# Patient Record
Sex: FEMALE | Race: WHITE | Employment: OTHER
[De-identification: names, ages, dates, MRNs, and addresses within clinical notes are randomized per-mention and may not be internally consistent; named-entity substitution may affect disease eponyms.]

---

## 2017-01-04 RX ORDER — HYDROCODONE BITARTRATE AND ACETAMINOPHEN 5; 325 MG/1; MG/1
TABLET ORAL
Qty: 120 TABLET | Refills: 0 | Status: SHIPPED | OUTPATIENT
Start: 2017-01-04 | End: 2017-02-07 | Stop reason: SDUPTHER

## 2017-01-20 DIAGNOSIS — E03.8 OTHER SPECIFIED HYPOTHYROIDISM: ICD-10-CM

## 2017-01-20 RX ORDER — LEVOTHYROXINE SODIUM 0.1 MG/1
100 TABLET ORAL DAILY
Qty: 30 TABLET | Refills: 5 | Status: SHIPPED | OUTPATIENT
Start: 2017-01-20 | End: 2017-02-07 | Stop reason: SDUPTHER

## 2017-01-23 RX ORDER — CLONAZEPAM 0.5 MG/1
TABLET ORAL
Qty: 90 TABLET | Refills: 0 | Status: SHIPPED | OUTPATIENT
Start: 2017-01-23 | End: 2017-02-27 | Stop reason: SDUPTHER

## 2017-01-27 ENCOUNTER — TELEPHONE (OUTPATIENT)
Dept: INTERNAL MEDICINE | Facility: CLINIC | Age: 62
End: 2017-01-27

## 2017-01-29 DIAGNOSIS — E11.65 UNCONTROLLED TYPE 2 DIABETES MELLITUS WITH HYPERGLYCEMIA, WITHOUT LONG-TERM CURRENT USE OF INSULIN (HCC): ICD-10-CM

## 2017-01-30 RX ORDER — GLIPIZIDE 10 MG/1
TABLET ORAL
Qty: 120 TABLET | Refills: 3 | Status: SHIPPED | OUTPATIENT
Start: 2017-01-30 | End: 2017-02-07 | Stop reason: SDUPTHER

## 2017-01-30 RX ORDER — METOPROLOL TARTRATE 100 MG/1
TABLET ORAL
Qty: 60 TABLET | Refills: 3 | Status: SHIPPED | OUTPATIENT
Start: 2017-01-30 | End: 2017-05-30 | Stop reason: SDUPTHER

## 2017-02-02 RX ORDER — HYDROCODONE BITARTRATE AND ACETAMINOPHEN 5; 325 MG/1; MG/1
TABLET ORAL
Qty: 120 TABLET | Refills: 0 | Status: SHIPPED | OUTPATIENT
Start: 2017-02-02 | End: 2017-02-07 | Stop reason: SDUPTHER

## 2017-02-02 RX ORDER — GABAPENTIN 800 MG/1
TABLET ORAL
Qty: 120 TABLET | Refills: 3 | Status: SHIPPED | OUTPATIENT
Start: 2017-02-02 | End: 2017-06-06 | Stop reason: SDUPTHER

## 2017-02-07 ENCOUNTER — OFFICE VISIT (OUTPATIENT)
Dept: INTERNAL MEDICINE | Facility: CLINIC | Age: 62
End: 2017-02-07

## 2017-02-07 VITALS
HEIGHT: 62 IN | DIASTOLIC BLOOD PRESSURE: 83 MMHG | WEIGHT: 241.2 LBS | SYSTOLIC BLOOD PRESSURE: 140 MMHG | HEART RATE: 77 BPM | TEMPERATURE: 97.9 F | OXYGEN SATURATION: 96 % | BODY MASS INDEX: 44.39 KG/M2

## 2017-02-07 DIAGNOSIS — K21.9 GASTROESOPHAGEAL REFLUX DISEASE WITHOUT ESOPHAGITIS: ICD-10-CM

## 2017-02-07 DIAGNOSIS — G89.29 CHRONIC PAIN OF BOTH KNEES: ICD-10-CM

## 2017-02-07 DIAGNOSIS — M25.561 CHRONIC PAIN OF BOTH KNEES: ICD-10-CM

## 2017-02-07 DIAGNOSIS — M25.562 CHRONIC PAIN OF BOTH KNEES: ICD-10-CM

## 2017-02-07 DIAGNOSIS — I10 ESSENTIAL HYPERTENSION: ICD-10-CM

## 2017-02-07 DIAGNOSIS — E11.65 UNCONTROLLED TYPE 2 DIABETES MELLITUS WITH HYPERGLYCEMIA, WITHOUT LONG-TERM CURRENT USE OF INSULIN (HCC): ICD-10-CM

## 2017-02-07 DIAGNOSIS — E03.8 OTHER SPECIFIED HYPOTHYROIDISM: ICD-10-CM

## 2017-02-07 DIAGNOSIS — M79.644 THUMB PAIN, RIGHT: ICD-10-CM

## 2017-02-07 DIAGNOSIS — M79.89 LEG SWELLING: ICD-10-CM

## 2017-02-07 DIAGNOSIS — E11.42 DIABETIC POLYNEUROPATHY ASSOCIATED WITH TYPE 2 DIABETES MELLITUS (HCC): ICD-10-CM

## 2017-02-07 PROBLEM — M79.646 THUMB PAIN: Status: ACTIVE | Noted: 2017-02-07

## 2017-02-07 LAB — HBA1C MFR BLD: 11 %

## 2017-02-07 PROCEDURE — 83036 HEMOGLOBIN GLYCOSYLATED A1C: CPT | Performed by: INTERNAL MEDICINE

## 2017-02-07 PROCEDURE — 99214 OFFICE O/P EST MOD 30 MIN: CPT | Performed by: INTERNAL MEDICINE

## 2017-02-07 RX ORDER — DOCUSATE SODIUM 100 MG/1
CAPSULE, LIQUID FILLED ORAL
Qty: 60 CAPSULE | Refills: 3 | Status: SHIPPED | OUTPATIENT
Start: 2017-02-07 | End: 2018-07-07

## 2017-02-07 RX ORDER — DULOXETIN HYDROCHLORIDE 60 MG/1
CAPSULE, DELAYED RELEASE ORAL
Qty: 30 CAPSULE | Refills: 2 | Status: SHIPPED | OUTPATIENT
Start: 2017-02-07 | End: 2017-05-30 | Stop reason: SDUPTHER

## 2017-02-07 RX ORDER — PANTOPRAZOLE SODIUM 40 MG/1
40 TABLET, DELAYED RELEASE ORAL DAILY
Qty: 30 TABLET | Refills: 3 | Status: SHIPPED | OUTPATIENT
Start: 2017-02-07 | End: 2018-07-07

## 2017-02-07 RX ORDER — LISINOPRIL 10 MG/1
TABLET ORAL
Qty: 30 TABLET | Refills: 3 | Status: SHIPPED | OUTPATIENT
Start: 2017-02-07 | End: 2017-08-31 | Stop reason: SDUPTHER

## 2017-02-07 RX ORDER — GLIPIZIDE 10 MG/1
10 TABLET ORAL
Qty: 60 TABLET | Refills: 3 | Status: SHIPPED | OUTPATIENT
Start: 2017-02-07 | End: 2017-07-12 | Stop reason: SDUPTHER

## 2017-02-07 RX ORDER — LEVOTHYROXINE SODIUM 0.1 MG/1
100 TABLET ORAL DAILY
Qty: 30 TABLET | Refills: 5 | Status: ON HOLD | OUTPATIENT
Start: 2017-02-07 | End: 2020-04-21 | Stop reason: HOSPADM

## 2017-02-07 RX ORDER — FUROSEMIDE 20 MG/1
TABLET ORAL
Qty: 90 TABLET | Refills: 3 | Status: SHIPPED | OUTPATIENT
Start: 2017-02-07 | End: 2020-09-25 | Stop reason: SDUPTHER

## 2017-02-07 RX ORDER — HYDROCODONE BITARTRATE AND ACETAMINOPHEN 5; 325 MG/1; MG/1
1 TABLET ORAL EVERY 6 HOURS PRN
Qty: 120 TABLET | Refills: 0 | Status: SHIPPED | OUTPATIENT
Start: 2017-02-07 | End: 2017-04-03 | Stop reason: SDUPTHER

## 2017-02-07 RX ORDER — IBUPROFEN 800 MG/1
800 TABLET ORAL EVERY 8 HOURS PRN
Qty: 90 TABLET | Refills: 3 | Status: SHIPPED | OUTPATIENT
Start: 2017-02-07 | End: 2017-08-03 | Stop reason: SDUPTHER

## 2017-02-07 RX ORDER — ATORVASTATIN CALCIUM 40 MG/1
40 TABLET, FILM COATED ORAL DAILY
Qty: 30 TABLET | Refills: 3 | Status: SHIPPED | OUTPATIENT
Start: 2017-02-07 | End: 2017-06-06 | Stop reason: SDUPTHER

## 2017-02-07 ASSESSMENT — PATIENT HEALTH QUESTIONNAIRE - PHQ9
SUM OF ALL RESPONSES TO PHQ9 QUESTIONS 1 & 2: 2
2. FEELING DOWN, DEPRESSED OR HOPELESS: 1
1. LITTLE INTEREST OR PLEASURE IN DOING THINGS: 1
SUM OF ALL RESPONSES TO PHQ QUESTIONS 1-9: 2

## 2017-02-20 ENCOUNTER — HOSPITAL ENCOUNTER (OUTPATIENT)
Dept: GENERAL RADIOLOGY | Age: 62
Discharge: HOME | End: 2017-02-20
Payer: COMMERCIAL

## 2017-02-20 ENCOUNTER — HOSPITAL ENCOUNTER (OUTPATIENT)
Age: 62
Discharge: HOME | End: 2017-02-20
Payer: COMMERCIAL

## 2017-02-20 DIAGNOSIS — G89.29 CHRONIC PAIN OF BOTH KNEES: ICD-10-CM

## 2017-02-20 DIAGNOSIS — M25.562 CHRONIC PAIN OF BOTH KNEES: ICD-10-CM

## 2017-02-20 DIAGNOSIS — M25.561 CHRONIC PAIN OF BOTH KNEES: ICD-10-CM

## 2017-02-20 DIAGNOSIS — M79.644 THUMB PAIN, RIGHT: ICD-10-CM

## 2017-02-20 PROCEDURE — 73562 X-RAY EXAM OF KNEE 3: CPT

## 2017-02-20 PROCEDURE — 73130 X-RAY EXAM OF HAND: CPT | Performed by: RADIOLOGY

## 2017-02-20 PROCEDURE — 73130 X-RAY EXAM OF HAND: CPT

## 2017-02-27 RX ORDER — CLONAZEPAM 0.5 MG/1
TABLET ORAL
Qty: 90 TABLET | Refills: 0 | Status: SHIPPED | OUTPATIENT
Start: 2017-02-27 | End: 2017-04-07 | Stop reason: SDUPTHER

## 2017-03-17 ENCOUNTER — TELEPHONE (OUTPATIENT)
Dept: INTERNAL MEDICINE | Age: 62
End: 2017-03-17

## 2017-03-22 ENCOUNTER — TELEPHONE (OUTPATIENT)
Dept: INTERNAL MEDICINE | Age: 62
End: 2017-03-22

## 2017-03-22 RX ORDER — ALOGLIPTIN 12.5 MG/1
12.5 TABLET, FILM COATED ORAL DAILY
Qty: 30 TABLET | Refills: 5 | Status: SHIPPED | OUTPATIENT
Start: 2017-03-22 | End: 2018-07-07

## 2017-03-24 ENCOUNTER — TELEPHONE (OUTPATIENT)
Dept: INTERNAL MEDICINE | Age: 62
End: 2017-03-24

## 2017-04-03 RX ORDER — HYDROCODONE BITARTRATE AND ACETAMINOPHEN 5; 325 MG/1; MG/1
1 TABLET ORAL EVERY 6 HOURS PRN
Qty: 120 TABLET | Refills: 0 | Status: SHIPPED | OUTPATIENT
Start: 2017-04-03 | End: 2017-05-04 | Stop reason: SDUPTHER

## 2017-04-10 RX ORDER — CLONAZEPAM 0.5 MG/1
TABLET ORAL
Qty: 90 TABLET | Refills: 0 | Status: SHIPPED | OUTPATIENT
Start: 2017-04-10 | End: 2017-05-22 | Stop reason: SDUPTHER

## 2017-05-05 RX ORDER — HYDROCODONE BITARTRATE AND ACETAMINOPHEN 5; 325 MG/1; MG/1
1 TABLET ORAL EVERY 6 HOURS PRN
Qty: 120 TABLET | Refills: 0 | Status: SHIPPED | OUTPATIENT
Start: 2017-05-05 | End: 2017-06-06 | Stop reason: SDUPTHER

## 2017-05-08 ENCOUNTER — TELEPHONE (OUTPATIENT)
Dept: INTERNAL MEDICINE | Age: 62
End: 2017-05-08

## 2017-05-22 RX ORDER — CLONAZEPAM 0.5 MG/1
TABLET ORAL
Qty: 90 TABLET | Refills: 0 | Status: SHIPPED | OUTPATIENT
Start: 2017-05-22 | End: 2017-06-23 | Stop reason: SDUPTHER

## 2017-05-30 DIAGNOSIS — E11.42 DIABETIC POLYNEUROPATHY ASSOCIATED WITH TYPE 2 DIABETES MELLITUS (HCC): ICD-10-CM

## 2017-05-30 RX ORDER — DULOXETIN HYDROCHLORIDE 60 MG/1
CAPSULE, DELAYED RELEASE ORAL
Qty: 30 CAPSULE | Refills: 2 | Status: SHIPPED | OUTPATIENT
Start: 2017-05-30 | End: 2020-11-16 | Stop reason: SDUPTHER

## 2017-05-30 RX ORDER — METOPROLOL TARTRATE 100 MG/1
TABLET ORAL
Qty: 60 TABLET | Refills: 3 | Status: SHIPPED | OUTPATIENT
Start: 2017-05-30 | End: 2017-06-27 | Stop reason: SDUPTHER

## 2017-06-06 DIAGNOSIS — G89.29 CHRONIC PAIN OF BOTH KNEES: Primary | ICD-10-CM

## 2017-06-06 DIAGNOSIS — M25.561 CHRONIC PAIN OF BOTH KNEES: Primary | ICD-10-CM

## 2017-06-06 DIAGNOSIS — M25.562 CHRONIC PAIN OF BOTH KNEES: Primary | ICD-10-CM

## 2017-06-07 ENCOUNTER — TELEPHONE (OUTPATIENT)
Dept: INTERNAL MEDICINE | Age: 62
End: 2017-06-07

## 2017-06-07 RX ORDER — HYDROCODONE BITARTRATE AND ACETAMINOPHEN 5; 325 MG/1; MG/1
1 TABLET ORAL EVERY 6 HOURS PRN
Qty: 120 TABLET | Refills: 0 | Status: SHIPPED | OUTPATIENT
Start: 2017-06-07 | End: 2017-07-07 | Stop reason: SDUPTHER

## 2017-06-07 RX ORDER — ATORVASTATIN CALCIUM 40 MG/1
40 TABLET, FILM COATED ORAL DAILY
Qty: 30 TABLET | Refills: 3 | Status: ON HOLD
Start: 2017-06-07 | End: 2020-03-30 | Stop reason: HOSPADM

## 2017-06-07 RX ORDER — GABAPENTIN 800 MG/1
TABLET ORAL
Qty: 120 TABLET | Refills: 3 | Status: SHIPPED | OUTPATIENT
Start: 2017-06-07 | End: 2017-07-17 | Stop reason: SDUPTHER

## 2017-06-12 ENCOUNTER — TELEPHONE (OUTPATIENT)
Dept: INTERNAL MEDICINE | Age: 62
End: 2017-06-12

## 2017-06-23 RX ORDER — GABAPENTIN 800 MG/1
TABLET ORAL
Qty: 120 TABLET | Refills: 3 | Status: SHIPPED | OUTPATIENT
Start: 2017-06-23 | End: 2020-07-10 | Stop reason: SDUPTHER

## 2017-06-23 RX ORDER — CLONAZEPAM 0.5 MG/1
TABLET ORAL
Qty: 90 TABLET | Refills: 0 | Status: SHIPPED | OUTPATIENT
Start: 2017-06-23 | End: 2017-07-23 | Stop reason: SDUPTHER

## 2017-06-23 RX ORDER — ATORVASTATIN CALCIUM 40 MG/1
TABLET, FILM COATED ORAL
Qty: 30 TABLET | Refills: 1 | Status: SHIPPED | OUTPATIENT
Start: 2017-06-23 | End: 2017-07-17 | Stop reason: SDUPTHER

## 2017-06-26 ENCOUNTER — TELEPHONE (OUTPATIENT)
Dept: INTERNAL MEDICINE | Age: 62
End: 2017-06-26

## 2017-06-27 RX ORDER — METOPROLOL TARTRATE 100 MG/1
TABLET ORAL
Qty: 60 TABLET | Refills: 3 | Status: ON HOLD | OUTPATIENT
Start: 2017-06-27 | End: 2020-03-30 | Stop reason: HOSPADM

## 2017-07-07 ENCOUNTER — HOSPITAL ENCOUNTER (OUTPATIENT)
Age: 62
Setting detail: SPECIMEN
Discharge: HOME | End: 2017-07-07
Payer: COMMERCIAL

## 2017-07-07 ENCOUNTER — OFFICE VISIT (OUTPATIENT)
Dept: INTERNAL MEDICINE | Age: 62
End: 2017-07-07
Payer: COMMERCIAL

## 2017-07-07 VITALS
HEART RATE: 73 BPM | WEIGHT: 232 LBS | OXYGEN SATURATION: 95 % | SYSTOLIC BLOOD PRESSURE: 124 MMHG | BODY MASS INDEX: 42.43 KG/M2 | DIASTOLIC BLOOD PRESSURE: 91 MMHG

## 2017-07-07 DIAGNOSIS — Z02.83 ENCOUNTER FOR DRUG SCREENING: Primary | ICD-10-CM

## 2017-07-07 DIAGNOSIS — G62.89 OTHER POLYNEUROPATHY: ICD-10-CM

## 2017-07-07 DIAGNOSIS — N39.0 RECURRENT UTI: ICD-10-CM

## 2017-07-07 DIAGNOSIS — Z12.11 COLON CANCER SCREENING: ICD-10-CM

## 2017-07-07 DIAGNOSIS — E03.8 OTHER SPECIFIED HYPOTHYROIDISM: ICD-10-CM

## 2017-07-07 LAB — HBA1C MFR BLD: 13.1 %

## 2017-07-07 PROCEDURE — 99214 OFFICE O/P EST MOD 30 MIN: CPT | Performed by: INTERNAL MEDICINE

## 2017-07-07 PROCEDURE — 83036 HEMOGLOBIN GLYCOSYLATED A1C: CPT | Performed by: INTERNAL MEDICINE

## 2017-07-07 RX ORDER — HYDROCODONE BITARTRATE AND ACETAMINOPHEN 5; 325 MG/1; MG/1
1 TABLET ORAL EVERY 6 HOURS PRN
Qty: 120 TABLET | Refills: 0 | Status: SHIPPED | OUTPATIENT
Start: 2017-07-07 | End: 2017-08-07 | Stop reason: SDUPTHER

## 2017-07-07 RX ORDER — ACETAMINOPHEN, ASPIRIN AND CAFFEINE 250; 250; 65 MG/1; MG/1; MG/1
1 TABLET, FILM COATED ORAL EVERY 6 HOURS PRN
COMMUNITY
End: 2017-07-07 | Stop reason: CLARIF

## 2017-07-07 RX ORDER — NITROFURANTOIN 25; 75 MG/1; MG/1
CAPSULE ORAL
COMMUNITY
Start: 2017-06-25 | End: 2017-07-17 | Stop reason: ALTCHOICE

## 2017-07-07 RX ORDER — OMEPRAZOLE 20 MG/1
20 CAPSULE, DELAYED RELEASE ORAL 2 TIMES DAILY
COMMUNITY
End: 2018-07-07

## 2017-07-08 LAB
BILIRUBIN URINE: NEGATIVE
COLOR: YELLOW
COMMENT UA: ABNORMAL
CREATININE URINE: 67.6 MG/DL (ref 28–217)
CULTURE: NORMAL
CULTURE: NORMAL
GLUCOSE URINE: ABNORMAL
KETONES, URINE: NEGATIVE
LEUKOCYTE ESTERASE, URINE: NEGATIVE
Lab: NORMAL
MICROALBUMIN/CREAT 24H UR: 23 MG/L
MICROALBUMIN/CREAT UR-RTO: 34 MCG/MG CREAT
NITRITE, URINE: NEGATIVE
PH UA: 5 (ref 5–8)
PROTEIN UA: NEGATIVE
SPECIFIC GRAVITY UA: 1.03 (ref 1–1.03)
SPECIMEN DESCRIPTION: NORMAL
STATUS: NORMAL
TURBIDITY: CLEAR
URINE HGB: NEGATIVE
UROBILINOGEN, URINE: NORMAL

## 2017-07-10 LAB
6-ACETYLMORPHINE, UR: NOT DETECTED
7-AMINOCLONAZEPAM, URINE: PRESENT
ALPHA-OH-ALPRAZ, URINE: NOT DETECTED
ALPRAZOLAM, URINE: NOT DETECTED
AMPHETAMINES, URINE: NOT DETECTED
BARBITURATES, URINE: NOT DETECTED
BENZOYLECGONINE, UR: NOT DETECTED
BUPRENORPHINE URINE: NOT DETECTED
CARISOPRODOL, UR: NOT DETECTED
CLONAZEPAM, URINE: NOT DETECTED
CODEINE, URINE: NOT DETECTED
CREATININE URINE: 70.7 MG/DL (ref 20–400)
DIAZEPAM, URINE: NOT DETECTED
DRUGS EXPECTED, UR: NORMAL
EER HI RES INTERP UR: NORMAL
ETHYL GLUCURONIDE UR: NOT DETECTED
FENTANYL URINE: NOT DETECTED
HYDROCODONE, URINE: PRESENT
HYDROMORPHONE, URINE: NOT DETECTED
LORAZEPAM, URINE: NOT DETECTED
MARIJUANA METAB, UR: PRESENT
MDA, UR: NOT DETECTED
MDEA, EVE, UR: NOT DETECTED
MDMA URINE: NOT DETECTED
MEPERIDINE METAB, UR: NOT DETECTED
METHADONE, URINE: NOT DETECTED
METHAMPHETAMINE, URINE: NOT DETECTED
METHYLPHENIDATE: NOT DETECTED
MIDAZOLAM, URINE: NOT DETECTED
MORPHINE URINE: NOT DETECTED
NORBUPRENORPHINE, URINE: NOT DETECTED
NORDIAZEPAM, URINE: NOT DETECTED
NORFENTANYL, URINE: NOT DETECTED
NORHYDROCODONE, URINE: PRESENT
NOROXYCODONE, URINE: NOT DETECTED
NOROXYMORPHONE, URINE: NOT DETECTED
OXAZEPAM, URINE: NOT DETECTED
OXYCODONE URINE: NOT DETECTED
OXYMORPHONE, URINE: NOT DETECTED
PAIN MANAGEMENT DRUG PANEL INTERP, URINE: NORMAL
PAIN MGT DRUG PANEL, HI RES, UR: NORMAL
PCP,URINE: NOT DETECTED
PHENTERMINE, UR: NOT DETECTED
PROPOXYPHENE, URINE: NOT DETECTED
TAPENTADOL, URINE: NOT DETECTED
TAPENTADOL-O-SULFATE, URINE: NOT DETECTED
TEMAZEPAM, URINE: NOT DETECTED
TRAMADOL, URINE: NOT DETECTED
ZOLPIDEM, URINE: NOT DETECTED

## 2017-07-11 ENCOUNTER — TELEPHONE (OUTPATIENT)
Dept: INTERNAL MEDICINE | Age: 62
End: 2017-07-11

## 2017-07-11 DIAGNOSIS — R32 URINARY INCONTINENCE, UNSPECIFIED TYPE: Primary | ICD-10-CM

## 2017-07-12 ENCOUNTER — TELEPHONE (OUTPATIENT)
Dept: INTERNAL MEDICINE | Age: 62
End: 2017-07-12

## 2017-07-12 DIAGNOSIS — E11.65 UNCONTROLLED TYPE 2 DIABETES MELLITUS WITH HYPERGLYCEMIA, WITHOUT LONG-TERM CURRENT USE OF INSULIN (HCC): ICD-10-CM

## 2017-07-12 RX ORDER — GLIPIZIDE 10 MG/1
20 TABLET ORAL
Qty: 120 TABLET | Refills: 3 | Status: SHIPPED
Start: 2017-07-12 | End: 2020-06-16 | Stop reason: ALTCHOICE

## 2017-07-14 ENCOUNTER — HOSPITAL ENCOUNTER (OUTPATIENT)
Age: 62
Discharge: HOME | End: 2017-07-14
Payer: COMMERCIAL

## 2017-07-14 LAB
CHOLESTEROL/HDL RATIO: 5.2
CHOLESTEROL: 183 MG/DL
HDLC SERPL-MCNC: 35 MG/DL
LDL CHOLESTEROL DIRECT: 80 MG/DL
LDL CHOLESTEROL: ABNORMAL MG/DL (ref 0–130)
TRIGL SERPL-MCNC: 557 MG/DL
VLDLC SERPL CALC-MCNC: ABNORMAL MG/DL (ref 1–30)

## 2017-07-14 PROCEDURE — 83721 ASSAY OF BLOOD LIPOPROTEIN: CPT

## 2017-07-14 PROCEDURE — 36415 COLL VENOUS BLD VENIPUNCTURE: CPT

## 2017-07-14 PROCEDURE — 80061 LIPID PANEL: CPT

## 2017-07-17 ENCOUNTER — HOSPITAL ENCOUNTER (OUTPATIENT)
Dept: PAIN MANAGEMENT | Age: 62
Discharge: HOME | End: 2017-07-17
Payer: COMMERCIAL

## 2017-07-17 VITALS
HEIGHT: 62 IN | RESPIRATION RATE: 20 BRPM | TEMPERATURE: 98.3 F | BODY MASS INDEX: 42.69 KG/M2 | SYSTOLIC BLOOD PRESSURE: 156 MMHG | OXYGEN SATURATION: 98 % | HEART RATE: 75 BPM | DIASTOLIC BLOOD PRESSURE: 88 MMHG | WEIGHT: 232 LBS

## 2017-07-17 PROCEDURE — 99203 OFFICE O/P NEW LOW 30 MIN: CPT

## 2017-07-17 ASSESSMENT — PAIN SCALES - GENERAL: PAINLEVEL_OUTOF10: 8

## 2017-07-17 ASSESSMENT — PAIN DESCRIPTION - PROGRESSION: CLINICAL_PROGRESSION: GRADUALLY WORSENING

## 2017-07-17 ASSESSMENT — PAIN DESCRIPTION - FREQUENCY: FREQUENCY: CONTINUOUS

## 2017-07-17 ASSESSMENT — PAIN DESCRIPTION - ORIENTATION: ORIENTATION: RIGHT;LEFT;LOWER;POSTERIOR

## 2017-07-17 ASSESSMENT — PAIN DESCRIPTION - PAIN TYPE: TYPE: CHRONIC PAIN

## 2017-07-17 ASSESSMENT — PAIN DESCRIPTION - ONSET: ONSET: ON-GOING

## 2017-08-04 RX ORDER — IBUPROFEN 800 MG/1
TABLET ORAL
Qty: 90 TABLET | Refills: 3 | Status: SHIPPED | OUTPATIENT
Start: 2017-08-04 | End: 2018-07-30

## 2017-08-07 ENCOUNTER — TELEPHONE (OUTPATIENT)
Dept: UROLOGY | Age: 62
End: 2017-08-07

## 2017-08-08 RX ORDER — HYDROCODONE BITARTRATE AND ACETAMINOPHEN 5; 325 MG/1; MG/1
1 TABLET ORAL EVERY 6 HOURS PRN
Qty: 120 TABLET | Refills: 0 | Status: SHIPPED | OUTPATIENT
Start: 2017-08-08 | End: 2017-09-06 | Stop reason: SDUPTHER

## 2017-08-24 RX ORDER — CLONAZEPAM 0.5 MG/1
0.5 TABLET ORAL 3 TIMES DAILY PRN
Qty: 90 TABLET | Refills: 0 | Status: SHIPPED | OUTPATIENT
Start: 2017-08-24 | End: 2017-09-14 | Stop reason: SDUPTHER

## 2017-08-31 ENCOUNTER — TELEPHONE (OUTPATIENT)
Dept: UROLOGY | Age: 62
End: 2017-08-31

## 2017-08-31 DIAGNOSIS — I10 ESSENTIAL HYPERTENSION: ICD-10-CM

## 2017-09-01 RX ORDER — LISINOPRIL 10 MG/1
TABLET ORAL
Qty: 30 TABLET | Refills: 0 | Status: SHIPPED | OUTPATIENT
Start: 2017-09-01 | End: 2018-07-07

## 2017-09-06 ENCOUNTER — TELEPHONE (OUTPATIENT)
Dept: INTERNAL MEDICINE | Age: 62
End: 2017-09-06

## 2017-09-06 RX ORDER — HYDROCODONE BITARTRATE AND ACETAMINOPHEN 5; 325 MG/1; MG/1
1 TABLET ORAL EVERY 6 HOURS PRN
Qty: 60 TABLET | Refills: 0 | Status: SHIPPED | OUTPATIENT
Start: 2017-09-06 | End: 2018-07-07

## 2017-09-08 ENCOUNTER — TELEPHONE (OUTPATIENT)
Dept: INTERNAL MEDICINE | Age: 62
End: 2017-09-08

## 2017-09-14 ENCOUNTER — TELEPHONE (OUTPATIENT)
Dept: INTERNAL MEDICINE | Age: 62
End: 2017-09-14

## 2017-09-14 RX ORDER — CLONAZEPAM 0.5 MG/1
0.5 TABLET ORAL 3 TIMES DAILY PRN
Qty: 12 TABLET | Refills: 0 | Status: SHIPPED | OUTPATIENT
Start: 2017-09-14 | End: 2018-07-07

## 2017-10-06 RX ORDER — ATORVASTATIN CALCIUM 40 MG/1
TABLET, FILM COATED ORAL
Qty: 30 TABLET | Refills: 3 | Status: SHIPPED | OUTPATIENT
Start: 2017-10-06 | End: 2018-07-07

## 2017-10-06 NOTE — TELEPHONE ENCOUNTER
Health Maintenance   Topic Date Due    Colon Cancer Screen FIT/FOBT  09/29/2012    Diabetic retinal exam  03/07/2017    Zostavax vaccine  03/19/2017    Diabetic hemoglobin A1C test  10/07/2017    Pneumococcal med risk (1 of 1 - PPSV23) 10/07/2017 (Originally 3/19/1976)    Flu vaccine (1) 10/18/2017 (Originally 9/1/2017)    Hepatitis C screen  02/03/2018 (Originally 1955)    DTaP/Tdap/Td vaccine (1 - Tdap) 03/09/2018 (Originally 3/19/1976)    Cervical cancer screen  02/11/2018    Breast cancer screen  06/01/2018    Diabetic foot exam  07/07/2018    Diabetic microalbuminuria test  07/07/2018    Lipid screen  07/14/2018    HIV screen  Addressed       Hemoglobin A1C (%)   Date Value   07/07/2017 13.1   02/07/2017 11.0   05/24/2016 10.3             ( goal A1C is < 7)   Microalb/Crt. Ratio (mcg/mg creat)   Date Value   07/07/2017 34 (H)     LDL Cholesterol (mg/dL)   Date Value   07/14/2017            (goal LDL is <100)   AST (U/L)   Date Value   05/27/2015 24     ALT (U/L)   Date Value   05/27/2015 29     BUN (mg/dL)   Date Value   08/10/2015 13     BP Readings from Last 3 Encounters:   07/17/17 (!) 156/88   07/07/17 (!) 124/91   02/07/17 140/83          (goal 120/80)    All Future Testing planned in [facility]  Lab Frequency Next Occurrence   Microalbumin, Ur Once 10/15/2017   POCT Fecal Immunochemical Test (FIT) Once 11/3/2017       Next Visit Date:  No future appointments.          Patient Active Problem List:     Chronic neck pain     Abnormal mammogram     DM neuropathy takes Percocet     Hypothyroidism     Depression     MVP (mitral valve prolapse)     Constipation     Chronic prescription benzodiazepine use     Chronic use of opiate drugs therapeutic purposes     Morbid obesity with BMI of 45.0-49.9, adult ([facility] [address]     Mediastinal cyst     Uncontrolled type 2 diabetes mellitus with diabetic polyneuropathy, without long-term current use of insulin (HCC)     Essential hypertension     Thumb pain Chronic pain of both knees

## 2017-12-08 ENCOUNTER — HOSPITAL ENCOUNTER (OUTPATIENT)
Age: 62
Setting detail: SPECIMEN
Discharge: HOME | End: 2017-12-08
Payer: COMMERCIAL

## 2017-12-08 LAB
ALBUMIN SERPL-MCNC: 3.5 G/DL (ref 3.5–5.2)
ALBUMIN/GLOBULIN RATIO: 0.9 (ref 1–2.5)
ALP BLD-CCNC: 103 U/L (ref 35–104)
ALT SERPL-CCNC: 21 U/L (ref 5–33)
ANION GAP SERPL CALCULATED.3IONS-SCNC: 23 MMOL/L (ref 9–17)
AST SERPL-CCNC: 15 U/L
BILIRUB SERPL-MCNC: 0.29 MG/DL (ref 0.3–1.2)
BUN BLDV-MCNC: 13 MG/DL (ref 8–23)
BUN/CREAT BLD: ABNORMAL (ref 9–20)
CALCIUM SERPL-MCNC: 9.6 MG/DL (ref 8.6–10.4)
CHLORIDE BLD-SCNC: 96 MMOL/L (ref 98–107)
CHOLESTEROL/HDL RATIO: 9.2
CHOLESTEROL: 366 MG/DL
CO2: 19 MMOL/L (ref 20–31)
CREAT SERPL-MCNC: 0.48 MG/DL (ref 0.5–0.9)
CREATININE URINE: 34 MG/DL (ref 28–217)
ESTIMATED AVERAGE GLUCOSE: 283 MG/DL
GFR AFRICAN AMERICAN: >60 ML/MIN
GFR NON-AFRICAN AMERICAN: >60 ML/MIN
GFR SERPL CREATININE-BSD FRML MDRD: ABNORMAL ML/MIN/{1.73_M2}
GFR SERPL CREATININE-BSD FRML MDRD: ABNORMAL ML/MIN/{1.73_M2}
GLUCOSE BLD-MCNC: 393 MG/DL (ref 70–99)
HBA1C MFR BLD: 11.5 % (ref 4–6)
HDLC SERPL-MCNC: 40 MG/DL
LDL CHOLESTEROL DIRECT: 200 MG/DL
LDL CHOLESTEROL: ABNORMAL MG/DL (ref 0–130)
MICROALBUMIN/CREAT 24H UR: <12 MG/L
MICROALBUMIN/CREAT UR-RTO: NORMAL MCG/MG CREAT
POTASSIUM SERPL-SCNC: 4.7 MMOL/L (ref 3.7–5.3)
SODIUM BLD-SCNC: 138 MMOL/L (ref 135–144)
TOTAL PROTEIN: 7.3 G/DL (ref 6.4–8.3)
TRIGL SERPL-MCNC: 1014 MG/DL
TSH SERPL DL<=0.05 MIU/L-ACNC: 6.23 MIU/L (ref 0.3–5)
VLDLC SERPL CALC-MCNC: ABNORMAL MG/DL (ref 1–30)

## 2018-01-09 ENCOUNTER — HOSPITAL ENCOUNTER (OUTPATIENT)
Age: 63
Setting detail: SPECIMEN
Discharge: HOME | End: 2018-01-09
Payer: COMMERCIAL

## 2018-01-12 LAB
DATE, STOOL #1: NORMAL
DATE, STOOL #2: NORMAL
DATE, STOOL #3: NORMAL
HEMOCCULT SP1 STL QL: NEGATIVE
HEMOCCULT SP2 STL QL: NEGATIVE
HEMOCCULT SP3 STL QL: NEGATIVE
TIME, STOOL #1: NORMAL
TIME, STOOL #2: NORMAL
TIME, STOOL #3: NORMAL

## 2018-02-19 ENCOUNTER — HOSPITAL ENCOUNTER (OUTPATIENT)
Age: 63
Discharge: HOME | End: 2018-02-21
Payer: COMMERCIAL

## 2018-02-19 ENCOUNTER — HOSPITAL ENCOUNTER (OUTPATIENT)
Age: 63
Setting detail: SPECIMEN
Discharge: HOME | End: 2018-02-19
Payer: COMMERCIAL

## 2018-02-19 ENCOUNTER — HOSPITAL ENCOUNTER (OUTPATIENT)
Dept: GENERAL RADIOLOGY | Age: 63
Discharge: HOME | End: 2018-02-21
Payer: COMMERCIAL

## 2018-02-19 DIAGNOSIS — G89.29 CHRONIC LOW BACK PAIN, UNSPECIFIED BACK PAIN LATERALITY, WITH SCIATICA PRESENCE UNSPECIFIED: ICD-10-CM

## 2018-02-19 DIAGNOSIS — M54.5 CHRONIC LOW BACK PAIN, UNSPECIFIED BACK PAIN LATERALITY, WITH SCIATICA PRESENCE UNSPECIFIED: ICD-10-CM

## 2018-02-19 PROCEDURE — 72100 X-RAY EXAM L-S SPINE 2/3 VWS: CPT

## 2018-02-21 LAB
HPV SAMPLE: NORMAL
HPV SOURCE: NORMAL
HPV, GENOTYPE 16: NOT DETECTED
HPV, GENOTYPE 18: NOT DETECTED
HPV, HIGH RISK OTHER: NOT DETECTED
HPV, INTERPRETATION: NORMAL

## 2018-02-25 LAB — CYTOLOGY REPORT: NORMAL

## 2018-03-23 ENCOUNTER — HOSPITAL ENCOUNTER (OUTPATIENT)
Dept: ULTRASOUND IMAGING | Age: 63
Discharge: HOME | End: 2018-03-25
Payer: COMMERCIAL

## 2018-03-23 ENCOUNTER — HOSPITAL ENCOUNTER (OUTPATIENT)
Dept: MAMMOGRAPHY | Age: 63
Discharge: HOME | End: 2018-03-25
Payer: COMMERCIAL

## 2018-03-23 DIAGNOSIS — N63.20 LUMP OF LEFT BREAST: ICD-10-CM

## 2018-03-23 DIAGNOSIS — Z87.898 HISTORY OF LUMP OF RIGHT BREAST: ICD-10-CM

## 2018-03-23 PROCEDURE — 76642 ULTRASOUND BREAST LIMITED: CPT

## 2018-03-23 PROCEDURE — G0279 TOMOSYNTHESIS, MAMMO: HCPCS

## 2018-07-03 ENCOUNTER — HOSPITAL ENCOUNTER (EMERGENCY)
Age: 63
Discharge: HOME | End: 2018-07-03
Attending: EMERGENCY MEDICINE
Payer: COMMERCIAL

## 2018-07-03 ENCOUNTER — APPOINTMENT (OUTPATIENT)
Dept: GENERAL RADIOLOGY | Age: 63
End: 2018-07-03
Payer: COMMERCIAL

## 2018-07-03 VITALS
HEART RATE: 87 BPM | HEIGHT: 62 IN | OXYGEN SATURATION: 96 % | BODY MASS INDEX: 42.33 KG/M2 | TEMPERATURE: 97.7 F | DIASTOLIC BLOOD PRESSURE: 81 MMHG | RESPIRATION RATE: 18 BRPM | SYSTOLIC BLOOD PRESSURE: 132 MMHG | WEIGHT: 230 LBS

## 2018-07-03 DIAGNOSIS — J01.90 ACUTE SINUSITIS, RECURRENCE NOT SPECIFIED, UNSPECIFIED LOCATION: Primary | ICD-10-CM

## 2018-07-03 PROCEDURE — 99283 EMERGENCY DEPT VISIT LOW MDM: CPT

## 2018-07-03 PROCEDURE — 6370000000 HC RX 637 (ALT 250 FOR IP): Performed by: NURSE PRACTITIONER

## 2018-07-03 PROCEDURE — 71046 X-RAY EXAM CHEST 2 VIEWS: CPT

## 2018-07-03 RX ORDER — DOXYCYCLINE 100 MG/1
100 CAPSULE ORAL ONCE
Status: COMPLETED | OUTPATIENT
Start: 2018-07-03 | End: 2018-07-03

## 2018-07-03 RX ORDER — DOXYCYCLINE HYCLATE 100 MG
100 TABLET ORAL 2 TIMES DAILY
Qty: 20 TABLET | Refills: 0 | Status: SHIPPED | OUTPATIENT
Start: 2018-07-03 | End: 2018-07-13

## 2018-07-03 RX ORDER — FLUTICASONE PROPIONATE 50 MCG
1 SPRAY, SUSPENSION (ML) NASAL DAILY
Qty: 1 BOTTLE | Refills: 0 | Status: SHIPPED | OUTPATIENT
Start: 2018-07-03 | End: 2018-07-30

## 2018-07-03 RX ADMIN — DOXYCYCLINE 100 MG: 100 CAPSULE ORAL at 22:38

## 2018-07-03 ASSESSMENT — ENCOUNTER SYMPTOMS
VOMITING: 0
TROUBLE SWALLOWING: 0
NAUSEA: 0
SINUS PRESSURE: 1
COUGH: 1
SINUS PAIN: 1
SHORTNESS OF BREATH: 0

## 2018-07-03 ASSESSMENT — PAIN DESCRIPTION - LOCATION: LOCATION: THROAT;NOSE

## 2018-07-03 ASSESSMENT — PAIN DESCRIPTION - DESCRIPTORS: DESCRIPTORS: BURNING

## 2018-07-03 ASSESSMENT — PAIN SCALES - GENERAL: PAINLEVEL_OUTOF10: 6

## 2018-07-03 ASSESSMENT — PAIN DESCRIPTION - PAIN TYPE: TYPE: ACUTE PAIN

## 2018-07-04 NOTE — ED PROVIDER NOTES
hepatitis A     possible history of hepatitis A in the past    History of renal calculi     Hypertension     Hyperthyroidism     Hypothyroidism     Morbid obesity with BMI of 45.0-49.9, adult ([facility] [address] 3/31/2016    Nephrolithiasis     Neuropathy (HCC)     Type II or unspecified type diabetes mellitus without mention of complication, not stated as uncontrolled     non insulin dependent     UTI (lower urinary tract infection)      Reviewed. SURGICAL HISTORY           Procedure Laterality Date    BREAST BIOPSY  2012    negative    CARDIOVASCULAR STRESS TEST      7/17/15 no results    CHOLECYSTECTOMY      CYST REMOVAL      right hand    CYSTOSCOPY      ENDOMETRIAL BIOPSY  2009    LITHOTRIPSY      TONSILLECTOMY      TUBAL LIGATION       Reviewed. CURRENT MEDICATIONS       Discharge Medication List as of 7/3/2018 10:23 PM      CONTINUE these medications which have NOT CHANGED    Details   !! atorvastatin (LIPITOR) 40 MG tablet take 1 tablet by mouth once daily, Disp-30 tablet, R-3Normal      clonazePAM (KLONOPIN) 0.5 MG tablet Take 1 tablet by mouth 3 times daily as needed for Anxiety, Disp-12 tablet, R-0Normal      HYDROcodone-acetaminophen (NORCO) 5-325 MG per tablet Take 1 tablet by mouth every 6 hours as needed for Pain ., Disp-60 tablet, R-0Must last 30 days. Normal      lisinopril (PRINIVIL;ZESTRIL) 10 MG tablet TAKE ONE-HALF TABLET BY MOUTH DAILY, Disp-30 tablet, R-0Normal      ibuprofen (ADVIL;MOTRIN) 800 MG tablet TAKE ONE TABLET BY MOUTH EVERY 8 HOURS AS NEEDED FOR PAIN, Disp-90 tablet, R-3Normal      !! glipiZIDE (GLUCOTROL) 10 MG tablet take 2 tablets by mouth twice a day before meals, Disp-120 tablet, R-3Normal      !!  DULoxetine (CYMBALTA) 60 MG extended release capsule take 1 capsule by mouth once daily, Disp-30 capsule, R-2Normal      !! glipiZIDE (GLUCOTROL) 10 MG tablet Take 2 tablets by mouth 2 times daily (before meals), Disp-120 tablet, R-3Normal      omeprazole (PRILOSEC) 20 MG delayed HISTORY      reports that she has quit smoking. Her smoking use included Cigarettes. She has never used smokeless tobacco. She reports that she uses drugs, including Marijuana. She reports that she does not drink alcohol. Reviewed. PHYSICAL EXAM    (up to 7 for level 4, 8 or more for level 5)     ED Triage Vitals [07/03/18 2008]   BP Temp Temp Source Pulse Resp SpO2 Height Weight   132/81 97.7 °F (36.5 °C) Temporal 87 18 96 % 5' 2\" (1.575 m) 230 lb (104.3 kg)       Physical Exam   Constitutional: She is oriented to person, place, and time. She appears well-developed and well-nourished. HENT:   Head: Normocephalic and atraumatic. Right Ear: Tympanic membrane, external ear and ear canal normal.   Left Ear: Tympanic membrane, external ear and ear canal normal.   Nose: Rhinorrhea present. Right sinus exhibits maxillary sinus tenderness and frontal sinus tenderness. Left sinus exhibits maxillary sinus tenderness and frontal sinus tenderness. Mouth/Throat: Uvula is midline and mucous membranes are normal. Posterior oropharyngeal erythema present. Congestion. Postnasal drip. Eyes: Right eye exhibits no discharge. Left eye exhibits no discharge. No scleral icterus. Neck: Normal range of motion. Cardiovascular: Normal rate and regular rhythm. Pulmonary/Chest: Effort normal and breath sounds normal. No stridor. No respiratory distress. She has no decreased breath sounds. She has no wheezes. She has no rhonchi. She has no rales. Musculoskeletal: Normal range of motion. She exhibits no edema. Neurological: She is alert and oriented to person, place, and time. Coordination normal.   Skin: Skin is warm and dry. She is not diaphoretic. Psychiatric: She has a normal mood and affect.  Her behavior is normal.       DIAGNOSTIC RESULTS     RADIOLOGY:   Xr Chest Standard (2 Vw)    Result Date: 7/3/2018  EXAMINATION: TWO VIEWS OF THE CHEST 7/3/2018 9:32 pm COMPARISON: March 22, 2013 HISTORY: [address] R-0Print             (Please note that portions of this note were completed with a voice recognition program.  Efforts were made to edit the dictations but occasionally words are mis-transcribed.)    Candido Barron APRN - CNP  07/04/18 0110

## 2018-07-06 NOTE — ED PROVIDER NOTES
[address]  eMERGENCY dEPARTMENT eNCOUnter   Independent Attestation     Pt Name: Radha Villanueva  MRN: [id number]  Katlyngfraquel 1955  Date of evaluation: 7/6/18     Radha Villanueva is a 66 y.o. female with CC: Sinus Problem and Pharyngitis      Based on the medical record the care appears appropriate. I was personally available for consultation in the Emergency Department.     Marc Vang MD  Attending Emergency Physician                    Amy Britton MD  07/06/18 7964

## 2018-07-07 ENCOUNTER — HOSPITAL ENCOUNTER (EMERGENCY)
Age: 63
Discharge: HOME | End: 2018-07-07
Attending: EMERGENCY MEDICINE
Payer: COMMERCIAL

## 2018-07-07 ENCOUNTER — APPOINTMENT (OUTPATIENT)
Dept: GENERAL RADIOLOGY | Age: 63
End: 2018-07-07
Payer: COMMERCIAL

## 2018-07-07 VITALS
HEART RATE: 62 BPM | DIASTOLIC BLOOD PRESSURE: 72 MMHG | HEIGHT: 62 IN | SYSTOLIC BLOOD PRESSURE: 125 MMHG | BODY MASS INDEX: 42.33 KG/M2 | OXYGEN SATURATION: 96 % | RESPIRATION RATE: 16 BRPM | TEMPERATURE: 97.5 F | WEIGHT: 230 LBS

## 2018-07-07 DIAGNOSIS — R73.9 HYPERGLYCEMIA: ICD-10-CM

## 2018-07-07 DIAGNOSIS — J06.9 UPPER RESPIRATORY TRACT INFECTION, UNSPECIFIED TYPE: Primary | ICD-10-CM

## 2018-07-07 DIAGNOSIS — J34.89 SINUS PRESSURE: ICD-10-CM

## 2018-07-07 LAB
ABSOLUTE EOS #: 0.2 K/UL (ref 0–0.4)
ABSOLUTE IMMATURE GRANULOCYTE: NORMAL K/UL (ref 0–0.3)
ABSOLUTE LYMPH #: 2.7 K/UL (ref 1–4.8)
ABSOLUTE MONO #: 0.6 K/UL (ref 0.1–1.3)
ALBUMIN SERPL-MCNC: 3.4 G/DL (ref 3.5–5.2)
ALBUMIN/GLOBULIN RATIO: ABNORMAL (ref 1–2.5)
ALP BLD-CCNC: 109 U/L (ref 35–104)
ALT SERPL-CCNC: 20 U/L (ref 5–33)
ANION GAP SERPL CALCULATED.3IONS-SCNC: 16 MMOL/L (ref 9–17)
AST SERPL-CCNC: 13 U/L
BASOPHILS # BLD: 1 % (ref 0–2)
BASOPHILS ABSOLUTE: 0.1 K/UL (ref 0–0.2)
BILIRUB SERPL-MCNC: 0.38 MG/DL (ref 0.3–1.2)
BUN BLDV-MCNC: 29 MG/DL (ref 8–23)
BUN/CREAT BLD: ABNORMAL (ref 9–20)
CALCIUM SERPL-MCNC: 9.2 MG/DL (ref 8.6–10.4)
CHLORIDE BLD-SCNC: 100 MMOL/L (ref 98–107)
CHP ED QC CHECK: ABNORMAL
CO2: 19 MMOL/L (ref 20–31)
CREAT SERPL-MCNC: 0.58 MG/DL (ref 0.5–0.9)
DIFFERENTIAL TYPE: NORMAL
EKG ATRIAL RATE: 67 BPM
EKG P AXIS: 47 DEGREES
EKG P-R INTERVAL: 158 MS
EKG Q-T INTERVAL: 422 MS
EKG QRS DURATION: 98 MS
EKG QTC CALCULATION (BAZETT): 445 MS
EKG R AXIS: -29 DEGREES
EKG T AXIS: 9 DEGREES
EKG VENTRICULAR RATE: 67 BPM
EOSINOPHILS RELATIVE PERCENT: 2 % (ref 0–4)
GFR AFRICAN AMERICAN: >60 ML/MIN
GFR NON-AFRICAN AMERICAN: >60 ML/MIN
GFR SERPL CREATININE-BSD FRML MDRD: ABNORMAL ML/MIN/{1.73_M2}
GFR SERPL CREATININE-BSD FRML MDRD: ABNORMAL ML/MIN/{1.73_M2}
GLUCOSE BLD-MCNC: 388 MG/DL
GLUCOSE BLD-MCNC: 388 MG/DL (ref 65–105)
GLUCOSE BLD-MCNC: 444 MG/DL (ref 70–99)
HCT VFR BLD CALC: 44.8 % (ref 36–46)
HEMOGLOBIN: 14.9 G/DL (ref 12–16)
IMMATURE GRANULOCYTES: NORMAL %
LYMPHOCYTES # BLD: 28 % (ref 24–44)
MCH RBC QN AUTO: 29.7 PG (ref 26–34)
MCHC RBC AUTO-ENTMCNC: 33.2 G/DL (ref 31–37)
MCV RBC AUTO: 89.6 FL (ref 80–100)
MONOCYTES # BLD: 6 % (ref 1–7)
NRBC AUTOMATED: NORMAL PER 100 WBC
PDW BLD-RTO: 12.7 % (ref 11.5–14.9)
PLATELET # BLD: 252 K/UL (ref 150–450)
PLATELET ESTIMATE: NORMAL
PMV BLD AUTO: 10.4 FL (ref 6–12)
POTASSIUM SERPL-SCNC: 4.8 MMOL/L (ref 3.7–5.3)
RBC # BLD: 5 M/UL (ref 4–5.2)
RBC # BLD: NORMAL 10*6/UL
SEG NEUTROPHILS: 63 % (ref 36–66)
SEGMENTED NEUTROPHILS ABSOLUTE COUNT: 6.3 K/UL (ref 1.3–9.1)
SODIUM BLD-SCNC: 135 MMOL/L (ref 135–144)
TOTAL PROTEIN: 6.9 G/DL (ref 6.4–8.3)
TROPONIN INTERP: NORMAL
TROPONIN T: <0.03 NG/ML
WBC # BLD: 9.9 K/UL (ref 3.5–11)
WBC # BLD: NORMAL 10*3/UL

## 2018-07-07 PROCEDURE — 71046 X-RAY EXAM CHEST 2 VIEWS: CPT

## 2018-07-07 PROCEDURE — 82947 ASSAY GLUCOSE BLOOD QUANT: CPT

## 2018-07-07 PROCEDURE — 36415 COLL VENOUS BLD VENIPUNCTURE: CPT

## 2018-07-07 PROCEDURE — 93005 ELECTROCARDIOGRAM TRACING: CPT

## 2018-07-07 PROCEDURE — 84484 ASSAY OF TROPONIN QUANT: CPT

## 2018-07-07 PROCEDURE — 99284 EMERGENCY DEPT VISIT MOD MDM: CPT

## 2018-07-07 PROCEDURE — 6370000000 HC RX 637 (ALT 250 FOR IP): Performed by: EMERGENCY MEDICINE

## 2018-07-07 PROCEDURE — 85025 COMPLETE CBC W/AUTO DIFF WBC: CPT

## 2018-07-07 PROCEDURE — 96372 THER/PROPH/DIAG INJ SC/IM: CPT

## 2018-07-07 PROCEDURE — 80053 COMPREHEN METABOLIC PANEL: CPT

## 2018-07-07 RX ORDER — CETIRIZINE HYDROCHLORIDE 10 MG/1
10 TABLET ORAL ONCE
Status: COMPLETED | OUTPATIENT
Start: 2018-07-07 | End: 2018-07-07

## 2018-07-07 RX ORDER — DIPHENHYDRAMINE HCL 25 MG
25 CAPSULE ORAL DAILY
COMMUNITY
End: 2018-07-30

## 2018-07-07 RX ORDER — INDOMETHACIN 50 MG/1
50 CAPSULE ORAL
COMMUNITY
End: 2018-07-30

## 2018-07-07 RX ORDER — LISINOPRIL 10 MG/1
10 TABLET ORAL DAILY
Status: ON HOLD | COMMUNITY
End: 2020-03-30 | Stop reason: HOSPADM

## 2018-07-07 RX ORDER — CETIRIZINE HYDROCHLORIDE 10 MG/1
10 TABLET ORAL DAILY
Qty: 15 TABLET | Refills: 0 | Status: SHIPPED | OUTPATIENT
Start: 2018-07-07 | End: 2019-08-19

## 2018-07-07 RX ADMIN — INSULIN LISPRO 6 UNITS: 100 INJECTION, SOLUTION INTRAVENOUS; SUBCUTANEOUS at 12:41

## 2018-07-07 RX ADMIN — CETIRIZINE HYDROCHLORIDE 10 MG: 10 TABLET, FILM COATED ORAL at 13:30

## 2018-07-07 ASSESSMENT — ENCOUNTER SYMPTOMS
CHEST TIGHTNESS: 1
VOMITING: 0
SORE THROAT: 0
DIARRHEA: 0
EYE PAIN: 0
SINUS PRESSURE: 1
SHORTNESS OF BREATH: 0
COUGH: 1
WHEEZING: 1
SINUS PAIN: 1
ABDOMINAL PAIN: 0
NAUSEA: 0
BACK PAIN: 0

## 2018-07-07 ASSESSMENT — PAIN SCALES - GENERAL: PAINLEVEL_OUTOF10: 7

## 2018-07-07 NOTE — ED NOTES
Pt arrives with c/o ear/nose congestion, sore throat, cough, SOB with chest tightness, which started about 3 weeks ago. Pt was seen in our ER 3 days ago and given doxy and flonase. Pt states today she felt the SOB with chest tightness and decided to come in. Pt is resting on cart, eupneic, very talkative, in no distress.       Jud Verduzco RN  07/07/18 4068

## 2018-07-07 NOTE — ED PROVIDER NOTES
see reports below, unless otherwise noted in the medical decision-making or here. XR CHEST STANDARD (2 VW)   Final Result   Unchanged appearance of the chest without acute airspace disease identified. LABS:  Labs Reviewed   COMPREHENSIVE METABOLIC PANEL - Abnormal; Notable for the following:        Result Value    Glucose 444 (*)     BUN 29 (*)     CO2 19 (*)     Alkaline Phosphatase 109 (*)     Alb 3.4 (*)     All other components within normal limits   POCT GLUCOSE - Abnormal; Notable for the following:    POC GLUCOSE FINGERSTICK - Abnormal; Notable for the following:     POC Glucose 388 (*)     All other components within normal limits   CBC WITH AUTO DIFFERENTIAL   TROPONIN         EMERGENCY DEPARTMENT COURSE:   Medical Decision Making:  Cough congestion, likely persistent upper respiratory infection, likely viral patient is protected against this infection with doxycycline. Due to history of chest pain, ongoing with cough the past 3 weeks context diabetes, will get EKG chest x-ray 1 troponin. Troponin is negative, EKGs at baseline, low suspicion for ACS dissection pulmonary embolus. Patient reevaluated and 476 1626, asymptomatic. Patient has hyperglycemia 444, she has follow-up appointment with her endocrinologist since she reports her sugars as being very labile, we'll provide a dose of lispro, lab workup otherwise nondiagnostic. We'll provide decongestant, patient continues to appear well, she is counseled regarding need to follow up and check her sugars daily for record keeping prior to her appointment with her endocrinologist so that they may assist in appropriate Glycemic intervention. Patient instructed to follow-up with PCP or return to the nearest emergency department for uncontrolled fevers, vomiting, shortness of breath, chest pain, or any other concerns. Patient acknowledges plan, voices understanding of it and is in agreement with it.     Pre-hypertension/Hypertension:

## 2018-07-08 ENCOUNTER — HOSPITAL ENCOUNTER (EMERGENCY)
Age: 63
Discharge: HOME | End: 2018-07-08
Attending: EMERGENCY MEDICINE
Payer: COMMERCIAL

## 2018-07-08 VITALS
TEMPERATURE: 98.1 F | RESPIRATION RATE: 17 BRPM | HEART RATE: 76 BPM | WEIGHT: 230 LBS | BODY MASS INDEX: 42.07 KG/M2 | OXYGEN SATURATION: 95 % | DIASTOLIC BLOOD PRESSURE: 90 MMHG | SYSTOLIC BLOOD PRESSURE: 145 MMHG

## 2018-07-08 LAB
ANION GAP SERPL CALCULATED.3IONS-SCNC: 16 MMOL/L (ref 9–17)
BUN BLDV-MCNC: 37 MG/DL (ref 8–23)
BUN/CREAT BLD: ABNORMAL (ref 9–20)
CALCIUM SERPL-MCNC: 9.4 MG/DL (ref 8.6–10.4)
CHLORIDE BLD-SCNC: 95 MMOL/L (ref 98–107)
CHP ED QC CHECK: NORMAL
CO2: 21 MMOL/L (ref 20–31)
CREAT SERPL-MCNC: 0.68 MG/DL (ref 0.5–0.9)
GFR AFRICAN AMERICAN: >60 ML/MIN
GFR NON-AFRICAN AMERICAN: >60 ML/MIN
GFR SERPL CREATININE-BSD FRML MDRD: ABNORMAL ML/MIN/{1.73_M2}
GFR SERPL CREATININE-BSD FRML MDRD: ABNORMAL ML/MIN/{1.73_M2}
GLUCOSE BLD-MCNC: 294 MG/DL (ref 65–105)
GLUCOSE BLD-MCNC: 376 MG/DL (ref 65–105)
GLUCOSE BLD-MCNC: 456 MG/DL (ref 70–99)
GLUCOSE BLD-MCNC: 457 MG/DL (ref 65–105)
GLUCOSE BLD-MCNC: 478 MG/DL
GLUCOSE BLD-MCNC: 478 MG/DL (ref 65–105)
POTASSIUM SERPL-SCNC: 4.3 MMOL/L (ref 3.7–5.3)
SODIUM BLD-SCNC: 132 MMOL/L (ref 135–144)

## 2018-07-08 PROCEDURE — 2580000003 HC RX 258: Performed by: EMERGENCY MEDICINE

## 2018-07-08 PROCEDURE — 99285 EMERGENCY DEPT VISIT HI MDM: CPT

## 2018-07-08 PROCEDURE — 36415 COLL VENOUS BLD VENIPUNCTURE: CPT

## 2018-07-08 PROCEDURE — 80048 BASIC METABOLIC PNL TOTAL CA: CPT

## 2018-07-08 PROCEDURE — 82947 ASSAY GLUCOSE BLOOD QUANT: CPT

## 2018-07-08 PROCEDURE — 96372 THER/PROPH/DIAG INJ SC/IM: CPT

## 2018-07-08 PROCEDURE — 6370000000 HC RX 637 (ALT 250 FOR IP): Performed by: EMERGENCY MEDICINE

## 2018-07-08 RX ORDER — 0.9 % SODIUM CHLORIDE 0.9 %
1000 INTRAVENOUS SOLUTION INTRAVENOUS ONCE
Status: DISCONTINUED | OUTPATIENT
Start: 2018-07-08 | End: 2018-07-08

## 2018-07-08 RX ORDER — 0.9 % SODIUM CHLORIDE 0.9 %
1000 INTRAVENOUS SOLUTION INTRAVENOUS ONCE
Status: COMPLETED | OUTPATIENT
Start: 2018-07-08 | End: 2018-07-08

## 2018-07-08 RX ADMIN — SODIUM CHLORIDE 1000 ML: 9 INJECTION, SOLUTION INTRAVENOUS at 04:56

## 2018-07-08 RX ADMIN — INSULIN LISPRO 12 UNITS: 100 INJECTION, SOLUTION INTRAVENOUS; SUBCUTANEOUS at 03:46

## 2018-07-08 RX ADMIN — INSULIN LISPRO 20 UNITS: 100 INJECTION, SOLUTION INTRAVENOUS; SUBCUTANEOUS at 04:58

## 2018-07-08 ASSESSMENT — ENCOUNTER SYMPTOMS
COUGH: 1
VOMITING: 0
NAUSEA: 0

## 2018-07-08 NOTE — ED PROVIDER NOTES
is . She indicated that her father is . SOCIAL HISTORY      reports that she has quit smoking. Her smoking use included Cigarettes. She has never used smokeless tobacco. She reports that she uses drugs, including Marijuana. She reports that she does not drink alcohol. PHYSICAL EXAM     INITIAL VITALS: BP (!) 145/90   Pulse 76   Temp 98.1 °F (36.7 °C) (Oral)   Resp 17   Wt 230 lb (104.3 kg)   LMP 2011   SpO2 95%   BMI 42.07 kg/m²   Gen.: NAD  Head: Normocephalic, atraumatic  Eye: Pupils equal round reactive to light, no conjunctivitis  Heart: Regular rate and rhythm no murmurs  Lungs: Clear to auscultation bilaterally, no respiratory distress  Chest wall: No crepitus, no tenderness palpation  Abdomen: Soft, nontender, nondistended, with no peritoneal signs  Neurologic: Patient is alert and oriented x3, motor and sensation is intact in all 4 extremities, fluent speech  Extremities: Full range of motion, no cyanosis, no edema, no signs of trauma, no tenderness to palpation    MEDICAL DECISION MAKING:     MDM  66year-old with uncontrolled diabetes. We'll make sure that there is no diabetic ketoacidosis. We will give subcutaneous insulin. And reassess. Hospital course:  Point of care glucose is 457.    6:39 AM  Glucose down to 294 from 478. No sign of DKA. She has close follow-up with endocrinology, next week. The findings of today's investigation and the importance of close follow up with their pcp and warning precautions which should prompt return to the ed were discussed with the patient. The patient and family verbalized understanding with teach back. DIAGNOSTIC RESULTS     LABS: All lab results were reviewed by myself, and all abnormals are listed below.   Labs Reviewed   BASIC METABOLIC PANEL - Abnormal; Notable for the following:        Result Value    Glucose 456 (*)     BUN 37 (*)     Sodium 132 (*)     Chloride 95 (*)     All other components within normal limits   POC GLUCOSE FINGERSTICK - Abnormal; Notable for the following:     POC Glucose 457 (*)     All other components within normal limits   POC GLUCOSE FINGERSTICK - Abnormal; Notable for the following:     POC Glucose 478 (*)     All other components within normal limits   POC GLUCOSE FINGERSTICK - Abnormal; Notable for the following:     POC Glucose 376 (*)     All other components within normal limits   POCT GLUCOSE - Normal       EMERGENCY DEPARTMENT COURSE:   Vitals:    Vitals:    07/08/18 0300 07/08/18 0301 07/08/18 0302   BP:  (!) 145/90    Pulse:  76    Resp:  17    Temp:   98.1 °F (36.7 °C)   TempSrc: Oral Oral Oral   SpO2:  95%    Weight: 230 lb (104.3 kg)         The patient was given the following medications while in the emergency department:  Orders Placed This Encounter   Medications    DISCONTD: 0.9 % sodium chloride bolus    insulin lispro (HUMALOG) injection vial 12 Units    insulin lispro (HUMALOG) injection vial 20 Units    0.9 % sodium chloride bolus     -------------------------  CRITICAL CARE:   CONSULTS: None  PROCEDURES: Procedures     FINAL IMPRESSION      1.  Uncontrolled type 2 diabetes mellitus with complication, with long-term current use of insulin [facility])          DISPOSITION/PLAN   DISPOSITION        PATIENT REFERRED TO:  Jennifer Pulse  [facility] [address] [place] [address]  [phone]    In 2 days      [facility] ED  [facility] [address] [place] [address]  [phone]    If symptoms worsen      DISCHARGE MEDICATIONS:  New Prescriptions    No medications on file         Chapito Concepcion MD  Attending Emergency Physician                      Chapito Concepcion MD  07/08/18 0302

## 2018-07-08 NOTE — ED NOTES
Pt presents in ED c/o hyperglycemia. Pt reported she was seen in ED x1 day ago for the same symptoms. Pt reported ongoing problem with hyperglycemia. Pt reported she is frustrated because she cannot get her blood sugar down. Pt tearful and explaining that she takes all of her medications. Pt reported she sees an endocrinologist on the 16th. Pt reported she has been having an upper respiratory infection for the past few days. Pt denies all pain. Pt is eupneic, A&OX4, and pink, warm, dry. Call light in reach.      Patti Hamilton RN  07/08/18 5517

## 2018-07-30 ENCOUNTER — APPOINTMENT (OUTPATIENT)
Dept: GENERAL RADIOLOGY | Age: 63
End: 2018-07-30
Payer: COMMERCIAL

## 2018-07-30 ENCOUNTER — HOSPITAL ENCOUNTER (OUTPATIENT)
Age: 63
Setting detail: OBSERVATION
Discharge: HOME | End: 2018-08-02
Attending: EMERGENCY MEDICINE | Admitting: INTERNAL MEDICINE
Payer: COMMERCIAL

## 2018-07-30 DIAGNOSIS — R07.9 CHEST PAIN, UNSPECIFIED TYPE: Primary | ICD-10-CM

## 2018-07-30 LAB
ABSOLUTE EOS #: 0.3 K/UL (ref 0–0.4)
ABSOLUTE IMMATURE GRANULOCYTE: ABNORMAL K/UL (ref 0–0.3)
ABSOLUTE LYMPH #: 2.5 K/UL (ref 1–4.8)
ABSOLUTE MONO #: 0.6 K/UL (ref 0.1–1.3)
ALBUMIN SERPL-MCNC: 3.7 G/DL (ref 3.5–5.2)
ALBUMIN/GLOBULIN RATIO: ABNORMAL (ref 1–2.5)
ALP BLD-CCNC: 133 U/L (ref 35–104)
ALT SERPL-CCNC: 23 U/L (ref 5–33)
ANION GAP SERPL CALCULATED.3IONS-SCNC: 15 MMOL/L (ref 9–17)
AST SERPL-CCNC: 14 U/L
BASOPHILS # BLD: 1 % (ref 0–2)
BASOPHILS ABSOLUTE: 0.1 K/UL (ref 0–0.2)
BETA-HYDROXYBUTYRATE: 0.27 MMOL/L (ref 0.02–0.27)
BILIRUB SERPL-MCNC: 0.52 MG/DL (ref 0.3–1.2)
BUN BLDV-MCNC: 23 MG/DL (ref 8–23)
BUN/CREAT BLD: ABNORMAL (ref 9–20)
CALCIUM SERPL-MCNC: 9.3 MG/DL (ref 8.6–10.4)
CHLORIDE BLD-SCNC: 96 MMOL/L (ref 98–107)
CO2: 23 MMOL/L (ref 20–31)
CREAT SERPL-MCNC: 0.55 MG/DL (ref 0.5–0.9)
DIFFERENTIAL TYPE: ABNORMAL
EKG ATRIAL RATE: 70 BPM
EKG P AXIS: 35 DEGREES
EKG P-R INTERVAL: 146 MS
EKG Q-T INTERVAL: 432 MS
EKG QRS DURATION: 96 MS
EKG QTC CALCULATION (BAZETT): 466 MS
EKG R AXIS: -28 DEGREES
EKG T AXIS: 7 DEGREES
EKG VENTRICULAR RATE: 70 BPM
EOSINOPHILS RELATIVE PERCENT: 3 % (ref 0–4)
GFR AFRICAN AMERICAN: >60 ML/MIN
GFR NON-AFRICAN AMERICAN: >60 ML/MIN
GFR SERPL CREATININE-BSD FRML MDRD: ABNORMAL ML/MIN/{1.73_M2}
GFR SERPL CREATININE-BSD FRML MDRD: ABNORMAL ML/MIN/{1.73_M2}
GLUCOSE BLD-MCNC: 299 MG/DL (ref 70–99)
HCT VFR BLD CALC: 44.1 % (ref 36–46)
HEMOGLOBIN: 15 G/DL (ref 12–16)
IMMATURE GRANULOCYTES: ABNORMAL %
INR BLD: 1
LYMPHOCYTES # BLD: 23 % (ref 24–44)
MCH RBC QN AUTO: 29.9 PG (ref 26–34)
MCHC RBC AUTO-ENTMCNC: 33.9 G/DL (ref 31–37)
MCV RBC AUTO: 88.2 FL (ref 80–100)
MONOCYTES # BLD: 5 % (ref 1–7)
NRBC AUTOMATED: ABNORMAL PER 100 WBC
PARTIAL THROMBOPLASTIN TIME: 27.7 SEC (ref 23–31)
PDW BLD-RTO: 12.7 % (ref 11.5–14.9)
PLATELET # BLD: 244 K/UL (ref 150–450)
PLATELET ESTIMATE: ABNORMAL
PMV BLD AUTO: 9.7 FL (ref 6–12)
POTASSIUM SERPL-SCNC: 4.4 MMOL/L (ref 3.7–5.3)
PROTHROMBIN TIME: 10 SEC (ref 9.7–12)
RBC # BLD: 5 M/UL (ref 4–5.2)
RBC # BLD: ABNORMAL 10*6/UL
SEG NEUTROPHILS: 68 % (ref 36–66)
SEGMENTED NEUTROPHILS ABSOLUTE COUNT: 7.6 K/UL (ref 1.3–9.1)
SODIUM BLD-SCNC: 134 MMOL/L (ref 135–144)
TOTAL PROTEIN: 7.2 G/DL (ref 6.4–8.3)
TROPONIN INTERP: NORMAL
TROPONIN INTERP: NORMAL
TROPONIN T: <0.03 NG/ML
TROPONIN T: <0.03 NG/ML
WBC # BLD: 11.1 K/UL (ref 3.5–11)
WBC # BLD: ABNORMAL 10*3/UL

## 2018-07-30 PROCEDURE — 99285 EMERGENCY DEPT VISIT HI MDM: CPT

## 2018-07-30 PROCEDURE — 82010 KETONE BODYS QUAN: CPT

## 2018-07-30 PROCEDURE — 71045 X-RAY EXAM CHEST 1 VIEW: CPT

## 2018-07-30 PROCEDURE — 85025 COMPLETE CBC W/AUTO DIFF WBC: CPT

## 2018-07-30 PROCEDURE — 80053 COMPREHEN METABOLIC PANEL: CPT

## 2018-07-30 PROCEDURE — 2580000003 HC RX 258: Performed by: EMERGENCY MEDICINE

## 2018-07-30 PROCEDURE — 36415 COLL VENOUS BLD VENIPUNCTURE: CPT

## 2018-07-30 PROCEDURE — 6360000002 HC RX W HCPCS: Performed by: EMERGENCY MEDICINE

## 2018-07-30 PROCEDURE — G0378 HOSPITAL OBSERVATION PER HR: HCPCS

## 2018-07-30 PROCEDURE — 85730 THROMBOPLASTIN TIME PARTIAL: CPT

## 2018-07-30 PROCEDURE — 84484 ASSAY OF TROPONIN QUANT: CPT

## 2018-07-30 PROCEDURE — 93005 ELECTROCARDIOGRAM TRACING: CPT

## 2018-07-30 PROCEDURE — 6370000000 HC RX 637 (ALT 250 FOR IP): Performed by: EMERGENCY MEDICINE

## 2018-07-30 PROCEDURE — 96374 THER/PROPH/DIAG INJ IV PUSH: CPT

## 2018-07-30 PROCEDURE — 85610 PROTHROMBIN TIME: CPT

## 2018-07-30 PROCEDURE — 96372 THER/PROPH/DIAG INJ SC/IM: CPT

## 2018-07-30 RX ORDER — GABAPENTIN 400 MG/1
800 CAPSULE ORAL 3 TIMES DAILY
Status: DISCONTINUED | OUTPATIENT
Start: 2018-07-31 | End: 2018-08-02 | Stop reason: HOSPADM

## 2018-07-30 RX ORDER — POTASSIUM CHLORIDE 7.45 MG/ML
10 INJECTION INTRAVENOUS PRN
Status: DISCONTINUED | OUTPATIENT
Start: 2018-07-30 | End: 2018-08-02 | Stop reason: HOSPADM

## 2018-07-30 RX ORDER — ONDANSETRON 2 MG/ML
4 INJECTION INTRAMUSCULAR; INTRAVENOUS EVERY 6 HOURS PRN
Status: DISCONTINUED | OUTPATIENT
Start: 2018-07-30 | End: 2018-08-02 | Stop reason: HOSPADM

## 2018-07-30 RX ORDER — CETIRIZINE HYDROCHLORIDE 10 MG/1
10 TABLET ORAL DAILY
Status: DISCONTINUED | OUTPATIENT
Start: 2018-07-31 | End: 2018-08-02 | Stop reason: HOSPADM

## 2018-07-30 RX ORDER — DULOXETIN HYDROCHLORIDE 60 MG/1
60 CAPSULE, DELAYED RELEASE ORAL DAILY
Status: DISCONTINUED | OUTPATIENT
Start: 2018-07-31 | End: 2018-08-02 | Stop reason: HOSPADM

## 2018-07-30 RX ORDER — ASPIRIN 81 MG/1
324 TABLET, CHEWABLE ORAL ONCE
Status: COMPLETED | OUTPATIENT
Start: 2018-07-30 | End: 2018-07-30

## 2018-07-30 RX ORDER — ONDANSETRON 2 MG/ML
4 INJECTION INTRAMUSCULAR; INTRAVENOUS ONCE
Status: DISCONTINUED | OUTPATIENT
Start: 2018-07-30 | End: 2018-08-02 | Stop reason: HOSPADM

## 2018-07-30 RX ORDER — MAGNESIUM SULFATE 1 G/100ML
1 INJECTION INTRAVENOUS PRN
Status: DISCONTINUED | OUTPATIENT
Start: 2018-07-30 | End: 2018-08-02 | Stop reason: HOSPADM

## 2018-07-30 RX ORDER — SODIUM CHLORIDE 0.9 % (FLUSH) 0.9 %
10 SYRINGE (ML) INJECTION PRN
Status: DISCONTINUED | OUTPATIENT
Start: 2018-07-30 | End: 2018-08-02 | Stop reason: HOSPADM

## 2018-07-30 RX ORDER — LEVOTHYROXINE SODIUM 0.07 MG/1
75 TABLET ORAL DAILY
Status: DISCONTINUED | OUTPATIENT
Start: 2018-07-31 | End: 2018-08-02 | Stop reason: HOSPADM

## 2018-07-30 RX ORDER — NITROGLYCERIN 0.4 MG/1
0.4 TABLET SUBLINGUAL EVERY 5 MIN PRN
Status: DISCONTINUED | OUTPATIENT
Start: 2018-07-30 | End: 2018-08-02 | Stop reason: HOSPADM

## 2018-07-30 RX ORDER — FAMOTIDINE 20 MG/1
20 TABLET, FILM COATED ORAL 2 TIMES DAILY
Status: DISCONTINUED | OUTPATIENT
Start: 2018-07-31 | End: 2018-08-02 | Stop reason: HOSPADM

## 2018-07-30 RX ORDER — 0.9 % SODIUM CHLORIDE 0.9 %
1000 INTRAVENOUS SOLUTION INTRAVENOUS ONCE
Status: COMPLETED | OUTPATIENT
Start: 2018-07-30 | End: 2018-07-30

## 2018-07-30 RX ORDER — POTASSIUM CHLORIDE 20 MEQ/1
40 TABLET, EXTENDED RELEASE ORAL PRN
Status: DISCONTINUED | OUTPATIENT
Start: 2018-07-30 | End: 2018-08-02 | Stop reason: HOSPADM

## 2018-07-30 RX ORDER — FENTANYL CITRATE 50 UG/ML
50 INJECTION, SOLUTION INTRAMUSCULAR; INTRAVENOUS ONCE
Status: COMPLETED | OUTPATIENT
Start: 2018-07-30 | End: 2018-07-30

## 2018-07-30 RX ORDER — POTASSIUM CHLORIDE 20MEQ/15ML
40 LIQUID (ML) ORAL PRN
Status: DISCONTINUED | OUTPATIENT
Start: 2018-07-30 | End: 2018-08-02 | Stop reason: HOSPADM

## 2018-07-30 RX ORDER — FUROSEMIDE 20 MG/1
20 TABLET ORAL DAILY
Status: DISCONTINUED | OUTPATIENT
Start: 2018-07-31 | End: 2018-08-02 | Stop reason: HOSPADM

## 2018-07-30 RX ORDER — ASPIRIN 81 MG/1
81 TABLET, CHEWABLE ORAL DAILY
Status: DISCONTINUED | OUTPATIENT
Start: 2018-07-31 | End: 2018-08-02 | Stop reason: HOSPADM

## 2018-07-30 RX ORDER — PROMETHAZINE HYDROCHLORIDE 25 MG/ML
25 INJECTION, SOLUTION INTRAMUSCULAR; INTRAVENOUS ONCE
Status: COMPLETED | OUTPATIENT
Start: 2018-07-30 | End: 2018-07-30

## 2018-07-30 RX ORDER — METOPROLOL TARTRATE 100 MG/1
100 TABLET ORAL 2 TIMES DAILY
Status: DISCONTINUED | OUTPATIENT
Start: 2018-07-31 | End: 2018-08-02 | Stop reason: HOSPADM

## 2018-07-30 RX ORDER — INSULIN GLARGINE 100 [IU]/ML
28 INJECTION, SOLUTION SUBCUTANEOUS NIGHTLY
Status: DISCONTINUED | OUTPATIENT
Start: 2018-07-31 | End: 2018-08-02 | Stop reason: HOSPADM

## 2018-07-30 RX ORDER — ACETAMINOPHEN 325 MG/1
650 TABLET ORAL EVERY 4 HOURS PRN
Status: DISCONTINUED | OUTPATIENT
Start: 2018-07-30 | End: 2018-08-02 | Stop reason: HOSPADM

## 2018-07-30 RX ORDER — SODIUM CHLORIDE 0.9 % (FLUSH) 0.9 %
10 SYRINGE (ML) INJECTION EVERY 12 HOURS SCHEDULED
Status: DISCONTINUED | OUTPATIENT
Start: 2018-07-31 | End: 2018-08-02 | Stop reason: HOSPADM

## 2018-07-30 RX ORDER — ATORVASTATIN CALCIUM 80 MG/1
80 TABLET, FILM COATED ORAL DAILY
Status: DISCONTINUED | OUTPATIENT
Start: 2018-07-31 | End: 2018-08-02 | Stop reason: HOSPADM

## 2018-07-30 RX ORDER — LISINOPRIL 10 MG/1
10 TABLET ORAL DAILY
Status: DISCONTINUED | OUTPATIENT
Start: 2018-07-31 | End: 2018-08-02 | Stop reason: HOSPADM

## 2018-07-30 RX ADMIN — ASPIRIN 81 MG 324 MG: 81 TABLET ORAL at 20:42

## 2018-07-30 RX ADMIN — PROMETHAZINE HYDROCHLORIDE 25 MG: 25 INJECTION INTRAMUSCULAR; INTRAVENOUS at 20:51

## 2018-07-30 RX ADMIN — SODIUM CHLORIDE 1000 ML: 9 INJECTION, SOLUTION INTRAVENOUS at 20:51

## 2018-07-30 RX ADMIN — FENTANYL CITRATE 50 MCG: 50 INJECTION, SOLUTION INTRAMUSCULAR; INTRAVENOUS at 22:23

## 2018-07-30 ASSESSMENT — PAIN DESCRIPTION - LOCATION
LOCATION: FOOT;HAND
LOCATION: CHEST

## 2018-07-30 ASSESSMENT — PAIN SCALES - GENERAL
PAINLEVEL_OUTOF10: 10
PAINLEVEL_OUTOF10: 8
PAINLEVEL_OUTOF10: 5
PAINLEVEL_OUTOF10: 5

## 2018-07-30 ASSESSMENT — PAIN DESCRIPTION - PAIN TYPE: TYPE: CHRONIC PAIN

## 2018-07-31 ENCOUNTER — APPOINTMENT (OUTPATIENT)
Dept: NUCLEAR MEDICINE | Age: 63
End: 2018-07-31
Payer: COMMERCIAL

## 2018-07-31 LAB
ALBUMIN SERPL-MCNC: 3.1 G/DL (ref 3.5–5.2)
ALBUMIN/GLOBULIN RATIO: ABNORMAL (ref 1–2.5)
ALP BLD-CCNC: 112 U/L (ref 35–104)
ALT SERPL-CCNC: 24 U/L (ref 5–33)
ANION GAP SERPL CALCULATED.3IONS-SCNC: 14 MMOL/L (ref 9–17)
AST SERPL-CCNC: 21 U/L
BILIRUB SERPL-MCNC: 0.33 MG/DL (ref 0.3–1.2)
BNP INTERPRETATION: NORMAL
BUN BLDV-MCNC: 19 MG/DL (ref 8–23)
BUN/CREAT BLD: ABNORMAL (ref 9–20)
CALCIUM SERPL-MCNC: 8.6 MG/DL (ref 8.6–10.4)
CHLORIDE BLD-SCNC: 101 MMOL/L (ref 98–107)
CHOLESTEROL/HDL RATIO: 5.4
CHOLESTEROL: 152 MG/DL
CO2: 23 MMOL/L (ref 20–31)
CREAT SERPL-MCNC: 0.48 MG/DL (ref 0.5–0.9)
GFR AFRICAN AMERICAN: >60 ML/MIN
GFR NON-AFRICAN AMERICAN: >60 ML/MIN
GFR SERPL CREATININE-BSD FRML MDRD: ABNORMAL ML/MIN/{1.73_M2}
GFR SERPL CREATININE-BSD FRML MDRD: ABNORMAL ML/MIN/{1.73_M2}
GLUCOSE BLD-MCNC: 234 MG/DL (ref 65–105)
GLUCOSE BLD-MCNC: 238 MG/DL (ref 65–105)
GLUCOSE BLD-MCNC: 242 MG/DL (ref 65–105)
GLUCOSE BLD-MCNC: 249 MG/DL (ref 65–105)
GLUCOSE BLD-MCNC: 257 MG/DL (ref 65–105)
GLUCOSE BLD-MCNC: 302 MG/DL (ref 70–99)
HCT VFR BLD CALC: 41.3 % (ref 36–46)
HDLC SERPL-MCNC: 28 MG/DL
HEMOGLOBIN: 13.8 G/DL (ref 12–16)
INR BLD: 1
LDL CHOLESTEROL: 48 MG/DL (ref 0–130)
LV EF: 49 %
LV EF: 58 %
LVEF MODALITY: NORMAL
LVEF MODALITY: NORMAL
MAGNESIUM: 1.7 MG/DL (ref 1.6–2.6)
MCH RBC QN AUTO: 29.7 PG (ref 26–34)
MCHC RBC AUTO-ENTMCNC: 33.4 G/DL (ref 31–37)
MCV RBC AUTO: 89 FL (ref 80–100)
NRBC AUTOMATED: ABNORMAL PER 100 WBC
PDW BLD-RTO: 13 % (ref 11.5–14.9)
PLATELET # BLD: 208 K/UL (ref 150–450)
PMV BLD AUTO: 10.3 FL (ref 6–12)
POTASSIUM SERPL-SCNC: 3.9 MMOL/L (ref 3.7–5.3)
PRO-BNP: 164 PG/ML
PROTHROMBIN TIME: 10 SEC (ref 9.7–12)
RBC # BLD: 4.63 M/UL (ref 4–5.2)
SODIUM BLD-SCNC: 138 MMOL/L (ref 135–144)
TOTAL PROTEIN: 6 G/DL (ref 6.4–8.3)
TRIGL SERPL-MCNC: 378 MG/DL
VLDLC SERPL CALC-MCNC: ABNORMAL MG/DL (ref 1–30)
WBC # BLD: 11.9 K/UL (ref 3.5–11)

## 2018-07-31 PROCEDURE — 82947 ASSAY GLUCOSE BLOOD QUANT: CPT

## 2018-07-31 PROCEDURE — 6370000000 HC RX 637 (ALT 250 FOR IP): Performed by: NURSE PRACTITIONER

## 2018-07-31 PROCEDURE — 78452 HT MUSCLE IMAGE SPECT MULT: CPT

## 2018-07-31 PROCEDURE — 85610 PROTHROMBIN TIME: CPT

## 2018-07-31 PROCEDURE — 2580000003 HC RX 258: Performed by: NURSE PRACTITIONER

## 2018-07-31 PROCEDURE — 2500000003 HC RX 250 WO HCPCS: Performed by: NURSE PRACTITIONER

## 2018-07-31 PROCEDURE — 36415 COLL VENOUS BLD VENIPUNCTURE: CPT

## 2018-07-31 PROCEDURE — A9500 TC99M SESTAMIBI: HCPCS | Performed by: INTERNAL MEDICINE

## 2018-07-31 PROCEDURE — A9500 TC99M SESTAMIBI: HCPCS | Performed by: NURSE PRACTITIONER

## 2018-07-31 PROCEDURE — 6360000002 HC RX W HCPCS: Performed by: NURSE PRACTITIONER

## 2018-07-31 PROCEDURE — 85027 COMPLETE CBC AUTOMATED: CPT

## 2018-07-31 PROCEDURE — 93306 TTE W/DOPPLER COMPLETE: CPT

## 2018-07-31 PROCEDURE — 83735 ASSAY OF MAGNESIUM: CPT

## 2018-07-31 PROCEDURE — 3430000000 HC RX DIAGNOSTIC RADIOPHARMACEUTICAL: Performed by: INTERNAL MEDICINE

## 2018-07-31 PROCEDURE — 93017 CV STRESS TEST TRACING ONLY: CPT

## 2018-07-31 PROCEDURE — 99220 PR INITIAL OBSERVATION CARE/DAY 70 MINUTES: CPT | Performed by: INTERNAL MEDICINE

## 2018-07-31 PROCEDURE — 3430000000 HC RX DIAGNOSTIC RADIOPHARMACEUTICAL: Performed by: NURSE PRACTITIONER

## 2018-07-31 PROCEDURE — 6370000000 HC RX 637 (ALT 250 FOR IP): Performed by: INTERNAL MEDICINE

## 2018-07-31 PROCEDURE — G0378 HOSPITAL OBSERVATION PER HR: HCPCS

## 2018-07-31 PROCEDURE — 80053 COMPREHEN METABOLIC PANEL: CPT

## 2018-07-31 PROCEDURE — 83880 ASSAY OF NATRIURETIC PEPTIDE: CPT

## 2018-07-31 PROCEDURE — 80061 LIPID PANEL: CPT

## 2018-07-31 RX ORDER — 0.9 % SODIUM CHLORIDE 0.9 %
250 INTRAVENOUS SOLUTION INTRAVENOUS ONCE
Status: DISCONTINUED | OUTPATIENT
Start: 2018-07-31 | End: 2018-08-02 | Stop reason: HOSPADM

## 2018-07-31 RX ORDER — CYCLOBENZAPRINE HCL 10 MG
10 TABLET ORAL 2 TIMES DAILY PRN
Status: DISCONTINUED | OUTPATIENT
Start: 2018-07-31 | End: 2018-08-02 | Stop reason: HOSPADM

## 2018-07-31 RX ORDER — AMINOPHYLLINE DIHYDRATE 25 MG/ML
100 INJECTION, SOLUTION INTRAVENOUS
Status: ACTIVE | OUTPATIENT
Start: 2018-07-31 | End: 2018-07-31

## 2018-07-31 RX ORDER — SODIUM CHLORIDE 0.9 % (FLUSH) 0.9 %
10 SYRINGE (ML) INJECTION PRN
Status: DISCONTINUED | OUTPATIENT
Start: 2018-07-31 | End: 2018-08-02 | Stop reason: HOSPADM

## 2018-07-31 RX ORDER — NITROGLYCERIN 0.4 MG/1
0.4 TABLET SUBLINGUAL EVERY 5 MIN PRN
Status: ACTIVE | OUTPATIENT
Start: 2018-07-31 | End: 2018-08-01

## 2018-07-31 RX ORDER — METOPROLOL TARTRATE 5 MG/5ML
2.5 INJECTION INTRAVENOUS PRN
Status: ACTIVE | OUTPATIENT
Start: 2018-07-31 | End: 2018-08-01

## 2018-07-31 RX ORDER — DEXTROSE MONOHYDRATE 50 MG/ML
100 INJECTION, SOLUTION INTRAVENOUS PRN
Status: DISCONTINUED | OUTPATIENT
Start: 2018-07-31 | End: 2018-08-02 | Stop reason: HOSPADM

## 2018-07-31 RX ORDER — NICOTINE POLACRILEX 4 MG
15 LOZENGE BUCCAL PRN
Status: DISCONTINUED | OUTPATIENT
Start: 2018-07-31 | End: 2018-08-02 | Stop reason: HOSPADM

## 2018-07-31 RX ORDER — DEXTROSE MONOHYDRATE 25 G/50ML
12.5 INJECTION, SOLUTION INTRAVENOUS PRN
Status: DISCONTINUED | OUTPATIENT
Start: 2018-07-31 | End: 2018-08-02 | Stop reason: HOSPADM

## 2018-07-31 RX ORDER — SODIUM CHLORIDE 0.9 % (FLUSH) 0.9 %
10 SYRINGE (ML) INJECTION PRN
Status: ACTIVE | OUTPATIENT
Start: 2018-07-31 | End: 2018-08-01

## 2018-07-31 RX ADMIN — INSULIN GLARGINE 28 UNITS: 100 INJECTION, SOLUTION SUBCUTANEOUS at 20:33

## 2018-07-31 RX ADMIN — GABAPENTIN 800 MG: 400 CAPSULE ORAL at 12:06

## 2018-07-31 RX ADMIN — Medication 10 ML: at 07:45

## 2018-07-31 RX ADMIN — ATORVASTATIN CALCIUM 80 MG: 80 TABLET, FILM COATED ORAL at 12:08

## 2018-07-31 RX ADMIN — METOPROLOL TARTRATE 100 MG: 100 TABLET ORAL at 00:57

## 2018-07-31 RX ADMIN — MICONAZOLE NITRATE: 20.6 POWDER TOPICAL at 12:06

## 2018-07-31 RX ADMIN — ASPIRIN 81 MG 81 MG: 81 TABLET ORAL at 12:08

## 2018-07-31 RX ADMIN — CETIRIZINE HYDROCHLORIDE 10 MG: 10 TABLET, FILM COATED ORAL at 12:08

## 2018-07-31 RX ADMIN — METOPROLOL TARTRATE 100 MG: 100 TABLET ORAL at 20:21

## 2018-07-31 RX ADMIN — ACETAMINOPHEN 650 MG: 325 TABLET, FILM COATED ORAL at 16:30

## 2018-07-31 RX ADMIN — CYCLOBENZAPRINE HYDROCHLORIDE 10 MG: 10 TABLET, FILM COATED ORAL at 20:23

## 2018-07-31 RX ADMIN — REGADENOSON 0.4 MG: 0.08 INJECTION, SOLUTION INTRAVENOUS at 10:20

## 2018-07-31 RX ADMIN — GABAPENTIN 800 MG: 400 CAPSULE ORAL at 00:57

## 2018-07-31 RX ADMIN — FUROSEMIDE 20 MG: 20 TABLET ORAL at 12:08

## 2018-07-31 RX ADMIN — DULOXETINE HYDROCHLORIDE 60 MG: 60 CAPSULE, DELAYED RELEASE ORAL at 12:07

## 2018-07-31 RX ADMIN — LISINOPRIL 10 MG: 10 TABLET ORAL at 12:07

## 2018-07-31 RX ADMIN — INSULIN LISPRO 4 UNITS: 100 INJECTION, SOLUTION INTRAVENOUS; SUBCUTANEOUS at 12:27

## 2018-07-31 RX ADMIN — TETRAKIS(2-METHOXYISOBUTYLISOCYANIDE)COPPER(I) TETRAFLUOROBORATE 38 MILLICURIE: 1 INJECTION, POWDER, LYOPHILIZED, FOR SOLUTION INTRAVENOUS at 10:27

## 2018-07-31 RX ADMIN — FAMOTIDINE 20 MG: 20 TABLET ORAL at 20:23

## 2018-07-31 RX ADMIN — Medication 10 ML: at 10:20

## 2018-07-31 RX ADMIN — Medication 10 ML: at 12:10

## 2018-07-31 RX ADMIN — MICONAZOLE NITRATE: 20.6 POWDER TOPICAL at 20:24

## 2018-07-31 RX ADMIN — INSULIN LISPRO 4 UNITS: 100 INJECTION, SOLUTION INTRAVENOUS; SUBCUTANEOUS at 18:04

## 2018-07-31 RX ADMIN — Medication 10 ML: at 20:23

## 2018-07-31 RX ADMIN — FAMOTIDINE 20 MG: 20 TABLET ORAL at 12:07

## 2018-07-31 RX ADMIN — INSULIN LISPRO 2 UNITS: 100 INJECTION, SOLUTION INTRAVENOUS; SUBCUTANEOUS at 20:32

## 2018-07-31 RX ADMIN — GABAPENTIN 800 MG: 400 CAPSULE ORAL at 20:22

## 2018-07-31 RX ADMIN — INSULIN LISPRO 6 UNITS: 100 INJECTION, SOLUTION INTRAVENOUS; SUBCUTANEOUS at 08:14

## 2018-07-31 RX ADMIN — TETRAKIS(2-METHOXYISOBUTYLISOCYANIDE)COPPER(I) TETRAFLUOROBORATE 11.3 MILLICURIE: 1 INJECTION, POWDER, LYOPHILIZED, FOR SOLUTION INTRAVENOUS at 07:44

## 2018-07-31 ASSESSMENT — ENCOUNTER SYMPTOMS
BACK PAIN: 0
ABDOMINAL PAIN: 0
COUGH: 0
EYES NEGATIVE: 1
RESPIRATORY NEGATIVE: 1
SHORTNESS OF BREATH: 0
GASTROINTESTINAL NEGATIVE: 1

## 2018-07-31 ASSESSMENT — PAIN SCALES - GENERAL
PAINLEVEL_OUTOF10: 6
PAINLEVEL_OUTOF10: 5

## 2018-07-31 NOTE — ED PROVIDER NOTES
[facility]  eMERGENCY dEPARTMENT eNCOUnter    Pt Name: Denver Schafer  MRN: [id number]  Katlyngfraquel 1955  Date of evaluation: 7/31/18  CHIEF COMPLAINT       Chief Complaint   Patient presents with    Headache    Hyperglycemia    Rash    Chest Pain     HISTORY OF PRESENT ILLNESS   The pt presents for evaluation of chest pain. She states it started today. She was seen by endocrinology and started on new insulin, she thinks that might be what caused it. She does also report a rash to her buttocks and elevated blood sugar, 400's. She states that it has been high since she was diagnosed. She does also have a mild headache, gradual onset, not worst of life. No weakness, numbness, neck pain, neck stiffness or fever. The history is provided by the patient. Chest Pain   Pain location:  Substernal area  Pain quality: pressure    Pain severity:  Moderate  Onset quality:  Gradual  Duration:  1 day  Timing:  Constant  Progression:  Worsening  Chronicity:  New  Relieved by:  Nothing  Worsened by:  Nothing  Associated symptoms: headache    Associated symptoms: no abdominal pain, no back pain, no cough, no fever and no shortness of breath        REVIEW OF SYSTEMS     Review of Systems   Constitutional: Negative. Negative for chills and fever. HENT: Negative. Negative for congestion. Eyes: Negative. Respiratory: Negative. Negative for cough and shortness of breath. Cardiovascular: Positive for chest pain. Gastrointestinal: Negative. Negative for abdominal pain. Musculoskeletal: Negative. Negative for back pain. Skin: Positive for rash. Neurological: Positive for headaches. All other systems reviewed and are negative.     PAST MEDICAL HISTORY     Past Medical History:   Diagnosis Date    Arrhythmia     Breast mass     CAD (coronary artery disease)     with valvular disease    Chronic neck pain     Coccygeal pain 3/7/2016    Constipation     Depression     Diabetic neuropathy ([facility]     History of hepatitis A     possible history of hepatitis A in the past    History of renal calculi     Hypertension     Hyperthyroidism     Hypothyroidism     Morbid obesity with BMI of 45.0-49.9, adult ([facility] 3/31/2016    Nephrolithiasis     Neuropathy (HCC)     Type II or unspecified type diabetes mellitus without mention of complication, not stated as uncontrolled     non insulin dependent     UTI (lower urinary tract infection)      SURGICAL HISTORY       Past Surgical History:   Procedure Laterality Date    BREAST BIOPSY  2012    negative    CARDIOVASCULAR STRESS TEST      7/17/15 no results    CHOLECYSTECTOMY      CYST REMOVAL      right hand    CYSTOSCOPY      ENDOMETRIAL BIOPSY  2009    LITHOTRIPSY      TONSILLECTOMY      TUBAL LIGATION       CURRENT MEDICATIONS       Current Discharge Medication List      CONTINUE these medications which have NOT CHANGED    Details   insulin glargine (BASAGLAR KWIKPEN) 100 UNIT/ML injection pen Inject 28 Units into the skin nightly      aspirin 81 MG tablet Take 81 mg by mouth daily      lisinopril (PRINIVIL;ZESTRIL) 10 MG tablet Take 10 mg by mouth daily      cetirizine (ZYRTEC ALLERGY) 10 MG tablet Take 1 tablet by mouth daily  Qty: 15 tablet, Refills: 0      glipiZIDE (GLUCOTROL) 10 MG tablet Take 2 tablets by mouth 2 times daily (before meals)  Qty: 120 tablet, Refills: 3    Associated Diagnoses: Uncontrolled type 2 diabetes mellitus with hyperglycemia, without long-term current use of insulin (HCC)      metoprolol (LOPRESSOR) 100 MG tablet take 1 tablet by mouth twice a day  Qty: 60 tablet, Refills: 3      gabapentin (NEURONTIN) 800 MG tablet take 1 tablet by mouth four times a day  Qty: 120 tablet, Refills: 3      atorvastatin (LIPITOR) 40 MG tablet Take 1 tablet by mouth daily  Qty: 30 tablet, Refills: 3      DULoxetine (CYMBALTA) 60 MG extended release capsule take 1 capsule by mouth once daily  Qty: 30 capsule, Refills: 2 Nursing note and vitals reviewed. MEDICAL DECISION MAKING:     Chest pain. Reviewed results and imaging. On reeval, exam unchanged. Concern for cardiac etiology. Discussed with medicine, will admit. Regarding rash, clinically consistent with yeast.  Glucose elevated, but no acidosis or dka. HA is minor, I do not suspect emergent neuro, vascular, infectious, or other emergent pathology. Pt is ready for admission at this time. CRITICAL CARE:       PROCEDURES:    Procedures    DIAGNOSTIC RESULTS   EKG: All EKG's are interpreted by the Emergency Department Physician who either signs or Co-signs this chart in the absence of a cardiologist.  Ekg, rate of 70, nsr, no st seg changes, nonspecific st seg changes, abnormal ekg. RADIOLOGY:All plain film, CT, MRI, and formal ultrasound images (except ED bedside ultrasound) are read by the radiologist, see reports below, unless otherwise noted in MDM or here. XR CHEST PORTABLE   Final Result   Stable chest with no acute parenchymal abnormality demonstrated. LABS: All lab results were reviewed by myself, and all abnormals are listed below.   Labs Reviewed   COMPREHENSIVE METABOLIC PANEL - Abnormal; Notable for the following:        Result Value    Glucose 299 (*)     Sodium 134 (*)     Chloride 96 (*)     Alkaline Phosphatase 133 (*)     All other components within normal limits   CBC WITH AUTO DIFFERENTIAL - Abnormal; Notable for the following:     WBC 11.1 (*)     Seg Neutrophils 68 (*)     Lymphocytes 23 (*)     All other components within normal limits   PROTIME-INR   APTT   TROPONIN   TROPONIN   BETA-HYDROXYBUTYRATE   COMPREHENSIVE METABOLIC PANEL W/ REFLEX TO MG FOR LOW K   MAGNESIUM   BRAIN NATRIURETIC PEPTIDE   LIPID PANEL   CBC   PROTIME-INR     EMERGENCY DEPARTMENT COURSE:   Vitals:    Vitals:    07/30/18 2130 07/30/18 2230 07/30/18 2245 07/30/18 2335   BP: (!) 146/89 (!) 146/82 (!) 148/78 (!) 147/85   Pulse: 70  71 66   Resp: 18  16 16

## 2018-07-31 NOTE — CARE COORDINATION
CASE MANAGEMENT NOTE:    Admission Date:  7/30/2018 Thompson Sam is a 66 y.o.  female    Admitted for : Chest pain [R07.9]    Met with:  Patient    PCP:  Dr. Jd Barbosa:  [facility]      Current Residence/ Living Arrangements:  independently at home             Current Services PTA:  No    Is patient agreeable to VNS: No    Freedom of choice provided: Yes         VNS chosen:  No    DME:  none    Home Oxygen: No    Nebulizer: No    Supplier: N/A    Potential Assistance Needed: No    SNF needed: No    Pharmacy:  [facility]       Does Patient want to use MEDS to BEDS? No    Family Members/Caregivers that pt would like involved in their care:    Yes    If yes, list name here:  Sara Davila Provider:  Patient                      Discharge Plan:  7/31: [facility] - Patient is from home with yaniv. She is independent with her care and drives. Denies DME. Denies any discharge needs. Will continue to follow along. Should discharge if stress test is negative.  //RUSS                 Electronically signed by: Modesto Marroquin RN on 7/31/2018 at 4:26 PM

## 2018-07-31 NOTE — H&P
mouth daily   Yes Historical Provider, MD   lisinopril (PRINIVIL;ZESTRIL) 10 MG tablet Take 10 mg by mouth daily   Yes Historical Provider, MD   cetirizine (ZYRTEC ALLERGY) 10 MG tablet Take 1 tablet by mouth daily 7/7/18  Yes London Thurston MD   glipiZIDE (GLUCOTROL) 10 MG tablet Take 2 tablets by mouth 2 times daily (before meals) 7/12/17  Yes Jacobo Patterson MD   metoprolol (LOPRESSOR) 100 MG tablet take 1 tablet by mouth twice a day 6/27/17  Yes Jacobo Patterson MD   gabapentin (NEURONTIN) 800 MG tablet take 1 tablet by mouth four times a day 6/23/17  Yes Jacobo Patterson MD   atorvastatin (LIPITOR) 40 MG tablet Take 1 tablet by mouth daily  Patient taking differently: Take 80 mg by mouth daily  6/7/17  Yes Jacobo Patterson MD   DULoxetine (CYMBALTA) 60 MG extended release capsule take 1 capsule by mouth once daily 5/30/17  Yes Jacobo Patterson MD   levothyroxine (SYNTHROID) 100 MCG tablet Take 1 tablet by mouth daily  Patient taking differently: Take 75 mcg by mouth daily  2/7/17  Yes Jacobo Patterson MD   furosemide (LASIX) 20 MG tablet take 1 tablet by mouth daily 2/7/17  Yes Jacobo Patterson MD        Allergies:     Cefuroxime axetil; Dilaudid [hydromorphone]; Metformin and related; Sulfa antibiotics; Amoxicillin; Antihistamines, loratadine-type; Aspartame and phenylalanine; Eggs or egg-derived products; Lorazepam; Nubain [nalbuphine hcl]; and Vistaril [hydroxyzine hcl]    Social History:     Tobacco:    reports that she has quit smoking. Her smoking use included Cigarettes. She has never used smokeless tobacco.  Alcohol:      reports that she does not drink alcohol. Drug Use:  reports that she does not use drugs.     Family History:     Family History   Problem Relation Age of Onset    Coronary Art Dis Mother     Lung Cancer Mother     Diabetes Mother         mellitus    Coronary Art Dis Father     Diabetes Father         diabetes mellitus       Review of Systems:     Positive and Negative as described in HPI. CONSTITUTIONAL:  negative for fevers, chills, sweats, fatigue, weight loss  HEENT:  negative for vision, hearing changes, runny nose, throat pain  RESPIRATORY:  negative for shortness of breath, cough, congestion, wheezing. CARDIOVASCULAR: positve for cp    GASTROINTESTINAL:  negative for nausea, vomiting, diarrhea, constipation, change in bowel habits, abdominal pain   GENITOURINARY:  negative for difficulty of urination, burning with urination, frequency   INTEGUMENT:  negative for rash, skin lesions, easy bruising   HEMATOLOGIC/LYMPHATIC:  negative for swelling/edema   ALLERGIC/IMMUNOLOGIC:  negative for urticaria , itching  ENDOCRINE:  negative increase in drinking, increase in urination, hot or cold intolerance  MUSCULOSKELETAL:  negative joint pains, muscle aches, swelling of joints  NEUROLOGICAL:  negative for headaches, dizziness, lightheadedness, numbness, pain, tingling extremities  BEHAVIOR/PSYCH:  negative for depression, anxiety    Physical Exam:   /85   Pulse 95   Temp 97.8 °F (36.6 °C) (Oral)   Resp 16   Ht 5' 2\" (1.575 m)   Wt 218 lb 11.1 oz (99.2 kg)   LMP 01/12/2011   SpO2 97%   BMI 40.00 kg/m²   Recent Labs      07/31/18   0051  07/31/18   0818  07/31/18   1205   POCGLU  234*  257*  242*     Morbid obese  General Appearance:  alert, well appearing, and in no acute distress  Mental status: oriented to person, place, and time with normal affect  Head:  normocephalic, atraumatic. Eye: no icterus, redness, pupils equal and reactive, extraocular eye movements intact, conjunctiva clear  Ear: normal external ear, no discharge, hearing intact  Nose:  no drainage noted  Mouth: mucous membranes moist  Neck: supple, no carotid bruits, thyroid not palpable  Lungs: Bilateral equal air entry, clear to ausculation, no wheezing, rales or rhonchi, normal effort  Cardiovascular: normal rate, regular rhythm, no murmur, gallop, rub.   Abdomen: Soft, nontender, nondistended, normal bowel sounds, no hepatomegaly or splenomegaly  Neurologic: There are no new focal motor or sensory deficits, normal muscle tone and bulk, no abnormal sensation, normal speech, cranial nerves II through XII grossly intact  Skin: No gross lesions, rashes, bruising or bleeding on exposed skin area  Extremities:  peripheral pulses palpable, no pedal edema or calf pain with palpation  Psych: normal affect     Investigations:      Laboratory Testing:  Recent Results (from the past 24 hour(s))   EKG 12 Lead    Collection Time: 07/30/18  8:00 PM   Result Value Ref Range    Ventricular Rate 70 BPM    Atrial Rate 70 BPM    P-R Interval 146 ms    QRS Duration 96 ms    Q-T Interval 432 ms    QTc Calculation (Bazett) 466 ms    P Axis 35 degrees    R Axis -28 degrees    T Axis 7 degrees   Comprehensive Metabolic Panel    Collection Time: 07/30/18  8:45 PM   Result Value Ref Range    Glucose 299 (H) 70 - 99 mg/dL    BUN 23 8 - 23 mg/dL    CREATININE 0.55 0.50 - 0.90 mg/dL    Bun/Cre Ratio NOT REPORTED 9 - 20    Calcium 9.3 8.6 - 10.4 mg/dL    Sodium 134 (L) 135 - 144 mmol/L    Potassium 4.4 3.7 - 5.3 mmol/L    Chloride 96 (L) 98 - 107 mmol/L    CO2 23 20 - 31 mmol/L    Anion Gap 15 9 - 17 mmol/L    Alkaline Phosphatase 133 (H) 35 - 104 U/L    ALT 23 5 - 33 U/L    AST 14 <32 U/L    Total Bilirubin 0.52 0.3 - 1.2 mg/dL    Total Protein 7.2 6.4 - 8.3 g/dL    Alb 3.7 3.5 - 5.2 g/dL    Albumin/Globulin Ratio NOT REPORTED 1.0 - 2.5    GFR Non-African American >60 >60 mL/min    GFR African American >60 >60 mL/min    GFR Comment          GFR Staging NOT REPORTED    CBC Auto Differential    Collection Time: 07/30/18  8:45 PM   Result Value Ref Range    WBC 11.1 (H) 3.5 - 11.0 k/uL    RBC 5.00 4.0 - 5.2 m/uL    Hemoglobin 15.0 12.0 - 16.0 g/dL    Hematocrit 44.1 36 - 46 %    MCV 88.2 80 - 100 fL    MCH 29.9 26 - 34 pg    MCHC 33.9 31 - 37 g/dL    RDW 12.7 11.5 - 14.9 %    Platelets 890 159 - 006 k/uL    MPV 9.7 6.0 - 12.0 fL    NRBC (L) 3.5 - 5.2 g/dL    Albumin/Globulin Ratio NOT REPORTED 1.0 - 2.5    GFR Non-African American >60 >60 mL/min    GFR African American >60 >60 mL/min    GFR Comment          GFR Staging NOT REPORTED    Magnesium    Collection Time: 07/31/18  5:23 AM   Result Value Ref Range    Magnesium 1.7 1.6 - 2.6 mg/dL   Brain natriuretic peptide    Collection Time: 07/31/18  5:23 AM   Result Value Ref Range    Pro- <300 pg/mL    BNP Interpretation         Lipid panel - fasting    Collection Time: 07/31/18  5:23 AM   Result Value Ref Range    Cholesterol 152 <200 mg/dL    HDL 28 (L) >40 mg/dL    LDL Cholesterol 48 0 - 130 mg/dL    Chol/HDL Ratio 5.4 (H) <5    Triglycerides 378 (H) <150 mg/dL    VLDL NOT REPORTED 1 - 30 mg/dL   CBC    Collection Time: 07/31/18  5:23 AM   Result Value Ref Range    WBC 11.9 (H) 3.5 - 11.0 k/uL    RBC 4.63 4.0 - 5.2 m/uL    Hemoglobin 13.8 12.0 - 16.0 g/dL    Hematocrit 41.3 36 - 46 %    MCV 89.0 80 - 100 fL    MCH 29.7 26 - 34 pg    MCHC 33.4 31 - 37 g/dL    RDW 13.0 11.5 - 14.9 %    Platelets 931 924 - 256 k/uL    MPV 10.3 6.0 - 12.0 fL    NRBC Automated NOT REPORTED per 100 WBC   Protime-INR    Collection Time: 07/31/18  5:23 AM   Result Value Ref Range    Protime 10.0 9.7 - 12.0 sec    INR 1.0    POC Glucose Fingerstick    Collection Time: 07/31/18  8:18 AM   Result Value Ref Range    POC Glucose 257 (H) 65 - 105 mg/dL   POC Glucose Fingerstick    Collection Time: 07/31/18 12:05 PM   Result Value Ref Range    POC Glucose 242 (H) 65 - 105 mg/dL       Recent Labs      07/31/18 0523 07/30/18 2045   HGB  13.8  15.0   HCT  41.3  44.1   WBC  11.9*  11.1*   MCV  89.0  88.2   NA  138  134*   K  3.9  4.4   CL  101  96*   CO2  23  23   BUN  19  23   CREATININE  0.48*  0.55   GLUCOSE  302*  299*   INR  1.0  1.0   PROTIME  10.0  10.0   APTT   --   27.7   AST  21  14   ALT  24  23   LABALBU  3.1*  3.7       Hematology:  Recent Labs      07/30/18 2045  07/31/18   0523   WBC  11.1*  11.9*   RBC SUSPECT TECHNOLOGIST PROVIDED HISTORY: Procedure Type->Rx Ordering Physician Provided Reason for Exam: chest pain, acute coronary syndrome suspect Acuity: Unknown Type of Exam: Unknown FINDINGS: Images interpreted utilizing [employer] and [employer]. There is normal distribution of radiotracer throughout the myocardium without evidence for a significant reversible or fixed perfusion defect. Function: End diastolic volume:  90VZ Left ventricular ejection fraction:  49% Wall motion abnormalities:  Septal wall hypokinesis/akinesis TID score:  0.79  (Scores greater than 1.39 are considered elevated for Lexiscan stress with Tc99m)     1. No discrete perfusion abnormality to suggest myocardial ischemia/infarction. 2. Septal wall hypokinesis/akinesis. Calculated ejection fraction of 49%. 3. Risk stratification: Intermediate Notes concerning risk stratification: Risk stratification incorporates both clinical history and some testing results. Final risk determination is the responsibility of the ordering provider as other patient information and test results may increase or decrease the risk assessment reported for this examination. Risk stratification criteria are adapted from \"Noninvasive Risk Stratification\" criteria from [employer]. Al, ACC/AATS/AHA/ASE/ASNC/SCAI/SCCT/STS 2017 Appropriate Use Criteria For Coronary Revascularization in Patients With Stable Ischemic Heart Disease [facility] Volume 69, Issue 17, May 2017 High risk (>3% annual death or MI) 1. Severe resting LV dysfunction (LVEF <35%) not readily explained by non coronary causes 2. Resting perfusion abnormalities greater than 10% of the myocardium in patients without prior history or evidence of MI3. Stress-induced perfusion abnormalities encumbering greater than or equal to 10% myocardium or stress segmental scores indicating multiple vascular territories with abnormalities 4.  Stress-induced LV dilatation (TID ratio greater than 1.19 for exercise

## 2018-07-31 NOTE — ED NOTES
Pt presents in ED c/o HA, hyperglycemia, rash, and chest pain. Pt has had these ongoing symptoms for a few weeks without relief. Pt has had trouble with her blood sugar on and off and saw her \"diabetic doctor\" today. Pt very tearful and anxious. Pt reported she cannot get her blood sugar down with her insulin at home. Pt reported ongoing rash on buttocks that she received cream for. Pt reported nausea without relief. Pt is eupneic, A&OX4, and pink, warm, dry. Call light is within pt's reach. Pt instructed on call light use.          Samreen Cortez RN  07/30/18 9564

## 2018-08-01 ENCOUNTER — HOSPITAL ENCOUNTER (OUTPATIENT)
Dept: CARDIAC CATH/INVASIVE PROCEDURES | Age: 63
Discharge: HOME | End: 2018-08-01
Payer: COMMERCIAL

## 2018-08-01 VITALS
SYSTOLIC BLOOD PRESSURE: 140 MMHG | HEART RATE: 68 BPM | RESPIRATION RATE: 18 BRPM | OXYGEN SATURATION: 94 % | DIASTOLIC BLOOD PRESSURE: 80 MMHG

## 2018-08-01 LAB
ALBUMIN SERPL-MCNC: 3.6 G/DL (ref 3.5–5.2)
ALBUMIN/GLOBULIN RATIO: ABNORMAL (ref 1–2.5)
ALP BLD-CCNC: 117 U/L (ref 35–104)
ALT SERPL-CCNC: 25 U/L (ref 5–33)
ANION GAP SERPL CALCULATED.3IONS-SCNC: 14 MMOL/L (ref 9–17)
AST SERPL-CCNC: 20 U/L
BILIRUB SERPL-MCNC: 0.32 MG/DL (ref 0.3–1.2)
BUN BLDV-MCNC: 14 MG/DL (ref 8–23)
BUN/CREAT BLD: ABNORMAL (ref 9–20)
CALCIUM SERPL-MCNC: 9.6 MG/DL (ref 8.6–10.4)
CHLORIDE BLD-SCNC: 105 MMOL/L (ref 98–107)
CO2: 23 MMOL/L (ref 20–31)
CREAT SERPL-MCNC: 0.48 MG/DL (ref 0.5–0.9)
GFR AFRICAN AMERICAN: >60 ML/MIN
GFR NON-AFRICAN AMERICAN: >60 ML/MIN
GFR SERPL CREATININE-BSD FRML MDRD: ABNORMAL ML/MIN/{1.73_M2}
GFR SERPL CREATININE-BSD FRML MDRD: ABNORMAL ML/MIN/{1.73_M2}
GLUCOSE BLD-MCNC: 213 MG/DL (ref 65–105)
GLUCOSE BLD-MCNC: 259 MG/DL (ref 70–99)
GLUCOSE BLD-MCNC: 278 MG/DL (ref 65–105)
GLUCOSE BLD-MCNC: 410 MG/DL (ref 65–105)
HCT VFR BLD CALC: 44.5 % (ref 36–46)
HEMOGLOBIN: 15 G/DL (ref 12–16)
MCH RBC QN AUTO: 30.3 PG (ref 26–34)
MCHC RBC AUTO-ENTMCNC: 33.6 G/DL (ref 31–37)
MCV RBC AUTO: 90.1 FL (ref 80–100)
NRBC AUTOMATED: ABNORMAL PER 100 WBC
PDW BLD-RTO: 13.1 % (ref 11.5–14.9)
PLATELET # BLD: 251 K/UL (ref 150–450)
PMV BLD AUTO: 9.8 FL (ref 6–12)
POTASSIUM SERPL-SCNC: 5.1 MMOL/L (ref 3.7–5.3)
RBC # BLD: 4.94 M/UL (ref 4–5.2)
SODIUM BLD-SCNC: 142 MMOL/L (ref 135–144)
TOTAL PROTEIN: 6.9 G/DL (ref 6.4–8.3)
WBC # BLD: 11.2 K/UL (ref 3.5–11)

## 2018-08-01 PROCEDURE — C1769 GUIDE WIRE: HCPCS

## 2018-08-01 PROCEDURE — 80053 COMPREHEN METABOLIC PANEL: CPT

## 2018-08-01 PROCEDURE — 7100000011 HC PHASE II RECOVERY - ADDTL 15 MIN

## 2018-08-01 PROCEDURE — 93458 L HRT ARTERY/VENTRICLE ANGIO: CPT | Performed by: INTERNAL MEDICINE

## 2018-08-01 PROCEDURE — 85027 COMPLETE CBC AUTOMATED: CPT

## 2018-08-01 PROCEDURE — 82947 ASSAY GLUCOSE BLOOD QUANT: CPT

## 2018-08-01 PROCEDURE — 6360000004 HC RX CONTRAST MEDICATION

## 2018-08-01 PROCEDURE — 6370000000 HC RX 637 (ALT 250 FOR IP): Performed by: INTERNAL MEDICINE

## 2018-08-01 PROCEDURE — C1894 INTRO/SHEATH, NON-LASER: HCPCS

## 2018-08-01 PROCEDURE — 85025 COMPLETE CBC W/AUTO DIFF WBC: CPT

## 2018-08-01 PROCEDURE — 2580000003 HC RX 258: Performed by: NURSE PRACTITIONER

## 2018-08-01 PROCEDURE — 6370000000 HC RX 637 (ALT 250 FOR IP): Performed by: NURSE PRACTITIONER

## 2018-08-01 PROCEDURE — 2500000003 HC RX 250 WO HCPCS

## 2018-08-01 PROCEDURE — 96376 TX/PRO/DX INJ SAME DRUG ADON: CPT

## 2018-08-01 PROCEDURE — G0378 HOSPITAL OBSERVATION PER HR: HCPCS

## 2018-08-01 PROCEDURE — 2709999900 HC NON-CHARGEABLE SUPPLY

## 2018-08-01 PROCEDURE — 7100000010 HC PHASE II RECOVERY - FIRST 15 MIN

## 2018-08-01 PROCEDURE — 36415 COLL VENOUS BLD VENIPUNCTURE: CPT

## 2018-08-01 PROCEDURE — 2500000003 HC RX 250 WO HCPCS: Performed by: NURSE PRACTITIONER

## 2018-08-01 PROCEDURE — 6360000002 HC RX W HCPCS: Performed by: NURSE PRACTITIONER

## 2018-08-01 RX ORDER — SODIUM CHLORIDE 9 MG/ML
INJECTION, SOLUTION INTRAVENOUS CONTINUOUS
Status: DISCONTINUED | OUTPATIENT
Start: 2018-08-01 | End: 2018-08-02 | Stop reason: HOSPADM

## 2018-08-01 RX ORDER — FENTANYL CITRATE 50 UG/ML
50 INJECTION, SOLUTION INTRAMUSCULAR; INTRAVENOUS ONCE
Status: COMPLETED | OUTPATIENT
Start: 2018-08-01 | End: 2018-08-01

## 2018-08-01 RX ORDER — SODIUM CHLORIDE 9 MG/ML
INJECTION, SOLUTION INTRAVENOUS CONTINUOUS
Status: CANCELLED | OUTPATIENT
Start: 2018-08-01 | End: 2018-08-04

## 2018-08-01 RX ORDER — ACETAMINOPHEN 325 MG/1
650 TABLET ORAL EVERY 4 HOURS PRN
Status: CANCELLED | OUTPATIENT
Start: 2018-08-01

## 2018-08-01 RX ORDER — SODIUM CHLORIDE 0.9 % (FLUSH) 0.9 %
10 SYRINGE (ML) INJECTION EVERY 12 HOURS SCHEDULED
Status: CANCELLED | OUTPATIENT
Start: 2018-08-01

## 2018-08-01 RX ORDER — SODIUM CHLORIDE 0.9 % (FLUSH) 0.9 %
10 SYRINGE (ML) INJECTION PRN
Status: DISCONTINUED | OUTPATIENT
Start: 2018-08-01 | End: 2018-08-02 | Stop reason: HOSPADM

## 2018-08-01 RX ORDER — SODIUM CHLORIDE 0.9 % (FLUSH) 0.9 %
10 SYRINGE (ML) INJECTION EVERY 12 HOURS SCHEDULED
Status: DISCONTINUED | OUTPATIENT
Start: 2018-08-01 | End: 2018-08-02 | Stop reason: HOSPADM

## 2018-08-01 RX ORDER — ONDANSETRON HYDROCHLORIDE 4 MG/5ML
SOLUTION ORAL ONCE
COMMUNITY
End: 2019-08-19

## 2018-08-01 RX ORDER — ONDANSETRON 2 MG/ML
4 INJECTION INTRAMUSCULAR; INTRAVENOUS EVERY 6 HOURS PRN
Status: CANCELLED | OUTPATIENT
Start: 2018-08-01

## 2018-08-01 RX ORDER — FENTANYL 50 UG/H
1 PATCH TRANSDERMAL
COMMUNITY
End: 2019-08-19

## 2018-08-01 RX ORDER — SODIUM CHLORIDE 0.9 % (FLUSH) 0.9 %
10 SYRINGE (ML) INJECTION PRN
Status: CANCELLED | OUTPATIENT
Start: 2018-08-01

## 2018-08-01 RX ORDER — FAMOTIDINE 20 MG/1
20 TABLET, FILM COATED ORAL 2 TIMES DAILY
COMMUNITY
End: 2019-08-19

## 2018-08-01 RX ADMIN — Medication 10 ML: at 22:22

## 2018-08-01 RX ADMIN — METOPROLOL TARTRATE 100 MG: 100 TABLET ORAL at 08:42

## 2018-08-01 RX ADMIN — SODIUM CHLORIDE: 9 INJECTION, SOLUTION INTRAVENOUS at 12:27

## 2018-08-01 RX ADMIN — INSULIN LISPRO 6 UNITS: 100 INJECTION, SOLUTION INTRAVENOUS; SUBCUTANEOUS at 08:40

## 2018-08-01 RX ADMIN — Medication 10 ML: at 08:44

## 2018-08-01 RX ADMIN — INSULIN LISPRO 4 UNITS: 100 INJECTION, SOLUTION INTRAVENOUS; SUBCUTANEOUS at 18:35

## 2018-08-01 RX ADMIN — INSULIN LISPRO 6 UNITS: 100 INJECTION, SOLUTION INTRAVENOUS; SUBCUTANEOUS at 22:27

## 2018-08-01 RX ADMIN — CYCLOBENZAPRINE HYDROCHLORIDE 10 MG: 10 TABLET, FILM COATED ORAL at 22:22

## 2018-08-01 RX ADMIN — INSULIN LISPRO 6 UNITS: 100 INJECTION, SOLUTION INTRAVENOUS; SUBCUTANEOUS at 22:25

## 2018-08-01 RX ADMIN — MICONAZOLE NITRATE: 20.6 POWDER TOPICAL at 22:24

## 2018-08-01 RX ADMIN — LISINOPRIL 10 MG: 10 TABLET ORAL at 08:43

## 2018-08-01 RX ADMIN — GABAPENTIN 800 MG: 400 CAPSULE ORAL at 22:22

## 2018-08-01 RX ADMIN — FAMOTIDINE 20 MG: 20 TABLET ORAL at 22:22

## 2018-08-01 RX ADMIN — METOPROLOL TARTRATE 100 MG: 100 TABLET ORAL at 22:22

## 2018-08-01 RX ADMIN — FENTANYL CITRATE 50 MCG: 50 INJECTION, SOLUTION INTRAMUSCULAR; INTRAVENOUS at 05:23

## 2018-08-01 RX ADMIN — ASPIRIN 81 MG 81 MG: 81 TABLET ORAL at 08:42

## 2018-08-01 RX ADMIN — INSULIN GLARGINE 28 UNITS: 100 INJECTION, SOLUTION SUBCUTANEOUS at 22:25

## 2018-08-01 RX ADMIN — MICONAZOLE NITRATE: 20.6 POWDER TOPICAL at 08:44

## 2018-08-01 ASSESSMENT — PAIN SCALES - GENERAL: PAINLEVEL_OUTOF10: 8

## 2018-08-01 NOTE — OP NOTE
[facility]    CARDIAC CATHETERIZATION    Date:   8/1/2018  Patient name: Andrew Ceballos  Date of admission:  8/1/2018 12:04 PM  MRN:   [id number]  YOB: 1955    Operators:  Primary: Karina Bazzi MD    CV Fellow:  Alicia Allen MD    Pre Procedure Conscious Sedation Data:    ASA Class:    [] I [x] II [] III [] IV    Mallampati Class:  [] I [x] II [] III [] IV     Indication:  [] STEMI      [] + Stress test  [] ACS      [] + EKG Changes  [] Non Q MI       [] Significant Risk Factors  [] Recurrent Angina             [] Diabetes Mellitus    [] New LBBB      [] Uncontrolled HTN. [] CHF / Low EF changes     [] Abnormal CTA / Ca Score    Procedure:  Access:  [x] Femoral  [] Radial  artery       [x] Right  [] Left    Procedure: After informed consent was obtained with explanation of the risks and benefits, the patient was brought to the cath lab. The access area was prepped and draped in sterile fashion. 1% lidocaine was used for local block. The artery was cannulated with a 6  Fr sheath with brisk arterial blood return. The side port was frequently flushed and aspirated with normal saline. Findings:  Left main: Mild disease  LAD: Mild disease  LCX: Mild disease  RCA: Mild disease  The LV gram was not performed. Conclusions:  1. Normal / Non obstructive CAD    Recommendation:  1. Medical treatments  2. Risk factor modification        History and Risk Factors    [x] Hypertension     [] Family history of CAD  [x] Hyperlipidemia     [] Cerebrovascular Disease   [] Prior MI       [] Peripheral Vascular disease   [] Prior PCI              [x] Diabetes Mellitus    [] Left Main PCI. [] Currently on Dialysis. [] Prior CABG. [] Currently smoker. [] Cardiac Arrest outside of healthcare facility. Witnessed     [] Yes   [] No     Arrest after arrival of EMS  [] Yes   [] No   [] Cardiac Arrest at other Facility. [] Yes   [] No    Pre-Procedure Information.   Heart Failure       [] Yes    [x] No    Class  [] I      [] II  [] III    [] IV. New Diagnosis    [] Yes  [] No    HF Type      [] Systolic   [] Diastolic          [] Unknown. Diagnostic Test:   EKG       [] Normal   [] Abnormal    New antiarrhythmia medications:    [] Yes   [] No   New onset atrial fibrillation / Flutter     [] Yes   [] No   ECG Abnormalities:      [] V. Fib   [] Johanna V. Tach           [] NS V. T   [] New LBBB           [] T. Inv  []  ST dev > 0.5 mm         [] PVC's freq  [] PVC's infrequent  Stress Test:   Type:    [] Stress Echo   [] Exercise Stress Test (no imaging)      [] Stress Nuclear  [] Stress Imaging   Results  [] Negative   [] Positive        [] Indeterminate  [] Unavailable     If Positive/ Risk / Extent of Ischemia:       [] Low  [] Intermediate         [] High  [] Unavailable      Cardiac CTA Performed:   Results   [] CAD   [] Non obstructive CAD      [] No CAD   [] Uncertain      [] Unknown   [] Structural Disease. Pre Procedure Medications:      [] ASA [] Beta Blockers      [] Nitrate [] Ca Channel Blockers      [] Ranolazine [] Statin       [] Plavix/Others antiplatelets        Electronically signed by Dariel Johnson MD on 8/1/2018 at 2:56 PM  Cardiology Fellow  Earlene Hampton MD. Attending physician  [facility]

## 2018-08-01 NOTE — PLAN OF CARE
Problem: Pain:  Goal: Pain level will decrease  Pain level will decrease   Outcome: Ongoing    Goal: Control of acute pain  Control of acute pain   Outcome: Ongoing    Goal: Control of chronic pain  Control of chronic pain   Outcome: Ongoing      Problem: Falls - Risk of:  Goal: Will remain free from falls  Will remain free from falls   Outcome: Met This Shift  Pt is free form falls this shift   Goal: Absence of physical injury  Absence of physical injury   Outcome: Met This Shift  Pt free from injury     Problem: Risk for Impaired Skin Integrity  Goal: Tissue integrity - skin and mucous membranes  Structural intactness and normal physiological function of skin and  mucous membranes.    Outcome: Met This Shift  Pt skin integrity is intact

## 2018-08-01 NOTE — CONSULTS
100 MG tablet take 1 tablet by mouth twice a day 6/27/17  Yes Giselle Monaco MD   gabapentin (NEURONTIN) 800 MG tablet take 1 tablet by mouth four times a day 6/23/17  Yes Giselle Monaco MD   atorvastatin (LIPITOR) 40 MG tablet Take 1 tablet by mouth daily  Patient taking differently: Take 80 mg by mouth daily  6/7/17  Yes Giselle Monaco MD   DULoxetine (CYMBALTA) 60 MG extended release capsule take 1 capsule by mouth once daily 5/30/17  Yes Giselle Monaco MD   levothyroxine (SYNTHROID) 100 MCG tablet Take 1 tablet by mouth daily  Patient taking differently: Take 75 mcg by mouth daily  2/7/17  Yes Giselle Monaco MD   furosemide (LASIX) 20 MG tablet take 1 tablet by mouth daily 2/7/17  Yes Giselle Monaco MD       Current Medications: Scheduled Meds:   miconazole   Topical BID    insulin lispro  0-12 Units Subcutaneous TID WC    insulin lispro  0-6 Units Subcutaneous Nightly    0.9 % sodium chloride  250 mL Intravenous Once    ondansetron  4 mg Intravenous Once    aspirin  81 mg Oral Daily    atorvastatin  80 mg Oral Daily    cetirizine  10 mg Oral Daily    DULoxetine  60 mg Oral Daily    furosemide  20 mg Oral Daily    gabapentin  800 mg Oral TID    insulin glargine  28 Units Subcutaneous Nightly    levothyroxine  75 mcg Oral Daily    lisinopril  10 mg Oral Daily    metoprolol  100 mg Oral BID    sodium chloride flush  10 mL Intravenous 2 times per day    famotidine  20 mg Oral BID    enoxaparin  40 mg Subcutaneous Daily     Continuous Infusions:   dextrose       PRN Meds:.glucose, dextrose, glucagon (rDNA), dextrose, sodium chloride flush, perflutren lipid microspheres, cyclobenzaprine, sodium chloride flush, potassium chloride **OR** potassium chloride **OR** potassium chloride, magnesium sulfate, acetaminophen, magnesium hydroxide, ondansetron, nitroGLYCERIN     Allergies:  Cefuroxime axetil; Dilaudid [hydromorphone]; Metformin and related; Sulfa antibiotics;  Amoxicillin; left ventricular hypertrophy. Grade I (mild) left ventricular diastolic dysfunction. Thickened mitral valve leaflets. [address] mitral regurgitation. Trivial tricuspid regurgitation. Normal right ventricular systolic pressure. Stress:7/31/18     Impression   1. No discrete perfusion abnormality to suggest myocardial   ischemia/infarction. 2. Septal wall hypokinesis/akinesis.  Calculated ejection fraction of 49%. 3. Risk stratification: Intermediate       Cath:    Labs:     CBC:   Recent Labs      07/31/18 0523 08/01/18 0518   WBC  11.9*  11.2*   HGB  13.8  15.0   HCT  41.3  44.5   PLT  208  251     BMP: Recent Labs      07/31/18 0523 08/01/18 0518   NA  138  142   K  3.9  5.1   CO2  23  23   BUN  19  14   CREATININE  0.48*  0.48*   LABGLOM  >60  >60   GLUCOSE  302*  259*     BNP: No results for input(s): BNP in the last 72 hours. PT/INR:   Recent Labs      07/30/18 2045 07/31/18 0523   PROTIME  10.0  10.0   INR  1.0  1.0     APTT:  Recent Labs      07/30/18 2045   APTT  27.7     CARDIAC ENZYMES:No results for input(s): CKTOTAL, CKMB, CKMBINDEX, TROPONINI in the last 72 hours.   FASTING LIPID PANEL:  Lab Results   Component Value Date    HDL 28 07/31/2018    LDLDIRECT 200 12/08/2017    TRIG 378 07/31/2018     LIVER PROFILE:  Recent Labs      07/31/18 0523 08/01/18 0518   AST  21  20   ALT  24  25   LABALBU  3.1*  3.6       Intake/Output Summary (Last 24 hours) at 08/01/18 1112  Last data filed at 08/01/18 6300   Gross per 24 hour   Intake              720 ml   Output             1900 ml   Net            -1180 ml       IMPRESSION:    Patient Active Problem List   Diagnosis    Chronic neck pain    Abnormal mammogram    DM neuropathy takes Percocet    Hypothyroidism    Depression    MVP (mitral valve prolapse)    Constipation    Chronic prescription benzodiazepine use    Chronic use of opiate drugs therapeutic purposes    Morbid obesity with BMI of 45.0-49.9, adult ([facility] [address]    Mediastinal cyst    Uncontrolled type 2 diabetes mellitus with diabetic polyneuropathy, without long-term current use of insulin (HCC)    Essential hypertension    Thumb pain    Chronic pain of both knees    Chest pain           RECOMMENDATIONS:  GIVEN THE RISK FACTORS , BEING DIABETIC , STRONG FAMILY HISTORY   PT IS GIVEN THE OPTION AND ALTERNATIVE   PT OPTED TO GO FAR CATH   RISK OF BLEEDING  REQUIRING TRANSFUSION , ARTERY RUPTURE REQUIRING SURGERY , MINOR MAJOR MI DEATH         Discussed with patient and spouse and nursing.     Rula Renner [facility]        [phone]

## 2018-08-01 NOTE — CARE COORDINATION
DISCHARGE PLANNING NOTE:    Patient is at ST for heart cath. Will follow along for any discharge needs.      Electronically signed by Hansel Rubio RN on 8/1/2018 at 2:44 PM

## 2018-08-02 VITALS
BODY MASS INDEX: 39.6 KG/M2 | HEIGHT: 62 IN | RESPIRATION RATE: 18 BRPM | DIASTOLIC BLOOD PRESSURE: 50 MMHG | SYSTOLIC BLOOD PRESSURE: 106 MMHG | WEIGHT: 215.17 LBS | HEART RATE: 69 BPM | OXYGEN SATURATION: 95 % | TEMPERATURE: 98 F

## 2018-08-02 LAB
ALBUMIN SERPL-MCNC: 3.4 G/DL (ref 3.5–5.2)
ALBUMIN/GLOBULIN RATIO: ABNORMAL (ref 1–2.5)
ALP BLD-CCNC: 109 U/L (ref 35–104)
ALT SERPL-CCNC: 30 U/L (ref 5–33)
ANION GAP SERPL CALCULATED.3IONS-SCNC: 12 MMOL/L (ref 9–17)
AST SERPL-CCNC: 19 U/L
BILIRUB SERPL-MCNC: 0.37 MG/DL (ref 0.3–1.2)
BUN BLDV-MCNC: 10 MG/DL (ref 8–23)
BUN/CREAT BLD: ABNORMAL (ref 9–20)
CALCIUM SERPL-MCNC: 9.1 MG/DL (ref 8.6–10.4)
CHLORIDE BLD-SCNC: 106 MMOL/L (ref 98–107)
CO2: 23 MMOL/L (ref 20–31)
CREAT SERPL-MCNC: 0.42 MG/DL (ref 0.5–0.9)
GFR AFRICAN AMERICAN: >60 ML/MIN
GFR NON-AFRICAN AMERICAN: >60 ML/MIN
GFR SERPL CREATININE-BSD FRML MDRD: ABNORMAL ML/MIN/{1.73_M2}
GFR SERPL CREATININE-BSD FRML MDRD: ABNORMAL ML/MIN/{1.73_M2}
GLUCOSE BLD-MCNC: 231 MG/DL (ref 65–105)
GLUCOSE BLD-MCNC: 233 MG/DL (ref 70–99)
GLUCOSE BLD-MCNC: 291 MG/DL (ref 65–105)
HCT VFR BLD CALC: 42 % (ref 36–46)
HEMOGLOBIN: 14.4 G/DL (ref 12–16)
MCH RBC QN AUTO: 30.9 PG (ref 26–34)
MCHC RBC AUTO-ENTMCNC: 34.3 G/DL (ref 31–37)
MCV RBC AUTO: 90.1 FL (ref 80–100)
NRBC AUTOMATED: NORMAL PER 100 WBC
PDW BLD-RTO: 13.2 % (ref 11.5–14.9)
PLATELET # BLD: 249 K/UL (ref 150–450)
PMV BLD AUTO: 9.7 FL (ref 6–12)
POTASSIUM SERPL-SCNC: 3.6 MMOL/L (ref 3.7–5.3)
RBC # BLD: 4.67 M/UL (ref 4–5.2)
SODIUM BLD-SCNC: 141 MMOL/L (ref 135–144)
TOTAL PROTEIN: 6.3 G/DL (ref 6.4–8.3)
WBC # BLD: 10.7 K/UL (ref 3.5–11)

## 2018-08-02 PROCEDURE — 80053 COMPREHEN METABOLIC PANEL: CPT

## 2018-08-02 PROCEDURE — 6370000000 HC RX 637 (ALT 250 FOR IP): Performed by: NURSE PRACTITIONER

## 2018-08-02 PROCEDURE — 99217 PR OBSERVATION CARE DISCHARGE MANAGEMENT: CPT | Performed by: INTERNAL MEDICINE

## 2018-08-02 PROCEDURE — 85027 COMPLETE CBC AUTOMATED: CPT

## 2018-08-02 PROCEDURE — 36415 COLL VENOUS BLD VENIPUNCTURE: CPT

## 2018-08-02 PROCEDURE — G0378 HOSPITAL OBSERVATION PER HR: HCPCS

## 2018-08-02 PROCEDURE — 85025 COMPLETE CBC W/AUTO DIFF WBC: CPT

## 2018-08-02 PROCEDURE — 82947 ASSAY GLUCOSE BLOOD QUANT: CPT

## 2018-08-02 RX ADMIN — LEVOTHYROXINE SODIUM 75 MCG: 75 TABLET ORAL at 05:41

## 2018-08-02 RX ADMIN — INSULIN LISPRO 6 UNITS: 100 INJECTION, SOLUTION INTRAVENOUS; SUBCUTANEOUS at 10:07

## 2018-08-02 NOTE — PLAN OF CARE
Problem: Falls - Risk of:  Goal: Will remain free from falls  Will remain free from falls   Outcome: Met This Shift  The patient remained free from falls this shift, call light within reach, bed in locked and lowest position. Side rails up x2. Continue to monitor closely. Problem: Risk for Impaired Skin Integrity  Goal: Tissue integrity - skin and mucous membranes  Structural intactness and normal physiological function of skin and  mucous membranes. Outcome: Met This Shift  Pt has cath.  Dry site in femoral

## 2018-08-02 NOTE — DISCHARGE SUMMARY
tablet  Take 20 mg by mouth 2 times daily             fentaNYL (DURAGESIC) 50 MCG/HR  Place 1 patch onto the skin every 72 hours. .             furosemide (LASIX) 20 MG tablet  take 1 tablet by mouth daily             gabapentin (NEURONTIN) 800 MG tablet  take 1 tablet by mouth four times a day             glipiZIDE (GLUCOTROL) 10 MG tablet  Take 2 tablets by mouth 2 times daily (before meals)             insulin glargine (BASAGLAR KWIKPEN) 100 UNIT/ML injection pen  Inject 28 Units into the skin nightly             levothyroxine (SYNTHROID) 100 MCG tablet  Take 1 tablet by mouth daily             lisinopril (PRINIVIL;ZESTRIL) 10 MG tablet  Take 10 mg by mouth daily             metoprolol (LOPRESSOR) 100 MG tablet  take 1 tablet by mouth twice a day             miconazole (MICOTIN) 2 % powder  Apply topically 2 times daily Apply topically 2 times daily. ondansetron (ZOFRAN) 4 MG/5ML solution  Take by mouth once                 Discharge Instructions: Follow up with Teresa  in 2 weeks.       Hospital acquired Infections: None    Time spent for dc more than 30 min    Maxwell RAMACHANDRAN attending

## 2018-08-03 ENCOUNTER — CARE COORDINATION (OUTPATIENT)
Dept: CASE MANAGEMENT | Age: 63
End: 2018-08-03

## 2018-08-03 DIAGNOSIS — R07.9 CHEST PAIN, UNSPECIFIED TYPE: Primary | ICD-10-CM

## 2018-08-03 PROCEDURE — 1111F DSCHRG MED/CURRENT MED MERGE: CPT

## 2018-08-03 NOTE — CARE COORDINATION
[facility] Transitions Initial Follow Up Call    Call within 2 business days of discharge: Yes    Patient: Hever Theodore Patient : 1955   MRN: [id number]  Reason for Admission: There are no discharge diagnoses documented for the most recent discharge. Discharge Date: 18 RARS: Readmission Risk Score: 7     Spoke with: [address]: [address] [place] [address]    Non-face-to-face services provided:  Obtained and reviewed discharge summary and/or continuity of care documents  Assessment and support for treatment adherence and medication management-reviewed discharge instructions and medications 1111f order   Writer spoke with patient, she is doing fine, has TCM appointment with her PCP today at 10:40, reviewed discharge medications, 1111F order completed, no vns, no needs or concerns, care transitions completed//JU    Care Transitions 24 Hour Call    Schedule Follow Up Appointment with PCP:  Completed  Do you have a copy of your discharge instructions?:  Yes  Do you have all of your prescriptions and are they filled?:  Yes  Have you been contacted by a [id number] [address] Pharmacist?:  No  Have you scheduled your follow up appointment?:  Yes  How are you going to get to your appointment?:  Car - family or friend to transport  Were you discharged with any Home Care or Post Acute Services:  No  Do you feel like you have everything you need to keep you well at home?:  Yes  Care Transitions Interventions         Follow Up  No future appointments.     Rema Padilla RN

## 2018-08-21 LAB — GLUCOSE BLD-MCNC: 426 MG/DL (ref 65–105)

## 2018-08-22 ENCOUNTER — TELEPHONE (OUTPATIENT)
Dept: INTERNAL MEDICINE CLINIC | Age: 63
End: 2018-08-22

## 2018-10-30 ENCOUNTER — HOSPITAL ENCOUNTER (OUTPATIENT)
Age: 63
Discharge: HOME | End: 2018-10-30
Payer: COMMERCIAL

## 2018-10-30 LAB
ESTIMATED AVERAGE GLUCOSE: 289 MG/DL
HBA1C MFR BLD: 11.7 % (ref 4–6)

## 2018-10-30 PROCEDURE — 83036 HEMOGLOBIN GLYCOSYLATED A1C: CPT

## 2018-10-30 PROCEDURE — 36415 COLL VENOUS BLD VENIPUNCTURE: CPT

## 2018-12-12 ENCOUNTER — HOSPITAL ENCOUNTER (OUTPATIENT)
Age: 63
Discharge: HOME | End: 2018-12-12
Payer: COMMERCIAL

## 2018-12-12 LAB
ALT SERPL-CCNC: 45 U/L (ref 5–33)
CHOLESTEROL, FASTING: 140 MG/DL
CHOLESTEROL/HDL RATIO: 3.7
CREATININE URINE: 67.4 MG/DL (ref 28–217)
HDLC SERPL-MCNC: 38 MG/DL
LDL CHOLESTEROL: 75 MG/DL (ref 0–130)
MICROALBUMIN/CREAT 24H UR: <12 MG/L
MICROALBUMIN/CREAT UR-RTO: NORMAL MCG/MG CREAT
POTASSIUM SERPL-SCNC: 4.5 MMOL/L (ref 3.7–5.3)
TRIGLYCERIDE, FASTING: 135 MG/DL
VLDLC SERPL CALC-MCNC: ABNORMAL MG/DL (ref 1–30)

## 2018-12-12 PROCEDURE — 82043 UR ALBUMIN QUANTITATIVE: CPT

## 2018-12-12 PROCEDURE — 84460 ALANINE AMINO (ALT) (SGPT): CPT

## 2018-12-12 PROCEDURE — 84132 ASSAY OF SERUM POTASSIUM: CPT

## 2018-12-12 PROCEDURE — 82570 ASSAY OF URINE CREATININE: CPT

## 2018-12-12 PROCEDURE — 80061 LIPID PANEL: CPT

## 2018-12-12 PROCEDURE — 36415 COLL VENOUS BLD VENIPUNCTURE: CPT

## 2019-01-23 ENCOUNTER — HOSPITAL ENCOUNTER (OUTPATIENT)
Age: 64
Setting detail: SPECIMEN
Discharge: HOME | End: 2019-01-23
Payer: COMMERCIAL

## 2019-01-24 LAB
ESTIMATED AVERAGE GLUCOSE: 240 MG/DL
HBA1C MFR BLD: 10 % (ref 4–6)

## 2019-01-29 ENCOUNTER — TELEPHONE (OUTPATIENT)
Dept: DERMATOLOGY | Age: 64
End: 2019-01-29

## 2019-02-26 ENCOUNTER — HOSPITAL ENCOUNTER (EMERGENCY)
Age: 64
Discharge: HOME | End: 2019-02-26
Attending: EMERGENCY MEDICINE
Payer: COMMERCIAL

## 2019-02-26 ENCOUNTER — APPOINTMENT (OUTPATIENT)
Dept: GENERAL RADIOLOGY | Age: 64
End: 2019-02-26
Payer: COMMERCIAL

## 2019-02-26 VITALS
WEIGHT: 230 LBS | BODY MASS INDEX: 42.33 KG/M2 | RESPIRATION RATE: 18 BRPM | TEMPERATURE: 97.9 F | HEART RATE: 74 BPM | HEIGHT: 62 IN | SYSTOLIC BLOOD PRESSURE: 119 MMHG | DIASTOLIC BLOOD PRESSURE: 89 MMHG | OXYGEN SATURATION: 98 %

## 2019-02-26 DIAGNOSIS — S69.91XA INJURY OF RIGHT WRIST, INITIAL ENCOUNTER: Primary | ICD-10-CM

## 2019-02-26 PROCEDURE — 73110 X-RAY EXAM OF WRIST: CPT

## 2019-02-26 PROCEDURE — 29125 APPL SHORT ARM SPLINT STATIC: CPT

## 2019-02-26 PROCEDURE — 6370000000 HC RX 637 (ALT 250 FOR IP): Performed by: NURSE PRACTITIONER

## 2019-02-26 PROCEDURE — 99283 EMERGENCY DEPT VISIT LOW MDM: CPT

## 2019-02-26 RX ORDER — IBUPROFEN 800 MG/1
800 TABLET ORAL EVERY 8 HOURS PRN
Qty: 20 TABLET | Refills: 0 | Status: SHIPPED | OUTPATIENT
Start: 2019-02-26 | End: 2019-08-19

## 2019-02-26 RX ORDER — HYDROCODONE BITARTRATE AND ACETAMINOPHEN 5; 325 MG/1; MG/1
1 TABLET ORAL EVERY 8 HOURS PRN
Qty: 9 TABLET | Refills: 0 | Status: SHIPPED | OUTPATIENT
Start: 2019-02-26 | End: 2019-03-01

## 2019-02-26 RX ORDER — HYDROCODONE BITARTRATE AND ACETAMINOPHEN 5; 325 MG/1; MG/1
1 TABLET ORAL ONCE
Status: COMPLETED | OUTPATIENT
Start: 2019-02-26 | End: 2019-02-26

## 2019-02-26 RX ADMIN — HYDROCODONE BITARTRATE AND ACETAMINOPHEN 1 TABLET: 5; 325 TABLET ORAL at 18:12

## 2019-02-26 ASSESSMENT — PAIN DESCRIPTION - PAIN TYPE: TYPE: ACUTE PAIN

## 2019-02-26 ASSESSMENT — ENCOUNTER SYMPTOMS
COUGH: 0
TROUBLE SWALLOWING: 0
NAUSEA: 0
VOMITING: 0
SHORTNESS OF BREATH: 0

## 2019-02-26 ASSESSMENT — PAIN SCALES - GENERAL
PAINLEVEL_OUTOF10: 10
PAINLEVEL_OUTOF10: 10

## 2019-02-26 ASSESSMENT — PAIN DESCRIPTION - DESCRIPTORS: DESCRIPTORS: ACHING;DISCOMFORT

## 2019-02-26 ASSESSMENT — PAIN DESCRIPTION - LOCATION: LOCATION: WRIST

## 2019-02-26 ASSESSMENT — PAIN DESCRIPTION - FREQUENCY: FREQUENCY: CONTINUOUS

## 2019-02-26 ASSESSMENT — PAIN DESCRIPTION - ORIENTATION: ORIENTATION: RIGHT

## 2019-02-27 ENCOUNTER — TELEPHONE (OUTPATIENT)
Dept: ORTHOPEDIC SURGERY | Age: 64
End: 2019-02-27

## 2019-03-07 ENCOUNTER — OFFICE VISIT (OUTPATIENT)
Dept: ORTHOPEDIC SURGERY | Age: 64
End: 2019-03-07
Payer: COMMERCIAL

## 2019-03-07 DIAGNOSIS — M25.531 RIGHT WRIST PAIN: Primary | ICD-10-CM

## 2019-03-07 PROCEDURE — 1036F TOBACCO NON-USER: CPT | Performed by: ORTHOPAEDIC SURGERY

## 2019-03-07 PROCEDURE — G8419 CALC BMI OUT NRM PARAM NOF/U: HCPCS | Performed by: ORTHOPAEDIC SURGERY

## 2019-03-07 PROCEDURE — G8427 DOCREV CUR MEDS BY ELIG CLIN: HCPCS | Performed by: ORTHOPAEDIC SURGERY

## 2019-03-07 PROCEDURE — G8484 FLU IMMUNIZE NO ADMIN: HCPCS | Performed by: ORTHOPAEDIC SURGERY

## 2019-03-07 PROCEDURE — 3017F COLORECTAL CA SCREEN DOC REV: CPT | Performed by: ORTHOPAEDIC SURGERY

## 2019-03-07 PROCEDURE — 99203 OFFICE O/P NEW LOW 30 MIN: CPT | Performed by: ORTHOPAEDIC SURGERY

## 2019-03-07 RX ORDER — DICLOFENAC SODIUM 75 MG/1
75 TABLET, DELAYED RELEASE ORAL 2 TIMES DAILY WITH MEALS
Qty: 28 TABLET | Refills: 0 | Status: SHIPPED | OUTPATIENT
Start: 2019-03-07 | End: 2019-08-19

## 2019-03-19 DIAGNOSIS — M25.531 RIGHT WRIST PAIN: Primary | ICD-10-CM

## 2019-05-15 ENCOUNTER — HOSPITAL ENCOUNTER (OUTPATIENT)
Age: 64
Setting detail: SPECIMEN
Discharge: HOME | End: 2019-05-15
Payer: COMMERCIAL

## 2019-05-15 LAB — TSH SERPL DL<=0.05 MIU/L-ACNC: 5.3 MIU/L (ref 0.3–5)

## 2019-07-25 ENCOUNTER — HOSPITAL ENCOUNTER (OUTPATIENT)
Age: 64
Setting detail: SPECIMEN
Discharge: HOME | End: 2019-07-25
Payer: COMMERCIAL

## 2019-07-26 LAB
CULTURE: NORMAL
DIRECT EXAM: ABNORMAL
Lab: ABNORMAL
Lab: NORMAL
SPECIMEN DESCRIPTION: ABNORMAL
SPECIMEN DESCRIPTION: NORMAL

## 2019-07-30 ENCOUNTER — APPOINTMENT (OUTPATIENT)
Dept: CT IMAGING | Age: 64
End: 2019-07-30
Payer: COMMERCIAL

## 2019-07-30 ENCOUNTER — APPOINTMENT (OUTPATIENT)
Dept: GENERAL RADIOLOGY | Age: 64
End: 2019-07-30
Payer: COMMERCIAL

## 2019-07-30 ENCOUNTER — HOSPITAL ENCOUNTER (EMERGENCY)
Age: 64
Discharge: HOME | End: 2019-07-30
Attending: EMERGENCY MEDICINE
Payer: COMMERCIAL

## 2019-07-30 VITALS
TEMPERATURE: 97.7 F | RESPIRATION RATE: 16 BRPM | WEIGHT: 240 LBS | HEIGHT: 62 IN | OXYGEN SATURATION: 96 % | HEART RATE: 80 BPM | BODY MASS INDEX: 44.16 KG/M2 | DIASTOLIC BLOOD PRESSURE: 97 MMHG | SYSTOLIC BLOOD PRESSURE: 162 MMHG

## 2019-07-30 DIAGNOSIS — S82.141A CLOSED FRACTURE OF RIGHT TIBIAL PLATEAU, INITIAL ENCOUNTER: ICD-10-CM
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
## 2019-07-30 PROCEDURE — 73700 CT LOWER EXTREMITY W/O DYE: CPT

## 2019-07-30 PROCEDURE — 72040 X-RAY EXAM NECK SPINE 2-3 VW: CPT

## 2019-07-30 PROCEDURE — 73610 X-RAY EXAM OF ANKLE: CPT

## 2019-07-30 PROCEDURE — 99284 EMERGENCY DEPT VISIT MOD MDM: CPT

## 2019-07-30 PROCEDURE — 73110 X-RAY EXAM OF WRIST: CPT

## 2019-07-30 PROCEDURE — 73630 X-RAY EXAM OF FOOT: CPT

## 2019-07-30 PROCEDURE — 73502 X-RAY EXAM HIP UNI 2-3 VIEWS: CPT

## 2019-07-30 PROCEDURE — 73562 X-RAY EXAM OF KNEE 3: CPT

## 2019-07-30 RX ORDER — TRAMADOL HYDROCHLORIDE 50 MG/1
50 TABLET ORAL EVERY 6 HOURS PRN
Qty: 10 TABLET | Refills: 0 | Status: SHIPPED | OUTPATIENT
Start: 2019-07-30 | End: 2019-07-30

## 2019-07-30 RX ORDER — HYDROCODONE BITARTRATE AND ACETAMINOPHEN 5; 325 MG/1; MG/1
1 TABLET ORAL EVERY 6 HOURS PRN
Qty: 8 TABLET | Refills: 0 | Status: SHIPPED | OUTPATIENT
Start: 2019-07-30 | End: 2019-08-01

## 2019-07-30 ASSESSMENT — PAIN SCALES - GENERAL: PAINLEVEL_OUTOF10: 5

## 2019-07-30 ASSESSMENT — PAIN DESCRIPTION - PAIN TYPE: TYPE: ACUTE PAIN

## 2019-07-30 ASSESSMENT — PAIN DESCRIPTION - DESCRIPTORS: DESCRIPTORS: ACHING;SORE

## 2019-07-30 ASSESSMENT — PAIN DESCRIPTION - ORIENTATION: ORIENTATION: RIGHT

## 2019-07-31 NOTE — ED PROVIDER NOTES
stated in nurses notes    SURGICAL HISTORY       Past Surgical History:   Procedure Laterality Date    BREAST BIOPSY  2012    negative    CARDIOVASCULAR STRESS TEST      7/17/15 no results    CHOLECYSTECTOMY      CYST REMOVAL      right hand    CYSTOSCOPY      ENDOMETRIAL BIOPSY  2009    LITHOTRIPSY      TONSILLECTOMY      TUBAL LIGATION       None otherwise stated in nurses notes    CURRENT MEDICATIONS       Discharge Medication List as of 7/30/2019  8:43 PM      CONTINUE these medications which have NOT CHANGED    Details   diclofenac (VOLTAREN) 75 MG EC tablet Take 1 tablet by mouth 2 times daily (with meals) for 14 days, Disp-28 tablet, R-0Normal      ibuprofen (ADVIL;MOTRIN) 800 MG tablet Take 1 tablet by mouth every 8 hours as needed for Pain, Disp-20 tablet, R-0Print      ondansetron (ZOFRAN) 4 MG/5ML solution Take by mouth onceHistorical Med      miconazole (MICOTIN) 2 % powder Apply topically 2 times daily Apply topically 2 times daily. , Topical, 2 TIMES DAILY, Historical Med      famotidine (PEPCID) 20 MG tablet Take 20 mg by mouth 2 times dailyHistorical Med      fentaNYL (DURAGESIC) 50 MCG/HR Place 1 patch onto the skin every 72 hours. .Historical Med      insulin glargine (BASAGLAR KWIKPEN) 100 UNIT/ML injection pen Inject 28 Units into the skin nightlyHistorical Med      aspirin 81 MG tablet Take 81 mg by mouth dailyHistorical Med      lisinopril (PRINIVIL;ZESTRIL) 10 MG tablet Take 10 mg by mouth dailyHistorical Med      cetirizine (ZYRTEC ALLERGY) 10 MG tablet Take 1 tablet by mouth daily, Disp-15 tablet, R-0Print      glipiZIDE (GLUCOTROL) 10 MG tablet Take 2 tablets by mouth 2 times daily (before meals), Disp-120 tablet, R-3Normal      metoprolol (LOPRESSOR) 100 MG tablet take 1 tablet by mouth twice a day, Disp-60 tablet, R-3Normal      gabapentin (NEURONTIN) 800 MG tablet take 1 tablet by mouth four times a day, Disp-120 tablet, R-3Normal      atorvastatin (LIPITOR) 40 MG tablet Take 1 right knee. Confirmed with CT scan. All other imaging is unremarkable. Will dc home with knee immobilizer and crutches. Pt is to be non-weight bearing. Will refer to orthopedics. Pain medication, RICE. Discussed results and plan with the pt. They expressed appropriate understanding. Pt given close follow up, supportive care instructions and strict return instructions at the bedside. ED Medications administered this visit:  Medications - No data to display    New Prescriptions from this visit:    Discharge Medication List as of 7/30/2019  8:43 PM          Follow-up:  Slo Oliver  [address] [facility] [address] [facility]  [phone]    Call       [facility] ED  [address]  [address] [address]  [phone]    If symptoms worsen    Jay Guzman MD  [address] [place] [address], [address] [place] [id number] [phone]    Call       Jay Guzman MD  [address] [place] [address], [address] [place] [address] [address] [facility] [id number] [phone]              Final Impression:   1. Fall, initial encounter    2.  Closed fracture of right tibial plateau, initial encounter               (Please note that portions of this note were completed with a voice recognition program.  Efforts were made to edit the dictations but occasionally words are mis-transcribed.)      (Please note that portions of this note were completed with a voice recognition program.  Efforts were made to edit the dictations but occasionally words are mis-transcribed.)    Danay Machuca, [address] [facility]  07/30/19 4021

## 2019-08-15 ENCOUNTER — HOSPITAL ENCOUNTER (OUTPATIENT)
Age: 64
Setting detail: SPECIMEN
Discharge: HOME | End: 2019-08-15
Payer: COMMERCIAL

## 2019-08-17 LAB
CULTURE: ABNORMAL
Lab: ABNORMAL
SPECIMEN DESCRIPTION: ABNORMAL

## 2019-08-19 ENCOUNTER — HOSPITAL ENCOUNTER (INPATIENT)
Age: 64
LOS: 1 days | Discharge: HOME | DRG: 463 | End: 2019-08-21
Attending: EMERGENCY MEDICINE | Admitting: INTERNAL MEDICINE
Payer: COMMERCIAL

## 2019-08-19 ENCOUNTER — APPOINTMENT (OUTPATIENT)
Dept: CT IMAGING | Age: 64
DRG: 463 | End: 2019-08-19
Payer: COMMERCIAL

## 2019-08-19 DIAGNOSIS — N30.00 ACUTE CYSTITIS WITHOUT HEMATURIA: Primary | ICD-10-CM

## 2019-08-19 DIAGNOSIS — N20.0 NEPHROLITHIASIS: ICD-10-CM

## 2019-08-19 DIAGNOSIS — N20.1 URETEROLITHIASIS: ICD-10-CM

## 2019-08-19 LAB
-: ABNORMAL
ABSOLUTE EOS #: 0.1 K/UL (ref 0–0.4)
ABSOLUTE IMMATURE GRANULOCYTE: ABNORMAL K/UL (ref 0–0.3)
ABSOLUTE LYMPH #: 2.4 K/UL (ref 1–4.8)
ABSOLUTE MONO #: 0.6 K/UL (ref 0.1–1.3)
ALBUMIN SERPL-MCNC: 3.6 G/DL (ref 3.5–5.2)
ALBUMIN/GLOBULIN RATIO: ABNORMAL (ref 1–2.5)
ALP BLD-CCNC: 116 U/L (ref 35–104)
ALT SERPL-CCNC: 28 U/L (ref 5–33)
AMORPHOUS: ABNORMAL
ANION GAP SERPL CALCULATED.3IONS-SCNC: 13 MMOL/L (ref 9–17)
AST SERPL-CCNC: 25 U/L
BACTERIA: ABNORMAL
BASOPHILS # BLD: 1 % (ref 0–2)
BASOPHILS ABSOLUTE: 0.1 K/UL (ref 0–0.2)
BILIRUB SERPL-MCNC: 0.52 MG/DL (ref 0.3–1.2)
BILIRUBIN URINE: ABNORMAL
BUN BLDV-MCNC: 7 MG/DL (ref 8–23)
BUN/CREAT BLD: ABNORMAL (ref 9–20)
CALCIUM SERPL-MCNC: 9.1 MG/DL (ref 8.6–10.4)
CASTS UA: ABNORMAL /LPF
CHLORIDE BLD-SCNC: 103 MMOL/L (ref 98–107)
CO2: 25 MMOL/L (ref 20–31)
COLOR: ABNORMAL
COMMENT UA: ABNORMAL
CREAT SERPL-MCNC: 0.42 MG/DL (ref 0.5–0.9)
CRYSTALS, UA: ABNORMAL /HPF
DIFFERENTIAL TYPE: ABNORMAL
EOSINOPHILS RELATIVE PERCENT: 1 % (ref 0–4)
EPITHELIAL CELLS UA: ABNORMAL /HPF
GFR AFRICAN AMERICAN: >60 ML/MIN
GFR NON-AFRICAN AMERICAN: >60 ML/MIN
GFR SERPL CREATININE-BSD FRML MDRD: ABNORMAL ML/MIN/{1.73_M2}
GFR SERPL CREATININE-BSD FRML MDRD: ABNORMAL ML/MIN/{1.73_M2}
GLUCOSE BLD-MCNC: 179 MG/DL (ref 70–99)
GLUCOSE URINE: NEGATIVE
HCT VFR BLD CALC: 45.8 % (ref 36–46)
HEMOGLOBIN: 15.4 G/DL (ref 12–16)
IMMATURE GRANULOCYTES: ABNORMAL %
KETONES, URINE: ABNORMAL
LEUKOCYTE ESTERASE, URINE: ABNORMAL
LIPASE: 13 U/L (ref 13–60)
LYMPHOCYTES # BLD: 24 % (ref 24–44)
MCH RBC QN AUTO: 29.7 PG (ref 26–34)
MCHC RBC AUTO-ENTMCNC: 33.5 G/DL (ref 31–37)
MCV RBC AUTO: 88.6 FL (ref 80–100)
MONOCYTES # BLD: 7 % (ref 1–7)
MUCUS: ABNORMAL
NITRITE, URINE: POSITIVE
NRBC AUTOMATED: ABNORMAL PER 100 WBC
OTHER OBSERVATIONS UA: ABNORMAL
PDW BLD-RTO: 13.1 % (ref 11.5–14.9)
PH UA: 5.5 (ref 5–8)
PLATELET # BLD: 247 K/UL (ref 150–450)
PLATELET ESTIMATE: ABNORMAL
PMV BLD AUTO: 9.9 FL (ref 6–12)
POTASSIUM SERPL-SCNC: 3.5 MMOL/L (ref 3.7–5.3)
PROTEIN UA: ABNORMAL
RBC # BLD: 5.17 M/UL (ref 4–5.2)
RBC # BLD: ABNORMAL 10*6/UL
RBC UA: ABNORMAL /HPF
RENAL EPITHELIAL, UA: ABNORMAL /HPF
SEG NEUTROPHILS: 67 % (ref 36–66)
SEGMENTED NEUTROPHILS ABSOLUTE COUNT: 6.8 K/UL (ref 1.3–9.1)
SODIUM BLD-SCNC: 141 MMOL/L (ref 135–144)
SPECIFIC GRAVITY UA: 1.01 (ref 1–1.03)
TOTAL PROTEIN: 7.1 G/DL (ref 6.4–8.3)
TRICHOMONAS: ABNORMAL
TURBIDITY: ABNORMAL
URINE HGB: NEGATIVE
UROBILINOGEN, URINE: ABNORMAL
WBC # BLD: 10 K/UL (ref 3.5–11)
WBC # BLD: ABNORMAL 10*3/UL
WBC UA: ABNORMAL /HPF
YEAST: ABNORMAL

## 2019-08-19 PROCEDURE — 81001 URINALYSIS AUTO W/SCOPE: CPT

## 2019-08-19 PROCEDURE — 74176 CT ABD & PELVIS W/O CONTRAST: CPT

## 2019-08-19 PROCEDURE — 83690 ASSAY OF LIPASE: CPT

## 2019-08-19 PROCEDURE — 0T768DZ DILATION OF RIGHT URETER WITH INTRALUMINAL DEVICE, VIA NATURAL OR ARTIFICIAL OPENING ENDOSCOPIC: ICD-10-PCS | Performed by: UROLOGY

## 2019-08-19 PROCEDURE — 87086 URINE CULTURE/COLONY COUNT: CPT

## 2019-08-19 PROCEDURE — 96375 TX/PRO/DX INJ NEW DRUG ADDON: CPT

## 2019-08-19 PROCEDURE — 96365 THER/PROPH/DIAG IV INF INIT: CPT

## 2019-08-19 PROCEDURE — 99220 PR INITIAL OBSERVATION CARE/DAY 70 MINUTES: CPT | Performed by: INTERNAL MEDICINE

## 2019-08-19 PROCEDURE — 85025 COMPLETE CBC W/AUTO DIFF WBC: CPT

## 2019-08-19 PROCEDURE — G0378 HOSPITAL OBSERVATION PER HR: HCPCS

## 2019-08-19 PROCEDURE — 6360000002 HC RX W HCPCS: Performed by: STUDENT IN AN ORGANIZED HEALTH CARE EDUCATION/TRAINING PROGRAM

## 2019-08-19 PROCEDURE — 99285 EMERGENCY DEPT VISIT HI MDM: CPT

## 2019-08-19 PROCEDURE — 80053 COMPREHEN METABOLIC PANEL: CPT

## 2019-08-19 PROCEDURE — 36415 COLL VENOUS BLD VENIPUNCTURE: CPT

## 2019-08-19 RX ORDER — DEXTROSE MONOHYDRATE 25 G/50ML
12.5 INJECTION, SOLUTION INTRAVENOUS PRN
Status: DISCONTINUED | OUTPATIENT
Start: 2019-08-19 | End: 2019-08-21 | Stop reason: HOSPADM

## 2019-08-19 RX ORDER — SODIUM CHLORIDE 9 MG/ML
INJECTION, SOLUTION INTRAVENOUS CONTINUOUS
Status: DISCONTINUED | OUTPATIENT
Start: 2019-08-19 | End: 2019-08-21 | Stop reason: HOSPADM

## 2019-08-19 RX ORDER — GABAPENTIN 400 MG/1
800 CAPSULE ORAL 3 TIMES DAILY
Status: DISCONTINUED | OUTPATIENT
Start: 2019-08-19 | End: 2019-08-21 | Stop reason: HOSPADM

## 2019-08-19 RX ORDER — NICOTINE POLACRILEX 4 MG
15 LOZENGE BUCCAL PRN
Status: DISCONTINUED | OUTPATIENT
Start: 2019-08-19 | End: 2019-08-21 | Stop reason: HOSPADM

## 2019-08-19 RX ORDER — DEXTROSE MONOHYDRATE 50 MG/ML
100 INJECTION, SOLUTION INTRAVENOUS PRN
Status: DISCONTINUED | OUTPATIENT
Start: 2019-08-19 | End: 2019-08-21 | Stop reason: HOSPADM

## 2019-08-19 RX ORDER — DIPHENHYDRAMINE HYDROCHLORIDE 50 MG/ML
50 INJECTION INTRAMUSCULAR; INTRAVENOUS EVERY 6 HOURS PRN
Status: DISCONTINUED | OUTPATIENT
Start: 2019-08-19 | End: 2019-08-21 | Stop reason: HOSPADM

## 2019-08-19 RX ORDER — FENTANYL CITRATE 50 UG/ML
50 INJECTION, SOLUTION INTRAMUSCULAR; INTRAVENOUS
Status: DISCONTINUED | OUTPATIENT
Start: 2019-08-19 | End: 2019-08-20

## 2019-08-19 RX ORDER — CIPROFLOXACIN 2 MG/ML
400 INJECTION, SOLUTION INTRAVENOUS ONCE
Status: COMPLETED | OUTPATIENT
Start: 2019-08-19 | End: 2019-08-19

## 2019-08-19 RX ORDER — METOPROLOL TARTRATE 50 MG/1
100 TABLET, FILM COATED ORAL 2 TIMES DAILY
Status: DISCONTINUED | OUTPATIENT
Start: 2019-08-19 | End: 2019-08-21 | Stop reason: HOSPADM

## 2019-08-19 RX ORDER — KETOROLAC TROMETHAMINE 30 MG/ML
15 INJECTION, SOLUTION INTRAMUSCULAR; INTRAVENOUS ONCE
Status: COMPLETED | OUTPATIENT
Start: 2019-08-19 | End: 2019-08-19

## 2019-08-19 RX ORDER — INSULIN GLARGINE 100 [IU]/ML
60 INJECTION, SOLUTION SUBCUTANEOUS NIGHTLY
Status: DISCONTINUED | OUTPATIENT
Start: 2019-08-20 | End: 2019-08-21 | Stop reason: HOSPADM

## 2019-08-19 RX ADMIN — KETOROLAC TROMETHAMINE 15 MG: 30 INJECTION, SOLUTION INTRAMUSCULAR at 19:38

## 2019-08-19 RX ADMIN — CIPROFLOXACIN 400 MG: 2 INJECTION, SOLUTION INTRAVENOUS at 21:33

## 2019-08-19 ASSESSMENT — ENCOUNTER SYMPTOMS
NAUSEA: 1
VOMITING: 0
ABDOMINAL PAIN: 1
CHEST TIGHTNESS: 0
SHORTNESS OF BREATH: 0

## 2019-08-19 ASSESSMENT — PAIN SCALES - GENERAL
PAINLEVEL_OUTOF10: 7
PAINLEVEL_OUTOF10: 7

## 2019-08-19 NOTE — ED PROVIDER NOTES
Encounter   Medications    ketorolac (TORADOL) injection 15 mg    ciprofloxacin (CIPRO) IVPB 400 mg    gabapentin (NEURONTIN) capsule 800 mg    insulin glargine (LANTUS) injection vial 60 Units    metoprolol tartrate (LOPRESSOR) tablet 100 mg    glucose (GLUTOSE) 40 % oral gel 15 g    dextrose 50 % IV solution    glucagon (rDNA) injection 1 mg    dextrose 5 % solution    insulin lispro (HUMALOG) injection vial 0-6 Units    diphenhydrAMINE (BENADRYL) injection 50 mg    0.9 % sodium chloride infusion    fentaNYL (SUBLIMAZE) injection 50 mcg     DIAGNOSTIC RESULTS / EMERGENCY DEPARTMENT COURSE / MDM     LABS:  Results for orders placed or performed during the hospital encounter of 08/19/19   Urinalysis with Microscopic   Result Value Ref Range    Color, UA ORANGE (A) YELLOW    Turbidity UA CLOUDY (A) CLEAR    Glucose, Ur NEGATIVE NEGATIVE    Bilirubin Urine SMALL (A) NEGATIVE    Ketones, Urine TRACE (A) NEGATIVE    Specific [place], UA 1.015 1.000 - 1.030    Urine Hgb NEGATIVE NEGATIVE    pH, UA 5.5 5.0 - 8.0    Protein, UA 1+ (A) NEGATIVE    Urobilinogen, Urine ELEVATED (A) Normal    Nitrite, Urine POSITIVE (A) NEGATIVE    Leukocyte Esterase, Urine SMALL (A) NEGATIVE    Urinalysis Comments NOT REPORTED     -          WBC, UA 2 TO 5 /HPF    RBC, UA 0 TO 2 /HPF    Casts UA NOT REPORTED /LPF    Crystals UA NOT REPORTED None /HPF    Epithelial Cells UA 5 TO 10 /HPF    Renal Epithelial, Urine NOT REPORTED 0 /HPF    Bacteria, UA FEW (A) None    Mucus, UA NOT REPORTED None    Trichomonas, UA NOT REPORTED None    Amorphous, UA 1+ (A) None    Other Observations UA NOT REPORTED NOT REQ.     Yeast, UA NOT REPORTED None   CBC Auto Differential   Result Value Ref Range    WBC 10.0 3.5 - 11.0 k/uL    RBC 5.17 4.0 - 5.2 m/uL    Hemoglobin 15.4 12.0 - 16.0 g/dL    Hematocrit 45.8 36 - 46 %    MCV 88.6 80 - 100 fL    MCH 29.7 26 - 34 pg    MCHC 33.5 31 - 37 g/dL    RDW 13.1 11.5 - 14.9 %    Platelets 998 239 - 982 k/uL

## 2019-08-19 NOTE — ED NOTES
Pt ambulates to bathroom with a steady gait. UA collected and sent to lab.       Mildred Ren RN  08/19/19 [id number]

## 2019-08-20 ENCOUNTER — ANESTHESIA (OUTPATIENT)
Dept: OPERATING ROOM | Age: 64
DRG: 463 | End: 2019-08-20
Payer: COMMERCIAL

## 2019-08-20 ENCOUNTER — TELEPHONE (OUTPATIENT)
Dept: UROLOGY | Age: 64
End: 2019-08-20

## 2019-08-20 ENCOUNTER — APPOINTMENT (OUTPATIENT)
Dept: GENERAL RADIOLOGY | Age: 64
DRG: 463 | End: 2019-08-20
Payer: COMMERCIAL

## 2019-08-20 ENCOUNTER — ANESTHESIA EVENT (OUTPATIENT)
Dept: OPERATING ROOM | Age: 64
DRG: 463 | End: 2019-08-20
Payer: COMMERCIAL

## 2019-08-20 VITALS — SYSTOLIC BLOOD PRESSURE: 116 MMHG | TEMPERATURE: 97.7 F | OXYGEN SATURATION: 96 % | DIASTOLIC BLOOD PRESSURE: 64 MMHG

## 2019-08-20 PROBLEM — N13.30 HYDRONEPHROSIS: Status: ACTIVE | Noted: 2019-08-20

## 2019-08-20 PROBLEM — N39.0 URINARY TRACT INFECTION WITHOUT HEMATURIA: Status: ACTIVE | Noted: 2019-08-20

## 2019-08-20 PROBLEM — N20.1 URETEROLITHIASIS: Status: ACTIVE | Noted: 2019-08-20

## 2019-08-20 LAB
-: NORMAL
ANION GAP SERPL CALCULATED.3IONS-SCNC: 12 MMOL/L (ref 9–17)
BUN BLDV-MCNC: 8 MG/DL (ref 8–23)
BUN/CREAT BLD: ABNORMAL (ref 9–20)
CALCIUM SERPL-MCNC: 8.3 MG/DL (ref 8.6–10.4)
CHLORIDE BLD-SCNC: 104 MMOL/L (ref 98–107)
CO2: 24 MMOL/L (ref 20–31)
CREAT SERPL-MCNC: <0.4 MG/DL (ref 0.5–0.9)
GFR AFRICAN AMERICAN: ABNORMAL ML/MIN
GFR NON-AFRICAN AMERICAN: ABNORMAL ML/MIN
GFR SERPL CREATININE-BSD FRML MDRD: ABNORMAL ML/MIN/{1.73_M2}
GFR SERPL CREATININE-BSD FRML MDRD: ABNORMAL ML/MIN/{1.73_M2}
GLUCOSE BLD-MCNC: 128 MG/DL (ref 65–105)
GLUCOSE BLD-MCNC: 145 MG/DL (ref 70–99)
GLUCOSE BLD-MCNC: 161 MG/DL (ref 65–105)
GLUCOSE BLD-MCNC: 178 MG/DL (ref 65–105)
HCT VFR BLD CALC: 43.1 % (ref 36–46)
HEMOGLOBIN: 14.3 G/DL (ref 12–16)
MAGNESIUM: 1.5 MG/DL (ref 1.6–2.6)
MCH RBC QN AUTO: 29.7 PG (ref 26–34)
MCHC RBC AUTO-ENTMCNC: 33.1 G/DL (ref 31–37)
MCV RBC AUTO: 89.7 FL (ref 80–100)
NRBC AUTOMATED: NORMAL PER 100 WBC
PDW BLD-RTO: 13.1 % (ref 11.5–14.9)
PLATELET # BLD: 218 K/UL (ref 150–450)
PMV BLD AUTO: 10.1 FL (ref 6–12)
POTASSIUM SERPL-SCNC: 3.3 MMOL/L (ref 3.7–5.3)
RBC # BLD: 4.8 M/UL (ref 4–5.2)
REASON FOR REJECTION: NORMAL
SODIUM BLD-SCNC: 140 MMOL/L (ref 135–144)
WBC # BLD: 8.8 K/UL (ref 3.5–11)
ZZ NTE CLEAN UP: ORDERED TEST: NORMAL
ZZ NTE WITH NAME CLEAN UP: SPECIMEN SOURCE: NORMAL

## 2019-08-20 PROCEDURE — 6370000000 HC RX 637 (ALT 250 FOR IP): Performed by: ANESTHESIOLOGY

## 2019-08-20 PROCEDURE — 2580000003 HC RX 258: Performed by: NURSE PRACTITIONER

## 2019-08-20 PROCEDURE — 7100000000 HC PACU RECOVERY - FIRST 15 MIN: Performed by: UROLOGY

## 2019-08-20 PROCEDURE — C2617 STENT, NON-COR, TEM W/O DEL: HCPCS | Performed by: UROLOGY

## 2019-08-20 PROCEDURE — 6360000002 HC RX W HCPCS: Performed by: UROLOGY

## 2019-08-20 PROCEDURE — 3600000002 HC SURGERY LEVEL 2 BASE: Performed by: UROLOGY

## 2019-08-20 PROCEDURE — 6370000000 HC RX 637 (ALT 250 FOR IP): Performed by: UROLOGY

## 2019-08-20 PROCEDURE — 6370000000 HC RX 637 (ALT 250 FOR IP): Performed by: NURSE PRACTITIONER

## 2019-08-20 PROCEDURE — 74018 RADEX ABDOMEN 1 VIEW: CPT

## 2019-08-20 PROCEDURE — 3700000000 HC ANESTHESIA ATTENDED CARE: Performed by: UROLOGY

## 2019-08-20 PROCEDURE — 83735 ASSAY OF MAGNESIUM: CPT

## 2019-08-20 PROCEDURE — 80048 BASIC METABOLIC PNL TOTAL CA: CPT

## 2019-08-20 PROCEDURE — 2580000003 HC RX 258: Performed by: UROLOGY

## 2019-08-20 PROCEDURE — 2500000003 HC RX 250 WO HCPCS: Performed by: ANESTHESIOLOGY

## 2019-08-20 PROCEDURE — C1769 GUIDE WIRE: HCPCS | Performed by: UROLOGY

## 2019-08-20 PROCEDURE — 1200000000 HC SEMI PRIVATE

## 2019-08-20 PROCEDURE — 85027 COMPLETE CBC AUTOMATED: CPT

## 2019-08-20 PROCEDURE — 7100000001 HC PACU RECOVERY - ADDTL 15 MIN: Performed by: UROLOGY

## 2019-08-20 PROCEDURE — 82947 ASSAY GLUCOSE BLOOD QUANT: CPT

## 2019-08-20 PROCEDURE — 3600000012 HC SURGERY LEVEL 2 ADDTL 15MIN: Performed by: UROLOGY

## 2019-08-20 PROCEDURE — C1758 CATHETER, URETERAL: HCPCS | Performed by: UROLOGY

## 2019-08-20 PROCEDURE — 2709999900 HC NON-CHARGEABLE SUPPLY: Performed by: UROLOGY

## 2019-08-20 PROCEDURE — 6360000002 HC RX W HCPCS: Performed by: ANESTHESIOLOGY

## 2019-08-20 PROCEDURE — 3700000001 HC ADD 15 MINUTES (ANESTHESIA): Performed by: UROLOGY

## 2019-08-20 PROCEDURE — 6360000002 HC RX W HCPCS: Performed by: NURSE PRACTITIONER

## 2019-08-20 PROCEDURE — 3209999900 FLUORO FOR SURGICAL PROCEDURES

## 2019-08-20 PROCEDURE — 2580000003 HC RX 258: Performed by: ANESTHESIOLOGY

## 2019-08-20 PROCEDURE — 36415 COLL VENOUS BLD VENIPUNCTURE: CPT

## 2019-08-20 DEVICE — STENT URET 6FR L24CM PERCFLX HYDR+ DBL PGTL THRD 2: Type: IMPLANTABLE DEVICE | Site: URETER | Status: FUNCTIONAL

## 2019-08-20 RX ORDER — LIDOCAINE HYDROCHLORIDE 10 MG/ML
INJECTION, SOLUTION EPIDURAL; INFILTRATION; INTRACAUDAL; PERINEURAL PRN
Status: DISCONTINUED | OUTPATIENT
Start: 2019-08-20 | End: 2019-08-20 | Stop reason: SDUPTHER

## 2019-08-20 RX ORDER — DULOXETIN HYDROCHLORIDE 60 MG/1
60 CAPSULE, DELAYED RELEASE ORAL DAILY
Status: DISCONTINUED | OUTPATIENT
Start: 2019-08-20 | End: 2019-08-21 | Stop reason: HOSPADM

## 2019-08-20 RX ORDER — INSULIN GLARGINE 100 [IU]/ML
15 INJECTION, SOLUTION SUBCUTANEOUS
Status: DISCONTINUED | OUTPATIENT
Start: 2019-08-20 | End: 2019-08-21 | Stop reason: HOSPADM

## 2019-08-20 RX ORDER — POTASSIUM CHLORIDE 7.45 MG/ML
10 INJECTION INTRAVENOUS PRN
Status: DISCONTINUED | OUTPATIENT
Start: 2019-08-20 | End: 2019-08-21 | Stop reason: HOSPADM

## 2019-08-20 RX ORDER — LISINOPRIL 10 MG/1
10 TABLET ORAL DAILY
Status: DISCONTINUED | OUTPATIENT
Start: 2019-08-20 | End: 2019-08-21 | Stop reason: HOSPADM

## 2019-08-20 RX ORDER — DEXAMETHASONE SODIUM PHOSPHATE 4 MG/ML
INJECTION, SOLUTION INTRA-ARTICULAR; INTRALESIONAL; INTRAMUSCULAR; INTRAVENOUS; SOFT TISSUE PRN
Status: DISCONTINUED | OUTPATIENT
Start: 2019-08-20 | End: 2019-08-20 | Stop reason: SDUPTHER

## 2019-08-20 RX ORDER — MORPHINE SULFATE 2 MG/ML
2 INJECTION, SOLUTION INTRAMUSCULAR; INTRAVENOUS EVERY 5 MIN PRN
Status: DISCONTINUED | OUTPATIENT
Start: 2019-08-20 | End: 2019-08-20 | Stop reason: HOSPADM

## 2019-08-20 RX ORDER — ONDANSETRON 2 MG/ML
INJECTION INTRAMUSCULAR; INTRAVENOUS PRN
Status: DISCONTINUED | OUTPATIENT
Start: 2019-08-20 | End: 2019-08-20 | Stop reason: SDUPTHER

## 2019-08-20 RX ORDER — FENTANYL CITRATE 50 UG/ML
75 INJECTION, SOLUTION INTRAMUSCULAR; INTRAVENOUS
Status: DISCONTINUED | OUTPATIENT
Start: 2019-08-20 | End: 2019-08-21 | Stop reason: HOSPADM

## 2019-08-20 RX ORDER — KETOROLAC TROMETHAMINE 30 MG/ML
15 INJECTION, SOLUTION INTRAMUSCULAR; INTRAVENOUS EVERY 6 HOURS PRN
Status: DISCONTINUED | OUTPATIENT
Start: 2019-08-20 | End: 2019-08-21 | Stop reason: HOSPADM

## 2019-08-20 RX ORDER — FENTANYL CITRATE 50 UG/ML
25 INJECTION, SOLUTION INTRAMUSCULAR; INTRAVENOUS EVERY 5 MIN PRN
Status: DISCONTINUED | OUTPATIENT
Start: 2019-08-20 | End: 2019-08-20 | Stop reason: HOSPADM

## 2019-08-20 RX ORDER — SODIUM CHLORIDE 0.9 % (FLUSH) 0.9 %
10 SYRINGE (ML) INJECTION PRN
Status: DISCONTINUED | OUTPATIENT
Start: 2019-08-20 | End: 2019-08-21 | Stop reason: HOSPADM

## 2019-08-20 RX ORDER — TAMSULOSIN HYDROCHLORIDE 0.4 MG/1
0.4 CAPSULE ORAL DAILY
Status: DISCONTINUED | OUTPATIENT
Start: 2019-08-20 | End: 2019-08-21 | Stop reason: HOSPADM

## 2019-08-20 RX ORDER — CIPROFLOXACIN 2 MG/ML
400 INJECTION, SOLUTION INTRAVENOUS EVERY 12 HOURS
Status: DISCONTINUED | OUTPATIENT
Start: 2019-08-20 | End: 2019-08-21 | Stop reason: HOSPADM

## 2019-08-20 RX ORDER — FUROSEMIDE 20 MG/1
20 TABLET ORAL DAILY
Status: DISCONTINUED | OUTPATIENT
Start: 2019-08-20 | End: 2019-08-21 | Stop reason: HOSPADM

## 2019-08-20 RX ORDER — SODIUM CHLORIDE 9 MG/ML
INJECTION, SOLUTION INTRAVENOUS CONTINUOUS PRN
Status: DISCONTINUED | OUTPATIENT
Start: 2019-08-20 | End: 2019-08-20 | Stop reason: SDUPTHER

## 2019-08-20 RX ORDER — ATORVASTATIN CALCIUM 80 MG/1
80 TABLET, FILM COATED ORAL DAILY
Status: DISCONTINUED | OUTPATIENT
Start: 2019-08-20 | End: 2019-08-21 | Stop reason: HOSPADM

## 2019-08-20 RX ORDER — SCOLOPAMINE TRANSDERMAL SYSTEM 1 MG/1
1 PATCH, EXTENDED RELEASE TRANSDERMAL ONCE
Status: DISCONTINUED | OUTPATIENT
Start: 2019-08-20 | End: 2019-08-21 | Stop reason: HOSPADM

## 2019-08-20 RX ORDER — DIPHENHYDRAMINE HYDROCHLORIDE 50 MG/ML
12.5 INJECTION INTRAMUSCULAR; INTRAVENOUS
Status: DISCONTINUED | OUTPATIENT
Start: 2019-08-20 | End: 2019-08-20 | Stop reason: HOSPADM

## 2019-08-20 RX ORDER — PROMETHAZINE HYDROCHLORIDE 25 MG/ML
6.25 INJECTION, SOLUTION INTRAMUSCULAR; INTRAVENOUS
Status: DISCONTINUED | OUTPATIENT
Start: 2019-08-20 | End: 2019-08-20 | Stop reason: HOSPADM

## 2019-08-20 RX ORDER — PROPOFOL 10 MG/ML
INJECTION, EMULSION INTRAVENOUS PRN
Status: DISCONTINUED | OUTPATIENT
Start: 2019-08-20 | End: 2019-08-20 | Stop reason: SDUPTHER

## 2019-08-20 RX ORDER — POTASSIUM CHLORIDE 20 MEQ/1
40 TABLET, EXTENDED RELEASE ORAL PRN
Status: DISCONTINUED | OUTPATIENT
Start: 2019-08-20 | End: 2019-08-21 | Stop reason: HOSPADM

## 2019-08-20 RX ORDER — FAMOTIDINE 20 MG/1
20 TABLET, FILM COATED ORAL 2 TIMES DAILY
Status: DISCONTINUED | OUTPATIENT
Start: 2019-08-20 | End: 2019-08-21 | Stop reason: HOSPADM

## 2019-08-20 RX ORDER — HYDROCODONE BITARTRATE AND ACETAMINOPHEN 5; 325 MG/1; MG/1
1 TABLET ORAL PRN
Status: DISCONTINUED | OUTPATIENT
Start: 2019-08-20 | End: 2019-08-20 | Stop reason: HOSPADM

## 2019-08-20 RX ORDER — HYDRALAZINE HYDROCHLORIDE 20 MG/ML
5 INJECTION INTRAMUSCULAR; INTRAVENOUS EVERY 10 MIN PRN
Status: DISCONTINUED | OUTPATIENT
Start: 2019-08-20 | End: 2019-08-20 | Stop reason: HOSPADM

## 2019-08-20 RX ORDER — ONDANSETRON 2 MG/ML
4 INJECTION INTRAMUSCULAR; INTRAVENOUS EVERY 6 HOURS PRN
Status: DISCONTINUED | OUTPATIENT
Start: 2019-08-20 | End: 2019-08-21 | Stop reason: HOSPADM

## 2019-08-20 RX ORDER — 0.9 % SODIUM CHLORIDE 0.9 %
500 INTRAVENOUS SOLUTION INTRAVENOUS
Status: DISCONTINUED | OUTPATIENT
Start: 2019-08-20 | End: 2019-08-20 | Stop reason: HOSPADM

## 2019-08-20 RX ORDER — LEVOTHYROXINE SODIUM 0.07 MG/1
75 TABLET ORAL DAILY
Status: DISCONTINUED | OUTPATIENT
Start: 2019-08-20 | End: 2019-08-21 | Stop reason: HOSPADM

## 2019-08-20 RX ORDER — ONDANSETRON 2 MG/ML
4 INJECTION INTRAMUSCULAR; INTRAVENOUS
Status: DISCONTINUED | OUTPATIENT
Start: 2019-08-20 | End: 2019-08-20 | Stop reason: HOSPADM

## 2019-08-20 RX ORDER — HYDROCODONE BITARTRATE AND ACETAMINOPHEN 5; 325 MG/1; MG/1
2 TABLET ORAL PRN
Status: DISCONTINUED | OUTPATIENT
Start: 2019-08-20 | End: 2019-08-20 | Stop reason: HOSPADM

## 2019-08-20 RX ORDER — ACETAMINOPHEN 325 MG/1
650 TABLET ORAL EVERY 4 HOURS PRN
Status: DISCONTINUED | OUTPATIENT
Start: 2019-08-20 | End: 2019-08-21 | Stop reason: HOSPADM

## 2019-08-20 RX ORDER — FENTANYL CITRATE 50 UG/ML
INJECTION, SOLUTION INTRAMUSCULAR; INTRAVENOUS PRN
Status: DISCONTINUED | OUTPATIENT
Start: 2019-08-20 | End: 2019-08-20 | Stop reason: SDUPTHER

## 2019-08-20 RX ORDER — MIDAZOLAM HYDROCHLORIDE 1 MG/ML
INJECTION INTRAMUSCULAR; INTRAVENOUS PRN
Status: DISCONTINUED | OUTPATIENT
Start: 2019-08-20 | End: 2019-08-20 | Stop reason: SDUPTHER

## 2019-08-20 RX ORDER — SODIUM CHLORIDE 0.9 % (FLUSH) 0.9 %
10 SYRINGE (ML) INJECTION EVERY 12 HOURS SCHEDULED
Status: DISCONTINUED | OUTPATIENT
Start: 2019-08-20 | End: 2019-08-21 | Stop reason: HOSPADM

## 2019-08-20 RX ADMIN — MIDAZOLAM 1 MG: 1 INJECTION INTRAMUSCULAR; INTRAVENOUS at 18:43

## 2019-08-20 RX ADMIN — ONDANSETRON 4 MG: 2 INJECTION INTRAMUSCULAR; INTRAVENOUS at 18:49

## 2019-08-20 RX ADMIN — METOPROLOL TARTRATE 100 MG: 50 TABLET ORAL at 20:20

## 2019-08-20 RX ADMIN — MIDAZOLAM 1 MG: 1 INJECTION INTRAMUSCULAR; INTRAVENOUS at 18:24

## 2019-08-20 RX ADMIN — FENTANYL CITRATE 25 MCG: 50 INJECTION, SOLUTION INTRAMUSCULAR; INTRAVENOUS at 18:33

## 2019-08-20 RX ADMIN — DEXAMETHASONE SODIUM PHOSPHATE 4 MG: 4 INJECTION, SOLUTION INTRA-ARTICULAR; INTRALESIONAL; INTRAMUSCULAR; INTRAVENOUS; SOFT TISSUE at 18:50

## 2019-08-20 RX ADMIN — METOPROLOL TARTRATE 100 MG: 50 TABLET ORAL at 00:29

## 2019-08-20 RX ADMIN — PROPOFOL 20 MG: 10 INJECTION, EMULSION INTRAVENOUS at 18:42

## 2019-08-20 RX ADMIN — PROPOFOL 30 MG: 10 INJECTION, EMULSION INTRAVENOUS at 18:37

## 2019-08-20 RX ADMIN — LIDOCAINE HYDROCHLORIDE 50 MG: 10 INJECTION, SOLUTION EPIDURAL; INFILTRATION; INTRACAUDAL; PERINEURAL at 18:33

## 2019-08-20 RX ADMIN — GABAPENTIN 800 MG: 400 CAPSULE ORAL at 20:06

## 2019-08-20 RX ADMIN — FAMOTIDINE 20 MG: 20 TABLET ORAL at 20:06

## 2019-08-20 RX ADMIN — FENTANYL CITRATE 50 MCG: 50 INJECTION INTRAMUSCULAR; INTRAVENOUS at 20:09

## 2019-08-20 RX ADMIN — SODIUM CHLORIDE: 9 INJECTION, SOLUTION INTRAVENOUS at 20:15

## 2019-08-20 RX ADMIN — CIPROFLOXACIN 400 MG: 2 INJECTION, SOLUTION INTRAVENOUS at 20:16

## 2019-08-20 RX ADMIN — MIDAZOLAM 1 MG: 1 INJECTION INTRAMUSCULAR; INTRAVENOUS at 19:06

## 2019-08-20 RX ADMIN — GABAPENTIN 800 MG: 400 CAPSULE ORAL at 03:06

## 2019-08-20 RX ADMIN — PROPOFOL 30 MG: 10 INJECTION, EMULSION INTRAVENOUS at 18:43

## 2019-08-20 RX ADMIN — CIPROFLOXACIN 400 MG: 2 INJECTION, SOLUTION INTRAVENOUS at 08:36

## 2019-08-20 RX ADMIN — FENTANYL CITRATE 25 MCG: 50 INJECTION, SOLUTION INTRAMUSCULAR; INTRAVENOUS at 19:05

## 2019-08-20 RX ADMIN — PROPOFOL 30 MG: 10 INJECTION, EMULSION INTRAVENOUS at 18:35

## 2019-08-20 RX ADMIN — FENTANYL CITRATE 50 MCG: 50 INJECTION INTRAMUSCULAR; INTRAVENOUS at 00:21

## 2019-08-20 RX ADMIN — SODIUM CHLORIDE 150 ML/HR: 9 INJECTION, SOLUTION INTRAVENOUS at 00:15

## 2019-08-20 RX ADMIN — SODIUM CHLORIDE: 9 INJECTION, SOLUTION INTRAVENOUS at 08:17

## 2019-08-20 RX ADMIN — FENTANYL CITRATE 25 MCG: 50 INJECTION, SOLUTION INTRAMUSCULAR; INTRAVENOUS at 18:35

## 2019-08-20 RX ADMIN — PROPOFOL 20 MG: 10 INJECTION, EMULSION INTRAVENOUS at 18:40

## 2019-08-20 RX ADMIN — FENTANYL CITRATE 50 MCG: 50 INJECTION INTRAMUSCULAR; INTRAVENOUS at 13:55

## 2019-08-20 RX ADMIN — SODIUM CHLORIDE: 9 INJECTION, SOLUTION INTRAVENOUS at 18:26

## 2019-08-20 RX ADMIN — PROPOFOL 50 MG: 10 INJECTION, EMULSION INTRAVENOUS at 18:33

## 2019-08-20 RX ADMIN — FENTANYL CITRATE 75 MCG: 50 INJECTION INTRAMUSCULAR; INTRAVENOUS at 22:21

## 2019-08-20 RX ADMIN — FAMOTIDINE 20 MG: 10 INJECTION, SOLUTION INTRAVENOUS at 16:37

## 2019-08-20 RX ADMIN — FENTANYL CITRATE 25 MCG: 50 INJECTION, SOLUTION INTRAMUSCULAR; INTRAVENOUS at 18:41

## 2019-08-20 RX ADMIN — PROPOFOL 20 MG: 10 INJECTION, EMULSION INTRAVENOUS at 18:38

## 2019-08-20 RX ADMIN — SODIUM CHLORIDE: 9 INJECTION, SOLUTION INTRAVENOUS at 18:52

## 2019-08-20 RX ADMIN — MIDAZOLAM 1 MG: 1 INJECTION INTRAMUSCULAR; INTRAVENOUS at 18:26

## 2019-08-20 ASSESSMENT — PULMONARY FUNCTION TESTS
PIF_VALUE: 1
PIF_VALUE: 0
PIF_VALUE: 1
PIF_VALUE: 2
PIF_VALUE: 1
PIF_VALUE: 0
PIF_VALUE: 0
PIF_VALUE: 1
PIF_VALUE: 0
PIF_VALUE: 1
PIF_VALUE: 0
PIF_VALUE: 0
PIF_VALUE: 1
PIF_VALUE: 1
PIF_VALUE: 0
PIF_VALUE: 1

## 2019-08-20 ASSESSMENT — PAIN SCALES - GENERAL
PAINLEVEL_OUTOF10: 9
PAINLEVEL_OUTOF10: 10
PAINLEVEL_OUTOF10: 10
PAINLEVEL_OUTOF10: 9
PAINLEVEL_OUTOF10: 7
PAINLEVEL_OUTOF10: 6
PAINLEVEL_OUTOF10: 3
PAINLEVEL_OUTOF10: 9
PAINLEVEL_OUTOF10: 4
PAINLEVEL_OUTOF10: 7

## 2019-08-20 ASSESSMENT — PAIN DESCRIPTION - ORIENTATION
ORIENTATION: RIGHT
ORIENTATION: RIGHT

## 2019-08-20 ASSESSMENT — ENCOUNTER SYMPTOMS
WHEEZING: 0
ABDOMINAL PAIN: 0
VOMITING: 0
NAUSEA: 1
SHORTNESS OF BREATH: 0
COUGH: 0
SORE THROAT: 0
DIARRHEA: 1
CONSTIPATION: 0
BACK PAIN: 0

## 2019-08-20 ASSESSMENT — PAIN DESCRIPTION - FREQUENCY
FREQUENCY: CONTINUOUS
FREQUENCY: INTERMITTENT

## 2019-08-20 ASSESSMENT — PAIN DESCRIPTION - PAIN TYPE
TYPE: ACUTE PAIN
TYPE: ACUTE PAIN;SURGICAL PAIN

## 2019-08-20 ASSESSMENT — PAIN DESCRIPTION - LOCATION
LOCATION: FLANK
LOCATION: PERINEUM
LOCATION: FLANK

## 2019-08-20 ASSESSMENT — PAIN DESCRIPTION - DESCRIPTORS
DESCRIPTORS: ACHING
DESCRIPTORS: ACHING;SHARP;SHOOTING;BURNING

## 2019-08-20 NOTE — H&P (VIEW-ONLY)
loratadine-type; Aspartame and phenylalanine; Eggs or egg-derived products; Lorazepam; Nubain [nalbuphine hcl]; and Vistaril [hydroxyzine hcl]    REVIEW OF SYSTEMS     Review of Systems   Constitutional: Positive for fatigue. Negative for chills, diaphoresis and fever. HENT: Negative for congestion and sore throat. Respiratory: Negative for cough, shortness of breath and wheezing. Cardiovascular: Negative for chest pain, palpitations and leg swelling. Gastrointestinal: Positive for diarrhea and nausea. Negative for abdominal pain, constipation and vomiting. Genitourinary: Positive for dysuria, flank pain, frequency and urgency. Musculoskeletal: Negative for back pain and myalgias. Skin: Negative for rash. Neurological: Negative for dizziness, weakness and headaches. Psychiatric/Behavioral: The patient is not nervous/anxious. PHYSICAL EXAM      BP (!) 132/59   Pulse 60   Temp 97.9 °F (36.6 °C) (Oral)   Resp 16   Ht 5' 2\" (1.575 m)   Wt 235 lb (106.6 kg)   LMP 01/12/2011   SpO2 98%   BMI 42.98 kg/m²  Body mass index is 42.98 kg/m². Physical Exam   Constitutional: She is oriented to person, place, and time. She appears well-developed and well-nourished. No distress. obesity   HENT:   Head: Normocephalic and atraumatic. Mouth/Throat: Oropharynx is clear and moist.   Eyes: Pupils are equal, round, and reactive to light. Conjunctivae and EOM are normal.   Neck: Normal range of motion. Neck supple. No tracheal deviation present. Cardiovascular: Normal rate, regular rhythm, normal heart sounds and intact distal pulses. Exam reveals no gallop and no friction rub. No murmur heard. Pulmonary/Chest: Effort normal and breath sounds normal. No respiratory distress. She has no wheezes. She has no rales. She exhibits no tenderness. Abdominal: Soft. Bowel sounds are normal. She exhibits no distension. There is no tenderness. There is no guarding.    Musculoskeletal: Normal range of SPECGRAV 1.015   LEUKOCYTESUR SMALL*   GLUCOSEU NEGATIVE   AMORPHOUS 1+*       Imaging/Diagonstics:     Ct Abdomen Pelvis Wo Contrast    Result Date: 8/19/2019  EXAMINATION: CT OF THE ABDOMEN AND PELVIS WITHOUT CONTRAST 8/19/2019 7:54 pm TECHNIQUE: CT of the abdomen and pelvis was performed without the administration of intravenous contrast. Multiplanar reformatted images are provided for review. Dose modulation, iterative reconstruction, and/or weight based adjustment of the mA/kV was utilized to reduce the radiation dose to as low as reasonably achievable. COMPARISON: CT abdomen and pelvis performed 04/24/2015. HISTORY: ORDERING SYSTEM PROVIDED HISTORY: rule out kidney stones TECHNOLOGIST PROVIDED HISTORY: Reason for Exam: rule out kidney stones Additional signs and symptoms: back pain worse on right side, possible kidney stones FINDINGS: Lower Chest: The lung bases are without consolidation or effusion. The visualized cardiac structures are unremarkable. Organs: The liver and spleen are normal size and overall attenuation. The gallbladder has been removed. The pancreas and adrenal glands are unremarkable. The left kidney is without obstructive uropathy. There is mild right-sided hydronephrosis with a 4 mm stone at the right ureterovesicular junction. The urinary bladder is contracted. GI/Bowel: The stomach is unremarkable. Loops of small bowel are normal in caliber without evidence for obstruction. The colon contains air and fecal residue. There are a few scattered uncomplicated diverticula. The appendix is normal.  There is no intraperitoneal free air or free fluid. Pelvis: The uterus and adnexal structures are age-appropriate. Peritoneum/Retroperitoneum: The psoas muscles are symmetric. The abdominal aorta is normal in caliber. The inferior vena cava is unremarkable. There is no retroperitoneal or mesenteric adenopathy. Bones/Soft Tissues: The extra-abdominal soft tissues are unremarkable.   There adjacent to the lunate, likely ossicles. No dislocation. Right knee: Three views were obtained of the right knee. Medial compartment joint space loss. Rounded ossific density is seen medial to the medial femoral condyle without clear donor site, likely degenerative or developmental.  Tiny ossific density is also seen along the lateral margin of the tibial plateau. Right foot: Three views were obtained of the right foot. Small calcific densities are seen medial to the distal aspect of the 1st metatarsal.  No clear donor site. No fracture or dislocation. Small plantar calcaneal spur. Ossific density along the lateral aspect of the tibial plateau, for which Segond fracture could be considered in the appropriate clinical setting. Correlate with point tenderness. Particulate hyperdensities within the soft tissues medial to the right 1st metatarsal, which can reflect calcification or foreign body/debris. Correlate with physical exam. No evidence of right hip fracture. Degenerative change of the right wrist and accessory ossicles, without convincing evidence of fracture. If patient has anatomic snuff box tenderness or persistent symptoms, follow-up radiographic series in 7-10 days would be recommended. Xr Hip Right (2-3 Views)    Result Date: 7/30/2019  EXAMINATION: TWO XRAY VIEWS OF THE RIGHT HIP; 3 XRAY VIEWS OF THE RIGHT WRIST; THREE XRAY VIEWS OF THE RIGHT KNEE; THREE XRAY VIEWS OF THE RIGHT FOOT 7/30/2019 7:26 pm COMPARISON: None. HISTORY: ORDERING SYSTEM PROVIDED HISTORY: fall TECHNOLOGIST PROVIDED HISTORY: fall Reason for Exam: Pain Acuity: Acute Type of Exam: Initial Mechanism of Injury: Fall x 1 day. slipped on kitchen floor; ORDERING SYSTEM PROVIDED HISTORY: pain over ulnar side. fall TECHNOLOGIST PROVIDED HISTORY: pain over ulnar side. fall Reason for Exam: Pain Acuity: Acute Type of Exam: Initial Mechanism of Injury: Fall x 1 day.  Slipped on kitchen floor; ORDERING SYSTEM PROVIDED HISTORY: fall TECHNOLOGIST PROVIDED HISTORY: fall Reason for Exam: Pain Acuity: Acute Mechanism of Injury: Fall x 1 day. slipped on kitchen floor FINDINGS: Right hip: Two views were obtained of the right hip. No gross fracture. No dislocation. Spurring of the greater trochanter. Right wrist: Three views were obtained of the right wrist.  Cystic change is demonstrated at the scaphoid. Rounded ossific densities are seen adjacent to the trapezium and adjacent to the lunate, likely ossicles. No dislocation. Right knee: Three views were obtained of the right knee. Medial compartment joint space loss. Rounded ossific density is seen medial to the medial femoral condyle without clear donor site, likely degenerative or developmental.  Tiny ossific density is also seen along the lateral margin of the tibial plateau. Right foot: Three views were obtained of the right foot. Small calcific densities are seen medial to the distal aspect of the 1st metatarsal.  No clear donor site. No fracture or dislocation. Small plantar calcaneal spur. Ossific density along the lateral aspect of the tibial plateau, for which Segond fracture could be considered in the appropriate clinical setting. Correlate with point tenderness. Particulate hyperdensities within the soft tissues medial to the right 1st metatarsal, which can reflect calcification or foreign body/debris. Correlate with physical exam. No evidence of right hip fracture. Degenerative change of the right wrist and accessory ossicles, without convincing evidence of fracture. If patient has anatomic snuff box tenderness or persistent symptoms, follow-up radiographic series in 7-10 days would be recommended. Xr Knee Right (3 Views)    Result Date: 7/30/2019  EXAMINATION: TWO XRAY VIEWS OF THE RIGHT HIP; 3 XRAY VIEWS OF THE RIGHT WRIST; THREE XRAY VIEWS OF THE RIGHT KNEE; THREE XRAY VIEWS OF THE RIGHT FOOT 7/30/2019 7:26 pm COMPARISON: None.  HISTORY: ORDERING SYSTEM PROVIDED

## 2019-08-20 NOTE — CONSULTS
Urology Consultation    Patient:  Radha Villalobos  MRN: [id number]  YOB: 1955    CHIEF COMPLAINT:  Right flank pain. HISTORY OF PRESENT ILLNESS:   The patient is a 60 y.o. female who presents with right flank pain. She has been having problems for the last 3 weeks. She developed severe right flank pain last night, 8/10. She had a CT, which showed a 4mm right UVJ stone. She is feeling better after having IV pain meds. She reports having a recent UTI, but has been on antibiotics for the last week or so. She has also noticed urgency and frequency as well. She has a h/o stones, but states that she can usually pass them. Patient's old records, notes and chart reviewed and summarized above.     Past Medical History:    Past Medical History:   Diagnosis Date    Arrhythmia     Breast mass     CAD (coronary artery disease)     with valvular disease    Chronic neck pain     Coccygeal pain 3/7/2016    Constipation     Depression     Diabetic neuropathy (HCC)     History of hepatitis A     possible history of hepatitis A in the past    History of renal calculi     Hypertension     Hyperthyroidism     Hypothyroidism     Morbid obesity with BMI of 45.0-49.9, adult ([facility] [address] 3/31/2016    Nephrolithiasis     Neuropathy     Type II or unspecified type diabetes mellitus without mention of complication, not stated as uncontrolled     non insulin dependent     UTI (lower urinary tract infection)        Past Surgical History:    Past Surgical History:   Procedure Laterality Date    BREAST BIOPSY  2012    negative    CARDIOVASCULAR STRESS TEST      7/17/15 no results    CHOLECYSTECTOMY      CYST REMOVAL      right hand    CYSTOSCOPY      ENDOMETRIAL BIOPSY  2009    LITHOTRIPSY      TONSILLECTOMY      TUBAL LIGATION       Previous  surgery: ureteroscopy   Medications:    Scheduled Meds:   ciprofloxacin  400 mg Intravenous Q12H    atorvastatin  80 mg Oral Daily    DULoxetine  60 mg Days per week: Not on file     Minutes per session: Not on file    Stress: Not on file   Relationships    Social connections:     Talks on phone: Not on file     Gets together: Not on file     Attends Voodoo service: Not on file     Active member of club or organization: Not on file     Attends meetings of clubs or organizations: Not on file     Relationship status: Not on file    Intimate partner violence:     Fear of current or ex partner: Not on file     Emotionally abused: Not on file     Physically abused: Not on file     Forced sexual activity: Not on file   Other Topics Concern    Not on file   Social History Narrative    Not on file       Family History:    Family History   Problem Relation Age of Onset    Coronary Art Dis Mother     Lung Cancer Mother     Diabetes Mother         mellitus    Coronary Art Dis Father     Diabetes Father         diabetes mellitus     Previous Urologic Family history: none  REVIEW OF SYSTEMS:  Constitutional: negative  Eyes: negative  Respiratory: negative  Cardiovascular: negative  Gastrointestinal: negative  Genitourinary: see HPI  Musculoskeletal: negative  Skin: negative   Neurological: negative  Hematological/Lymphatic: negative  Psychological: negative    Physical Exam:      This a 60 y.o. female   Patient Vitals for the past 24 hrs:   BP Temp Temp src Pulse Resp SpO2 Height Weight   08/20/19 0752 105/66 98.4 °F (36.9 °C) Oral 67 16 96 % -- --   08/20/19 0325 (!) 132/59 97.9 °F (36.6 °C) Oral 60 16 98 % -- --   08/20/19 0029 131/74 -- -- 70 -- -- -- --   08/20/19 0022 131/74 -- -- 69 16 96 % -- --   08/19/19 2350 (!) 147/94 98.1 °F (36.7 °C) Oral 70 18 95 % -- --   08/19/19 1844 (!) 142/97 98 °F (36.7 °C) Oral 69 16 97 % 5' 2\" (1.575 m) 235 lb (106.6 kg)     Constitutional: Patient in no acute distress. Neuro: Alert and oriented to person, place and time.   Psych: mood and affect normal  HEENT negative  Lungs: Respiratory effort is normal  Cardiovascular:

## 2019-08-20 NOTE — H&P
motion. She exhibits tenderness (Rt CVA tenderness). She exhibits no edema. Lymphadenopathy:     She has no cervical adenopathy. Neurological: She is alert and oriented to person, place, and time. She displays no seizure activity. Coordination normal.   Skin: Skin is warm and dry. No rash noted. She is not diaphoretic. No erythema. No pallor. Psychiatric: She has a normal mood and affect. Her behavior is normal. Thought content normal.     DIAGNOSTICS      EKG: none    Labs:  CBC:   Recent Labs     08/19/19 1938   WBC 10.0   HGB 15.4        BMP:    Recent Labs     08/19/19 1938      K 3.5*      CO2 25   BUN 7*   CREATININE 0.42*   GLUCOSE 179*     S. Calcium:  Recent Labs     08/19/19 1938   CALCIUM 9.1     S. Ionized Calcium:No results for input(s): IONCA in the last 72 hours. S. Magnesium:No results for input(s): MG in the last 72 hours. S. Phosphorus:No results for input(s): PHOS in the last 72 hours. S. Glucose:No results for input(s): POCGLU in the last 72 hours. Glycosylated hemoglobin A1C:   Lab Results   Component Value Date    LABA1C 10.0 01/23/2019     Hepatic:   Recent Labs     08/19/19 1938   AST 25   ALT 28   ALKPHOS 116*     CARDIAC ENZY: No results for input(s): CKTOTAL, CKMB, CKMBINDEX, TROPHS, MYOGLOBIN in the last 72 hours. INR: No results for input(s): INR in the last 72 hours. BNP: No results for input(s): PROBNP in the last 72 hours. ABGs: No results for input(s): PH, PCO2, PO2, HCO3, O2SAT in the last 72 hours. Lipids: No results for input(s): CHOL, TRIG, HDL, LDLCALC in the last 72 hours. Invalid input(s): LDL  Pancreatic functions:  Recent Labs     08/19/19 1938   LIPASE 13     S. LacticAcid: No results for input(s): LACTA in the last 72 hours.   Thyroid functions:   Lab Results   Component Value Date    TSH 5.30 05/15/2019      U/A:  Recent Labs     08/19/19  [address] 2 TO 5   RBCUA 0 TO 2   MUCUS NOT REPORTED   BACTERIA FEW* TECHNOLOGIST PROVIDED HISTORY: fall Reason for Exam: Pain Acuity: Acute Mechanism of Injury: Fall x 1 day. slipped on kitchen floor FINDINGS: Right hip: Two views were obtained of the right hip. No gross fracture. No dislocation. Spurring of the greater trochanter. Right wrist: Three views were obtained of the right wrist.  Cystic change is demonstrated at the scaphoid. Rounded ossific densities are seen adjacent to the trapezium and adjacent to the lunate, likely ossicles. No dislocation. Right knee: Three views were obtained of the right knee. Medial compartment joint space loss. Rounded ossific density is seen medial to the medial femoral condyle without clear donor site, likely degenerative or developmental.  Tiny ossific density is also seen along the lateral margin of the tibial plateau. Right foot: Three views were obtained of the right foot. Small calcific densities are seen medial to the distal aspect of the 1st metatarsal.  No clear donor site. No fracture or dislocation. Small plantar calcaneal spur. Ossific density along the lateral aspect of the tibial plateau, for which Segond fracture could be considered in the appropriate clinical setting. Correlate with point tenderness. Particulate hyperdensities within the soft tissues medial to the right 1st metatarsal, which can reflect calcification or foreign body/debris. Correlate with physical exam. No evidence of right hip fracture. Degenerative change of the right wrist and accessory ossicles, without convincing evidence of fracture. If patient has anatomic snuff box tenderness or persistent symptoms, follow-up radiographic series in 7-10 days would be recommended. Xr Knee Right (3 Views)    Result Date: 7/30/2019  EXAMINATION: TWO XRAY VIEWS OF THE RIGHT HIP; 3 XRAY VIEWS OF THE RIGHT WRIST; THREE XRAY VIEWS OF THE RIGHT KNEE; THREE XRAY VIEWS OF THE RIGHT FOOT 7/30/2019 7:26 pm COMPARISON: None.  HISTORY: ORDERING SYSTEM PROVIDED COMPARISON: Right knee radiographs 07/30/2019 and 02/20/2017. HISTORY ORDERING SYSTEM PROVIDED HISTORY: possible segond fracture on xray TECHNOLOGIST PROVIDED HISTORY: Reason for Exam: patient states she fell a couple days ago and c/o right knee pain Initial encounter FINDINGS: Bones: A small age-indeterminate osseous fragment is seen adjacent to the peripheral rim of the lateral tibial plateau. This is new from 2017 and may represent a small Segond fracture. No displaced fracture or dislocation. Soft Tissue:  Chronic calcification in the proximal superficial medial collateral ligament compatible with a remote sprain. The extensor mechanism is intact. No abnormal soft tissue mass or fluid collection. Joint:  No joint effusion or popliteal cyst.  No significant degenerative changes. No joint body. 1. Small age-indeterminate osseous fragment new from 2017 adjacent to the lateral tibial plateau possibly representing a Segond fracture. These have a high correlation with ACL tears and if clinically indicated further evaluation could be obtained with MRI. 2. No joint effusion or significant degenerative changes. ASSESSMENT  and  PLAN     Principal Problem:    Ureterolithiasis  Active Problems:    Nephrolithiasis    Urinary tract infection without hematuria    Hydronephrosis  Resolved Problems:    Diabetes type 2, uncontrolled ([address]    Plan:  Ureterolithiasis with mild hydronephrosis  -4 mm stone noted in Rt UVJ  -IV fluids   -Urology Consult  -Pain and nausea control  -Flomax daily  -Strain all urine    Urinary tract infection  -Cipro IV BID  -Urine culture pending    Diabetes Mellitus  -Continue oral hypoglycemics for now  -POCT ac and hs with sliding scale coverage    Consultations:     [facility] CONSULT TO RASHMI Mcdowell CNP   8/20/2019  5:38 AM    [address]  [address]    Phone [phone]     IM

## 2019-08-21 VITALS
HEIGHT: 62 IN | HEART RATE: 77 BPM | SYSTOLIC BLOOD PRESSURE: 141 MMHG | RESPIRATION RATE: 17 BRPM | TEMPERATURE: 98 F | OXYGEN SATURATION: 96 % | DIASTOLIC BLOOD PRESSURE: 69 MMHG | BODY MASS INDEX: 44.59 KG/M2 | WEIGHT: 242.29 LBS

## 2019-08-21 LAB
ANION GAP SERPL CALCULATED.3IONS-SCNC: 13 MMOL/L (ref 9–17)
BUN BLDV-MCNC: 11 MG/DL (ref 8–23)
BUN/CREAT BLD: ABNORMAL (ref 9–20)
CALCIUM SERPL-MCNC: 8.6 MG/DL (ref 8.6–10.4)
CHLORIDE BLD-SCNC: 105 MMOL/L (ref 98–107)
CO2: 20 MMOL/L (ref 20–31)
CREAT SERPL-MCNC: 0.53 MG/DL (ref 0.5–0.9)
CULTURE: NORMAL
GFR AFRICAN AMERICAN: >60 ML/MIN
GFR NON-AFRICAN AMERICAN: >60 ML/MIN
GFR SERPL CREATININE-BSD FRML MDRD: ABNORMAL ML/MIN/{1.73_M2}
GFR SERPL CREATININE-BSD FRML MDRD: ABNORMAL ML/MIN/{1.73_M2}
GLUCOSE BLD-MCNC: 270 MG/DL (ref 65–105)
GLUCOSE BLD-MCNC: 311 MG/DL (ref 70–99)
HCT VFR BLD CALC: 43.8 % (ref 36–46)
HEMOGLOBIN: 14.6 G/DL (ref 12–16)
Lab: NORMAL
MAGNESIUM: 1.5 MG/DL (ref 1.6–2.6)
MCH RBC QN AUTO: 29.8 PG (ref 26–34)
MCHC RBC AUTO-ENTMCNC: 33.4 G/DL (ref 31–37)
MCV RBC AUTO: 89.4 FL (ref 80–100)
NRBC AUTOMATED: NORMAL PER 100 WBC
PDW BLD-RTO: 13.1 % (ref 11.5–14.9)
PLATELET # BLD: 221 K/UL (ref 150–450)
PMV BLD AUTO: 10.5 FL (ref 6–12)
POTASSIUM SERPL-SCNC: 3.5 MMOL/L (ref 3.7–5.3)
RBC # BLD: 4.9 M/UL (ref 4–5.2)
SODIUM BLD-SCNC: 138 MMOL/L (ref 135–144)
SPECIMEN DESCRIPTION: NORMAL
WBC # BLD: 9.2 K/UL (ref 3.5–11)

## 2019-08-21 PROCEDURE — 6370000000 HC RX 637 (ALT 250 FOR IP): Performed by: INTERNAL MEDICINE

## 2019-08-21 PROCEDURE — 99238 HOSP IP/OBS DSCHRG MGMT 30/<: CPT | Performed by: INTERNAL MEDICINE

## 2019-08-21 PROCEDURE — 2580000003 HC RX 258: Performed by: UROLOGY

## 2019-08-21 PROCEDURE — 6360000002 HC RX W HCPCS: Performed by: UROLOGY

## 2019-08-21 PROCEDURE — 82947 ASSAY GLUCOSE BLOOD QUANT: CPT

## 2019-08-21 PROCEDURE — 85027 COMPLETE CBC AUTOMATED: CPT

## 2019-08-21 PROCEDURE — 80048 BASIC METABOLIC PNL TOTAL CA: CPT

## 2019-08-21 PROCEDURE — 36415 COLL VENOUS BLD VENIPUNCTURE: CPT

## 2019-08-21 PROCEDURE — 6370000000 HC RX 637 (ALT 250 FOR IP): Performed by: UROLOGY

## 2019-08-21 PROCEDURE — 6360000002 HC RX W HCPCS: Performed by: NURSE PRACTITIONER

## 2019-08-21 PROCEDURE — 83735 ASSAY OF MAGNESIUM: CPT

## 2019-08-21 RX ORDER — HYDROCODONE BITARTRATE AND ACETAMINOPHEN 5; 325 MG/1; MG/1
1 TABLET ORAL EVERY 6 HOURS PRN
Qty: 12 TABLET | Refills: 0 | Status: SHIPPED | OUTPATIENT
Start: 2019-08-21 | End: 2019-08-24

## 2019-08-21 RX ORDER — CIPROFLOXACIN 500 MG/1
500 TABLET, FILM COATED ORAL 2 TIMES DAILY
Qty: 14 TABLET | Refills: 0 | Status: SHIPPED | OUTPATIENT
Start: 2019-08-21 | End: 2019-08-28

## 2019-08-21 RX ORDER — LANOLIN ALCOHOL/MO/W.PET/CERES
800 CREAM (GRAM) TOPICAL ONCE
Status: COMPLETED | OUTPATIENT
Start: 2019-08-21 | End: 2019-08-21

## 2019-08-21 RX ORDER — TAMSULOSIN HYDROCHLORIDE 0.4 MG/1
0.4 CAPSULE ORAL DAILY
Qty: 30 CAPSULE | Refills: 0 | Status: SHIPPED | OUTPATIENT
Start: 2019-08-22 | End: 2019-09-10 | Stop reason: ALTCHOICE

## 2019-08-21 RX ORDER — OXYCODONE HYDROCHLORIDE AND ACETAMINOPHEN 5; 325 MG/1; MG/1
1 TABLET ORAL EVERY 6 HOURS PRN
Status: DISCONTINUED | OUTPATIENT
Start: 2019-08-21 | End: 2019-08-21 | Stop reason: HOSPADM

## 2019-08-21 RX ORDER — OXYCODONE HYDROCHLORIDE AND ACETAMINOPHEN 5; 325 MG/1; MG/1
2 TABLET ORAL EVERY 6 HOURS PRN
Status: DISCONTINUED | OUTPATIENT
Start: 2019-08-21 | End: 2019-08-21 | Stop reason: HOSPADM

## 2019-08-21 RX ADMIN — INSULIN LISPRO 6 UNITS: 100 INJECTION, SOLUTION INTRAVENOUS; SUBCUTANEOUS at 12:30

## 2019-08-21 RX ADMIN — Medication 800 MG: at 13:37

## 2019-08-21 RX ADMIN — KETOROLAC TROMETHAMINE 15 MG: 30 INJECTION, SOLUTION INTRAMUSCULAR at 00:02

## 2019-08-21 RX ADMIN — CIPROFLOXACIN 400 MG: 2 INJECTION, SOLUTION INTRAVENOUS at 09:03

## 2019-08-21 RX ADMIN — INSULIN GLARGINE 15 UNITS: 100 INJECTION, SOLUTION SUBCUTANEOUS at 09:03

## 2019-08-21 RX ADMIN — TAMSULOSIN HYDROCHLORIDE 0.4 MG: 0.4 CAPSULE ORAL at 09:03

## 2019-08-21 RX ADMIN — INSULIN LISPRO 6 UNITS: 100 INJECTION, SOLUTION INTRAVENOUS; SUBCUTANEOUS at 09:03

## 2019-08-21 RX ADMIN — POTASSIUM CHLORIDE 40 MEQ: 1500 TABLET, EXTENDED RELEASE ORAL at 12:30

## 2019-08-21 RX ADMIN — FUROSEMIDE 20 MG: 20 TABLET ORAL at 09:04

## 2019-08-21 RX ADMIN — LEVOTHYROXINE SODIUM 75 MCG: 75 TABLET ORAL at 06:20

## 2019-08-21 RX ADMIN — LISINOPRIL 10 MG: 10 TABLET ORAL at 09:04

## 2019-08-21 RX ADMIN — ATORVASTATIN CALCIUM 80 MG: 80 TABLET, FILM COATED ORAL at 09:04

## 2019-08-21 RX ADMIN — FAMOTIDINE 20 MG: 20 TABLET ORAL at 09:04

## 2019-08-21 RX ADMIN — DULOXETINE 60 MG: 60 CAPSULE, DELAYED RELEASE ORAL at 09:10

## 2019-08-21 RX ADMIN — GABAPENTIN 800 MG: 400 CAPSULE ORAL at 09:04

## 2019-08-21 RX ADMIN — OXYCODONE HYDROCHLORIDE AND ACETAMINOPHEN 1 TABLET: 5; 325 TABLET ORAL at 09:47

## 2019-08-21 RX ADMIN — FENTANYL CITRATE 75 MCG: 50 INJECTION INTRAMUSCULAR; INTRAVENOUS at 03:49

## 2019-08-21 RX ADMIN — ENOXAPARIN SODIUM 30 MG: 30 INJECTION SUBCUTANEOUS at 09:03

## 2019-08-21 RX ADMIN — GABAPENTIN 800 MG: 400 CAPSULE ORAL at 13:39

## 2019-08-21 RX ADMIN — SODIUM CHLORIDE: 9 INJECTION, SOLUTION INTRAVENOUS at 03:58

## 2019-08-21 RX ADMIN — METOPROLOL TARTRATE 100 MG: 50 TABLET ORAL at 09:03

## 2019-08-21 ASSESSMENT — PAIN DESCRIPTION - ORIENTATION: ORIENTATION: LOWER;RIGHT

## 2019-08-21 ASSESSMENT — PAIN DESCRIPTION - FREQUENCY: FREQUENCY: CONTINUOUS

## 2019-08-21 ASSESSMENT — PAIN SCALES - GENERAL
PAINLEVEL_OUTOF10: 6
PAINLEVEL_OUTOF10: 8
PAINLEVEL_OUTOF10: 10
PAINLEVEL_OUTOF10: 10
PAINLEVEL_OUTOF10: 6

## 2019-08-21 ASSESSMENT — PAIN DESCRIPTION - DESCRIPTORS: DESCRIPTORS: SHARP

## 2019-08-21 ASSESSMENT — PAIN DESCRIPTION - LOCATION: LOCATION: ABDOMEN;FLANK

## 2019-08-21 ASSESSMENT — PAIN DESCRIPTION - PAIN TYPE: TYPE: ACUTE PAIN

## 2019-08-21 NOTE — ANESTHESIA POSTPROCEDURE EVALUATION
Department of Anesthesiology  Postprocedure Note    Patient: Pixie Landau  MRN: [id number]  YOB: 1955  Date of evaluation: 8/21/2019  Time:  1:25 PM     Procedure Summary     Date:  08/20/19 Room / Location:  [address] [place] [address] 01 / 250 [facility] OR    Anesthesia Start:  [address] Anesthesia Stop:  1900    Procedure:  CYSTOSCOPY URETERAL STENT INSERTION (Right Ureter) Diagnosis:  (NEPHROLITHIASIS)    Surgeon:  Nereida Rojo MD Responsible Provider:  Marj Mark MD    Anesthesia Type:  MAC, general ASA Status:  3          Anesthesia Type: MAC, general    Gogo Phase I: Gogo Score: 10    Gogo Phase II:      Last vitals: Reviewed and per EMR flowsheets. Anesthesia Post Evaluation    Comments: Patient wants to go home. No anesthetic related issues.

## 2019-08-21 NOTE — PLAN OF CARE
Problem: Falls - Risk of:  Goal: Will remain free from falls  Description  Will remain free from falls  Outcome: Ongoing  Note:   Pt. Free of falls and injuries this shift. Problem: Pain:  Goal: Pain level will decrease  Description  Pain level will decrease  Outcome: Ongoing  Note:   Adequate pain control achieved this shift. See MAR.

## 2019-08-22 ENCOUNTER — TELEPHONE (OUTPATIENT)
Dept: UROLOGY | Age: 64
End: 2019-08-22

## 2019-08-24 LAB — GLUCOSE BLD-MCNC: 291 MG/DL (ref 65–105)

## 2019-08-26 ENCOUNTER — APPOINTMENT (OUTPATIENT)
Dept: GENERAL RADIOLOGY | Age: 64
End: 2019-08-26
Attending: UROLOGY
Payer: COMMERCIAL

## 2019-08-26 ENCOUNTER — ANESTHESIA (OUTPATIENT)
Dept: OPERATING ROOM | Age: 64
End: 2019-08-26
Payer: COMMERCIAL

## 2019-08-26 ENCOUNTER — HOSPITAL ENCOUNTER (OUTPATIENT)
Age: 64
Setting detail: OUTPATIENT SURGERY
Discharge: HOME | End: 2019-08-26
Attending: UROLOGY | Admitting: UROLOGY
Payer: COMMERCIAL

## 2019-08-26 ENCOUNTER — ANESTHESIA EVENT (OUTPATIENT)
Dept: OPERATING ROOM | Age: 64
End: 2019-08-26
Payer: COMMERCIAL

## 2019-08-26 VITALS
HEART RATE: 78 BPM | SYSTOLIC BLOOD PRESSURE: 103 MMHG | OXYGEN SATURATION: 95 % | RESPIRATION RATE: 16 BRPM | HEIGHT: 62 IN | TEMPERATURE: 97.2 F | DIASTOLIC BLOOD PRESSURE: 64 MMHG | BODY MASS INDEX: 44.16 KG/M2 | WEIGHT: 240 LBS

## 2019-08-26 VITALS
OXYGEN SATURATION: 95 % | RESPIRATION RATE: 10 BRPM | SYSTOLIC BLOOD PRESSURE: 96 MMHG | DIASTOLIC BLOOD PRESSURE: 56 MMHG | TEMPERATURE: 97.1 F

## 2019-08-26 LAB
GLUCOSE BLD-MCNC: 257 MG/DL (ref 65–105)
GLUCOSE BLD-MCNC: 276 MG/DL (ref 65–105)

## 2019-08-26 PROCEDURE — 3209999900 FLUORO FOR SURGICAL PROCEDURES

## 2019-08-26 PROCEDURE — 2580000003 HC RX 258: Performed by: ANESTHESIOLOGY

## 2019-08-26 PROCEDURE — 2720000010 HC SURG SUPPLY STERILE: Performed by: UROLOGY

## 2019-08-26 PROCEDURE — C2617 STENT, NON-COR, TEM W/O DEL: HCPCS | Performed by: UROLOGY

## 2019-08-26 PROCEDURE — 6360000002 HC RX W HCPCS: Performed by: NURSE ANESTHETIST, CERTIFIED REGISTERED

## 2019-08-26 PROCEDURE — 7100000001 HC PACU RECOVERY - ADDTL 15 MIN: Performed by: UROLOGY

## 2019-08-26 PROCEDURE — 2709999900 HC NON-CHARGEABLE SUPPLY: Performed by: UROLOGY

## 2019-08-26 PROCEDURE — 6360000002 HC RX W HCPCS: Performed by: ANESTHESIOLOGY

## 2019-08-26 PROCEDURE — 3600000012 HC SURGERY LEVEL 2 ADDTL 15MIN: Performed by: UROLOGY

## 2019-08-26 PROCEDURE — 7100000031 HC ASPR PHASE II RECOVERY - ADDTL 15 MIN: Performed by: UROLOGY

## 2019-08-26 PROCEDURE — 6370000000 HC RX 637 (ALT 250 FOR IP): Performed by: ANESTHESIOLOGY

## 2019-08-26 PROCEDURE — 2500000003 HC RX 250 WO HCPCS: Performed by: NURSE ANESTHETIST, CERTIFIED REGISTERED

## 2019-08-26 PROCEDURE — 3600000002 HC SURGERY LEVEL 2 BASE: Performed by: UROLOGY

## 2019-08-26 PROCEDURE — 3700000001 HC ADD 15 MINUTES (ANESTHESIA): Performed by: UROLOGY

## 2019-08-26 PROCEDURE — 82360 CALCULUS ASSAY QUANT: CPT

## 2019-08-26 PROCEDURE — 7100000030 HC ASPR PHASE II RECOVERY - FIRST 15 MIN: Performed by: UROLOGY

## 2019-08-26 PROCEDURE — C1758 CATHETER, URETERAL: HCPCS | Performed by: UROLOGY

## 2019-08-26 PROCEDURE — 88300 SURGICAL PATH GROSS: CPT

## 2019-08-26 PROCEDURE — 7100000000 HC PACU RECOVERY - FIRST 15 MIN: Performed by: UROLOGY

## 2019-08-26 PROCEDURE — 7100000011 HC PHASE II RECOVERY - ADDTL 15 MIN: Performed by: UROLOGY

## 2019-08-26 PROCEDURE — 6360000002 HC RX W HCPCS: Performed by: UROLOGY

## 2019-08-26 PROCEDURE — 3700000000 HC ANESTHESIA ATTENDED CARE: Performed by: UROLOGY

## 2019-08-26 PROCEDURE — 82947 ASSAY GLUCOSE BLOOD QUANT: CPT

## 2019-08-26 PROCEDURE — 74018 RADEX ABDOMEN 1 VIEW: CPT

## 2019-08-26 PROCEDURE — 7100000010 HC PHASE II RECOVERY - FIRST 15 MIN: Performed by: UROLOGY

## 2019-08-26 PROCEDURE — C1769 GUIDE WIRE: HCPCS

## 2019-08-26 DEVICE — URETERAL STENT
Type: IMPLANTABLE DEVICE | Site: URETER | Status: FUNCTIONAL
Brand: POLARIS™ ULTRA

## 2019-08-26 RX ORDER — FENTANYL CITRATE 50 UG/ML
INJECTION, SOLUTION INTRAMUSCULAR; INTRAVENOUS PRN
Status: DISCONTINUED | OUTPATIENT
Start: 2019-08-26 | End: 2019-08-26 | Stop reason: SDUPTHER

## 2019-08-26 RX ORDER — LABETALOL 20 MG/4 ML (5 MG/ML) INTRAVENOUS SYRINGE
5 EVERY 10 MIN PRN
Status: DISCONTINUED | OUTPATIENT
Start: 2019-08-26 | End: 2019-08-26 | Stop reason: HOSPADM

## 2019-08-26 RX ORDER — PROPOFOL 10 MG/ML
INJECTION, EMULSION INTRAVENOUS PRN
Status: DISCONTINUED | OUTPATIENT
Start: 2019-08-26 | End: 2019-08-26 | Stop reason: SDUPTHER

## 2019-08-26 RX ORDER — SUCCINYLCHOLINE/SOD CL,ISO/PF 200MG/10ML
SYRINGE (ML) INTRAVENOUS PRN
Status: DISCONTINUED | OUTPATIENT
Start: 2019-08-26 | End: 2019-08-26 | Stop reason: SDUPTHER

## 2019-08-26 RX ORDER — ONDANSETRON 2 MG/ML
INJECTION INTRAMUSCULAR; INTRAVENOUS PRN
Status: DISCONTINUED | OUTPATIENT
Start: 2019-08-26 | End: 2019-08-26 | Stop reason: SDUPTHER

## 2019-08-26 RX ORDER — SODIUM CHLORIDE 0.9 % (FLUSH) 0.9 %
10 SYRINGE (ML) INJECTION PRN
Status: DISCONTINUED | OUTPATIENT
Start: 2019-08-26 | End: 2019-08-26 | Stop reason: HOSPADM

## 2019-08-26 RX ORDER — FENTANYL CITRATE 50 UG/ML
25 INJECTION, SOLUTION INTRAMUSCULAR; INTRAVENOUS EVERY 5 MIN PRN
Status: DISCONTINUED | OUTPATIENT
Start: 2019-08-26 | End: 2019-08-26 | Stop reason: HOSPADM

## 2019-08-26 RX ORDER — SODIUM CHLORIDE, SODIUM LACTATE, POTASSIUM CHLORIDE, CALCIUM CHLORIDE 600; 310; 30; 20 MG/100ML; MG/100ML; MG/100ML; MG/100ML
INJECTION, SOLUTION INTRAVENOUS CONTINUOUS
Status: DISCONTINUED | OUTPATIENT
Start: 2019-08-26 | End: 2019-08-26 | Stop reason: CLARIF

## 2019-08-26 RX ORDER — METOCLOPRAMIDE HYDROCHLORIDE 5 MG/ML
10 INJECTION INTRAMUSCULAR; INTRAVENOUS
Status: COMPLETED | OUTPATIENT
Start: 2019-08-26 | End: 2019-08-26

## 2019-08-26 RX ORDER — 0.9 % SODIUM CHLORIDE 0.9 %
500 INTRAVENOUS SOLUTION INTRAVENOUS
Status: DISCONTINUED | OUTPATIENT
Start: 2019-08-26 | End: 2019-08-26 | Stop reason: HOSPADM

## 2019-08-26 RX ORDER — HYDROCODONE BITARTRATE AND ACETAMINOPHEN 5; 325 MG/1; MG/1
1 TABLET ORAL EVERY 6 HOURS PRN
COMMUNITY
End: 2019-12-23

## 2019-08-26 RX ORDER — ROCURONIUM BROMIDE 10 MG/ML
INJECTION, SOLUTION INTRAVENOUS PRN
Status: DISCONTINUED | OUTPATIENT
Start: 2019-08-26 | End: 2019-08-26 | Stop reason: SDUPTHER

## 2019-08-26 RX ORDER — SCOLOPAMINE TRANSDERMAL SYSTEM 1 MG/1
1 PATCH, EXTENDED RELEASE TRANSDERMAL
Status: DISCONTINUED | OUTPATIENT
Start: 2019-08-26 | End: 2019-08-26 | Stop reason: HOSPADM

## 2019-08-26 RX ORDER — HYDROCODONE BITARTRATE AND ACETAMINOPHEN 5; 325 MG/1; MG/1
1 TABLET ORAL
Status: DISCONTINUED | OUTPATIENT
Start: 2019-08-26 | End: 2019-08-26 | Stop reason: HOSPADM

## 2019-08-26 RX ORDER — HYDRALAZINE HYDROCHLORIDE 20 MG/ML
5 INJECTION INTRAMUSCULAR; INTRAVENOUS EVERY 10 MIN PRN
Status: DISCONTINUED | OUTPATIENT
Start: 2019-08-26 | End: 2019-08-26 | Stop reason: HOSPADM

## 2019-08-26 RX ORDER — SODIUM CHLORIDE 9 MG/ML
INJECTION, SOLUTION INTRAVENOUS CONTINUOUS
Status: DISCONTINUED | OUTPATIENT
Start: 2019-08-26 | End: 2019-08-26 | Stop reason: HOSPADM

## 2019-08-26 RX ORDER — CIPROFLOXACIN 2 MG/ML
400 INJECTION, SOLUTION INTRAVENOUS ONCE
Status: COMPLETED | OUTPATIENT
Start: 2019-08-26 | End: 2019-08-26

## 2019-08-26 RX ORDER — DIPHENHYDRAMINE HYDROCHLORIDE 50 MG/ML
12.5 INJECTION INTRAMUSCULAR; INTRAVENOUS
Status: DISCONTINUED | OUTPATIENT
Start: 2019-08-26 | End: 2019-08-26 | Stop reason: HOSPADM

## 2019-08-26 RX ORDER — PROMETHAZINE HYDROCHLORIDE 25 MG/ML
6.25 INJECTION, SOLUTION INTRAMUSCULAR; INTRAVENOUS
Status: DISCONTINUED | OUTPATIENT
Start: 2019-08-26 | End: 2019-08-26

## 2019-08-26 RX ORDER — SODIUM CHLORIDE 0.9 % (FLUSH) 0.9 %
10 SYRINGE (ML) INJECTION EVERY 12 HOURS SCHEDULED
Status: DISCONTINUED | OUTPATIENT
Start: 2019-08-26 | End: 2019-08-26 | Stop reason: HOSPADM

## 2019-08-26 RX ORDER — LIDOCAINE HYDROCHLORIDE 20 MG/ML
INJECTION, SOLUTION EPIDURAL; INFILTRATION; INTRACAUDAL; PERINEURAL PRN
Status: DISCONTINUED | OUTPATIENT
Start: 2019-08-26 | End: 2019-08-26 | Stop reason: SDUPTHER

## 2019-08-26 RX ADMIN — FENTANYL CITRATE 50 MCG: 50 INJECTION, SOLUTION INTRAMUSCULAR; INTRAVENOUS at 11:25

## 2019-08-26 RX ADMIN — Medication 120 MG: at 11:25

## 2019-08-26 RX ADMIN — CIPROFLOXACIN 400 MG: 2 INJECTION, SOLUTION INTRAVENOUS at 11:34

## 2019-08-26 RX ADMIN — LIDOCAINE HYDROCHLORIDE 100 MG: 20 INJECTION, SOLUTION EPIDURAL; INFILTRATION; INTRACAUDAL at 11:25

## 2019-08-26 RX ADMIN — METOCLOPRAMIDE 10 MG: 5 INJECTION, SOLUTION INTRAMUSCULAR; INTRAVENOUS at 12:20

## 2019-08-26 RX ADMIN — PROPOFOL 180 MG: 10 INJECTION, EMULSION INTRAVENOUS at 11:25

## 2019-08-26 RX ADMIN — ROCURONIUM BROMIDE 10 MG: 10 INJECTION, SOLUTION INTRAVENOUS at 11:25

## 2019-08-26 RX ADMIN — PHENYLEPHRINE HYDROCHLORIDE 200 MCG: 10 INJECTION INTRAVENOUS at 11:43

## 2019-08-26 RX ADMIN — ONDANSETRON 4 MG: 2 INJECTION INTRAMUSCULAR; INTRAVENOUS at 11:51

## 2019-08-26 RX ADMIN — SODIUM CHLORIDE: 9 INJECTION, SOLUTION INTRAVENOUS at 10:18

## 2019-08-26 RX ADMIN — SODIUM CHLORIDE: 9 INJECTION, SOLUTION INTRAVENOUS at 11:23

## 2019-08-26 RX ADMIN — PHENYLEPHRINE HYDROCHLORIDE 100 MCG: 10 INJECTION INTRAVENOUS at 11:40

## 2019-08-26 ASSESSMENT — PULMONARY FUNCTION TESTS
PIF_VALUE: 28
PIF_VALUE: 4
PIF_VALUE: 32
PIF_VALUE: 27
PIF_VALUE: 30
PIF_VALUE: 30
PIF_VALUE: 31
PIF_VALUE: 4
PIF_VALUE: 16
PIF_VALUE: 3
PIF_VALUE: 32
PIF_VALUE: 0
PIF_VALUE: 28
PIF_VALUE: 4
PIF_VALUE: 1
PIF_VALUE: 1
PIF_VALUE: 32
PIF_VALUE: 32
PIF_VALUE: 16
PIF_VALUE: 30
PIF_VALUE: 33
PIF_VALUE: 30
PIF_VALUE: 28
PIF_VALUE: 13
PIF_VALUE: 1
PIF_VALUE: 16
PIF_VALUE: 15
PIF_VALUE: 31
PIF_VALUE: 1
PIF_VALUE: 1
PIF_VALUE: 4
PIF_VALUE: 32
PIF_VALUE: 28
PIF_VALUE: 21
PIF_VALUE: 0

## 2019-08-26 ASSESSMENT — PAIN DESCRIPTION - DESCRIPTORS: DESCRIPTORS: SHARP

## 2019-08-26 ASSESSMENT — PAIN SCALES - GENERAL
PAINLEVEL_OUTOF10: 0
PAINLEVEL_OUTOF10: 0

## 2019-08-26 ASSESSMENT — ENCOUNTER SYMPTOMS
BACK PAIN: 0
WHEEZING: 0
SORE THROAT: 0
DIARRHEA: 1
COUGH: 0
SHORTNESS OF BREATH: 0
NAUSEA: 1
CONSTIPATION: 0
VOMITING: 0
ABDOMINAL PAIN: 0

## 2019-08-26 ASSESSMENT — PAIN - FUNCTIONAL ASSESSMENT: PAIN_FUNCTIONAL_ASSESSMENT: 0-10

## 2019-08-26 NOTE — BRIEF OP NOTE
Brief Postoperative Note  ______________________________________________________________    Patient: Dorene Urrutia  YOB: 1955  MRN: [id number]  Date of Procedure: 8/26/2019    Pre-Op Diagnosis: RIGHT KIDNEY STONE  (PAT PER IN EPIC ON ADMIT)    Post-Op Diagnosis: Same       Procedure(s):  CYSTOSCOPY URETEROSCOPY  W/HOLMIUM LASER & STENT EXCHANGE    Anesthesia: General    Surgeon(s):  Vicky Dias MD    Assistant:     Estimated Blood Loss (mL): less than 50     Complications: None    Specimens:   ID Type Source Tests Collected by Time Destination   1 :  Stone (Calculus) Ureter STONE ANALYSIS Vicky Dias MD 8/26/2019 1151        Implants:  * No implants in log *      Drains: 4.7x24 stent placed    Findings: stone removed, stent exchanged    Vicky Dias MD  Date: 8/26/2019  Time: 11:53 AM

## 2019-08-26 NOTE — H&P
(Oral)   Resp 18   Ht 5' 2\" (1.575 m)   Wt 240 lb (108.9 kg)   LMP 01/12/2011   SpO2 96%   BMI 43.90 kg/m²  Body mass index is 43.9 kg/m². Physical Exam   Constitutional: She is oriented to person, place, and time. She appears well-developed and well-nourished. No distress. obesity   HENT:   Head: Normocephalic and atraumatic. Mouth/Throat: Oropharynx is clear and moist.   Eyes: Pupils are equal, round, and reactive to light. Conjunctivae and EOM are normal.   Neck: Normal range of motion. Neck supple. No tracheal deviation present. Cardiovascular: Normal rate, regular rhythm, normal heart sounds and intact distal pulses. Exam reveals no gallop and no friction rub. No murmur heard. Pulmonary/Chest: Effort normal and breath sounds normal. No respiratory distress. She has no wheezes. She has no rales. She exhibits no tenderness. Abdominal: Soft. Bowel sounds are normal. She exhibits no distension. There is no tenderness. There is no guarding. Musculoskeletal: Normal range of motion. She exhibits tenderness (Rt CVA tenderness). She exhibits no edema. Lymphadenopathy:     She has no cervical adenopathy. Neurological: She is alert and oriented to person, place, and time. She displays no seizure activity. Coordination normal.   Skin: Skin is warm and dry. No rash noted. She is not diaphoretic. No erythema. No pallor. Psychiatric: She has a normal mood and affect. Her behavior is normal. Thought content normal.     DIAGNOSTICS      EKG: none    Labs:  CBC:   No results for input(s): WBC, HGB, PLT in the last 72 hours. BMP:    No results for input(s): NA, K, CL, CO2, BUN, CREATININE, GLUCOSE in the last 72 hours. S. Calcium:  No results for input(s): CALCIUM in the last 72 hours. S. Ionized Calcium:No results for input(s): IONCA in the last 72 hours. S. Magnesium:No results for input(s): MG in the last 72 hours. S. Phosphorus:No results for input(s): PHOS in the last 72 hours.   S. Glucose:  Recent Labs     08/26/19  1017   POCGLU 276*     Glycosylated hemoglobin A1C:   Lab Results   Component Value Date    LABA1C 10.0 01/23/2019     Hepatic:   No results for input(s): AST, ALT, ALB, ALKPHOS in the last 72 hours. Invalid input(s):  PROT,  BILITOT,  BILIDIR  CARDIAC ENZY: No results for input(s): CKTOTAL, CKMB, CKMBINDEX, TROPHS, MYOGLOBIN in the last 72 hours. INR: No results for input(s): INR in the last 72 hours. BNP: No results for input(s): PROBNP in the last 72 hours. ABGs: No results for input(s): PH, PCO2, PO2, HCO3, O2SAT in the last 72 hours. Lipids: No results for input(s): CHOL, TRIG, HDL, LDLCALC in the last 72 hours. Invalid input(s): LDL  Pancreatic functions:  No results for input(s): LIPASE, AMYLASE in the last 72 hours. Marichuy Chi: No results for input(s): LACTA in the last 72 hours. Thyroid functions:   Lab Results   Component Value Date    TSH 5.30 05/15/2019      U/A:  No results for input(s): NITRITE, COLORU, WBCUA, RBCUA, MUCUS, BACTERIA, CLARITYU, SPECGRAV, LEUKOCYTESUR, BLOODU, GLUCOSEU, AMORPHOUS in the last 72 hours. Invalid input(s): Amee Metz    Imaging/Diagonstics:     Ct Abdomen Pelvis Wo Contrast    Result Date: 8/19/2019  EXAMINATION: CT OF THE ABDOMEN AND PELVIS WITHOUT CONTRAST 8/19/2019 7:54 pm TECHNIQUE: CT of the abdomen and pelvis was performed without the administration of intravenous contrast. Multiplanar reformatted images are provided for review. Dose modulation, iterative reconstruction, and/or weight based adjustment of the mA/kV was utilized to reduce the radiation dose to as low as reasonably achievable. COMPARISON: CT abdomen and pelvis performed 04/24/2015.  HISTORY: ORDERING SYSTEM PROVIDED HISTORY: rule out kidney stones TECHNOLOGIST PROVIDED HISTORY: Reason for Exam: rule out kidney stones Additional signs and symptoms: back pain worse on right side, possible kidney stones FINDINGS: Lower Chest: The lung bases are without consolidation or effusion. The visualized cardiac structures are unremarkable. Organs: The liver and spleen are normal size and overall attenuation. The gallbladder has been removed. The pancreas and adrenal glands are unremarkable. The left kidney is without obstructive uropathy. There is mild right-sided hydronephrosis with a 4 mm stone at the right ureterovesicular junction. The urinary bladder is contracted. GI/Bowel: The stomach is unremarkable. Loops of small bowel are normal in caliber without evidence for obstruction. The colon contains air and fecal residue. There are a few scattered uncomplicated diverticula. The appendix is normal.  There is no intraperitoneal free air or free fluid. Pelvis: The uterus and adnexal structures are age-appropriate. Peritoneum/Retroperitoneum: The psoas muscles are symmetric. The abdominal aorta is normal in caliber. The inferior vena cava is unremarkable. There is no retroperitoneal or mesenteric adenopathy. Bones/Soft Tissues: The extra-abdominal soft tissues are unremarkable. There is no acute osseous abnormality. Mild right-sided hydronephrosis with a 4 mm stone at the right ureterovesicular junction. Xr Cervical Spine (2-3 Views)    Result Date: 7/30/2019  EXAMINATION: 4 XRAY VIEWS OF THE CERVICAL SPINE 7/30/2019 7:26 pm COMPARISON: None. HISTORY: ORDERING SYSTEM PROVIDED HISTORY: fall TECHNOLOGIST PROVIDED HISTORY: fall Reason for Exam: Pain Acuity: Acute Type of Exam: Initial Mechanism of Injury: Fall x 1 day. Slipped on kitchen floor FINDINGS: There is loss of the normal lordotic curvature. AP alignment is normal through the upper thoracic spine. Detail below the upper C7 level is limited on the lateral.  There is no acute displaced fracture. Prevertebral soft tissues are normal in thickness.      No acute osseous abnormality     Xr Wrist Right (min 3 Views)    Result Date: 7/30/2019  EXAMINATION: TWO XRAY VIEWS OF THE RIGHT HIP; 3 XRAY VIEWS or foreign body/debris. Correlate with physical exam. No evidence of right hip fracture. Degenerative change of the right wrist and accessory ossicles, without convincing evidence of fracture. If patient has anatomic snuff box tenderness or persistent symptoms, follow-up radiographic series in 7-10 days would be recommended. Xr Hip Right (2-3 Views)    Result Date: 7/30/2019  EXAMINATION: TWO XRAY VIEWS OF THE RIGHT HIP; 3 XRAY VIEWS OF THE RIGHT WRIST; THREE XRAY VIEWS OF THE RIGHT KNEE; THREE XRAY VIEWS OF THE RIGHT FOOT 7/30/2019 7:26 pm COMPARISON: None. HISTORY: ORDERING SYSTEM PROVIDED HISTORY: fall TECHNOLOGIST PROVIDED HISTORY: fall Reason for Exam: Pain Acuity: Acute Type of Exam: Initial Mechanism of Injury: Fall x 1 day. slipped on kitchen floor; ORDERING SYSTEM PROVIDED HISTORY: pain over ulnar side. fall TECHNOLOGIST PROVIDED HISTORY: pain over ulnar side. fall Reason for Exam: Pain Acuity: Acute Type of Exam: Initial Mechanism of Injury: Fall x 1 day. Slipped on kitchen floor; ORDERING SYSTEM PROVIDED HISTORY: fall TECHNOLOGIST PROVIDED HISTORY: fall Reason for Exam: Pain Acuity: Acute Mechanism of Injury: Fall x 1 day. slipped on kitchen floor FINDINGS: Right hip: Two views were obtained of the right hip. No gross fracture. No dislocation. Spurring of the greater trochanter. Right wrist: Three views were obtained of the right wrist.  Cystic change is demonstrated at the scaphoid. Rounded ossific densities are seen adjacent to the trapezium and adjacent to the lunate, likely ossicles. No dislocation. Right knee: Three views were obtained of the right knee. Medial compartment joint space loss. Rounded ossific density is seen medial to the medial femoral condyle without clear donor site, likely degenerative or developmental.  Tiny ossific density is also seen along the lateral margin of the tibial plateau. Right foot: Three views were obtained of the right foot.   Small calcific densities the lunate, likely ossicles. No dislocation. Right knee: Three views were obtained of the right knee. Medial compartment joint space loss. Rounded ossific density is seen medial to the medial femoral condyle without clear donor site, likely degenerative or developmental.  Tiny ossific density is also seen along the lateral margin of the tibial plateau. Right foot: Three views were obtained of the right foot. Small calcific densities are seen medial to the distal aspect of the 1st metatarsal.  No clear donor site. No fracture or dislocation. Small plantar calcaneal spur. Ossific density along the lateral aspect of the tibial plateau, for which Segond fracture could be considered in the appropriate clinical setting. Correlate with point tenderness. Particulate hyperdensities within the soft tissues medial to the right 1st metatarsal, which can reflect calcification or foreign body/debris. Correlate with physical exam. No evidence of right hip fracture. Degenerative change of the right wrist and accessory ossicles, without convincing evidence of fracture. If patient has anatomic snuff box tenderness or persistent symptoms, follow-up radiographic series in 7-10 days would be recommended. Xr Ankle Right (min 3 Views)    Result Date: 7/30/2019  EXAMINATION: THREE XRAY VIEWS OF THE RIGHT ANKLE 7/30/2019 7:26 pm COMPARISON: None. HISTORY: ORDERING SYSTEM PROVIDED HISTORY: fall TECHNOLOGIST PROVIDED HISTORY: fall Reason for Exam: Pain Acuity: Acute Type of Exam: Initial Mechanism of Injury: Fall x 1 day. Slipped on kitchen floor FINDINGS: Three views were obtained of the right ankle. The lateral view is slightly oblique. The ankle mortise is maintained. Partially corticated ossific density is seen just lateral to the talus, inferior to the lateral malleolus. Soft tissue swelling is demonstrated. Phleboliths within the soft tissues of the lower leg.      Ossific density along the lateral margin of the talus

## 2019-08-27 LAB — SURGICAL PATHOLOGY REPORT: NORMAL

## 2019-08-30 ENCOUNTER — TELEPHONE (OUTPATIENT)
Dept: UROLOGY | Age: 64
End: 2019-08-30

## 2019-09-04 ENCOUNTER — HOSPITAL ENCOUNTER (OUTPATIENT)
Age: 64
Setting detail: SPECIMEN
Discharge: HOME | End: 2019-09-04
Payer: COMMERCIAL

## 2019-09-04 LAB — TSH SERPL DL<=0.05 MIU/L-ACNC: 4.3 MIU/L (ref 0.3–5)

## 2019-09-06 ENCOUNTER — APPOINTMENT (OUTPATIENT)
Dept: CT IMAGING | Age: 64
End: 2019-09-06
Payer: COMMERCIAL

## 2019-09-06 ENCOUNTER — HOSPITAL ENCOUNTER (EMERGENCY)
Age: 64
Discharge: HOME | End: 2019-09-06
Attending: EMERGENCY MEDICINE
Payer: COMMERCIAL

## 2019-09-06 VITALS
DIASTOLIC BLOOD PRESSURE: 85 MMHG | HEIGHT: 62 IN | BODY MASS INDEX: 43.24 KG/M2 | SYSTOLIC BLOOD PRESSURE: 129 MMHG | OXYGEN SATURATION: 96 % | WEIGHT: 235 LBS | HEART RATE: 69 BPM | TEMPERATURE: 98.2 F | RESPIRATION RATE: 19 BRPM

## 2019-09-06 DIAGNOSIS — N39.0 COMPLICATED UTI (URINARY TRACT INFECTION): Primary | ICD-10-CM

## 2019-09-06 LAB
-: ABNORMAL
ABSOLUTE EOS #: 0.1 K/UL (ref 0–0.4)
ABSOLUTE IMMATURE GRANULOCYTE: ABNORMAL K/UL (ref 0–0.3)
ABSOLUTE LYMPH #: 3 K/UL (ref 1–4.8)
ABSOLUTE MONO #: 0.6 K/UL (ref 0.1–1.3)
AMORPHOUS: ABNORMAL
ANION GAP SERPL CALCULATED.3IONS-SCNC: 12 MMOL/L (ref 9–17)
BACTERIA: ABNORMAL
BASOPHILS # BLD: 1 % (ref 0–2)
BASOPHILS ABSOLUTE: 0.1 K/UL (ref 0–0.2)
BILIRUBIN URINE: NEGATIVE
BUN BLDV-MCNC: 9 MG/DL (ref 8–23)
BUN/CREAT BLD: ABNORMAL (ref 9–20)
CALCIUM SERPL-MCNC: 9.4 MG/DL (ref 8.6–10.4)
CASTS UA: ABNORMAL /LPF
CHLORIDE BLD-SCNC: 102 MMOL/L (ref 98–107)
CO2: 25 MMOL/L (ref 20–31)
COLOR: YELLOW
COMMENT UA: ABNORMAL
CREAT SERPL-MCNC: 0.41 MG/DL (ref 0.5–0.9)
CRYSTALS, UA: ABNORMAL /HPF
DIFFERENTIAL TYPE: ABNORMAL
EOSINOPHILS RELATIVE PERCENT: 1 % (ref 0–4)
EPITHELIAL CELLS UA: ABNORMAL /HPF
GFR AFRICAN AMERICAN: >60 ML/MIN
GFR NON-AFRICAN AMERICAN: >60 ML/MIN
GFR SERPL CREATININE-BSD FRML MDRD: ABNORMAL ML/MIN/{1.73_M2}
GFR SERPL CREATININE-BSD FRML MDRD: ABNORMAL ML/MIN/{1.73_M2}
GLUCOSE BLD-MCNC: 185 MG/DL (ref 70–99)
GLUCOSE URINE: NEGATIVE
HCT VFR BLD CALC: 47.4 % (ref 36–46)
HEMOGLOBIN: 15.9 G/DL (ref 12–16)
IMMATURE GRANULOCYTES: ABNORMAL %
KETONES, URINE: NEGATIVE
LEUKOCYTE ESTERASE, URINE: ABNORMAL
LYMPHOCYTES # BLD: 30 % (ref 24–44)
MCH RBC QN AUTO: 29.9 PG (ref 26–34)
MCHC RBC AUTO-ENTMCNC: 33.6 G/DL (ref 31–37)
MCV RBC AUTO: 89 FL (ref 80–100)
MONOCYTES # BLD: 6 % (ref 1–7)
MUCUS: ABNORMAL
NITRITE, URINE: NEGATIVE
NRBC AUTOMATED: ABNORMAL PER 100 WBC
OTHER OBSERVATIONS UA: ABNORMAL
PDW BLD-RTO: 13.2 % (ref 11.5–14.9)
PH UA: 5.5 (ref 5–8)
PLATELET # BLD: 287 K/UL (ref 150–450)
PLATELET ESTIMATE: ABNORMAL
PMV BLD AUTO: 10 FL (ref 6–12)
POTASSIUM SERPL-SCNC: 4.2 MMOL/L (ref 3.7–5.3)
PROTEIN UA: NEGATIVE
RBC # BLD: 5.33 M/UL (ref 4–5.2)
RBC # BLD: ABNORMAL 10*6/UL
RBC UA: ABNORMAL /HPF
RENAL EPITHELIAL, UA: ABNORMAL /HPF
SEG NEUTROPHILS: 62 % (ref 36–66)
SEGMENTED NEUTROPHILS ABSOLUTE COUNT: 6.3 K/UL (ref 1.3–9.1)
SODIUM BLD-SCNC: 139 MMOL/L (ref 135–144)
SPECIFIC GRAVITY UA: 1.01 (ref 1–1.03)
TRICHOMONAS: ABNORMAL
TURBIDITY: ABNORMAL
URINE HGB: NEGATIVE
UROBILINOGEN, URINE: NORMAL
WBC # BLD: 10.1 K/UL (ref 3.5–11)
WBC # BLD: ABNORMAL 10*3/UL
WBC UA: ABNORMAL /HPF
YEAST: ABNORMAL

## 2019-09-06 PROCEDURE — 81001 URINALYSIS AUTO W/SCOPE: CPT

## 2019-09-06 PROCEDURE — 99284 EMERGENCY DEPT VISIT MOD MDM: CPT

## 2019-09-06 PROCEDURE — 6370000000 HC RX 637 (ALT 250 FOR IP): Performed by: STUDENT IN AN ORGANIZED HEALTH CARE EDUCATION/TRAINING PROGRAM

## 2019-09-06 PROCEDURE — 85025 COMPLETE CBC W/AUTO DIFF WBC: CPT

## 2019-09-06 PROCEDURE — 74176 CT ABD & PELVIS W/O CONTRAST: CPT

## 2019-09-06 PROCEDURE — 36415 COLL VENOUS BLD VENIPUNCTURE: CPT

## 2019-09-06 PROCEDURE — 6360000002 HC RX W HCPCS: Performed by: STUDENT IN AN ORGANIZED HEALTH CARE EDUCATION/TRAINING PROGRAM

## 2019-09-06 PROCEDURE — 96372 THER/PROPH/DIAG INJ SC/IM: CPT

## 2019-09-06 PROCEDURE — 80048 BASIC METABOLIC PNL TOTAL CA: CPT

## 2019-09-06 RX ORDER — SULFAMETHOXAZOLE AND TRIMETHOPRIM 800; 160 MG/1; MG/1
1 TABLET ORAL 2 TIMES DAILY
Qty: 20 TABLET | Refills: 0 | Status: SHIPPED | OUTPATIENT
Start: 2019-09-06 | End: 2019-09-16

## 2019-09-06 RX ORDER — ORPHENADRINE CITRATE 30 MG/ML
60 INJECTION INTRAMUSCULAR; INTRAVENOUS ONCE
Status: COMPLETED | OUTPATIENT
Start: 2019-09-06 | End: 2019-09-06

## 2019-09-06 RX ORDER — SULFAMETHOXAZOLE AND TRIMETHOPRIM 800; 160 MG/1; MG/1
1 TABLET ORAL ONCE
Status: COMPLETED | OUTPATIENT
Start: 2019-09-06 | End: 2019-09-06

## 2019-09-06 RX ORDER — ACETAMINOPHEN 500 MG
1000 TABLET ORAL ONCE
Status: COMPLETED | OUTPATIENT
Start: 2019-09-06 | End: 2019-09-06

## 2019-09-06 RX ADMIN — SULFAMETHOXAZOLE AND TRIMETHOPRIM 1 TABLET: 800; 160 TABLET ORAL at 20:28

## 2019-09-06 RX ADMIN — ACETAMINOPHEN 1000 MG: 500 TABLET, FILM COATED ORAL at 19:06

## 2019-09-06 RX ADMIN — ORPHENADRINE CITRATE 60 MG: 30 INJECTION INTRAMUSCULAR; INTRAVENOUS at 19:41

## 2019-09-06 ASSESSMENT — PAIN SCALES - GENERAL
PAINLEVEL_OUTOF10: 8
PAINLEVEL_OUTOF10: 9

## 2019-09-07 NOTE — ED PROVIDER NOTES
focal deficits    Impression: muscle spasm vs UTI vs stone     Plan: symptomatic treatment, UA, CBC, BMP, and bedside US. UA + for UTI. Will get CT to ensure no stone. If negative plan for d/c with antibiotics.     CRITICAL CARE: None    Lisset Villarreal MD  Attending Emergency Physician         Lisset Villarreal MD  09/06/19 1371
tablet         DDX:   Stent Obstruction, MSK strain/sprain, UTI Pyelo    MDM:    Shanell Irvin is a 60 y.o. female who presents with right back and right flank pain. Recently had a stent placed for nephrolithiasis. Stent is still in place. Vital signs notable for hypertension otherwise stable. Afebrile. No complaints of urinary symptoms. Will obtain UA with micro CBC and Bmp to assess for UTI as well as metabolic derangements as pain is right over stent site. Patient pulled stent out already. Tylenol & norflex  for pain control, hypertonic mm over R paraspinal.  She is frequently seen in the emergency department has a narcotic score of 323. Bedside point of care ultrasound performed did not demonstrate any hydronephrosis. Will treat as complicated UTi due to recent instrumentation. Will give first dose of bactrim here and script for 10days. DIAGNOSTIC RESULTS / EMERGENCYDEPARTMENT COURSE   LABS:  Labs Reviewed   URINALYSIS WITH MICROSCOPIC - Abnormal; Notable for the following components:       Result Value    Turbidity UA CLOUDY (*)     Leukocyte Esterase, Urine MOD (*)     Bacteria, UA MODERATE (*)     All other components within normal limits   CBC WITH AUTO DIFFERENTIAL - Abnormal; Notable for the following components:    RBC 5.33 (*)     Hematocrit 47.4 (*)     All other components within normal limits   BASIC METABOLIC PANEL - Abnormal; Notable for the following components:    Glucose 185 (*)     CREATININE 0.41 (*)     All other components within normal limits       RADIOLOGY:  No results found. EMERGENCY DEPARTMENT COURSE:  ED Course as of Sep 06 2026   Fri Sep 06, 2019   2000 Leukocyte Esterase, Urine(!): MOD [RB]   2000 UTI per UA however will treat as complicated UTI due to recent procedure    [RB]      ED Course User Index  [RB] Perfecto Villeda DO       CONSULTS:  None    CRITICAL CARE:  Please see attending note    FINAL IMPRESSION     1.  Complicated UTI (urinary tract infection)

## 2019-09-10 ENCOUNTER — OFFICE VISIT (OUTPATIENT)
Dept: UROLOGY | Age: 64
End: 2019-09-10
Payer: COMMERCIAL

## 2019-09-10 ENCOUNTER — HOSPITAL ENCOUNTER (OUTPATIENT)
Age: 64
Setting detail: SPECIMEN
Discharge: HOME | End: 2019-09-10
Payer: COMMERCIAL

## 2019-09-10 VITALS
BODY MASS INDEX: 43.25 KG/M2 | SYSTOLIC BLOOD PRESSURE: 121 MMHG | HEIGHT: 62 IN | WEIGHT: 235.01 LBS | HEART RATE: 69 BPM | DIASTOLIC BLOOD PRESSURE: 65 MMHG | TEMPERATURE: 98.6 F

## 2019-09-10 DIAGNOSIS — N20.0 KIDNEY STONE: Primary | ICD-10-CM

## 2019-09-10 DIAGNOSIS — N20.0 KIDNEY STONE: ICD-10-CM

## 2019-09-10 DIAGNOSIS — N30.00 ACUTE CYSTITIS WITHOUT HEMATURIA: ICD-10-CM

## 2019-09-10 DIAGNOSIS — R10.9 FLANK PAIN: ICD-10-CM

## 2019-09-10 LAB
-: ABNORMAL
AMORPHOUS: ABNORMAL
BACTERIA: ABNORMAL
BILIRUBIN URINE: NEGATIVE
CASTS UA: ABNORMAL /LPF (ref 0–8)
COLOR: YELLOW
COMMENT UA: ABNORMAL
CRYSTALS, UA: ABNORMAL /HPF
EPITHELIAL CELLS UA: ABNORMAL /HPF (ref 0–5)
GLUCOSE URINE: ABNORMAL
KETONES, URINE: NEGATIVE
LEUKOCYTE ESTERASE, URINE: ABNORMAL
MUCUS: ABNORMAL
NITRITE, URINE: NEGATIVE
OTHER OBSERVATIONS UA: ABNORMAL
PH UA: 5 (ref 5–8)
PROTEIN UA: NEGATIVE
RBC UA: ABNORMAL /HPF (ref 0–4)
RENAL EPITHELIAL, UA: ABNORMAL /HPF
SPECIFIC GRAVITY UA: 1.01 (ref 1–1.03)
TRICHOMONAS: ABNORMAL
TURBIDITY: CLEAR
URINE HGB: NEGATIVE
UROBILINOGEN, URINE: NORMAL
WBC UA: ABNORMAL /HPF (ref 0–5)
YEAST: ABNORMAL

## 2019-09-10 PROCEDURE — G8428 CUR MEDS NOT DOCUMENT: HCPCS | Performed by: UROLOGY

## 2019-09-10 PROCEDURE — 1111F DSCHRG MED/CURRENT MED MERGE: CPT | Performed by: UROLOGY

## 2019-09-10 PROCEDURE — 99214 OFFICE O/P EST MOD 30 MIN: CPT | Performed by: UROLOGY

## 2019-09-10 PROCEDURE — 3017F COLORECTAL CA SCREEN DOC REV: CPT | Performed by: UROLOGY

## 2019-09-10 PROCEDURE — 1036F TOBACCO NON-USER: CPT | Performed by: UROLOGY

## 2019-09-10 PROCEDURE — G8417 CALC BMI ABV UP PARAM F/U: HCPCS | Performed by: UROLOGY

## 2019-09-10 ASSESSMENT — ENCOUNTER SYMPTOMS
EYE PAIN: 0
COLOR CHANGE: 0
COUGH: 0
BACK PAIN: 0
WHEEZING: 0
VOMITING: 0
SHORTNESS OF BREATH: 0
ABDOMINAL PAIN: 0
NAUSEA: 0
EYE REDNESS: 0

## 2019-09-10 NOTE — PROGRESS NOTES
Review of Systems   Constitutional: Negative for activity change, chills and fever. Eyes: Negative for pain, redness and visual disturbance. Respiratory: Negative for cough, shortness of breath and wheezing. Cardiovascular: Negative for chest pain and leg swelling. Gastrointestinal: Negative for abdominal pain, nausea and vomiting. Genitourinary: Positive for flank pain, frequency and urgency. Negative for difficulty urinating, dysuria, hematuria, pelvic pain, vaginal bleeding and vaginal discharge. Musculoskeletal: Negative for back pain, joint swelling and myalgias. Skin: Negative for color change and rash. Neurological: Negative for dizziness, tremors and numbness. Hematological: Negative for adenopathy. Does not bruise/bleed easily.

## 2019-09-11 LAB
CULTURE: NORMAL
Lab: NORMAL
SPECIMEN DESCRIPTION: NORMAL

## 2019-10-17 ENCOUNTER — OFFICE VISIT (OUTPATIENT)
Dept: DERMATOLOGY | Age: 64
End: 2019-10-17

## 2019-10-17 VITALS
SYSTOLIC BLOOD PRESSURE: 141 MMHG | HEART RATE: 76 BPM | HEIGHT: 62 IN | WEIGHT: 240.6 LBS | DIASTOLIC BLOOD PRESSURE: 96 MMHG | BODY MASS INDEX: 44.27 KG/M2 | OXYGEN SATURATION: 93 %

## 2019-10-17 DIAGNOSIS — L82.1 SEBORRHEIC KERATOSIS: Primary | ICD-10-CM

## 2019-10-17 PROCEDURE — 1131000 COSMETIC-SHAVE SKIN LESION 0.5 CM/<: Performed by: DERMATOLOGY

## 2019-10-17 RX ORDER — LIDOCAINE HYDROCHLORIDE AND EPINEPHRINE 10; 10 MG/ML; UG/ML
0.5 INJECTION, SOLUTION INFILTRATION; PERINEURAL ONCE
Status: COMPLETED | OUTPATIENT
Start: 2019-10-17 | End: 2019-10-17

## 2019-10-17 RX ORDER — LIDOCAINE HYDROCHLORIDE AND EPINEPHRINE 10; 10 MG/ML; UG/ML
0.5 INJECTION, SOLUTION INFILTRATION; PERINEURAL ONCE
Status: CANCELLED | OUTPATIENT
Start: 2019-10-17 | End: 2019-10-17

## 2019-10-17 RX ADMIN — LIDOCAINE HYDROCHLORIDE AND EPINEPHRINE 0.5 ML: 10; 10 INJECTION, SOLUTION INFILTRATION; PERINEURAL at 16:42

## 2019-10-22 ENCOUNTER — TELEPHONE (OUTPATIENT)
Dept: UROLOGY | Age: 64
End: 2019-10-22

## 2019-10-25 DIAGNOSIS — L82.1 SEBORRHEIC KERATOSIS: ICD-10-CM

## 2019-12-23 ENCOUNTER — HOSPITAL ENCOUNTER (EMERGENCY)
Age: 64
Discharge: HOME | End: 2019-12-23
Attending: EMERGENCY MEDICINE
Payer: COMMERCIAL

## 2019-12-23 VITALS
HEIGHT: 62 IN | DIASTOLIC BLOOD PRESSURE: 94 MMHG | HEART RATE: 69 BPM | SYSTOLIC BLOOD PRESSURE: 128 MMHG | WEIGHT: 240 LBS | OXYGEN SATURATION: 97 % | TEMPERATURE: 99.2 F | BODY MASS INDEX: 44.16 KG/M2 | RESPIRATION RATE: 18 BRPM

## 2019-12-23 DIAGNOSIS — N30.01 ACUTE CYSTITIS WITH HEMATURIA: Primary | ICD-10-CM

## 2019-12-23 LAB
-: ABNORMAL
AMORPHOUS: ABNORMAL
BACTERIA: ABNORMAL
BILIRUBIN URINE: NEGATIVE
CASTS UA: ABNORMAL /LPF
COLOR: YELLOW
COMMENT UA: ABNORMAL
CRYSTALS, UA: ABNORMAL /HPF
EPITHELIAL CELLS UA: ABNORMAL /HPF
GLUCOSE URINE: ABNORMAL
KETONES, URINE: NEGATIVE
LEUKOCYTE ESTERASE, URINE: ABNORMAL
MUCUS: ABNORMAL
NITRITE, URINE: POSITIVE
OTHER OBSERVATIONS UA: ABNORMAL
PH UA: 5.5 (ref 5–8)
PROTEIN UA: ABNORMAL
RBC UA: ABNORMAL /HPF
RENAL EPITHELIAL, UA: ABNORMAL /HPF
SPECIFIC GRAVITY UA: 1.02 (ref 1–1.03)
TRICHOMONAS: ABNORMAL
TURBIDITY: ABNORMAL
URINE HGB: ABNORMAL
UROBILINOGEN, URINE: NORMAL
WBC UA: ABNORMAL /HPF
YEAST: ABNORMAL

## 2019-12-23 PROCEDURE — 87086 URINE CULTURE/COLONY COUNT: CPT

## 2019-12-23 PROCEDURE — 87077 CULTURE AEROBIC IDENTIFY: CPT

## 2019-12-23 PROCEDURE — 81001 URINALYSIS AUTO W/SCOPE: CPT

## 2019-12-23 PROCEDURE — 87186 SC STD MICRODIL/AGAR DIL: CPT

## 2019-12-23 PROCEDURE — 99283 EMERGENCY DEPT VISIT LOW MDM: CPT

## 2019-12-23 RX ORDER — SULFAMETHOXAZOLE AND TRIMETHOPRIM 800; 160 MG/1; MG/1
1 TABLET ORAL 2 TIMES DAILY
Qty: 20 TABLET | Refills: 0 | Status: SHIPPED | OUTPATIENT
Start: 2019-12-23 | End: 2020-01-02

## 2019-12-23 ASSESSMENT — PAIN SCALES - GENERAL: PAINLEVEL_OUTOF10: 7

## 2019-12-25 LAB
CULTURE: ABNORMAL
Lab: ABNORMAL
SPECIMEN DESCRIPTION: ABNORMAL

## 2020-02-04 ENCOUNTER — HOSPITAL ENCOUNTER (OUTPATIENT)
Age: 65
Discharge: HOME | End: 2020-02-04
Payer: COMMERCIAL

## 2020-02-04 LAB
BILIRUBIN URINE: NEGATIVE
COLOR: YELLOW
COMMENT UA: ABNORMAL
GLUCOSE URINE: ABNORMAL
KETONES, URINE: NEGATIVE
LEUKOCYTE ESTERASE, URINE: NEGATIVE
NITRITE, URINE: NEGATIVE
PH UA: 5 (ref 5–8)
PROTEIN UA: NEGATIVE
SPECIFIC GRAVITY UA: 1.01 (ref 1–1.03)
TURBIDITY: CLEAR
URINE HGB: NEGATIVE
UROBILINOGEN, URINE: NORMAL

## 2020-02-04 PROCEDURE — 81003 URINALYSIS AUTO W/O SCOPE: CPT

## 2020-02-04 NOTE — PROGRESS NOTES
Patient called into office stating that her PCP is treating her for a UTI but the Macrobid that she was put on is not working. Adv patient to contact PCP since they are the ones treating her UTI. Patient states that she was informed to call our office. Adv patient to give urine sample at the lab so it can be tested.  Patient verbalized understanding

## 2020-02-05 ENCOUNTER — TELEPHONE (OUTPATIENT)
Dept: UROLOGY | Age: 65
End: 2020-02-05

## 2020-02-05 NOTE — TELEPHONE ENCOUNTER
Pt called for results of urine test ordered and collected yesterday. Pt was informed results were negative for indications of UTI and did not meet criteria for culture to be performed. Pt was advised to call for an office visit if symptoms persist. She stated that dysuria is intermittent and will call to schedule office visit if dysuria continues.

## 2020-03-28 ENCOUNTER — APPOINTMENT (OUTPATIENT)
Dept: GENERAL RADIOLOGY | Age: 65
DRG: 139 | End: 2020-03-28
Payer: COMMERCIAL

## 2020-03-28 ENCOUNTER — APPOINTMENT (OUTPATIENT)
Dept: NUCLEAR MEDICINE | Age: 65
DRG: 139 | End: 2020-03-28
Payer: COMMERCIAL

## 2020-03-28 ENCOUNTER — HOSPITAL ENCOUNTER (INPATIENT)
Age: 65
LOS: 1 days | Discharge: HOME | DRG: 139 | End: 2020-03-30
Attending: EMERGENCY MEDICINE | Admitting: INTERNAL MEDICINE
Payer: COMMERCIAL

## 2020-03-28 LAB
ABSOLUTE BANDS #: 1.77 K/UL (ref 0–1)
ABSOLUTE EOS #: 0.18 K/UL (ref 0–0.4)
ABSOLUTE IMMATURE GRANULOCYTE: ABNORMAL K/UL (ref 0–0.3)
ABSOLUTE LYMPH #: 4.96 K/UL (ref 1–4.8)
ABSOLUTE MONO #: 0.53 K/UL (ref 0.1–1.3)
ALBUMIN SERPL-MCNC: 3.5 G/DL (ref 3.5–5.2)
ALBUMIN/GLOBULIN RATIO: ABNORMAL (ref 1–2.5)
ALP BLD-CCNC: 139 U/L (ref 35–104)
ALT SERPL-CCNC: 23 U/L (ref 5–33)
ANION GAP SERPL CALCULATED.3IONS-SCNC: 13 MMOL/L (ref 9–17)
AST SERPL-CCNC: 15 U/L
ATYPICAL LYMPHOCYTE ABSOLUTE COUNT: 0.35 K/UL
ATYPICAL LYMPHOCYTES: 2 %
BANDS: 10 % (ref 0–10)
BASOPHILS # BLD: 0 % (ref 0–2)
BASOPHILS ABSOLUTE: 0 K/UL (ref 0–0.2)
BILIRUB SERPL-MCNC: 0.63 MG/DL (ref 0.3–1.2)
BNP INTERPRETATION: NORMAL
BUN BLDV-MCNC: 13 MG/DL (ref 8–23)
BUN/CREAT BLD: ABNORMAL (ref 9–20)
CALCIUM SERPL-MCNC: 10.4 MG/DL (ref 8.6–10.4)
CHLORIDE BLD-SCNC: 98 MMOL/L (ref 98–107)
CO2: 25 MMOL/L (ref 20–31)
CREAT SERPL-MCNC: 0.43 MG/DL (ref 0.5–0.9)
DIFFERENTIAL TYPE: ABNORMAL
EKG ATRIAL RATE: 78 BPM
EKG ATRIAL RATE: 81 BPM
EKG P AXIS: 45 DEGREES
EKG P AXIS: 58 DEGREES
EKG P-R INTERVAL: 150 MS
EKG P-R INTERVAL: 152 MS
EKG Q-T INTERVAL: 392 MS
EKG Q-T INTERVAL: 402 MS
EKG QRS DURATION: 90 MS
EKG QRS DURATION: 94 MS
EKG QTC CALCULATION (BAZETT): 446 MS
EKG QTC CALCULATION (BAZETT): 466 MS
EKG R AXIS: -26 DEGREES
EKG R AXIS: -35 DEGREES
EKG T AXIS: 25 DEGREES
EKG T AXIS: 5 DEGREES
EKG VENTRICULAR RATE: 78 BPM
EKG VENTRICULAR RATE: 81 BPM
EOSINOPHILS RELATIVE PERCENT: 1 % (ref 0–4)
GFR AFRICAN AMERICAN: >60 ML/MIN
GFR NON-AFRICAN AMERICAN: >60 ML/MIN
GFR SERPL CREATININE-BSD FRML MDRD: ABNORMAL ML/MIN/{1.73_M2}
GFR SERPL CREATININE-BSD FRML MDRD: ABNORMAL ML/MIN/{1.73_M2}
GLUCOSE BLD-MCNC: 184 MG/DL (ref 65–105)
GLUCOSE BLD-MCNC: 214 MG/DL (ref 65–105)
GLUCOSE BLD-MCNC: 215 MG/DL (ref 70–99)
GLUCOSE BLD-MCNC: 238 MG/DL (ref 65–105)
GLUCOSE BLD-MCNC: 277 MG/DL (ref 65–105)
GLUCOSE BLD-MCNC: 286 MG/DL (ref 65–105)
HCT VFR BLD CALC: 48.6 % (ref 36–46)
HEMOGLOBIN: 16.3 G/DL (ref 12–16)
IMMATURE GRANULOCYTES: ABNORMAL %
LV EF: 20 %
LVEF MODALITY: NORMAL
LYMPHOCYTES # BLD: 28 % (ref 24–44)
MCH RBC QN AUTO: 29.7 PG (ref 26–34)
MCHC RBC AUTO-ENTMCNC: 33.5 G/DL (ref 31–37)
MCV RBC AUTO: 88.6 FL (ref 80–100)
MONOCYTES # BLD: 3 % (ref 1–7)
MORPHOLOGY: NORMAL
NRBC AUTOMATED: ABNORMAL PER 100 WBC
PDW BLD-RTO: 13.2 % (ref 11.5–14.9)
PLATELET # BLD: 254 K/UL (ref 150–450)
PLATELET ESTIMATE: ABNORMAL
PMV BLD AUTO: 10.7 FL (ref 6–12)
POTASSIUM SERPL-SCNC: 4.5 MMOL/L (ref 3.7–5.3)
PRO-BNP: 248 PG/ML
RBC # BLD: 5.49 M/UL (ref 4–5.2)
RBC # BLD: ABNORMAL 10*6/UL
SEG NEUTROPHILS: 56 % (ref 36–66)
SEGMENTED NEUTROPHILS ABSOLUTE COUNT: 9.91 K/UL (ref 1.3–9.1)
SODIUM BLD-SCNC: 136 MMOL/L (ref 135–144)
TOTAL PROTEIN: 7.4 G/DL (ref 6.4–8.3)
TROPONIN INTERP: NORMAL
TROPONIN INTERP: NORMAL
TROPONIN T: NORMAL NG/ML
TROPONIN T: NORMAL NG/ML
TROPONIN, HIGH SENSITIVITY: 12 NG/L (ref 0–14)
TROPONIN, HIGH SENSITIVITY: 13 NG/L (ref 0–14)
WBC # BLD: 17.7 K/UL (ref 3.5–11)
WBC # BLD: ABNORMAL 10*3/UL

## 2020-03-28 PROCEDURE — 6360000002 HC RX W HCPCS: Performed by: INTERNAL MEDICINE

## 2020-03-28 PROCEDURE — 96365 THER/PROPH/DIAG IV INF INIT: CPT

## 2020-03-28 PROCEDURE — 36415 COLL VENOUS BLD VENIPUNCTURE: CPT

## 2020-03-28 PROCEDURE — 6370000000 HC RX 637 (ALT 250 FOR IP): Performed by: INTERNAL MEDICINE

## 2020-03-28 PROCEDURE — 99223 1ST HOSP IP/OBS HIGH 75: CPT | Performed by: INTERNAL MEDICINE

## 2020-03-28 PROCEDURE — 93005 ELECTROCARDIOGRAM TRACING: CPT | Performed by: EMERGENCY MEDICINE

## 2020-03-28 PROCEDURE — 93017 CV STRESS TEST TRACING ONLY: CPT

## 2020-03-28 PROCEDURE — G0378 HOSPITAL OBSERVATION PER HR: HCPCS

## 2020-03-28 PROCEDURE — 80053 COMPREHEN METABOLIC PANEL: CPT

## 2020-03-28 PROCEDURE — A9500 TC99M SESTAMIBI: HCPCS | Performed by: INTERNAL MEDICINE

## 2020-03-28 PROCEDURE — 78452 HT MUSCLE IMAGE SPECT MULT: CPT

## 2020-03-28 PROCEDURE — 6360000002 HC RX W HCPCS: Performed by: EMERGENCY MEDICINE

## 2020-03-28 PROCEDURE — 83880 ASSAY OF NATRIURETIC PEPTIDE: CPT

## 2020-03-28 PROCEDURE — 84484 ASSAY OF TROPONIN QUANT: CPT

## 2020-03-28 PROCEDURE — 93005 ELECTROCARDIOGRAM TRACING: CPT | Performed by: INTERNAL MEDICINE

## 2020-03-28 PROCEDURE — 93010 ELECTROCARDIOGRAM REPORT: CPT | Performed by: INTERNAL MEDICINE

## 2020-03-28 PROCEDURE — 2580000003 HC RX 258: Performed by: INTERNAL MEDICINE

## 2020-03-28 PROCEDURE — 71045 X-RAY EXAM CHEST 1 VIEW: CPT

## 2020-03-28 PROCEDURE — 82947 ASSAY GLUCOSE BLOOD QUANT: CPT

## 2020-03-28 PROCEDURE — 99285 EMERGENCY DEPT VISIT HI MDM: CPT

## 2020-03-28 PROCEDURE — 96372 THER/PROPH/DIAG INJ SC/IM: CPT

## 2020-03-28 PROCEDURE — 3430000000 HC RX DIAGNOSTIC RADIOPHARMACEUTICAL: Performed by: INTERNAL MEDICINE

## 2020-03-28 PROCEDURE — 85025 COMPLETE CBC W/AUTO DIFF WBC: CPT

## 2020-03-28 RX ORDER — AMINOPHYLLINE DIHYDRATE 25 MG/ML
50 INJECTION, SOLUTION INTRAVENOUS PRN
Status: ACTIVE | OUTPATIENT
Start: 2020-03-28 | End: 2020-03-28

## 2020-03-28 RX ORDER — DEXTROSE MONOHYDRATE 50 MG/ML
100 INJECTION, SOLUTION INTRAVENOUS PRN
Status: DISCONTINUED | OUTPATIENT
Start: 2020-03-28 | End: 2020-03-30 | Stop reason: HOSPADM

## 2020-03-28 RX ORDER — NITROGLYCERIN 0.4 MG/1
0.4 TABLET SUBLINGUAL EVERY 5 MIN PRN
Status: ACTIVE | OUTPATIENT
Start: 2020-03-28 | End: 2020-03-28

## 2020-03-28 RX ORDER — METOPROLOL TARTRATE 5 MG/5ML
5 INJECTION INTRAVENOUS EVERY 5 MIN PRN
Status: ACTIVE | OUTPATIENT
Start: 2020-03-28 | End: 2020-03-28

## 2020-03-28 RX ORDER — LEVOTHYROXINE SODIUM 0.07 MG/1
75 TABLET ORAL DAILY
Status: DISCONTINUED | OUTPATIENT
Start: 2020-03-28 | End: 2020-03-30 | Stop reason: HOSPADM

## 2020-03-28 RX ORDER — ACETAMINOPHEN 325 MG/1
650 TABLET ORAL EVERY 4 HOURS PRN
Status: DISCONTINUED | OUTPATIENT
Start: 2020-03-28 | End: 2020-03-30 | Stop reason: HOSPADM

## 2020-03-28 RX ORDER — SODIUM CHLORIDE 0.9 % (FLUSH) 0.9 %
10 SYRINGE (ML) INJECTION PRN
Status: DISCONTINUED | OUTPATIENT
Start: 2020-03-28 | End: 2020-03-30 | Stop reason: HOSPADM

## 2020-03-28 RX ORDER — ATROPINE SULFATE 0.1 MG/ML
0.5 INJECTION INTRAVENOUS EVERY 5 MIN PRN
Status: ACTIVE | OUTPATIENT
Start: 2020-03-28 | End: 2020-03-28

## 2020-03-28 RX ORDER — LEVOFLOXACIN 5 MG/ML
500 INJECTION, SOLUTION INTRAVENOUS EVERY 24 HOURS
Status: DISCONTINUED | OUTPATIENT
Start: 2020-03-28 | End: 2020-03-28

## 2020-03-28 RX ORDER — DEXTROSE MONOHYDRATE 25 G/50ML
12.5 INJECTION, SOLUTION INTRAVENOUS PRN
Status: DISCONTINUED | OUTPATIENT
Start: 2020-03-28 | End: 2020-03-30 | Stop reason: HOSPADM

## 2020-03-28 RX ORDER — SODIUM CHLORIDE 0.9 % (FLUSH) 0.9 %
10 SYRINGE (ML) INJECTION PRN
Status: ACTIVE | OUTPATIENT
Start: 2020-03-28 | End: 2020-03-28

## 2020-03-28 RX ORDER — LISINOPRIL 10 MG/1
10 TABLET ORAL DAILY
Status: DISCONTINUED | OUTPATIENT
Start: 2020-03-28 | End: 2020-03-30

## 2020-03-28 RX ORDER — GABAPENTIN 400 MG/1
800 CAPSULE ORAL 2 TIMES DAILY
Status: DISCONTINUED | OUTPATIENT
Start: 2020-03-28 | End: 2020-03-30 | Stop reason: HOSPADM

## 2020-03-28 RX ORDER — DULOXETIN HYDROCHLORIDE 60 MG/1
60 CAPSULE, DELAYED RELEASE ORAL DAILY
Status: DISCONTINUED | OUTPATIENT
Start: 2020-03-28 | End: 2020-03-30 | Stop reason: HOSPADM

## 2020-03-28 RX ORDER — SODIUM CHLORIDE 0.9 % (FLUSH) 0.9 %
10 SYRINGE (ML) INJECTION EVERY 12 HOURS SCHEDULED
Status: DISCONTINUED | OUTPATIENT
Start: 2020-03-28 | End: 2020-03-30 | Stop reason: HOSPADM

## 2020-03-28 RX ORDER — SODIUM CHLORIDE 9 MG/ML
500 INJECTION, SOLUTION INTRAVENOUS CONTINUOUS PRN
Status: ACTIVE | OUTPATIENT
Start: 2020-03-28 | End: 2020-03-28

## 2020-03-28 RX ORDER — INSULIN GLARGINE 100 [IU]/ML
60 INJECTION, SOLUTION SUBCUTANEOUS NIGHTLY
Status: DISCONTINUED | OUTPATIENT
Start: 2020-03-28 | End: 2020-03-28

## 2020-03-28 RX ORDER — NICOTINE POLACRILEX 4 MG
15 LOZENGE BUCCAL PRN
Status: DISCONTINUED | OUTPATIENT
Start: 2020-03-28 | End: 2020-03-30 | Stop reason: HOSPADM

## 2020-03-28 RX ORDER — LEVOFLOXACIN 5 MG/ML
750 INJECTION, SOLUTION INTRAVENOUS EVERY 24 HOURS
Status: DISCONTINUED | OUTPATIENT
Start: 2020-03-29 | End: 2020-03-30 | Stop reason: HOSPADM

## 2020-03-28 RX ORDER — METOPROLOL TARTRATE 100 MG/1
100 TABLET ORAL 2 TIMES DAILY
Status: DISCONTINUED | OUTPATIENT
Start: 2020-03-28 | End: 2020-03-30

## 2020-03-28 RX ORDER — ATORVASTATIN CALCIUM 80 MG/1
80 TABLET, FILM COATED ORAL DAILY
Status: DISCONTINUED | OUTPATIENT
Start: 2020-03-28 | End: 2020-03-30 | Stop reason: HOSPADM

## 2020-03-28 RX ADMIN — INSULIN LISPRO 2 UNITS: 100 INJECTION, SOLUTION INTRAVENOUS; SUBCUTANEOUS at 07:53

## 2020-03-28 RX ADMIN — INSULIN LISPRO 4 UNITS: 100 INJECTION, SOLUTION INTRAVENOUS; SUBCUTANEOUS at 12:39

## 2020-03-28 RX ADMIN — TETRAKIS(2-METHOXYISOBUTYLISOCYANIDE)COPPER(I) TETRAFLUOROBORATE 36.7 MILLICURIE: 1 INJECTION, POWDER, LYOPHILIZED, FOR SOLUTION INTRAVENOUS at 11:16

## 2020-03-28 RX ADMIN — TETRAKIS(2-METHOXYISOBUTYLISOCYANIDE)COPPER(I) TETRAFLUOROBORATE 9.8 MILLICURIE: 1 INJECTION, POWDER, LYOPHILIZED, FOR SOLUTION INTRAVENOUS at 07:13

## 2020-03-28 RX ADMIN — Medication 10 ML: at 07:54

## 2020-03-28 RX ADMIN — ACETAMINOPHEN 650 MG: 325 TABLET, FILM COATED ORAL at 15:14

## 2020-03-28 RX ADMIN — ACETAMINOPHEN 650 MG: 325 TABLET, FILM COATED ORAL at 03:46

## 2020-03-28 RX ADMIN — GABAPENTIN 800 MG: 400 CAPSULE ORAL at 20:53

## 2020-03-28 RX ADMIN — LISINOPRIL 10 MG: 10 TABLET ORAL at 15:36

## 2020-03-28 RX ADMIN — DULOXETINE HYDROCHLORIDE 60 MG: 60 CAPSULE, DELAYED RELEASE ORAL at 17:13

## 2020-03-28 RX ADMIN — Medication 10 ML: at 20:53

## 2020-03-28 RX ADMIN — Medication 5 ML: at 11:14

## 2020-03-28 RX ADMIN — LEVOFLOXACIN 500 MG: 5 INJECTION, SOLUTION INTRAVENOUS at 01:39

## 2020-03-28 RX ADMIN — ENOXAPARIN SODIUM 30 MG: 30 INJECTION SUBCUTANEOUS at 07:53

## 2020-03-28 RX ADMIN — ATORVASTATIN CALCIUM 80 MG: 80 TABLET, FILM COATED ORAL at 20:53

## 2020-03-28 RX ADMIN — INSULIN LISPRO 6 UNITS: 100 INJECTION, SOLUTION INTRAVENOUS; SUBCUTANEOUS at 17:13

## 2020-03-28 RX ADMIN — METOPROLOL TARTRATE 100 MG: 100 TABLET ORAL at 20:54

## 2020-03-28 RX ADMIN — REGADENOSON 0.4 MG: 0.08 INJECTION, SOLUTION INTRAVENOUS at 11:13

## 2020-03-28 RX ADMIN — Medication 10 ML: at 07:13

## 2020-03-28 RX ADMIN — GABAPENTIN 800 MG: 400 CAPSULE ORAL at 15:36

## 2020-03-28 RX ADMIN — ENOXAPARIN SODIUM 30 MG: 30 INJECTION SUBCUTANEOUS at 20:53

## 2020-03-28 ASSESSMENT — PAIN DESCRIPTION - PAIN TYPE
TYPE: ACUTE PAIN

## 2020-03-28 ASSESSMENT — PAIN SCALES - GENERAL
PAINLEVEL_OUTOF10: 5
PAINLEVEL_OUTOF10: 0
PAINLEVEL_OUTOF10: 3
PAINLEVEL_OUTOF10: 9
PAINLEVEL_OUTOF10: 9
PAINLEVEL_OUTOF10: 3
PAINLEVEL_OUTOF10: 3

## 2020-03-28 ASSESSMENT — PAIN DESCRIPTION - ORIENTATION: ORIENTATION: MID;OTHER (COMMENT)

## 2020-03-28 ASSESSMENT — PAIN DESCRIPTION - LOCATION
LOCATION: CHEST
LOCATION: HEAD
LOCATION: CHEST
LOCATION: CHEST

## 2020-03-28 ASSESSMENT — PAIN DESCRIPTION - DESCRIPTORS: DESCRIPTORS: SHARP

## 2020-03-28 ASSESSMENT — PAIN DESCRIPTION - FREQUENCY: FREQUENCY: INTERMITTENT

## 2020-03-28 ASSESSMENT — PAIN DESCRIPTION - ONSET: ONSET: SUDDEN

## 2020-03-28 ASSESSMENT — PAIN DESCRIPTION - PROGRESSION: CLINICAL_PROGRESSION: GRADUALLY WORSENING

## 2020-03-28 NOTE — PROGRESS NOTES
Pt transported to stress lab via wheelchair at this time. IV flushed and intact, no distress noted at this time. Will continue to monitor.

## 2020-03-28 NOTE — H&P
History and Physical Service  [facility]    HISTORY AND PHYSICAL EXAMINATION            Date:   3/28/2020  Patient name:  Yashira Lamb  MRN:   [id number]  Account:  [de-identified]  YOB: 1955  PCP:    Shelton Calderón  Code Status:    Full Code    Chief Complaint:     Chief Complaint   Patient presents with    Chest Pain         History Obtained From:     patient    History of Present Illness: The patient is a 63 y.o. Non-/non  female who presents with exertional chest pain      Started 2 days ago. Initially episodic, more in the evenings. Yesterday progressively got worse. Worsening with exertion. Associated with some difficulty breathing, palpitations. Denies any nausea vomiting. Denies any fevers chills. Trop X2 neg. EKG- nil acute      Chest x-ray showed left basilar airspace disease. Started on Levaquin. Reports feeling better since. Known to have mitral valve prolapse. Also type 2 diabetes, hypertension, hyperlipidemia.     Past Medical History:     Past Medical History:   Diagnosis Date    Arrhythmia     Breast mass     CAD (coronary artery disease)     with valvular disease    Chronic neck pain     Coccygeal pain 3/7/2016    Constipation     Depression     Diabetic neuropathy (HCC)     History of hepatitis A     possible history of hepatitis A in the past    History of renal calculi     Hypertension     Hyperthyroidism     Hypothyroidism     Morbid obesity with BMI of 45.0-49.9, adult ([facility] [address] 3/31/2016    Nephrolithiasis     Neuropathy     Type II or unspecified type diabetes mellitus without mention of complication, not stated as uncontrolled     non insulin dependent     UTI (lower urinary tract infection)         Past Surgical History:     Past Surgical History:   Procedure Laterality Date    BREAST BIOPSY  2012    negative    CARDIOVASCULAR STRESS TEST      7/17/15 no results    CHOLECYSTECTOMY      CYST REPORTED     Immature Granulocytes NOT REPORTED 0 %    Absolute Immature Granulocyte NOT REPORTED 0.00 - 0.30 k/uL    WBC Morphology NOT REPORTED     RBC Morphology NOT REPORTED     Platelet Estimate NOT REPORTED     Seg Neutrophils 56 36 - 66 %    Lymphocytes 28 24 - 44 %    Atypical Lymphocytes 2 %    Monocytes 3 1 - 7 %    Eosinophils % 1 0 - 4 %    Basophils 0 0 - 2 %    Bands 10 0 - 10 %    Segs Absolute 9.91 (H) 1.3 - 9.1 k/uL    Absolute Lymph # 4.96 (H) 1.0 - 4.8 k/uL    Atypical Lymphocytes Absolute 0.35 k/uL    Absolute Mono # 0.53 0.1 - 1.3 k/uL    Absolute Eos # 0.18 0.0 - 0.4 k/uL    Basophils Absolute 0.00 0.0 - 0.2 k/uL    Absolute Bands # 1.77 (H) 0.0 - 1.0 k/uL    Morphology Normal    Comprehensive Metabolic Panel w/ Reflex to MG    Collection Time: 03/28/20 12:40 AM   Result Value Ref Range    Glucose 215 (H) 70 - 99 mg/dL    BUN 13 8 - 23 mg/dL    CREATININE 0.43 (L) 0.50 - 0.90 mg/dL    Bun/Cre Ratio NOT REPORTED 9 - 20    Calcium 10.4 8.6 - 10.4 mg/dL    Sodium 136 135 - 144 mmol/L    Potassium 4.5 3.7 - 5.3 mmol/L    Chloride 98 98 - 107 mmol/L    CO2 25 20 - 31 mmol/L    Anion Gap 13 9 - 17 mmol/L    Alkaline Phosphatase 139 (H) 35 - 104 U/L    ALT 23 5 - 33 U/L    AST 15 <32 U/L    Total Bilirubin 0.63 0.3 - 1.2 mg/dL    Total Protein 7.4 6.4 - 8.3 g/dL    Alb 3.5 3.5 - 5.2 g/dL    Albumin/Globulin Ratio NOT REPORTED 1.0 - 2.5    GFR Non-African American >60 >60 mL/min    GFR African American >60 >60 mL/min    GFR Comment          GFR Staging NOT REPORTED    Brain Natriuretic Peptide    Collection Time: 03/28/20 12:40 AM   Result Value Ref Range    Pro- <300 pg/mL    BNP Interpretation Pro-BNP Reference Range:    Troponin    Collection Time: 03/28/20 12:40 AM   Result Value Ref Range    Troponin, High Sensitivity 12 0 - 14 ng/L    Troponin T NOT REPORTED <0.03 ng/mL    Troponin Interp NOT REPORTED    POC Glucose Fingerstick    Collection Time: 03/28/20  2:36 AM   Result Value Ref Range PROVIDED HISTORY: cp Reason for Exam: cp Acuity: Acute Type of Exam: Initial Additional signs and symptoms: Chest pain, palpitations, lung pain/rib pain with deep breaths Relevant Medical/Surgical History: Chest pain, palpitations, lung pain/rib pain with deep breaths FINDINGS: The heart is normal in size and configuration. The mediastinal contours are within normal limits. Left basilar subsegmental atelectasis versus airspace disease is present. Lungs are otherwise well aerated. The pleural surfaces are normal and no evidence of a pleural effusion is seen. Bones and soft tissues are unremarkable. Left basilar subsegmental atelectasis versus airspace disease.  Otherwise, unremarkable single AP upright view of the chest.        Current Facility-Administered Medications   Medication Dose Route Frequency Provider Last Rate Last Dose    levofloxacin (LEVAQUIN) 500 MG/100ML infusion 500 mg  500 mg Intravenous Q24H Maria Esther Bess MD   Stopped at 03/28/20 0239    sodium chloride flush 0.9 % injection 10 mL  10 mL Intravenous 2 times per day Ivette Sue MD   10 mL at 03/28/20 0754    sodium chloride flush 0.9 % injection 10 mL  10 mL Intravenous PRN Ivette Sue MD        acetaminophen (TYLENOL) tablet 650 mg  650 mg Oral Q4H PRN Ivette Sue MD   650 mg at 03/28/20 0346    enoxaparin (LOVENOX) injection 30 mg  30 mg Subcutaneous BID Ivette Sue MD   30 mg at 03/28/20 0753    insulin lispro (HUMALOG) injection vial 0-12 Units  0-12 Units Subcutaneous TID WC Ivette Sue MD   4 Units at 03/28/20 1239    insulin lispro (HUMALOG) injection vial 0-6 Units  0-6 Units Subcutaneous Nightly Feng Chloe Magallanes MD        glucose (GLUTOSE) 40 % oral gel 15 g  15 g Oral PRN Feng Chloe Magallanes MD        dextrose 50 % IV solution  12.5 g Intravenous PRN Ivette Sue MD        glucagon (rDNA) injection 1 mg  1 mg Intramuscular PRN Ivette Sue MD        dextrose 5 % solution  100 mL/hr Intravenous PRN Ivette Sue MD        sodium chloride flush 0.9 % injection 10 mL  10 mL Intravenous PRN Raymond Praker MD   5 mL at 03/28/20 1114    0.9 % sodium chloride infusion  500 mL Intravenous Continuous PRN Raymond Parker MD        atropine injection 0.5 mg  0.5 mg Intravenous Q5 Min PRN Raymond Parker MD        nitroGLYCERIN (NITROSTAT) SL tablet 0.4 mg  0.4 mg Sublingual Q5 Min PRN Raymond Parker MD        metoprolol (LOPRESSOR) injection 5 mg  5 mg Intravenous Q5 Min PRN Raymond Parker MD        aminophylline injection 50 mg  50 mg Intravenous PRN Raymond Parker MD        sodium chloride flush 0.9 % injection 10 mL  10 mL Intravenous PRN Raymond Parker MD   10 mL at 03/28/20 0713    atorvastatin (LIPITOR) tablet 80 mg  80 mg Oral Daily Jaclyn Euceda MD        DULoxetine (CYMBALTA) extended release capsule 60 mg  60 mg Oral Daily Jaclyn Euceda MD        gabapentin (NEURONTIN) capsule 800 mg  800 mg Oral BID Jaclyn Euceda MD        insulin glargine (LANTUS;BASAGLAR) injection pen 60 Units  60 Units Subcutaneous Nightly Jaclyn Euceda MD        levothyroxine (SYNTHROID) tablet 75 mcg  75 mcg Oral Daily Jaclyn Euceda MD        lisinopril (PRINIVIL;ZESTRIL) tablet 10 mg  10 mg Oral Daily Jaclyn Euceda MD        metoprolol (LOPRESSOR) tablet 100 mg  100 mg Oral BID Jaclyn Euceda MD           Impressions :     1. Active Problems:    Hypothyroidism    MVP (mitral valve prolapse)    Morbid obesity with BMI of 45.0-49.9, adult ([facility] [address]    Uncontrolled type 2 diabetes mellitus with diabetic polyneuropathy, without long-term current use of insulin (HCC)    Essential hypertension    Chest pain  Resolved Problems:    * No resolved hospital problems.  *        2.  has a past medical history of Arrhythmia, Breast mass, CAD (coronary artery disease), Chronic neck pain, Coccygeal pain (3/7/2016), Constipation, Depression, Diabetic neuropathy ([facility] [address] History of hepatitis A, History of renal calculi,

## 2020-03-28 NOTE — FLOWSHEET NOTE
Called and notified Dr Kate Brock that patient has arrived to unit. RN informed of admitting diagnosis as well as levaquin that was ordered in the ER. Dr Kate Brock ordered regular diet, continue antibiotics, ACHS blood sugar checks and medium dose insulin sliding scale. He will evaluate the patient in the morning.      Electronically signed by Albino Berman RN on 3/28/2020 at 3:16 AM

## 2020-03-28 NOTE — ED PROVIDER NOTES
EMERGENCY DEPARTMENT ENCOUNTER    Pt Name: Elvira Rios  MRN: [id number]  Katlyngfraquel 1955  Date of evaluation: 3/28/20  CHIEF COMPLAINT       Chief Complaint   Patient presents with    Chest Pain     HISTORY OF PRESENT ILLNESS   HPI    HISTORY OF PRESENT ILLNESS:  Past medical history of CAD presents for chief complaint of chest pain. Patient has sharp pressure-like chest pain that radiates to her neck. Is been intermittent for the past day. Worse on exertion. Improved on rest.  No lightheadedness or dizziness. No hemoptysis. No fevers or chills. Severity is moderate. Timing is 1 day. Course is intermittent.   Context is CAD.  -----------------------  -----------------------  REVIEW OF SYSTEMS  ED Caveat: [none]  Gen:  No fever, no chills  CV: +CP, no palpitations  Resp: +SOB, no respiratory distress  GI: No V/D, no abd pain  : No dysuria, no increased frequency  Skin: No rash, no purulent lesions  Eyes: No blurry vision, No double vision  MSK: No back pain, no joint pain  Neuro: No HA, no sensation changes  Psych: No SI/HI  -----------------------  -----------------------  ALLERGIES  -per nursing records, reviewed    PAST MEDICAL HISTORY  -See HPI    SOCIAL HISTORY  -No daily drinking, no IV drugs  -----------------------  -----------------------  PHYSICAL EXAM  Gen: Alert, no acute distress  Skin: Warm, no rashes  Head: Normocephalic, atraumatic  Neck: No midline tenderness, no nuchal rigidity  Eye: EOMI, PERRLA, normal conjunctiva  ENT: Mucous membranes moist, no pharyngeal erythema  CV: Normal rate, no rubs  Resp: Respirations unlabored, lungs clear to auscultation  GI: Soft, non distended, no large abdominal masses, non tender  MSK: No midline back pain, no large joint effusions  Neuro: Alert and oriented, no focal neurological deficits observed  Psych: Cooperative, appropriate mood and affect  -----------------------  -----------------------  MEDICAL DECISION MAKING  Differential Diagnosis:  - Consideration is given for ACS, Dissection, Pneumothorax, Pulmonary Embolism,  -  #Impression/Plan:  - Clinically patient's presentation is most consistent with concern for ACS. Given patient's presentation will get laboratory testing imaging. Given her heart score of greater than 3 will get admitted for further evaluation. Clinically she is well-appearing at this time in no acute distress and chest pain-free.  -  ##Reevaluation/Conversations on care:  -   -   -----------------------  -----------------------  Major Christianson MD, BENEDICTO  Emergency Medicine Attending  Questions? Please contact my cell phone anytime.   [phone]  *This charting supersedes any ED resident or staff charting and was written using speech recognition software      PASTMEDICAL HISTORY     Past Medical History:   Diagnosis Date    Arrhythmia     Breast mass     CAD (coronary artery disease)     with valvular disease    Chronic neck pain     Coccygeal pain 3/7/2016    Constipation     Depression     Diabetic neuropathy (HCC)     History of hepatitis A     possible history of hepatitis A in the past    History of renal calculi     Hypertension     Hyperthyroidism     Hypothyroidism     Morbid obesity with BMI of 45.0-49.9, adult ([facility] [address] 3/31/2016    Nephrolithiasis     Neuropathy     Type II or unspecified type diabetes mellitus without mention of complication, not stated as uncontrolled     non insulin dependent     UTI (lower urinary tract infection)      SURGICAL HISTORY       Past Surgical History:   Procedure Laterality Date    BREAST BIOPSY  2012    negative    CARDIOVASCULAR STRESS TEST      7/17/15 no results    CHOLECYSTECTOMY      CYST REMOVAL      right hand    CYSTO/URETERO/PYELOSCOPY, CALCULUS TX Right 8/26/2019    CYSTOSCOPY URETEROSCOPY  STONE BASKET RETRIEVAL RIGHT STENT EXCHANGE performed by Bren Florian MD at [facility] Right 8/20/2019    [facility]

## 2020-03-29 PROBLEM — R07.9 CHEST PAIN WITH HIGH RISK FOR CARDIAC ETIOLOGY: Status: ACTIVE | Noted: 2020-03-29

## 2020-03-29 LAB
ABSOLUTE EOS #: 0.1 K/UL (ref 0–0.4)
ABSOLUTE IMMATURE GRANULOCYTE: ABNORMAL K/UL (ref 0–0.3)
ABSOLUTE LYMPH #: 2.8 K/UL (ref 1–4.8)
ABSOLUTE MONO #: 0.8 K/UL (ref 0.1–1.3)
ANION GAP SERPL CALCULATED.3IONS-SCNC: 12 MMOL/L (ref 9–17)
BASOPHILS # BLD: 1 % (ref 0–2)
BASOPHILS ABSOLUTE: 0.1 K/UL (ref 0–0.2)
BUN BLDV-MCNC: 10 MG/DL (ref 8–23)
BUN/CREAT BLD: ABNORMAL (ref 9–20)
CALCIUM SERPL-MCNC: 9.2 MG/DL (ref 8.6–10.4)
CHLORIDE BLD-SCNC: 101 MMOL/L (ref 98–107)
CO2: 22 MMOL/L (ref 20–31)
CREAT SERPL-MCNC: 0.48 MG/DL (ref 0.5–0.9)
DIFFERENTIAL TYPE: ABNORMAL
EOSINOPHILS RELATIVE PERCENT: 1 % (ref 0–4)
GFR AFRICAN AMERICAN: >60 ML/MIN
GFR NON-AFRICAN AMERICAN: >60 ML/MIN
GFR SERPL CREATININE-BSD FRML MDRD: ABNORMAL ML/MIN/{1.73_M2}
GFR SERPL CREATININE-BSD FRML MDRD: ABNORMAL ML/MIN/{1.73_M2}
GLUCOSE BLD-MCNC: 174 MG/DL (ref 65–105)
GLUCOSE BLD-MCNC: 206 MG/DL (ref 65–105)
GLUCOSE BLD-MCNC: 270 MG/DL (ref 70–99)
GLUCOSE BLD-MCNC: 282 MG/DL (ref 65–105)
GLUCOSE BLD-MCNC: 307 MG/DL (ref 65–105)
HCT VFR BLD CALC: 45.4 % (ref 36–46)
HEMOGLOBIN: 15.2 G/DL (ref 12–16)
IMMATURE GRANULOCYTES: ABNORMAL %
LYMPHOCYTES # BLD: 32 % (ref 24–44)
MCH RBC QN AUTO: 29.9 PG (ref 26–34)
MCHC RBC AUTO-ENTMCNC: 33.5 G/DL (ref 31–37)
MCV RBC AUTO: 89.4 FL (ref 80–100)
MONOCYTES # BLD: 9 % (ref 1–7)
NRBC AUTOMATED: ABNORMAL PER 100 WBC
PDW BLD-RTO: 13.3 % (ref 11.5–14.9)
PLATELET # BLD: 225 K/UL (ref 150–450)
PLATELET ESTIMATE: ABNORMAL
PMV BLD AUTO: 10.2 FL (ref 6–12)
POTASSIUM SERPL-SCNC: 4.1 MMOL/L (ref 3.7–5.3)
RBC # BLD: 5.08 M/UL (ref 4–5.2)
RBC # BLD: ABNORMAL 10*6/UL
SEG NEUTROPHILS: 57 % (ref 36–66)
SEGMENTED NEUTROPHILS ABSOLUTE COUNT: 5.1 K/UL (ref 1.3–9.1)
SODIUM BLD-SCNC: 135 MMOL/L (ref 135–144)
WBC # BLD: 8.9 K/UL (ref 3.5–11)
WBC # BLD: ABNORMAL 10*3/UL

## 2020-03-29 PROCEDURE — 99232 SBSQ HOSP IP/OBS MODERATE 35: CPT | Performed by: INTERNAL MEDICINE

## 2020-03-29 PROCEDURE — 96366 THER/PROPH/DIAG IV INF ADDON: CPT

## 2020-03-29 PROCEDURE — 6370000000 HC RX 637 (ALT 250 FOR IP): Performed by: INTERNAL MEDICINE

## 2020-03-29 PROCEDURE — 6360000002 HC RX W HCPCS: Performed by: INTERNAL MEDICINE

## 2020-03-29 PROCEDURE — G0378 HOSPITAL OBSERVATION PER HR: HCPCS

## 2020-03-29 PROCEDURE — 85025 COMPLETE CBC W/AUTO DIFF WBC: CPT

## 2020-03-29 PROCEDURE — 2580000003 HC RX 258: Performed by: INTERNAL MEDICINE

## 2020-03-29 PROCEDURE — 82947 ASSAY GLUCOSE BLOOD QUANT: CPT

## 2020-03-29 PROCEDURE — 80048 BASIC METABOLIC PNL TOTAL CA: CPT

## 2020-03-29 PROCEDURE — 36415 COLL VENOUS BLD VENIPUNCTURE: CPT

## 2020-03-29 PROCEDURE — 2060000000 HC ICU INTERMEDIATE R&B

## 2020-03-29 PROCEDURE — 96372 THER/PROPH/DIAG INJ SC/IM: CPT

## 2020-03-29 RX ADMIN — INSULIN LISPRO 8 UNITS: 100 INJECTION, SOLUTION INTRAVENOUS; SUBCUTANEOUS at 17:37

## 2020-03-29 RX ADMIN — Medication 10 ML: at 07:41

## 2020-03-29 RX ADMIN — INSULIN LISPRO 4 UNITS: 100 INJECTION, SOLUTION INTRAVENOUS; SUBCUTANEOUS at 07:37

## 2020-03-29 RX ADMIN — GABAPENTIN 800 MG: 400 CAPSULE ORAL at 20:37

## 2020-03-29 RX ADMIN — DULOXETINE HYDROCHLORIDE 60 MG: 60 CAPSULE, DELAYED RELEASE ORAL at 07:37

## 2020-03-29 RX ADMIN — INSULIN LISPRO 6 UNITS: 100 INJECTION, SOLUTION INTRAVENOUS; SUBCUTANEOUS at 11:42

## 2020-03-29 RX ADMIN — ENOXAPARIN SODIUM 30 MG: 30 INJECTION SUBCUTANEOUS at 07:37

## 2020-03-29 RX ADMIN — LEVOFLOXACIN 750 MG: 5 INJECTION, SOLUTION INTRAVENOUS at 00:10

## 2020-03-29 RX ADMIN — METOPROLOL TARTRATE 100 MG: 100 TABLET ORAL at 20:37

## 2020-03-29 RX ADMIN — ATORVASTATIN CALCIUM 80 MG: 80 TABLET, FILM COATED ORAL at 20:40

## 2020-03-29 RX ADMIN — LISINOPRIL 10 MG: 10 TABLET ORAL at 07:37

## 2020-03-29 RX ADMIN — ENOXAPARIN SODIUM 30 MG: 30 INJECTION SUBCUTANEOUS at 20:38

## 2020-03-29 RX ADMIN — LEVOTHYROXINE SODIUM 75 MCG: 75 TABLET ORAL at 07:37

## 2020-03-29 RX ADMIN — ACETAMINOPHEN 650 MG: 325 TABLET, FILM COATED ORAL at 03:40

## 2020-03-29 RX ADMIN — Medication 10 ML: at 20:38

## 2020-03-29 RX ADMIN — GABAPENTIN 800 MG: 400 CAPSULE ORAL at 07:37

## 2020-03-29 RX ADMIN — METOPROLOL TARTRATE 100 MG: 100 TABLET ORAL at 07:37

## 2020-03-29 ASSESSMENT — PAIN SCALES - GENERAL
PAINLEVEL_OUTOF10: 0
PAINLEVEL_OUTOF10: 8

## 2020-03-29 NOTE — PROGRESS NOTES
Patient's stress test results are back. Dr. Heath Gibson paged with results. Clinical Lead, Kellie talked to Dr. Heath Gibson, he would like an ECHO to confirm the EF.

## 2020-03-29 NOTE — PROGRESS NOTES
Current Meds:   Scheduled Meds:    sodium chloride flush  10 mL Intravenous 2 times per day    enoxaparin  30 mg Subcutaneous BID    insulin lispro  0-12 Units Subcutaneous TID WC    insulin lispro  0-6 Units Subcutaneous Nightly    atorvastatin  80 mg Oral Daily    DULoxetine  60 mg Oral Daily    gabapentin  800 mg Oral BID    levothyroxine  75 mcg Oral Daily    lisinopril  10 mg Oral Daily    metoprolol  100 mg Oral BID    levofloxacin  750 mg Intravenous Q24H    Insulin Degludec  75 Units Subcutaneous Nightly     Continuous Infusions:    dextrose       PRN Meds: melatonin ER, sodium chloride flush, acetaminophen, glucose, dextrose, glucagon (rDNA), dextrose, sodium chloride flush    Data:     Past Medical History:   has a past medical history of Arrhythmia, Breast mass, CAD (coronary artery disease), Chronic neck pain, Coccygeal pain, Constipation, Depression, Diabetic neuropathy ([facility] [address] History of hepatitis A, History of renal calculi, Hypertension, Hyperthyroidism, Hypothyroidism, Morbid obesity with BMI of 45.0-49.9, adult ([facility] [address] Nephrolithiasis, Neuropathy, Type II or unspecified type diabetes mellitus without mention of complication, not stated as uncontrolled, and UTI (lower urinary tract infection). Social History:   reports that she has quit smoking. Her smoking use included cigarettes. She has never used smokeless tobacco. She reports that she does not drink alcohol or use drugs.      Family History:   Family History   Problem Relation Age of Onset    Coronary Art Dis Mother     Lung Cancer Mother     Diabetes Mother         mellitus    Coronary Art Dis Father     Diabetes Father         diabetes mellitus       Vitals:  /74   Pulse 77   Temp 96.8 °F (36 °C) (Oral)   Resp 16   Ht 5' 2\" (1.575 m)   Wt 235 lb 2 oz (106.7 kg)   LMP 2011   SpO2 95%   BMI 43.00 kg/m²   Temp (24hrs), Av.3 °F (36.8 °C), Min:96.8 °F (36 °C), Max:98.6 °F (37 °C)    Recent Labs adult ([facility] [E66.01, Z68.42] 03/31/2016    Hypothyroidism [E03.9] 01/14/2012    MVP (mitral valve prolapse) [I34.1] 01/14/2012           DVT prophylaxis: Lovenox    Day number for antibiotics: Levaquin day 2    Plan:          56-year-old female admitted for chest pain rule out ACS. High risk due to poorly controlled diabetes, morbid obesity, also hypertension.     1. Chest pain-rule out ACS. Stress test performed-  2. Left lower lobe pneumonia- community-acquired. Clinically unable to determine organism. Started on Levaquin, improving. 2.  Type 2 diabetes-poorly controlled. Lantus and sliding scale. 3.  Hypertension-continue home medications. 4.  Hypothyroid- resume home medication    3/29-still showed significantly decreased EF from 45 to 29%. Cardiologist recommends echo to confirm. Ordered. Continues on antibiotic for left lower lobe pneumonia.         Gissel Marie MD  3/29/2020  3:44 PM

## 2020-03-29 NOTE — PLAN OF CARE
Problem: Airway Clearance - Ineffective:  Goal: Clear lung sounds  Description: Clear lung sounds  3/29/2020 0902 by Chito Mahajan RN  Outcome: Ongoing  3/28/2020 2130 by Xavi Fenton RN  Outcome: Met This Shift  3/28/2020 1925 by Chito Mahajan RN  Outcome: Ongoing  Goal: Ability to maintain a clear airway will improve  Description: Ability to maintain a clear airway will improve  3/29/2020 0902 by Chito Mahajan RN  Outcome: Ongoing  3/28/2020 2130 by Xavi Fenton RN  Outcome: Met This Shift  3/28/2020 1925 by Chito Mahajan RN  Outcome: Ongoing     Problem: Fluid Volume - Deficit:  Goal: Achieves intake and output within specified parameters  Description: Achieves intake and output within specified parameters  3/29/2020 0902 by Chito Mahajan RN  Outcome: Ongoing  3/28/2020 2130 by Xavi Fenton RN  Outcome: Met This Shift  3/28/2020 1925 by Chito Mahajan RN  Outcome: Ongoing     Problem: Gas Exchange - Impaired:  Goal: Levels of oxygenation will improve  Description: Levels of oxygenation will improve  3/29/2020 0902 by Chito Mahajan RN  Outcome: Ongoing  3/28/2020 2130 by Xavi Fenton RN  Outcome: Met This Shift  3/28/2020 1925 by Chito Mahajan RN  Outcome: Ongoing     Problem:  Activity:  Goal: Risk for activity intolerance will decrease  Description: Risk for activity intolerance will decrease  3/29/2020 0902 by Chito Mahajan RN  Outcome: Ongoing  3/28/2020 2130 by Xavi Fenton RN  Outcome: Met This Shift  3/28/2020 1925 by Chito Mahajan RN  Outcome: Ongoing     Problem: Metabolic:  Goal: Ability to maintain appropriate glucose levels will improve  Description: Ability to maintain appropriate glucose levels will improve  3/29/2020 0902 by Chito Mahajan RN  Outcome: Ongoing  3/28/2020 2130 by Xavi Fenton RN  Outcome: Met This Shift  3/28/2020 1925 by Chito Mahajan RN  Outcome: Ongoing     Problem: Nutritional:  Goal: Maintenance

## 2020-03-29 NOTE — PLAN OF CARE
Problem: Airway Clearance - Ineffective:  Goal: Clear lung sounds  Description: Clear lung sounds  3/28/2020 2130 by Aure Kuhn RN  Outcome: Met This Shift  3/28/2020 1925 by Tamela Carrel, RN  Outcome: Ongoing  Goal: Ability to maintain a clear airway will improve  Description: Ability to maintain a clear airway will improve  3/28/2020 2130 by Aure Kuhn RN  Outcome: Met This Shift  3/28/2020 1925 by Tamela Carrel, RN  Outcome: Ongoing     Problem: Fluid Volume - Deficit:  Goal: Achieves intake and output within specified parameters  Description: Achieves intake and output within specified parameters  3/28/2020 2130 by Aure Kuhn RN  Outcome: Met This Shift  3/28/2020 1925 by Tamela Carrel, RN  Outcome: Ongoing     Problem: Gas Exchange - Impaired:  Goal: Levels of oxygenation will improve  Description: Levels of oxygenation will improve  3/28/2020 2130 by Aure Kuhn RN  Outcome: Met This Shift  3/28/2020 1925 by Tamela Carrel, RN  Outcome: Ongoing     Problem:  Activity:  Goal: Risk for activity intolerance will decrease  Description: Risk for activity intolerance will decrease  3/28/2020 2130 by Aure Kuhn RN  Outcome: Met This Shift  3/28/2020 1925 by Tamela Carrel, RN  Outcome: Ongoing     Problem: Metabolic:  Goal: Ability to maintain appropriate glucose levels will improve  Description: Ability to maintain appropriate glucose levels will improve  3/28/2020 2130 by Aure Kuhn RN  Outcome: Met This Shift  3/28/2020 1925 by Tamela Carrel, RN  Outcome: Ongoing     Problem: Nutritional:  Goal: Maintenance of adequate nutrition will improve  Description: Maintenance of adequate nutrition will improve  3/28/2020 2130 by Aure Kuhn RN  Outcome: Met This Shift  3/28/2020 1925 by Tamela Carrel, RN  Outcome: Ongoing  Goal: Progress toward achieving an optimal weight will improve  Description: Progress toward achieving an optimal weight will

## 2020-03-30 VITALS
HEIGHT: 62 IN | OXYGEN SATURATION: 94 % | WEIGHT: 235.13 LBS | RESPIRATION RATE: 20 BRPM | DIASTOLIC BLOOD PRESSURE: 60 MMHG | SYSTOLIC BLOOD PRESSURE: 97 MMHG | HEART RATE: 68 BPM | BODY MASS INDEX: 43.27 KG/M2 | TEMPERATURE: 97.9 F

## 2020-03-30 LAB
GLUCOSE BLD-MCNC: 174 MG/DL (ref 65–105)
GLUCOSE BLD-MCNC: 198 MG/DL (ref 65–105)
GLUCOSE BLD-MCNC: 212 MG/DL (ref 65–105)
GLUCOSE BLD-MCNC: 222 MG/DL (ref 65–105)
GLUCOSE BLD-MCNC: 268 MG/DL (ref 65–105)
LV EF: 55 %
LVEF MODALITY: NORMAL

## 2020-03-30 PROCEDURE — C8929 TTE W OR WO FOL WCON,DOPPLER: HCPCS

## 2020-03-30 PROCEDURE — 6370000000 HC RX 637 (ALT 250 FOR IP): Performed by: INTERNAL MEDICINE

## 2020-03-30 PROCEDURE — 99239 HOSP IP/OBS DSCHRG MGMT >30: CPT | Performed by: INTERNAL MEDICINE

## 2020-03-30 PROCEDURE — 6360000002 HC RX W HCPCS: Performed by: INTERNAL MEDICINE

## 2020-03-30 PROCEDURE — 82947 ASSAY GLUCOSE BLOOD QUANT: CPT

## 2020-03-30 PROCEDURE — 6360000004 HC RX CONTRAST MEDICATION: Performed by: INTERNAL MEDICINE

## 2020-03-30 PROCEDURE — 2580000003 HC RX 258: Performed by: INTERNAL MEDICINE

## 2020-03-30 RX ORDER — METOPROLOL TARTRATE 50 MG/1
50 TABLET, FILM COATED ORAL 2 TIMES DAILY
Status: DISCONTINUED | OUTPATIENT
Start: 2020-03-30 | End: 2020-03-30 | Stop reason: HOSPADM

## 2020-03-30 RX ORDER — LEVOFLOXACIN 750 MG/1
750 TABLET ORAL DAILY
Qty: 3 TABLET | Refills: 0 | Status: SHIPPED | OUTPATIENT
Start: 2020-03-30 | End: 2020-04-02

## 2020-03-30 RX ORDER — LISINOPRIL 5 MG/1
5 TABLET ORAL DAILY
Status: DISCONTINUED | OUTPATIENT
Start: 2020-03-31 | End: 2020-03-30 | Stop reason: HOSPADM

## 2020-03-30 RX ORDER — ATORVASTATIN CALCIUM 80 MG/1
80 TABLET, FILM COATED ORAL DAILY
Qty: 30 TABLET | Refills: 3 | Status: SHIPPED | OUTPATIENT
Start: 2020-03-31 | End: 2021-01-21 | Stop reason: SDUPTHER

## 2020-03-30 RX ORDER — METOPROLOL TARTRATE 50 MG/1
50 TABLET, FILM COATED ORAL 2 TIMES DAILY
Qty: 60 TABLET | Refills: 3 | Status: SHIPPED | OUTPATIENT
Start: 2020-03-30 | End: 2020-08-17 | Stop reason: SDUPTHER

## 2020-03-30 RX ORDER — LISINOPRIL 5 MG/1
5 TABLET ORAL DAILY
Qty: 30 TABLET | Refills: 3 | Status: SHIPPED | OUTPATIENT
Start: 2020-03-31 | End: 2020-08-17

## 2020-03-30 RX ADMIN — GABAPENTIN 800 MG: 400 CAPSULE ORAL at 08:20

## 2020-03-30 RX ADMIN — DULOXETINE HYDROCHLORIDE 60 MG: 60 CAPSULE, DELAYED RELEASE ORAL at 08:20

## 2020-03-30 RX ADMIN — ACETAMINOPHEN 650 MG: 325 TABLET, FILM COATED ORAL at 05:43

## 2020-03-30 RX ADMIN — LEVOFLOXACIN 750 MG: 5 INJECTION, SOLUTION INTRAVENOUS at 03:56

## 2020-03-30 RX ADMIN — METOPROLOL TARTRATE 100 MG: 100 TABLET ORAL at 08:20

## 2020-03-30 RX ADMIN — LEVOTHYROXINE SODIUM 75 MCG: 75 TABLET ORAL at 08:24

## 2020-03-30 RX ADMIN — INSULIN LISPRO 4 UNITS: 100 INJECTION, SOLUTION INTRAVENOUS; SUBCUTANEOUS at 08:21

## 2020-03-30 RX ADMIN — PERFLUTREN 2.2 MG: 6.52 INJECTION, SUSPENSION INTRAVENOUS at 09:29

## 2020-03-30 RX ADMIN — INSULIN LISPRO 6 UNITS: 100 INJECTION, SOLUTION INTRAVENOUS; SUBCUTANEOUS at 12:37

## 2020-03-30 RX ADMIN — ATORVASTATIN CALCIUM 80 MG: 80 TABLET, FILM COATED ORAL at 08:20

## 2020-03-30 RX ADMIN — ENOXAPARIN SODIUM 30 MG: 30 INJECTION SUBCUTANEOUS at 08:20

## 2020-03-30 RX ADMIN — LISINOPRIL 10 MG: 10 TABLET ORAL at 08:20

## 2020-03-30 RX ADMIN — ACETAMINOPHEN 650 MG: 325 TABLET, FILM COATED ORAL at 10:50

## 2020-03-30 RX ADMIN — Medication 10 ML: at 08:21

## 2020-03-30 RX ADMIN — INSULIN LISPRO 4 UNITS: 100 INJECTION, SOLUTION INTRAVENOUS; SUBCUTANEOUS at 17:40

## 2020-03-30 ASSESSMENT — PAIN SCALES - GENERAL
PAINLEVEL_OUTOF10: 3
PAINLEVEL_OUTOF10: 8
PAINLEVEL_OUTOF10: 5
PAINLEVEL_OUTOF10: 3

## 2020-03-30 NOTE — CARE COORDINATION
ONGOING DISCHARGE PLAN:    Spoke with patient regarding discharge plan and patient confirms that plan is still to go home with no needs. Patient had 2 Decho today. Per Cardio Notes - Depending on echo results, may need a Cardiac Cath    Remains on IV Levaquin,     Will continue to follow for additional discharge needs.     Electronically signed by Garth Romero RN on 3/30/2020 at 11:07 AM

## 2020-03-30 NOTE — PROGRESS NOTES
RN spoke with Dr. Griselda Ca. OK for discharge. Discussed hypotension of 94/62 but pt is asymptomatic. Decreased metoprolol from 100mg BID to 50mg BID and decreased lisinopril from 10mg daily to 5mg daily. OK to discharge, have pt monitor her BP. F/U in 2-3 weeks outpatient. Electronically signed by Neil Norman RN on 3/30/2020 at 5:18 PM      RN discussed this change by Dr. Griselda Ca with Dr. Alber Willingham. OK to change the discharge med rec to reflect the above changes in cardiac meds.  Electronically signed by Neil Norman RN on 3/30/2020 at 5:21 PM

## 2020-03-30 NOTE — PROGRESS NOTES
[facility] [address]    Progress Note    3/30/2020    11:37 AM    Name:   Selvin Montez  MRN:     [id number]     Acct:      [de-identified]   Room:   211/[facility] Day:  1  Admit Date:  3/28/2020 12:30 AM    PCP:   Ruben Francisco  Code Status:  Full Code    Subjective:     C/C:   Chief Complaint   Patient presents with    Chest Pain     Interval History Status: improved. Brief History:   Patient has morbid obesity, diabetes, hypertension, mitral valve prolapse. She presented with complaining of chest pain  Chest pain is mainly located on left side, center of the chest.  Chest pain has completely improved  She had stress test, cardiology following    Review of Systems:     Constitutional:  negative for chills, fevers, sweats  Respiratory:  negative for cough, dyspnea on exertion, shortness of breath, wheezing  Cardiovascular: Chest pain, improved  Gastrointestinal:  negative for abdominal pain, constipation, diarrhea, nausea, vomiting  Neurological:  negative for dizziness, headache    Medications: Allergies: Allergies   Allergen Reactions    Cefuroxime Axetil Anaphylaxis    Dilaudid [Hydromorphone] Anaphylaxis    Metformin And Related Diarrhea, Itching and Nausea And Vomiting    Sulfa Antibiotics Itching    Amoxicillin     Antihistamines, Loratadine-Type     Aspartame And Phenylalanine     Eggs Or Egg-Derived Products     Lorazepam     Nubain [Nalbuphine Hcl] Other (See Comments)     Hallucinations; \"I got stoned. \"    Vistaril [Hydroxyzine Hcl]        Current Meds:   Scheduled Meds:    sodium chloride flush  10 mL Intravenous 2 times per day    enoxaparin  30 mg Subcutaneous BID    insulin lispro  0-12 Units Subcutaneous TID WC    insulin lispro  0-6 Units Subcutaneous Nightly    atorvastatin  80 mg Oral Daily    DULoxetine  60 mg Oral Daily    gabapentin  800 mg Oral BID    levothyroxine  75 mcg Oral Daily    lisinopril  10 mg Oral Daily

## 2020-03-30 NOTE — CONSULTS
SOPN 75 Units, 75 Units, Subcutaneous, Nightly    Allergies:  Cefuroxime axetil; Dilaudid [hydromorphone]; Metformin and related; Sulfa antibiotics; Amoxicillin; Antihistamines, loratadine-type; Aspartame and phenylalanine; Eggs or egg-derived products; Lorazepam; Nubain [nalbuphine hcl]; and Vistaril [hydroxyzine hcl]    Social History:   reports that she has quit smoking. Her smoking use included cigarettes. She has never used smokeless tobacco. She reports that she does not drink alcohol or use drugs. Family History: family history includes Coronary Art Dis in her father and mother; Diabetes in her father and mother; Nazanin Tamir in her mother. No h/o sudden cardiac death. No for premature CAD    REVIEW OF SYSTEMS:    · Constitutional: there has been no unanticipated weight loss. There's been No change in energy level, No change in activity level. · Eyes: No visual changes or diplopia. No scleral icterus. · ENT: No Headaches  · Cardiovascular: No chest pain at this time. · Respiratory: No previous pulmonary problems, No cough  · Gastrointestinal: No abdominal pain. No change in bowel or bladder habits. · Genitourinary: No dysuria, trouble voiding, or hematuria. · Musculoskeletal:  No gait disturbance, No weakness or joint complaints. · Integumentary: No rash or pruritis. · Neurological: No headache, diplopia, change in muscle strength, numbness or tingling. No change in gait, balance, coordination, mood, affect, memory, mentation, behavior. · Psychiatric: No anxiety, or depression. · Endocrine: No temperature intolerance. No excessive thirst, fluid intake, or urination. No tremor. · Hematologic/Lymphatic: No abnormal bruising or bleeding, blood clots or swollen lymph nodes. · Allergic/Immunologic: No nasal congestion or hives.       PHYSICAL EXAM:      /84   Pulse 71   Temp 97.1 °F (36.2 °C) (Oral)   Resp 20   Ht 5' 2\" (1.575 m)   Wt 235 lb 2 oz (106.7 kg)   LMP 01/12/2011   SpO2

## 2020-03-30 NOTE — DISCHARGE SUMMARY
[place] [address] Internal Medicine    Discharge Summary     Patient ID: Eddie Metz  :  1955   MRN: [id number]     ACCOUNT:  [de-identified]   Patient's PCP: Eleni Bravo  Admit Date: 3/28/2020   Discharge Date: 2020    Length of Stay: 1  Code Status:  Prior  Admitting Physician: Jimmy Rivera MD  Discharge Physician: Pradeep Watts MD     Active Discharge Diagnoses:     Primary Problem  <principal problem not specified>      [place] Problems    Diagnosis Date Noted    Chest pain with high risk for cardiac etiology [R07.9] 2020    Chest pain [R07.9] 2018    Essential hypertension [I10] 2017    Uncontrolled type 2 diabetes mellitus with diabetic polyneuropathy, without long-term current use of insulin ([facility] [address] [E11.42, E11.65] 2016    Morbid obesity with BMI of 45.0-49.9, adult ([facility] [address] [E66.01, Z68.42] 2016    Hypothyroidism [E03.9] 2012    MVP (mitral valve prolapse) [I34.1] 2012       Admission Condition:  fair     Discharged Condition: fair    Hospital Stay:     Hospital Course:  Eddie Metz is a 63 y.o. female who was admitted for the management of <principal problem not specified> , presented to ER with Chest Pain  Patient has multiple medical problem which include morbid obesity, diabetes, hypertension, Mitral Valve prolapse. She presented with chest pain, she had stress test was negative. Evaluated by cardiologist.  She will follow with cardiologist as outpatient. Her symptom has improved completely.         Significant therapeutic interventions:     Significant Diagnostic Studies:   Labs / Micro:        ,     Radiology:    Xr Chest Portable    Result Date: 3/28/2020  EXAMINATION: ONE XRAY VIEW OF THE CHEST 3/28/2020 1:09 am COMPARISON: Chest portable 2018 HISTORY: ORDERING SYSTEM PROVIDED HISTORY: cp TECHNOLOGIST PROVIDED HISTORY: cp Reason for Exam: cp Acuity: Acute Type of Exam: Initial Additional signs and symptoms: Chest pain, palpitations, lung pain/rib pain with deep breaths Relevant Medical/Surgical History: Chest pain, palpitations, lung pain/rib pain with deep breaths FINDINGS: The heart is normal in size and configuration. The mediastinal contours are within normal limits. Left basilar subsegmental atelectasis versus airspace disease is present. Lungs are otherwise well aerated. The pleural surfaces are normal and no evidence of a pleural effusion is seen. Bones and soft tissues are unremarkable. Left basilar subsegmental atelectasis versus airspace disease. Otherwise, unremarkable single AP upright view of the chest.     Nm Cardiac Stress Test Nuclear Imaging    Result Date: 3/29/2020  EXAMINATION: MYOCARDIAL PERFUSION IMAGING 3/28/2020 7:09 am TECHNIQUE: For the rest study, 9.8 mCi of Tc 99 labeled sestamibi were injected. SPECT images were acquired. Under cardiology supervision, 0.4mg Lalitha Asnya was infused. After pharmacologic stress, 36.7 mCi of Tc 99 labeled sestamibi were injected. SPECT images with ECG gating were acquired. COMPARISON: July 31, 2018 HISTORY: ORDERING SYSTEM PROVIDED HISTORY: Chest pain TECHNOLOGIST PROVIDED HISTORY: Reason for Exam: Chest pain Procedure Type->Rx Chest Pain Reason for Exam: Chest pain Acuity: Unknown Type of Exam: Unknown FINDINGS: There is normal accumulation of tracer throughout the myocardium on rest images and stress images. There are no fixed or reversible defects. End diastolic volume is 83 ml. The ejection fraction equals 20%. Hypokinesis of the septal wall and apex. Dyskinesis of the lateral wall. The left ventricle is dilated. TID of 1.05.     1. No pharmacologically induced reversible perfusion defects to suggest ischemia. 2. Ejection fraction of 20% (previously 49%). 3. Hypokinesis of the septal wall and apex. Dyskinesis of the lateral wall.          Consultations:    Consults:     Final Specialist Recommendations/Findings:   IP CONSULT TO CARDIOLOGY      The patient was seen and examined on day of discharge and this discharge summary is in conjunction with any daily progress note from day of discharge. Discharge plan:     Disposition: Home    Physician Follow Up:     Veronica Paz  [address] [facility] [address] [facility]  [phone]    Schedule an appointment as soon as possible for a visit in 1 week  post hospital follow up appointment    [facility] [address] [address] [phone]    Schedule an appointment as soon as possible for a visit in 2 weeks         Requiring Further Evaluation/Follow Up POST HOSPITALIZATION/Incidental Findings:    Diet: cardiac diet    Activity: As tolerated    Instructions to Patient:     Discharge Medications:      Medication List      START taking these medications    levoFLOXacin 750 MG tablet  Commonly known as:  LEVAQUIN  Take 1 tablet by mouth daily for 3 days        CHANGE how you take these medications    atorvastatin 80 MG tablet  Commonly known as:  LIPITOR  Take 1 tablet by mouth daily  What changed:    · medication strength  · how much to take     gabapentin 800 MG tablet  Commonly known as:  NEURONTIN  take 1 tablet by mouth four times a day  What changed:  See the new instructions. levothyroxine 100 MCG tablet  Commonly known as:  SYNTHROID  Take 1 tablet by mouth daily  What changed:  how much to take     lisinopril 5 MG tablet  Commonly known as:  PRINIVIL;ZESTRIL  Take 1 tablet by mouth daily  What changed:    · medication strength  · how much to take     metoprolol tartrate 50 MG tablet  Commonly known as:  LOPRESSOR  Take 1 tablet by mouth 2 times daily  What changed:    · medication strength  · See the new instructions. * Insulin Degludec 200 UNIT/ML Sopn  Inject 75 Units into the skin nightly  What changed: You were already taking a medication with the same name, and this prescription was added.  Make sure you understand how

## 2020-03-31 ENCOUNTER — TELEPHONE (OUTPATIENT)
Dept: INTERNAL MEDICINE CLINIC | Age: 65
End: 2020-03-31

## 2020-04-13 ENCOUNTER — HOSPITAL ENCOUNTER (INPATIENT)
Age: 65
LOS: 4 days | Discharge: HOME | DRG: 241 | End: 2020-04-21
Attending: EMERGENCY MEDICINE | Admitting: INTERNAL MEDICINE
Payer: COMMERCIAL

## 2020-04-13 ENCOUNTER — APPOINTMENT (OUTPATIENT)
Dept: GENERAL RADIOLOGY | Age: 65
DRG: 241 | End: 2020-04-13
Payer: COMMERCIAL

## 2020-04-13 LAB
ABSOLUTE EOS #: 0 K/UL (ref 0–0.4)
ABSOLUTE IMMATURE GRANULOCYTE: ABNORMAL K/UL (ref 0–0.3)
ABSOLUTE LYMPH #: 0.92 K/UL (ref 1–4.8)
ABSOLUTE MONO #: 0.48 K/UL (ref 0.1–1.3)
ANION GAP SERPL CALCULATED.3IONS-SCNC: 14 MMOL/L (ref 9–17)
BASOPHILS # BLD: 1 % (ref 0–2)
BASOPHILS ABSOLUTE: 0.04 K/UL (ref 0–0.2)
BUN BLDV-MCNC: 7 MG/DL (ref 8–23)
BUN/CREAT BLD: ABNORMAL (ref 9–20)
CALCIUM SERPL-MCNC: 8.6 MG/DL (ref 8.6–10.4)
CHLORIDE BLD-SCNC: 101 MMOL/L (ref 98–107)
CO2: 21 MMOL/L (ref 20–31)
CREAT SERPL-MCNC: 0.44 MG/DL (ref 0.5–0.9)
DIFFERENTIAL TYPE: ABNORMAL
EOSINOPHILS RELATIVE PERCENT: 0 % (ref 0–4)
GFR AFRICAN AMERICAN: >60 ML/MIN
GFR NON-AFRICAN AMERICAN: >60 ML/MIN
GFR SERPL CREATININE-BSD FRML MDRD: ABNORMAL ML/MIN/{1.73_M2}
GFR SERPL CREATININE-BSD FRML MDRD: ABNORMAL ML/MIN/{1.73_M2}
GLUCOSE BLD-MCNC: 305 MG/DL (ref 70–99)
HCT VFR BLD CALC: 48.7 % (ref 36–46)
HEMOGLOBIN: 16.5 G/DL (ref 12–16)
IMMATURE GRANULOCYTES: ABNORMAL %
LYMPHOCYTES # BLD: 21 % (ref 24–44)
MCH RBC QN AUTO: 29.7 PG (ref 26–34)
MCHC RBC AUTO-ENTMCNC: 33.9 G/DL (ref 31–37)
MCV RBC AUTO: 87.8 FL (ref 80–100)
MONOCYTES # BLD: 11 % (ref 1–7)
MORPHOLOGY: ABNORMAL
NRBC AUTOMATED: ABNORMAL PER 100 WBC
PDW BLD-RTO: 13.4 % (ref 11.5–14.9)
PLATELET # BLD: 132 K/UL (ref 150–450)
PLATELET ESTIMATE: ABNORMAL
PMV BLD AUTO: 10.5 FL (ref 6–12)
POTASSIUM SERPL-SCNC: 3.7 MMOL/L (ref 3.7–5.3)
RBC # BLD: 5.55 M/UL (ref 4–5.2)
RBC # BLD: ABNORMAL 10*6/UL
SEG NEUTROPHILS: 67 % (ref 36–66)
SEGMENTED NEUTROPHILS ABSOLUTE COUNT: 2.96 K/UL (ref 1.3–9.1)
SODIUM BLD-SCNC: 136 MMOL/L (ref 135–144)
TROPONIN INTERP: ABNORMAL
TROPONIN T: ABNORMAL NG/ML
TROPONIN, HIGH SENSITIVITY: 17 NG/L (ref 0–14)
WBC # BLD: 4.4 K/UL (ref 3.5–11)
WBC # BLD: ABNORMAL 10*3/UL

## 2020-04-13 PROCEDURE — 93005 ELECTROCARDIOGRAM TRACING: CPT | Performed by: EMERGENCY MEDICINE

## 2020-04-13 PROCEDURE — G0378 HOSPITAL OBSERVATION PER HR: HCPCS

## 2020-04-13 PROCEDURE — 80048 BASIC METABOLIC PNL TOTAL CA: CPT

## 2020-04-13 PROCEDURE — 71045 X-RAY EXAM CHEST 1 VIEW: CPT

## 2020-04-13 PROCEDURE — 99285 EMERGENCY DEPT VISIT HI MDM: CPT

## 2020-04-13 PROCEDURE — 36415 COLL VENOUS BLD VENIPUNCTURE: CPT

## 2020-04-13 PROCEDURE — 85025 COMPLETE CBC W/AUTO DIFF WBC: CPT

## 2020-04-13 PROCEDURE — 84484 ASSAY OF TROPONIN QUANT: CPT

## 2020-04-13 ASSESSMENT — ENCOUNTER SYMPTOMS
ABDOMINAL PAIN: 0
BACK PAIN: 0
SHORTNESS OF BREATH: 1
NAUSEA: 1
VOMITING: 0
EYES NEGATIVE: 1
DIARRHEA: 0

## 2020-04-13 ASSESSMENT — PAIN SCALES - GENERAL: PAINLEVEL_OUTOF10: 8

## 2020-04-13 NOTE — LETTER
[address] [facility] [address]  Phone: [phone]             April 21, 2020    Patient: Deborah Regalado   YOB: 1955   Date of Visit: 4/13/2020       To Whom It May Concern:    Keyla Mann was seen and treated in our facility  beginning 4/13/2020 until 04/21/2020. She is instructed to continue to self isolate for 10 additional days.       Sincerely,       Nia Real RN         Signature:__________________________________

## 2020-04-14 ENCOUNTER — APPOINTMENT (OUTPATIENT)
Dept: NUCLEAR MEDICINE | Age: 65
DRG: 241 | End: 2020-04-14
Payer: COMMERCIAL

## 2020-04-14 PROBLEM — R06.00 DYSPNEA: Status: ACTIVE | Noted: 2020-04-14

## 2020-04-14 PROBLEM — R06.02 SHORTNESS OF BREATH: Status: ACTIVE | Noted: 2020-04-14

## 2020-04-14 LAB
ALBUMIN SERPL-MCNC: 2.7 G/DL (ref 3.5–5.2)
ALBUMIN/GLOBULIN RATIO: ABNORMAL (ref 1–2.5)
ALP BLD-CCNC: 107 U/L (ref 35–104)
ALT SERPL-CCNC: 29 U/L (ref 5–33)
AMYLASE: 198 U/L (ref 28–100)
ANION GAP SERPL CALCULATED.3IONS-SCNC: 15 MMOL/L (ref 9–17)
AST SERPL-CCNC: 27 U/L
BILIRUB SERPL-MCNC: 0.32 MG/DL (ref 0.3–1.2)
BNP INTERPRETATION: NORMAL
BUN BLDV-MCNC: 6 MG/DL (ref 8–23)
BUN/CREAT BLD: ABNORMAL (ref 9–20)
CALCIUM SERPL-MCNC: 8.6 MG/DL (ref 8.6–10.4)
CHLORIDE BLD-SCNC: 101 MMOL/L (ref 98–107)
CHOLESTEROL/HDL RATIO: 2.6
CHOLESTEROL: 68 MG/DL
CO2: 19 MMOL/L (ref 20–31)
CREAT SERPL-MCNC: <0.4 MG/DL (ref 0.5–0.9)
EKG ATRIAL RATE: 96 BPM
EKG P AXIS: 10 DEGREES
EKG P-R INTERVAL: 132 MS
EKG Q-T INTERVAL: 368 MS
EKG QRS DURATION: 92 MS
EKG QTC CALCULATION (BAZETT): 464 MS
EKG R AXIS: -39 DEGREES
EKG T AXIS: 10 DEGREES
EKG VENTRICULAR RATE: 96 BPM
GFR AFRICAN AMERICAN: ABNORMAL ML/MIN
GFR NON-AFRICAN AMERICAN: ABNORMAL ML/MIN
GFR SERPL CREATININE-BSD FRML MDRD: ABNORMAL ML/MIN/{1.73_M2}
GFR SERPL CREATININE-BSD FRML MDRD: ABNORMAL ML/MIN/{1.73_M2}
GLUCOSE BLD-MCNC: 254 MG/DL (ref 65–105)
GLUCOSE BLD-MCNC: 273 MG/DL (ref 65–105)
GLUCOSE BLD-MCNC: 280 MG/DL (ref 65–105)
GLUCOSE BLD-MCNC: 297 MG/DL (ref 70–99)
GLUCOSE BLD-MCNC: 305 MG/DL (ref 65–105)
GLUCOSE BLD-MCNC: 339 MG/DL (ref 65–105)
HDLC SERPL-MCNC: 26 MG/DL
LDL CHOLESTEROL: 16 MG/DL (ref 0–130)
LIPASE: 12 U/L (ref 13–60)
MAGNESIUM: 1.4 MG/DL (ref 1.6–2.6)
POTASSIUM SERPL-SCNC: 3.5 MMOL/L (ref 3.7–5.3)
PRO-BNP: 118 PG/ML
SODIUM BLD-SCNC: 135 MMOL/L (ref 135–144)
TOTAL PROTEIN: 6.5 G/DL (ref 6.4–8.3)
TRIGL SERPL-MCNC: 132 MG/DL
TROPONIN INTERP: ABNORMAL
TROPONIN T: ABNORMAL NG/ML
TROPONIN, HIGH SENSITIVITY: 15 NG/L (ref 0–14)
TROPONIN, HIGH SENSITIVITY: 16 NG/L (ref 0–14)
TROPONIN, HIGH SENSITIVITY: 21 NG/L (ref 0–14)
VLDLC SERPL CALC-MCNC: ABNORMAL MG/DL (ref 1–30)

## 2020-04-14 PROCEDURE — 6360000002 HC RX W HCPCS: Performed by: NURSE PRACTITIONER

## 2020-04-14 PROCEDURE — 6370000000 HC RX 637 (ALT 250 FOR IP): Performed by: NURSE PRACTITIONER

## 2020-04-14 PROCEDURE — 3430000000 HC RX DIAGNOSTIC RADIOPHARMACEUTICAL: Performed by: INTERNAL MEDICINE

## 2020-04-14 PROCEDURE — 99244 OFF/OP CNSLTJ NEW/EST MOD 40: CPT | Performed by: INTERNAL MEDICINE

## 2020-04-14 PROCEDURE — 83880 ASSAY OF NATRIURETIC PEPTIDE: CPT

## 2020-04-14 PROCEDURE — 83690 ASSAY OF LIPASE: CPT

## 2020-04-14 PROCEDURE — 99223 1ST HOSP IP/OBS HIGH 75: CPT | Performed by: INTERNAL MEDICINE

## 2020-04-14 PROCEDURE — 83735 ASSAY OF MAGNESIUM: CPT

## 2020-04-14 PROCEDURE — 96372 THER/PROPH/DIAG INJ SC/IM: CPT

## 2020-04-14 PROCEDURE — A9560 TC99M LABELED RBC: HCPCS | Performed by: INTERNAL MEDICINE

## 2020-04-14 PROCEDURE — 96365 THER/PROPH/DIAG IV INF INIT: CPT

## 2020-04-14 PROCEDURE — 80053 COMPREHEN METABOLIC PANEL: CPT

## 2020-04-14 PROCEDURE — 82947 ASSAY GLUCOSE BLOOD QUANT: CPT

## 2020-04-14 PROCEDURE — 2580000003 HC RX 258: Performed by: NURSE PRACTITIONER

## 2020-04-14 PROCEDURE — G0378 HOSPITAL OBSERVATION PER HR: HCPCS

## 2020-04-14 PROCEDURE — 84484 ASSAY OF TROPONIN QUANT: CPT

## 2020-04-14 PROCEDURE — 96366 THER/PROPH/DIAG IV INF ADDON: CPT

## 2020-04-14 PROCEDURE — 36415 COLL VENOUS BLD VENIPUNCTURE: CPT

## 2020-04-14 PROCEDURE — 6370000000 HC RX 637 (ALT 250 FOR IP): Performed by: INTERNAL MEDICINE

## 2020-04-14 PROCEDURE — 82150 ASSAY OF AMYLASE: CPT

## 2020-04-14 PROCEDURE — 93010 ELECTROCARDIOGRAM REPORT: CPT | Performed by: INTERNAL MEDICINE

## 2020-04-14 PROCEDURE — 80061 LIPID PANEL: CPT

## 2020-04-14 RX ORDER — DULOXETIN HYDROCHLORIDE 60 MG/1
60 CAPSULE, DELAYED RELEASE ORAL DAILY
Status: DISCONTINUED | OUTPATIENT
Start: 2020-04-14 | End: 2020-04-21 | Stop reason: HOSPADM

## 2020-04-14 RX ORDER — FAMOTIDINE 20 MG/1
20 TABLET, FILM COATED ORAL 2 TIMES DAILY
Status: DISCONTINUED | OUTPATIENT
Start: 2020-04-14 | End: 2020-04-14

## 2020-04-14 RX ORDER — INSULIN GLARGINE 100 [IU]/ML
75 INJECTION, SOLUTION SUBCUTANEOUS NIGHTLY
Status: DISCONTINUED | OUTPATIENT
Start: 2020-04-14 | End: 2020-04-21 | Stop reason: HOSPADM

## 2020-04-14 RX ORDER — LISINOPRIL 5 MG/1
5 TABLET ORAL DAILY
Status: DISCONTINUED | OUTPATIENT
Start: 2020-04-14 | End: 2020-04-21 | Stop reason: HOSPADM

## 2020-04-14 RX ORDER — PANTOPRAZOLE SODIUM 40 MG/1
40 TABLET, DELAYED RELEASE ORAL
Status: DISCONTINUED | OUTPATIENT
Start: 2020-04-15 | End: 2020-04-21 | Stop reason: HOSPADM

## 2020-04-14 RX ORDER — ACETAMINOPHEN 650 MG/1
650 SUPPOSITORY RECTAL EVERY 6 HOURS PRN
Status: DISCONTINUED | OUTPATIENT
Start: 2020-04-14 | End: 2020-04-18 | Stop reason: SDUPTHER

## 2020-04-14 RX ORDER — MAGNESIUM SULFATE 1 G/100ML
1 INJECTION INTRAVENOUS PRN
Status: DISCONTINUED | OUTPATIENT
Start: 2020-04-14 | End: 2020-04-21 | Stop reason: HOSPADM

## 2020-04-14 RX ORDER — GABAPENTIN 400 MG/1
800 CAPSULE ORAL 2 TIMES DAILY
Status: DISCONTINUED | OUTPATIENT
Start: 2020-04-14 | End: 2020-04-21 | Stop reason: HOSPADM

## 2020-04-14 RX ORDER — DIPHENHYDRAMINE HCL 25 MG
50 TABLET ORAL EVERY 6 HOURS PRN
Status: DISCONTINUED | OUTPATIENT
Start: 2020-04-14 | End: 2020-04-21 | Stop reason: HOSPADM

## 2020-04-14 RX ORDER — SODIUM CHLORIDE 0.9 % (FLUSH) 0.9 %
10 SYRINGE (ML) INJECTION PRN
Status: DISCONTINUED | OUTPATIENT
Start: 2020-04-14 | End: 2020-04-21 | Stop reason: HOSPADM

## 2020-04-14 RX ORDER — METOPROLOL TARTRATE 50 MG/1
50 TABLET, FILM COATED ORAL 2 TIMES DAILY
Status: DISCONTINUED | OUTPATIENT
Start: 2020-04-14 | End: 2020-04-21 | Stop reason: HOSPADM

## 2020-04-14 RX ORDER — LEVOTHYROXINE SODIUM 0.07 MG/1
75 TABLET ORAL DAILY
Status: DISCONTINUED | OUTPATIENT
Start: 2020-04-14 | End: 2020-04-21 | Stop reason: HOSPADM

## 2020-04-14 RX ORDER — POTASSIUM CHLORIDE 20 MEQ/1
40 TABLET, EXTENDED RELEASE ORAL PRN
Status: DISCONTINUED | OUTPATIENT
Start: 2020-04-14 | End: 2020-04-21 | Stop reason: HOSPADM

## 2020-04-14 RX ORDER — ATORVASTATIN CALCIUM 80 MG/1
80 TABLET, FILM COATED ORAL DAILY
Status: DISCONTINUED | OUTPATIENT
Start: 2020-04-14 | End: 2020-04-21 | Stop reason: HOSPADM

## 2020-04-14 RX ORDER — DEXTROSE MONOHYDRATE 25 G/50ML
12.5 INJECTION, SOLUTION INTRAVENOUS PRN
Status: DISCONTINUED | OUTPATIENT
Start: 2020-04-14 | End: 2020-04-21 | Stop reason: HOSPADM

## 2020-04-14 RX ORDER — SODIUM CHLORIDE 0.9 % (FLUSH) 0.9 %
10 SYRINGE (ML) INJECTION EVERY 12 HOURS SCHEDULED
Status: DISCONTINUED | OUTPATIENT
Start: 2020-04-14 | End: 2020-04-21 | Stop reason: HOSPADM

## 2020-04-14 RX ORDER — FUROSEMIDE 20 MG/1
20 TABLET ORAL DAILY
Status: DISCONTINUED | OUTPATIENT
Start: 2020-04-14 | End: 2020-04-21 | Stop reason: HOSPADM

## 2020-04-14 RX ORDER — POTASSIUM CHLORIDE 7.45 MG/ML
10 INJECTION INTRAVENOUS PRN
Status: DISCONTINUED | OUTPATIENT
Start: 2020-04-14 | End: 2020-04-21 | Stop reason: HOSPADM

## 2020-04-14 RX ORDER — ACETAMINOPHEN 325 MG/1
650 TABLET ORAL EVERY 6 HOURS PRN
Status: DISCONTINUED | OUTPATIENT
Start: 2020-04-14 | End: 2020-04-17 | Stop reason: SDUPTHER

## 2020-04-14 RX ORDER — NICOTINE POLACRILEX 4 MG
15 LOZENGE BUCCAL PRN
Status: DISCONTINUED | OUTPATIENT
Start: 2020-04-14 | End: 2020-04-21 | Stop reason: HOSPADM

## 2020-04-14 RX ORDER — DEXTROSE MONOHYDRATE 50 MG/ML
100 INJECTION, SOLUTION INTRAVENOUS PRN
Status: DISCONTINUED | OUTPATIENT
Start: 2020-04-14 | End: 2020-04-21 | Stop reason: HOSPADM

## 2020-04-14 RX ORDER — ONDANSETRON 2 MG/ML
4 INJECTION INTRAMUSCULAR; INTRAVENOUS EVERY 6 HOURS PRN
Status: DISCONTINUED | OUTPATIENT
Start: 2020-04-14 | End: 2020-04-21 | Stop reason: HOSPADM

## 2020-04-14 RX ORDER — NITROGLYCERIN 0.4 MG/1
0.4 TABLET SUBLINGUAL EVERY 5 MIN PRN
Status: DISCONTINUED | OUTPATIENT
Start: 2020-04-14 | End: 2020-04-21 | Stop reason: HOSPADM

## 2020-04-14 RX ORDER — PROMETHAZINE HYDROCHLORIDE 25 MG/1
12.5 TABLET ORAL EVERY 6 HOURS PRN
Status: DISCONTINUED | OUTPATIENT
Start: 2020-04-14 | End: 2020-04-21 | Stop reason: HOSPADM

## 2020-04-14 RX ADMIN — ENOXAPARIN SODIUM 30 MG: 30 INJECTION SUBCUTANEOUS at 20:07

## 2020-04-14 RX ADMIN — MAGNESIUM SULFATE HEPTAHYDRATE 1 G: 1 INJECTION, SOLUTION INTRAVENOUS at 09:27

## 2020-04-14 RX ADMIN — Medication 10 ML: at 09:26

## 2020-04-14 RX ADMIN — INSULIN LISPRO 3 UNITS: 100 INJECTION, SOLUTION INTRAVENOUS; SUBCUTANEOUS at 01:04

## 2020-04-14 RX ADMIN — MAGNESIUM SULFATE HEPTAHYDRATE 1 G: 1 INJECTION, SOLUTION INTRAVENOUS at 10:38

## 2020-04-14 RX ADMIN — Medication 25 MILLICURIE: at 13:10

## 2020-04-14 RX ADMIN — POTASSIUM CHLORIDE 40 MEQ: 1500 TABLET, EXTENDED RELEASE ORAL at 09:26

## 2020-04-14 RX ADMIN — DULOXETINE HYDROCHLORIDE 60 MG: 60 CAPSULE, DELAYED RELEASE PELLETS ORAL at 09:15

## 2020-04-14 RX ADMIN — METOPROLOL TARTRATE 50 MG: 50 TABLET, FILM COATED ORAL at 09:15

## 2020-04-14 RX ADMIN — ATORVASTATIN CALCIUM 80 MG: 80 TABLET, FILM COATED ORAL at 09:15

## 2020-04-14 RX ADMIN — INSULIN GLARGINE 75 UNITS: 100 INJECTION, SOLUTION SUBCUTANEOUS at 21:59

## 2020-04-14 RX ADMIN — INSULIN LISPRO 6 UNITS: 100 INJECTION, SOLUTION INTRAVENOUS; SUBCUTANEOUS at 09:16

## 2020-04-14 RX ADMIN — INSULIN LISPRO 5 UNITS: 100 INJECTION, SOLUTION INTRAVENOUS; SUBCUTANEOUS at 21:59

## 2020-04-14 RX ADMIN — ACETAMINOPHEN 650 MG: 325 TABLET, FILM COATED ORAL at 13:58

## 2020-04-14 RX ADMIN — LEVOTHYROXINE SODIUM 75 MCG: 75 TABLET ORAL at 09:15

## 2020-04-14 RX ADMIN — ENOXAPARIN SODIUM 30 MG: 30 INJECTION SUBCUTANEOUS at 09:31

## 2020-04-14 RX ADMIN — GABAPENTIN 800 MG: 400 CAPSULE ORAL at 20:07

## 2020-04-14 RX ADMIN — INSULIN LISPRO 12 UNITS: 100 INJECTION, SOLUTION INTRAVENOUS; SUBCUTANEOUS at 13:16

## 2020-04-14 RX ADMIN — LISINOPRIL 5 MG: 5 TABLET ORAL at 09:15

## 2020-04-14 RX ADMIN — FUROSEMIDE 20 MG: 20 TABLET ORAL at 09:15

## 2020-04-14 RX ADMIN — INSULIN LISPRO 12 UNITS: 100 INJECTION, SOLUTION INTRAVENOUS; SUBCUTANEOUS at 18:37

## 2020-04-14 RX ADMIN — GABAPENTIN 800 MG: 400 CAPSULE ORAL at 09:15

## 2020-04-14 RX ADMIN — INSULIN GLARGINE 75 UNITS: 100 INJECTION, SOLUTION SUBCUTANEOUS at 01:04

## 2020-04-14 RX ADMIN — FAMOTIDINE 20 MG: 20 TABLET, FILM COATED ORAL at 09:15

## 2020-04-14 ASSESSMENT — ENCOUNTER SYMPTOMS
WHEEZING: 0
VOMITING: 0
ABDOMINAL PAIN: 0
NAUSEA: 1
SHORTNESS OF BREATH: 1
CONSTIPATION: 0
SORE THROAT: 0
DIARRHEA: 0
BACK PAIN: 0
COUGH: 0

## 2020-04-14 ASSESSMENT — PAIN SCALES - GENERAL
PAINLEVEL_OUTOF10: 0
PAINLEVEL_OUTOF10: 0
PAINLEVEL_OUTOF10: 2
PAINLEVEL_OUTOF10: 3
PAINLEVEL_OUTOF10: 4

## 2020-04-14 ASSESSMENT — PAIN DESCRIPTION - ORIENTATION: ORIENTATION: RIGHT

## 2020-04-14 ASSESSMENT — PAIN DESCRIPTION - FREQUENCY: FREQUENCY: INTERMITTENT

## 2020-04-14 ASSESSMENT — PAIN DESCRIPTION - LOCATION
LOCATION: HEAD
LOCATION: ABDOMEN

## 2020-04-14 ASSESSMENT — PAIN DESCRIPTION - PAIN TYPE
TYPE: ACUTE PAIN
TYPE: ACUTE PAIN

## 2020-04-14 ASSESSMENT — PAIN DESCRIPTION - DESCRIPTORS
DESCRIPTORS: ACHING
DESCRIPTORS: HEAVINESS

## 2020-04-14 NOTE — ED PROVIDER NOTES
EMERGENCY DEPARTMENT ENCOUNTER    Pt Name: Elvira Rios  MRN: [id number]  Katlyngfraquel 1955  Date of evaluation: 4/13/20  CHIEF COMPLAINT       Chief Complaint   Patient presents with    Nausea    Shortness of Breath     HISTORY OF PRESENT ILLNESS   The pt presents for evaluation of shortness of breath and nausea. Ongoing for a few days. Pt was recently admitted for pneumonia. Pt denies any pain. No fever recently. Pt denies any exposure to covid. The history is provided by the patient. Shortness of Breath   Severity:  Moderate  Onset quality:  Gradual  Timing:  Constant  Progression:  Worsening  Chronicity:  Recurrent  Relieved by:  Nothing  Worsened by:  Nothing  Ineffective treatments:  None tried  Associated symptoms: no abdominal pain, no chest pain, no fever, no headaches, no rash and no vomiting        REVIEW OF SYSTEMS     Review of Systems   Constitutional: Negative. Negative for chills and fever. HENT: Negative. Negative for congestion. Eyes: Negative. Respiratory: Positive for shortness of breath. Cardiovascular: Negative. Negative for chest pain. Gastrointestinal: Positive for nausea. Negative for abdominal pain, diarrhea and vomiting. Genitourinary: Negative. Musculoskeletal: Negative. Negative for back pain. Skin: Negative. Negative for rash. Neurological: Negative. Negative for headaches. All other systems reviewed and are negative.     PASTMEDICAL HISTORY     Past Medical History:   Diagnosis Date    Arrhythmia     Breast mass     CAD (coronary artery disease)     with valvular disease    Chronic neck pain     Coccygeal pain 3/7/2016    Constipation     Depression     Diabetic neuropathy (HCC)     History of hepatitis A     possible history of hepatitis A in the past    History of renal calculi     Hypertension     Hyperthyroidism     Hypothyroidism     Morbid obesity with BMI of 45.0-49.9, adult ([facility] [address] 3/31/2016    Nephrolithiasis     Neuropathy     Type II or unspecified type diabetes mellitus without mention of complication, not stated as uncontrolled     non insulin dependent     UTI (lower urinary tract infection)      SURGICAL HISTORY       Past Surgical History:   Procedure Laterality Date    BREAST BIOPSY  2012    negative    CARDIOVASCULAR STRESS TEST      7/17/15 no results    CHOLECYSTECTOMY      CYST REMOVAL      right hand    CYSTO/URETERO/PYELOSCOPY, CALCULUS TX Right 8/26/2019    CYSTOSCOPY URETEROSCOPY  STONE BASKET RETRIEVAL RIGHT STENT EXCHANGE performed by Heather Mcmillan MD at [facility] Right 8/20/2019    CYSTOSCOPY URETERAL STENT INSERTION performed by Heather Mcmillan MD at [facility]  2009    LITHOTRIPSY     [facility]       Current Discharge Medication List      CONTINUE these medications which have NOT CHANGED    Details   Insulin Degludec 200 UNIT/ML SOPN Inject 75 Units into the skin nightly  Qty: 5 pen, Refills: 2      lisinopril (PRINIVIL;ZESTRIL) 5 MG tablet Take 1 tablet by mouth daily  Qty: 30 tablet, Refills: 3      metoprolol tartrate (LOPRESSOR) 50 MG tablet Take 1 tablet by mouth 2 times daily  Qty: 60 tablet, Refills: 3      atorvastatin (LIPITOR) 80 MG tablet Take 1 tablet by mouth daily  Qty: 30 tablet, Refills: 3      Insulin Degludec (TRESIBA) 100 UNIT/ML SOLN Inject into the skin      glipiZIDE (GLUCOTROL) 10 MG tablet Take 2 tablets by mouth 2 times daily (before meals)  Qty: 120 tablet, Refills: 3    Associated Diagnoses: Uncontrolled type 2 diabetes mellitus with hyperglycemia, without long-term current use of insulin ([facility])      gabapentin (NEURONTIN) 800 MG tablet take 1 tablet by mouth four times a day  Qty: 120 tablet, Refills: 3      DULoxetine (CYMBALTA) 60 MG extended release capsule take 1 capsule by mouth once daily  Qty: 30 capsule, Refills: 2    Associated Diagnoses: Diabetic polyneuropathy associated with type 2 diabetes mellitus (HCC)      levothyroxine (SYNTHROID) 100 MCG tablet Take 1 tablet by mouth daily  Qty: 30 tablet, Refills: 5    Associated Diagnoses: Other specified hypothyroidism      furosemide (LASIX) 20 MG tablet take 1 tablet by mouth daily  Qty: 90 tablet, Refills: 3    Associated Diagnoses: Leg swelling           ALLERGIES     is allergic to cefuroxime axetil; dilaudid [hydromorphone]; metformin and related; sulfa antibiotics; amoxicillin; antihistamines, loratadine-type; aspartame and phenylalanine; eggs or egg-derived products; lorazepam; nubain [nalbuphine hcl]; and vistaril [hydroxyzine hcl]. FAMILY HISTORY     She indicated that her mother is . She indicated that her father is . SOCIAL HISTORY       Social History     Tobacco Use    Smoking status: Former Smoker     Types: Cigarettes    Smokeless tobacco: Never Used    Tobacco comment: pt states she quit smoking cigarettes at the age of 14   Substance Use Topics    Alcohol use: No     Alcohol/week: 0.0 standard drinks    Drug use: No     Comment: once in a while     PHYSICAL EXAM     INITIAL VITALS: /64   Pulse 104   Temp 97.9 °F (36.6 °C) (Oral)   Resp 18   Ht 5' 2\" (1.575 m)   Wt 231 lb 0.7 oz (104.8 kg)   LMP 2015 (Approximate)   SpO2 92%   BMI 42.26 kg/m²    Physical Exam  Vitals signs and nursing note reviewed. Constitutional:       Appearance: Normal appearance. HENT:      Head: Normocephalic and atraumatic. Nose: No congestion. Mouth/Throat:      Mouth: Mucous membranes are moist.   Eyes:      Conjunctiva/sclera: Conjunctivae normal.   Neck:      Musculoskeletal: Normal range of motion and neck supple. Cardiovascular:      Rate and Rhythm: Normal rate and regular rhythm. Heart sounds: No murmur. Pulmonary:      Effort: Pulmonary effort is normal.      Breath sounds: Normal breath sounds. Abdominal:      Palpations: Abdomen is soft. Tenderness: There is no abdominal tenderness. Hernia: No hernia is present. Musculoskeletal:      Right lower leg: No edema. Left lower leg: No edema. Comments: No calf tenderness. Skin:     General: Skin is warm and dry. Capillary Refill: Capillary refill takes less than 2 seconds. Findings: No rash. Neurological:      General: No focal deficit present. Mental Status: She is alert. MEDICAL DECISION MAKING:     Shortness of breath. Reviewed ekg, labs, and imaging. On reeval, exam unchanged. Trop mildly elevated, increased since last visit. Otherwise, workup nonacute. Discussed with medicine, will admit for further therapy and evaluation. Pt is ready for admission at this time. CRITICAL CARE:       PROCEDURES:    Procedures    DIAGNOSTIC RESULTS   EKG:All EKG's are interpreted by the Emergency Department Physician who either signs or Co-signs this chart in the absence of a cardiologist.        RADIOLOGY:All plain film, CT, MRI, and formal ultrasound images (except ED bedside ultrasound) are read by the radiologist, see reports below, unless otherwisenoted in MDM or here. XR CHEST PORTABLE   Final Result   No acute process. NM CARDIAC MUGA SCAN    (Results Pending)   US GALLBLADDER RUQ    (Results Pending)     LABS: All lab results were reviewed by myself, and all abnormals are listed below.   Labs Reviewed   CBC WITH AUTO DIFFERENTIAL - Abnormal; Notable for the following components:       Result Value    RBC 5.55 (*)     Hemoglobin 16.5 (*)     Hematocrit 48.7 (*)     Platelets 847 (*)     Seg Neutrophils 67 (*)     Lymphocytes 21 (*)     Monocytes 11 (*)     Absolute Lymph # 0.92 (*)     All other components within normal limits   BASIC METABOLIC PANEL - Abnormal; Notable for the following components:    Glucose 305 (*)     BUN 7 (*)     CREATININE 0.44 (*)     All other components within normal limits   TROPONIN - Abnormal; Notable for the following TempSrc: Axillary  Oral    SpO2: 93%  92%    Weight:  229 lb 4.5 oz (104 kg)  231 lb 0.7 oz (104.8 kg)   Height:           The patient was given the following medications while in the emergency department:  Orders Placed This Encounter   Medications    atorvastatin (LIPITOR) tablet 80 mg    DULoxetine (CYMBALTA) extended release capsule 60 mg    furosemide (LASIX) tablet 20 mg    gabapentin (NEURONTIN) capsule 800 mg    DISCONTD: Insulin Degludec SOPN 75 Units    levothyroxine (SYNTHROID) tablet 75 mcg    lisinopril (PRINIVIL;ZESTRIL) tablet 5 mg    metoprolol tartrate (LOPRESSOR) tablet 50 mg    glucose (GLUTOSE) 40 % oral gel 15 g    dextrose 50 % IV solution    glucagon (rDNA) injection 1 mg    dextrose 5 % solution    DISCONTD: insulin lispro (HUMALOG) injection vial 0-12 Units    DISCONTD: insulin lispro (HUMALOG) injection vial 0-6 Units    sodium chloride flush 0.9 % injection 10 mL    sodium chloride flush 0.9 % injection 10 mL    OR Linked Order Group     potassium chloride (KLOR-CON M) extended release tablet 40 mEq     potassium bicarb-citric acid (EFFER-K) effervescent tablet 40 mEq     potassium chloride 10 mEq/100 mL IVPB (Peripheral Line)    magnesium sulfate 1 g in dextrose 5% 100 mL IVPB    OR Linked Order Group     acetaminophen (TYLENOL) tablet 650 mg     acetaminophen (TYLENOL) suppository 650 mg    magnesium hydroxide (MILK OF MAGNESIA) 400 MG/5ML suspension 30 mL    OR Linked Order Group     promethazine (PHENERGAN) tablet 12.5 mg     ondansetron (ZOFRAN) injection 4 mg    DISCONTD: famotidine (PEPCID) tablet 20 mg     May cancel order if already on H2 blocker/PPI alternative as a home medication to avoid duplication of therapy.     aspirin EC tablet 325 mg    enoxaparin (LOVENOX) injection 30 mg    nitroGLYCERIN (NITROSTAT) SL tablet 0.4 mg    insulin glargine (LANTUS) injection vial 75 Units    insulin lispro (HUMALOG) injection vial 0-18 Units    insulin

## 2020-04-14 NOTE — PROGRESS NOTES
Spoke to RN regarding MUGA scan. We attempted testing today but were unable to complete due to dose infiltrating. A new IV needs to be started (20 gauge or larger) with good patency, and we will try again in the late morning tomorrow, 4/15/20. Dr. Jeovanny Espinoza, Radiologist, advised to reattempt tomorrow.  With any questions, please call [phone] or contact radiology thank you Electronically signed by Angeline Wharton on 4/14/2020 at 1:40 PM

## 2020-04-14 NOTE — ED TRIAGE NOTES
Mode of arrival (squad #, walk in, police, etc) : squad        Chief complaint(s): nausea, shortness of breath        Arrival Note (brief scenario, treatment PTA, etc). : Pt arrives via EMS for complaints of SOB and nausea. Pt mistakenly states she was discharged from here 4 days ago, but was actually discharged on 4/1/2020 from here after a bout of pneumonia. Pt denies cough, she reports one episode of vomiting. Pt also states she gets kidney stones frequently. Pt denies chest pain. Pt is A&Ox4, in no acute distress, appears nauseous. Respirations are even and unlabored. C= \"Have you ever felt that you should Cut down on your drinking? \"  No  A= \"Have people Annoyed you by criticizing your drinking? \"  No  G= \"Have you ever felt bad or Guilty about your drinking? \"  No  E= \"Have you ever had a drink as an Eye-opener first thing in the morning to steady your nerves or to help a hangover? \"  No      Deferred []      Reason for deferring: N/A    *If yes to two or more: probable alcohol abuse. *

## 2020-04-14 NOTE — H&P
Vincent Reed MD       ALLERGIES      Cefuroxime axetil; Dilaudid [hydromorphone]; Metformin and related; Sulfa antibiotics; Amoxicillin; Antihistamines, loratadine-type; Aspartame and phenylalanine; Eggs or egg-derived products; Lorazepam; Nubain [nalbuphine hcl]; and Vistaril [hydroxyzine hcl]    REVIEW OF SYSTEMS     Review of Systems   Constitutional: Negative for chills, diaphoresis and fever. HENT: Negative for congestion and sore throat. Respiratory: Positive for shortness of breath. Negative for cough and wheezing. Cardiovascular: Negative for chest pain, palpitations and leg swelling. Gastrointestinal: Positive for nausea. Negative for abdominal pain, constipation, diarrhea and vomiting. Genitourinary: Negative for dysuria, frequency and urgency. Musculoskeletal: Negative for back pain and myalgias. Skin: Negative for rash. Neurological: Negative for dizziness, weakness and headaches. Psychiatric/Behavioral: The patient is not nervous/anxious. PHYSICAL EXAM      /78   Pulse 105   Temp 98.6 °F (37 °C) (Axillary)   Resp 18   Ht 5' 2\" (1.575 m)   Wt 229 lb 4.5 oz (104 kg)   LMP 01/12/2015 (Approximate)   SpO2 93%   BMI 41.94 kg/m²  Body mass index is 41.94 kg/m². Physical Exam  Constitutional:       General: She is not in acute distress. Appearance: She is well-developed. She is obese. She is not diaphoretic. HENT:      Head: Normocephalic and atraumatic. Eyes:      Conjunctiva/sclera: Conjunctivae normal.      Pupils: Pupils are equal, round, and reactive to light. Neck:      Musculoskeletal: Normal range of motion and neck supple. Trachea: No tracheal deviation. Cardiovascular:      Rate and Rhythm: Normal rate and regular rhythm. Heart sounds: Normal heart sounds. No murmur. No friction rub. No gallop. Pulmonary:      Effort: Pulmonary effort is normal. No respiratory distress. Breath sounds: Normal breath sounds. No wheezing or rales. Chest:      Chest wall: No tenderness. Abdominal:      General: Bowel sounds are normal. There is no distension. Palpations: Abdomen is soft. Tenderness: There is no abdominal tenderness. There is no guarding. Musculoskeletal: Normal range of motion. General: No tenderness. Lymphadenopathy:      Cervical: No cervical adenopathy. Skin:     General: Skin is warm and dry. Coloration: Skin is not pale. Findings: No erythema or rash. Neurological:      Mental Status: She is alert and oriented to person, place, and time. Motor: No seizure activity. Coordination: Coordination normal.   Psychiatric:         Behavior: Behavior normal.         Thought Content: Thought content normal.       DIAGNOSTICS      EKG:     EKG 12 Lead [[id number]]    Collected: 04/13/20 2204    Updated: 04/13/20 2207     Ventricular Rate 96 BPM    Atrial Rate 96 BPM    P-R Interval 132 ms    QRS Duration 92 ms    Q-T Interval 368 ms    QTc Calculation (Bazett) 464 ms    P Axis 10 degrees    R Axis -39 degrees    T Axis 10 degrees   Narrative:     *Poor data quality, interpretation may be adversely affected  Sinus rhythm with frequent Premature ventricular complexes  Left axis deviation  Minimal voltage criteria for LVH, may be normal variant  Inferior infarct , age undetermined  Anterolateral infarct (cited on or before 26-AUG-2005)  Abnormal ECG  When compared with ECG of 28-MAR-2020 05:16,  ST now depressed in Inferior leads       Labs:  CBC:   Recent Labs     04/13/20 2200   WBC 4.4   HGB 16.5*   *     BMP:    Recent Labs     04/13/20 2200      K 3.7      CO2 21   BUN 7*   CREATININE 0.44*   GLUCOSE 305*     S. Calcium:  Recent Labs     04/13/20 2200   CALCIUM 8.6     S. Ionized Calcium:No results for input(s): IONCA in the last 72 hours. S. Magnesium:No results for input(s): MG in the last 72 hours. S. Phosphorus:No results for input(s): PHOS in the last 72 hours.   S. am COMPARISON: Chest portable July 30, 2018 HISTORY: ORDERING SYSTEM PROVIDED HISTORY: cp TECHNOLOGIST PROVIDED HISTORY: cp Reason for Exam: cp Acuity: Acute Type of Exam: Initial Additional signs and symptoms: Chest pain, palpitations, lung pain/rib pain with deep breaths Relevant Medical/Surgical History: Chest pain, palpitations, lung pain/rib pain with deep breaths FINDINGS: The heart is normal in size and configuration. The mediastinal contours are within normal limits. Left basilar subsegmental atelectasis versus airspace disease is present. Lungs are otherwise well aerated. The pleural surfaces are normal and no evidence of a pleural effusion is seen. Bones and soft tissues are unremarkable. Left basilar subsegmental atelectasis versus airspace disease. Otherwise, unremarkable single AP upright view of the chest.     Nm Cardiac Stress Test Nuclear Imaging    Result Date: 3/28/2020  EXAMINATION: MYOCARDIAL PERFUSION IMAGING 3/28/2020 7:09 am TECHNIQUE: For the rest study, 9.8 mCi of Tc 99 labeled sestamibi were injected. SPECT images were acquired. Under cardiology supervision, 0.4mg Serina Rom was infused. After pharmacologic stress, 36.7 mCi of Tc 99 labeled sestamibi were injected. SPECT images with ECG gating were acquired. COMPARISON: July 31, 2018 HISTORY: ORDERING SYSTEM PROVIDED HISTORY: Chest pain TECHNOLOGIST PROVIDED HISTORY: Reason for Exam: Chest pain Procedure Type->Rx Chest Pain Reason for Exam: Chest pain Acuity: Unknown Type of Exam: Unknown FINDINGS: There is normal accumulation of tracer throughout the myocardium on rest images and stress images. There are no fixed or reversible defects. End diastolic volume is 83 ml. The ejection fraction equals 20%. Hypokinesis of the septal wall and apex. Dyskinesis of the lateral wall. The left ventricle is dilated. TID of 1.05.     1. No pharmacologically induced reversible perfusion defects to suggest ischemia.  2. Ejection fraction of 20%

## 2020-04-14 NOTE — PLAN OF CARE
Problem: Pain:  Goal: Pain level will decrease  Description: Pain level will decrease  Outcome: Ongoing   Pt has had no complaints of chest pain since being admitted

## 2020-04-14 NOTE — CONSULTS
nightly 3/30/20  Yes Patricia Hansen MD   lisinopril (PRINIVIL;ZESTRIL) 5 MG tablet Take 1 tablet by mouth daily 3/31/20  Yes Patricia Hansen MD   metoprolol tartrate (LOPRESSOR) 50 MG tablet Take 1 tablet by mouth 2 times daily 3/30/20  Yes Patricia Hansen MD   atorvastatin (LIPITOR) 80 MG tablet Take 1 tablet by mouth daily 3/31/20  Yes Patricia Hansen MD   Insulin Degludec (TRESIBA) 100 UNIT/ML SOLN Inject into the skin   Yes Negrita Frank MD   glipiZIDE (GLUCOTROL) 10 MG tablet Take 2 tablets by mouth 2 times daily (before meals) 7/12/17  Yes Terry Gifford MD   gabapentin (NEURONTIN) 800 MG tablet take 1 tablet by mouth four times a day  Patient taking differently: 800 mg 2 times daily. 6/23/17  Yes Terry Gifford MD   DULoxetine (CYMBALTA) 60 MG extended release capsule take 1 capsule by mouth once daily 5/30/17  Yes Terry Gifford MD   levothyroxine (SYNTHROID) 100 MCG tablet Take 1 tablet by mouth daily  Patient taking differently: Take 75 mcg by mouth daily  2/7/17  Yes Terry Gifford MD   furosemide (LASIX) 20 MG tablet take 1 tablet by mouth daily 2/7/17  Yes Terry Gifford MD       Allergies:  Cefuroxime axetil; Dilaudid [hydromorphone]; Metformin and related; Sulfa antibiotics; Amoxicillin; Antihistamines, loratadine-type; Aspartame and phenylalanine; Eggs or egg-derived products; Lorazepam; Nubain [nalbuphine hcl]; and Vistaril [hydroxyzine hcl]    Social History:   reports that she has quit smoking. Her smoking use included cigarettes. She has never used smokeless tobacco. She reports that she does not drink alcohol or use drugs. Family History:   neg for early CAD    REVIEW OF SYSTEMS:    · Constitutional: there has been no unanticipated weight loss. There's been No change in energy level, No change in activity level. · Eyes: No visual changes or diplopia. No scleral icterus. · ENT: No Headaches, hearing loss or vertigo. No mouth sores or sore throat. · Cardiovascular:  As HPI  · Respiratory: As HPI  · Gastrointestinal: No abdominal pain, appetite loss, blood in stools. No change in bowel or bladder habits. · Genitourinary: No dysuria, trouble voiding, or hematuria. · Musculoskeletal:  No gait disturbance, No weakness or joint complaints. · Integumentary: No rash or pruritis. · Neurological: No headache, diplopia, change in muscle strength, numbness or tingling. No change in gait, balance, coordination, mood, affect, memory, mentation, behavior. · Psychiatric: No anxiety, or depression. · Endocrine: No temperature intolerance. No excessive thirst, fluid intake, or urination. No tremor. · Hematologic/Lymphatic: No abnormal bruising or bleeding, blood clots or swollen lymph nodes. · Allergic/Immunologic: No nasal congestion or hives. PHYSICAL EXAM:    Physical Examination:    /64   Pulse 104   Temp 97.9 °F (36.6 °C) (Oral)   Resp 18   Ht 5' 2\" (1.575 m)   Wt 229 lb 4.5 oz (104 kg)   LMP 01/12/2015 (Approximate)   SpO2 92%   BMI 41.94 kg/m²    Constitutional and General Appearance: alert, cooperative, no distress and appears stated age  [de-identified]: PERRL, no cervical lymphadenopathy. No masses palpable. Normal oral mucosa  Respiratory:  · Normal excursion and expansion without use of accessory muscles  · Resp Auscultation: Good respiratory effort. No for increased work of breathing. On auscultation: Clear  Cardiovascular:  · The apical impulse is not displaced  · Heart tones are crisp and normal. regular S1 and S2. Murmurs: none  · Jugular venous pulsation Normal  · The carotid upstroke is normal in amplitude and contour without delay or bruit  · Peripheral pulses are symmetrical and full   Abdomen:  · No masses or tenderness  · Bowel sounds present  Extremities:  ·  No Cyanosis or Clubbing  ·  Lower extremity edema: none  ·  Skin: Warm and dry  Neurological:  · Alert and oriented.   · Moves all extremities well  · No abnormalities of mood, affect, memory, mentation, or behavior are noted    DATA:    Diagnostics:      EKG:   Results for orders placed or performed during the hospital encounter of 04/13/20   EKG 12 Lead   Result Value Ref Range    Ventricular Rate 96 BPM    Atrial Rate 96 BPM    P-R Interval 132 ms    QRS Duration 92 ms    Q-T Interval 368 ms    QTc Calculation (Bazett) 464 ms    P Axis 10 degrees    R Axis -39 degrees    T Axis 10 degrees    Narrative    Sinus rhythm with frequent Premature ventricular complexes  Left axis deviation  Minimal voltage criteria for LVH, may be normal variant  Inferior infarct , age undetermined  Poor R wave progression  Abnormal ECG  When compared with ECG of 28-MAR-2020 05:16,  ST now depressed in Inferior leads       Echo:  Results for orders placed or performed during the hospital encounter of 03/28/20   ECHO Complete 2D W Doppler W Color  Summary  Contrast was utilized on this technically difficult study. Small LV cavity. Severe left ventricular hypertrophy. Global left ventricular systolic function is normal. Estimated LV EF >55 %. No obvious wall motion abnormality seen. Evidence of mild (grade I)  diastolic dysfunction. Both atria are normal in size. Normal right ventricular size and function. No significant valvular regurgitation or stenosis seen. No obvious thrombus, vegetation or shunt seen on present study. No significant pericardial effusion seen. Stress Test 3/28/29:  1. No pharmacologically induced reversible perfusion defects to suggest   ischemia. 2. Ejection fraction of 20% (previously 49%). 3. Hypokinesis of the septal wall and apex.  Dyskinesis of the lateral wall. LAST CATH:   Results for orders placed or performed during the hospital encounter of 08/01/18   Diagnostic Cardiac Cath Lab Procedure        Procedure Summary      Mild CAD.       Recommendations      Medical therapy and risk factor modifications             Labs:     CBC:   Recent Labs     04/13/20  2200   WBC 4.4   HGB 16.5*   HCT

## 2020-04-14 NOTE — PROGRESS NOTES
pts , Ozzie Dunne, called for another update. Questions answered. States he will call back again later for another update.

## 2020-04-15 ENCOUNTER — APPOINTMENT (OUTPATIENT)
Dept: NUCLEAR MEDICINE | Age: 65
DRG: 241 | End: 2020-04-15
Payer: COMMERCIAL

## 2020-04-15 ENCOUNTER — APPOINTMENT (OUTPATIENT)
Dept: ULTRASOUND IMAGING | Age: 65
DRG: 241 | End: 2020-04-15
Payer: COMMERCIAL

## 2020-04-15 LAB
ALBUMIN SERPL-MCNC: 2.9 G/DL (ref 3.5–5.2)
ALBUMIN/GLOBULIN RATIO: ABNORMAL (ref 1–2.5)
ALP BLD-CCNC: 112 U/L (ref 35–104)
ALT SERPL-CCNC: 25 U/L (ref 5–33)
ANION GAP SERPL CALCULATED.3IONS-SCNC: 17 MMOL/L (ref 9–17)
AST SERPL-CCNC: 22 U/L
BILIRUB SERPL-MCNC: 0.57 MG/DL (ref 0.3–1.2)
BUN BLDV-MCNC: 6 MG/DL (ref 8–23)
BUN/CREAT BLD: ABNORMAL (ref 9–20)
CALCIUM SERPL-MCNC: 8.7 MG/DL (ref 8.6–10.4)
CHLORIDE BLD-SCNC: 98 MMOL/L (ref 98–107)
CO2: 20 MMOL/L (ref 20–31)
CREAT SERPL-MCNC: 0.42 MG/DL (ref 0.5–0.9)
ESTIMATED AVERAGE GLUCOSE: 243 MG/DL
GFR AFRICAN AMERICAN: >60 ML/MIN
GFR NON-AFRICAN AMERICAN: >60 ML/MIN
GFR SERPL CREATININE-BSD FRML MDRD: ABNORMAL ML/MIN/{1.73_M2}
GFR SERPL CREATININE-BSD FRML MDRD: ABNORMAL ML/MIN/{1.73_M2}
GLUCOSE BLD-MCNC: 214 MG/DL (ref 70–99)
GLUCOSE BLD-MCNC: 246 MG/DL (ref 65–105)
GLUCOSE BLD-MCNC: 247 MG/DL (ref 65–105)
GLUCOSE BLD-MCNC: 262 MG/DL (ref 65–105)
GLUCOSE BLD-MCNC: 319 MG/DL (ref 65–105)
HBA1C MFR BLD: 10.1 % (ref 4–6)
HCT VFR BLD CALC: 47.4 % (ref 36–46)
HEMOGLOBIN: 16 G/DL (ref 12–16)
LIPASE: 9 U/L (ref 13–60)
LV EF: 54 %
LVEF MODALITY: NORMAL
MCH RBC QN AUTO: 29.4 PG (ref 26–34)
MCHC RBC AUTO-ENTMCNC: 33.8 G/DL (ref 31–37)
MCV RBC AUTO: 87.2 FL (ref 80–100)
NRBC AUTOMATED: ABNORMAL PER 100 WBC
PDW BLD-RTO: 13.4 % (ref 11.5–14.9)
PLATELET # BLD: 148 K/UL (ref 150–450)
PMV BLD AUTO: 10.7 FL (ref 6–12)
POTASSIUM SERPL-SCNC: 3.9 MMOL/L (ref 3.7–5.3)
RBC # BLD: 5.43 M/UL (ref 4–5.2)
SODIUM BLD-SCNC: 135 MMOL/L (ref 135–144)
TOTAL PROTEIN: 6.8 G/DL (ref 6.4–8.3)
TROPONIN INTERP: ABNORMAL
TROPONIN INTERP: ABNORMAL
TROPONIN T: ABNORMAL NG/ML
TROPONIN T: ABNORMAL NG/ML
TROPONIN, HIGH SENSITIVITY: 17 NG/L (ref 0–14)
TROPONIN, HIGH SENSITIVITY: 18 NG/L (ref 0–14)
WBC # BLD: 5.8 K/UL (ref 3.5–11)

## 2020-04-15 PROCEDURE — 96375 TX/PRO/DX INJ NEW DRUG ADDON: CPT

## 2020-04-15 PROCEDURE — 80053 COMPREHEN METABOLIC PANEL: CPT

## 2020-04-15 PROCEDURE — 6360000002 HC RX W HCPCS: Performed by: NURSE PRACTITIONER

## 2020-04-15 PROCEDURE — G0378 HOSPITAL OBSERVATION PER HR: HCPCS | Performed by: INTERNAL MEDICINE

## 2020-04-15 PROCEDURE — G0378 HOSPITAL OBSERVATION PER HR: HCPCS

## 2020-04-15 PROCEDURE — 6370000000 HC RX 637 (ALT 250 FOR IP): Performed by: INTERNAL MEDICINE

## 2020-04-15 PROCEDURE — 6370000000 HC RX 637 (ALT 250 FOR IP): Performed by: NURSE PRACTITIONER

## 2020-04-15 PROCEDURE — 76705 ECHO EXAM OF ABDOMEN: CPT

## 2020-04-15 PROCEDURE — 3430000000 HC RX DIAGNOSTIC RADIOPHARMACEUTICAL: Performed by: INTERNAL MEDICINE

## 2020-04-15 PROCEDURE — 85027 COMPLETE CBC AUTOMATED: CPT

## 2020-04-15 PROCEDURE — 36415 COLL VENOUS BLD VENIPUNCTURE: CPT

## 2020-04-15 PROCEDURE — 96372 THER/PROPH/DIAG INJ SC/IM: CPT

## 2020-04-15 PROCEDURE — 99225 PR SBSQ OBSERVATION CARE/DAY 25 MINUTES: CPT | Performed by: INTERNAL MEDICINE

## 2020-04-15 PROCEDURE — 82947 ASSAY GLUCOSE BLOOD QUANT: CPT

## 2020-04-15 PROCEDURE — 83690 ASSAY OF LIPASE: CPT

## 2020-04-15 PROCEDURE — 2580000003 HC RX 258: Performed by: NURSE PRACTITIONER

## 2020-04-15 PROCEDURE — 96376 TX/PRO/DX INJ SAME DRUG ADON: CPT

## 2020-04-15 PROCEDURE — 78472 GATED HEART PLANAR SINGLE: CPT

## 2020-04-15 PROCEDURE — A9560 TC99M LABELED RBC: HCPCS | Performed by: INTERNAL MEDICINE

## 2020-04-15 PROCEDURE — 84484 ASSAY OF TROPONIN QUANT: CPT

## 2020-04-15 PROCEDURE — 99233 SBSQ HOSP IP/OBS HIGH 50: CPT | Performed by: INTERNAL MEDICINE

## 2020-04-15 PROCEDURE — 83036 HEMOGLOBIN GLYCOSYLATED A1C: CPT

## 2020-04-15 RX ORDER — GLIPIZIDE 5 MG/1
20 TABLET ORAL
Status: DISCONTINUED | OUTPATIENT
Start: 2020-04-15 | End: 2020-04-21 | Stop reason: HOSPADM

## 2020-04-15 RX ADMIN — LEVOTHYROXINE SODIUM 75 MCG: 75 TABLET ORAL at 09:08

## 2020-04-15 RX ADMIN — Medication 22 MILLICURIE: at 12:45

## 2020-04-15 RX ADMIN — GLIPIZIDE 20 MG: 5 TABLET ORAL at 18:31

## 2020-04-15 RX ADMIN — ENOXAPARIN SODIUM 30 MG: 30 INJECTION SUBCUTANEOUS at 20:25

## 2020-04-15 RX ADMIN — ATORVASTATIN CALCIUM 80 MG: 80 TABLET, FILM COATED ORAL at 09:08

## 2020-04-15 RX ADMIN — ONDANSETRON 4 MG: 2 INJECTION INTRAMUSCULAR; INTRAVENOUS at 10:46

## 2020-04-15 RX ADMIN — ACETAMINOPHEN 650 MG: 325 TABLET, FILM COATED ORAL at 23:46

## 2020-04-15 RX ADMIN — ASPIRIN 325 MG: 325 TABLET, COATED ORAL at 09:07

## 2020-04-15 RX ADMIN — ONDANSETRON 4 MG: 2 INJECTION INTRAMUSCULAR; INTRAVENOUS at 05:37

## 2020-04-15 RX ADMIN — INSULIN LISPRO 6 UNITS: 100 INJECTION, SOLUTION INTRAVENOUS; SUBCUTANEOUS at 09:08

## 2020-04-15 RX ADMIN — METOPROLOL TARTRATE 50 MG: 50 TABLET, FILM COATED ORAL at 20:25

## 2020-04-15 RX ADMIN — DULOXETINE HYDROCHLORIDE 60 MG: 60 CAPSULE, DELAYED RELEASE PELLETS ORAL at 09:08

## 2020-04-15 RX ADMIN — FUROSEMIDE 20 MG: 20 TABLET ORAL at 09:08

## 2020-04-15 RX ADMIN — ACETAMINOPHEN 650 MG: 325 TABLET, FILM COATED ORAL at 17:46

## 2020-04-15 RX ADMIN — GABAPENTIN 800 MG: 400 CAPSULE ORAL at 20:25

## 2020-04-15 RX ADMIN — Medication 10 ML: at 09:11

## 2020-04-15 RX ADMIN — INSULIN LISPRO 6 UNITS: 100 INJECTION, SOLUTION INTRAVENOUS; SUBCUTANEOUS at 13:47

## 2020-04-15 RX ADMIN — INSULIN GLARGINE 75 UNITS: 100 INJECTION, SOLUTION SUBCUTANEOUS at 20:23

## 2020-04-15 RX ADMIN — LISINOPRIL 5 MG: 5 TABLET ORAL at 09:08

## 2020-04-15 RX ADMIN — Medication 10 ML: at 20:28

## 2020-04-15 RX ADMIN — INSULIN LISPRO 5 UNITS: 100 INJECTION, SOLUTION INTRAVENOUS; SUBCUTANEOUS at 20:24

## 2020-04-15 RX ADMIN — METOPROLOL TARTRATE 50 MG: 50 TABLET, FILM COATED ORAL at 09:08

## 2020-04-15 RX ADMIN — GABAPENTIN 800 MG: 400 CAPSULE ORAL at 09:08

## 2020-04-15 RX ADMIN — INSULIN LISPRO 12 UNITS: 100 INJECTION, SOLUTION INTRAVENOUS; SUBCUTANEOUS at 17:47

## 2020-04-15 ASSESSMENT — ENCOUNTER SYMPTOMS
VOMITING: 0
DIARRHEA: 1
ALLERGIC/IMMUNOLOGIC NEGATIVE: 1
BACK PAIN: 1
SHORTNESS OF BREATH: 1
VOMITING: 1
SORE THROAT: 0
DIARRHEA: 0
CONSTIPATION: 0
COLOR CHANGE: 0
BLOOD IN STOOL: 0
SHORTNESS OF BREATH: 0
BACK PAIN: 0
COUGH: 0
WHEEZING: 0
NAUSEA: 1
ABDOMINAL PAIN: 1
RECTAL PAIN: 0
TROUBLE SWALLOWING: 0
ANAL BLEEDING: 0
EYES NEGATIVE: 1
VOICE CHANGE: 0
CHOKING: 0

## 2020-04-15 ASSESSMENT — PAIN SCALES - GENERAL
PAINLEVEL_OUTOF10: 0
PAINLEVEL_OUTOF10: 0
PAINLEVEL_OUTOF10: 2
PAINLEVEL_OUTOF10: 4

## 2020-04-15 NOTE — PLAN OF CARE
Problem: Pain:  Goal: Control of acute pain  Description: Control of acute pain  Note: Pt pain is controlled with prn tylenol. Will continue to monitor. Problem: Activity:  Goal: Ability to tolerate increased activity will improve  Description: Ability to tolerate increased activity will improve  Note: Pt able to express needs. Tolerates ambulation well with some discomfort upon exertion. Will continue to monitor.

## 2020-04-15 NOTE — CONSULTS
mildly elevated. However lipase levels within normal limits and her liver tests within normal limits as well. Other history by the APRN as follows. This history also reviewed and discussed with the patient. 80-year-old female with a history of hypertension, hypothyroidism, morbid obesity, poorly controlled DM II presented to the ER with shortness of breath and nausea. Patient reports recent history of pneumonia. Patient states that she has been having dizziness, weakness, shortness of breath for the last few months. Patient is concerned that she has something severely wrong with her heart. Patient also reports nausea over the last 2 weeks with an isolated episode of vomiting. She denies any fevers, chills, chest pain. She does have some mild right upper quadrant pain that comes and goes. This pain started a few months ago. Pain is described as sharp and lasts over a period  of 30 minutes to 2 hours. She also reports diarrhea intermittently over the last few weeks. Denies any sick contacts. No aggravating or alleviating factors.    She is going down for MUGA scan today.           Past Medical History:     Past Medical History:   Diagnosis Date    Arrhythmia     Breast mass     CAD (coronary artery disease)     with valvular disease    Chronic neck pain     Coccygeal pain 3/7/2016    Constipation     Depression     Diabetic neuropathy (HCC)     History of hepatitis A     possible history of hepatitis A in the past    History of renal calculi     Hypertension     Hyperthyroidism     Hypothyroidism     Morbid obesity with BMI of 45.0-49.9, adult ([facility] [address] 3/31/2016    Nephrolithiasis     Neuropathy     Type II or unspecified type diabetes mellitus without mention of complication, not stated as uncontrolled     non insulin dependent     UTI (lower urinary tract infection)         Past Surgical History:     Past Surgical History:   Procedure Laterality Date    BREAST BIOPSY  2012 /71   Pulse 80   Temp 99.8 °F (37.7 °C) (Oral)   Resp 16   Ht 5' 2\" (1.575 m)   Wt 231 lb 7.7 oz (105 kg)   LMP 2015 (Approximate)   SpO2 94%   BMI 42.34 kg/m²   Temp (24hrs), Av.7 °F (37.1 °C), Min:98 °F (36.7 °C), Max:99.8 °F (37.7 °C)      Physical Exam  Vitals signs and nursing note reviewed. Constitutional:       Appearance: She is well-developed. HENT:      Head: Normocephalic and atraumatic. Eyes:      General: No scleral icterus. Conjunctiva/sclera: Conjunctivae normal.      Pupils: Pupils are equal, round, and reactive to light. Neck:      Musculoskeletal: Normal range of motion and neck supple. Thyroid: No thyromegaly. Vascular: No hepatojugular reflux or JVD. Trachea: No tracheal deviation. Cardiovascular:      Rate and Rhythm: Normal rate and regular rhythm. Heart sounds: Normal heart sounds. Pulmonary:      Effort: Pulmonary effort is normal. No respiratory distress. Breath sounds: Normal breath sounds. No wheezing or rales. Abdominal:      General: Bowel sounds are normal. There is no distension. Palpations: Abdomen is soft. There is no hepatomegaly or mass. Tenderness: There is no abdominal tenderness. There is no rebound. Hernia: No hernia is present. Musculoskeletal:         General: No tenderness. Comments: No joint swelling   Lymphadenopathy:      Cervical: No cervical adenopathy. Skin:     General: Skin is warm. Findings: No bruising, ecchymosis, erythema or rash. Neurological:      Mental Status: She is alert and oriented to person, place, and time. Cranial Nerves: No cranial nerve deficit. Psychiatric:         Thought Content:  Thought content normal.       Data:   CBC:   Lab Results   Component Value Date    WBC 5.8 04/15/2020    RBC 5.43 04/15/2020    RBC 4.79 2012    HGB 16.0 04/15/2020    HCT 47.4 04/15/2020    MCV 87.2 04/15/2020    MCH 29.4 04/15/2020    MCHC 33.8 04/15/2020 RDW 13.4 04/15/2020     04/15/2020     01/23/2012    MPV 10.7 04/15/2020     CBC with Differential:  Lab Results   Component Value Date    WBC 5.8 04/15/2020    RBC 5.43 04/15/2020    RBC 4.79 01/23/2012    HGB 16.0 04/15/2020    HCT 47.4 04/15/2020     04/15/2020     01/23/2012    MCV 87.2 04/15/2020    MCH 29.4 04/15/2020    MCHC 33.8 04/15/2020    RDW 13.4 04/15/2020    LYMPHOPCT 21 04/13/2020    MONOPCT 11 04/13/2020    BASOPCT 1 04/13/2020    MONOSABS 0.48 04/13/2020    LYMPHSABS 0.92 04/13/2020    EOSABS 0.00 04/13/2020    BASOSABS 0.04 04/13/2020    DIFFTYPE NOT REPORTED 04/13/2020     Hemoglobin/Hematocrit:  Lab Results   Component Value Date    HGB 16.0 04/15/2020    HCT 47.4 04/15/2020     CMP:    Lab Results   Component Value Date     04/15/2020    K 3.9 04/15/2020    CL 98 04/15/2020    CO2 20 04/15/2020    BUN 6 04/15/2020    CREATININE 0.42 04/15/2020    GFRAA >60 04/15/2020    LABGLOM >60 04/15/2020    GLUCOSE 214 04/15/2020    GLUCOSE 94 01/23/2012    PROT 6.8 04/15/2020    LABALBU 2.9 04/15/2020    LABALBU 3.2 10/17/2011    CALCIUM 8.7 04/15/2020    BILITOT 0.57 04/15/2020    ALKPHOS 112 04/15/2020    AST 22 04/15/2020    ALT 25 04/15/2020     BMP:  Lab Results   Component Value Date     04/15/2020    K 3.9 04/15/2020    CL 98 04/15/2020    CO2 20 04/15/2020    BUN 6 04/15/2020    LABALBU 2.9 04/15/2020    LABALBU 3.2 10/17/2011    CREATININE 0.42 04/15/2020    CALCIUM 8.7 04/15/2020    GFRAA >60 04/15/2020    LABGLOM >60 04/15/2020    GLUCOSE 214 04/15/2020    GLUCOSE 94 01/23/2012     PT/INR:    Lab Results   Component Value Date    PROTIME 10.0 07/31/2018    INR 1.0 07/31/2018     PTT:    Lab Results   Component Value Date    APTT 27.7 07/30/2018   [APTT}    Assesment:     Primary Problem  Dyspnea    Active Hospital Problems    Diagnosis Date Noted    Dyspnea [R06.00] 04/14/2020    Diabetes type 2, uncontrolled ([facility] [address] [E11.65] 01/14/2012     This patient has mild right flank right upper quadrant discomfort. I suspect that she may have fatty liver. Need to evaluate possibility of diabetic gastroparesis. Plan:     1. Ultrasound of the liver. 2. Stool for occult blood. 3. When patient status stable may need work-up regarding possibility of gastroparesis. 4. Patient is getting work-up regarding loss of balance, gait issues by the PCP. 5. Consider peripheral neuropathy. 6. Advised hemoglobin A1c as well. Thank you for allowing me to participate in the care of your patient. Please feel free to contact me with anyquestions or concerns. Electronically signed by Hayden Moraes MD on 4/15/2020 at 9:55 AM     Copy sent to Dr. Marleen Bill    Note is dictated utilizing voice recognition software. Unfortunately this leads to occasional typographical errors. Please contact our office if you have any questions.

## 2020-04-15 NOTE — PROGRESS NOTES
Oral Daily    lisinopril  5 mg Oral Daily    metoprolol tartrate  50 mg Oral BID    sodium chloride flush  10 mL Intravenous 2 times per day    aspirin  325 mg Oral Daily    enoxaparin  30 mg Subcutaneous BID    insulin glargine  75 Units Subcutaneous Nightly    insulin lispro  0-18 Units Subcutaneous TID WC    insulin lispro  0-9 Units Subcutaneous Nightly    pantoprazole  40 mg Oral QAM AC     Continuous Infusions:    dextrose       PRN Meds: glucose, dextrose, glucagon (rDNA), dextrose, sodium chloride flush, potassium chloride **OR** potassium alternative oral replacement **OR** potassium chloride, magnesium sulfate, acetaminophen **OR** acetaminophen, magnesium hydroxide, promethazine **OR** ondansetron, nitroGLYCERIN, technetium labeled red blood cells, sodium chloride flush, sodium chloride flush, diphenhydrAMINE    Data:     Past Medical History:   has a past medical history of Arrhythmia, Breast mass, CAD (coronary artery disease), Chronic neck pain, Coccygeal pain, Constipation, Depression, Diabetic neuropathy ([facility] [address] History of hepatitis A, History of renal calculi, Hypertension, Hyperthyroidism, Hypothyroidism, Morbid obesity with BMI of 45.0-49.9, adult ([facility] [address] Nephrolithiasis, Neuropathy, Type II or unspecified type diabetes mellitus without mention of complication, not stated as uncontrolled, and UTI (lower urinary tract infection). Social History:   reports that she has quit smoking. Her smoking use included cigarettes. She has never used smokeless tobacco. She reports that she does not drink alcohol or use drugs.      Family History:   Family History   Problem Relation Age of Onset    Coronary Art Dis Mother     Lung Cancer Mother     Diabetes Mother         mellitus    Coronary Art Dis Father     Diabetes Father         diabetes mellitus       Vitals:  /71   Pulse 80   Temp 99.8 °F (37.7 °C) (Oral)   Resp 16   Ht 5' 2\" (1.575 m)   Wt 231 lb 7.7 oz (105 kg)   LMP 2015 (Approximate)   SpO2 94%   BMI 42.34 kg/m²   Temp (24hrs), Av.7 °F (37.1 °C), Min:98 °F (36.7 °C), Max:99.8 °F (37.7 °C)    Recent Labs     20  1139 20  1731 207 04/15/20  0746   POCGLU 305* 339* 280* 246*       I/O (24Hr): Intake/Output Summary (Last 24 hours) at 4/15/2020 1040  Last data filed at 4/15/2020 0542  Gross per 24 hour   Intake 1100 ml   Output 100 ml   Net 1000 ml       Labs:    [unfilled]    Lab Results   Component Value Date/Time    SPECIAL NOT REPORTED 2019 04:10 PM     Lab Results   Component Value Date/Time    CULTURE KLEBSIELLA PNEUMONIAE >188441 CFU/ML (A) 2019 04:10 PM       [unfilled]    Radiology:    Physical Examination:        General appearance:  alert, cooperative and no distress, central obesity present  Mental Status:  oriented to person, place and time and normal affect  Lungs:  clear to auscultation bilaterally, normal effort  Heart:  regular rate and rhythm, no murmur  Abdomen:  soft, nontender, nondistended, normal bowel sounds, no masses, hepatomegaly, splenomegaly  Extremities:  no edema, redness, tenderness in the calves  Skin:  no gross lesions, rashes, induration    Assessment:        Primary Problem  Dyspnea    Active Hospital Problems    Diagnosis Date Noted    Dyspnea [R06.00] 2020    Diabetes [facility], [facility] [E11.65] 2012   Principal Problem:    Dyspnea  Active Problems:    Chronic neck pain    Diabetes type 2, uncontrolled (HCC)    Hypothyroidism    MVP (mitral valve prolapse)    Chronic prescription benzodiazepine use    Morbid obesity with BMI of 45.0-49.9, adult ([facility] [address]    Uncontrolled type 2 diabetes mellitus with diabetic neuropathy, with long-term current use of insulin (HCC)    Chest pain  Resolved Problems:    * No resolved hospital problems. *        Plan:        1. Admitted with nausea, vomiting, dizziness.   Symptoms are going on for months, GI consulted, plan for ultrasound liver,

## 2020-04-16 ENCOUNTER — ANESTHESIA (OUTPATIENT)
Dept: ENDOSCOPY | Age: 65
DRG: 241 | End: 2020-04-16
Payer: COMMERCIAL

## 2020-04-16 ENCOUNTER — ANESTHESIA EVENT (OUTPATIENT)
Dept: ENDOSCOPY | Age: 65
DRG: 241 | End: 2020-04-16
Payer: COMMERCIAL

## 2020-04-16 ENCOUNTER — APPOINTMENT (OUTPATIENT)
Dept: GENERAL RADIOLOGY | Age: 65
DRG: 241 | End: 2020-04-16
Payer: COMMERCIAL

## 2020-04-16 VITALS
DIASTOLIC BLOOD PRESSURE: 35 MMHG | OXYGEN SATURATION: 98 % | RESPIRATION RATE: 12 BRPM | SYSTOLIC BLOOD PRESSURE: 91 MMHG

## 2020-04-16 LAB
-: ABNORMAL
ALBUMIN SERPL-MCNC: 2.6 G/DL (ref 3.5–5.2)
ALBUMIN/GLOBULIN RATIO: ABNORMAL (ref 1–2.5)
ALP BLD-CCNC: 99 U/L (ref 35–104)
ALT SERPL-CCNC: 23 U/L (ref 5–33)
AMORPHOUS: ABNORMAL
ANION GAP SERPL CALCULATED.3IONS-SCNC: 12 MMOL/L (ref 9–17)
AST SERPL-CCNC: 20 U/L
BACTERIA: ABNORMAL
BILIRUB SERPL-MCNC: 0.52 MG/DL (ref 0.3–1.2)
BILIRUBIN URINE: NEGATIVE
BUN BLDV-MCNC: 9 MG/DL (ref 8–23)
BUN/CREAT BLD: ABNORMAL (ref 9–20)
CALCIUM SERPL-MCNC: 8.8 MG/DL (ref 8.6–10.4)
CASTS UA: ABNORMAL /LPF
CHLORIDE BLD-SCNC: 103 MMOL/L (ref 98–107)
CO2: 20 MMOL/L (ref 20–31)
COLOR: YELLOW
COMMENT UA: ABNORMAL
CREAT SERPL-MCNC: 0.42 MG/DL (ref 0.5–0.9)
CRYSTALS, UA: ABNORMAL /HPF
EPITHELIAL CELLS UA: ABNORMAL /HPF
GFR AFRICAN AMERICAN: >60 ML/MIN
GFR NON-AFRICAN AMERICAN: >60 ML/MIN
GFR SERPL CREATININE-BSD FRML MDRD: ABNORMAL ML/MIN/{1.73_M2}
GFR SERPL CREATININE-BSD FRML MDRD: ABNORMAL ML/MIN/{1.73_M2}
GLUCOSE BLD-MCNC: 142 MG/DL (ref 65–105)
GLUCOSE BLD-MCNC: 142 MG/DL (ref 65–105)
GLUCOSE BLD-MCNC: 149 MG/DL (ref 70–99)
GLUCOSE BLD-MCNC: 193 MG/DL (ref 65–105)
GLUCOSE URINE: NEGATIVE
HCT VFR BLD CALC: 45.2 % (ref 36–46)
HEMOGLOBIN: 15.6 G/DL (ref 12–16)
KETONES, URINE: NEGATIVE
LEUKOCYTE ESTERASE, URINE: ABNORMAL
MAGNESIUM: 1.7 MG/DL (ref 1.6–2.6)
MCH RBC QN AUTO: 29.7 PG (ref 26–34)
MCHC RBC AUTO-ENTMCNC: 34.4 G/DL (ref 31–37)
MCV RBC AUTO: 86.3 FL (ref 80–100)
MUCUS: ABNORMAL
NITRITE, URINE: POSITIVE
NRBC AUTOMATED: ABNORMAL PER 100 WBC
OTHER OBSERVATIONS UA: ABNORMAL
PDW BLD-RTO: 13.1 % (ref 11.5–14.9)
PH UA: 6 (ref 5–8)
PLATELET # BLD: 148 K/UL (ref 150–450)
PMV BLD AUTO: 11.1 FL (ref 6–12)
POTASSIUM SERPL-SCNC: 3.5 MMOL/L (ref 3.7–5.3)
PROTEIN UA: ABNORMAL
RBC # BLD: 5.24 M/UL (ref 4–5.2)
RBC UA: ABNORMAL /HPF
RENAL EPITHELIAL, UA: ABNORMAL /HPF
SODIUM BLD-SCNC: 135 MMOL/L (ref 135–144)
SPECIFIC GRAVITY UA: 1.02 (ref 1–1.03)
TOTAL PROTEIN: 6.5 G/DL (ref 6.4–8.3)
TRICHOMONAS: ABNORMAL
TURBIDITY: ABNORMAL
URINE HGB: NEGATIVE
UROBILINOGEN, URINE: NORMAL
WBC # BLD: 6.5 K/UL (ref 3.5–11)
WBC UA: ABNORMAL /HPF
YEAST: ABNORMAL

## 2020-04-16 PROCEDURE — 2709999900 HC NON-CHARGEABLE SUPPLY: Performed by: INTERNAL MEDICINE

## 2020-04-16 PROCEDURE — 99233 SBSQ HOSP IP/OBS HIGH 50: CPT | Performed by: INTERNAL MEDICINE

## 2020-04-16 PROCEDURE — 2500000003 HC RX 250 WO HCPCS: Performed by: ANESTHESIOLOGY

## 2020-04-16 PROCEDURE — 6360000002 HC RX W HCPCS: Performed by: INTERNAL MEDICINE

## 2020-04-16 PROCEDURE — 6370000000 HC RX 637 (ALT 250 FOR IP): Performed by: INTERNAL MEDICINE

## 2020-04-16 PROCEDURE — 3700000001 HC ADD 15 MINUTES (ANESTHESIA): Performed by: INTERNAL MEDICINE

## 2020-04-16 PROCEDURE — 82947 ASSAY GLUCOSE BLOOD QUANT: CPT

## 2020-04-16 PROCEDURE — 43239 EGD BIOPSY SINGLE/MULTIPLE: CPT | Performed by: INTERNAL MEDICINE

## 2020-04-16 PROCEDURE — 81001 URINALYSIS AUTO W/SCOPE: CPT

## 2020-04-16 PROCEDURE — 2580000003 HC RX 258: Performed by: INTERNAL MEDICINE

## 2020-04-16 PROCEDURE — 2580000003 HC RX 258: Performed by: ANESTHESIOLOGY

## 2020-04-16 PROCEDURE — 7100000001 HC PACU RECOVERY - ADDTL 15 MIN: Performed by: INTERNAL MEDICINE

## 2020-04-16 PROCEDURE — 6360000002 HC RX W HCPCS

## 2020-04-16 PROCEDURE — 0DB78ZX EXCISION OF STOMACH, PYLORUS, VIA NATURAL OR ARTIFICIAL OPENING ENDOSCOPIC, DIAGNOSTIC: ICD-10-PCS | Performed by: INTERNAL MEDICINE

## 2020-04-16 PROCEDURE — 2500000003 HC RX 250 WO HCPCS

## 2020-04-16 PROCEDURE — 96372 THER/PROPH/DIAG INJ SC/IM: CPT

## 2020-04-16 PROCEDURE — 88342 IMHCHEM/IMCYTCHM 1ST ANTB: CPT

## 2020-04-16 PROCEDURE — 6370000000 HC RX 637 (ALT 250 FOR IP): Performed by: NURSE PRACTITIONER

## 2020-04-16 PROCEDURE — 3700000000 HC ANESTHESIA ATTENDED CARE: Performed by: INTERNAL MEDICINE

## 2020-04-16 PROCEDURE — G0378 HOSPITAL OBSERVATION PER HR: HCPCS

## 2020-04-16 PROCEDURE — 83735 ASSAY OF MAGNESIUM: CPT

## 2020-04-16 PROCEDURE — 87086 URINE CULTURE/COLONY COUNT: CPT

## 2020-04-16 PROCEDURE — 88305 TISSUE EXAM BY PATHOLOGIST: CPT

## 2020-04-16 PROCEDURE — 85027 COMPLETE CBC AUTOMATED: CPT

## 2020-04-16 PROCEDURE — 96375 TX/PRO/DX INJ NEW DRUG ADDON: CPT

## 2020-04-16 PROCEDURE — 80053 COMPREHEN METABOLIC PANEL: CPT

## 2020-04-16 PROCEDURE — 3609012400 HC EGD TRANSORAL BIOPSY SINGLE/MULTIPLE: Performed by: INTERNAL MEDICINE

## 2020-04-16 PROCEDURE — 7100000000 HC PACU RECOVERY - FIRST 15 MIN: Performed by: INTERNAL MEDICINE

## 2020-04-16 PROCEDURE — 36415 COLL VENOUS BLD VENIPUNCTURE: CPT

## 2020-04-16 PROCEDURE — 96376 TX/PRO/DX INJ SAME DRUG ADON: CPT

## 2020-04-16 PROCEDURE — 87040 BLOOD CULTURE FOR BACTERIA: CPT

## 2020-04-16 PROCEDURE — 96367 TX/PROPH/DG ADDL SEQ IV INF: CPT

## 2020-04-16 PROCEDURE — 6360000002 HC RX W HCPCS: Performed by: ANESTHESIOLOGY

## 2020-04-16 PROCEDURE — 71045 X-RAY EXAM CHEST 1 VIEW: CPT

## 2020-04-16 RX ORDER — DIPHENHYDRAMINE HYDROCHLORIDE 50 MG/ML
12.5 INJECTION INTRAMUSCULAR; INTRAVENOUS
Status: DISCONTINUED | OUTPATIENT
Start: 2020-04-16 | End: 2020-04-16 | Stop reason: HOSPADM

## 2020-04-16 RX ORDER — FENTANYL CITRATE 50 UG/ML
25 INJECTION, SOLUTION INTRAMUSCULAR; INTRAVENOUS EVERY 5 MIN PRN
Status: DISCONTINUED | OUTPATIENT
Start: 2020-04-16 | End: 2020-04-16 | Stop reason: HOSPADM

## 2020-04-16 RX ORDER — HYDRALAZINE HYDROCHLORIDE 20 MG/ML
5 INJECTION INTRAMUSCULAR; INTRAVENOUS EVERY 10 MIN PRN
Status: DISCONTINUED | OUTPATIENT
Start: 2020-04-16 | End: 2020-04-16 | Stop reason: HOSPADM

## 2020-04-16 RX ORDER — HYDROCODONE BITARTRATE AND ACETAMINOPHEN 5; 325 MG/1; MG/1
2 TABLET ORAL PRN
Status: DISCONTINUED | OUTPATIENT
Start: 2020-04-16 | End: 2020-04-16 | Stop reason: HOSPADM

## 2020-04-16 RX ORDER — ONDANSETRON 2 MG/ML
4 INJECTION INTRAMUSCULAR; INTRAVENOUS
Status: DISCONTINUED | OUTPATIENT
Start: 2020-04-16 | End: 2020-04-16 | Stop reason: HOSPADM

## 2020-04-16 RX ORDER — MORPHINE SULFATE 2 MG/ML
2 INJECTION, SOLUTION INTRAMUSCULAR; INTRAVENOUS EVERY 5 MIN PRN
Status: DISCONTINUED | OUTPATIENT
Start: 2020-04-16 | End: 2020-04-16 | Stop reason: HOSPADM

## 2020-04-16 RX ORDER — HYDROCODONE BITARTRATE AND ACETAMINOPHEN 5; 325 MG/1; MG/1
1 TABLET ORAL PRN
Status: DISCONTINUED | OUTPATIENT
Start: 2020-04-16 | End: 2020-04-16 | Stop reason: HOSPADM

## 2020-04-16 RX ORDER — LIDOCAINE HYDROCHLORIDE 20 MG/ML
INJECTION, SOLUTION EPIDURAL; INFILTRATION; INTRACAUDAL; PERINEURAL PRN
Status: DISCONTINUED | OUTPATIENT
Start: 2020-04-16 | End: 2020-04-16 | Stop reason: SDUPTHER

## 2020-04-16 RX ORDER — ONDANSETRON 2 MG/ML
4 INJECTION INTRAMUSCULAR; INTRAVENOUS ONCE
Status: COMPLETED | OUTPATIENT
Start: 2020-04-16 | End: 2020-04-16

## 2020-04-16 RX ORDER — PROPOFOL 10 MG/ML
INJECTION, EMULSION INTRAVENOUS PRN
Status: DISCONTINUED | OUTPATIENT
Start: 2020-04-16 | End: 2020-04-16 | Stop reason: SDUPTHER

## 2020-04-16 RX ORDER — 0.9 % SODIUM CHLORIDE 0.9 %
500 INTRAVENOUS SOLUTION INTRAVENOUS
Status: DISCONTINUED | OUTPATIENT
Start: 2020-04-16 | End: 2020-04-16 | Stop reason: HOSPADM

## 2020-04-16 RX ORDER — SODIUM CHLORIDE 9 MG/ML
INJECTION, SOLUTION INTRAVENOUS CONTINUOUS
Status: DISCONTINUED | OUTPATIENT
Start: 2020-04-16 | End: 2020-04-16

## 2020-04-16 RX ORDER — LEVOFLOXACIN 5 MG/ML
500 INJECTION, SOLUTION INTRAVENOUS EVERY 24 HOURS
Status: DISCONTINUED | OUTPATIENT
Start: 2020-04-16 | End: 2020-04-17

## 2020-04-16 RX ORDER — MEPERIDINE HYDROCHLORIDE 25 MG/ML
12.5 INJECTION INTRAMUSCULAR; INTRAVENOUS; SUBCUTANEOUS EVERY 5 MIN PRN
Status: DISCONTINUED | OUTPATIENT
Start: 2020-04-16 | End: 2020-04-16 | Stop reason: HOSPADM

## 2020-04-16 RX ADMIN — LIDOCAINE HYDROCHLORIDE 60 MG: 20 INJECTION, SOLUTION EPIDURAL; INFILTRATION; INTRACAUDAL; PERINEURAL at 15:18

## 2020-04-16 RX ADMIN — PROPOFOL 20 MG: 10 INJECTION, EMULSION INTRAVENOUS at 15:20

## 2020-04-16 RX ADMIN — INSULIN GLARGINE 75 UNITS: 100 INJECTION, SOLUTION SUBCUTANEOUS at 20:54

## 2020-04-16 RX ADMIN — PANTOPRAZOLE SODIUM 40 MG: 40 TABLET, DELAYED RELEASE ORAL at 17:53

## 2020-04-16 RX ADMIN — Medication 10 ML: at 21:00

## 2020-04-16 RX ADMIN — PROPOFOL 20 MG: 10 INJECTION, EMULSION INTRAVENOUS at 15:22

## 2020-04-16 RX ADMIN — PROPOFOL 20 MG: 10 INJECTION, EMULSION INTRAVENOUS at 15:24

## 2020-04-16 RX ADMIN — ACETAMINOPHEN 650 MG: 325 TABLET, FILM COATED ORAL at 11:20

## 2020-04-16 RX ADMIN — INSULIN LISPRO 3 UNITS: 100 INJECTION, SOLUTION INTRAVENOUS; SUBCUTANEOUS at 20:55

## 2020-04-16 RX ADMIN — METOPROLOL TARTRATE 50 MG: 50 TABLET, FILM COATED ORAL at 20:56

## 2020-04-16 RX ADMIN — POTASSIUM CHLORIDE 40 MEQ: 1500 TABLET, EXTENDED RELEASE ORAL at 17:53

## 2020-04-16 RX ADMIN — PROPOFOL 20 MG: 10 INJECTION, EMULSION INTRAVENOUS at 15:21

## 2020-04-16 RX ADMIN — GLIPIZIDE 20 MG: 5 TABLET ORAL at 17:53

## 2020-04-16 RX ADMIN — LISINOPRIL 5 MG: 5 TABLET ORAL at 17:53

## 2020-04-16 RX ADMIN — PROPOFOL 20 MG: 10 INJECTION, EMULSION INTRAVENOUS at 15:23

## 2020-04-16 RX ADMIN — SODIUM CHLORIDE: 9 INJECTION, SOLUTION INTRAVENOUS at 14:09

## 2020-04-16 RX ADMIN — LEVOFLOXACIN 500 MG: 5 INJECTION, SOLUTION INTRAVENOUS at 11:20

## 2020-04-16 RX ADMIN — DULOXETINE HYDROCHLORIDE 60 MG: 60 CAPSULE, DELAYED RELEASE PELLETS ORAL at 17:53

## 2020-04-16 RX ADMIN — PROPOFOL 20 MG: 10 INJECTION, EMULSION INTRAVENOUS at 15:19

## 2020-04-16 RX ADMIN — GABAPENTIN 800 MG: 400 CAPSULE ORAL at 11:20

## 2020-04-16 RX ADMIN — ONDANSETRON 4 MG: 2 INJECTION INTRAMUSCULAR; INTRAVENOUS at 14:52

## 2020-04-16 RX ADMIN — ATORVASTATIN CALCIUM 80 MG: 80 TABLET, FILM COATED ORAL at 17:53

## 2020-04-16 RX ADMIN — ENOXAPARIN SODIUM 30 MG: 30 INJECTION SUBCUTANEOUS at 20:56

## 2020-04-16 RX ADMIN — FUROSEMIDE 20 MG: 20 TABLET ORAL at 17:53

## 2020-04-16 RX ADMIN — GABAPENTIN 800 MG: 400 CAPSULE ORAL at 20:56

## 2020-04-16 RX ADMIN — FAMOTIDINE 20 MG: 10 INJECTION, SOLUTION INTRAVENOUS at 14:52

## 2020-04-16 RX ADMIN — PROPOFOL 50 MG: 10 INJECTION, EMULSION INTRAVENOUS at 15:18

## 2020-04-16 RX ADMIN — ACETAMINOPHEN 650 MG: 325 TABLET, FILM COATED ORAL at 17:59

## 2020-04-16 ASSESSMENT — ENCOUNTER SYMPTOMS: SHORTNESS OF BREATH: 1

## 2020-04-16 ASSESSMENT — PULMONARY FUNCTION TESTS
PIF_VALUE: 2
PIF_VALUE: 1
PIF_VALUE: 2
PIF_VALUE: 1
PIF_VALUE: 2
PIF_VALUE: 1
PIF_VALUE: 1
PIF_VALUE: 2
PIF_VALUE: 1

## 2020-04-16 ASSESSMENT — PAIN DESCRIPTION - DESCRIPTORS: DESCRIPTORS: ACHING

## 2020-04-16 ASSESSMENT — PAIN SCALES - GENERAL
PAINLEVEL_OUTOF10: 10
PAINLEVEL_OUTOF10: 3
PAINLEVEL_OUTOF10: 0
PAINLEVEL_OUTOF10: 8
PAINLEVEL_OUTOF10: 0

## 2020-04-16 ASSESSMENT — PAIN DESCRIPTION - PAIN TYPE: TYPE: ACUTE PAIN

## 2020-04-16 ASSESSMENT — PAIN - FUNCTIONAL ASSESSMENT: PAIN_FUNCTIONAL_ASSESSMENT: 0-10

## 2020-04-16 ASSESSMENT — PAIN DESCRIPTION - FREQUENCY: FREQUENCY: INTERMITTENT

## 2020-04-16 ASSESSMENT — PAIN DESCRIPTION - LOCATION: LOCATION: HEAD

## 2020-04-16 NOTE — PROGRESS NOTES
glipiZIDE  20 mg Oral BID AC    atorvastatin  80 mg Oral Daily    DULoxetine  60 mg Oral Daily    furosemide  20 mg Oral Daily    gabapentin  800 mg Oral BID    levothyroxine  75 mcg Oral Daily    lisinopril  5 mg Oral Daily    metoprolol tartrate  50 mg Oral BID    sodium chloride flush  10 mL Intravenous 2 times per day    aspirin  325 mg Oral Daily    enoxaparin  30 mg Subcutaneous BID    insulin glargine  75 Units Subcutaneous Nightly    insulin lispro  0-18 Units Subcutaneous TID WC    insulin lispro  0-9 Units Subcutaneous Nightly    pantoprazole  40 mg Oral QAM AC     Continuous Infusions:    dextrose       PRN Meds: glucose, dextrose, glucagon (rDNA), dextrose, sodium chloride flush, potassium chloride **OR** potassium alternative oral replacement **OR** potassium chloride, magnesium sulfate, acetaminophen **OR** acetaminophen, magnesium hydroxide, promethazine **OR** ondansetron, nitroGLYCERIN, sodium chloride flush, sodium chloride flush, diphenhydrAMINE    Data:     Past Medical History:   has a past medical history of Arrhythmia, Breast mass, CAD (coronary artery disease), Chronic neck pain, Coccygeal pain, Constipation, Depression, Diabetic neuropathy ([facility] [address] History of hepatitis A, History of renal calculi, Hypertension, Hyperthyroidism, Hypothyroidism, Morbid obesity with BMI of 45.0-49.9, adult ([facility] [address] Nephrolithiasis, Neuropathy, Type II or unspecified type diabetes mellitus without mention of complication, not stated as uncontrolled, and UTI (lower urinary tract infection). Social History:   reports that she has quit smoking. Her smoking use included cigarettes. She has never used smokeless tobacco. She reports that she does not drink alcohol or use drugs.      Family History:   Family History   Problem Relation Age of Onset    Coronary Art Dis Mother     Lung Cancer Mother     Diabetes Mother         mellitus    Coronary Art Dis Father     Diabetes Father         diabetes

## 2020-04-16 NOTE — CARE COORDINATION
SPOKE WITH DR. Arvin Stubbs. PT. WITH LOW GRADE TEMP T/O TODAY, REVIEWED CXR PT. STARTED ON LEVAQUIN TODAY. BC, UA AND IS.  WILL HOLD DC FOR TODAY WILL SEE IN AM. Electronically signed by Emely Dunne RN on 4/16/2020 at 6:13 PM

## 2020-04-17 LAB
ALBUMIN SERPL-MCNC: 2.5 G/DL (ref 3.5–5.2)
ALBUMIN/GLOBULIN RATIO: ABNORMAL (ref 1–2.5)
ALP BLD-CCNC: 88 U/L (ref 35–104)
ALT SERPL-CCNC: 19 U/L (ref 5–33)
ANION GAP SERPL CALCULATED.3IONS-SCNC: 13 MMOL/L (ref 9–17)
AST SERPL-CCNC: 23 U/L
BILIRUB SERPL-MCNC: 0.53 MG/DL (ref 0.3–1.2)
BUN BLDV-MCNC: 6 MG/DL (ref 8–23)
BUN/CREAT BLD: ABNORMAL (ref 9–20)
C-REACTIVE PROTEIN: 61.5 MG/L (ref 0–5)
CALCIUM SERPL-MCNC: 8.3 MG/DL (ref 8.6–10.4)
CHLORIDE BLD-SCNC: 101 MMOL/L (ref 98–107)
CO2: 22 MMOL/L (ref 20–31)
CREAT SERPL-MCNC: 0.47 MG/DL (ref 0.5–0.9)
CULTURE: NORMAL
D-DIMER QUANTITATIVE: 0.98 MG/L FEU (ref 0–0.59)
FERRITIN: 699 UG/L (ref 13–150)
GFR AFRICAN AMERICAN: >60 ML/MIN
GFR NON-AFRICAN AMERICAN: >60 ML/MIN
GFR SERPL CREATININE-BSD FRML MDRD: ABNORMAL ML/MIN/{1.73_M2}
GFR SERPL CREATININE-BSD FRML MDRD: ABNORMAL ML/MIN/{1.73_M2}
GLUCOSE BLD-MCNC: 142 MG/DL (ref 65–105)
GLUCOSE BLD-MCNC: 142 MG/DL (ref 70–99)
GLUCOSE BLD-MCNC: 154 MG/DL (ref 65–105)
GLUCOSE BLD-MCNC: 205 MG/DL (ref 65–105)
GLUCOSE BLD-MCNC: 218 MG/DL (ref 65–105)
GLUCOSE BLD-MCNC: 234 MG/DL (ref 65–105)
HCT VFR BLD CALC: 42.5 % (ref 36–46)
HEMOGLOBIN: 14.3 G/DL (ref 12–16)
LACTATE DEHYDROGENASE: 348 U/L (ref 135–214)
LACTIC ACID, WHOLE BLOOD: ABNORMAL MMOL/L (ref 0.7–2.1)
LACTIC ACID: 2.5 MMOL/L (ref 0.5–2.2)
LEGIONELLA PNEUMOPHILIA AG, URINE: NEGATIVE
Lab: NORMAL
MCH RBC QN AUTO: 29.6 PG (ref 26–34)
MCHC RBC AUTO-ENTMCNC: 33.6 G/DL (ref 31–37)
MCV RBC AUTO: 88.3 FL (ref 80–100)
NRBC AUTOMATED: NORMAL PER 100 WBC
PDW BLD-RTO: 13.3 % (ref 11.5–14.9)
PLATELET # BLD: 163 K/UL (ref 150–450)
PMV BLD AUTO: 10.5 FL (ref 6–12)
POTASSIUM SERPL-SCNC: 4.4 MMOL/L (ref 3.7–5.3)
PROCALCITONIN: 0.1 NG/ML
RBC # BLD: 4.81 M/UL (ref 4–5.2)
SODIUM BLD-SCNC: 136 MMOL/L (ref 135–144)
SOURCE: NORMAL
SPECIMEN DESCRIPTION: NORMAL
STREP PNEUMONIAE ANTIGEN: NEGATIVE
TOTAL PROTEIN: 6.5 G/DL (ref 6.4–8.3)
TROPONIN INTERP: NORMAL
TROPONIN T: NORMAL NG/ML
TROPONIN, HIGH SENSITIVITY: 14 NG/L (ref 0–14)
WBC # BLD: 7.3 K/UL (ref 3.5–11)

## 2020-04-17 PROCEDURE — 6370000000 HC RX 637 (ALT 250 FOR IP): Performed by: INTERNAL MEDICINE

## 2020-04-17 PROCEDURE — 85379 FIBRIN DEGRADATION QUANT: CPT

## 2020-04-17 PROCEDURE — 82947 ASSAY GLUCOSE BLOOD QUANT: CPT

## 2020-04-17 PROCEDURE — 6360000002 HC RX W HCPCS: Performed by: INTERNAL MEDICINE

## 2020-04-17 PROCEDURE — 82728 ASSAY OF FERRITIN: CPT

## 2020-04-17 PROCEDURE — 99253 IP/OBS CNSLTJ NEW/EST LOW 45: CPT | Performed by: INTERNAL MEDICINE

## 2020-04-17 PROCEDURE — 87899 AGENT NOS ASSAY W/OPTIC: CPT

## 2020-04-17 PROCEDURE — 2580000003 HC RX 258: Performed by: INTERNAL MEDICINE

## 2020-04-17 PROCEDURE — 85027 COMPLETE CBC AUTOMATED: CPT

## 2020-04-17 PROCEDURE — 83605 ASSAY OF LACTIC ACID: CPT

## 2020-04-17 PROCEDURE — 2060000000 HC ICU INTERMEDIATE R&B

## 2020-04-17 PROCEDURE — 99232 SBSQ HOSP IP/OBS MODERATE 35: CPT | Performed by: INTERNAL MEDICINE

## 2020-04-17 PROCEDURE — 84145 PROCALCITONIN (PCT): CPT

## 2020-04-17 PROCEDURE — 84484 ASSAY OF TROPONIN QUANT: CPT

## 2020-04-17 PROCEDURE — 87449 NOS EACH ORGANISM AG IA: CPT

## 2020-04-17 PROCEDURE — 83615 LACTATE (LD) (LDH) ENZYME: CPT

## 2020-04-17 PROCEDURE — 86140 C-REACTIVE PROTEIN: CPT

## 2020-04-17 PROCEDURE — U0002 COVID-19 LAB TEST NON-CDC: HCPCS

## 2020-04-17 PROCEDURE — 96372 THER/PROPH/DIAG INJ SC/IM: CPT

## 2020-04-17 PROCEDURE — 36415 COLL VENOUS BLD VENIPUNCTURE: CPT

## 2020-04-17 PROCEDURE — 80053 COMPREHEN METABOLIC PANEL: CPT

## 2020-04-17 RX ORDER — ACETAMINOPHEN 650 MG/1
650 SUPPOSITORY RECTAL EVERY 6 HOURS PRN
Status: DISCONTINUED | OUTPATIENT
Start: 2020-04-17 | End: 2020-04-21 | Stop reason: HOSPADM

## 2020-04-17 RX ORDER — DEXTROMETHORPHAN POLISTIREX 30 MG/5ML
60 SUSPENSION ORAL 2 TIMES DAILY
Status: DISCONTINUED | OUTPATIENT
Start: 2020-04-17 | End: 2020-04-21 | Stop reason: HOSPADM

## 2020-04-17 RX ORDER — ACETAMINOPHEN 325 MG/1
650 TABLET ORAL EVERY 6 HOURS PRN
Status: DISCONTINUED | OUTPATIENT
Start: 2020-04-17 | End: 2020-04-21 | Stop reason: HOSPADM

## 2020-04-17 RX ORDER — BENZONATATE 100 MG/1
200 CAPSULE ORAL 3 TIMES DAILY
Status: DISCONTINUED | OUTPATIENT
Start: 2020-04-17 | End: 2020-04-21 | Stop reason: HOSPADM

## 2020-04-17 RX ORDER — FUROSEMIDE 10 MG/ML
20 INJECTION INTRAMUSCULAR; INTRAVENOUS ONCE
Status: COMPLETED | OUTPATIENT
Start: 2020-04-17 | End: 2020-04-17

## 2020-04-17 RX ORDER — LEVOFLOXACIN 500 MG/1
500 TABLET, FILM COATED ORAL DAILY
Status: DISCONTINUED | OUTPATIENT
Start: 2020-04-17 | End: 2020-04-20

## 2020-04-17 RX ADMIN — GLIPIZIDE 20 MG: 5 TABLET ORAL at 06:09

## 2020-04-17 RX ADMIN — LEVOFLOXACIN 500 MG: 500 TABLET, FILM COATED ORAL at 16:39

## 2020-04-17 RX ADMIN — DULOXETINE HYDROCHLORIDE 60 MG: 60 CAPSULE, DELAYED RELEASE PELLETS ORAL at 08:50

## 2020-04-17 RX ADMIN — GLIPIZIDE 20 MG: 5 TABLET ORAL at 16:39

## 2020-04-17 RX ADMIN — INSULIN LISPRO 6 UNITS: 100 INJECTION, SOLUTION INTRAVENOUS; SUBCUTANEOUS at 18:00

## 2020-04-17 RX ADMIN — INSULIN LISPRO 6 UNITS: 100 INJECTION, SOLUTION INTRAVENOUS; SUBCUTANEOUS at 13:09

## 2020-04-17 RX ADMIN — ENOXAPARIN SODIUM 30 MG: 30 INJECTION SUBCUTANEOUS at 08:50

## 2020-04-17 RX ADMIN — PANTOPRAZOLE SODIUM 40 MG: 40 TABLET, DELAYED RELEASE ORAL at 06:09

## 2020-04-17 RX ADMIN — FUROSEMIDE 20 MG: 10 INJECTION, SOLUTION INTRAMUSCULAR; INTRAVENOUS at 16:41

## 2020-04-17 RX ADMIN — Medication 10 ML: at 09:52

## 2020-04-17 RX ADMIN — METOPROLOL TARTRATE 50 MG: 50 TABLET, FILM COATED ORAL at 08:49

## 2020-04-17 RX ADMIN — Medication 60 MG: at 20:38

## 2020-04-17 RX ADMIN — Medication 10 ML: at 20:38

## 2020-04-17 RX ADMIN — LISINOPRIL 5 MG: 5 TABLET ORAL at 08:50

## 2020-04-17 RX ADMIN — GABAPENTIN 800 MG: 400 CAPSULE ORAL at 08:50

## 2020-04-17 RX ADMIN — ASPIRIN 325 MG: 325 TABLET, COATED ORAL at 08:50

## 2020-04-17 RX ADMIN — FUROSEMIDE 20 MG: 20 TABLET ORAL at 08:50

## 2020-04-17 RX ADMIN — ENOXAPARIN SODIUM 30 MG: 30 INJECTION SUBCUTANEOUS at 20:38

## 2020-04-17 RX ADMIN — BENZONATATE 200 MG: 100 CAPSULE ORAL at 20:38

## 2020-04-17 RX ADMIN — INSULIN LISPRO 3 UNITS: 100 INJECTION, SOLUTION INTRAVENOUS; SUBCUTANEOUS at 08:30

## 2020-04-17 RX ADMIN — ATORVASTATIN CALCIUM 80 MG: 80 TABLET, FILM COATED ORAL at 08:50

## 2020-04-17 RX ADMIN — INSULIN GLARGINE 75 UNITS: 100 INJECTION, SOLUTION SUBCUTANEOUS at 21:44

## 2020-04-17 RX ADMIN — INSULIN LISPRO 3 UNITS: 100 INJECTION, SOLUTION INTRAVENOUS; SUBCUTANEOUS at 21:44

## 2020-04-17 RX ADMIN — GABAPENTIN 800 MG: 400 CAPSULE ORAL at 20:38

## 2020-04-17 RX ADMIN — ACETAMINOPHEN 650 MG: 325 TABLET, FILM COATED ORAL at 14:19

## 2020-04-17 RX ADMIN — LEVOTHYROXINE SODIUM 75 MCG: 75 TABLET ORAL at 08:50

## 2020-04-17 ASSESSMENT — PAIN SCALES - GENERAL
PAINLEVEL_OUTOF10: 0
PAINLEVEL_OUTOF10: 0

## 2020-04-17 NOTE — ANESTHESIA POSTPROCEDURE EVALUATION
POST- ANESTHESIA EVALUATION       Pt Name: Selvin Montez  MRN: [id number]  YOB: 1955  Date of evaluation: 4/16/2020  Time:  8:44 PM      BP (!) 102/58   Pulse 109   Temp 99.6 °F (37.6 °C) (Oral)   Resp 24   Ht 5' 2\" (1.575 m)   Wt 233 lb 0.4 oz (105.7 kg)   LMP 01/12/2015 (Approximate)   SpO2 92%   BMI 42.62 kg/m²      Consciousness Level  Awake  Cardiopulmonary Status  Stable  Pain Adequately Treated YES  Nausea / Vomiting  NO  Adequate Hydration  YES  Anesthesia Related Complications NONE      Electronically signed by Kilo Lee MD on 4/16/2020 at 8:44 PM       Department of Anesthesiology  Postprocedure Note    Patient: Selvin Montez  MRN: [id number]  YOB: 1955  Date of evaluation: 4/16/2020  Time:  8:44 PM     Procedure Summary     Date:  04/16/20 Room / Location:  [facility] 03 / [facility]    Anesthesia Start:  8737 Anesthesia Stop:  3248    Procedure:  EGD BIOPSY (N/A Esophagus) Diagnosis:  (NAUSEA GASTROPARESIS)    Surgeon:  Francesca Cuello MD Responsible Provider:  Chika Adame MD    Anesthesia Type:  MAC ASA Status:  3          Anesthesia Type: MAC    Gogo Phase I: Gogo Score: 10    Gogo Phase II:      Last vitals: Reviewed and per EMR flowsheets.        Anesthesia Post Evaluation

## 2020-04-17 NOTE — PROGRESS NOTES
GI Progress notes    4/17/2020   10:38 AM    Name:  Jody Reno  MRN:    [id number]     Acct:     [de-identified]   Room:  2046/2046-01   Day: 0     Admit Date: 4/13/2020  9:42 PM  PCP: Joaquina Renee    Subjective:     C/C:   Chief Complaint   Patient presents with    Nausea    Shortness of Breath       Interval History: Status: not changed. Patient seen and examined. No acute events overnight. Patient s/p EGD revealing mild gastritis and few pinpoint hemorrhages. Still reports mild nausea, abdominal pains. Patient reports headache overnight. Recently started on Levaquin for UTI. Patient reports SOB. Denies chest pain. She had low-grade fever yesterday. Fever noted again this morning. Transferred to [facility] until ruled out. US liver: The hepatic parenchyma is somewhat heterogeneous.  This could reflect hepatic   steatosis and/or hepatocellular disease.  This finding should be correlated   clinically. Labs and progress notes reviewed. ROS:  Constitutional: + fever, chills  Gastrointestinal: negative for  constipation, diarrhea, and vomiting. +abdominal pain, nausea      Medications: Allergies: Allergies   Allergen Reactions    Cefuroxime Axetil Anaphylaxis    Dilaudid [Hydromorphone] Anaphylaxis    Metformin And Related Diarrhea, Itching and Nausea And Vomiting    Sulfa Antibiotics Itching    Amoxicillin     Antihistamines, Loratadine-Type     Aspartame And Phenylalanine     Eggs Or Egg-Derived Products     Lorazepam     Nubain [Nalbuphine Hcl] Other (See Comments)     Hallucinations; \"I got stoned. \"    Vistaril [Hydroxyzine Hcl]        Current Meds: levofloxacin (LEVAQUIN) 500 MG/100ML infusion 500 mg, Q24H  glipiZIDE (GLUCOTROL) tablet 20 mg, BID AC  atorvastatin (LIPITOR) tablet 80 mg, Daily  DULoxetine (CYMBALTA) extended release capsule 60 mg, Daily  furosemide (LASIX) tablet 20 mg, Daily  gabapentin (NEURONTIN) capsule 800 mg, BID  levothyroxine (SYNTHROID) 4.79 01/23/2012    HGB 14.3 04/17/2020    HCT 42.5 04/17/2020    MCV 88.3 04/17/2020    MCH 29.6 04/17/2020    MCHC 33.6 04/17/2020    RDW 13.3 04/17/2020     04/17/2020     01/23/2012    MPV 10.5 04/17/2020     CBC with Differential:    Lab Results   Component Value Date    WBC 7.3 04/17/2020    RBC 4.81 04/17/2020    RBC 4.79 01/23/2012    HGB 14.3 04/17/2020    HCT 42.5 04/17/2020     04/17/2020     01/23/2012    MCV 88.3 04/17/2020    MCH 29.6 04/17/2020    MCHC 33.6 04/17/2020    RDW 13.3 04/17/2020    LYMPHOPCT 21 04/13/2020    MONOPCT 11 04/13/2020    BASOPCT 1 04/13/2020    MONOSABS 0.48 04/13/2020    LYMPHSABS 0.92 04/13/2020    EOSABS 0.00 04/13/2020    BASOSABS 0.04 04/13/2020    DIFFTYPE NOT REPORTED 04/13/2020     Hemoglobin/Hematocrit:    Lab Results   Component Value Date    HGB 14.3 04/17/2020    HCT 42.5 04/17/2020     CMP:    Lab Results   Component Value Date     04/17/2020    K 4.4 04/17/2020     04/17/2020    CO2 22 04/17/2020    BUN 6 04/17/2020    CREATININE 0.47 04/17/2020    GFRAA >60 04/17/2020    LABGLOM >60 04/17/2020    GLUCOSE 142 04/17/2020    GLUCOSE 94 01/23/2012    PROT 6.5 04/17/2020    LABALBU 2.5 04/17/2020    LABALBU 3.2 10/17/2011    CALCIUM 8.3 04/17/2020    BILITOT 0.53 04/17/2020    ALKPHOS 88 04/17/2020    AST 23 04/17/2020    ALT 19 04/17/2020     BMP:    Lab Results   Component Value Date     04/17/2020    K 4.4 04/17/2020     04/17/2020    CO2 22 04/17/2020    BUN 6 04/17/2020    LABALBU 2.5 04/17/2020    LABALBU 3.2 10/17/2011    CREATININE 0.47 04/17/2020    CALCIUM 8.3 04/17/2020    GFRAA >60 04/17/2020    LABGLOM >60 04/17/2020    GLUCOSE 142 04/17/2020    GLUCOSE 94 01/23/2012     PT/INR:    Lab Results   Component Value Date    PROTIME 10.0 07/31/2018    INR 1.0 07/31/2018     PTT:    Lab Results   Component Value Date    APTT 27.7 07/30/2018   [APTT}    Physical Examination:        General appearance: alert, patient and d/W Nursing Staff  Will F/U with you  Please call or Page for any issues or change in status  Thanks    Electronically signed by RASHMI Yeager NP on 4/17/2020 at 10:38 AM     Discussed with RASHMI around 11:30 AM regarding this patient. I agree with her assessment and management plan. Advised to contact me if patient has further issues. At present ultrasound of the liver revealed fatty liver. EGD was nonspecific and did not identify any lesions that can account for her symptoms.

## 2020-04-17 NOTE — CARE COORDINATION
DISCHARGE PLANNING NOTE:    COVID PENDING    Patient transferred to [address] [place] [address] Unit from [facility] today. Had EGD yesterday     On PO levaquin. Will continue to follow along.      Electronically signed by Raiza Gray RN on 4/17/2020 at 3:33 PM

## 2020-04-17 NOTE — CONSULTS
[facility] -   Infectious diseases evaluation  admission date 4/13/2020        Impression :   Current:  · Fever, possible UTI  · Concern about COVID-19  Infection  · Coronary artery disease  · Multiple antibiotics allergy include cephalosporins, sulfa, amoxicillin. · Diabetes mellitus  · Coronary artery disease  · Hypertension  · Obesity  · Depression and anxiety  · Asthma        Recommendations  Levaquin  Follow blood culture and adjust antibiotics as needed  Blood cultures from 4/16/2020 pending  Urine for Legionella and Streptococcus antigen pending  COVID-19 PCR pending    History of Present Illness:   Initial history:  Dre Gracia is a 63y.o.-year-old female presented to the hospital on 4/13/2020 with gradual moderate shortness of breath associated with nausea, no fever. 4/13/2020 WBC was 4.4 with 21% lymphocytes. 4/13 chest x-ray showed no acute infiltrate  MUGA scan showed of EF of 54%  EGD was done  On 4/16 she had low-grade fever with a temperature up to 100.8 , with reported sinus drainage was started on Levaquin  Urinalysis 4/16/2020 showed 5-10 WBC, small leukocyte esterase, positive nitrate, pending urine culture  4/16/2020 chest x-ray no acute infiltrate  Interval changes  4/17/2020   Lactic acid today 2.5, d-dimer 0.98, ferritin 699, , C-reactive protein 61 procalcitonin level 0.10  . I have personally reviewed the past medical history, past surgical history, medications, social history, and family history, and I haveupdated the database accordingly.   Past Medical History:     Past Medical History:   Diagnosis Date    Arrhythmia     Breast mass     CAD (coronary artery disease)     with valvular disease    Chronic neck pain     Coccygeal pain 3/7/2016    Constipation     Depression     Diabetic neuropathy (HCC)     History of hepatitis A     possible history of hepatitis A in the past    History of renal calculi    

## 2020-04-17 NOTE — CONSULTS
Dictated      Patient with new onset fever yesterday after EGD, has lymphopenia transferred to rule out COVID    Patient states that she has some mild dyspnea with the cough which has been nonproductive for the last 4 to 5 days  Also has evidence of TU, says that she has asthma but cannot remember any inhalers she is on. She is a non-smoker      Coronavirus swab already sent.   Will check LDH, ferritin, procalcitonin, d-dimer, CRP, and also to rule out bacterial pneumonia

## 2020-04-17 NOTE — CONSULTS
and echo. Echo showed an EF  of greater than 35%, but the stress test showed her EF was 20%. PAST SURGICAL HISTORY:  She has had a cholecystectomy, tonsillectomy,  cystoscopy, breast biopsy and endometrial biopsy. PHYSICAL EXAMINATION:  GENERAL APPEARANCE:  Morbidly obese female in no acute respiratory  distress. VITAL SIGNS:  Currently, temperature 98.1, respiratory rate 20, pulse is  91, blood pressure 106/58, oxygen saturation at rest is _____. Her  T-max here was 100.8. HEENT:  She is micrognathic, macroglossia, low overhanging soft palate. LUNGS:  Lungs are notable for being diminished with some minimal basilar  crackles. No wheezes, no rhonchi. CARDIAC SYSTEM:  S1, S2.  ABDOMEN:  Soft, but obese. EXTREMITIES:  Show no edema. NEUROLOGIC:  There are no focal deficits. LABORATORY DATA:  Electrolytes are all within normal limits. BUN 6,  creatinine 0.47, glucose have been in the 200 range. Liver function  tests are normal.  White count is 7.3 with an H and H of 14.3 and 42.5  and a platelet count of 479. DIAGNOSTIC DATA:  Chest x-ray shows increased interstitial markings  bilaterally particularly at the bases with some small pleural effusions. IMPRESSION:  My impression is that the patient has some symptoms  consistent with coronavirus infection. However, we need to get more  information clearly if the swab has been sent. I would also like to get  a C-reactive protein, D-dimer, ferritin, LDH, procalcitonin level. We  will also get a strep pneumoniae antigen, urine for Legionella antigen,  lactic acid. We will get a BNP in the a.m. tomorrow. I will however  give her Lasix today. Further recommendations to follow. We will keep  her in droplet plus isolation for now. She will need outpatient PFTs and also sleep study.         Angeline Shearer    D: 04/17/2020 16:41:31       T: 04/17/2020 16:51:46     [email or web address]  Job#: [id number]     Doc#: [id number]    CC:

## 2020-04-18 LAB
ABSOLUTE BANDS #: 0.05 K/UL (ref 0–1)
ABSOLUTE EOS #: 0 K/UL (ref 0–0.4)
ABSOLUTE IMMATURE GRANULOCYTE: ABNORMAL K/UL (ref 0–0.3)
ABSOLUTE LYMPH #: 1.49 K/UL (ref 1–4.8)
ABSOLUTE MONO #: 0.43 K/UL (ref 0.1–1.3)
ALBUMIN SERPL-MCNC: 2.2 G/DL (ref 3.5–5.2)
ALBUMIN/GLOBULIN RATIO: ABNORMAL (ref 1–2.5)
ALP BLD-CCNC: 85 U/L (ref 35–104)
ALT SERPL-CCNC: 19 U/L (ref 5–33)
ANION GAP SERPL CALCULATED.3IONS-SCNC: 13 MMOL/L (ref 9–17)
AST SERPL-CCNC: 20 U/L
BANDS: 1 % (ref 0–10)
BASOPHILS # BLD: 0 % (ref 0–2)
BASOPHILS ABSOLUTE: 0 K/UL (ref 0–0.2)
BILIRUB SERPL-MCNC: 0.39 MG/DL (ref 0.3–1.2)
BNP INTERPRETATION: NORMAL
BUN BLDV-MCNC: 7 MG/DL (ref 8–23)
BUN/CREAT BLD: ABNORMAL (ref 9–20)
CALCIUM SERPL-MCNC: 8.5 MG/DL (ref 8.6–10.4)
CHLORIDE BLD-SCNC: 102 MMOL/L (ref 98–107)
CO2: 20 MMOL/L (ref 20–31)
CREAT SERPL-MCNC: <0.4 MG/DL (ref 0.5–0.9)
DIFFERENTIAL TYPE: ABNORMAL
EOSINOPHILS RELATIVE PERCENT: 0 % (ref 0–4)
GFR AFRICAN AMERICAN: ABNORMAL ML/MIN
GFR NON-AFRICAN AMERICAN: ABNORMAL ML/MIN
GFR SERPL CREATININE-BSD FRML MDRD: ABNORMAL ML/MIN/{1.73_M2}
GFR SERPL CREATININE-BSD FRML MDRD: ABNORMAL ML/MIN/{1.73_M2}
GLUCOSE BLD-MCNC: 148 MG/DL (ref 70–99)
GLUCOSE BLD-MCNC: 156 MG/DL (ref 65–105)
GLUCOSE BLD-MCNC: 163 MG/DL (ref 65–105)
HCT VFR BLD CALC: 43.5 % (ref 36–46)
HEMOGLOBIN: 14.2 G/DL (ref 12–16)
IMMATURE GRANULOCYTES: ABNORMAL %
LYMPHOCYTES # BLD: 31 % (ref 24–44)
MCH RBC QN AUTO: 29.4 PG (ref 26–34)
MCHC RBC AUTO-ENTMCNC: 32.5 G/DL (ref 31–37)
MCV RBC AUTO: 90.4 FL (ref 80–100)
MONOCYTES # BLD: 9 % (ref 1–7)
MORPHOLOGY: NORMAL
NRBC AUTOMATED: ABNORMAL PER 100 WBC
PDW BLD-RTO: 13.5 % (ref 11.5–14.9)
PLATELET # BLD: 168 K/UL (ref 150–450)
PLATELET ESTIMATE: ABNORMAL
PMV BLD AUTO: 9.9 FL (ref 6–12)
POTASSIUM SERPL-SCNC: 4 MMOL/L (ref 3.7–5.3)
PRO-BNP: 106 PG/ML
RBC # BLD: 4.81 M/UL (ref 4–5.2)
RBC # BLD: ABNORMAL 10*6/UL
SARS-COV-2, PCR: ABNORMAL
SARS-COV-2: DETECTED
SEG NEUTROPHILS: 59 % (ref 36–66)
SEGMENTED NEUTROPHILS ABSOLUTE COUNT: 2.83 K/UL (ref 1.3–9.1)
SODIUM BLD-SCNC: 135 MMOL/L (ref 135–144)
SOURCE: ABNORMAL
TOTAL PROTEIN: 6.2 G/DL (ref 6.4–8.3)
WBC # BLD: 4.8 K/UL (ref 3.5–11)
WBC # BLD: ABNORMAL 10*3/UL

## 2020-04-18 PROCEDURE — 6370000000 HC RX 637 (ALT 250 FOR IP): Performed by: INTERNAL MEDICINE

## 2020-04-18 PROCEDURE — 82947 ASSAY GLUCOSE BLOOD QUANT: CPT

## 2020-04-18 PROCEDURE — 36415 COLL VENOUS BLD VENIPUNCTURE: CPT

## 2020-04-18 PROCEDURE — 85025 COMPLETE CBC W/AUTO DIFF WBC: CPT

## 2020-04-18 PROCEDURE — 83880 ASSAY OF NATRIURETIC PEPTIDE: CPT

## 2020-04-18 PROCEDURE — 80053 COMPREHEN METABOLIC PANEL: CPT

## 2020-04-18 PROCEDURE — 2060000000 HC ICU INTERMEDIATE R&B

## 2020-04-18 PROCEDURE — 6360000002 HC RX W HCPCS: Performed by: INTERNAL MEDICINE

## 2020-04-18 PROCEDURE — 99232 SBSQ HOSP IP/OBS MODERATE 35: CPT | Performed by: INTERNAL MEDICINE

## 2020-04-18 PROCEDURE — 2580000003 HC RX 258: Performed by: INTERNAL MEDICINE

## 2020-04-18 PROCEDURE — 99231 SBSQ HOSP IP/OBS SF/LOW 25: CPT | Performed by: INTERNAL MEDICINE

## 2020-04-18 RX ADMIN — ONDANSETRON 4 MG: 2 INJECTION INTRAMUSCULAR; INTRAVENOUS at 17:23

## 2020-04-18 RX ADMIN — GLIPIZIDE 20 MG: 5 TABLET ORAL at 16:02

## 2020-04-18 RX ADMIN — ASPIRIN 325 MG: 325 TABLET, COATED ORAL at 07:33

## 2020-04-18 RX ADMIN — ENOXAPARIN SODIUM 30 MG: 30 INJECTION SUBCUTANEOUS at 07:33

## 2020-04-18 RX ADMIN — Medication 10 ML: at 07:36

## 2020-04-18 RX ADMIN — Medication 60 MG: at 21:10

## 2020-04-18 RX ADMIN — LEVOTHYROXINE SODIUM 75 MCG: 75 TABLET ORAL at 07:33

## 2020-04-18 RX ADMIN — Medication 10 ML: at 21:18

## 2020-04-18 RX ADMIN — ATORVASTATIN CALCIUM 80 MG: 80 TABLET, FILM COATED ORAL at 07:32

## 2020-04-18 RX ADMIN — PANTOPRAZOLE SODIUM 40 MG: 40 TABLET, DELAYED RELEASE ORAL at 05:33

## 2020-04-18 RX ADMIN — INSULIN GLARGINE 75 UNITS: 100 INJECTION, SOLUTION SUBCUTANEOUS at 22:03

## 2020-04-18 RX ADMIN — ENOXAPARIN SODIUM 30 MG: 30 INJECTION SUBCUTANEOUS at 21:09

## 2020-04-18 RX ADMIN — Medication 60 MG: at 07:33

## 2020-04-18 RX ADMIN — GABAPENTIN 800 MG: 400 CAPSULE ORAL at 07:32

## 2020-04-18 RX ADMIN — GLIPIZIDE 20 MG: 5 TABLET ORAL at 05:32

## 2020-04-18 RX ADMIN — BENZONATATE 200 MG: 100 CAPSULE ORAL at 07:32

## 2020-04-18 RX ADMIN — BENZONATATE 200 MG: 100 CAPSULE ORAL at 21:09

## 2020-04-18 RX ADMIN — INSULIN LISPRO 6 UNITS: 100 INJECTION, SOLUTION INTRAVENOUS; SUBCUTANEOUS at 17:15

## 2020-04-18 RX ADMIN — FUROSEMIDE 20 MG: 20 TABLET ORAL at 07:32

## 2020-04-18 RX ADMIN — BENZONATATE 200 MG: 100 CAPSULE ORAL at 16:02

## 2020-04-18 RX ADMIN — INSULIN LISPRO 3 UNITS: 100 INJECTION, SOLUTION INTRAVENOUS; SUBCUTANEOUS at 12:00

## 2020-04-18 RX ADMIN — LEVOFLOXACIN 500 MG: 500 TABLET, FILM COATED ORAL at 07:33

## 2020-04-18 RX ADMIN — METOPROLOL TARTRATE 50 MG: 50 TABLET, FILM COATED ORAL at 07:33

## 2020-04-18 RX ADMIN — GABAPENTIN 800 MG: 400 CAPSULE ORAL at 21:09

## 2020-04-18 RX ADMIN — DULOXETINE HYDROCHLORIDE 60 MG: 60 CAPSULE, DELAYED RELEASE PELLETS ORAL at 07:32

## 2020-04-18 RX ADMIN — LISINOPRIL 5 MG: 5 TABLET ORAL at 07:32

## 2020-04-18 ASSESSMENT — PAIN SCALES - GENERAL: PAINLEVEL_OUTOF10: 0

## 2020-04-18 NOTE — PROGRESS NOTES
Marital status: Single     Spouse name: Not on file    Number of children: Not on file    Years of education: Not on file    Highest education level: Not on file   Occupational History    Not on file   Social Needs    Financial resource strain: Not on file    Food insecurity     Worry: Not on file     Inability: Not on file    Transportation needs     Medical: Not on file     Non-medical: Not on file   Tobacco Use    Smoking status: Former Smoker     Types: Cigarettes    Smokeless tobacco: Never Used    Tobacco comment: pt states she quit smoking cigarettes at the age of 14   Substance and Sexual Activity    Alcohol use: No     Alcohol/week: 0.0 standard drinks    Drug use: No     Comment: once in a while    Sexual activity: Yes     Partners: Male   Lifestyle    Physical activity     Days per week: Not on file     Minutes per session: Not on file    Stress: Not on file   Relationships    Social connections     Talks on phone: Not on file     Gets together: Not on file     Attends Yarsanism service: Not on file     Active member of club or organization: Not on file     Attends meetings of clubs or organizations: Not on file     Relationship status: Not on file    Intimate partner violence     Fear of current or ex partner: Not on file     Emotionally abused: Not on file     Physically abused: Not on file     Forced sexual activity: Not on file   Other Topics Concern    Not on file   Social History Narrative    Not on file       Family History:     Family History   Problem Relation Age of Onset    Coronary Art Dis Mother     Lung Cancer Mother     Diabetes Mother         mellitus    Coronary Art Dis Father     Diabetes Father         diabetes mellitus        Allergies:   Cefuroxime axetil; Dilaudid [hydromorphone]; Metformin and related; Sulfa antibiotics; Amoxicillin;  Antihistamines, loratadine-type; Aspartame and phenylalanine; Eggs or egg-derived products; Lorazepam; Nubain [nalbuphine hcl]; and Vistaril [hydroxyzine hcl]     Review of Systems:     Review of Systems  As documented in the chart all reviewed  Physical Examination :     Patient Vitals for the past 8 hrs:   BP Temp Temp src Pulse Resp SpO2   04/18/20 1202 100/67 98.3 °F (36.8 °C) Oral 70 18 94 %   04/18/20 1023 -- -- -- -- -- 95 %   04/18/20 0730 112/70 98 °F (36.7 °C) Oral 84 18 95 %       Physical Exam  Due to limited PPE during COVID-19 pandemic my physical exam was  deferred    Medical Decision Making:   I have independently reviewed/ordered the following labs:    CBC with Differential:   Recent Labs     04/17/20  0500 04/18/20  0621   WBC 7.3 4.8   HGB 14.3 14.2   HCT 42.5 43.5    168   LYMPHOPCT  --  31   MONOPCT  --  9*     BMP:  Recent Labs     04/16/20  0523 04/17/20  0500 04/18/20  0621    136 135   K 3.5* 4.4 4.0    101 102   CO2 20 22 20   BUN 9 6* 7*   CREATININE 0.42* 0.47* <0.40*   MG 1.7  --   --      Hepatic Function Panel:   Recent Labs     04/17/20  0500 04/18/20  0621   PROT 6.5 6.2*   LABALBU 2.5* 2.2*   BILITOT 0.53 0.39   ALKPHOS 88 85   ALT 19 19   AST 23 20       Lab Results   Component Value Date    CREATININE <0.40 04/18/2020    GLUCOSE 148 04/18/2020    GLUCOSE 94 01/23/2012       Detailed results: Thank you for allowing us to participate in the care of this patient. Please call with questions. This note is created with the assistance of a speech recognition program.  While intending to generate adocument that actually reflects the content of the visit, the document can still have some errors including those of syntax and sound a like substitutions which may escape proof reading. It such instances, actual meaningcan be extrapolated by contextual diversion.     Wilfredo Ba MD  Office: [phone]  Perfect serve / office [phone]

## 2020-04-18 NOTE — PROGRESS NOTES
Patient weaned to 1L NC. O2 sat 95% on the 1L. Will continue to try to wean back to room air. Will continue to monitor.

## 2020-04-18 NOTE — PROGRESS NOTES
Her smoking use included cigarettes. She has never used smokeless tobacco. She reports that she does not drink alcohol or use drugs. Family History:   Family History   Problem Relation Age of Onset    Coronary Art Dis Mother     Lung Cancer Mother     Diabetes Mother         mellitus    Coronary Art Dis Father     Diabetes Father         diabetes mellitus       Vitals:  /71   Pulse 72   Temp 98.1 °F (36.7 °C) (Oral)   Resp 18   Ht 5' 2\" (1.575 m)   Wt 233 lb 0.4 oz (105.7 kg)   LMP 2015 (Approximate)   SpO2 93%   BMI 42.62 kg/m²   Temp (24hrs), Av.2 °F (36.8 °C), Min:98 °F (36.7 °C), Max:98.5 °F (36.9 °C)    Recent Labs     20  0716 20  1154 20  1637 20  1200   POCGLU 142* 218* 205* 163*       I/O (24Hr):     Intake/Output Summary (Last 24 hours) at 2020 1908  Last data filed at 2020 1048  Gross per 24 hour   Intake --   Output 700 ml   Net -700 ml       Labs:    [unfilled]    Lab Results   Component Value Date/Time    SPECIAL NOT REPORTED 2020 08:23 PM     Lab Results   Component Value Date/Time    CULTURE NO SIGNIFICANT GROWTH 2020 08:23 PM       [unfilled]    Radiology:    Physical Examination:        General appearance:  alert, cooperative and no distress, central obesity present  Mental Status:  oriented to person, place and time and normal affect  Lungs:  clear to auscultation bilaterally, normal effort  Heart:  regular rate and rhythm, no murmur  Abdomen:  soft, nontender, nondistended, normal bowel sounds, no masses, hepatomegaly, splenomegaly  Extremities:  no edema, redness, tenderness in the calves  Skin:  no gross lesions, rashes, induration    Assessment:        Primary Problem  Dyspnea    Active Hospital Problems    Diagnosis Date Noted    Abdominal pain, epigastric [R10.13]     Dyspnea [R06.00] 2020    Chest pain [R07.9] 2018    Uncontrolled type 2 diabetes mellitus with diabetic neuropathy, with long-term

## 2020-04-18 NOTE — PROGRESS NOTES
List   Diagnosis    Chronic neck pain    Abnormal mammogram    Diabetes type 2, uncontrolled ([address]    DM neuropathy takes Percocet    Hypothyroidism    Depression    MVP (mitral valve prolapse)    Constipation    Chronic prescription benzodiazepine use    Chronic use of opiate drugs therapeutic purposes    Morbid obesity with BMI of 45.0-49.9, adult (HCC)    Mediastinal cyst    Uncontrolled type 2 diabetes mellitus with diabetic neuropathy, with long-term current use of insulin (HCC)    Essential hypertension    Thumb pain    Chronic pain of both knees    Chest pain    Nephrolithiasis    Urinary tract infection without hematuria    Ureterolithiasis    Hydronephrosis    Chest pain with high risk for cardiac etiology    Dyspnea    Abdominal pain, epigastric     #1. Patient with viral pneumonia secondary toSARS COVID 2/ COVID 19  Particular acute respiratory failure with hypoxemia secondary to #1    QTC is 464. Perhaps too long for the use of Plaquenil  On Lovenox for DVT prophylaxis  Continue with supportive care.     Patient voiced understanding with plan      Electronically signed by Elmon Goodpasture, MD on 4/18/2020 at 12:04 PM

## 2020-04-19 LAB
ALBUMIN SERPL-MCNC: 2.4 G/DL (ref 3.5–5.2)
ALBUMIN/GLOBULIN RATIO: ABNORMAL (ref 1–2.5)
ALP BLD-CCNC: 97 U/L (ref 35–104)
ALT SERPL-CCNC: 19 U/L (ref 5–33)
ANION GAP SERPL CALCULATED.3IONS-SCNC: 13 MMOL/L (ref 9–17)
AST SERPL-CCNC: 17 U/L
BILIRUB SERPL-MCNC: 0.37 MG/DL (ref 0.3–1.2)
BUN BLDV-MCNC: 7 MG/DL (ref 8–23)
BUN/CREAT BLD: ABNORMAL (ref 9–20)
CALCIUM SERPL-MCNC: 9 MG/DL (ref 8.6–10.4)
CHLORIDE BLD-SCNC: 103 MMOL/L (ref 98–107)
CO2: 19 MMOL/L (ref 20–31)
CREAT SERPL-MCNC: <0.4 MG/DL (ref 0.5–0.9)
GFR AFRICAN AMERICAN: ABNORMAL ML/MIN
GFR NON-AFRICAN AMERICAN: ABNORMAL ML/MIN
GFR SERPL CREATININE-BSD FRML MDRD: ABNORMAL ML/MIN/{1.73_M2}
GFR SERPL CREATININE-BSD FRML MDRD: ABNORMAL ML/MIN/{1.73_M2}
GLUCOSE BLD-MCNC: 202 MG/DL (ref 70–99)
GLUCOSE BLD-MCNC: 237 MG/DL (ref 65–105)
POTASSIUM SERPL-SCNC: 4 MMOL/L (ref 3.7–5.3)
SODIUM BLD-SCNC: 135 MMOL/L (ref 135–144)
TOTAL PROTEIN: 6.4 G/DL (ref 6.4–8.3)

## 2020-04-19 PROCEDURE — 6370000000 HC RX 637 (ALT 250 FOR IP): Performed by: INTERNAL MEDICINE

## 2020-04-19 PROCEDURE — 99231 SBSQ HOSP IP/OBS SF/LOW 25: CPT | Performed by: INTERNAL MEDICINE

## 2020-04-19 PROCEDURE — 99232 SBSQ HOSP IP/OBS MODERATE 35: CPT | Performed by: INTERNAL MEDICINE

## 2020-04-19 PROCEDURE — 82947 ASSAY GLUCOSE BLOOD QUANT: CPT

## 2020-04-19 PROCEDURE — 2060000000 HC ICU INTERMEDIATE R&B

## 2020-04-19 PROCEDURE — 6360000002 HC RX W HCPCS: Performed by: INTERNAL MEDICINE

## 2020-04-19 PROCEDURE — 2580000003 HC RX 258: Performed by: INTERNAL MEDICINE

## 2020-04-19 PROCEDURE — 36415 COLL VENOUS BLD VENIPUNCTURE: CPT

## 2020-04-19 PROCEDURE — 80053 COMPREHEN METABOLIC PANEL: CPT

## 2020-04-19 RX ADMIN — Medication 60 MG: at 08:04

## 2020-04-19 RX ADMIN — INSULIN LISPRO 9 UNITS: 100 INJECTION, SOLUTION INTRAVENOUS; SUBCUTANEOUS at 18:00

## 2020-04-19 RX ADMIN — FUROSEMIDE 20 MG: 20 TABLET ORAL at 08:02

## 2020-04-19 RX ADMIN — Medication 10 ML: at 08:08

## 2020-04-19 RX ADMIN — METOPROLOL TARTRATE 50 MG: 50 TABLET, FILM COATED ORAL at 20:30

## 2020-04-19 RX ADMIN — ENOXAPARIN SODIUM 30 MG: 30 INJECTION SUBCUTANEOUS at 08:04

## 2020-04-19 RX ADMIN — GABAPENTIN 800 MG: 400 CAPSULE ORAL at 20:30

## 2020-04-19 RX ADMIN — Medication 10 ML: at 20:31

## 2020-04-19 RX ADMIN — INSULIN LISPRO 3 UNITS: 100 INJECTION, SOLUTION INTRAVENOUS; SUBCUTANEOUS at 22:08

## 2020-04-19 RX ADMIN — ENOXAPARIN SODIUM 30 MG: 30 INJECTION SUBCUTANEOUS at 20:31

## 2020-04-19 RX ADMIN — INSULIN LISPRO 9 UNITS: 100 INJECTION, SOLUTION INTRAVENOUS; SUBCUTANEOUS at 12:00

## 2020-04-19 RX ADMIN — GLIPIZIDE 20 MG: 5 TABLET ORAL at 08:02

## 2020-04-19 RX ADMIN — Medication 60 MG: at 20:30

## 2020-04-19 RX ADMIN — LISINOPRIL 5 MG: 5 TABLET ORAL at 08:02

## 2020-04-19 RX ADMIN — METOPROLOL TARTRATE 50 MG: 50 TABLET, FILM COATED ORAL at 08:04

## 2020-04-19 RX ADMIN — DULOXETINE HYDROCHLORIDE 60 MG: 60 CAPSULE, DELAYED RELEASE PELLETS ORAL at 08:04

## 2020-04-19 RX ADMIN — INSULIN LISPRO 3 UNITS: 100 INJECTION, SOLUTION INTRAVENOUS; SUBCUTANEOUS at 08:03

## 2020-04-19 RX ADMIN — ATORVASTATIN CALCIUM 80 MG: 80 TABLET, FILM COATED ORAL at 08:04

## 2020-04-19 RX ADMIN — INSULIN GLARGINE 75 UNITS: 100 INJECTION, SOLUTION SUBCUTANEOUS at 22:07

## 2020-04-19 RX ADMIN — BENZONATATE 200 MG: 100 CAPSULE ORAL at 20:30

## 2020-04-19 RX ADMIN — ASPIRIN 325 MG: 325 TABLET, COATED ORAL at 08:03

## 2020-04-19 RX ADMIN — BENZONATATE 200 MG: 100 CAPSULE ORAL at 13:57

## 2020-04-19 RX ADMIN — GLIPIZIDE 20 MG: 5 TABLET ORAL at 17:10

## 2020-04-19 RX ADMIN — BENZONATATE 200 MG: 100 CAPSULE ORAL at 08:04

## 2020-04-19 RX ADMIN — LEVOTHYROXINE SODIUM 75 MCG: 75 TABLET ORAL at 08:02

## 2020-04-19 RX ADMIN — PANTOPRAZOLE SODIUM 40 MG: 40 TABLET, DELAYED RELEASE ORAL at 06:49

## 2020-04-19 RX ADMIN — GABAPENTIN 800 MG: 400 CAPSULE ORAL at 08:02

## 2020-04-19 RX ADMIN — LEVOFLOXACIN 500 MG: 500 TABLET, FILM COATED ORAL at 08:02

## 2020-04-19 ASSESSMENT — PAIN SCALES - GENERAL: PAINLEVEL_OUTOF10: 0

## 2020-04-19 NOTE — PROGRESS NOTES
Pulmonary Progress Note  [facility]      Patient - Belen Harding,  Age - 63 y.o.    - 1955      Room Number - 7976/7569-63   N -  [id number]   [facility]  Date of Admission -  2020  9:42 PM        Consulting Paulina Galicia MD  Primary Care Physician - [address]   Patient appears to be feeling better. She still feels weak but better. Her nausea is improved. Her breathing is fair as long as she lays still. She understands that she is not on oxygen and she is happy with that. OBJECTIVE   VITALS    height is 5' 2\" (1.575 m) and weight is 233 lb 0.4 oz (105.7 kg). Her oral temperature is 97.8 °F (36.6 °C). Her blood pressure is 136/77 and her pulse is 82. Her respiration is 18 and oxygen saturation is 92%. Body mass index is 42.62 kg/m². Temperature Range: Temp: 97.8 °F (36.6 °C) Temp  Av.2 °F (36.8 °C)  Min: 97.8 °F (36.6 °C)  Max: 98.9 °F (37.2 °C)  BP Range:  Systolic (20VMT), ICN:162 , Min:102 , TQY:556     Diastolic (22STB), OMK:52, Min:54, Max:93    Pulse Range: Pulse  Av  Min: 72  Max: 95  Respiration Range: Resp  Av  Min: 18  Max: 18  Current Pulse Ox[de-identified]  SpO2: 92 %  24HR Pulse Ox Range:  SpO2  Av.8 %  Min: 92 %  Max: 94 %  Oxygen Amount and Delivery: O2 Flow Rate (L/min): 1 L/min    Wt Readings from Last 3 Encounters:   20 233 lb 0.4 oz (105.7 kg)   20 235 lb 2 oz (106.7 kg)   19 240 lb (108.9 kg)       I/O (24 Hours)    Intake/Output Summary (Last 24 hours) at 2020 1304  Last data filed at 2020 0045  Gross per 24 hour   Intake --   Output 800 ml   Net -800 ml       EXAM     General Appearance  Awake, alert, oriented, in no acute distress  HEENT - normocephalic, atraumatic. Neck - Supple,  trachea midline   Lungs -coarse breath sounds no crackles rales or wheezes  Heart Exam:PMI normal. No lifts, heaves, or thrills. RRR.  No murmurs, clicks, gallops,

## 2020-04-19 NOTE — PROGRESS NOTES
[facility] -   Infectious diseases evaluation  admission date 4/13/2020        Impression :   Current:  ·  COVID-19  Infection  · Possible UTI  · Coronary artery disease  · Multiple antibiotics allergy include cephalosporins, sulfa, amoxicillin. · Diabetes mellitus  · Coronary artery disease  · Hypertension  · Obesity  · Depression and anxiety  · Asthma        Recommendations  Levaquin  Urine for Legionella and Streptococcus antigen negative  No growth on urine orblood cultures to date  QTC is 464, avoid Plaquenil and Zithromax due to cardiac risk. History of Present Illness:   Initial history:  Freda Bello is a 63y.o.-year-old female presented to the hospital on 4/13/2020 with gradual moderate shortness of breath associated with nausea, no fever. 4/13/2020 WBC was 4.4 with 21% lymphocytes. 4/13 chest x-ray showed no acute infiltrate  MUGA scan showed of EF of 54%  EGD was done  On 4/16 she had low-grade fever with a temperature up to 100.8 , with reported sinus drainage was started on Levaquin  Urinalysis 4/16/2020 showed 5-10 WBC, small leukocyte esterase, positive nitrate, pending urine culture  4/16/2020 chest x-ray no acute infiltrate  4/17 Lactic acid  2.5, d-dimer 0.98, ferritin 699, , C-reactive protein 61 procalcitonin level 0.10  Interval changes  4/19/2020   Improving, no hypoxia on room air, no reported fever in the last 24 hours. QTC is 464 on 4/13  . I have personally reviewed the past medical history, past surgical history, medications, social history, and family history, and I haveupdated the database accordingly.   Past Medical History:     Past Medical History:   Diagnosis Date    Arrhythmia     Breast mass     CAD (coronary artery disease)     with valvular disease    Chronic neck pain     Coccygeal pain 3/7/2016    Constipation     Depression     Diabetic neuropathy (HCC)     History of hepatitis A Socioeconomic History    Marital status: Single     Spouse name: Not on file    Number of children: Not on file    Years of education: Not on file    Highest education level: Not on file   Occupational History    Not on file   Social Needs    Financial resource strain: Not on file    Food insecurity     Worry: Not on file     Inability: Not on file    Transportation needs     Medical: Not on file     Non-medical: Not on file   Tobacco Use    Smoking status: Former Smoker     Types: Cigarettes    Smokeless tobacco: Never Used    Tobacco comment: pt states she quit smoking cigarettes at the age of 14   Substance and Sexual Activity    Alcohol use: No     Alcohol/week: 0.0 standard drinks    Drug use: No     Comment: once in a while    Sexual activity: Yes     Partners: Male   Lifestyle    Physical activity     Days per week: Not on file     Minutes per session: Not on file    Stress: Not on file   Relationships    Social connections     Talks on phone: Not on file     Gets together: Not on file     Attends Sabianism service: Not on file     Active member of club or organization: Not on file     Attends meetings of clubs or organizations: Not on file     Relationship status: Not on file    Intimate partner violence     Fear of current or ex partner: Not on file     Emotionally abused: Not on file     Physically abused: Not on file     Forced sexual activity: Not on file   Other Topics Concern    Not on file   Social History Narrative    Not on file       Family History:     Family History   Problem Relation Age of Onset    Coronary Art Dis Mother     Lung Cancer Mother     Diabetes Mother         mellitus    Coronary Art Dis Father     Diabetes Father         diabetes mellitus        Allergies:   Cefuroxime axetil; Dilaudid [hydromorphone]; Metformin and related; Sulfa antibiotics; Amoxicillin;  Antihistamines, loratadine-type; Aspartame and phenylalanine; Eggs or egg-derived products;

## 2020-04-19 NOTE — PROGRESS NOTES
[facility]    Progress Note    4/19/2020    1:23 PM    Name:   Ranulfo Pena  MRN:     [id number]     Acct:      [de-identified]   Room:   2046/2046-01  IP Day:  2  Admit Date:  4/13/2020  9:42 PM    PCP:   Aruna Art  Code Status:  Full Code    Subjective:     C/C:   Chief Complaint   Patient presents with    Nausea    Shortness of Breath     Interval History Status: improved. Brief History:   Patient admitted with vague complaints, which include nausea, vomiting, dizziness, extreme shakiness of the body symptoms are going on for last few weeks. She was recently admitted at [facility] with pneumonia, she also had chest pain, stress test was done which was concerning for low ejection fraction  Subsequent echocardiogram, patient had normal ejection fraction  Plan for MUGA scan  She feels that her nausea is slightly better  4/16   Patient has low-grade fever up to 100  Patient complaining of sinus drainage, symptoms are going on for last few days  No cough, shortness of breath  4/17   Patient feeling much better   GI Symptoms are better after EGD   Has temperature overnight   Was shifted in isolation for coronavirus   Discussed with Pulmonologist   4/18   Patient positive for coronavirus   Afebrile   Requiring Oxygen 1 liters     4/19   Patient is doing much better today  On room air  Afebrile  Eating food  Review of Systems:     Constitutional:  negative for chills, fevers, sweats  Respiratory:  negative for cough, dyspnea on exertion, shortness of breath, wheezing  Cardiovascular:  negative for chest pain, chest pressure/discomfort, lower extremity edema, palpitations  Gastrointestinal: Positive for nausea,   neurological: Positive for dizziness    Medications: Allergies:     Allergies   Allergen Reactions    Cefuroxime Axetil Anaphylaxis    Dilaudid [Hydromorphone] Anaphylaxis    Metformin And Related Diarrhea, Itching and Nausea And of Oxygen   On levofloxacin    4/19  Patient is  Doing much better   DC planning , once ok with Pulm   Discussed with Dr Gabriella Camara   Patient has nausea on presentation, confirmed to have gastritis on EGD.   Lori Corley MD  4/19/2020  1:22 PM

## 2020-04-19 NOTE — PLAN OF CARE
Problem: Pain:  Goal: Pain level will decrease  Description: Pain level will decrease  4/19/2020 0429 by Jonda Carrel, RN  Outcome: Met This Shift  Note: Adequate pain control achieved this shift. See MAR. Problem: Breathing Pattern - Ineffective:  Goal: Ability to achieve and maintain a regular respiratory rate will improve  Description: Ability to achieve and maintain a regular respiratory rate will improve  Outcome: Met This Shift  Note: Maintained adequate oxygen saturations. Encouraged C and DB and incentive spirometry. Oxygen therapy as needed. See head to toe assessment.

## 2020-04-20 ENCOUNTER — APPOINTMENT (OUTPATIENT)
Dept: GENERAL RADIOLOGY | Age: 65
DRG: 241 | End: 2020-04-20
Payer: COMMERCIAL

## 2020-04-20 PROBLEM — U07.1 PNEUMONIA DUE TO COVID-19 VIRUS: Status: ACTIVE | Noted: 2020-04-20

## 2020-04-20 PROBLEM — J12.82 PNEUMONIA DUE TO COVID-19 VIRUS: Status: ACTIVE | Noted: 2020-04-20

## 2020-04-20 LAB
ALBUMIN SERPL-MCNC: 2.5 G/DL (ref 3.5–5.2)
ALBUMIN/GLOBULIN RATIO: ABNORMAL (ref 1–2.5)
ALP BLD-CCNC: 97 U/L (ref 35–104)
ALT SERPL-CCNC: 17 U/L (ref 5–33)
ANION GAP SERPL CALCULATED.3IONS-SCNC: 14 MMOL/L (ref 9–17)
AST SERPL-CCNC: 15 U/L
BILIRUB SERPL-MCNC: 0.3 MG/DL (ref 0.3–1.2)
BUN BLDV-MCNC: 6 MG/DL (ref 8–23)
BUN/CREAT BLD: ABNORMAL (ref 9–20)
CALCIUM SERPL-MCNC: 8.8 MG/DL (ref 8.6–10.4)
CHLORIDE BLD-SCNC: 102 MMOL/L (ref 98–107)
CO2: 23 MMOL/L (ref 20–31)
CREAT SERPL-MCNC: <0.4 MG/DL (ref 0.5–0.9)
GFR AFRICAN AMERICAN: ABNORMAL ML/MIN
GFR NON-AFRICAN AMERICAN: ABNORMAL ML/MIN
GFR SERPL CREATININE-BSD FRML MDRD: ABNORMAL ML/MIN/{1.73_M2}
GFR SERPL CREATININE-BSD FRML MDRD: ABNORMAL ML/MIN/{1.73_M2}
GLUCOSE BLD-MCNC: 215 MG/DL (ref 70–99)
GLUCOSE BLD-MCNC: 267 MG/DL (ref 65–105)
POTASSIUM SERPL-SCNC: 4 MMOL/L (ref 3.7–5.3)
SODIUM BLD-SCNC: 139 MMOL/L (ref 135–144)
SURGICAL PATHOLOGY REPORT: NORMAL
TOTAL PROTEIN: 6.1 G/DL (ref 6.4–8.3)

## 2020-04-20 PROCEDURE — 71045 X-RAY EXAM CHEST 1 VIEW: CPT

## 2020-04-20 PROCEDURE — 99231 SBSQ HOSP IP/OBS SF/LOW 25: CPT | Performed by: INTERNAL MEDICINE

## 2020-04-20 PROCEDURE — 2060000000 HC ICU INTERMEDIATE R&B

## 2020-04-20 PROCEDURE — 6370000000 HC RX 637 (ALT 250 FOR IP): Performed by: INTERNAL MEDICINE

## 2020-04-20 PROCEDURE — 6360000002 HC RX W HCPCS: Performed by: INTERNAL MEDICINE

## 2020-04-20 PROCEDURE — 2580000003 HC RX 258: Performed by: INTERNAL MEDICINE

## 2020-04-20 PROCEDURE — 80053 COMPREHEN METABOLIC PANEL: CPT

## 2020-04-20 PROCEDURE — 82947 ASSAY GLUCOSE BLOOD QUANT: CPT

## 2020-04-20 PROCEDURE — 99232 SBSQ HOSP IP/OBS MODERATE 35: CPT | Performed by: INTERNAL MEDICINE

## 2020-04-20 PROCEDURE — 36415 COLL VENOUS BLD VENIPUNCTURE: CPT

## 2020-04-20 RX ADMIN — BENZONATATE 200 MG: 100 CAPSULE ORAL at 20:59

## 2020-04-20 RX ADMIN — ACETAMINOPHEN 650 MG: 325 TABLET, FILM COATED ORAL at 03:44

## 2020-04-20 RX ADMIN — ACETAMINOPHEN 650 MG: 325 TABLET, FILM COATED ORAL at 22:23

## 2020-04-20 RX ADMIN — METOPROLOL TARTRATE 50 MG: 50 TABLET, FILM COATED ORAL at 20:59

## 2020-04-20 RX ADMIN — GLIPIZIDE 20 MG: 5 TABLET ORAL at 17:13

## 2020-04-20 RX ADMIN — ENOXAPARIN SODIUM 30 MG: 30 INJECTION SUBCUTANEOUS at 20:59

## 2020-04-20 RX ADMIN — Medication 10 ML: at 21:14

## 2020-04-20 RX ADMIN — METOPROLOL TARTRATE 50 MG: 50 TABLET, FILM COATED ORAL at 09:01

## 2020-04-20 RX ADMIN — BENZONATATE 200 MG: 100 CAPSULE ORAL at 14:04

## 2020-04-20 RX ADMIN — ASPIRIN 325 MG: 325 TABLET, COATED ORAL at 09:00

## 2020-04-20 RX ADMIN — ENOXAPARIN SODIUM 30 MG: 30 INJECTION SUBCUTANEOUS at 08:59

## 2020-04-20 RX ADMIN — INSULIN LISPRO 6 UNITS: 100 INJECTION, SOLUTION INTRAVENOUS; SUBCUTANEOUS at 08:45

## 2020-04-20 RX ADMIN — DULOXETINE HYDROCHLORIDE 60 MG: 60 CAPSULE, DELAYED RELEASE PELLETS ORAL at 09:00

## 2020-04-20 RX ADMIN — INSULIN LISPRO 5 UNITS: 100 INJECTION, SOLUTION INTRAVENOUS; SUBCUTANEOUS at 21:13

## 2020-04-20 RX ADMIN — GABAPENTIN 800 MG: 400 CAPSULE ORAL at 09:01

## 2020-04-20 RX ADMIN — INSULIN LISPRO 3 UNITS: 100 INJECTION, SOLUTION INTRAVENOUS; SUBCUTANEOUS at 11:58

## 2020-04-20 RX ADMIN — PANTOPRAZOLE SODIUM 40 MG: 40 TABLET, DELAYED RELEASE ORAL at 05:59

## 2020-04-20 RX ADMIN — GABAPENTIN 800 MG: 400 CAPSULE ORAL at 20:59

## 2020-04-20 RX ADMIN — GLIPIZIDE 20 MG: 5 TABLET ORAL at 09:01

## 2020-04-20 RX ADMIN — BENZONATATE 200 MG: 100 CAPSULE ORAL at 09:01

## 2020-04-20 RX ADMIN — INSULIN LISPRO 9 UNITS: 100 INJECTION, SOLUTION INTRAVENOUS; SUBCUTANEOUS at 17:19

## 2020-04-20 RX ADMIN — LEVOFLOXACIN 500 MG: 500 TABLET, FILM COATED ORAL at 09:00

## 2020-04-20 RX ADMIN — LISINOPRIL 5 MG: 5 TABLET ORAL at 09:02

## 2020-04-20 RX ADMIN — FUROSEMIDE 20 MG: 20 TABLET ORAL at 09:00

## 2020-04-20 RX ADMIN — LEVOTHYROXINE SODIUM 75 MCG: 75 TABLET ORAL at 09:01

## 2020-04-20 RX ADMIN — ATORVASTATIN CALCIUM 80 MG: 80 TABLET, FILM COATED ORAL at 09:01

## 2020-04-20 RX ADMIN — Medication 60 MG: at 09:00

## 2020-04-20 RX ADMIN — Medication 10 ML: at 12:01

## 2020-04-20 RX ADMIN — ACETAMINOPHEN 650 MG: 325 TABLET, FILM COATED ORAL at 09:00

## 2020-04-20 RX ADMIN — INSULIN GLARGINE 75 UNITS: 100 INJECTION, SOLUTION SUBCUTANEOUS at 21:13

## 2020-04-20 RX ADMIN — Medication 60 MG: at 20:59

## 2020-04-20 ASSESSMENT — PAIN SCALES - GENERAL
PAINLEVEL_OUTOF10: 0
PAINLEVEL_OUTOF10: 9
PAINLEVEL_OUTOF10: 9
PAINLEVEL_OUTOF10: 5
PAINLEVEL_OUTOF10: 6
PAINLEVEL_OUTOF10: 0
PAINLEVEL_OUTOF10: 5

## 2020-04-20 ASSESSMENT — PAIN DESCRIPTION - DESCRIPTORS
DESCRIPTORS: ACHING
DESCRIPTORS: ACHING

## 2020-04-20 ASSESSMENT — PAIN DESCRIPTION - LOCATION
LOCATION: NECK
LOCATION: HEAD
LOCATION: NECK
LOCATION: HEAD

## 2020-04-20 ASSESSMENT — PAIN DESCRIPTION - ORIENTATION
ORIENTATION: MID
ORIENTATION: MID

## 2020-04-20 ASSESSMENT — PAIN DESCRIPTION - PAIN TYPE
TYPE: ACUTE PAIN

## 2020-04-20 ASSESSMENT — PAIN DESCRIPTION - FREQUENCY: FREQUENCY: INTERMITTENT

## 2020-04-20 ASSESSMENT — PAIN DESCRIPTION - ONSET: ONSET: GRADUAL

## 2020-04-20 NOTE — PROGRESS NOTES
Pulmonary Progress Note  [facility]      Patient - Dre Gracia,  Age - 63 y.o.    - 1955      Room Number - 4908/6370-74   MRN -  [id number]   Acct # - [de-identified]  Date of Admission -  2020  9:42 PM        Consulting El Salmeron MD  Primary Care Physician - Nitish Sung she is feeling better    OBJECTIVE   VITALS    height is 5' 2\" (1.575 m) and weight is 233 lb 0.4 oz (105.7 kg). Her oral temperature is 97 °F (36.1 °C). Her blood pressure is 126/76 and her pulse is 80. Her respiration is 22 and oxygen saturation is 90%. Body mass index is 42.62 kg/m². Temperature Range: Temp: 97 °F (36.1 °C) Temp  Av.3 °F (36.3 °C)  Min: 96.8 °F (36 °C)  Max: 97.8 °F (36.6 °C)  BP Range:  Systolic (85FQN), ECW:522 , Min:108 , JBI:796     Diastolic (82GWG), PWH:38, Min:54, Max:86    Pulse Range: Pulse  Av.2  Min: 72  Max: 80  Respiration Range: Resp  Av.4  Min: 16  Max: 22  Current Pulse Ox[de-identified]  SpO2: 90 %  24HR Pulse Ox Range:  SpO2  Av.4 %  Min: 90 %  Max: 92 %  Oxygen Amount and Delivery: O2 Flow Rate (L/min): 1 L/min    Wt Readings from Last 3 Encounters:   20 233 lb 0.4 oz (105.7 kg)   20 235 lb 2 oz (106.7 kg)   19 240 lb (108.9 kg)       I/O (24 Hours)    Intake/Output Summary (Last 24 hours) at 2020 1611  Last data filed at 2020 1403  Gross per 24 hour   Intake 850 ml   Output 2900 ml   Net -2050 ml       EXAM       Remainder of examination deferred due to excessive risk conferred by physical examination during a global pandemic due to coronavirus 19.   In a setting where local and national supplies of personal protective equipment are depleted    MEDS      benzonatate  200 mg Oral TID    dextromethorphan  60 mg Oral BID    glipiZIDE  20 mg Oral BID AC    atorvastatin  80 mg Oral Daily    DULoxetine  60 mg Oral Daily    furosemide  20 mg

## 2020-04-20 NOTE — PROGRESS NOTES
obesity with BMI of 45.0-49.9, adult ([facility] [address] 3/31/2016    Nephrolithiasis     Neuropathy     Type II or unspecified type diabetes mellitus without mention of complication, not stated as uncontrolled     non insulin dependent     UTI (lower urinary tract infection)        Past Surgical  History:     Past Surgical History:   Procedure Laterality Date    BREAST BIOPSY  2012    negative    CARDIOVASCULAR STRESS TEST      7/17/15 no results    CHOLECYSTECTOMY      CYST REMOVAL      right hand    CYSTO/URETERO/PYELOSCOPY, CALCULUS TX Right 8/26/2019    CYSTOSCOPY URETEROSCOPY  STONE BASKET RETRIEVAL RIGHT STENT EXCHANGE performed by Hailee Solares MD at [facility] Right 8/20/2019    CYSTOSCOPY URETERAL STENT INSERTION performed by Hailee Solares MD at [facility]  2009    ENDOSCOPY, COLON, DIAGNOSTIC      LITHOTRIPSY      TONSILLECTOMY      TUBAL LIGATION      UPPER GASTROINTESTINAL ENDOSCOPY N/A 4/16/2020    EGD BIOPSY performed by Serafin Rubio MD at [facility]       Medications:      levoFLOXacin  500 mg Oral Daily    benzonatate  200 mg Oral TID    dextromethorphan  60 mg Oral BID    glipiZIDE  20 mg Oral BID AC    atorvastatin  80 mg Oral Daily    DULoxetine  60 mg Oral Daily    furosemide  20 mg Oral Daily    gabapentin  800 mg Oral BID    levothyroxine  75 mcg Oral Daily    lisinopril  5 mg Oral Daily    metoprolol tartrate  50 mg Oral BID    sodium chloride flush  10 mL Intravenous 2 times per day    aspirin  325 mg Oral Daily    enoxaparin  30 mg Subcutaneous BID    insulin glargine  75 Units Subcutaneous Nightly    insulin lispro  0-18 Units Subcutaneous TID WC    insulin lispro  0-9 Units Subcutaneous Nightly    pantoprazole  40 mg Oral QAM AC       Social History:     Social History     Socioeconomic History    Marital status: Single     Spouse name: Not on file    Number of children: Not on file    Years of education:

## 2020-04-21 VITALS
WEIGHT: 233.03 LBS | HEART RATE: 77 BPM | OXYGEN SATURATION: 92 % | HEIGHT: 62 IN | TEMPERATURE: 98.3 F | RESPIRATION RATE: 18 BRPM | DIASTOLIC BLOOD PRESSURE: 72 MMHG | SYSTOLIC BLOOD PRESSURE: 118 MMHG | BODY MASS INDEX: 42.88 KG/M2

## 2020-04-21 LAB
ALBUMIN SERPL-MCNC: 2.6 G/DL (ref 3.5–5.2)
ALBUMIN/GLOBULIN RATIO: ABNORMAL (ref 1–2.5)
ALP BLD-CCNC: 107 U/L (ref 35–104)
ALT SERPL-CCNC: 20 U/L (ref 5–33)
ANION GAP SERPL CALCULATED.3IONS-SCNC: 10 MMOL/L (ref 9–17)
AST SERPL-CCNC: 18 U/L
BILIRUB SERPL-MCNC: 0.33 MG/DL (ref 0.3–1.2)
BUN BLDV-MCNC: 5 MG/DL (ref 8–23)
BUN/CREAT BLD: ABNORMAL (ref 9–20)
CALCIUM SERPL-MCNC: 8.8 MG/DL (ref 8.6–10.4)
CHLORIDE BLD-SCNC: 104 MMOL/L (ref 98–107)
CO2: 26 MMOL/L (ref 20–31)
CREAT SERPL-MCNC: <0.4 MG/DL (ref 0.5–0.9)
GFR AFRICAN AMERICAN: ABNORMAL ML/MIN
GFR NON-AFRICAN AMERICAN: ABNORMAL ML/MIN
GFR SERPL CREATININE-BSD FRML MDRD: ABNORMAL ML/MIN/{1.73_M2}
GFR SERPL CREATININE-BSD FRML MDRD: ABNORMAL ML/MIN/{1.73_M2}
GLUCOSE BLD-MCNC: 146 MG/DL (ref 70–99)
GLUCOSE BLD-MCNC: 165 MG/DL (ref 65–105)
POTASSIUM SERPL-SCNC: 4 MMOL/L (ref 3.7–5.3)
SODIUM BLD-SCNC: 140 MMOL/L (ref 135–144)
TOTAL PROTEIN: 6.1 G/DL (ref 6.4–8.3)

## 2020-04-21 PROCEDURE — 6360000002 HC RX W HCPCS: Performed by: INTERNAL MEDICINE

## 2020-04-21 PROCEDURE — 36415 COLL VENOUS BLD VENIPUNCTURE: CPT

## 2020-04-21 PROCEDURE — 80053 COMPREHEN METABOLIC PANEL: CPT

## 2020-04-21 PROCEDURE — 2580000003 HC RX 258: Performed by: INTERNAL MEDICINE

## 2020-04-21 PROCEDURE — 99239 HOSP IP/OBS DSCHRG MGMT >30: CPT | Performed by: INTERNAL MEDICINE

## 2020-04-21 PROCEDURE — 6370000000 HC RX 637 (ALT 250 FOR IP): Performed by: INTERNAL MEDICINE

## 2020-04-21 PROCEDURE — 82947 ASSAY GLUCOSE BLOOD QUANT: CPT

## 2020-04-21 RX ORDER — DEXTROMETHORPHAN POLISTIREX 30 MG/5ML
60 SUSPENSION ORAL 2 TIMES DAILY
Qty: 200 ML | Refills: 0 | Status: SHIPPED | OUTPATIENT
Start: 2020-04-21 | End: 2020-05-01

## 2020-04-21 RX ORDER — BENZONATATE 200 MG/1
200 CAPSULE ORAL 3 TIMES DAILY
Qty: 21 CAPSULE | Refills: 0 | Status: SHIPPED | OUTPATIENT
Start: 2020-04-21 | End: 2020-04-28

## 2020-04-21 RX ORDER — LEVOTHYROXINE SODIUM 0.07 MG/1
75 TABLET ORAL DAILY
Qty: 30 TABLET | Refills: 3 | Status: SHIPPED | OUTPATIENT
Start: 2020-04-22 | End: 2021-07-20 | Stop reason: SDUPTHER

## 2020-04-21 RX ADMIN — METOPROLOL TARTRATE 50 MG: 50 TABLET, FILM COATED ORAL at 08:24

## 2020-04-21 RX ADMIN — ENOXAPARIN SODIUM 30 MG: 30 INJECTION SUBCUTANEOUS at 08:25

## 2020-04-21 RX ADMIN — INSULIN LISPRO 3 UNITS: 100 INJECTION, SOLUTION INTRAVENOUS; SUBCUTANEOUS at 11:36

## 2020-04-21 RX ADMIN — LEVOTHYROXINE SODIUM 75 MCG: 75 TABLET ORAL at 08:24

## 2020-04-21 RX ADMIN — Medication 60 MG: at 08:24

## 2020-04-21 RX ADMIN — PANTOPRAZOLE SODIUM 40 MG: 40 TABLET, DELAYED RELEASE ORAL at 05:19

## 2020-04-21 RX ADMIN — ASPIRIN 325 MG: 325 TABLET, COATED ORAL at 08:24

## 2020-04-21 RX ADMIN — GLIPIZIDE 20 MG: 5 TABLET ORAL at 05:18

## 2020-04-21 RX ADMIN — ATORVASTATIN CALCIUM 80 MG: 80 TABLET, FILM COATED ORAL at 08:24

## 2020-04-21 RX ADMIN — Medication 10 ML: at 08:30

## 2020-04-21 RX ADMIN — BENZONATATE 200 MG: 100 CAPSULE ORAL at 08:25

## 2020-04-21 RX ADMIN — FUROSEMIDE 20 MG: 20 TABLET ORAL at 08:24

## 2020-04-21 RX ADMIN — DULOXETINE HYDROCHLORIDE 60 MG: 60 CAPSULE, DELAYED RELEASE PELLETS ORAL at 08:24

## 2020-04-21 ASSESSMENT — PAIN SCALES - GENERAL: PAINLEVEL_OUTOF10: 0

## 2020-04-21 NOTE — DISCHARGE SUMMARY
deep breaths FINDINGS: The heart is normal in size and configuration. The mediastinal contours are within normal limits. Left basilar subsegmental atelectasis versus airspace disease is present. Lungs are otherwise well aerated. The pleural surfaces are normal and no evidence of a pleural effusion is seen. Bones and soft tissues are unremarkable. Left basilar subsegmental atelectasis versus airspace disease. Otherwise, unremarkable single AP upright view of the chest.     Nm Cardiac Stress Test Nuclear Imaging    Result Date: 3/28/2020  EXAMINATION: MYOCARDIAL PERFUSION IMAGING 3/28/2020 7:09 am TECHNIQUE: For the rest study, 9.8 mCi of Tc 99 labeled sestamibi were injected. SPECT images were acquired. Under cardiology supervision, 0.4mg Serina Rom was infused. After pharmacologic stress, 36.7 mCi of Tc 99 labeled sestamibi were injected. SPECT images with ECG gating were acquired. COMPARISON: July 31, 2018 HISTORY: ORDERING SYSTEM PROVIDED HISTORY: Chest pain TECHNOLOGIST PROVIDED HISTORY: Reason for Exam: Chest pain Procedure Type->Rx Chest Pain Reason for Exam: Chest pain Acuity: Unknown Type of Exam: Unknown FINDINGS: There is normal accumulation of tracer throughout the myocardium on rest images and stress images. There are no fixed or reversible defects. End diastolic volume is 83 ml. The ejection fraction equals 20%. Hypokinesis of the septal wall and apex. Dyskinesis of the lateral wall. The left ventricle is dilated. TID of 1.05.     1. No pharmacologically induced reversible perfusion defects to suggest ischemia. 2. Ejection fraction of 20% (previously 49%). 3. Hypokinesis of the septal wall and apex. Dyskinesis of the lateral wall.          Consultations:    Consults:     Final Specialist Recommendations/Findings:   IP CONSULT TO INTERNAL MEDICINE  IP CONSULT TO CARDIOLOGY  IP CONSULT TO GI  IP CONSULT TO PULMONOLOGY  IP CONSULT TO INFECTIOUS DISEASES      The patient was seen and examined on healthcare provider and tell them that you have, or are being evaluated for, COVID-19. Put on a facemask before you enter the facility. These steps will help the healthcare providers office to keep other people in the office or waiting room from getting infected or exposed. Ask your healthcare provider to call the local or state health department. Persons who are placed under active monitoring or facilitated self-monitoring should follow instructions provided by their local health department or occupational health professionals, as appropriate. When working with your local health department check their available hours. If you have a medical emergency and need to call 911, notify the dispatch personnel that you have, or are being evaluated for COVID-19. If possible, put on a facemask before emergency medical services arrive. Discontinuing home isolation  Patients with confirmed COVID-19 should remain under home isolation precautions until the risk of secondary transmission to others is thought to be low. The decision to discontinue home isolation precautions should be made on a case-by-case basis, in consultation with healthcare providers and [facility] health departments. Diet: regular    Activity: As tolerated    Discharge Medications:      Medication List      START taking these medications    benzonatate 200 MG capsule  Commonly known as:  TESSALON  Take 1 capsule by mouth three times daily for 7 days     dextromethorphan 30 MG/5ML extended release liquid  Commonly known as:  DELSYM  Take 10 mLs by mouth 2 times daily for 10 days        CHANGE how you take these medications    gabapentin 800 MG tablet  Commonly known as:  NEURONTIN  take 1 tablet by mouth four times a day  What changed:  See the new instructions. Insulin Degludec 200 UNIT/ML Sopn  Inject 75 Units into the skin nightly  What changed:  Another medication with the same name was removed.  Continue taking this medication, and follow the directions you see here. levothyroxine 75 MCG tablet  Commonly known as:  SYNTHROID  Take 1 tablet by mouth daily  Start taking on:  April 22, 2020  What changed:    · medication strength  · how much to take        CONTINUE taking these medications    atorvastatin 80 MG tablet  Commonly known as:  LIPITOR  Take 1 tablet by mouth daily     DULoxetine 60 MG extended release capsule  Commonly known as:  CYMBALTA  take 1 capsule by mouth once daily     furosemide 20 MG tablet  Commonly known as:  LASIX  take 1 tablet by mouth daily     glipiZIDE 10 MG tablet  Commonly known as:  GLUCOTROL  Take 2 tablets by mouth 2 times daily (before meals)     lisinopril 5 MG tablet  Commonly known as:  PRINIVIL;ZESTRIL  Take 1 tablet by mouth daily     metoprolol tartrate 50 MG tablet  Commonly known as:  LOPRESSOR  Take 1 tablet by mouth 2 times daily           Where to Get Your Medications      These medications were sent to [place] [address]. Abigail Lovell [phone] Toribio Gaviria [phone]  [address] [place] [id number]    Phone:  [phone]   · benzonatate 200 MG capsule  · dextromethorphan 30 MG/5ML extended release liquid  · levothyroxine 75 MCG tablet         Time Spent on discharge is  35 mins in patient examination, evaluation, counseling as well as medication reconciliation, prescriptions for required medications, discharge plan and follow up. Electronically signed by   Rebeca Benavides MD  4/21/2020  2:05 PM      Thank you Dr. Daylin Delgado for the opportunity to be involved in this patient's care.

## 2020-04-21 NOTE — PLAN OF CARE
Problem: Pain:  Goal: Pain level will decrease  Description: Pain level will decrease  4/21/2020 0216 by Jorge Vazquez RN  Outcome: Ongoing  4/20/2020 1714 by Alfonzo Maher RN  Outcome: Met This Shift  Goal: Control of acute pain  Description: Control of acute pain  4/21/2020 0216 by Jorge Vazquez RN  Outcome: Ongoing  Note: Patient having some neck discomfort. Discomfort relieved with PRN Tylenol. See MAR. Will continue to monitor. 4/20/2020 1714 by Alfonzo Maher RN  Outcome: Met This Shift  Goal: Control of chronic pain  Description: Control of chronic pain  4/21/2020 0216 by Jorge Vazquez RN  Outcome: Ongoing  4/20/2020 1714 by Alfonzo Maher RN  Outcome: Met This Shift  Note: Tylenol relieved headache. Problem: Activity:  Goal: Ability to tolerate increased activity will improve  Description: Ability to tolerate increased activity will improve  4/21/2020 0216 by Jorge Vazquez RN  Outcome: Ongoing  Note: Patient has been up to bathroom by herself. No complaints of SOB. O2 sats 90% on RA. Will continue to monitor. 4/20/2020 1714 by Alfonzo Maher RN  Outcome: Met This Shift  Note: Up to bathroom by herself. States slight SOB only. O2 sats RA 90-91%     Problem: Breathing Pattern - Ineffective:  Goal: Ability to achieve and maintain a regular respiratory rate will improve  Description: Ability to achieve and maintain a regular respiratory rate will improve  4/21/2020 0216 by Jorge Vazquez RN  Outcome: Ongoing  Note: Regular breathing pattern noted this shift. See respiratory assessment. Will continue to monitor.    4/20/2020 1714 by Alfonzo Maher RN  Outcome: Met This Shift

## 2020-04-22 ENCOUNTER — CARE COORDINATION (OUTPATIENT)
Dept: CASE MANAGEMENT | Age: 65
End: 2020-04-22

## 2020-04-22 NOTE — CARE COORDINATION
[facility] Transitions Initial Follow Up Call    Call within 2 business days of discharge: Yes    Patient: Delbert Ramirez Patient : 1955   MRN: [id number]  Reason for Admission: sob  Discharge Date: 20 RARS: Readmission Risk Score: 17      Last Discharge [facility]       Complaint Diagnosis Description Type Department Provider    20 Nausea; Shortness of Breath Dyspnea, unspecified type ED to Hosp-Admission (Discharged) (ADMITTED) [facility] Terese Goetz MD; Jimmy Pruitt. .. Spoke with: [facility]: [address]    Non-face-to-face services provided:  Scheduled appointment with PCP-sent request to [facility] for follow up appointment  covid-19 screening  Patient contacted regarding [id number] diagnosis\". Care Transition Nurse/ Ambulatory Care Manager contacted the caregiver by telephone to perform post discharge assessment. Verified name and  with caregiver as identifiers. Provided introduction to self, and explanation of the CTN/ACM role, and reason for call due to risk factors for infection and/or exposure to COVID-19. Symptoms reviewed with caregiver who verbalized the following symptoms: fatigue, pain or aching joints and headache. Due to patient's cough and sob is better but still has headache, SO giving her OTC tylenol extra strength encounter set f/u call for 1-2 days from CTN from Loop routed to provider for escalation. Patient has following risk factors of: diabetes. CTN/ACM reviewed discharge instructions, medical action plan and red flags such as increased shortness of breath, increasing fever and signs of decompensation with caregiver who verbalized understanding. Discussed exposure protocols and quarantine with CDC Guidelines What to do if you are sick with coronavirus disease .  Caregiver was given an opportunity for questions and concerns.  The caregiver agrees to contact the [facility] exposure line [phone], local

## 2020-04-23 LAB
CULTURE: NORMAL
CULTURE: NORMAL
Lab: NORMAL
Lab: NORMAL
SPECIMEN DESCRIPTION: NORMAL
SPECIMEN DESCRIPTION: NORMAL

## 2020-06-16 ENCOUNTER — VIRTUAL VISIT (OUTPATIENT)
Dept: FAMILY MEDICINE CLINIC | Age: 65
End: 2020-06-16
Payer: COMMERCIAL

## 2020-06-16 PROBLEM — N39.0 URINARY TRACT INFECTION WITHOUT HEMATURIA: Status: RESOLVED | Noted: 2019-08-20 | Resolved: 2020-06-16

## 2020-06-16 PROBLEM — R06.00 DYSPNEA: Status: RESOLVED | Noted: 2020-04-14 | Resolved: 2020-06-16

## 2020-06-16 PROBLEM — N13.30 HYDRONEPHROSIS: Status: RESOLVED | Noted: 2019-08-20 | Resolved: 2020-06-16

## 2020-06-16 PROBLEM — M79.646 THUMB PAIN: Status: RESOLVED | Noted: 2017-02-07 | Resolved: 2020-06-16

## 2020-06-16 PROBLEM — E78.2 MIXED HYPERLIPIDEMIA: Status: ACTIVE | Noted: 2020-06-16

## 2020-06-16 PROBLEM — R07.9 CHEST PAIN: Status: RESOLVED | Noted: 2018-07-30 | Resolved: 2020-06-16

## 2020-06-16 PROCEDURE — 99204 OFFICE O/P NEW MOD 45 MIN: CPT | Performed by: FAMILY MEDICINE

## 2020-06-16 PROCEDURE — 3046F HEMOGLOBIN A1C LEVEL >9.0%: CPT | Performed by: FAMILY MEDICINE

## 2020-06-16 PROCEDURE — G8427 DOCREV CUR MEDS BY ELIG CLIN: HCPCS | Performed by: FAMILY MEDICINE

## 2020-06-16 PROCEDURE — 3017F COLORECTAL CA SCREEN DOC REV: CPT | Performed by: FAMILY MEDICINE

## 2020-06-16 PROCEDURE — 2022F DILAT RTA XM EVC RTNOPTHY: CPT | Performed by: FAMILY MEDICINE

## 2020-06-16 RX ORDER — INSULIN LISPRO 100 [IU]/ML
10 INJECTION, SOLUTION INTRAVENOUS; SUBCUTANEOUS 3 TIMES DAILY PRN
COMMUNITY
Start: 2020-05-18 | End: 2021-11-12 | Stop reason: SDUPTHER

## 2020-06-16 RX ORDER — GABAPENTIN 600 MG/1
600 TABLET ORAL 2 TIMES DAILY
COMMUNITY
Start: 2020-05-29 | End: 2020-09-08

## 2020-06-16 ASSESSMENT — ENCOUNTER SYMPTOMS
VOMITING: 0
DIARRHEA: 0
RHINORRHEA: 0
VOICE CHANGE: 0
WHEEZING: 0
SHORTNESS OF BREATH: 0
SINUS PRESSURE: 0
COUGH: 0
ABDOMINAL DISTENTION: 0
ABDOMINAL PAIN: 0
PHOTOPHOBIA: 0
BACK PAIN: 0
NAUSEA: 0
CONSTIPATION: 0
CHEST TIGHTNESS: 0

## 2020-06-16 ASSESSMENT — PATIENT HEALTH QUESTIONNAIRE - PHQ9
SUM OF ALL RESPONSES TO PHQ QUESTIONS 1-9: 1
2. FEELING DOWN, DEPRESSED OR HOPELESS: 1
SUM OF ALL RESPONSES TO PHQ QUESTIONS 1-9: 1
1. LITTLE INTEREST OR PLEASURE IN DOING THINGS: 0
SUM OF ALL RESPONSES TO PHQ9 QUESTIONS 1 & 2: 1

## 2020-06-16 NOTE — PROGRESS NOTES
LIGATION      UPPER GASTROINTESTINAL ENDOSCOPY N/A 4/16/2020    EGD BIOPSY performed by Peter Mcpherson MD at [address] [place] [address] ENDO   ,   Social History     Tobacco Use    Smoking status: Former Smoker     Types: Cigarettes    Smokeless tobacco: Never Used    Tobacco comment: pt states she quit smoking cigarettes at the age of 14   Substance Use Topics    Alcohol use: No     Alcohol/week: 0.0 standard drinks    Drug use: No     Comment: once in a while   ,   Family History   Problem Relation Age of Onset    Coronary Art Dis Mother     Lung Cancer Mother     Diabetes Mother         mellitus    Coronary Art Dis Father     Diabetes Father         diabetes mellitus   ,   Immunization History   Administered Date(s) Administered    Pneumococcal Polysaccharide ([id number]) 09/18/2012    Tdap (Boostrix, Adacel) 09/18/2009   ,   Health Maintenance   Topic Date Due    Hepatitis C screen  1955    Shingles Vaccine (1 of 2) 03/19/2007    Diabetic retinal exam  03/07/2017    Diabetic foot exam  07/07/2018    Colon Cancer Screen FIT/FOBT  01/09/2019    DTaP/Tdap/Td vaccine (2 - Td) 09/18/2019    Diabetic microalbuminuria test  12/12/2019    Breast cancer screen  03/23/2020    A1C test (Diabetic or Prediabetic)  07/15/2020    Flu vaccine (Season Ended) 09/01/2020    TSH testing  09/04/2020    Lipid screen  04/14/2021    Potassium monitoring  04/21/2021    Creatinine monitoring  04/21/2021    Cervical cancer screen  02/19/2023    Pneumococcal 0-64 years Vaccine  Completed    HIV screen  Addressed    Hepatitis A vaccine  Aged Out    Hib vaccine  Aged Out    Meningococcal (ACWY) vaccine  Aged Out       PHYSICAL EXAMINATION:  [ INSTRUCTIONS:  \"[x]\" Indicates a positive item  \"[]\" Indicates a negative item  -- DELETE ALL ITEMS NOT EXAMINED]  Vital Signs: (As obtained by patient/caregiver or practitioner observation)    Blood pressure-  Heart rate-    Respiratory rate-    Temperature-  Pulse oximetry- Constitutional: [x] Appears well-developed and well-nourished [x] No apparent distress      [] Abnormal-   Mental status  [x] Alert and awake  [x] Oriented to person/place/time [x]Able to follow commands      Eyes:  EOM    [x]  Normal  [] Abnormal-  Sclera  [x]  Normal  [] Abnormal -         Discharge []  None visible  [] Abnormal -    HENT:   [x] Normocephalic, atraumatic. [x] Abnormal   [x] Mouth/Throat: Mucous membranes are moist.     External Ears [x] Normal  [] Abnormal-     Neck: [x] No visualized mass     Pulmonary/Chest: [x] Respiratory effort normal.  [x] No visualized signs of difficulty breathing or respiratory distress        [] Abnormal-      Musculoskeletal:   [x] Normal gait with no signs of ataxia         [x] Normal range of motion of neck        [] Abnormal-       Neurological:        [x] No Facial Asymmetry (Cranial nerve 7 motor function) (limited exam to video visit)          [x] No gaze palsy        [] Abnormal-         Skin:        [x] No significant exanthematous lesions or discoloration noted on facial skin         [] Abnormal-            Psychiatric:       [x] Normal Affect [] No Hallucinations        [] Abnormal-     Other pertinent observable physical exam findings-     ASSESSMENT/PLAN:  1. Uncontrolled type 2 diabetes mellitus with diabetic neuropathy, with long-term current use of insulin (HCC)  Uncontrolled A1c has mildly improved, continue insulin continue sliding scale, start on Januvia discontinue glipizide. Discussed with patient to keep fasting blood sugars under 150.  - SITagliptin (JANUVIA) 50 MG tablet; Take 1 tablet by mouth daily  Dispense: 90 tablet; Refill: 1    2. Pneumonia due to COVID-19 virus  Repeat chest x-ray for resolution of pneumonia. - XR CHEST STANDARD (2 VW); Future    3. Acquired hypothyroidism  Stable on recent blood work    4. Essential hypertension  Blood pressure is running low, discussed to hold of lisinopril and monitor your blood pressure    5. Mixed hyperlipidemia  Stable continue same medications    6. Preventative health care    - Hepatitis C Antibody; Future      Return in about 4 weeks (around 7/14/2020) for dm ,htn, hld,in office. Laith aNir is a 63 y.o. female being evaluated by a Virtual Visit (video visit) encounter to address concerns as mentioned above. A caregiver was present when appropriate. Due to this being a TeleHealth encounter (During JNJEX-87 public health emergency), evaluation of the following organ systems was limited: Vitals/Constitutional/EENT/Resp/CV/GI//MS/Neuro/Skin/Heme-Lymph-Imm. Pursuant to the emergency declaration under the [address] [place] [address] authority and the [employer] and [employer] Act, this Virtual Visit was conducted with patient's (and/or legal guardian's) consent, to reduce the patient's risk of exposure to COVID-19 and provide necessary medical care. The patient (and/or legal guardian) has also been advised to contact this office for worsening conditions or problems, and seek emergency medical treatment and/or call 911 if deemed necessary. Patient identification was verified at the start of the visit: Yes    Total time spent on this encounter: 22    Services were provided through a video synchronous discussion virtually to substitute for in-person clinic visit. Patient and provider were located at their individual homes. --Olga Moses MD on 6/16/2020 at 11:15 PM    An electronic signature was used to authenticate this note.

## 2020-06-23 ENCOUNTER — TELEPHONE (OUTPATIENT)
Dept: FAMILY MEDICINE CLINIC | Age: 65
End: 2020-06-23

## 2020-06-26 ENCOUNTER — HOSPITAL ENCOUNTER (EMERGENCY)
Age: 65
Discharge: HOME | End: 2020-06-26
Attending: EMERGENCY MEDICINE
Payer: COMMERCIAL

## 2020-06-26 VITALS
HEART RATE: 85 BPM | OXYGEN SATURATION: 97 % | TEMPERATURE: 97.8 F | WEIGHT: 235 LBS | RESPIRATION RATE: 14 BRPM | BODY MASS INDEX: 43.24 KG/M2 | SYSTOLIC BLOOD PRESSURE: 122 MMHG | DIASTOLIC BLOOD PRESSURE: 89 MMHG | HEIGHT: 62 IN

## 2020-06-26 LAB
-: ABNORMAL
AMORPHOUS: ABNORMAL
BACTERIA: ABNORMAL
BILIRUBIN URINE: NEGATIVE
CASTS UA: ABNORMAL /LPF
COLOR: ABNORMAL
COMMENT UA: ABNORMAL
CRYSTALS, UA: ABNORMAL /HPF
EPITHELIAL CELLS UA: ABNORMAL /HPF
GLUCOSE URINE: ABNORMAL
KETONES, URINE: NEGATIVE
LEUKOCYTE ESTERASE, URINE: ABNORMAL
MUCUS: ABNORMAL
NITRITE, URINE: NEGATIVE
OTHER OBSERVATIONS UA: ABNORMAL
PH UA: 5.5 (ref 5–8)
PROTEIN UA: ABNORMAL
RBC UA: ABNORMAL /HPF
RENAL EPITHELIAL, UA: ABNORMAL /HPF
SPECIFIC GRAVITY UA: 1.03 (ref 1–1.03)
TRICHOMONAS: ABNORMAL
TURBIDITY: ABNORMAL
URINE HGB: ABNORMAL
UROBILINOGEN, URINE: NORMAL
WBC UA: ABNORMAL /HPF
YEAST: ABNORMAL

## 2020-06-26 PROCEDURE — 87086 URINE CULTURE/COLONY COUNT: CPT

## 2020-06-26 PROCEDURE — 87186 SC STD MICRODIL/AGAR DIL: CPT

## 2020-06-26 PROCEDURE — 81001 URINALYSIS AUTO W/SCOPE: CPT

## 2020-06-26 PROCEDURE — 99283 EMERGENCY DEPT VISIT LOW MDM: CPT

## 2020-06-26 PROCEDURE — 6370000000 HC RX 637 (ALT 250 FOR IP): Performed by: EMERGENCY MEDICINE

## 2020-06-26 PROCEDURE — 87077 CULTURE AEROBIC IDENTIFY: CPT

## 2020-06-26 RX ORDER — CIPROFLOXACIN 500 MG/1
500 TABLET, FILM COATED ORAL ONCE
Status: DISCONTINUED | OUTPATIENT
Start: 2020-06-26 | End: 2020-06-26

## 2020-06-26 RX ORDER — SULFAMETHOXAZOLE AND TRIMETHOPRIM 800; 160 MG/1; MG/1
1 TABLET ORAL 2 TIMES DAILY
Qty: 20 TABLET | Refills: 0 | Status: SHIPPED | OUTPATIENT
Start: 2020-06-26 | End: 2020-07-06

## 2020-06-26 RX ORDER — SULFAMETHOXAZOLE AND TRIMETHOPRIM 800; 160 MG/1; MG/1
1 TABLET ORAL ONCE
Status: COMPLETED | OUTPATIENT
Start: 2020-06-26 | End: 2020-06-26

## 2020-06-26 RX ORDER — CIPROFLOXACIN 500 MG/1
500 TABLET, FILM COATED ORAL 2 TIMES DAILY
Qty: 14 TABLET | Refills: 0 | Status: SHIPPED | OUTPATIENT
Start: 2020-06-26 | End: 2020-07-03

## 2020-06-26 RX ADMIN — SULFAMETHOXAZOLE AND TRIMETHOPRIM 1 TABLET: 800; 160 TABLET ORAL at 19:07

## 2020-06-26 ASSESSMENT — ENCOUNTER SYMPTOMS
BACK PAIN: 0
GASTROINTESTINAL NEGATIVE: 1
RESPIRATORY NEGATIVE: 1
ABDOMINAL PAIN: 0
SHORTNESS OF BREATH: 0
EYES NEGATIVE: 1
COUGH: 0

## 2020-06-26 NOTE — ED PROVIDER NOTES
1+ (*)     Urine Hgb LARGE (*)     Protein, UA 2+ (*)     Leukocyte Esterase, Urine LARGE (*)     All other components within normal limits   MICROSCOPIC URINALYSIS - Abnormal; Notable for the following components:    Bacteria, UA MANY (*)     Mucus, UA 2+ (*)     Amorphous, UA 2+ (*)     All other components within normal limits   CULTURE, URINE       EMERGENCY DEPARTMENTCOURSE:         Vitals:    Vitals:    06/26/20 1840   BP: 122/89   Pulse: 85   Resp: 14   Temp: 97.8 °F (36.6 °C)   SpO2: 97%   Weight: 235 lb (106.6 kg)   Height: 5' 2\" (1.575 m)       The patient was given the following medications while in the emergency department:  Orders Placed This Encounter   Medications    DISCONTD: ciprofloxacin (CIPRO) tablet 500 mg    ciprofloxacin (CIPRO) 500 MG tablet     Sig: Take 1 tablet by mouth 2 times daily for 7 days     Dispense:  14 tablet     Refill:  0    sulfamethoxazole-trimethoprim (BACTRIM DS;SEPTRA DS) 800-160 MG per tablet 1 tablet    sulfamethoxazole-trimethoprim (BACTRIM DS) 800-160 MG per tablet     Sig: Take 1 tablet by mouth 2 times daily for 10 days     Dispense:  20 tablet     Refill:  0     CONSULTS:  None    FINAL IMPRESSION      1.  Urinary tract infection with hematuria, site unspecified          DISPOSITION/PLAN   DISPOSITION Decision To Discharge 06/26/2020 06:59:25 PM      PATIENT REFERRED TO:  Alean Crigler, MD  [address] [facility]  [address] [address]    Call in 1 day      DISCHARGE MEDICATIONS:  Discharge Medication List as of 6/26/2020  6:59 PM      START taking these medications    Details   ciprofloxacin (CIPRO) 500 MG tablet Take 1 tablet by mouth 2 times daily for 7 days, Disp-14 tablet, R-0Print           Korey Shrestha MD  Attending Emergency Physician                    Ina Valera MD  06/26/20 0950

## 2020-06-28 ENCOUNTER — CARE COORDINATION (OUTPATIENT)
Dept: CARE COORDINATION | Age: 65
End: 2020-06-28

## 2020-06-28 LAB
CULTURE: ABNORMAL
Lab: ABNORMAL
SPECIMEN DESCRIPTION: ABNORMAL

## 2020-06-29 ENCOUNTER — TELEPHONE (OUTPATIENT)
Dept: FAMILY MEDICINE CLINIC | Age: 65
End: 2020-06-29

## 2020-06-29 NOTE — TELEPHONE ENCOUNTER
[facility]) ED Follow up Call    Reason for ED visit:           Joaquina Knight , this is Barbara Barros from Dr. Alanis Luu office, just calling to see how you are doing after your recent ED visit. Did you receive discharge instructions? Yes  Do you understand the discharge instructions? Yes  Did the ED give you any new prescriptions? Yes  Were you able to fill your prescriptions? Yes      Do you have one of our red, yellow and green  Zone sheets that help you to determine when you should go to the ED? Yes      Do you need or want to make a follow up appt with your PCP? No    Do you have any further needs in the home i.e. Equipment?   No        FU appts/Provider:    Future Appointments   Date Time Provider Aleksandra Wisdom   7/16/2020 11:00 AM MD josy Brewer [facility]

## 2020-06-30 NOTE — CARE COORDINATION
Patient contacted regarding [id number] diagnosis\". Discussed COVID-19 related testing which was available at this time. Test results were positive. Patient informed of results, if available? Yes    Care Transition Nurse/ Ambulatory Care Manager contacted the patient by telephone to perform post discharge assessment. Verified name and  with patient as identifiers. Provided introduction to self, and explanation of the CTN/ACM role, and reason for call due to risk factors for infection and/or exposure to COVID-19. Symptoms reviewed with patient who verbalized the following symptoms: nausea, fatigue, sweating. Due to no new or worsening symptoms encounter was not routed to provider for escalation. Discussed follow-up appointments. If no appointment was previously scheduled, appointment scheduling offered: [facility] follow up appointment(s):   Future Appointments   Date Time Provider Aleksandra Wisdom   2020 11:00 AM Austin Sepulveda MD [facility]     [facility] follow up appointment(s):      Patient has following risk factors of: diabetes and HTN, obesity. CTN/ACM reviewed discharge instructions, medical action plan and red flags such as increased shortness of breath, increasing fever and signs of decompensation with patient who verbalized understanding. Discussed exposure protocols and quarantine with CDC Guidelines What to do if you are sick with coronavirus disease .  Patient was given an opportunity for questions and concerns. The patient agrees to contact the [facility] exposure line [phone], local [facility] department [place] Department [facility]: ([phone]) and PCP office for questions related to their healthcare. CTN/ACM provided contact information for future needs.     Reviewed and educated patient on any new and changed medications related to discharge diagnosis     Patient/family/caregiver given information for Damaso Greenberg and agrees to enroll yes  Patient's preferred e-mail:    Patient's

## 2020-07-03 ENCOUNTER — HOSPITAL ENCOUNTER (OUTPATIENT)
Age: 65
Discharge: HOME | End: 2020-07-03
Payer: COMMERCIAL

## 2020-07-03 ENCOUNTER — HOSPITAL ENCOUNTER (OUTPATIENT)
Age: 65
Discharge: HOME | End: 2020-07-05
Payer: COMMERCIAL

## 2020-07-03 ENCOUNTER — HOSPITAL ENCOUNTER (OUTPATIENT)
Dept: GENERAL RADIOLOGY | Age: 65
Discharge: HOME | End: 2020-07-05
Payer: COMMERCIAL

## 2020-07-03 LAB — HEPATITIS C ANTIBODY: NONREACTIVE

## 2020-07-03 PROCEDURE — 71046 X-RAY EXAM CHEST 2 VIEWS: CPT

## 2020-07-03 PROCEDURE — 86803 HEPATITIS C AB TEST: CPT

## 2020-07-03 PROCEDURE — 36415 COLL VENOUS BLD VENIPUNCTURE: CPT

## 2020-07-10 NOTE — TELEPHONE ENCOUNTER
Please Approve or Refuse.   Send to Pharmacy per Pt's Request:      Next Visit Date:  7/16/2020   Last Visit Date: 06/16/2020    Hemoglobin A1C (%)   Date Value   04/15/2020 10.1 (H)   01/23/2019 10.0 (H)   10/30/2018 11.7 (H)             ( goal A1C is < 7)   BP Readings from Last 3 Encounters:   06/26/20 122/89   04/21/20 118/72   04/16/20 (!) 91/35          (goal 120/80)  BUN   Date Value Ref Range Status   04/21/2020 5 (L) 8 - 23 mg/dL Final     CREATININE   Date Value Ref Range Status   04/21/2020 <0.40 (L) 0.50 - 0.90 mg/dL Final     Potassium   Date Value Ref Range Status   04/21/2020 4.0 3.7 - 5.3 mmol/L Final

## 2020-07-11 RX ORDER — GABAPENTIN 600 MG/1
600 TABLET ORAL 2 TIMES DAILY
Qty: 60 TABLET | Refills: 0 | Status: SHIPPED | OUTPATIENT
Start: 2020-07-11 | End: 2020-08-10

## 2020-08-10 RX ORDER — GABAPENTIN 600 MG/1
TABLET ORAL
Qty: 60 TABLET | Refills: 0 | Status: SHIPPED | OUTPATIENT
Start: 2020-08-10 | End: 2020-08-17

## 2020-08-10 NOTE — TELEPHONE ENCOUNTER
Last seen 6/16/20    Next Visit Date:  No future appointments. Health Maintenance   Topic Date Due    Shingles Vaccine (1 of 2) 03/19/2007    Diabetic retinal exam  03/07/2017    Diabetic foot exam  07/07/2018    Colon Cancer Screen FIT/FOBT  01/09/2019    DTaP/Tdap/Td vaccine (2 - Td) 09/18/2019    Diabetic microalbuminuria test  12/12/2019    Breast cancer screen  03/23/2020    A1C test (Diabetic or Prediabetic)  07/15/2020    Flu vaccine (1) 09/01/2020    TSH testing  09/04/2020    Lipid screen  04/14/2021    Potassium monitoring  04/21/2021    Creatinine monitoring  04/21/2021    Cervical cancer screen  02/19/2023    Pneumococcal 0-64 years Vaccine  Completed    Hepatitis C screen  Completed    HIV screen  Addressed    Hepatitis A vaccine  Aged Out    Hib vaccine  Aged Out    Meningococcal (ACWY) vaccine  Aged Out       Hemoglobin A1C (%)   Date Value   04/15/2020 10.1 (H)   01/23/2019 10.0 (H)   10/30/2018 11.7 (H)             ( goal A1C is < 7)   Microalb/Crt.  Ratio (mcg/mg creat)   Date Value   12/12/2018 CANNOT BE CALCULATED     LDL Cholesterol (mg/dL)   Date Value   04/14/2020 16       (goal LDL is <100)   AST (U/L)   Date Value   04/21/2020 18     ALT (U/L)   Date Value   04/21/2020 20     BUN (mg/dL)   Date Value   04/21/2020 5 (L)     BP Readings from Last 3 Encounters:   06/26/20 122/89   04/21/20 118/72   04/16/20 (!) 91/35          (goal 120/80)    All Future Testing planned in [facility]  Lab Frequency Next Occurrence   Supersaturation Profile, Urine Once 05/26/2020   Nasal cannula oxygen PRN                Patient Active Problem List:     Chronic neck pain     Abnormal mammogram     Diabetes type 2, uncontrolled (HCC)     DM neuropathy takes Percocet     Hypothyroidism     Depression     MVP (mitral valve prolapse)     Constipation     Chronic prescription benzodiazepine use     Chronic use of opiate drugs therapeutic purposes     Morbid obesity with BMI of 45.0-49.9, adult ([address]     Mediastinal cyst     Uncontrolled type 2 diabetes mellitus with diabetic neuropathy, with long-term current use of insulin (HCC)     Essential hypertension     Chronic pain of both knees     Nephrolithiasis     Ureterolithiasis     Chest pain with high risk for cardiac etiology     Pneumonia due to COVID-19 virus     Mixed hyperlipidemia

## 2020-08-17 ENCOUNTER — VIRTUAL VISIT (OUTPATIENT)
Dept: FAMILY MEDICINE CLINIC | Age: 65
End: 2020-08-17
Payer: COMMERCIAL

## 2020-08-17 VITALS — SYSTOLIC BLOOD PRESSURE: 97 MMHG | DIASTOLIC BLOOD PRESSURE: 78 MMHG

## 2020-08-17 PROBLEM — E78.2 MIXED DYSLIPIDEMIA: Status: ACTIVE | Noted: 2017-09-18

## 2020-08-17 PROBLEM — M19.042 ARTHRITIS OF LEFT HAND: Status: ACTIVE | Noted: 2020-08-17

## 2020-08-17 PROBLEM — G40.909 SEIZURE DISORDER (HCC): Status: ACTIVE | Noted: 2017-09-21

## 2020-08-17 PROBLEM — I25.10 CORONARY ARTERY DISEASE INVOLVING NATIVE CORONARY ARTERY OF NATIVE HEART WITHOUT ANGINA PECTORIS: Status: ACTIVE | Noted: 2017-09-18

## 2020-08-17 PROBLEM — M19.041 ARTHRITIS OF RIGHT HAND: Status: ACTIVE | Noted: 2020-08-17

## 2020-08-17 PROBLEM — M12.9 ARTHRITIS INVOLVING MULTIPLE SITES: Status: ACTIVE | Noted: 2020-08-17

## 2020-08-17 PROBLEM — K21.9 GASTROESOPHAGEAL REFLUX DISEASE WITHOUT ESOPHAGITIS: Status: ACTIVE | Noted: 2020-08-17

## 2020-08-17 PROBLEM — G43.009 MIGRAINE WITHOUT AURA, NOT INTRACTABLE: Status: ACTIVE | Noted: 2017-09-18

## 2020-08-17 PROBLEM — F41.1 GENERALIZED ANXIETY DISORDER: Status: ACTIVE | Noted: 2017-09-18

## 2020-08-17 PROCEDURE — 99443 PR PHYS/QHP TELEPHONE EVALUATION 21-30 MIN: CPT | Performed by: FAMILY MEDICINE

## 2020-08-17 RX ORDER — NAPROXEN 250 MG/1
500 TABLET ORAL 2 TIMES DAILY WITH MEALS
Qty: 180 TABLET | Refills: 0 | Status: SHIPPED | OUTPATIENT
Start: 2020-08-17 | End: 2020-12-02 | Stop reason: ALTCHOICE

## 2020-08-17 RX ORDER — FAMOTIDINE 20 MG/1
20 TABLET, FILM COATED ORAL 2 TIMES DAILY
Qty: 60 TABLET | Refills: 3 | Status: SHIPPED | OUTPATIENT
Start: 2020-08-17 | End: 2020-12-11

## 2020-08-17 RX ORDER — PANTOPRAZOLE SODIUM 40 MG/1
40 TABLET, DELAYED RELEASE ORAL DAILY
COMMUNITY
Start: 2020-04-15 | End: 2021-10-06

## 2020-08-17 RX ORDER — GLIPIZIDE 5 MG/1
1 TABLET ORAL NIGHTLY
COMMUNITY
Start: 2017-07-12 | End: 2020-11-16 | Stop reason: SDUPTHER

## 2020-08-17 RX ORDER — MAGNESIUM SULFATE 1 G/100ML
1 INJECTION INTRAVENOUS DAILY
COMMUNITY
Start: 2020-04-14 | End: 2020-12-02 | Stop reason: ALTCHOICE

## 2020-08-17 RX ORDER — NITROGLYCERIN 0.4 MG/1
0.4 TABLET SUBLINGUAL EVERY 6 HOURS PRN
COMMUNITY
Start: 2020-04-14 | End: 2021-06-26

## 2020-08-17 RX ORDER — OXYBUTYNIN CHLORIDE 5 MG/1
5 TABLET ORAL DAILY
COMMUNITY
Start: 2020-06-30 | End: 2022-03-11 | Stop reason: DRUGHIGH

## 2020-08-17 ASSESSMENT — ENCOUNTER SYMPTOMS
BACK PAIN: 0
PHOTOPHOBIA: 0
COUGH: 0
VOMITING: 0
ABDOMINAL PAIN: 0
CHEST TIGHTNESS: 0
VOICE CHANGE: 0
RHINORRHEA: 0
DIARRHEA: 0
SINUS PRESSURE: 0
NAUSEA: 0
ABDOMINAL DISTENTION: 0
SHORTNESS OF BREATH: 0
CONSTIPATION: 0
WHEEZING: 0

## 2020-08-17 NOTE — PATIENT INSTRUCTIONS
Patient Education        Arthritis: Care Instructions  Overview  Arthritis, also called osteoarthritis, is a breakdown of the cartilage that cushions your joints. When the cartilage wears down, your bones rub against each other. This causes pain and stiffness. Many people have some arthritis as they age. Arthritis most often affects the joints of the spine, hands, hips, knees, or feet. Arthritis never goes away completely. But medicine and home treatment can help reduce pain and help you stay active. Follow-up care is a key part of your treatment and safety. Be sure to make and go to all appointments, and call your doctor if you are having problems. It's also a good idea to know your test results and keep a list of the medicines you take. How can you care for yourself at home? · Stay at a healthy weight. Being overweight puts extra strain on your joints. · Talk to your doctor or physical therapist about exercises that will help ease joint pain. ? Stretch. You may enjoy gentle forms of yoga to help keep your joints and muscles flexible. ? Walk instead of jog. Other types of exercise that are less stressful on the joints include riding a bike, swimming, chidi chi, or water exercise. ? Lift weights. Strong muscles help reduce stress on your joints. Stronger thigh muscles, for example, take some of the stress off of the knees and hips. Learn the right way to lift weights so you don't make joint pain worse. · Take your medicines exactly as prescribed. Call your doctor if you think you are having a problem with your medicine. · Take pain medicines exactly as directed. ? If the doctor gave you a prescription medicine for pain, take it as prescribed. ? If you are not taking a prescription pain medicine, ask your doctor if you can take an over-the-counter medicine. · Use a cane, crutch, walker, or another device if you need help to get around. These can help rest your joints.  You also can use other things to make life easier, such as a higher toilet seat and padded handles on kitchen utensils. · Do not sit in low chairs. They can make it hard to get up. · Put heat or cold on your sore joints as needed. Use whichever helps you most. You can also switch between hot and cold packs. ? Apply heat 2 or 3 times a day for 20 to 30 minutes--using a heating pad, hot shower, or hot pack--to relieve pain and stiffness. But don't use heat on a swollen joint. ? Put ice or a cold pack on your sore joint for 10 to 20 minutes at a time. Put a thin cloth between the ice and your skin. When should you call for help? Call your doctor now or seek immediate medical care if:  · You have sudden swelling, warmth, or pain in any joint. · You have joint pain and a fever or rash. · You have such bad pain that you cannot use a joint. Watch closely for changes in your health, and be sure to contact your doctor if:  · You have mild joint symptoms that continue even with more than 6 weeks of care at home. · You have stomach pain or other problems with your medicine. Where can you learn more? Go to https://[email or web address] org and sign in to your [employer] account. Enter N978 in the [facility] box to learn more about \"Arthritis: Care Instructions. \"     If you do not have an account, please click on the \"Sign Up Now\" link. Current as of: December 9, 2019               Content Version: 12.5  © 7386-9735 Healthwise, Incorporated. Care instructions adapted under license by [facility] ([facility]). If you have questions about a medical condition or this instruction, always ask your healthcare professional. [place] [address] any warranty or liability for your use of this information. Patient Education        Nonsteroidal Anti-Inflammatory Drugs (NSAIDs): Care Instructions  Your Care Instructions     Nonsteroidal anti-inflammatory drugs (NSAIDs) relieve pain and fever.  You also can use them to reduce swelling and inflammation. Over-the-counter NSAIDs include:  · Ibuprofen (Advil, Motrin). · Naproxen (Aleve). Aspirin (Enzo, Bufferin) is also an NSAID. But it doesn't work the same way as these other NSAIDs. Prescription NSAIDs include:  · Diclofenac (Voltaren). · Indomethacin (Indocin). · Piroxicam (Feldene). Take NSAIDS exactly as prescribed. Call your doctor if you think you are having a problem with your medicine. If you are taking over-the-counter medicine, read and follow all instructions on the label. Follow-up care is a key part of your treatment and safety. Be sure to make and go to all appointments, and call your doctor if you are having problems. It's also a good idea to know your test results and keep a list of the medicines you take. What should you know about NSAIDs? · Do not use an over-the-counter NSAID for longer than 10 days. Talk to your doctor first.  · The most common side effects from NSAIDs are stomachaches, heartburn, and nausea. NSAIDs may irritate the stomach lining. If the medicine upsets your stomach, you can try taking it with food. But if that doesn't help, talk with your doctor to make sure it's not a more serious problem, such as a stomach ulcer or bleeding in the stomach or intestines. · Using NSAIDs may:  ? Lead to high blood pressure. ? Make symptoms of heart failure worse. ? Raise the risk of heart attack, stroke, kidney damage, and skin reactions. · Your risks are greater if you take NSAIDs at higher doses or for longer than the label says. People who are older than 72 or who have heart, stomach, or intestinal disease have a higher risk for problems. Aspirin  Aspirin is not like other NSAIDs. It can help people who are at high risk for heart attack or stroke. But taking aspirin isn't right for everyone, because it can cause serious bleeding. Talk to your doctor before you start taking aspirin every day.  You and your doctor can decide if aspirin is a good choice for you based on your risk of a heart attack or stroke and your risk of serious bleeding. Unless you have a high risk of a heart attack or stroke, the benefits of aspirin probably won't outweigh the risk of bleeding. · If you use other NSAIDs a lot, aspirin may not work as well to prevent heart attack and stroke. · If you take aspirin every day for your heart, talk with your doctor before you take other NSAIDs. · Do not give aspirin to anyone younger than 20. It has been linked to Reye syndrome, a serious illness. When should you call for help? [id number] anytime you think you may need emergency care. For example, call if:  · You passed out (lost consciousness). · You vomit blood or what looks like coffee grounds. · You pass maroon or very bloody stools. Call your doctor now or seek immediate medical care if:  · Your stools are black and tarlike or have streaks of blood. Watch closely for changes in your health, and be sure to contact your doctor if you have any problems. Where can you learn more? Go to https://[employer]. org and sign in to your [employer] account. Enter A328 in the [employer] box to learn more about \"Nonsteroidal Anti-Inflammatory Drugs (NSAIDs): Care Instructions. \"     If you do not have an account, please click on the \"Sign Up Now\" link. Current as of: November 20, 2019               Content Version: 12.5  © 7524-1819 Healthwise, Incorporated. Care instructions adapted under license by [facility] ([facility]). If you have questions about a medical condition or this instruction, always ask your healthcare professional. [place] [address] any warranty or liability for your use of this information. Thank you for coming to see me today. Listed below are the things we discussed today.     Number to call to schedule Diagnostic testing if you need to schedule a test:  [phone]   Orders Placed This Encounter   Medications    naproxen (NAPROSYN) 250 MG tablet

## 2020-08-17 NOTE — PROGRESS NOTES
[address] [facility] [address]  [address]  Phone: [phone], Fax: [phone]    TELEHEALTH EVALUATION -- Audio/Phone (During RSACN-79 public health emergency)    Mitesh Morse (:  1955) has requested an audio/video evaluation for the following concern(s):  Chief Complaint   Patient presents with    Arthritis    Other    Pain    Peripheral Neuropathy      HPI:  Mitesh Morse is an established patient of Dr. Zahira Abbott. Patient scheduled this appointment for hand pain, and general joint pains. Patient has a known history of diabetes, hypertension, MVP, and GERD. Patient reports stable BP at home. She was on lisinopril and metoprolol. BP was very low. Lisinopril was stopped and metoprolol dose was decreased from 50 mg BID to 25 mg bid. Patient denies palpitations, CP, SOB. She was established with a cardiologist but does not want to go back to his office. We will send her to Dr. Joel Liu for further management. Patient with a known DM. SHe is established with Dr. Virgen Sin office. She states that her glucose readings have been fluctuating. Last A1c was 11%. She is currently on lantus, lispro, and glipizide. Dr. Zahira Abbott started her on Januvia but endocrinologist stopped it. She has another ff up appt in the next few weeks. Patient with a known osteoarthritis on both hips, both shoulders, both knees and both hands/fingers. SHe's had this pain for a long time. She reports worsening pain on MIP's with decreasing ROM. Patient is also being seen for neuropathy. SHe is currently on gabapentin and cymbalta. SHe was on ibuprofen but stopped taken them due to covid 19 news about it. SHe has some increasing pain since she stopped. She was established with an orthopedic specialist at [facility] but also stopped. She had a few steroid injections and PT without success. She also reports some occasional indigestion and was diagnosed with GERD during a recent hospitalization. She is not on any medication. No dark stools/bleeding.     Patient Active Problem List    Diagnosis Date Noted    Arthritis involving multiple sites 08/17/2020    Arthritis of right hand 08/17/2020    Gastroesophageal reflux disease without esophagitis 08/17/2020    Mixed hyperlipidemia 06/16/2020    Pneumonia due to COVID-19 virus 04/20/2020    Chest pain with high risk for cardiac etiology 03/29/2020    Ureterolithiasis 08/20/2019    Nephrolithiasis 08/19/2019    Seizure disorder ([facility] [address] 09/21/2017    Coronary artery disease involving native coronary artery of native heart without angina pectoris 09/18/2017    Generalized anxiety disorder 09/18/2017    Migraine without aura, not intractable 09/18/2017    Mixed dyslipidemia 09/18/2017    Essential hypertension 02/07/2017    Chronic pain of both knees 02/07/2017    Uncontrolled type 2 diabetes mellitus with diabetic neuropathy, with long-term current use of insulin ([facility] [address] 06/08/2016    Mediastinal cyst 05/24/2016    Chronic prescription benzodiazepine use 03/31/2016    Chronic use of opiate drugs therapeutic purposes 03/31/2016    Morbid obesity with BMI of 45.0-49.9, adult ([address] 03/31/2016    Constipation 04/08/2015    Diabetes type 2, uncontrolled ([address] 01/14/2012    DM neuropathy takes Percocet 01/14/2012    Hypothyroidism 01/14/2012    Depression 01/14/2012    MVP (mitral valve prolapse) 01/14/2012    Abnormal mammogram 01/10/2012    Chronic neck pain        Past Medical History:   Diagnosis Date    Arrhythmia     Breast mass     CAD (coronary artery disease)     with valvular disease    Chronic neck pain     Coccygeal pain 3/7/2016    Constipation     Depression     Diabetic neuropathy (HCC)     History of hepatitis A     possible history of hepatitis A in the past    History of renal calculi     Hypertension     Hyperthyroidism     Hypothyroidism     Morbid obesity with BMI of 45.0-49.9, adult ([address] 3/31/2016    Nephrolithiasis     Neuropathy     Type II or unspecified type diabetes mellitus without mention of complication, not stated as uncontrolled     non insulin dependent     UTI (lower urinary tract infection)      Past Surgical History:   Procedure Laterality Date    BREAST BIOPSY  2012    negative    CARDIOVASCULAR STRESS TEST      7/17/15 no results    CHOLECYSTECTOMY      CYST REMOVAL      right hand    CYSTO/URETERO/PYELOSCOPY, CALCULUS TX Right 8/26/2019    CYSTOSCOPY URETEROSCOPY  STONE BASKET RETRIEVAL RIGHT STENT EXCHANGE performed by Adolph Allen MD at [facility] Right 8/20/2019    CYSTOSCOPY URETERAL STENT INSERTION performed by Adolph Allen MD at [facility]  2009    ENDOSCOPY, COLON, DIAGNOSTIC      LITHOTRIPSY      TONSILLECTOMY      TUBAL LIGATION      UPPER GASTROINTESTINAL ENDOSCOPY N/A 4/16/2020    EGD BIOPSY performed by Tawny Dancer, MD at [facility]     Family History   Problem Relation Age of Onset    Coronary Art Dis Mother     Lung Cancer Mother     Diabetes Mother         mellitus    Coronary Art Dis Father     Diabetes Father         diabetes mellitus     Current Outpatient Medications   Medication Sig Dispense Refill    pantoprazole (PROTONIX) 40 MG tablet Take 40 mg by mouth daily      magnesium sulfate 1-5 GM/100ML-% SOLN Infuse 1 g intravenously daily      melatonin ER 1 MG TBCR tablet Take 1 mg by mouth nightly      nitroGLYCERIN (NITROSTAT) 0.4 MG SL tablet Place 0.4 mg under the tongue every 6 hours as needed      glipiZIDE (GLUCOTROL) 5 MG tablet Take 1 tablet by mouth nightly      naproxen (NAPROSYN) 250 MG tablet Take 2 tablets by mouth 2 times daily (with meals) 180 tablet 0    diclofenac sodium (VOLTAREN) 1 % GEL Apply 2 g topically 4 times daily 2 Tube 1    famotidine (PEPCID) 20 MG tablet Take 1 tablet by mouth 2 times daily 60 tablet 3    metoprolol tartrate (LOPRESSOR) 25 MG tablet Take 1 tablet by mouth 2 times daily 180 tablet 0    oxybutynin (DITROPAN) 5 MG tablet Take 1 tablet by mouth daily      insulin lispro, 1 Unit Dial, 100 UNIT/ML SOPN Inject 10 Units into the skin 3 times daily as needed      gabapentin (NEURONTIN) 600 MG tablet Take 600 mg by mouth 2 times daily.  levothyroxine (SYNTHROID) 75 MCG tablet Take 1 tablet by mouth daily 30 tablet 3    Insulin Degludec 200 UNIT/ML SOPN Inject 75 Units into the skin nightly 5 pen 2    atorvastatin (LIPITOR) 80 MG tablet Take 1 tablet by mouth daily 30 tablet 3    DULoxetine (CYMBALTA) 60 MG extended release capsule take 1 capsule by mouth once daily 30 capsule 2    furosemide (LASIX) 20 MG tablet take 1 tablet by mouth daily 90 tablet 3     No current facility-administered medications for this visit. Review of Systems   Constitutional: Positive for activity change and fatigue. Negative for chills and unexpected weight change. HENT: Negative for congestion, ear discharge, ear pain, postnasal drip, rhinorrhea, sinus pressure, sneezing and voice change. Eyes: Negative for photophobia and visual disturbance. Respiratory: Negative for cough, chest tightness, shortness of breath and wheezing. Cardiovascular: Negative for chest pain, palpitations and leg swelling. Gastrointestinal: Negative for abdominal distention, abdominal pain, constipation, diarrhea, nausea and vomiting. Occasional indigestion   Endocrine: Negative for polyphagia and polyuria. Genitourinary: Negative for difficulty urinating, dysuria, flank pain, frequency, menstrual problem, urgency and vaginal pain. Musculoskeletal: Positive for arthralgias and joint swelling. Negative for back pain, neck pain and neck stiffness. Neurological: Positive for numbness. Negative for facial asymmetry, speech difficulty, weakness and light-headedness.    Psychiatric/Behavioral: Negative for agitation, behavioral problems, decreased concentration, dysphoric mood, sleep disturbance and suicidal ideas. The patient is nervous/anxious. Allergies   Allergen Reactions    Cefuroxime Axetil Anaphylaxis    Dilaudid [Hydromorphone] Anaphylaxis    Metformin And Related Diarrhea, Itching and Nausea And Vomiting    Sulfa Antibiotics Itching    Amoxicillin     Antihistamines, Loratadine-Type     Eggs Or Egg-Derived Products     Lorazepam     Nubain [Nalbuphine Hcl] Other (See Comments)     Hallucinations; \"I got stoned. \"    Vistaril [Hydroxyzine Hcl]         Prior to Visit Medications    Medication Sig Taking? Authorizing Provider   pantoprazole (PROTONIX) 40 MG tablet Take 40 mg by mouth daily Yes Historical Provider, MD   magnesium sulfate 1-5 GM/100ML-% SOLN Infuse 1 g intravenously daily Yes Historical Provider, MD   melatonin ER 1 MG TBCR tablet Take 1 mg by mouth nightly Yes Historical Provider, MD   nitroGLYCERIN (NITROSTAT) 0.4 MG SL tablet Place 0.4 mg under the tongue every 6 hours as needed Yes Historical Provider, MD   glipiZIDE (GLUCOTROL) 5 MG tablet Take 1 tablet by mouth nightly Yes Historical Provider, MD   naproxen (NAPROSYN) 250 MG tablet Take 2 tablets by mouth 2 times daily (with meals) Yes RASHMI Sutherland CNP   diclofenac sodium (VOLTAREN) 1 % GEL Apply 2 g topically 4 times daily Yes RASHMI Sutherland CNP   famotidine (PEPCID) 20 MG tablet Take 1 tablet by mouth 2 times daily Yes RASHMI Sutherland CNP   metoprolol tartrate (LOPRESSOR) 25 MG tablet Take 1 tablet by mouth 2 times daily Yes RASHMI Sutherland CNP   oxybutynin (DITROPAN) 5 MG tablet Take 1 tablet by mouth daily  Historical Provider, MD   insulin lispro, 1 Unit Dial, 100 UNIT/ML SOPN Inject 10 Units into the skin 3 times daily as needed  Historical Provider, MD   gabapentin (NEURONTIN) 600 MG tablet Take 600 mg by mouth 2 times daily.   Historical Provider, MD   levothyroxine (SYNTHROID) 75 MCG tablet Take 1 tablet by mouth daily  Gloria Murphy MD   Insulin Degludec 200 UNIT/ML SOPN Inject 75 Units into the skin nightly  Jesusita Corona MD   atorvastatin (LIPITOR) 80 MG tablet Take 1 tablet by mouth daily  Jesusita Corona MD   DULoxetine (CYMBALTA) 60 MG extended release capsule take 1 capsule by mouth once daily  Dewayne Crowley MD   furosemide (LASIX) 20 MG tablet take 1 tablet by mouth daily  Dewayne Crowley MD       Social History     Tobacco Use    Smoking status: Former Smoker     Types: Cigarettes    Smokeless tobacco: Never Used    Tobacco comment: pt states she quit smoking cigarettes at the age of 14   Substance Use Topics    Alcohol use: No     Alcohol/week: 0.0 standard drinks    Drug use: No     Comment: once in a while        PHYSICAL EXAMINATION:  Vital Signs: (As obtained by patient/caregiver or practitioner observation)    Constitutional: [] Appears well-developed and well-nourished [x] No apparent distress      [] Abnormal-   Mental status  [x] Alert and awake  [x] Oriented to person/place/time [x]Able to follow commands      Psychiatric:       [x] Normal Affect [x] No Hallucinations        [x] Abnormal- Anxious    Other pertinent observable physical exam findings- with pressured speech, multiple complaints,     Due to this being a TeleHealth encounter, evaluation of the following organ systems is limited: Vitals/Constitutional/EENT/Resp/CV/GI//MS/Neuro/Skin/Heme-Lymph-Imm. ASSESSMENT/PLAN:  1. Arthritis involving multiple sites  Worsening  Continue current therapy. Start naproxen and pepcid  See ortho specialist.  DISCUSSED and ADVISED TO:  Stay at a healthy weight. Continue exercises/PT  Stretch to help prevent stiffness and to prevent injury before exercise. Gentle forms of yoga help keep joints and muscles flexible. Walk instead of jog, ride a bike, swim, and water exercise. Lift weights as tolerated. strong muscles help reduce stress on joints. Take pain medicines exactly as directed and only as needed. - naproxen (NAPROSYN) 250 MG tablet;  Take 2 tablets by mouth 2 times daily (with meals)  Dispense: 180 tablet; Refill: 0  - diclofenac sodium (VOLTAREN) 1 % GEL; Apply 2 g topically 4 times daily  Dispense: 2 Tube; Refill: 1  - [address] Lyly Ruiz MD, [place], [place]    2. Arthritis of left hand  Worsening  Continue current therapy. Start naproxen and pepcid  See ortho specialist.  - naproxen (NAPROSYN) 250 MG tablet; Take 2 tablets by mouth 2 times daily (with meals)  Dispense: 180 tablet; Refill: 0  - diclofenac sodium (VOLTAREN) 1 % GEL; Apply 2 g topically 4 times daily  Dispense: 2 Tube; Refill: 1  - 300 Lyly Ruiz MD, [place], [place]    3. Arthritis of right hand  Worsening  Continue current therapy. Start naproxen and pepcid  See ortho specialist.  - naproxen (NAPROSYN) 250 MG tablet; Take 2 tablets by mouth 2 times daily (with meals)  Dispense: 180 tablet; Refill: 0  - diclofenac sodium (VOLTAREN) 1 % GEL; Apply 2 g topically 4 times daily  Dispense: 2 Tube; Refill: 1  - 300 Lyly Ruiz MD, [place], [place]    4. Essential hypertension  Stable  Continue current therapy. Metoprolol  Lisinopril stopped  DISCUSSED AND ADVISED TO:  Cut down on your salt intake. Cut down on caffeinated drinks, sports drinks. Instructed to check BP at home regularly. Report for any chest pains, shortness of breath, headaches, and lightheadedness. Call the office if your blood pressure continue to be higher than 140/90 or 90/50.    - metoprolol tartrate (LOPRESSOR) 25 MG tablet; Take 1 tablet by mouth 2 times daily  Dispense: 180 tablet; Refill: 0    5. Uncontrolled type 2 diabetes mellitus with diabetic neuropathy, with long-term current use of insulin (HCC)  Failure to Improve  Continue therapy. We will recheck Hemoglobin A1c with endocrinology  DISCUSSED and ADVISED TO:  Continue to check Glucose levels at home. Report increased and low levels as discussed.   Decrease carbohydrates, sugary drinks, desserts in your diet.  Exercise regularly, as tolerated. Try to lose weight. 6. MVP (mitral valve prolapse)  Stable  COntinue therapy  DISCUSSED and ADVISED TO:  Discussed serious causes of CP. Advised to go to the ER for worsening CP/SOB  - metoprolol tartrate (LOPRESSOR) 25 MG tablet; Take 1 tablet by mouth 2 times daily  Dispense: 180 tablet; Refill: 0  - AFL - Noe Phipps MD, Cardiology, [facility]    7. Gastroesophageal reflux disease without esophagitis  Failure to Improve  Start Pepcid  DISCUSSED AND ADVISED TO:  Avoid food triggers. Stop eating large meals close to bedtime  Don't eat meals too close to bedtime  Avoid ASA, NSAID's, caffeine, peppermints, alcohol and tobacco.  Report for worsening symptoms. - famotidine (PEPCID) 20 MG tablet; Take 1 tablet by mouth 2 times daily  Dispense: 60 tablet;  Refill: 3    Controlled Substance Monitoring:  Acute and Chronic Pain Monitoring:   RX Monitoring 7/7/2017   Attestation -   Periodic Controlled Substance Monitoring Medication contract signed today     Orders Placed This Encounter   Procedures   Eb Joshua MD, Orthopedic Surgery, [facility]     Referral Priority:   Routine     Referral Type:   Eval and Treat     Referral Reason:   Specialty Services Required     Referred to Provider:   Harrison Grayson MD     Requested Specialty:   Orthopedic Surgery     Number of Visits Requested:   Conley Nissen, MD, Cardiology, [facility]     Referral Priority:   Routine     Referral Type:   Eval and Treat     Referral Reason:   Specialty Services Required     Referred to Provider:   Shelia Cervantes MD     Requested Specialty:   Cardiology     Number of Visits Requested:   1     Orders Placed This Encounter   Medications    naproxen (NAPROSYN) 250 MG tablet     Sig: Take 2 tablets by mouth 2 times daily (with meals)     Dispense:  180 tablet     Refill:  0    diclofenac sodium (VOLTAREN) 1 % GEL     Sig: Apply 2 g topically 4 times daily     Dispense:  2 Tube consent to participate in phone visit was obtained. I discussed with the patient the nature of our telehealth visits via interactive/real-time audio that:  - I would evaluate the patient and recommend diagnostics and treatments based on my assessment  - Our sessions are not being recorded and that personal health information is protected  - Our team would provide follow up care in person if/when the patient needs it. Pursuant to the emergency declaration under the [address] [place] [address], [address] [place] [address] and the [employer] and [employer] Act, this Virtual Visit was conducted with patient's (and/or legal guardian's) consent, to reduce the patient's risk of exposure to COVID-19 and provide necessary medical care. The patient (and/or legal guardian) has also been advised to contact this office for worsening conditions or problems, and seek emergency medical treatment and/or call 911 if deemed necessary. This note was completed by using the assistance of a speech-recognition program. However, inadvertent computerized transcription errors may be present. Although every effort was made to ensure accuracy, no guarantees can be provided that every mistake has been identified and corrected by editing.   Electronically signed by RASHMI Castro CNP on 8/17/20 at 3:48 PM EDT

## 2020-08-18 ENCOUNTER — HOSPITAL ENCOUNTER (OUTPATIENT)
Age: 65
Discharge: HOME | End: 2020-08-18
Payer: COMMERCIAL

## 2020-08-18 LAB
ALBUMIN SERPL-MCNC: 3.9 G/DL (ref 3.5–5.2)
ALBUMIN/GLOBULIN RATIO: ABNORMAL (ref 1–2.5)
ALP BLD-CCNC: 129 U/L (ref 35–104)
ALT SERPL-CCNC: 24 U/L (ref 5–33)
ANION GAP SERPL CALCULATED.3IONS-SCNC: 12 MMOL/L (ref 9–17)
AST SERPL-CCNC: 21 U/L
BILIRUB SERPL-MCNC: 0.61 MG/DL (ref 0.3–1.2)
BUN BLDV-MCNC: 11 MG/DL (ref 8–23)
BUN/CREAT BLD: ABNORMAL (ref 9–20)
CALCIUM SERPL-MCNC: 9.3 MG/DL (ref 8.6–10.4)
CHLORIDE BLD-SCNC: 101 MMOL/L (ref 98–107)
CHOLESTEROL/HDL RATIO: 3.8
CHOLESTEROL: 156 MG/DL
CO2: 25 MMOL/L (ref 20–31)
CREAT SERPL-MCNC: 0.48 MG/DL (ref 0.5–0.9)
CREATININE URINE: 72 MG/DL (ref 28–217)
GFR AFRICAN AMERICAN: >60 ML/MIN
GFR NON-AFRICAN AMERICAN: >60 ML/MIN
GFR SERPL CREATININE-BSD FRML MDRD: ABNORMAL ML/MIN/{1.73_M2}
GFR SERPL CREATININE-BSD FRML MDRD: ABNORMAL ML/MIN/{1.73_M2}
GLUCOSE BLD-MCNC: 237 MG/DL (ref 70–99)
HDLC SERPL-MCNC: 41 MG/DL
LDL CHOLESTEROL: 72 MG/DL (ref 0–130)
MICROALBUMIN/CREAT 24H UR: 37 MG/L
MICROALBUMIN/CREAT UR-RTO: 51 MCG/MG CREAT
POTASSIUM SERPL-SCNC: 4.6 MMOL/L (ref 3.7–5.3)
SODIUM BLD-SCNC: 138 MMOL/L (ref 135–144)
THYROXINE, FREE: 1.05 NG/DL (ref 0.93–1.7)
TOTAL PROTEIN: 7.5 G/DL (ref 6.4–8.3)
TRIGL SERPL-MCNC: 214 MG/DL
TSH SERPL DL<=0.05 MIU/L-ACNC: 3.94 MIU/L (ref 0.3–5)
VLDLC SERPL CALC-MCNC: ABNORMAL MG/DL (ref 1–30)

## 2020-08-18 PROCEDURE — 80053 COMPREHEN METABOLIC PANEL: CPT

## 2020-08-18 PROCEDURE — 82570 ASSAY OF URINE CREATININE: CPT

## 2020-08-18 PROCEDURE — 80061 LIPID PANEL: CPT

## 2020-08-18 PROCEDURE — 36415 COLL VENOUS BLD VENIPUNCTURE: CPT

## 2020-08-18 PROCEDURE — 84439 ASSAY OF FREE THYROXINE: CPT

## 2020-08-18 PROCEDURE — 82043 UR ALBUMIN QUANTITATIVE: CPT

## 2020-08-18 PROCEDURE — 84443 ASSAY THYROID STIM HORMONE: CPT

## 2020-09-08 ENCOUNTER — TELEPHONE (OUTPATIENT)
Dept: FAMILY MEDICINE CLINIC | Age: 65
End: 2020-09-08

## 2020-09-08 RX ORDER — GABAPENTIN 600 MG/1
TABLET ORAL
Qty: 60 TABLET | Refills: 0 | Status: SHIPPED | OUTPATIENT
Start: 2020-09-08 | End: 2020-10-05

## 2020-09-08 NOTE — TELEPHONE ENCOUNTER
Last seen 8/17/20    Next Visit Date:  Future Appointments   Date Time Provider Aleksandra Wisdom   9/9/2020  2:00 PM BIBER, [place] [facility]   9/16/2020  4:00 PM Sudhakar Ba, APRN - [id number] Maintenance   Topic Date Due    Shingles Vaccine (1 of 2) 03/19/2007    Diabetic retinal exam  03/07/2017    Diabetic foot exam  07/07/2018    Colon Cancer Screen FIT/FOBT  01/09/2019    DTaP/Tdap/Td vaccine (2 - Td) 09/18/2019    Breast cancer screen  03/23/2020    A1C test (Diabetic or Prediabetic)  07/15/2020    Flu vaccine (1) 09/01/2020    Diabetic microalbuminuria test  08/18/2021    Lipid screen  08/18/2021    TSH testing  08/18/2021    Potassium monitoring  08/18/2021    Creatinine monitoring  08/18/2021    Cervical cancer screen  02/19/2023    Pneumococcal 0-64 years Vaccine  Completed    Hepatitis C screen  Completed    HIV screen  Addressed    Hepatitis A vaccine  Aged Out    Hib vaccine  Aged Out    Meningococcal (ACWY) vaccine  Aged Out       Hemoglobin A1C (%)   Date Value   04/15/2020 10.1 (H)   01/23/2019 10.0 (H)   10/30/2018 11.7 (H)             ( goal A1C is < 7)   Microalb/Crt.  Ratio (mcg/mg creat)   Date Value   08/18/2020 51 (H)     LDL Cholesterol (mg/dL)   Date Value   08/18/2020 72       (goal LDL is <100)   AST (U/L)   Date Value   08/18/2020 21     ALT (U/L)   Date Value   08/18/2020 24     BUN (mg/dL)   Date Value   08/18/2020 11     BP Readings from Last 3 Encounters:   08/17/20 97/78   06/26/20 122/89   04/21/20 118/72          (goal 120/80)    All Future Testing planned in [facility]  Lab Frequency Next Occurrence   Supersaturation Profile, Urine Once 05/26/2020   Nasal cannula oxygen PRN                Patient Active Problem List:     Chronic neck pain     Abnormal mammogram     Diabetes type 2, uncontrolled (HCC)     DM neuropathy takes Percocet     Hypothyroidism     Depression     MVP (mitral valve prolapse)     Constipation

## 2020-09-08 NOTE — TELEPHONE ENCOUNTER
diclofenac sodium (VOLTAREN) 1 % GEL doesn't help  for hand pain , it feels like her hands are on fire, even after gel is washed off it would still burn but no pain relief . Patient called to let you know .

## 2020-09-16 ENCOUNTER — OFFICE VISIT (OUTPATIENT)
Dept: OBGYN CLINIC | Age: 65
End: 2020-09-16
Payer: COMMERCIAL

## 2020-09-16 ENCOUNTER — HOSPITAL ENCOUNTER (OUTPATIENT)
Age: 65
Setting detail: SPECIMEN
Discharge: HOME | End: 2020-09-16
Payer: COMMERCIAL

## 2020-09-16 VITALS
HEIGHT: 62 IN | TEMPERATURE: 97.2 F | WEIGHT: 245 LBS | DIASTOLIC BLOOD PRESSURE: 82 MMHG | SYSTOLIC BLOOD PRESSURE: 124 MMHG | BODY MASS INDEX: 45.08 KG/M2

## 2020-09-16 PROCEDURE — 87624 HPV HI-RISK TYP POOLED RSLT: CPT

## 2020-09-16 PROCEDURE — 99386 PREV VISIT NEW AGE 40-64: CPT | Performed by: NURSE PRACTITIONER

## 2020-09-16 PROCEDURE — G0145 SCR C/V CYTO,THINLAYER,RESCR: HCPCS

## 2020-09-16 ASSESSMENT — PATIENT HEALTH QUESTIONNAIRE - PHQ9
1. LITTLE INTEREST OR PLEASURE IN DOING THINGS: 0
SUM OF ALL RESPONSES TO PHQ9 QUESTIONS 1 & 2: 0
SUM OF ALL RESPONSES TO PHQ QUESTIONS 1-9: 0
SUM OF ALL RESPONSES TO PHQ QUESTIONS 1-9: 0
2. FEELING DOWN, DEPRESSED OR HOPELESS: 0

## 2020-09-16 NOTE — PROGRESS NOTES
History and Physical  [address], [address] [facility] [address], [address] [facility] [phone]   Fax [phone]  Mi Akbar  9/16/2020              63 y.o. Chief Complaint   Patient presents with    Annual Exam       Patient's last menstrual period was 01/12/2015 (approximate). Primary Care Physician: Tre Devries MD    The patient was seen and examined. She has no chief complaint today and is here for her annual exam.  Her bowels are regular. There are no voiding complaints. She denies any bloating. She denies vaginal discharge and was counseled on STD's and the need for barrier contraception. HPI : Mi Akbar is a 63 y.o. female No obstetric history on file. Annual exam  Patient is here to establish care. Hx of left breast biopsies with benign histology, clip placement. Complaining of bilateral breast pain      ________________________________________________________________________  OB History   No obstetric history on file.      Past Medical History:   Diagnosis Date    Arrhythmia     Breast mass     CAD (coronary artery disease)     with valvular disease    Chronic neck pain     Coccygeal pain 3/7/2016    Constipation     COVID-19 04/2020    Depression     Diabetic neuropathy (HCC)     History of hepatitis A     possible history of hepatitis A in the past    History of renal calculi     Hypertension     Hyperthyroidism     Hypothyroidism     Morbid obesity with BMI of 45.0-49.9, adult ([facility] [address] 3/31/2016    Nephrolithiasis     Neuropathy     Type II or unspecified type diabetes mellitus without mention of complication, not stated as uncontrolled     non insulin dependent     UTI (lower urinary tract infection)                                                                    Past Surgical History:   Procedure Laterality Date    BREAST BIOPSY  2012    negative    CARDIOVASCULAR STRESS TEST      7/17/15 no results    CHOLECYSTECTOMY      CYST REMOVAL      right hand    CYSTO/URETERO/PYELOSCOPY, CALCULUS TX Right 8/26/2019    CYSTOSCOPY URETEROSCOPY  STONE BASKET RETRIEVAL RIGHT STENT EXCHANGE performed by Brigitte Villanueva MD at [facility] Right 8/20/2019    CYSTOSCOPY URETERAL STENT INSERTION performed by Brigitte Villanueva MD at [facility]  2009    ENDOSCOPY, COLON, DIAGNOSTIC      LITHOTRIPSY      TONSILLECTOMY      TUBAL LIGATION      UPPER GASTROINTESTINAL ENDOSCOPY N/A 4/16/2020    EGD BIOPSY performed by Arias Villanueva MD at [facility]     Family History   Problem Relation Age of Onset    Coronary Art Dis Mother     Lung Cancer Mother     Diabetes Mother         mellitus    Coronary Art Dis Father     Diabetes Father         diabetes mellitus     Social History     Socioeconomic History    Marital status: Single     Spouse name: Not on file    Number of children: Not on file    Years of education: Not on file    Highest education level: Not on file   Occupational History    Not on file   Social Needs    Financial resource strain: Not on file    Food insecurity     Worry: Not on file     Inability: Not on file    Transportation needs     Medical: Not on file     Non-medical: Not on file   Tobacco Use    Smoking status: Former Smoker     Types: Cigarettes    Smokeless tobacco: Never Used    Tobacco comment: pt states she quit smoking cigarettes at the age of 14   Substance and Sexual Activity    Alcohol use: No     Alcohol/week: 0.0 standard drinks    Drug use: No     Comment: once in a while    Sexual activity: Yes     Partners: Male   Lifestyle    Physical activity     Days per week: Not on file     Minutes per session: Not on file    Stress: Not on file   Relationships    Social connections     Talks on phone: Not on file     Gets together: Not on file     Attends Druze service: Not on file     Active member of club or organization: Not on file     Attends meetings of clubs or organizations: Not on file     Relationship status: Not on file    Intimate partner violence     Fear of current or ex partner: Not on file     Emotionally abused: Not on file     Physically abused: Not on file     Forced sexual activity: Not on file   Other Topics Concern    Not on file   Social History Narrative    Not on file       MEDICATIONS:  Current Outpatient Medications   Medication Sig Dispense Refill    gabapentin (NEURONTIN) 600 MG tablet take 1 tablet by mouth twice a day 60 tablet 0    pantoprazole (PROTONIX) 40 MG tablet Take 40 mg by mouth daily      oxybutynin (DITROPAN) 5 MG tablet Take 1 tablet by mouth daily      magnesium sulfate 1-5 GM/100ML-% SOLN Infuse 1 g intravenously daily      melatonin ER 1 MG TBCR tablet Take 1 mg by mouth nightly      nitroGLYCERIN (NITROSTAT) 0.4 MG SL tablet Place 0.4 mg under the tongue every 6 hours as needed      glipiZIDE (GLUCOTROL) 5 MG tablet Take 1 tablet by mouth nightly      naproxen (NAPROSYN) 250 MG tablet Take 2 tablets by mouth 2 times daily (with meals) 180 tablet 0    diclofenac sodium (VOLTAREN) 1 % GEL Apply 2 g topically 4 times daily 2 Tube 1    famotidine (PEPCID) 20 MG tablet Take 1 tablet by mouth 2 times daily 60 tablet 3    metoprolol tartrate (LOPRESSOR) 25 MG tablet Take 1 tablet by mouth 2 times daily 180 tablet 0    insulin lispro, 1 Unit Dial, 100 UNIT/ML SOPN Inject 10 Units into the skin 3 times daily as needed      levothyroxine (SYNTHROID) 75 MCG tablet Take 1 tablet by mouth daily 30 tablet 3    Insulin Degludec 200 UNIT/ML SOPN Inject 75 Units into the skin nightly 5 pen 2    atorvastatin (LIPITOR) 80 MG tablet Take 1 tablet by mouth daily 30 tablet 3    DULoxetine (CYMBALTA) 60 MG extended release capsule take 1 capsule by mouth once daily 30 capsule 2    furosemide (LASIX) 20 MG tablet take 1 tablet by mouth daily 90 tablet 3     No current facility-administered medications for this visit. ALLERGIES:  Allergies as of 09/16/2020 - Review Complete 09/16/2020   Allergen Reaction Noted    Cefuroxime axetil Anaphylaxis 12/06/2011    Dilaudid [hydromorphone] Anaphylaxis 09/16/2013    Metformin and related Diarrhea, Itching, and Nausea And Vomiting 06/08/2016    Sulfa antibiotics Itching 12/06/2011    Amoxicillin  07/17/2015    Antihistamines, loratadine-type  07/17/2015    Eggs or egg-derived products  07/30/2018    Lorazepam  01/14/2012    Nubain [nalbuphine hcl] Other (See Comments) 07/07/2018    Vistaril [hydroxyzine hcl]  01/10/2012       Symptoms of decreased mood absent  Symptoms of anhedonia absent    **If either question is answered in a  positive fashion then complete the PHQ9 Scoring Evaluation and make the appropriate referral**      Immunization status: stated as current, but no records available. Gynecologic History:  Menarche: 17 yo  Menopause at 49 yo     Patient's last menstrual period was 01/12/2015 (approximate). Sexually Active: Yes    STD History: Yes hx of herpes     Permanent Sterilization: Yes TL   Reversible Birth Control: No        Hormone Replacement Exposure: No      Genetic Qualified Family History of Breast, Ovarian , Colon or Uterine Cancer: no     If YES see scanned worksheet.     Preventative Health Testing:    Health Maintenance:  Health Maintenance Due   Topic Date Due    Shingles Vaccine (1 of 2) 03/19/2007    Diabetic retinal exam  03/07/2017    Diabetic foot exam  07/07/2018    Colon Cancer Screen FIT/FOBT  01/09/2019    DTaP/Tdap/Td vaccine (2 - Td) 09/18/2019    Breast cancer screen  03/23/2020    A1C test (Diabetic or Prediabetic)  07/15/2020    Flu vaccine (1) 09/01/2020       Date of Last Pap Smear: 2/19/2018 neg/neg  Abnormal Pap Smear History: yes- unsure - poor historian  Colposcopy History:   Date of Last Mammogram: 3/23/2018 normal  Date of Last Colonoscopy:   Date of Last Bone Density:      ________________________________________________________________________        REVIEW OF SYSTEMS:    yes   A minimum of an eleven point review of systems was completed. Review Of Systems (11 point):  Constitutional: No fever, chills or malaise; No weight change or fatigue  Head and Eyes: No vision, Headache, Dizziness or trauma in last 12 months  ENT ROS: No hearing, Tinnitis, sinus or taste problems  Hematological and Lymphatic ROS:No Lymphoma, Von Willebrand's, Hemophillia or Bleeding History  Psych ROS: No Depression, Homicidal thoughts,suicidal thoughts, or anxiety  Breast ROS: No prior breast abnormalities or lumps. + hx left breast biopsies. Bilateral breast pain  Respiratory ROS: No SOB, Pneumoniae,Cough, or Pulmonary Embolism History  Cardiovascular ROS: No Chest Pain with Exertion, Palpitations, Syncope, Edema, Arrhythmia. Hypertension, hyperlipidemia  Gastrointestinal ROS: No Indigestion, Heartburn, Nausea, vomiting, Diarrhea, Constipation,or Bowel Changes; No Bloody Stools or melena  Genito-Urinary ROS: No Dysuria, Hematuria or Nocturia. No Urinary Incontinence or Vaginal Discharge. Musculoskeletal ROS: No Arthralgia, Arthritis,Gout,Osteoporosis or Rheumatism  Neurological ROS: No CVA, Migraines, Epilepsy, Seizure Hx, or Limb Weakness+ type II diabetes, hypothyroidism  Dermatological ROS: No Rash, Itching, Hives, Mole Changes or Cancer                                                                                                                                                                                                                                  PHYSICAL Exam:     Constitutional:  Vitals:    09/16/20 1628   BP: 124/82   Site: Right Upper Arm   Position: Sitting   Cuff Size: Large Adult   Temp: 97.2 °F (36.2 °C)   Weight: 245 lb (111.1 kg)   Height: 5' 2\" (1.575 m)         General Appearance: This  is a well Developed, well Nourished, well groomed female.       Her BMI was reviewed. Nutritional decision making was discussed. Skin:  There was a Normal Inspection of the skin without rashes or lesions. There were no rashes. (Papular, Maculopapular, Hives, Pustular, Macular)     There were no lesions (Ulcers, Erythema, Abn. Appearing Nevi)            Lymphatic:  No Lymph Nodes were Palpable in the neck , axilla or groin.  0 # Of Lymph Nodes; Location ; Character [Normal]  [Shotty] [Tender] [Enlarged]     Neck and EENT:  The neck was supple. There were no masses   The thyroid was not enlarged and had no masses. Perrla, EOMI B/L, TMI B/L No Abnormalities. Throat inspected-No exudates or Masses, Nares Patent No Masses        Respiratory: The lungs were auscultated and found to be clear. There were no rales, rhonchi or wheezes. There was a good respiratory effort. Cardiovascular: The heart was in a regular rate and rhythm. . No S3 or S4. There was no murmur appreciated. Location, grade, and radiation are not applicable. Extremities: The patients extremities were without calf tenderness, edema, or varicosities. There was full range of motion in all four extremities. Pulses in all four extremities were appreciated and are 2/4. Abdomen: The abdomen was soft and non-tender. There were good bowel sounds in all quadrants and there was no guarding, rebound or rigidity. On evaluation there was no evidence of hepatosplenomegaly and there was no costal vertebral timmy tenderness bilaterally. No hernias were appreciated. Abdominal Scars: C/W previous surgeries    Psych:   The patient had a normal Orientation to: Time, Place, Person, and Situation  There is no Mood / Affect changes    Breast:  (Chest)  normal appearance, no masses or tenderness, No nipple retraction or dimpling, No nipple discharge or bleeding, No axillary or supraclavicular adenopathy, scar noted to left breast from previous breast biopsy on left outer aspect of breast.  Self breast exams were reviewed in detail. Literature was given. Pelvic Exam:  Vulva and vagina appear normal. Bimanual exam reveals normal uterus and adnexa. Rectal Exam:  exam declined by patient          Musculosk:  Normal Gait and station was noted. Digits were evaluated without abnormal findings. Range of motion, stability and strength were evaluated and found to be appropriate for the patients age. ASSESSMENT:      63 y.o. Annual   Diagnosis Orders   1. Well female exam with routine gynecological exam  DEXA VERTEBRAL FRACTURE ASSESSMENT    PAP SMEAR   2. Encounter for screening mammogram for malignant neoplasm of breast  VIDHYA DIGITAL DIAGNOSTIC W OR WO CAD BILATERAL   3. Post-menopausal  DEXA VERTEBRAL FRACTURE ASSESSMENT   4. Special screening examination for human papillomavirus (HPV)  PAP SMEAR   5.  Breast pain  VIDHYA DIGITAL DIAGNOSTIC W OR WO CAD BILATERAL          Chief Complaint   Patient presents with    Annual Exam          Past Medical History:   Diagnosis Date    Arrhythmia     Breast mass     CAD (coronary artery disease)     with valvular disease    Chronic neck pain     Coccygeal pain 3/7/2016    Constipation     COVID-19 04/2020    Depression     Diabetic neuropathy (HCC)     History of hepatitis A     possible history of hepatitis A in the past    History of renal calculi     Hypertension     Hyperthyroidism     Hypothyroidism     Morbid obesity with BMI of 45.0-49.9, adult ([facility] [address] 3/31/2016    Nephrolithiasis     Neuropathy     Type II or unspecified type diabetes mellitus without mention of complication, not stated as uncontrolled     non insulin dependent     UTI (lower urinary tract infection)          Patient Active Problem List    Diagnosis Date Noted    Arthritis involving multiple sites 08/17/2020    Arthritis of right hand 08/17/2020    Gastroesophageal reflux disease without esophagitis 08/17/2020    Mixed hyperlipidemia 06/16/2020    Pneumonia due to COVID-19 virus 04/20/2020  Chest pain with high risk for cardiac etiology 03/29/2020    Ureterolithiasis 08/20/2019    Nephrolithiasis 08/19/2019    [facility] 09/21/2017    Coronary artery disease involving native coronary artery of native heart without angina pectoris 09/18/2017    Generalized anxiety disorder 09/18/2017    Migraine without aura, not intractable 09/18/2017    Mixed dyslipidemia 09/18/2017    Essential hypertension 02/07/2017    Chronic pain of both knees 02/07/2017    Uncontrolled type 2 diabetes mellitus with diabetic neuropathy, with long-term current use of insulin ([facility] 06/08/2016    Mediastinal cyst 05/24/2016    Chronic prescription benzodiazepine use 03/31/2016    Chronic use of opiate drugs therapeutic purposes 03/31/2016     Replacing deprecated diagnoses      Morbid obesity with BMI of 45.0-49.9, adult ([facility] [address] 03/31/2016    Constipation 04/08/2015    Diabetes type 2, uncontrolled ([facility] 01/14/2012    DM neuropathy takes Percocet 01/14/2012    Hypothyroidism 01/14/2012    Depression 01/14/2012    MVP (mitral valve prolapse) 01/14/2012    Abnormal mammogram 01/10/2012    Chronic neck pain           Hereditary Breast, Ovarian, Colon and Uterine Cancer screening Done. Tobacco & Secondary smoke risks reviewed; instructed on cessation and avoidance      Counseling Completed:  Preventative Health Recommendations and Follow up. The patient was informed of the recommended preventative health recommendations. 1. Annuals every year; Cytology collections per prevailing guidelines. 2. Mammograms begin every year at 37 yo if no abnormalities are found and no family history. 3. Bone density studies every 2-3 years. Begin at 73 yo. If no fracture history or osteoporosis family history. (significant). 4. Colonoscopy begin at 40 yo. Repeat every ten years if negative and no family history. 5. Calcium of 4859-1599 mg/day in split dosing  6. Vitamin D 400-800 IU/day  7.  All other preventative health recommendations will be managed by the patients Primary care physician. PLAN:  Return in about 1 year (around 9/16/2021) for annual.   Mammogram and dexascan ordered. Pap smear obtained. FIT test ordered. Repeat Annual every 1 year  Cervical Cytology Evaluation begins at 24years old. If Negative Cytology, Follow-up screening per current guidelines. Mammograms every 1 year. If 37 yo and last mammogram was negative. Calcium and Vitamin D dosing reviewed. Colonoscopy screening reviewed as well as onset for bone density testing. Birth control and barrier recommendations discussed. STD counseling and prevention reviewed. Gardisil counseling completed for all patients 10-37 yo. Routine health maintenance per patients PCP. Orders Placed This Encounter   Procedures    DEXA VERTEBRAL FRACTURE ASSESSMENT     Standing Status:   Future     Standing Expiration Date:   9/16/2021     Order Specific Question:   Reason for exam:     Answer:   post menopausal    VIDHYA DIGITAL DIAGNOSTIC W OR WO CAD BILATERAL     Standing Status:   Future     Standing Expiration Date:   11/16/2021     Order Specific Question:   Reason for exam:     Answer:   bilateral breast pain    PAP SMEAR     Patient History:    Patient's last menstrual period was 01/12/2015 (approximate). OBGYN Status: Postmenopausal  Past Surgical History:  2012: BREAST BIOPSY      Comment:  negative  No date: CARDIOVASCULAR STRESS TEST      Comment:  7/17/15 no results  No date: CHOLECYSTECTOMY  No date: CYST REMOVAL      Comment:  right hand  8/26/2019: CYSTO/URETERO/PYELOSCOPY, CALCULUS TX; Right      Comment:  CYSTOSCOPY URETEROSCOPY  STONE BASKET RETRIEVAL RIGHT                STENT EXCHANGE performed by Patricia Mulligan MD at [address] [facility]  No date: CYSTOSCOPY  8/20/2019: CYSTOSCOPY;  Right      Comment:  CYSTOSCOPY URETERAL STENT INSERTION performed by Shamir Barry MD at [address]  2009: ENDOMETRIAL BIOPSY  No date: ENDOSCOPY, COLON, DIAGNOSTIC  No date: LITHOTRIPSY  No date: TONSILLECTOMY  No date: TUBAL LIGATION  4/16/2020: UPPER GASTROINTESTINAL ENDOSCOPY; N/A      Comment:  EGD BIOPSY performed by Chanda Chi MD at [address] [place] [address]      Social History    Tobacco Use      Smoking status: Former Smoker        Types: Cigarettes      Smokeless tobacco: Never Used      Tobacco comment: pt states she quit smoking cigarettes at the age of 14       Standing Status:   Future     Standing Expiration Date:   9/16/2021     Order Specific Question:   Collection Type     Answer: Thin Prep     Order Specific Question:   Prior Abnormal Pap Test     Answer:   No     Order Specific Question:   Screening or Diagnostic     Answer:   Screening     Order Specific Question:   HPV Requested?      Answer:   Yes     Order Specific Question:   High Risk Patient     Answer:   N/A

## 2020-09-21 RX ORDER — CYCLOBENZAPRINE HCL 10 MG
10 TABLET ORAL 2 TIMES DAILY PRN
Qty: 60 TABLET | Refills: 0 | Status: SHIPPED | OUTPATIENT
Start: 2020-09-21 | End: 2020-11-02 | Stop reason: SDUPTHER

## 2020-09-25 LAB
HPV SOURCE: NORMAL
HPV, GENOTYPE 16: NOT DETECTED
HPV, GENOTYPE 18: NOT DETECTED
HPV, HIGH RISK OTHER: NOT DETECTED

## 2020-09-25 RX ORDER — FUROSEMIDE 20 MG/1
TABLET ORAL
Qty: 90 TABLET | Refills: 3 | Status: SHIPPED | OUTPATIENT
Start: 2020-09-25 | End: 2021-03-23 | Stop reason: SDUPTHER

## 2020-09-28 LAB — CYTOLOGY REPORT: NORMAL

## 2020-09-29 ENCOUNTER — TELEPHONE (OUTPATIENT)
Dept: OBGYN CLINIC | Age: 65
End: 2020-09-29

## 2020-09-29 NOTE — TELEPHONE ENCOUNTER
----- Message from RASHMI Santos CNP sent at 9/28/2020 12:53 PM EDT -----  Pap smear ASCUS  Neg HPV   aGE 63  Repeat pap smear in 1 year. Please let patient know. +Candida  Diflucan 150mg PO X 1 now and repeat in 7 days.

## 2020-10-01 ENCOUNTER — TELEPHONE (OUTPATIENT)
Dept: OBGYN CLINIC | Age: 65
End: 2020-10-01

## 2020-10-01 RX ORDER — FLUCONAZOLE 150 MG/1
150 TABLET ORAL ONCE
Qty: 2 TABLET | Refills: 0 | Status: SHIPPED | OUTPATIENT
Start: 2020-10-01 | End: 2020-10-01

## 2020-10-01 NOTE — TELEPHONE ENCOUNTER
Per Fernando Perez CNP, patient advised of +candida with order for diflucan. Patient also made aware of negative hpv with ascus results. Patient agreeable to repeat pap in 1 year.

## 2020-10-01 NOTE — TELEPHONE ENCOUNTER
----- Message from RASHMI Maza CNP sent at 9/28/2020 12:53 PM EDT -----  Pap smear ASCUS  Neg HPV   aGE 63  Repeat pap smear in 1 year. Please let patient know. +Candida  Diflucan 150mg PO X 1 now and repeat in 7 days.

## 2020-10-05 RX ORDER — GABAPENTIN 600 MG/1
TABLET ORAL
Qty: 60 TABLET | Refills: 0 | Status: SHIPPED | OUTPATIENT
Start: 2020-10-05 | End: 2020-11-03

## 2020-10-05 NOTE — TELEPHONE ENCOUNTER
Last visit: 8/17/2020  Last Med refill: 9/8/2020  Does patient have enough medication for 72 hours: Yes    Next Visit Date:  Future Appointments   Date Time Provider Aleksandra Wisdom   10/7/2020  4:30 PM [id number]   10/13/2020  3:00  [facility] M[facility]MO [facility]   10/13/2020  3:30  [facility] RM [facility] MAMMO [facility]   9/20/2021  4:00 PM Alfredo Dobbs, APRN - [address] Maintenance   Topic Date Due    Shingles Vaccine (1 of 2) 03/19/2007    Diabetic retinal exam  03/07/2017    Diabetic foot exam  07/07/2018    Colon Cancer Screen FIT/FOBT  01/09/2019    DTaP/Tdap/Td vaccine (2 - Td) 09/18/2019    Breast cancer screen  03/23/2020    A1C test (Diabetic or Prediabetic)  07/15/2020    Flu vaccine (1) 09/01/2020    Statin Therapy  03/30/2021    Diabetic microalbuminuria test  08/18/2021    Lipid screen  08/18/2021    TSH testing  08/18/2021    Potassium monitoring  08/18/2021    Creatinine monitoring  08/18/2021    Cervical cancer screen  09/16/2025    Pneumococcal 0-64 years Vaccine  Completed    Hepatitis C screen  Completed    HIV screen  Addressed    Hepatitis A vaccine  Aged Out    Hib vaccine  Aged Out    Meningococcal (ACWY) vaccine  Aged Out       Hemoglobin A1C (%)   Date Value   04/15/2020 10.1 (H)   01/23/2019 10.0 (H)   10/30/2018 11.7 (H)             ( goal A1C is < 7)   Microalb/Crt.  Ratio (mcg/mg creat)   Date Value   08/18/2020 51 (H)     LDL Cholesterol (mg/dL)   Date Value   08/18/2020 72   04/14/2020 16       (goal LDL is <100)   AST (U/L)   Date Value   08/18/2020 21     ALT (U/L)   Date Value   08/18/2020 24     BUN (mg/dL)   Date Value   08/18/2020 11     BP Readings from Last 3 Encounters:   09/16/20 124/82   08/17/20 97/78   06/26/20 122/89          (goal 120/80)    All Future Testing planned in [facility]  Lab Frequency Next Occurrence   DEXA VERTEBRAL FRACTURE ASSESSMENT Once 10/16/2020   [facility] DIGITAL DIAGNOSTIC W OR WO CAD BILATERAL Once 11/26/2020   Cologuard Once 10/25/2020   Nasal cannula oxygen PRN                Patient Active Problem List:     Chronic neck pain     Abnormal mammogram     Diabetes type 2, uncontrolled ([facility])     DM neuropathy takes Percocet     Hypothyroidism     Depression     MVP (mitral valve prolapse)     Constipation     Chronic prescription benzodiazepine use     Chronic use of opiate drugs therapeutic purposes     Morbid obesity with BMI of 45.0-49.9, adult ([facility] [address]     Mediastinal cyst     Uncontrolled type 2 diabetes mellitus with diabetic neuropathy, with long-term current use of insulin ([facility])     Essential hypertension     Chronic pain of both knees     Nephrolithiasis     Ureterolithiasis     Chest pain with high risk for cardiac etiology     Pneumonia due to COVID-19 virus     Mixed hyperlipidemia     Coronary artery disease involving native coronary artery of native heart without angina pectoris     Generalized anxiety disorder     Migraine without aura, not intractable     Mixed dyslipidemia     Seizure disorder ([facility])     Arthritis involving multiple sites     Arthritis of right hand     Gastroesophageal reflux disease without esophagitis

## 2020-10-12 NOTE — TELEPHONE ENCOUNTER
Please Approve or Refuse.   Send to Pharmacy per Pt's Request:    Next Visit Date:  Visit date not found   Last Visit Date: 8/17/2020    Hemoglobin A1C (%)   Date Value   04/15/2020 10.1 (H)   01/23/2019 10.0 (H)   10/30/2018 11.7 (H)             ( goal A1C is < 7)   BP Readings from Last 3 Encounters:   09/16/20 124/82   08/17/20 97/78   06/26/20 122/89          (goal 120/80)  BUN   Date Value Ref Range Status   08/18/2020 11 8 - 23 mg/dL Final     CREATININE   Date Value Ref Range Status   08/18/2020 0.48 (L) 0.50 - 0.90 mg/dL Final     Potassium   Date Value Ref Range Status   08/18/2020 4.6 3.7 - 5.3 mmol/L Final

## 2020-10-20 ENCOUNTER — E-VISIT (OUTPATIENT)
Dept: FAMILY MEDICINE CLINIC | Age: 65
End: 2020-10-20
Payer: COMMERCIAL

## 2020-10-20 PROCEDURE — 99422 OL DIG E/M SVC 11-20 MIN: CPT | Performed by: FAMILY MEDICINE

## 2020-10-20 RX ORDER — AZITHROMYCIN 250 MG/1
TABLET, FILM COATED ORAL
Qty: 6 TABLET | Refills: 0 | Status: SHIPPED | OUTPATIENT
Start: 2020-10-20 | End: 2020-10-25

## 2020-10-20 RX ORDER — GUAIFENESIN 600 MG/1
600 TABLET, EXTENDED RELEASE ORAL 2 TIMES DAILY
Qty: 30 TABLET | Refills: 0 | Status: SHIPPED | OUTPATIENT
Start: 2020-10-20 | End: 2020-12-02 | Stop reason: ALTCHOICE

## 2020-10-20 RX ORDER — FLUTICASONE PROPIONATE 50 MCG
2 SPRAY, SUSPENSION (ML) NASAL DAILY
Qty: 1 BOTTLE | Refills: 0 | Status: SHIPPED | OUTPATIENT
Start: 2020-10-20 | End: 2021-10-06

## 2020-10-20 ASSESSMENT — LIFESTYLE VARIABLES
PACKS_PER_DAY: .5
SMOKING_STATUS: NO, I'M A FORMER SMOKER
SMOKING_YEARS: 1

## 2020-10-20 NOTE — PROGRESS NOTES
HPI: per patient's questionnaire    EXAM: not applicable    Diagnoses and all orders for this visit:    1. Acute bacterial sinusitis    - azithromycin (ZITHROMAX) 250 MG tablet; 500 mg orally on day one followed by 250 mg daily on days two through five  Dispense: 6 tablet; Refill: 0  - fluticasone (FLONASE) 50 MCG/ACT nasal spray; 2 sprays by Nasal route daily  Dispense: 1 Bottle; Refill: 0  - guaiFENesin (MUCINEX) 600 MG extended release tablet; Take 1 tablet by mouth 2 times daily  Dispense: 30 tablet; Refill: 0      Patient was advised to contact PCP if symptoms worsen or failing to change as expected    11-20 minutes were spent on the digital evaluation and management of this patient.

## 2020-11-02 ENCOUNTER — TELEPHONE (OUTPATIENT)
Dept: FAMILY MEDICINE CLINIC | Age: 65
End: 2020-11-02

## 2020-11-02 RX ORDER — CYCLOBENZAPRINE HCL 10 MG
10 TABLET ORAL 2 TIMES DAILY PRN
Qty: 60 TABLET | Refills: 0 | Status: SHIPPED | OUTPATIENT
Start: 2020-11-02 | End: 2020-11-12

## 2020-11-03 RX ORDER — GABAPENTIN 600 MG/1
TABLET ORAL
Qty: 60 TABLET | Refills: 0 | Status: SHIPPED | OUTPATIENT
Start: 2020-11-03 | End: 2020-12-02

## 2020-11-03 NOTE — TELEPHONE ENCOUNTER
Please Approve or Refuse.   Send to Pharmacy per Pt's Request:      Next Visit Date:  Visit date not found   Last Visit Date: 10/20/2020    Hemoglobin A1C (%)   Date Value   04/15/2020 10.1 (H)   01/23/2019 10.0 (H)   10/30/2018 11.7 (H)             ( goal A1C is < 7)   BP Readings from Last 3 Encounters:   09/16/20 124/82   08/17/20 97/78   06/26/20 122/89          (goal 120/80)  BUN   Date Value Ref Range Status   08/18/2020 11 8 - 23 mg/dL Final     CREATININE   Date Value Ref Range Status   08/18/2020 0.48 (L) 0.50 - 0.90 mg/dL Final     Potassium   Date Value Ref Range Status   08/18/2020 4.6 3.7 - 5.3 mmol/L Final

## 2020-11-16 RX ORDER — DULOXETIN HYDROCHLORIDE 60 MG/1
CAPSULE, DELAYED RELEASE ORAL
Qty: 30 CAPSULE | Refills: 2 | Status: SHIPPED | OUTPATIENT
Start: 2020-11-16 | End: 2021-01-28

## 2020-11-16 RX ORDER — GLIPIZIDE 5 MG/1
5 TABLET ORAL NIGHTLY
Qty: 60 TABLET | Refills: 1 | Status: SHIPPED | OUTPATIENT
Start: 2020-11-16 | End: 2020-12-02 | Stop reason: ALTCHOICE

## 2020-11-16 NOTE — TELEPHONE ENCOUNTER
PATIENT ALSO STATES B/L FEET HAVE BEEN VERY DRY ESPECIALLY IN BETWEEN TOES REQUESTING REFERRAL FOR PODIATRIST ALSO CONCERNED THE PAST FEW MONTHS WITH EDEMA IN BOTH LEGS.  SHE DID STATE SHE HAS BEEN MOVING AND ON HER FEET A LOT SO SHE SHE KNOWS THAT ISN'T HELPING PLEASE ADVISE

## 2020-11-30 ENCOUNTER — NURSE TRIAGE (OUTPATIENT)
Dept: OTHER | Facility: CLINIC | Age: 65
End: 2020-11-30

## 2020-11-30 ENCOUNTER — TELEPHONE (OUTPATIENT)
Dept: FAMILY MEDICINE CLINIC | Age: 65
End: 2020-11-30

## 2020-11-30 NOTE — TELEPHONE ENCOUNTER
Patient was transferred to scheduling by nurse triage for a possible UTI. Experiencing frequency, burning x2 days. She states the medication that was called in for her for her last UTI cleared it up right away. Please contact patient at earliest convenience to advise. Thank you.

## 2020-12-02 ENCOUNTER — VIRTUAL VISIT (OUTPATIENT)
Dept: FAMILY MEDICINE CLINIC | Age: 65
End: 2020-12-02
Payer: COMMERCIAL

## 2020-12-02 ENCOUNTER — TELEPHONE (OUTPATIENT)
Dept: FAMILY MEDICINE CLINIC | Age: 65
End: 2020-12-02

## 2020-12-02 PROCEDURE — 3046F HEMOGLOBIN A1C LEVEL >9.0%: CPT | Performed by: FAMILY MEDICINE

## 2020-12-02 PROCEDURE — 99214 OFFICE O/P EST MOD 30 MIN: CPT | Performed by: FAMILY MEDICINE

## 2020-12-02 PROCEDURE — G8427 DOCREV CUR MEDS BY ELIG CLIN: HCPCS | Performed by: FAMILY MEDICINE

## 2020-12-02 PROCEDURE — 2022F DILAT RTA XM EVC RTNOPTHY: CPT | Performed by: FAMILY MEDICINE

## 2020-12-02 PROCEDURE — 3017F COLORECTAL CA SCREEN DOC REV: CPT | Performed by: FAMILY MEDICINE

## 2020-12-02 RX ORDER — CIPROFLOXACIN 500 MG/1
500 TABLET, FILM COATED ORAL 2 TIMES DAILY
Qty: 14 TABLET | Refills: 0 | Status: SHIPPED | OUTPATIENT
Start: 2020-12-02 | End: 2020-12-09

## 2020-12-02 RX ORDER — SYRINGE-NEEDLE,INSULIN,0.5 ML 30 GX5/16"
SYRINGE, EMPTY DISPOSABLE MISCELLANEOUS
COMMUNITY
Start: 2020-10-28 | End: 2022-06-24 | Stop reason: SDUPTHER

## 2020-12-02 RX ORDER — ASPIRIN 81 MG/1
81 TABLET ORAL DAILY
Qty: 90 TABLET | Refills: 1 | Status: SHIPPED | OUTPATIENT
Start: 2020-12-02 | End: 2021-05-19

## 2020-12-02 RX ORDER — GLIPIZIDE 10 MG/1
TABLET ORAL
COMMUNITY
Start: 2020-11-12 | End: 2020-12-21 | Stop reason: SDUPTHER

## 2020-12-02 RX ORDER — GABAPENTIN 600 MG/1
TABLET ORAL
Qty: 60 TABLET | Refills: 0 | Status: SHIPPED | OUTPATIENT
Start: 2020-12-02 | End: 2021-01-04

## 2020-12-02 ASSESSMENT — ENCOUNTER SYMPTOMS
BLOOD IN STOOL: 0
SHORTNESS OF BREATH: 0
DIARRHEA: 0
WHEEZING: 0
ABDOMINAL PAIN: 1
BACK PAIN: 1
PHOTOPHOBIA: 0
COUGH: 0
ABDOMINAL DISTENTION: 0
CHEST TIGHTNESS: 0

## 2020-12-02 NOTE — TELEPHONE ENCOUNTER
Patient tried doing EVisit but it gets half way through and kicks her out several different times with ERRORS. She would like to see if something can be called in. Can we order a urine lab for her to do?     URINARY PROBLEMS ADULT EVISIT EXCLUSION CRITERIA:  -feels pressure when emptying bladder  - Large amount of cloudy urine  - Duration of symptoms longer than 4 days   -belly pain that is getting worse with time  - History of kidney stones  - History of surgery on the kidneys stones removed back in February/March 2020    RX: [facility]

## 2020-12-02 NOTE — PROGRESS NOTES
Visit Information    Have you changed or started any medications since your last visit including any over-the-counter medicines, vitamins, or herbal medicines? no   Have you stopped taking any of your medications? Is so, why? -  no  Are you having any side effects from any of your medications? - no    Have you seen any other physician or provider since your last visit?  no   Have you had any other diagnostic tests since your last visit?  no   Have you been seen in the emergency room and/or had an admission in a hospital since we last saw you?  no   Have you had your routine dental cleaning in the past 6 months?  no     Do you have an active MyChart account? If no, what is the barrier?   Yes    Patient Care Team:  Corinne Snyder MD as PCP - General (Family Medicine)  Corinne Snyder MD as PCP - [address] [place] [address] Provider  Maria Esther Cleary DO as Consulting Physician (Obstetrics & Gynecology)  Charity Galdamez MD as Consulting Physician (Endocrinology)    Medical History Review  Past Medical, Family, and Social History reviewed and does contribute to the patient presenting condition    Health Maintenance   Topic Date Due    Shingles Vaccine (1 of 2) 03/19/2007    Diabetic retinal exam  03/07/2017    Diabetic foot exam  07/07/2018    Colon Cancer Screen FIT/FOBT  01/09/2019    DTaP/Tdap/Td vaccine (2 - Td) 09/18/2019    Breast cancer screen  03/23/2020    A1C test (Diabetic or Prediabetic)  07/15/2020    Flu vaccine (1) 09/01/2020    Diabetic microalbuminuria test  08/18/2021    Lipid screen  08/18/2021    TSH testing  08/18/2021    Potassium monitoring  08/18/2021    Creatinine monitoring  08/18/2021    Cervical cancer screen  09/16/2025    Hepatitis C screen  Completed    HIV screen  Addressed    Hepatitis A vaccine  Aged Out    Hib vaccine  Aged Out    Meningococcal (ACWY) vaccine  Aged Out    Pneumococcal 0-64 years Vaccine  Aged Out

## 2020-12-02 NOTE — PROGRESS NOTES
[address] [address]  Phone: [phone], Fax: [phone]    TELEHEALTH EVALUATION -- Audio/Visual (During UTG- public health emergency)    Patient ID verified by me prior to start of this visit    Jay Britt (:  1955) has requested an audio/video evaluation for the following concern(s):  Chief Complaint   Patient presents with    Urinary Tract Infection     NOTICED SYMPTOMS STARTING OVER THE WEEKEND      HPI:  Jay Britt is an established patient of Shelva Cabot, MD  . Patient has a history of UTI and uncontrolled diabetes. Patient called for sick call complaining of burning increased frequency suprapubic pain started yesterday. Patient has history of recurrent UTIs was treated in  and urine culture grew Citrobacter. Patient reports she gets recurrent infections, she also has seen urologist and has lithotripsy done. Patient denies any fever chills. Reports she drinks plenty of water. Diabetes uncontrolled follows with endocrinologist no recent A1c, she was started on Januvia in  reports endocrinologist recommended not to take that and she is only on [place] and short acting insulin. Patient reports evening sugars are running more than 300 and she is not able to get under control. Morning sugars are more than 150. She is on high-dose of Tresiba 18 units and also takes 10 units premeal insulin. She is also on glipizide. Hypertension usually controlled denies any chest pain shortness of breath. Patient has mammogram ordered reports she missed her appointment. Hyperlipidemia on aspirin and statins. Review of Systems   Constitutional: Positive for activity change. Negative for appetite change, diaphoresis, fever and unexpected weight change. HENT: Negative for congestion, ear pain, mouth sores and postnasal drip. Eyes: Negative for photophobia and visual disturbance.    Respiratory: Negative for cough, chest tightness, shortness of breath and wheezing. Cardiovascular: Negative for chest pain, palpitations and leg swelling. Gastrointestinal: Positive for abdominal pain. Negative for abdominal distention, blood in stool and diarrhea. Genitourinary: Positive for dysuria, flank pain, frequency and urgency. Negative for difficulty urinating, dyspareunia, genital sores, hematuria and vaginal pain. Musculoskeletal: Positive for arthralgias and back pain. Negative for joint swelling and myalgias. Neurological: Negative for dizziness, weakness, numbness and headaches. Psychiatric/Behavioral: Negative for agitation, decreased concentration and sleep disturbance. The patient is not nervous/anxious.         Patient Active Problem List    Diagnosis Date Noted    Arthritis involving multiple sites 08/17/2020    Arthritis of right hand 08/17/2020    Gastroesophageal reflux disease without esophagitis 08/17/2020    Mixed hyperlipidemia 06/16/2020    Pneumonia due to COVID-19 virus 04/20/2020    Chest pain with high risk for cardiac etiology 03/29/2020    Ureterolithiasis 08/20/2019    Nephrolithiasis 08/19/2019    Seizure disorder ([facility] [address] 09/21/2017    Coronary artery disease involving native coronary artery of native heart without angina pectoris 09/18/2017    Generalized anxiety disorder 09/18/2017    Migraine without aura, not intractable 09/18/2017    Mixed dyslipidemia 09/18/2017    Essential hypertension 02/07/2017    Chronic pain of both knees 02/07/2017    Uncontrolled type 2 diabetes mellitus with diabetic neuropathy, with long-term current use of insulin ([address] 06/08/2016    Mediastinal cyst 05/24/2016    Chronic prescription benzodiazepine use 03/31/2016    Chronic use of opiate drugs therapeutic purposes 03/31/2016    Morbid obesity with BMI of 45.0-49.9, adult ([address] 03/31/2016    Constipation 04/08/2015    Diabetes type 2, uncontrolled ([address] 01/14/2012    DM neuropathy takes Percocet 01/14/2012    Hypothyroidism 01/14/2012    Depression 01/14/2012    MVP (mitral valve prolapse) 01/14/2012    Abnormal mammogram 01/10/2012    Chronic neck pain         Past Surgical History:   Procedure Laterality Date    BREAST BIOPSY  2012    negative    CARDIOVASCULAR STRESS TEST      7/17/15 no results    CHOLECYSTECTOMY      CYST REMOVAL      right hand    CYSTO/URETERO/PYELOSCOPY, CALCULUS TX Right 8/26/2019    CYSTOSCOPY URETEROSCOPY  STONE BASKET RETRIEVAL RIGHT STENT EXCHANGE performed by Beatriz Posadas MD at [facility] Right 8/20/2019    CYSTOSCOPY URETERAL STENT INSERTION performed by Beatriz Posadas MD at [facility]  2009    ENDOSCOPY, COLON, DIAGNOSTIC      LITHOTRIPSY      TONSILLECTOMY      TUBAL LIGATION      UPPER GASTROINTESTINAL ENDOSCOPY N/A 4/16/2020    EGD BIOPSY performed by Levon Olivera MD at [address] [place] [address]     Family History   Problem Relation Age of Onset    Coronary Art Dis Mother     Lung Cancer Mother     Diabetes Mother         mellitus    Coronary Art Dis Father     Diabetes Father         diabetes mellitus     Current Outpatient Medications   Medication Sig Dispense Refill    RA PEN NEEDLES 31G X 8 MM MISC INJECTING TWICE DAILY DX: E11.65      glipiZIDE (GLUCOTROL) 10 MG tablet take 2 tablets by mouth twice a day      ciprofloxacin (CIPRO) 500 MG tablet Take 1 tablet by mouth 2 times daily for 7 days 14 tablet 0    SITagliptin (JANUVIA) 100 MG tablet Take 1 tablet by mouth daily 90 tablet 1    aspirin EC 81 MG EC tablet Take 1 tablet by mouth daily 90 tablet 1    DULoxetine (CYMBALTA) 60 MG extended release capsule take 1 capsule by mouth once daily 30 capsule 2    gabapentin (NEURONTIN) 600 MG tablet take 1 tablet by mouth twice a day 60 tablet 0    fluticasone (FLONASE) 50 MCG/ACT nasal spray 2 sprays by Nasal route daily 1 Bottle 0    furosemide (LASIX) 20 MG tablet take 1 tablet by mouth daily 90 tablet 3    pantoprazole (PROTONIX) 40 MG tablet Take 40 mg by mouth daily      oxybutynin (DITROPAN) 5 MG tablet Take 1 tablet by mouth daily      melatonin ER 1 MG TBCR tablet Take 1 mg by mouth nightly      nitroGLYCERIN (NITROSTAT) 0.4 MG SL tablet Place 0.4 mg under the tongue every 6 hours as needed      famotidine (PEPCID) 20 MG tablet Take 1 tablet by mouth 2 times daily 60 tablet 3    metoprolol tartrate (LOPRESSOR) 25 MG tablet Take 1 tablet by mouth 2 times daily 180 tablet 0    insulin lispro, 1 Unit Dial, 100 UNIT/ML SOPN Inject 10 Units into the skin 3 times daily as needed      levothyroxine (SYNTHROID) 75 MCG tablet Take 1 tablet by mouth daily 30 tablet 3    Insulin Degludec 200 UNIT/ML SOPN Inject 75 Units into the skin nightly 5 pen 2    atorvastatin (LIPITOR) 80 MG tablet Take 1 tablet by mouth daily 30 tablet 3     No current facility-administered medications for this visit. Allergies   Allergen Reactions    Cefuroxime Axetil Anaphylaxis    Dilaudid [Hydromorphone] Anaphylaxis    Metformin And Related Diarrhea, Itching and Nausea And Vomiting    Sulfa Antibiotics Itching    Amoxicillin     Antihistamines, Loratadine-Type     Eggs Or Egg-Derived Products     Lorazepam     Nubain [Nalbuphine Hcl] Other (See Comments)     Hallucinations; \"I got stoned. \"    Vistaril [Hydroxyzine Hcl]         Social History     Tobacco Use    Smoking status: Former Smoker     Types: Cigarettes    Smokeless tobacco: Never Used    Tobacco comment: pt states she quit smoking cigarettes at the age of 14   Substance Use Topics    Alcohol use: No     Alcohol/week: 0.0 standard drinks    Drug use: No     Comment: once in a while        PHYSICAL EXAMINATION:  Vital Signs: (As obtained by patient/caregiver or practitioner observation)  Patient-Reported Vitals 12/2/2020   Patient-Reported Weight 240 LB   Patient-Reported Height 5 2   Patient-Reported Systolic -   Patient-Reported Diastolic -        Constitutional: [x] Appears well-developed and well-nourished [x] No apparent distress      [] Abnormal-   Mental status  [x] Alert and awake  [x] Oriented to person/place/time [x]Able to follow commands      Eyes:  EOM    [x]  Normal  [] Abnormal-  Sclera  [x]  Normal  [] Abnormal -         Discharge [x]  None visible  [] Abnormal -    HENT:   [x] Normocephalic, atraumatic. [] Abnormal   [x] Mouth/Throat: Mucous membranes are moist.     External Ears [x] Normal  [] Abnormal-     Neck: [x] No visualized mass     Pulmonary/Chest: [x] Respiratory effort normal.  [x] No visualized signs of difficulty breathing or respiratory distress        [] Abnormal     Musculoskeletal:   [x] Normal gait with no signs of ataxia         [x] Normal range of motion of neck        [] Abnormal-     Neurological:        [x] No Facial Asymmetry (Cranial nerve 7 motor function) (limited exam to video visit)          [x] No gaze palsy        [] Abnormal-     Skin:        [x] No significant exanthematous lesions or discoloration noted on facial skin         [] Abnormal-     Psychiatric:       [x] Normal Affect [x] No Hallucinations        [x] Abnormal- Anxious, with pressured speech    Other pertinent observable physical exam findings-   Lab Results   Component Value Date    WBC 4.8 04/18/2020    HGB 14.2 04/18/2020    HCT 43.5 04/18/2020    MCV 90.4 04/18/2020     04/18/2020     Lab Results   Component Value Date     08/18/2020    K 4.6 08/18/2020     08/18/2020    CO2 25 08/18/2020    BUN 11 08/18/2020    CREATININE 0.48 08/18/2020    GLUCOSE 237 08/18/2020    GLUCOSE 94 01/23/2012    CALCIUM 9.3 08/18/2020        Due to this being a TeleHealth encounter, evaluation of the following organ systems is limited: Vitals/Constitutional/EENT/Resp/CV/GI//MS/Neuro/Skin/Heme-Lymph-Imm. ASSESSMENT/PLAN:  1.  Uncontrolled type 2 diabetes mellitus with diabetic neuropathy, with long-term current use of insulin ([facility]  Uncontrolled repeat A1c, start on Januvia continue insulin encouraged patient to follow-up monthly in the office for blood sugars. - Hemoglobin A1C; Future  - SITagliptin (JANUVIA) 100 MG tablet; Take 1 tablet by mouth daily  Dispense: 90 tablet; Refill: 1  - aspirin EC 81 MG EC tablet; Take 1 tablet by mouth daily  Dispense: 90 tablet; Refill: 1    2. Acute cystitis with hematuria  Antibiotic, recheck urine, likely due to uncontrolled diabetes. - Urinalysis Reflex to Culture; Future  - ciprofloxacin (CIPRO) 500 MG tablet; Take 1 tablet by mouth 2 times daily for 7 days  Dispense: 14 tablet; Refill: 0    3. Essential hypertension  Controlled continue same medications    4. Mixed hyperlipidemia  Stable continue statins and aspirin    5. Encounter for screening mammogram for breast cancer  Encouraged to schedule mammogram      Controlled Substance Monitoring:  Acute and Chronic Pain Monitoring:   RX Monitoring 9/21/2020   Attestation -   Periodic Controlled Substance Monitoring No signs of potential drug abuse or diversion identified. Orders Placed This Encounter   Procedures    Hemoglobin A1C     Standing Status:   Future     Standing Expiration Date:   12/2/2021    Urinalysis Reflex to Culture     Standing Status:   Future     Standing Expiration Date:   12/2/2021     Order Specific Question:   SPECIFY(EX-CATH,MIDSTREAM,CYSTO,ETC)?      Answer:   midstream      Orders Placed This Encounter   Medications    ciprofloxacin (CIPRO) 500 MG tablet     Sig: Take 1 tablet by mouth 2 times daily for 7 days     Dispense:  14 tablet     Refill:  0    SITagliptin (JANUVIA) 100 MG tablet     Sig: Take 1 tablet by mouth daily     Dispense:  90 tablet     Refill:  1    aspirin EC 81 MG EC tablet     Sig: Take 1 tablet by mouth daily     Dispense:  90 tablet     Refill:  1      Medications Discontinued During This Encounter   Medication Reason    glipiZIDE (GLUCOTROL) 5 MG tablet Therapy completed    diclofenac sodium (VOLTAREN) 1 % GEL Therapy completed    guaiFENesin (MUCINEX) 600 MG extended release tablet Therapy completed    magnesium sulfate 1-5 GM/100ML-% SOLN Therapy completed    naproxen (NAPROSYN) 250 MG tablet Therapy completed      Altonkwame Mohamud received counseling on the following healthy behaviors: nutrition, exercise and medication adherence  Reviewed prior labs and health maintenance. Continue current medications, diet and exercise. Discussed use, benefit, and side effects of prescribed medications. Barriers to medication compliance addressed. Patient given educational materials - see patient instructions. All patient questions answered. Patient voiced understanding. Return in about 4 weeks (around 12/30/2020) for dm ,htn, hld. Hernan Do is a 63 y.o. female patient  being evaluated by a Virtual Visit (video visit) encounter to address concerns as mentioned above. 25A caregiver was present when appropriate. Due to this being a TeleHealth encounter (During ABPXP-97 public health emergency), evaluation of the following organ systems was limited:Vitals/Constitutional/EENT/Resp/CV/GI//MS/Neuro/Skin/Heme-Lymph-Imm. Services were provided through a video synchronous discussion virtually to substitute for in-person clinic visit. This is a telehealth visit that was performed with the originating site at Patient Location: home and provider Location of [place], [place]. Verbal consent to participate in video visit was obtained. Patient ID verified by me prior to start of this visit  I discussed with the patient the nature of our telehealth visits via interactive/real-time audio/video that:  - I would evaluate the patient and recommend diagnostics and treatments based on my assessment  - Our sessions are not being recorded and that personal health information is protected  - Our team would provide follow up care in person if/when the patient needs it.      Pursuant to the emergency declaration under the [address] [place] [address] authority and the [employer] Act, this Virtual Visit was conducted with patient's (and/or legal guardian's) consent, to reduce the patient's risk of exposure to COVID-19 and provide necessary medical care. The patient (and/or legal guardian) has also been advised to contact this office for worsening conditions or problems, and seek emergency medical treatment and/or call 911 if deemed necessary. This note was completed by using the assistance of a speech-recognition program. However, inadvertent computerized transcription errors may be present. Although every effort was made to ensure accuracy, no guarantees can be provided that every mistake has been identified and corrected by editing.   Electronically signed by Norene Schirmer, MD on 12/2/20 at 11:22 AM EST

## 2020-12-03 ENCOUNTER — HOSPITAL ENCOUNTER (OUTPATIENT)
Age: 65
Discharge: HOME | End: 2020-12-03
Payer: COMMERCIAL

## 2020-12-03 LAB
-: ABNORMAL
AMORPHOUS: ABNORMAL
BACTERIA: ABNORMAL
BILIRUBIN URINE: NEGATIVE
CASTS UA: ABNORMAL /LPF
COLOR: YELLOW
COMMENT UA: ABNORMAL
CRYSTALS, UA: ABNORMAL /HPF
EPITHELIAL CELLS UA: ABNORMAL /HPF
ESTIMATED AVERAGE GLUCOSE: 266 MG/DL
GLUCOSE URINE: ABNORMAL
HBA1C MFR BLD: 10.9 % (ref 4–6)
KETONES, URINE: NEGATIVE
LEUKOCYTE ESTERASE, URINE: ABNORMAL
MUCUS: ABNORMAL
NITRITE, URINE: NEGATIVE
OTHER OBSERVATIONS UA: ABNORMAL
PH UA: 5.5 (ref 5–8)
PROTEIN UA: NEGATIVE
RBC UA: ABNORMAL /HPF
RENAL EPITHELIAL, UA: ABNORMAL /HPF
SPECIFIC GRAVITY UA: 1.01 (ref 1–1.03)
TRICHOMONAS: ABNORMAL
TURBIDITY: ABNORMAL
URINE HGB: NEGATIVE
UROBILINOGEN, URINE: NORMAL
WBC UA: ABNORMAL /HPF
YEAST: ABNORMAL

## 2020-12-03 PROCEDURE — 36415 COLL VENOUS BLD VENIPUNCTURE: CPT

## 2020-12-03 PROCEDURE — 83036 HEMOGLOBIN GLYCOSYLATED A1C: CPT

## 2020-12-03 PROCEDURE — 81001 URINALYSIS AUTO W/SCOPE: CPT

## 2020-12-11 RX ORDER — FAMOTIDINE 20 MG/1
TABLET, FILM COATED ORAL
Qty: 60 TABLET | Refills: 3 | Status: SHIPPED | OUTPATIENT
Start: 2020-12-11 | End: 2021-03-30

## 2020-12-11 NOTE — TELEPHONE ENCOUNTER
Last visit: 12/02/2020  Last Med refill: 11/16/2020  Does patient have enough medication for 72 hours: Yes    Next Visit Date:  Future Appointments   Date Time Provider Aleksandra Wisdom   1/11/2021  2:45 PM Lacy Ron MD [facility]   9/20/2021  4:00 PM Eros Cheney, APRN - [id number] Maintenance   Topic Date Due    Shingles Vaccine (1 of 2) 03/19/2007    Diabetic retinal exam  03/07/2017    Diabetic foot exam  07/07/2018    Colon Cancer Screen FIT/FOBT  01/09/2019    DTaP/Tdap/Td vaccine (2 - Td) 09/18/2019    Breast cancer screen  03/23/2020    Flu vaccine (1) 09/01/2020    A1C test (Diabetic or Prediabetic)  03/03/2021    Diabetic microalbuminuria test  08/18/2021    Lipid screen  08/18/2021    TSH testing  08/18/2021    Potassium monitoring  08/18/2021    Creatinine monitoring  08/18/2021    Cervical cancer screen  09/16/2025    Hepatitis C screen  Completed    HIV screen  Addressed    Hepatitis A vaccine  Aged Out    Hib vaccine  Aged Out    Meningococcal (ACWY) vaccine  Aged Out    Pneumococcal 0-64 years Vaccine  Aged Out       Hemoglobin A1C (%)   Date Value   12/03/2020 10.9 (H)   04/15/2020 10.1 (H)   01/23/2019 10.0 (H)             ( goal A1C is < 7)   Microalb/Crt.  Ratio (mcg/mg creat)   Date Value   08/18/2020 51 (H)     LDL Cholesterol (mg/dL)   Date Value   08/18/2020 72   04/14/2020 16       (goal LDL is <100)   AST (U/L)   Date Value   08/18/2020 21     ALT (U/L)   Date Value   08/18/2020 24     BUN (mg/dL)   Date Value   08/18/2020 11     BP Readings from Last 3 Encounters:   09/16/20 124/82   08/17/20 97/78   06/26/20 122/89          (goal 120/80)    All Future Testing planned in [facility]  Lab Frequency Next Occurrence   DEXA VERTEBRAL FRACTURE ASSESSMENT Once 11/02/2021   VIDHYA DIGITAL DIAGNOSTIC W OR WO CAD BILATERAL Once 02/05/2021   Cologuard Once 02/04/2021   Nasal cannula oxygen PRN                Patient Active Problem List:     Chronic neck pain     Abnormal mammogram     Diabetes type 2, uncontrolled (HCC)     DM neuropathy takes Percocet     Hypothyroidism     Depression     MVP (mitral valve prolapse)     Constipation     Chronic prescription benzodiazepine use     Chronic use of opiate drugs therapeutic purposes     Morbid obesity with BMI of 45.0-49.9, adult ([facility]     Mediastinal cyst     Uncontrolled type 2 diabetes mellitus with diabetic neuropathy, with long-term current use of insulin (HCC)     Essential hypertension     Chronic pain of both knees     Nephrolithiasis     Ureterolithiasis     Chest pain with high risk for cardiac etiology     Pneumonia due to COVID-19 virus     Mixed hyperlipidemia     Coronary artery disease involving native coronary artery of native heart without angina pectoris     Generalized anxiety disorder     Migraine without aura, not intractable     Mixed dyslipidemia     Seizure disorder (HCC)     Arthritis involving multiple sites     Arthritis of right hand     Gastroesophageal reflux disease without esophagitis

## 2020-12-21 ENCOUNTER — TELEPHONE (OUTPATIENT)
Dept: FAMILY MEDICINE CLINIC | Age: 65
End: 2020-12-21

## 2020-12-21 NOTE — TELEPHONE ENCOUNTER
Pt is calling and wants to know if she needs to take glipizide and [facility] [place] [place] together.

## 2020-12-22 RX ORDER — GLIPIZIDE 10 MG/1
TABLET ORAL
Qty: 180 TABLET | Refills: 2 | Status: SHIPPED | OUTPATIENT
Start: 2020-12-22 | End: 2022-08-22 | Stop reason: SDUPTHER

## 2021-01-04 RX ORDER — GABAPENTIN 600 MG/1
TABLET ORAL
Qty: 60 TABLET | Refills: 0 | Status: SHIPPED | OUTPATIENT
Start: 2021-01-04 | End: 2021-02-03

## 2021-01-04 NOTE — TELEPHONE ENCOUNTER
Please Approve or Refuse.   Send to Pharmacy per Pt's Request:    Next Visit Date:  1/11/2021   Last Visit Date: 12/2/2020    Hemoglobin A1C (%)   Date Value   12/03/2020 10.9 (H)   04/15/2020 10.1 (H)   01/23/2019 10.0 (H)             ( goal A1C is < 7)   BP Readings from Last 3 Encounters:   09/16/20 124/82   08/17/20 97/78   06/26/20 122/89          (goal 120/80)  BUN   Date Value Ref Range Status   08/18/2020 11 8 - 23 mg/dL Final     CREATININE   Date Value Ref Range Status   08/18/2020 0.48 (L) 0.50 - 0.90 mg/dL Final     Potassium   Date Value Ref Range Status   08/18/2020 4.6 3.7 - 5.3 mmol/L Final

## 2021-01-11 ENCOUNTER — VIRTUAL VISIT (OUTPATIENT)
Dept: FAMILY MEDICINE CLINIC | Age: 66
End: 2021-01-11
Payer: COMMERCIAL

## 2021-01-11 DIAGNOSIS — E78.2 MIXED HYPERLIPIDEMIA: ICD-10-CM

## 2021-01-11 DIAGNOSIS — E03.9 ACQUIRED HYPOTHYROIDISM: ICD-10-CM

## 2021-01-11 DIAGNOSIS — I10 ESSENTIAL HYPERTENSION: ICD-10-CM

## 2021-01-11 DIAGNOSIS — G47.00 INSOMNIA, UNSPECIFIED TYPE: ICD-10-CM

## 2021-01-11 DIAGNOSIS — Z12.11 COLON CANCER SCREENING: ICD-10-CM

## 2021-01-11 PROBLEM — J12.82 PNEUMONIA DUE TO COVID-19 VIRUS: Status: RESOLVED | Noted: 2020-04-20 | Resolved: 2021-01-11

## 2021-01-11 PROBLEM — U07.1 PNEUMONIA DUE TO COVID-19 VIRUS: Status: RESOLVED | Noted: 2020-04-20 | Resolved: 2021-01-11

## 2021-01-11 PROCEDURE — 99214 OFFICE O/P EST MOD 30 MIN: CPT | Performed by: FAMILY MEDICINE

## 2021-01-11 RX ORDER — CHOLECALCIFEROL (VITAMIN D3) 25 MCG
3 TABLET ORAL NIGHTLY
Qty: 30 TABLET | Refills: 1 | Status: SHIPPED | OUTPATIENT
Start: 2021-01-11 | End: 2021-10-06 | Stop reason: DRUGHIGH

## 2021-01-11 NOTE — PROGRESS NOTES
Anita Solano is a 63 y.o. female evaluated via telephone on 1/11/2021. Consent:  She and/or health care decision maker is aware that that she may receive a bill for this telephone service, depending on her insurance coverage, and has provided verbal consent to proceed: Yes      Documentation:  I communicated with the patient and/or health care decision maker about . Patient is here for follow-up on diabetes which is uncontrolled and to discuss blood work. Her A1c has increased to 10.7 from 10.1, she is on long-acting insulin and short-acting and was also started on Januvia. Patient follows with endocrinologist and she also takes glipizide. Patient reports she received Dexcom and her sugars have improved. The lowest sugars are 150 and the highest on our more than 200. She is not able to tolerate sugars below 150. Patient is currently taking 80 units of long-acting insulin and takes glipizide on and off. Patient reports she covers her sugars with short-acting insulin. Patient also started Atkins diet and is trying to watch her diet cut down on carbs. Cholesterol is normal.  Blood pressure is controlled. All lab work discussed with patient. She is complaining of insomnia is taking melatonin 1 mg reports that started helping and wants to increase. Details of this discussion including any medical advice provided:     1. Uncontrolled type 2 diabetes mellitus with diabetic neuropathy, with long-term current use of insulin (HCC)  Blood sugars have improved, discussed start glipizide twice daily agent continue Januvia and insulin. We will see her back in 2 months. 2. Mixed hyperlipidemia  Stable on current treatment    3. Essential hypertension  Controlled continue same medications    4. Acquired hypothyroidism  Stable continue Synthroid    5. Insomnia, unspecified type  Increase melatonin to 3 mg  - melatonin ER 3 MG TBCR; Take 3 mg by mouth nightly  Dispense: 30 tablet;  Refill: 1

## 2021-01-18 ENCOUNTER — VIRTUAL VISIT (OUTPATIENT)
Dept: FAMILY MEDICINE CLINIC | Age: 66
End: 2021-01-18
Payer: COMMERCIAL

## 2021-01-18 ENCOUNTER — TELEPHONE (OUTPATIENT)
Dept: FAMILY MEDICINE CLINIC | Age: 66
End: 2021-01-18

## 2021-01-18 DIAGNOSIS — N20.0 NEPHROLITHIASIS: ICD-10-CM

## 2021-01-18 DIAGNOSIS — N39.0 RECURRENT UTI: Primary | ICD-10-CM

## 2021-01-18 PROCEDURE — 99214 OFFICE O/P EST MOD 30 MIN: CPT | Performed by: FAMILY MEDICINE

## 2021-01-18 RX ORDER — CEPHALEXIN 500 MG/1
500 CAPSULE ORAL 4 TIMES DAILY
Qty: 40 CAPSULE | Refills: 0 | Status: SHIPPED | OUTPATIENT
Start: 2021-01-18 | End: 2021-02-24 | Stop reason: ALTCHOICE

## 2021-01-18 RX ORDER — LANOLIN ALCOHOL/MO/W.PET/CERES
CREAM (GRAM) TOPICAL
Qty: 30 TABLET | Refills: 1 | Status: SHIPPED | OUTPATIENT
Start: 2021-01-18 | End: 2022-05-17

## 2021-01-18 NOTE — TELEPHONE ENCOUNTER
Please Approve or Refuse.   Send to Pharmacy per Pt's Request:  Next Visit Date:  Visit date not found   Last Visit Date: 1/11/2021    Hemoglobin A1C (%)   Date Value   12/03/2020 10.9 (H)   04/15/2020 10.1 (H)   01/23/2019 10.0 (H)             ( goal A1C is < 7)   BP Readings from Last 3 Encounters:   09/16/20 124/82   08/17/20 97/78   06/26/20 122/89          (goal 120/80)  BUN   Date Value Ref Range Status   08/18/2020 11 8 - 23 mg/dL Final     CREATININE   Date Value Ref Range Status   08/18/2020 0.48 (L) 0.50 - 0.90 mg/dL Final     Potassium   Date Value Ref Range Status   08/18/2020 4.6 3.7 - 5.3 mmol/L Final

## 2021-01-18 NOTE — TELEPHONE ENCOUNTER
Patient has been trying to do an E-visit all day today but not able to do as the system is down. She is having urinary symptoms.      Symptoms:   Urine is cloudy  When she is done urinating she has a lot of discomfort/cramping  Urine bubbles towards the end  No discharge  No odor  This has been ongoing for the past 3 days

## 2021-01-18 NOTE — PROGRESS NOTES
Lauren Rae is a 63 y.o. female evaluated via telephone on 1/18/2021. Consent:  She and/or health care decision maker is aware that that she may receive a bill for this telephone service, depending on her insurance coverage, and has provided verbal consent to proceed: Yes      Documentation:  I communicated with the patient and/or health care decision maker about. Patient is scheduled for telephone visit for recurrent UTIs was recently treated in December with ciprofloxacin. Patient has recurrent history has uncontrolled diabetes. Patient was referred to endocrinologist reports sugars improved. She is complaining of suprapubic pain, bubbly sensation, increased frequency denies any fever chills flank pain, denies any blood with urine. Patient has history of nephrolithiasis in the past.    Details of this discussion including any medical advice provided:     1. Recurrent UTI  Advised to take Benadryl if he notes any side effects from Keflex. - Jose Moody MD, Urology, [place]  - Urinalysis Reflex to Culture; Future  - cephALEXin (KEFLEX) 500 MG capsule; Take 1 capsule by mouth 4 times daily  Dispense: 40 capsule; Refill: 0    2. Nephrolithiasis    - Jose Moody MD, Urology, [place]    I affirm this is a Patient Initiated Episode with a Patient who has not had a related appointment within my department in the past 7 days or scheduled within the next 24 hours.     Patient identification was verified at the start of the visit: Yes    Total Time: minutes: 21-30 minutes    Note: not billable if this call serves to triage the patient into an appointment for the relevant concern This is a telehealth visit that was performed with the originating site at Patient Location: home and Provider Location of [facility], [address]. Verbal consent to participate in video visit was obtained. Pursuant to the emergency declaration under the [address] [place] [address], [facility] waiver authority and the [employer] and [employer] Act, this Virtual Visit was conducted, with patient's consent, to reduce the patient's risk of exposure to COVID-19 and provide continuity of care for an established/new patient. Services were provided through a video synchronous discussion virtually to substitute for in-person clinic visit. I discussed with the patient the nature of our telehealth visits via interactive/real-time audio/video that:  - I would evaluate the patient and recommend diagnostics and treatments based on my assessment  - Our sessions are not being recorded and that personal health information is protected  - Our team would provide follow up care in person if/when the patient needs it.       Joni Kirk

## 2021-01-20 NOTE — TELEPHONE ENCOUNTER
Please Approve or Refuse.   Send to Pharmacy per Pt's Request:      Next Visit Date:  Visit date not found   Last Visit Date: 1/18/2021    Hemoglobin A1C (%)   Date Value   12/03/2020 10.9 (H)   04/15/2020 10.1 (H)   01/23/2019 10.0 (H)             ( goal A1C is < 7)   BP Readings from Last 3 Encounters:   09/16/20 124/82   08/17/20 97/78   06/26/20 122/89          (goal 120/80)  BUN   Date Value Ref Range Status   08/18/2020 11 8 - 23 mg/dL Final     CREATININE   Date Value Ref Range Status   08/18/2020 0.48 (L) 0.50 - 0.90 mg/dL Final     Potassium   Date Value Ref Range Status   08/18/2020 4.6 3.7 - 5.3 mmol/L Final

## 2021-01-21 RX ORDER — ATORVASTATIN CALCIUM 80 MG/1
80 TABLET, FILM COATED ORAL DAILY
Qty: 30 TABLET | Refills: 3 | Status: SHIPPED | OUTPATIENT
Start: 2021-01-21 | End: 2021-05-17

## 2021-01-28 DIAGNOSIS — E11.42 DIABETIC POLYNEUROPATHY ASSOCIATED WITH TYPE 2 DIABETES MELLITUS (HCC): ICD-10-CM

## 2021-01-28 RX ORDER — DULOXETIN HYDROCHLORIDE 60 MG/1
CAPSULE, DELAYED RELEASE ORAL
Qty: 30 CAPSULE | Refills: 2 | Status: SHIPPED | OUTPATIENT
Start: 2021-01-28 | End: 2021-04-26

## 2021-02-01 ENCOUNTER — TELEPHONE (OUTPATIENT)
Dept: FAMILY MEDICINE CLINIC | Age: 66
End: 2021-02-01

## 2021-02-01 NOTE — TELEPHONE ENCOUNTER
Pt can increase her laisx to 40mg , I orderd BMp , she can repeat that in 1wk, please schedule f/u yvan in next wk.

## 2021-02-01 NOTE — TELEPHONE ENCOUNTER
PATIENT SHE HAS BEEN RETAINING WATER SINCE SHE STARTED THE JANUVIA SHE HAS ALSO BEEN SWELLING SHE WANTS TO KNOW WHAT SHE NEEDS TO DO ??  PLEASE ADVISE

## 2021-02-02 ENCOUNTER — HOSPITAL ENCOUNTER (OUTPATIENT)
Age: 66
Discharge: HOME | End: 2021-02-02
Payer: COMMERCIAL

## 2021-02-02 DIAGNOSIS — N39.0 RECURRENT UTI: ICD-10-CM

## 2021-02-02 LAB
ANION GAP SERPL CALCULATED.3IONS-SCNC: 9 MMOL/L (ref 9–17)
BILIRUBIN URINE: NEGATIVE
BUN BLDV-MCNC: 10 MG/DL (ref 8–23)
BUN/CREAT BLD: ABNORMAL (ref 9–20)
CALCIUM SERPL-MCNC: 9.1 MG/DL (ref 8.6–10.4)
CHLORIDE BLD-SCNC: 104 MMOL/L (ref 98–107)
CO2: 27 MMOL/L (ref 20–31)
COLOR: YELLOW
COMMENT UA: NORMAL
CREAT SERPL-MCNC: <0.4 MG/DL (ref 0.5–0.9)
GFR AFRICAN AMERICAN: ABNORMAL ML/MIN
GFR NON-AFRICAN AMERICAN: ABNORMAL ML/MIN
GFR SERPL CREATININE-BSD FRML MDRD: ABNORMAL ML/MIN/{1.73_M2}
GFR SERPL CREATININE-BSD FRML MDRD: ABNORMAL ML/MIN/{1.73_M2}
GLUCOSE BLD-MCNC: 217 MG/DL (ref 70–99)
GLUCOSE URINE: NEGATIVE
KETONES, URINE: NEGATIVE
LEUKOCYTE ESTERASE, URINE: NEGATIVE
NITRITE, URINE: NEGATIVE
PH UA: 5.5 (ref 5–8)
POTASSIUM SERPL-SCNC: 4 MMOL/L (ref 3.7–5.3)
PROTEIN UA: NEGATIVE
SODIUM BLD-SCNC: 140 MMOL/L (ref 135–144)
SPECIFIC GRAVITY UA: 1.01 (ref 1–1.03)
TURBIDITY: CLEAR
URINE HGB: NEGATIVE
UROBILINOGEN, URINE: NORMAL

## 2021-02-02 PROCEDURE — 81003 URINALYSIS AUTO W/O SCOPE: CPT

## 2021-02-02 PROCEDURE — 36415 COLL VENOUS BLD VENIPUNCTURE: CPT

## 2021-02-02 PROCEDURE — 80048 BASIC METABOLIC PNL TOTAL CA: CPT

## 2021-02-03 RX ORDER — GABAPENTIN 600 MG/1
TABLET ORAL
Qty: 60 TABLET | Refills: 0 | Status: SHIPPED | OUTPATIENT
Start: 2021-02-03 | End: 2021-03-08

## 2021-02-24 ENCOUNTER — VIRTUAL VISIT (OUTPATIENT)
Dept: FAMILY MEDICINE CLINIC | Age: 66
End: 2021-02-24
Payer: COMMERCIAL

## 2021-02-24 DIAGNOSIS — M19.041 ARTHRITIS OF RIGHT HAND: ICD-10-CM

## 2021-02-24 DIAGNOSIS — I10 ESSENTIAL HYPERTENSION: ICD-10-CM

## 2021-02-24 PROCEDURE — 99214 OFFICE O/P EST MOD 30 MIN: CPT | Performed by: FAMILY MEDICINE

## 2021-02-24 RX ORDER — DULAGLUTIDE 0.75 MG/.5ML
0.75 INJECTION, SOLUTION SUBCUTANEOUS
Qty: 4 PEN | Refills: 5 | Status: SHIPPED | OUTPATIENT
Start: 2021-02-24 | End: 2021-03-23 | Stop reason: SDUPTHER

## 2021-02-24 NOTE — PROGRESS NOTES
I affirm this is a Patient Initiated Episode with a Patient who has not had a related appointment within my department in the past 7 days or scheduled within the next 24 hours. Patient identification was verified at the start of the visit: Yes  This is a telehealth visit that was performed with the originating site at Patient Location: home and Provider Location of [place], [place]. Verbal consent to participate in video visit was obtained. Pursuant to the emergency declaration under the [address] [place] [address] waiver authority and the [employer] and [employer] Act, this Virtual Visit was conducted, with patient's consent, to reduce the patient's risk of exposure to COVID-19 and provide continuity of care for an established/new patient. Services were provided through a video synchronous discussion virtually to substitute for in-person clinic visit. I discussed with the patient the nature of our telehealth visits via interactive/real-time audio/video that:  - I would evaluate the patient and recommend diagnostics and treatments based on my assessment  - Our sessions are not being recorded and that personal health information is protected  - Our team would provide follow up care in person if/when the patient needs it.     Total Time: minutes: 21-30 minutes    Note: not billable if this call serves to triage the patient into an appointment for the relevant concern      Lashawn Schrader

## 2021-03-08 RX ORDER — GABAPENTIN 600 MG/1
TABLET ORAL
Qty: 60 TABLET | Refills: 0 | Status: SHIPPED | OUTPATIENT
Start: 2021-03-08 | End: 2021-04-06

## 2021-03-12 ENCOUNTER — TELEPHONE (OUTPATIENT)
Dept: FAMILY MEDICINE CLINIC | Age: 66
End: 2021-03-12

## 2021-03-13 NOTE — TELEPHONE ENCOUNTER
That is fine , she can stop [facility], I started her on trulicity , confirm if she has received it .  If she has received it , she can increase dose to 1.5mg.

## 2021-03-23 ENCOUNTER — OFFICE VISIT (OUTPATIENT)
Dept: FAMILY MEDICINE CLINIC | Age: 66
End: 2021-03-23
Payer: COMMERCIAL

## 2021-03-23 VITALS
WEIGHT: 249 LBS | BODY MASS INDEX: 45.82 KG/M2 | OXYGEN SATURATION: 96 % | DIASTOLIC BLOOD PRESSURE: 79 MMHG | SYSTOLIC BLOOD PRESSURE: 123 MMHG | TEMPERATURE: 97 F | HEART RATE: 89 BPM | HEIGHT: 62 IN

## 2021-03-23 DIAGNOSIS — F32.4 MAJOR DEPRESSIVE DISORDER WITH SINGLE EPISODE, IN PARTIAL REMISSION (HCC): ICD-10-CM

## 2021-03-23 DIAGNOSIS — I50.32 CHRONIC DIASTOLIC CONGESTIVE HEART FAILURE (HCC): ICD-10-CM

## 2021-03-23 DIAGNOSIS — M54.2 CHRONIC NECK PAIN: ICD-10-CM

## 2021-03-23 DIAGNOSIS — M79.89 LEG SWELLING: ICD-10-CM

## 2021-03-23 DIAGNOSIS — G89.29 CHRONIC NECK PAIN: ICD-10-CM

## 2021-03-23 DIAGNOSIS — E03.9 ACQUIRED HYPOTHYROIDISM: ICD-10-CM

## 2021-03-23 DIAGNOSIS — E78.2 MIXED DYSLIPIDEMIA: ICD-10-CM

## 2021-03-23 DIAGNOSIS — I10 ESSENTIAL HYPERTENSION: ICD-10-CM

## 2021-03-23 DIAGNOSIS — N20.0 NEPHROLITHIASIS: ICD-10-CM

## 2021-03-23 LAB — HBA1C MFR BLD: 8.9 %

## 2021-03-23 PROCEDURE — 3052F HG A1C>EQUAL 8.0%<EQUAL 9.0%: CPT | Performed by: FAMILY MEDICINE

## 2021-03-23 PROCEDURE — G8417 CALC BMI ABV UP PARAM F/U: HCPCS | Performed by: FAMILY MEDICINE

## 2021-03-23 PROCEDURE — 99214 OFFICE O/P EST MOD 30 MIN: CPT | Performed by: FAMILY MEDICINE

## 2021-03-23 PROCEDURE — G8484 FLU IMMUNIZE NO ADMIN: HCPCS | Performed by: FAMILY MEDICINE

## 2021-03-23 PROCEDURE — G8427 DOCREV CUR MEDS BY ELIG CLIN: HCPCS | Performed by: FAMILY MEDICINE

## 2021-03-23 PROCEDURE — 3017F COLORECTAL CA SCREEN DOC REV: CPT | Performed by: FAMILY MEDICINE

## 2021-03-23 PROCEDURE — 83036 HEMOGLOBIN GLYCOSYLATED A1C: CPT | Performed by: FAMILY MEDICINE

## 2021-03-23 PROCEDURE — 2022F DILAT RTA XM EVC RTNOPTHY: CPT | Performed by: FAMILY MEDICINE

## 2021-03-23 PROCEDURE — 1036F TOBACCO NON-USER: CPT | Performed by: FAMILY MEDICINE

## 2021-03-23 RX ORDER — FUROSEMIDE 40 MG/1
40 TABLET ORAL DAILY
Qty: 90 TABLET | Refills: 1 | Status: SHIPPED | OUTPATIENT
Start: 2021-03-23 | End: 2021-09-08

## 2021-03-23 RX ORDER — DULAGLUTIDE 0.75 MG/.5ML
0.75 INJECTION, SOLUTION SUBCUTANEOUS
Qty: 4 PEN | Refills: 5 | Status: SHIPPED | OUTPATIENT
Start: 2021-03-23 | End: 2021-05-06

## 2021-03-23 RX ORDER — CYCLOBENZAPRINE HCL 10 MG
10 TABLET ORAL NIGHTLY PRN
Qty: 30 TABLET | Refills: 0 | Status: SHIPPED | OUTPATIENT
Start: 2021-03-23 | End: 2021-04-22

## 2021-03-23 RX ORDER — LISINOPRIL 2.5 MG/1
2.5 TABLET ORAL DAILY
Qty: 90 TABLET | Refills: 1 | Status: SHIPPED | OUTPATIENT
Start: 2021-03-23 | End: 2021-09-08

## 2021-03-23 ASSESSMENT — ENCOUNTER SYMPTOMS
PHOTOPHOBIA: 0
CHEST TIGHTNESS: 1
BACK PAIN: 0
COUGH: 0
SINUS PRESSURE: 0
ANAL BLEEDING: 0
SHORTNESS OF BREATH: 1
VOMITING: 0
ABDOMINAL DISTENTION: 0
WHEEZING: 0
COLOR CHANGE: 0
CONSTIPATION: 0
DIARRHEA: 0
RHINORRHEA: 0
SORE THROAT: 0

## 2021-03-23 ASSESSMENT — PATIENT HEALTH QUESTIONNAIRE - PHQ9
4. FEELING TIRED OR HAVING LITTLE ENERGY: 3
9. THOUGHTS THAT YOU WOULD BE BETTER OFF DEAD, OR OF HURTING YOURSELF: 0
SUM OF ALL RESPONSES TO PHQ QUESTIONS 1-9: 15
SUM OF ALL RESPONSES TO PHQ QUESTIONS 1-9: 15
8. MOVING OR SPEAKING SO SLOWLY THAT OTHER PEOPLE COULD HAVE NOTICED. OR THE OPPOSITE, BEING SO FIGETY OR RESTLESS THAT YOU HAVE BEEN MOVING AROUND A LOT MORE THAN USUAL: 2
6. FEELING BAD ABOUT YOURSELF - OR THAT YOU ARE A FAILURE OR HAVE LET YOURSELF OR YOUR FAMILY DOWN: 1

## 2021-03-23 ASSESSMENT — COLUMBIA-SUICIDE SEVERITY RATING SCALE - C-SSRS
1. WITHIN THE PAST MONTH, HAVE YOU WISHED YOU WERE DEAD OR WISHED YOU COULD GO TO SLEEP AND NOT WAKE UP?: YES
6. HAVE YOU EVER DONE ANYTHING, STARTED TO DO ANYTHING, OR PREPARED TO DO ANYTHING TO END YOUR LIFE?: NO

## 2021-03-23 NOTE — PROGRESS NOTES
Visit Information    Have you changed or started any medications since your last visit including any over-the-counter medicines, vitamins, or herbal medicines? no   Have you stopped taking any of your medications? Is so, why? -  no  Are you having any side effects from any of your medications? - no    Have you seen any other physician or provider since your last visit? yes - Dr. Darryl Blair    Have you had any other diagnostic tests since your last visit?  no   Have you been seen in the emergency room and/or had an admission in a hospital since we last saw you?  no   Have you had your routine dental cleaning in the past 6 months?  no     Do you have an active MyChart account? If no, what is the barrier?   Yes    Patient Care Team:  Shawanda Kay MD as PCP - General (Family Medicine)  Shawanda Kay MD [facility] P[facility]  Sonia Dejesus DO as Consulting Physician (Obstetrics & Gynecology)  Reginald Joshua MD as Consulting Physician (Endocrinology)    Medical History Review  Past Medical, Family, and Social History reviewed and does contribute to the patient presenting condition    Health Maintenance   Topic Date Due    COVID-19 Vaccine (1) Never done    Shingles Vaccine (1 of 2) Never done    Diabetic retinal exam  03/07/2017    Diabetic foot exam  07/07/2018    Colon Cancer Screen FIT/FOBT  01/09/2019    DTaP/Tdap/Td vaccine (2 - Td) 09/18/2019    Breast cancer screen  03/23/2020    A1C test (Diabetic or Prediabetic)  03/03/2021    Flu vaccine (1) 06/30/2021 (Originally 9/1/2020)    Diabetic microalbuminuria test  08/18/2021    Lipid screen  08/18/2021    TSH testing  08/18/2021    Potassium monitoring  02/02/2022    Creatinine monitoring  02/02/2022    Cervical cancer screen  09/16/2025    Pneumococcal 0-64 years Vaccine  Completed    Hepatitis C screen  Completed    HIV screen  Addressed    Hepatitis A vaccine  Aged Out    Hib vaccine  Aged Out    Meningococcal (ACWY) vaccine  Aged Out

## 2021-03-23 NOTE — PROGRESS NOTES
Chief Complaint   Patient presents with    Leg Pain     states having pain both legs          Enrigue Dill Shoults  here today for follow up on chronic medical problems, go over labs and/or diagnostic studies, and medication refills. Leg Pain (states having pain both legs )      HPI: Patient is here for follow-up on diabetes uncontrolled last A1c was 10.94 patient follows with endocrinologist.  Is on multiple medications. Patient was started on Januvia by me, patient took on and off reports she developed leg swelling and was feeling congested. Today A1c is 8.5 which has improved, patient stopped taking Januvia. She was started on Trulicity, patient has not received that. Patient is on Dexcom but her sugars are still running high more than 400 in the evenings and more than 200 in the mornings. She is also on short-acting insulin reports she does 10 to 18 units 3 times daily. Patient also history of diastolic heart failure and is on Lasix 20 mg. Hypertension controlled denies any chest pain shortness of breath. Patient has history of depression, is on Cymbalta reports that is helping. Patient reports her cat passed away recently, and she is upset because of that. She denies any bad thoughts or any suicidal attempts. Hypothyroidism stable on recent blood work. Patient is not taking lisinopril, and kidney functions normal.    /79 (Site: Left Upper Arm, Position: Sitting, Cuff Size: Large Adult)   Pulse 89   Temp 97 °F (36.1 °C) (Temporal)   Ht 5' 2\" (1.575 m)   Wt 249 lb (112.9 kg)   LMP 01/12/2015 (Approximate)   SpO2 96%   BMI 45.54 kg/m²    Body mass index is 45.54 kg/m². Wt Readings from Last 3 Encounters:   03/23/21 249 lb (112.9 kg)   09/16/20 245 lb (111.1 kg)   06/26/20 235 lb (106.6 kg)        []Negative depression screening.   PHQ Scores 3/23/2021 9/16/2020 6/16/2020 2/7/2017 6/8/2016 5/24/2016   PHQ2 Score 3 0 1 2 0 2   PHQ9 Score 15 0 1 2 0 2      []1-4 = Minimal Esterase, Urine 02/02/2021 NEGATIVE  NEGATIVE Final    Urinalysis Comments 02/02/2021 Microscopic exam not performed based on chemical results unless requested in original order.    Final         Most recent labs reviewed:     Lab Results   Component Value Date    WBC 4.8 04/18/2020    HGB 14.2 04/18/2020    HCT 43.5 04/18/2020    MCV 90.4 04/18/2020     04/18/2020       @BRIEFLAB(NA,K,CL,CO2,BUN,CREATININE,GLUCOSE,CALCIUM)@     Lab Results   Component Value Date    ALT 24 08/18/2020    AST 21 08/18/2020    ALKPHOS 129 (H) 08/18/2020    BILITOT 0.61 08/18/2020       Lab Results   Component Value Date    TSH 3.94 08/18/2020       Lab Results   Component Value Date    CHOL 156 08/18/2020    CHOL 68 04/14/2020    CHOL 152 07/31/2018     Lab Results   Component Value Date    TRIG 214 (H) 08/18/2020    TRIG 132 04/14/2020    TRIG 378 (H) 07/31/2018     Lab Results   Component Value Date    HDL 41 08/18/2020    HDL 26 (L) 04/14/2020    HDL 38 (L) 12/12/2018     Lab Results   Component Value Date    LDLCHOLESTEROL 72 08/18/2020    LDLCHOLESTEROL 16 04/14/2020    LDLCHOLESTEROL 75 12/12/2018     Lab Results   Component Value Date    VLDL NOT REPORTED (H) 08/18/2020    VLDL NOT REPORTED 04/14/2020    VLDL NOT REPORTED 12/12/2018     Lab Results   Component Value Date    CHOLHDLRATIO 3.8 08/18/2020    CHOLHDLRATIO 2.6 04/14/2020    CHOLHDLRATIO 3.7 12/12/2018       Lab Results   Component Value Date    LABA1C 8.9 03/23/2021       No results found for: RCVYCHJF05    No results found for: FOLATE    Lab Results   Component Value Date    FERRITIN 699 (H) 04/17/2020       Lab Results   Component Value Date    VITD25 14.0 (L) 01/29/2014             Current Outpatient Medications   Medication Sig Dispense Refill    Dulaglutide (TRULICITY) 6.93 UT/4.0QU SOPN Inject 0.75 mg into the skin every 7 days If pen needle is needed, please request 4 pen 5    furosemide (LASIX) 40 MG tablet Take 1 tablet by mouth daily take 1 tablet by mouth daily 90 tablet 1    lisinopril (PRINIVIL;ZESTRIL) 2.5 MG tablet Take 1 tablet by mouth daily 90 tablet 1    cyclobenzaprine (FLEXERIL) 10 MG tablet Take 1 tablet by mouth nightly as needed for Muscle spasms 30 tablet 0    gabapentin (NEURONTIN) 600 MG tablet take 1 tablet by mouth twice a day 60 tablet 0    DULoxetine (CYMBALTA) 60 MG extended release capsule take 1 capsule by mouth once daily 30 capsule 2    atorvastatin (LIPITOR) 80 MG tablet Take 1 tablet by mouth daily 30 tablet 3    melatonin 3 MG TABS tablet take 1 tablet by mouth nightly 30 tablet 1    melatonin ER 3 MG TBCR Take 3 mg by mouth nightly 30 tablet 1    glipiZIDE (GLUCOTROL) 10 MG tablet take 2 tablets by mouth twice a day 180 tablet 2    famotidine (PEPCID) 20 MG tablet take 1 tablet by mouth twice a day 60 tablet 3    RA PEN NEEDLES 31G X 8 MM MISC INJECTING TWICE DAILY DX: E11.65      aspirin EC 81 MG EC tablet Take 1 tablet by mouth daily 90 tablet 1    fluticasone (FLONASE) 50 MCG/ACT nasal spray 2 sprays by Nasal route daily 1 Bottle 0    pantoprazole (PROTONIX) 40 MG tablet Take 40 mg by mouth daily      oxybutynin (DITROPAN) 5 MG tablet Take 1 tablet by mouth daily      nitroGLYCERIN (NITROSTAT) 0.4 MG SL tablet Place 0.4 mg under the tongue every 6 hours as needed      metoprolol tartrate (LOPRESSOR) 25 MG tablet Take 1 tablet by mouth 2 times daily 180 tablet 0    insulin lispro, 1 Unit Dial, 100 UNIT/ML SOPN Inject 10 Units into the skin 3 times daily as needed      levothyroxine (SYNTHROID) 75 MCG tablet Take 1 tablet by mouth daily 30 tablet 3    Insulin Degludec 200 UNIT/ML SOPN Inject 75 Units into the skin nightly 5 pen 2     No current facility-administered medications for this visit.               Social History     Socioeconomic History    Marital status: Single     Spouse name: Not on file    Number of children: Not on file    Years of education: Not on file    Highest education level: Not on file   Occupational History    Not on file   Social Needs    Financial resource strain: Not on file    Food insecurity     Worry: Not on file     Inability: Not on file    Transportation needs     Medical: Not on file     Non-medical: Not on file   Tobacco Use    Smoking status: Former Smoker     Packs/day: 0.01     Years: 1.00     Pack years: 0.01     Types: Cigarettes     Quit date: 5     Years since quittin.2    Smokeless tobacco: Never Used    Tobacco comment: pt states she quit smoking cigarettes at the age of 14   Substance and Sexual Activity    Alcohol use: No     Alcohol/week: 0.0 standard drinks    Drug use: No     Comment: once in a while    Sexual activity: Yes     Partners: Male   Lifestyle    Physical activity     Days per week: Not on file     Minutes per session: Not on file    Stress: Not on file   Relationships    Social connections     Talks on phone: Not on file     Gets together: Not on file     Attends [employer] service: Not on file     Active member of club or organization: Not on file     Attends meetings of clubs or organizations: Not on file     Relationship status: Not on file    Intimate partner violence     Fear of current or ex partner: Not on file     Emotionally abused: Not on file     Physically abused: Not on file     Forced sexual activity: Not on file   Other Topics Concern    Not on file   Social History Narrative    Not on file     Counseling given: Not Answered  Comment: pt states she quit smoking cigarettes at the age of 14        Family History   Problem Relation Age of Onset    Coronary Art Dis Mother     Lung Cancer Mother     Diabetes Mother         mellitus    Coronary Art Dis Father     Diabetes Father         diabetes mellitus             -rest of complaints with corresponding details per ROS    The patient's past medical, surgical, social, and family history as well as her current medications and allergies were reviewed as documented intoday's encounter. Review of Systems   Constitutional: Positive for activity change and unexpected weight change. Negative for diaphoresis. HENT: Negative for congestion, ear pain, mouth sores, postnasal drip, rhinorrhea, sinus pressure and sore throat. Eyes: Negative for photophobia and visual disturbance. Respiratory: Positive for chest tightness and shortness of breath. Negative for cough and wheezing. Cardiovascular: Positive for leg swelling. Negative for chest pain and palpitations. Gastrointestinal: Negative for abdominal distention, anal bleeding, constipation, diarrhea and vomiting. Endocrine: Negative for polyuria. Genitourinary: Negative for difficulty urinating, dysuria, flank pain, frequency, urgency and vaginal pain. Musculoskeletal: Positive for arthralgias, gait problem, myalgias and neck pain. Negative for back pain and neck stiffness. Skin: Negative for color change, pallor, rash and wound. Neurological: Positive for numbness. Negative for dizziness, syncope, weakness, light-headedness and headaches. Psychiatric/Behavioral: Positive for dysphoric mood. Negative for confusion, decreased concentration, sleep disturbance and suicidal ideas. The patient is nervous/anxious. The patient is not hyperactive. Physical Exam  Vitals signs and nursing note reviewed. Constitutional:       Appearance: Normal appearance. She is obese. HENT:      Head: Normocephalic and atraumatic. Nose: Nose normal.      Mouth/Throat:      Mouth: Mucous membranes are dry. Eyes:      General:         Right eye: No discharge. Extraocular Movements: Extraocular movements intact. Pupils: Pupils are equal, round, and reactive to light. Neck:      Musculoskeletal: Normal range of motion and neck supple. No muscular tenderness. Cardiovascular:      Rate and Rhythm: Normal rate.       Heart sounds: Normal heart sounds, S1 normal and S2 normal.   Pulmonary:      Breath sounds: No decreased air movement or transmitted upper airway sounds. No wheezing, rhonchi or rales. Abdominal:      General: Abdomen is flat. Bowel sounds are normal.      Palpations: Abdomen is soft. Tenderness: There is no abdominal tenderness. Musculoskeletal:      Right shoulder: She exhibits normal range of motion. Lumbar back: She exhibits decreased range of motion, pain and spasm. She exhibits no edema. Right lower leg: She exhibits no tenderness and no bony tenderness. Edema present. Left lower leg: Edema present. Lymphadenopathy:      Cervical: No cervical adenopathy. Skin:     General: Skin is warm. Neurological:      Mental Status: She is alert and oriented to person, place, and time. Cranial Nerves: Cranial nerves are intact. Sensory: Sensation is intact. Motor: No weakness or tremor. Coordination: Coordination is intact. Romberg sign negative. Gait: Gait is intact. Psychiatric:         Attention and Perception: Attention and perception normal.         Mood and Affect: Mood and affect normal. Mood is not anxious or depressed. Speech: Speech normal. She is communicative. Speech is not rapid and pressured. Behavior: Behavior normal.         Thought Content: Thought content normal. Thought content is not paranoid. Cognition and Memory: Cognition normal. Cognition is not impaired. ASSESSMENT AND PLAN      1. Uncontrolled type 2 diabetes mellitus with diabetic neuropathy, with long-term current use of insulin (HCC)  A1c has mildly improved, but blood sugars are still running high more than 400.   Start on Trulicity, will do prior authorization if not covered continue all other medications stop Januvia due to side effects.  - Dulaglutide (TRULICITY) 9.22 LX/6.5QY SOPN; Inject 0.75 mg into the skin every 7 days If pen needle is needed, please request  Dispense: 4 pen; Refill: 5  - POCT glycosylated hemoglobin (Hb A1C) 2. Chronic diastolic congestive heart failure ([facility] [address]  Start on low-dose lisinopril, increase Lasix to 40 mg, monitor kidney function.  - Basic Metabolic Panel; Future    3. Mixed dyslipidemia  Continue statins continue aspirin    4. Essential hypertension  Controlled continue same medications    5. Nephrolithiasis  Continue to monitor    6. Acquired hypothyroidism  Recent TSH is normal    7. Leg swelling  Increase Lasix to 40 mg monitor kidney function. - furosemide (LASIX) 40 MG tablet; Take 1 tablet by mouth daily take 1 tablet by mouth daily  Dispense: 90 tablet; Refill: 1    8. Major depressive disorder with single episode, in partial remission (HCC)  Stable continue Cymbalta    9. Chronic neck pain  Flexeril as needed for muscle pains  - cyclobenzaprine (FLEXERIL) 10 MG tablet; Take 1 tablet by mouth nightly as needed for Muscle spasms  Dispense: 30 tablet; Refill: 0      Orders Placed This Encounter   Procedures    Basic Metabolic Panel     Standing Status:   Future     Standing Expiration Date:   3/23/2022    POCT glycosylated hemoglobin (Hb A1C)         Medications Discontinued During This Encounter   Medication Reason    SITagliptin (JANUVIA) 100 MG tablet Side effects    furosemide (LASIX) 20 MG tablet REORDER    Dulaglutide (TRULICITY) 5.09 AW/6.8AB SOPN REORDER       Ashsylvia Levy received counseling on the following healthy behaviors: nutrition, exercise and medication adherence  Reviewed prior labs and health maintenance  Continue current medications, diet and exercise. Discussed use, benefit, and side effects of prescribed medications. Barriers to medication compliance addressed. Patient given educational materials - see patient instructions  Was a self-tracking handout given in paper form or via [employer]?  Yes    Requested Prescriptions     Signed Prescriptions Disp Refills    Dulaglutide (TRULICITY) 4.04 CG/0.4LR SOPN 4 pen 5     Sig: Inject 0.75 mg into the skin every 7 days If pen needle is needed, please request    furosemide (LASIX) 40 MG tablet 90 tablet 1     Sig: Take 1 tablet by mouth daily take 1 tablet by mouth daily    lisinopril (PRINIVIL;ZESTRIL) 2.5 MG tablet 90 tablet 1     Sig: Take 1 tablet by mouth daily    cyclobenzaprine (FLEXERIL) 10 MG tablet 30 tablet 0     Sig: Take 1 tablet by mouth nightly as needed for Muscle spasms       All patient questions answered. Patient voiced understanding. Quality Measures    Body mass index is 45.54 kg/m². Elevated. Weight control planned discussed daily exercise regimen and Healthy diet and regular exercise. BP: 123/79 Blood pressure is normal. Treatment plan consists of Weight Reduction, DASH Eating Plan, Dietary Sodium Restriction and No treatment change needed. Lab Results   Component Value Date    LDLCHOLESTEROL 72 08/18/2020    LDLDIRECT 200 (H) 12/08/2017    (goal LDL reduction with dx if diabetes is 50% LDL reduction)      PHQ Scores 3/23/2021 9/16/2020 6/16/2020 2/7/2017 6/8/2016 5/24/2016   PHQ2 Score 3 0 1 2 0 2   PHQ9 Score 15 0 1 2 0 2     Interpretation of Total Score Depression Severity: 1-4 = Minimal depression, 5-9 = Mild depression, 10-14 = Moderate depression, 15-19 = Moderately severe depression, 20-27 = Severe depression    The patient'spast medical, surgical, social, and family history as well as her   current medications and allergies were reviewed as documented in today's encounter. Medications, labs, diagnostic studies, consultations andfollow-up as documented in this encounter. Return in about 4 weeks (around 4/20/2021) for dm ,htn, hld, A1C. Patient wasseen with total face to face time of 30 minutes. More than 50% of this visit was counseling and education.        Future Appointments   Date Time Provider Aleksandra Wisdom   5/6/2021  2:30 PM Eli Jasmine MD [facility]   9/20/2021  4:00 PM Evan Ospina, [address] [place] [address]     This note was completed by using the assistance of a speech-recognition program. However, inadvertent computerized transcription errors may be present. Althoughevery effort was made to ensure accuracy, no guarantees can be provided that every mistake has been identified and corrected by editing.   Electronically signed by Joni Kirk MD on 3/23/2021  3:21 PM

## 2021-03-30 DIAGNOSIS — K21.9 GASTROESOPHAGEAL REFLUX DISEASE WITHOUT ESOPHAGITIS: ICD-10-CM

## 2021-03-30 RX ORDER — FAMOTIDINE 20 MG/1
TABLET, FILM COATED ORAL
Qty: 60 TABLET | Refills: 3 | Status: SHIPPED | OUTPATIENT
Start: 2021-03-30 | End: 2021-07-14

## 2021-04-06 RX ORDER — GABAPENTIN 600 MG/1
TABLET ORAL
Qty: 60 TABLET | Refills: 0 | Status: SHIPPED | OUTPATIENT
Start: 2021-04-06 | End: 2021-05-10

## 2021-04-21 DIAGNOSIS — G89.29 CHRONIC NECK PAIN: ICD-10-CM

## 2021-04-21 DIAGNOSIS — M54.2 CHRONIC NECK PAIN: ICD-10-CM

## 2021-04-22 RX ORDER — CYCLOBENZAPRINE HCL 10 MG
TABLET ORAL
Qty: 30 TABLET | Refills: 0 | Status: SHIPPED | OUTPATIENT
Start: 2021-04-22 | End: 2021-10-06

## 2021-04-22 NOTE — TELEPHONE ENCOUNTER
Please Approve or Refuse.   Send to Pharmacy per Pt's Request:      Next Visit Date:  5/6/2021   Last Visit Date: 3/23/2021    Hemoglobin A1C (%)   Date Value   03/23/2021 8.9   12/03/2020 10.9 (H)   04/15/2020 10.1 (H)             ( goal A1C is < 7)   BP Readings from Last 3 Encounters:   03/23/21 123/79   09/16/20 124/82   08/17/20 97/78          (goal 120/80)  BUN   Date Value Ref Range Status   02/02/2021 10 8 - 23 mg/dL Final     CREATININE   Date Value Ref Range Status   02/02/2021 <0.40 (L) 0.50 - 0.90 mg/dL Final     Potassium   Date Value Ref Range Status   02/02/2021 4.0 3.7 - 5.3 mmol/L Final

## 2021-04-26 DIAGNOSIS — E11.42 DIABETIC POLYNEUROPATHY ASSOCIATED WITH TYPE 2 DIABETES MELLITUS (HCC): ICD-10-CM

## 2021-04-26 RX ORDER — DULOXETIN HYDROCHLORIDE 60 MG/1
CAPSULE, DELAYED RELEASE ORAL
Qty: 90 CAPSULE | Refills: 3 | Status: SHIPPED | OUTPATIENT
Start: 2021-04-26 | End: 2022-04-08

## 2021-04-30 ENCOUNTER — TELEPHONE (OUTPATIENT)
Dept: FAMILY MEDICINE CLINIC | Age: 66
End: 2021-04-30

## 2021-04-30 DIAGNOSIS — I83.813 VARICOSE VEINS OF BOTH LOWER EXTREMITIES WITH PAIN: Primary | ICD-10-CM

## 2021-04-30 NOTE — TELEPHONE ENCOUNTER
Patient states whenever they do \"oral sex\" she ends up with a UTI. She states she wants to know if there is something over the counter that can help. I advised 100% cranberry juice or cranberry pills. She states she is drinking cranberry juice but it is not helping. She also states her varicose veins are getting too big and needs another referral or something to get them taken care of. She states she is having cramping in her hands. Please advise.

## 2021-05-03 NOTE — TELEPHONE ENCOUNTER
She cannot take cranberry juice, that has lot of sugar , that will worse her DM. Her UTI are due to uncontrolled DM, once her DM is under control, they will improve. I put refferal to vascular.

## 2021-05-06 ENCOUNTER — TELEPHONE (OUTPATIENT)
Dept: FAMILY MEDICINE CLINIC | Age: 66
End: 2021-05-06

## 2021-05-06 ENCOUNTER — OFFICE VISIT (OUTPATIENT)
Dept: FAMILY MEDICINE CLINIC | Age: 66
End: 2021-05-06
Payer: COMMERCIAL

## 2021-05-06 ENCOUNTER — HOSPITAL ENCOUNTER (OUTPATIENT)
Age: 66
Discharge: HOME | End: 2021-05-06
Payer: COMMERCIAL

## 2021-05-06 VITALS
RESPIRATION RATE: 18 BRPM | WEIGHT: 248 LBS | OXYGEN SATURATION: 96 % | HEIGHT: 62 IN | HEART RATE: 74 BPM | TEMPERATURE: 96.7 F | BODY MASS INDEX: 45.64 KG/M2 | DIASTOLIC BLOOD PRESSURE: 84 MMHG | SYSTOLIC BLOOD PRESSURE: 126 MMHG

## 2021-05-06 DIAGNOSIS — F32.4 MAJOR DEPRESSIVE DISORDER WITH SINGLE EPISODE, IN PARTIAL REMISSION (HCC): ICD-10-CM

## 2021-05-06 DIAGNOSIS — I10 ESSENTIAL HYPERTENSION: ICD-10-CM

## 2021-05-06 DIAGNOSIS — I50.32 CHRONIC DIASTOLIC CONGESTIVE HEART FAILURE (HCC): ICD-10-CM

## 2021-05-06 DIAGNOSIS — E78.2 MIXED DYSLIPIDEMIA: ICD-10-CM

## 2021-05-06 DIAGNOSIS — G40.909 SEIZURE DISORDER (HCC): ICD-10-CM

## 2021-05-06 LAB
ANION GAP SERPL CALCULATED.3IONS-SCNC: 11 MMOL/L (ref 9–17)
BUN BLDV-MCNC: 9 MG/DL (ref 8–23)
BUN/CREAT BLD: ABNORMAL (ref 9–20)
CALCIUM SERPL-MCNC: 9.5 MG/DL (ref 8.6–10.4)
CHLORIDE BLD-SCNC: 99 MMOL/L (ref 98–107)
CO2: 26 MMOL/L (ref 20–31)
CREAT SERPL-MCNC: 0.44 MG/DL (ref 0.5–0.9)
GFR AFRICAN AMERICAN: >60 ML/MIN
GFR NON-AFRICAN AMERICAN: >60 ML/MIN
GFR SERPL CREATININE-BSD FRML MDRD: ABNORMAL ML/MIN/{1.73_M2}
GFR SERPL CREATININE-BSD FRML MDRD: ABNORMAL ML/MIN/{1.73_M2}
GLUCOSE BLD-MCNC: 295 MG/DL (ref 70–99)
POTASSIUM SERPL-SCNC: 4.4 MMOL/L (ref 3.7–5.3)
SODIUM BLD-SCNC: 136 MMOL/L (ref 135–144)

## 2021-05-06 PROCEDURE — 2022F DILAT RTA XM EVC RTNOPTHY: CPT | Performed by: FAMILY MEDICINE

## 2021-05-06 PROCEDURE — 36415 COLL VENOUS BLD VENIPUNCTURE: CPT

## 2021-05-06 PROCEDURE — 99214 OFFICE O/P EST MOD 30 MIN: CPT | Performed by: FAMILY MEDICINE

## 2021-05-06 PROCEDURE — G8427 DOCREV CUR MEDS BY ELIG CLIN: HCPCS | Performed by: FAMILY MEDICINE

## 2021-05-06 PROCEDURE — G8417 CALC BMI ABV UP PARAM F/U: HCPCS | Performed by: FAMILY MEDICINE

## 2021-05-06 PROCEDURE — 3017F COLORECTAL CA SCREEN DOC REV: CPT | Performed by: FAMILY MEDICINE

## 2021-05-06 PROCEDURE — 3052F HG A1C>EQUAL 8.0%<EQUAL 9.0%: CPT | Performed by: FAMILY MEDICINE

## 2021-05-06 PROCEDURE — 1036F TOBACCO NON-USER: CPT | Performed by: FAMILY MEDICINE

## 2021-05-06 PROCEDURE — 80048 BASIC METABOLIC PNL TOTAL CA: CPT

## 2021-05-06 ASSESSMENT — PATIENT HEALTH QUESTIONNAIRE - PHQ9
SUM OF ALL RESPONSES TO PHQ QUESTIONS 1-9: 10
6. FEELING BAD ABOUT YOURSELF - OR THAT YOU ARE A FAILURE OR HAVE LET YOURSELF OR YOUR FAMILY DOWN: 2
10. IF YOU CHECKED OFF ANY PROBLEMS, HOW DIFFICULT HAVE THESE PROBLEMS MADE IT FOR YOU TO DO YOUR WORK, TAKE CARE OF THINGS AT HOME, OR GET ALONG WITH OTHER PEOPLE: 0
8. MOVING OR SPEAKING SO SLOWLY THAT OTHER PEOPLE COULD HAVE NOTICED. OR THE OPPOSITE, BEING SO FIGETY OR RESTLESS THAT YOU HAVE BEEN MOVING AROUND A LOT MORE THAN USUAL: 0
4. FEELING TIRED OR HAVING LITTLE ENERGY: 2
SUM OF ALL RESPONSES TO PHQ QUESTIONS 1-9: 10
SUM OF ALL RESPONSES TO PHQ QUESTIONS 1-9: 10
7. TROUBLE CONCENTRATING ON THINGS, SUCH AS READING THE NEWSPAPER OR WATCHING TELEVISION: 0
SUM OF ALL RESPONSES TO PHQ9 QUESTIONS 1 & 2: 3

## 2021-05-06 ASSESSMENT — ENCOUNTER SYMPTOMS
PHOTOPHOBIA: 0
BACK PAIN: 1
WHEEZING: 0
VOMITING: 0
COLOR CHANGE: 0
CHEST TIGHTNESS: 0
FACIAL SWELLING: 0
SINUS PAIN: 0
ABDOMINAL DISTENTION: 0
COUGH: 0
NAUSEA: 0
BLOOD IN STOOL: 0
CONSTIPATION: 0
SORE THROAT: 0
SINUS PRESSURE: 0
SHORTNESS OF BREATH: 0
ABDOMINAL PAIN: 0

## 2021-05-06 ASSESSMENT — COLUMBIA-SUICIDE SEVERITY RATING SCALE - C-SSRS
2. HAVE YOU ACTUALLY HAD ANY THOUGHTS OF KILLING YOURSELF?: NO
1. WITHIN THE PAST MONTH, HAVE YOU WISHED YOU WERE DEAD OR WISHED YOU COULD GO TO SLEEP AND NOT WAKE UP?: NO

## 2021-05-06 NOTE — PROGRESS NOTES
Visit Information    Have you changed or started any medications since your last visit including any over-the-counter medicines, vitamins, or herbal medicines? no   Are you having any side effects from any of your medications? -  no  Have you stopped taking any of your medications? Is so, why? -  no    Have you seen any other physician or provider since your last visit? Yes - Records Obtained  Have you had any other diagnostic tests since your last visit? No  Have you been seen in the emergency room and/or had an admission to a hospital since we last saw you? No  Have you had your routine dental cleaning in the past 6 months? yes     Have you activated your [employer] account? If not, what are your barriers?  Yes     Patient Care Team:  Lana Barnett MD as PCP - General (Family Medicine)  Lana Barnett MD as PCP - [facility]  Lashonda Pa DO as Consulting Physician (Obstetrics & Gynecology)  Piter Alaniz MD as Consulting Physician (Endocrinology)    Medical History Review  Past Medical, Family, and Social History reviewed and does contribute to the patient presenting condition    Health Maintenance   Topic Date Due    COVID-19 Vaccine (1) Never done    Shingles Vaccine (1 of 2) Never done    Diabetic retinal exam  03/07/2017    Diabetic foot exam  07/07/2018    Colon Cancer Screen FIT/FOBT  01/09/2019    DTaP/Tdap/Td vaccine (2 - Td) 09/18/2019    Breast cancer screen  03/23/2020    Diabetic microalbuminuria test  08/18/2021    Lipid screen  08/18/2021    TSH testing  08/18/2021    Flu vaccine (Season Ended) 09/01/2021    Potassium monitoring  02/02/2022    Creatinine monitoring  02/02/2022    A1C test (Diabetic or Prediabetic)  03/23/2022    Cervical cancer screen  09/16/2025    Pneumococcal 0-64 years Vaccine  Completed    Hepatitis C screen  Completed    HIV screen  Addressed    Hepatitis A vaccine  Aged Out    Hib vaccine  Aged Out    Meningococcal (ACWY) vaccine Aged Out     Chief Complaint   Patient presents with    Hypertension     previous A1C 8.9 march 2021   [facility]     due shingles, covid vaccine diabetic foot and eye exam,amor,earlp.     Diabetes

## 2021-05-06 NOTE — TELEPHONE ENCOUNTER
Called and spoke with [employer] [facility] about stool  sample results. They stated it was not turned in with in 72 hours and they tried contacting her with no response. I was instructed to have her call them at [phone]. Left message to call office.

## 2021-05-06 NOTE — PROGRESS NOTES
Chief Complaint   Patient presents with    Hypertension     previous A1C 8.9 march 2021   [facility] Maintenance     due shingles, covid vaccine diabetic foot and eye exam,cologuard,datp.  Diabetes         Wisam Wynnults  here today for follow up on chronic medical problems, go over labs and/or diagnostic studies, and medication refills. Hypertension (previous A1C 8.9 march 2021), Health Maintenance (due shingles, covid vaccine diabetic foot and eye exam,cologuard,datp.), and Diabetes      HPI: Patient is here for follow-up on diabetes, last A1c is 8.4, which was mildly improved, patient follows with endocrinologist.  Patient was started on Trulicity reports she did not receive it. Patient has received Dexcom, reports her sugars on average than 200. She is currently on insulin both long and short acting. Also is on sulfonylureas. Patient is emotionally weak, has history of depression and is on medications. Patient reports she lost her cat since then she is missing her. She was with her for 25 years and was taking care of her. She is on antidepressants, patient is not willing to go to counseling. She denies any suicidal thoughts or any attempts. Seizure disorder stable no recent seizures. Hypertension controlled    /84 (Site: Left Upper Arm, Position: Sitting, Cuff Size: Large Adult)   Pulse 74   Temp 96.7 °F (35.9 °C)   Resp 18   Ht 5' 2\" (1.575 m)   Wt 248 lb (112.5 kg)   LMP 01/12/2015 (Approximate)   SpO2 96%   BMI 45.36 kg/m²    Body mass index is 45.36 kg/m². Wt Readings from Last 3 Encounters:   05/06/21 248 lb (112.5 kg)   03/23/21 249 lb (112.9 kg)   09/16/20 245 lb (111.1 kg)        []Negative depression screening.   PHQ Scores 5/6/2021 3/23/2021 9/16/2020 6/16/2020 2/7/2017 6/8/2016 5/24/2016   PHQ2 Score 3 3 0 1 2 0 2   PHQ9 Score 10 15 0 1 2 0 2      []1-4 = Minimal depression   []5-9 = Milddepression   [x]10-14 = Moderate depression   []15-19 = Moderately severe depression   []20-27 = Severe depression    Discussed testing with the patient and all questions fully answered.     Office Visit on 03/23/2021   Component Date Value Ref Range Status    Hemoglobin A1C 03/23/2021 8.9  % Final         Most recent labs reviewed:     Lab Results   Component Value Date    WBC 4.8 04/18/2020    HGB 14.2 04/18/2020    HCT 43.5 04/18/2020    MCV 90.4 04/18/2020     04/18/2020       @BRIEFLAB(NA,K,CL,CO2,BUN,CREATININE,GLUCOSE,CALCIUM)@     Lab Results   Component Value Date    ALT 24 08/18/2020    AST 21 08/18/2020    ALKPHOS 129 (H) 08/18/2020    BILITOT 0.61 08/18/2020       Lab Results   Component Value Date    TSH 3.94 08/18/2020       Lab Results   Component Value Date    CHOL 156 08/18/2020    CHOL 68 04/14/2020    CHOL 152 07/31/2018     Lab Results   Component Value Date    TRIG 214 (H) 08/18/2020    TRIG 132 04/14/2020    TRIG 378 (H) 07/31/2018     Lab Results   Component Value Date    HDL 41 08/18/2020    HDL 26 (L) 04/14/2020    HDL 38 (L) 12/12/2018     Lab Results   Component Value Date    LDLCHOLESTEROL 72 08/18/2020    LDLCHOLESTEROL 16 04/14/2020    LDLCHOLESTEROL 75 12/12/2018     Lab Results   Component Value Date    VLDL NOT REPORTED (H) 08/18/2020    VLDL NOT REPORTED 04/14/2020    VLDL NOT REPORTED 12/12/2018     Lab Results   Component Value Date    CHOLHDLRATIO 3.8 08/18/2020    CHOLHDLRATIO 2.6 04/14/2020    CHOLHDLRATIO 3.7 12/12/2018       Lab Results   Component Value Date    LABA1C 8.9 03/23/2021       No results found for: [id number]    No results found for: FOLATE    Lab Results   Component Value Date    FERRITIN 699 (H) 04/17/2020       Lab Results   Component Value Date    VITD25 14.0 (L) 01/29/2014             Current Outpatient Medications   Medication Sig Dispense Refill    dapagliflozin (FARXIGA) 5 MG tablet Take 1 tablet by mouth every morning 90 tablet 1    DULoxetine (CYMBALTA) 60 MG extended release capsule take 1 capsule by mouth once daily 90 capsule 3    cyclobenzaprine (FLEXERIL) 10 MG tablet take 1 tablet by mouth nightly if needed for muscle spasm 30 tablet 0    gabapentin (NEURONTIN) 600 MG tablet take 1 tablet by mouth twice a day 60 tablet 0    famotidine (PEPCID) 20 MG tablet take 1 tablet by mouth twice a day 60 tablet 3    furosemide (LASIX) 40 MG tablet Take 1 tablet by mouth daily take 1 tablet by mouth daily 90 tablet 1    lisinopril (PRINIVIL;ZESTRIL) 2.5 MG tablet Take 1 tablet by mouth daily 90 tablet 1    atorvastatin (LIPITOR) 80 MG tablet Take 1 tablet by mouth daily 30 tablet 3    melatonin 3 MG TABS tablet take 1 tablet by mouth nightly 30 tablet 1    melatonin ER 3 MG TBCR Take 3 mg by mouth nightly 30 tablet 1    glipiZIDE (GLUCOTROL) 10 MG tablet take 2 tablets by mouth twice a day 180 tablet 2    RA PEN NEEDLES 31G X 8 MM MISC INJECTING TWICE DAILY DX: E11.65      aspirin EC 81 MG EC tablet Take 1 tablet by mouth daily 90 tablet 1    fluticasone (FLONASE) 50 MCG/ACT nasal spray 2 sprays by Nasal route daily 1 Bottle 0    pantoprazole (PROTONIX) 40 MG tablet Take 40 mg by mouth daily      oxybutynin (DITROPAN) 5 MG tablet Take 1 tablet by mouth daily      metoprolol tartrate (LOPRESSOR) 25 MG tablet Take 1 tablet by mouth 2 times daily 180 tablet 0    insulin lispro, 1 Unit Dial, 100 UNIT/ML SOPN Inject 10 Units into the skin 3 times daily as needed      levothyroxine (SYNTHROID) 75 MCG tablet Take 1 tablet by mouth daily 30 tablet 3    Insulin Degludec 200 UNIT/ML SOPN Inject 75 Units into the skin nightly 5 pen 2    nitroGLYCERIN (NITROSTAT) 0.4 MG SL tablet Place 0.4 mg under the tongue every 6 hours as needed       No current facility-administered medications for this visit.               Social History     Socioeconomic History    Marital status: Single     Spouse name: Not on file    Number of children: Not on file    Years of education: Not on file    Highest education level: Not on file Occupational History    Not on file   Social Needs    Financial resource strain: Not on file    Food insecurity     Worry: Not on file     Inability: Not on file    Transportation needs     Medical: Not on file     Non-medical: Not on file   Tobacco Use    Smoking status: Former Smoker     Packs/day: 0.01     Years: 1.00     Pack years: 0.01     Types: Cigarettes     Quit date: 5     Years since quittin.4    Smokeless tobacco: Never Used    Tobacco comment: pt states she quit smoking cigarettes at the age of 14   Substance and Sexual Activity    Alcohol use: No     Alcohol/week: 0.0 standard drinks    Drug use: No     Comment: once in a while    Sexual activity: Yes     Partners: Male   Lifestyle    Physical activity     Days per week: Not on file     Minutes per session: Not on file    Stress: Not on file   Relationships    Social connections     Talks on phone: Not on file     Gets together: Not on file     Attends Taoism service: Not on file     Active member of club or organization: Not on file     Attends meetings of clubs or organizations: Not on file     Relationship status: Not on file    Intimate partner violence     Fear of current or ex partner: Not on file     Emotionally abused: Not on file     Physically abused: Not on file     Forced sexual activity: Not on file   Other Topics Concern    Not on file   Social History Narrative    Not on file     Counseling given: Not Answered  Comment: pt states she quit smoking cigarettes at the age of 14        Family History   Problem Relation Age of Onset    Coronary Art Dis Mother     Lung Cancer Mother     Diabetes Mother         mellitus    Coronary Art Dis Father     Diabetes Father         diabetes mellitus             -rest of complaints with corresponding details per ROS    The patient's past medical, surgical, social, and family history as well as her current medications and allergies were reviewed as documented intoday's encounter. Review of Systems   Constitutional: Negative for activity change, appetite change, diaphoresis, fatigue and unexpected weight change. HENT: Negative for congestion, ear pain, facial swelling, nosebleeds, postnasal drip, sinus pressure, sinus pain and sore throat. Eyes: Negative for photophobia and visual disturbance. Respiratory: Negative for cough, chest tightness, shortness of breath and wheezing. Cardiovascular: Negative for chest pain, palpitations and leg swelling. Gastrointestinal: Negative for abdominal distention, abdominal pain, blood in stool, constipation, nausea and vomiting. Endocrine: Negative for polyphagia and polyuria. Genitourinary: Negative for difficulty urinating, flank pain, frequency, urgency and vaginal pain. Musculoskeletal: Positive for arthralgias and back pain. Negative for gait problem, joint swelling, neck pain and neck stiffness. Skin: Negative for color change. Neurological: Positive for numbness. Negative for dizziness, facial asymmetry and speech difficulty. Psychiatric/Behavioral: Positive for decreased concentration and sleep disturbance. Negative for agitation, confusion, dysphoric mood, hallucinations and self-injury. The patient is nervous/anxious. Physical Exam  Vitals signs and nursing note reviewed. Constitutional:       Appearance: Normal appearance. She is obese. HENT:      Head: Normocephalic. Nose: Nose normal.      Mouth/Throat:      Mouth: Mucous membranes are moist.   Eyes:      Extraocular Movements: Extraocular movements intact. Pupils: Pupils are equal, round, and reactive to light. Cardiovascular:      Rate and Rhythm: Normal rate and regular rhythm. Heart sounds: Normal heart sounds. Pulmonary:      Effort: Pulmonary effort is normal.      Breath sounds: Normal breath sounds. No wheezing or rhonchi. Abdominal:      General: Bowel sounds are normal. There is no distension. Palpations: Abdomen is soft. There is no mass. Tenderness: There is no abdominal tenderness. There is guarding. There is no rebound. Musculoskeletal:      Lumbar back: She exhibits decreased range of motion and pain. She exhibits no bony tenderness, no deformity, no laceration and no spasm. Skin:     General: Skin is warm. Neurological:      Mental Status: She is alert and oriented to person, place, and time. Cranial Nerves: Cranial nerves are intact. No cranial nerve deficit or dysarthria. Sensory: Sensation is intact. Motor: Motor function is intact. Coordination: Coordination is intact. Gait: Gait normal.   Psychiatric:         Attention and Perception: Attention and perception normal.         Mood and Affect: Mood is anxious and depressed. Mood is not elated. Affect is tearful. Affect is not blunt, angry or inappropriate. Speech: She is communicative. Speech is not rapid and pressured. Behavior: Behavior normal. Behavior is not agitated or slowed. Behavior is cooperative. Thought Content: Thought content normal.             ASSESSMENT AND PLAN      1. Uncontrolled type 2 diabetes mellitus with diabetic neuropathy, with long-term current use of insulin ([facility]  We will start prior authorization for controlled study, start on Farxiga, continue insulin monitor blood sugars. Follow-up with endocrinologist.  - dapagliflozin (FARXIGA) 5 MG tablet; Take 1 tablet by mouth every morning  Dispense: 90 tablet; Refill: 1    2. Major depressive disorder with single episode, in partial remission ([facility]  Continue Cymbalta patient refused counseling. 3. Mixed dyslipidemia  Stable continue statins    4. Seizure disorder ([facility]  No recent seizures    5. Essential hypertension  Controlled continue same meds      No orders of the defined types were placed in this encounter.         Medications Discontinued During This Encounter   Medication Reason    [facility] 9.06 WO/6.5IN SOPN Cost of medication       Juan Luis Flores received counseling on the following healthy behaviors: nutrition, exercise and medication adherence  Reviewed prior labs and health maintenance  Continue current medications, diet and exercise. Discussed use, benefit, and side effects of prescribed medications. Barriers to medication compliance addressed. Patient given educational materials - see patient instructions  Was a self-tracking handout given in paper form or via [employer]? Yes    Requested Prescriptions     Signed Prescriptions Disp Refills    dapagliflozin (FARXIGA) 5 MG tablet 90 tablet 1     Sig: Take 1 tablet by mouth every morning       All patient questions answered. Patient voiced understanding. Quality Measures    Body mass index is 45.36 kg/m². Elevated. Weight control planned discussed daily exercise regimen and Healthy diet and regular exercise. BP: 126/84 Blood pressure is normal. Treatment plan consists of Weight Reduction, DASH Eating Plan, Dietary Sodium Restriction, Increased Physical Activity and No treatment change needed. Lab Results   Component Value Date    LDLCHOLESTEROL 72 08/18/2020    LDLDIRECT 200 (H) 12/08/2017    (goal LDL reduction with dx if diabetes is 50% LDL reduction)      PHQ Scores 5/6/2021 3/23/2021 9/16/2020 6/16/2020 2/7/2017 6/8/2016 5/24/2016   PHQ2 Score 3 3 0 1 2 0 2   PHQ9 Score 10 15 0 1 2 0 2     Interpretation of Total Score Depression Severity: 1-4 = Minimal depression, 5-9 = Mild depression, 10-14 = Moderate depression, 15-19 = Moderately severe depression, 20-27 = Severe depression    The patient'spast medical, surgical, social, and family history as well as her   current medications and allergies were reviewed as documented in today's encounter. Medications, labs, diagnostic studies, consultations andfollow-up as documented in this encounter. Return in about 2 months (around 7/6/2021) for dm ,htn, hld.     Patient wasseen

## 2021-05-10 RX ORDER — GABAPENTIN 600 MG/1
TABLET ORAL
Qty: 60 TABLET | Refills: 0 | Status: SHIPPED | OUTPATIENT
Start: 2021-05-10 | End: 2021-06-07

## 2021-05-17 RX ORDER — ATORVASTATIN CALCIUM 80 MG/1
TABLET, FILM COATED ORAL
Qty: 30 TABLET | Refills: 3 | Status: SHIPPED | OUTPATIENT
Start: 2021-05-17 | End: 2021-09-20

## 2021-05-18 ENCOUNTER — IMMUNIZATION (OUTPATIENT)
Dept: PRIMARY CARE CLINIC | Age: 66
End: 2021-05-18
Payer: COMMERCIAL

## 2021-05-18 PROCEDURE — 91300 COVID-19, PFIZER VACCINE 30MCG/0.3ML DOSE: CPT | Performed by: INTERNAL MEDICINE

## 2021-05-18 PROCEDURE — 0001A COVID-19, PFIZER VACCINE 30MCG/0.3ML DOSE: CPT | Performed by: INTERNAL MEDICINE

## 2021-05-19 RX ORDER — ASPIRIN 81 MG/1
TABLET, DELAYED RELEASE ORAL
Qty: 90 TABLET | Refills: 1 | Status: SHIPPED | OUTPATIENT
Start: 2021-05-19 | End: 2021-11-01

## 2021-05-19 NOTE — TELEPHONE ENCOUNTER
Please Approve or Refuse.   Send to Pharmacy per Pt's Request:      Next Visit Date:  7/19/2021   Last Visit Date: 5/6/2021    Hemoglobin A1C (%)   Date Value   03/23/2021 8.9   12/03/2020 10.9 (H)   04/15/2020 10.1 (H)             ( goal A1C is < 7)   BP Readings from Last 3 Encounters:   05/06/21 126/84   03/23/21 123/79   09/16/20 124/82          (goal 120/80)  BUN   Date Value Ref Range Status   05/06/2021 9 8 - 23 mg/dL Final     CREATININE   Date Value Ref Range Status   05/06/2021 0.44 (L) 0.50 - 0.90 mg/dL Final     Potassium   Date Value Ref Range Status   05/06/2021 4.4 3.7 - 5.3 mmol/L Final     Comment:     SPECIMEN MODERATELY HEMOLYZED, RESULTS MAY BE ADVERSELY AFFECTED Declines

## 2021-06-07 RX ORDER — GABAPENTIN 600 MG/1
TABLET ORAL
Qty: 60 TABLET | Refills: 0 | Status: SHIPPED | OUTPATIENT
Start: 2021-06-07 | End: 2021-07-06

## 2021-06-08 ENCOUNTER — IMMUNIZATION (OUTPATIENT)
Dept: PRIMARY CARE CLINIC | Age: 66
End: 2021-06-08
Payer: COMMERCIAL

## 2021-06-08 PROCEDURE — 91300 COVID-19, PFIZER VACCINE 30MCG/0.3ML DOSE: CPT | Performed by: INTERNAL MEDICINE

## 2021-06-08 PROCEDURE — 0002A COVID-19, PFIZER VACCINE 30MCG/0.3ML DOSE: CPT | Performed by: INTERNAL MEDICINE

## 2021-06-26 ENCOUNTER — HOSPITAL ENCOUNTER (EMERGENCY)
Age: 66
Discharge: HOME | End: 2021-06-26
Attending: EMERGENCY MEDICINE
Payer: COMMERCIAL

## 2021-06-26 VITALS
WEIGHT: 239 LBS | RESPIRATION RATE: 16 BRPM | HEIGHT: 62 IN | DIASTOLIC BLOOD PRESSURE: 93 MMHG | HEART RATE: 98 BPM | BODY MASS INDEX: 43.98 KG/M2 | OXYGEN SATURATION: 96 % | TEMPERATURE: 98.3 F | SYSTOLIC BLOOD PRESSURE: 139 MMHG

## 2021-06-26 DIAGNOSIS — N39.0 URINARY TRACT INFECTION WITH HEMATURIA, SITE UNSPECIFIED: Primary | ICD-10-CM

## 2021-06-26 DIAGNOSIS — R31.9 URINARY TRACT INFECTION WITH HEMATURIA, SITE UNSPECIFIED: Primary | ICD-10-CM

## 2021-06-26 DIAGNOSIS — G89.18 POST-OP PAIN: ICD-10-CM

## 2021-06-26 LAB
-: ABNORMAL
AMORPHOUS: ABNORMAL
BACTERIA: ABNORMAL
BILIRUBIN URINE: NEGATIVE
CASTS UA: ABNORMAL /LPF
COLOR: YELLOW
COMMENT UA: ABNORMAL
CRYSTALS, UA: ABNORMAL /HPF
EPITHELIAL CELLS UA: ABNORMAL /HPF
GLUCOSE URINE: ABNORMAL
KETONES, URINE: ABNORMAL
LEUKOCYTE ESTERASE, URINE: ABNORMAL
MUCUS: ABNORMAL
NITRITE, URINE: NEGATIVE
OTHER OBSERVATIONS UA: ABNORMAL
PH UA: 5.5 (ref 5–8)
PROTEIN UA: ABNORMAL
RBC UA: ABNORMAL /HPF
RENAL EPITHELIAL, UA: ABNORMAL /HPF
SPECIFIC GRAVITY UA: 1.03 (ref 1–1.03)
TRICHOMONAS: ABNORMAL
TURBIDITY: ABNORMAL
URINE HGB: ABNORMAL
UROBILINOGEN, URINE: NORMAL
WBC UA: ABNORMAL /HPF
YEAST: ABNORMAL

## 2021-06-26 PROCEDURE — 81001 URINALYSIS AUTO W/SCOPE: CPT

## 2021-06-26 PROCEDURE — 6370000000 HC RX 637 (ALT 250 FOR IP): Performed by: EMERGENCY MEDICINE

## 2021-06-26 PROCEDURE — 99284 EMERGENCY DEPT VISIT MOD MDM: CPT

## 2021-06-26 RX ORDER — CIPROFLOXACIN 500 MG/1
500 TABLET, FILM COATED ORAL ONCE
Status: COMPLETED | OUTPATIENT
Start: 2021-06-26 | End: 2021-06-26

## 2021-06-26 RX ORDER — HYDROCODONE BITARTRATE AND ACETAMINOPHEN 5; 325 MG/1; MG/1
1 TABLET ORAL ONCE
Status: COMPLETED | OUTPATIENT
Start: 2021-06-26 | End: 2021-06-26

## 2021-06-26 RX ORDER — HYDROCODONE BITARTRATE AND ACETAMINOPHEN 5; 325 MG/1; MG/1
1 TABLET ORAL EVERY 6 HOURS PRN
Qty: 6 TABLET | Refills: 0 | Status: SHIPPED | OUTPATIENT
Start: 2021-06-26 | End: 2021-06-26 | Stop reason: SDUPTHER

## 2021-06-26 RX ORDER — HYDROCODONE BITARTRATE AND ACETAMINOPHEN 5; 325 MG/1; MG/1
1 TABLET ORAL EVERY 6 HOURS PRN
Qty: 6 TABLET | Refills: 0 | Status: SHIPPED | OUTPATIENT
Start: 2021-06-26 | End: 2021-06-29

## 2021-06-26 RX ORDER — CIPROFLOXACIN 500 MG/1
500 TABLET, FILM COATED ORAL 2 TIMES DAILY
Qty: 14 TABLET | Refills: 0 | Status: SHIPPED | OUTPATIENT
Start: 2021-06-26 | End: 2021-07-03

## 2021-06-26 RX ORDER — CIPROFLOXACIN 500 MG/1
500 TABLET, FILM COATED ORAL 2 TIMES DAILY
Qty: 14 TABLET | Refills: 0 | Status: SHIPPED | OUTPATIENT
Start: 2021-06-26 | End: 2021-06-26 | Stop reason: SDUPTHER

## 2021-06-26 RX ADMIN — HYDROCODONE BITARTRATE AND ACETAMINOPHEN 1 TABLET: 5; 325 TABLET ORAL at 20:46

## 2021-06-26 RX ADMIN — CIPROFLOXACIN 500 MG: 500 TABLET, FILM COATED ORAL at 21:15

## 2021-06-26 ASSESSMENT — ENCOUNTER SYMPTOMS
COUGH: 0
BACK PAIN: 0
SHORTNESS OF BREATH: 0
ABDOMINAL PAIN: 0
VOMITING: 0
DIARRHEA: 0

## 2021-06-26 ASSESSMENT — PAIN DESCRIPTION - DESCRIPTORS: DESCRIPTORS: SHARP

## 2021-06-26 ASSESSMENT — PAIN DESCRIPTION - LOCATION: LOCATION: KNEE

## 2021-06-26 ASSESSMENT — PAIN DESCRIPTION - ORIENTATION: ORIENTATION: RIGHT

## 2021-06-26 ASSESSMENT — PAIN SCALES - GENERAL
PAINLEVEL_OUTOF10: 8
PAINLEVEL_OUTOF10: 8

## 2021-06-27 NOTE — ED PROVIDER NOTES
EMERGENCY DEPARTMENT ENCOUNTER    Pt Name: Gabriela Rose  MRN: [id number]  [place] 1955  Date of evaluation: 6/26/21  CHIEF COMPLAINT       Chief Complaint   Patient presents with    Urinary Frequency    Post-op Problem     HISTORY OF PRESENT ILLNESS   HPI    This is a 74-year-old female who comes in today with 2 problems. The first problems the patient has had increased urinary frequency as well as burning with urination and incomplete emptying this is been going on for few days now the patient states that she gets frequent urinary tract infections she has never followed up with a urologist to see if she has any structural component for her frequent urinary tract infections. She states that she gets the normal antibiotics and it usually goes away she says that she has some pressure in her lower suprapubic area but no nausea vomiting no back pain no fevers but did break out in a sweat earlier but thinks that this could be secondary to pain. The patient also reports that 3 days ago she had vein stripping by a [facility] and they told her to take Tylenol to take him Tylenol sometimes works but she has pain especially at night and is requesting something stronger for pain at night. She denies any numbness tingling she is able to walk no falls. No fevers    REVIEW OF SYSTEMS     Review of Systems   Constitutional: Negative for fever. HENT: Negative for congestion. Respiratory: Negative for cough and shortness of breath. Cardiovascular: Negative for chest pain. Gastrointestinal: Negative for abdominal pain, diarrhea and vomiting. Genitourinary: Positive for dysuria. Musculoskeletal: Negative for back pain. Leg pain   Skin: Negative for rash. Neurological: Negative for headaches. All other systems reviewed and are negative.     PASTMEDICAL HISTORY     Past Medical History:   Diagnosis Date    Arrhythmia     Breast mass     CAD (coronary artery disease) with valvular disease    Chronic neck pain     Coccygeal pain 3/7/2016    Constipation     COVID-19 04/2020    Depression     Diabetic neuropathy (HCC)     History of hepatitis A     possible history of hepatitis A in the past    History of renal calculi     Hypertension     Hyperthyroidism     Hypothyroidism     Morbid obesity with BMI of 45.0-49.9, adult ([facility] 3/31/2016    Nephrolithiasis     Neuropathy     Type II or unspecified type diabetes mellitus without mention of complication, not stated as uncontrolled     non insulin dependent     UTI (lower urinary tract infection)      SURGICAL HISTORY       Past Surgical History:   Procedure Laterality Date    BREAST BIOPSY  2012    negative    CARDIOVASCULAR STRESS TEST      7/17/15 no results    CHOLECYSTECTOMY      CYST REMOVAL      right hand    CYSTO/URETERO/PYELOSCOPY, CALCULUS TX Right 8/26/2019    CYSTOSCOPY URETEROSCOPY  STONE BASKET RETRIEVAL RIGHT STENT EXCHANGE performed by Mendez Sainz MD at [facility] Right 8/20/2019    CYSTOSCOPY URETERAL STENT INSERTION performed by Mendez Sainz MD at [address] [facility]  2009    ENDOSCOPY, COLON, DIAGNOSTIC      LITHOTRIPSY      TONSILLECTOMY      TUBAL LIGATION      UPPER GASTROINTESTINAL ENDOSCOPY N/A 4/16/2020    EGD BIOPSY performed by Leidy Hernadez MD at [facility]       Previous Medications    ATORVASTATIN (LIPITOR) 80 MG TABLET    take 1 tablet by mouth once daily    CYCLOBENZAPRINE (FLEXERIL) 10 MG TABLET    take 1 tablet by mouth nightly if needed for muscle spasm    DAPAGLIFLOZIN (FARXIGA) 5 MG TABLET    Take 1 tablet by mouth every morning    DULOXETINE (CYMBALTA) 60 MG EXTENDED RELEASE CAPSULE    take 1 capsule by mouth once daily    FAMOTIDINE (PEPCID) 20 MG TABLET    take 1 tablet by mouth twice a day    FLUTICASONE (FLONASE) 50 MCG/ACT NASAL SPRAY    2 sprays by Nasal route daily    FUROSEMIDE (LASIX) 40 MG TABLET    Take 1 tablet by mouth daily take 1 tablet by mouth daily    GABAPENTIN (NEURONTIN) 600 MG TABLET    take 1 tablet by mouth twice a day    GLIPIZIDE (GLUCOTROL) 10 MG TABLET    take 2 tablets by mouth twice a day    INSULIN DEGLUDEC 200 UNIT/ML SOPN    Inject 75 Units into the skin nightly    INSULIN LISPRO, 1 UNIT DIAL, 100 UNIT/ML SOPN    Inject 10 Units into the skin 3 times daily as needed    LEVOTHYROXINE (SYNTHROID) 75 MCG TABLET    Take 1 tablet by mouth daily    LISINOPRIL (PRINIVIL;ZESTRIL) 2.5 MG TABLET    Take 1 tablet by mouth daily    MELATONIN 3 MG TABS TABLET    take 1 tablet by mouth nightly    MELATONIN ER 3 MG TBCR    Take 3 mg by mouth nightly    METOPROLOL TARTRATE (LOPRESSOR) 25 MG TABLET    Take 1 tablet by mouth 2 times daily    OXYBUTYNIN (DITROPAN) 5 MG TABLET    Take 1 tablet by mouth daily    PANTOPRAZOLE (PROTONIX) 40 MG TABLET    Take 40 mg by mouth daily    RA PEN NEEDLES 31G X 8 MM MISC    INJECTING TWICE DAILY DX: E11.65    SM ASPIRIN ADULT LOW STRENGTH 81 MG EC TABLET    take 1 tablet by mouth once daily     ALLERGIES     is allergic to cefuroxime axetil; dilaudid [hydromorphone]; metformin and related; sulfa antibiotics; amoxicillin; antihistamines, loratadine-type; eggs or egg-derived products; lorazepam; nubain [nalbuphine hcl]; and vistaril [hydroxyzine hcl]. FAMILY HISTORY     She indicated that her mother is . She indicated that her father is .      SOCIAL HISTORY       Social History     Tobacco Use    Smoking status: Former Smoker     Packs/day: 0.01     Years: 1.00     Pack years: 0.01     Types: Cigarettes     Quit date: 5     Years since quittin.6    Smokeless tobacco: Never Used    Tobacco comment: pt states she quit smoking cigarettes at the age of 14   Vaping Use    Vaping Use: Never used   Substance Use Topics    Alcohol use: No     Alcohol/week: 0.0 standard drinks    Drug use: Yes     Types: Marijuana     Comment: once in a while     PHYSICAL EXAM     INITIAL VITALS: BP (!) 139/93   Pulse 98   Temp 98.3 °F (36.8 °C) (Oral)   Resp 16   Ht 5' 2\" (1.575 m)   Wt 239 lb (108.4 kg)   LMP 01/12/2015 (Approximate)   SpO2 96%   BMI 43.71 kg/m²    Physical Exam  Vitals and nursing note reviewed. Constitutional:       General: She is not in acute distress. Appearance: She is well-developed. HENT:      Head: Normocephalic and atraumatic. Eyes:      Conjunctiva/sclera: Conjunctivae normal.   Cardiovascular:      Rate and Rhythm: Normal rate and regular rhythm. Heart sounds: No murmur heard. No friction rub. Pulmonary:      Effort: Pulmonary effort is normal. No respiratory distress. Breath sounds: Normal breath sounds. No wheezing or rhonchi. Abdominal:      General: There is no distension. Palpations: Abdomen is soft. Tenderness: There is no abdominal tenderness. There is no guarding. Hernia: No hernia is present. Musculoskeletal:      Cervical back: Neck supple. Comments: Healing ecchymoses on the bilateral mid thighs with some tenderness to palpation very strong DP pulses distally less than 2-second cap refill warm extremities distal   Skin:     General: Skin is warm and dry. Capillary Refill: Capillary refill takes less than 2 seconds. Neurological:      Mental Status: She is alert. DIAGNOSTIC RESULTS   RADIOLOGY:All plain film, CT, MRI, and formal ultrasound images (except ED bedside ultrasound) are read by the radiologist, see reports below, unless otherwisenoted in MDM or here. No orders to display     LABS: All lab results were reviewed by myself, and all abnormals are listed below.   Labs Reviewed   URINALYSIS - Abnormal; Notable for the following components:       Result Value    Turbidity UA TURBID (*)     Glucose, Ur 2+ (*)     Ketones, Urine TRACE (*)     Urine Hgb LARGE (*)     Protein, UA 1+ (*)     Leukocyte Esterase, Urine MOD (*)     All other components within unspecified    2. Post-op pain         DISPOSITION/PLAN   DISPOSITION Decision To Discharge 06/26/2021 09:14:50 PM      PATIENT REFERRED TO:  [facility] ED  [address]  [address] [place] [address] [address] [facility]    If symptoms worsen    Sana Saenz MD  [address]  [address] [address] [phone]    Schedule an appointment as soon as possible for a visit   To talk about your recurrent urinary tract infection    DISCHARGE MEDICATIONS:  New Prescriptions    CIPROFLOXACIN (CIPRO) 500 MG TABLET    Take 1 tablet by mouth 2 times daily for 7 days    HYDROCODONE-ACETAMINOPHEN (NORCO) 5-325 MG PER TABLET    Take 1 tablet by mouth every 6 hours as needed for Pain for up to 3 days. Intended supply: 3 days.  Take lowest dose possible to manage pain     Guido Green MD  Attending Emergency Physician    This charting supersedes any ED resident or staff charting and was written using speech recognition software       Guido Green MD  06/26/21 [id number]

## 2021-06-28 ENCOUNTER — TELEPHONE (OUTPATIENT)
Dept: FAMILY MEDICINE CLINIC | Age: 66
End: 2021-06-28

## 2021-06-28 NOTE — TELEPHONE ENCOUNTER
[facility] ([facility]) ED Follow up Call    Reason for ED visit:  uti     6/28/2021           FU appts/Provider:    Future Appointments   Date Time Provider Aleksandra Wisdom   7/19/2021  2:45 PM Yolanda Clifford MD [facility]   9/20/2021  4:00 PM RASHMI Tejeda IF NOT USED  Hi, this message is for Chelsie Estrada. This is Nerissa Rios MA from [facility] office. Just calling to see how you are doing after your recent visit to the Emergency Room. [facility] wants to make sure you were able to fill any prescriptions and that you understand your discharge instructions. Please return our call if you need to make a follow up appointment with your provider or have any further needs. Our phone number is [phone]. Have a great day.

## 2021-07-06 RX ORDER — GABAPENTIN 600 MG/1
TABLET ORAL
Qty: 60 TABLET | Refills: 0 | Status: SHIPPED | OUTPATIENT
Start: 2021-07-06 | End: 2021-08-04

## 2021-07-14 DIAGNOSIS — K21.9 GASTROESOPHAGEAL REFLUX DISEASE WITHOUT ESOPHAGITIS: ICD-10-CM

## 2021-07-14 RX ORDER — FAMOTIDINE 20 MG/1
TABLET, FILM COATED ORAL
Qty: 60 TABLET | Refills: 3 | Status: SHIPPED | OUTPATIENT
Start: 2021-07-14 | End: 2021-10-27

## 2021-07-19 ENCOUNTER — OFFICE VISIT (OUTPATIENT)
Dept: FAMILY MEDICINE CLINIC | Age: 66
End: 2021-07-19
Payer: COMMERCIAL

## 2021-07-19 DIAGNOSIS — I10 ESSENTIAL HYPERTENSION: ICD-10-CM

## 2021-07-19 DIAGNOSIS — G89.29 CHRONIC PAIN OF BOTH KNEES: ICD-10-CM

## 2021-07-19 DIAGNOSIS — E78.2 MIXED HYPERLIPIDEMIA: ICD-10-CM

## 2021-07-19 DIAGNOSIS — M25.561 CHRONIC PAIN OF BOTH KNEES: ICD-10-CM

## 2021-07-19 DIAGNOSIS — Z12.11 COLON CANCER SCREENING: ICD-10-CM

## 2021-07-19 DIAGNOSIS — J01.40 ACUTE NON-RECURRENT PANSINUSITIS: ICD-10-CM

## 2021-07-19 DIAGNOSIS — E11.649 UNCONTROLLED TYPE 2 DIABETES MELLITUS WITH HYPOGLYCEMIA WITHOUT COMA (HCC): Primary | ICD-10-CM

## 2021-07-19 DIAGNOSIS — M25.562 CHRONIC PAIN OF BOTH KNEES: ICD-10-CM

## 2021-07-19 DIAGNOSIS — I50.32 CHRONIC DIASTOLIC CONGESTIVE HEART FAILURE (HCC): ICD-10-CM

## 2021-07-19 PROCEDURE — 3017F COLORECTAL CA SCREEN DOC REV: CPT | Performed by: FAMILY MEDICINE

## 2021-07-19 PROCEDURE — 2022F DILAT RTA XM EVC RTNOPTHY: CPT | Performed by: FAMILY MEDICINE

## 2021-07-19 PROCEDURE — 3052F HG A1C>EQUAL 8.0%<EQUAL 9.0%: CPT | Performed by: FAMILY MEDICINE

## 2021-07-19 PROCEDURE — 99214 OFFICE O/P EST MOD 30 MIN: CPT | Performed by: FAMILY MEDICINE

## 2021-07-19 PROCEDURE — G8427 DOCREV CUR MEDS BY ELIG CLIN: HCPCS | Performed by: FAMILY MEDICINE

## 2021-07-19 ASSESSMENT — ENCOUNTER SYMPTOMS
ABDOMINAL PAIN: 0
BACK PAIN: 0
SHORTNESS OF BREATH: 0
SINUS PRESSURE: 0
NAUSEA: 0
BLOOD IN STOOL: 0
PHOTOPHOBIA: 0
SINUS PAIN: 0
COUGH: 0
WHEEZING: 0
CONSTIPATION: 0
ABDOMINAL DISTENTION: 0
CHEST TIGHTNESS: 0
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
## 2021-07-20 RX ORDER — LEVOTHYROXINE SODIUM 0.07 MG/1
75 TABLET ORAL DAILY
Qty: 30 TABLET | Refills: 0 | Status: SHIPPED | OUTPATIENT
Start: 2021-07-20 | End: 2021-10-06 | Stop reason: DRUGHIGH

## 2021-07-20 NOTE — TELEPHONE ENCOUNTER
----- Message from Kurtis Broussard sent at 7/19/2021  6:00 PM EDT -----  Subject: Refill Request    QUESTIONS  Name of Medication? levothyroxine (SYNTHROID) 75 MCG tablet  Patient-reported dosage and instructions? take 1 day and 2 on sunday   How many days do you have left? 0  Preferred Pharmacy? [address] phone number (if available)? [phone]  Additional Information for Provider? Pt had a visit with Dr. Kaya Rahman today   in order to get her thyroid medicine filled. Pt got all her medication   filled beside the thyroid medication and would like that to be filled as   soon as possible.   ---------------------------------------------------------------------------  --------------  CALL BACK INFO  What is the best way for the office to contact you? OK to leave message on   voicemail  Preferred Call Back Phone Number?  [phone]

## 2021-07-27 DIAGNOSIS — Z12.31 SCREENING MAMMOGRAM, ENCOUNTER FOR: Primary | ICD-10-CM

## 2021-07-28 ENCOUNTER — TELEPHONE (OUTPATIENT)
Dept: UROLOGY | Age: 66
End: 2021-07-28

## 2021-07-28 DIAGNOSIS — R39.9 UTI SYMPTOMS: Primary | ICD-10-CM

## 2021-07-28 NOTE — TELEPHONE ENCOUNTER
KH-Pt called in-stated she thinks she has a UTI or bladder infection-adv to give a urine sample at any [facility] lab-scheduled pt an appointment with Lucretia Canas on 8/2/2021. Please order urine culture.

## 2021-08-04 ENCOUNTER — TELEPHONE (OUTPATIENT)
Dept: UROLOGY | Age: 66
End: 2021-08-04

## 2021-08-04 DIAGNOSIS — B96.20 E-COLI UTI: Primary | ICD-10-CM

## 2021-08-04 DIAGNOSIS — N39.0 E-COLI UTI: Primary | ICD-10-CM

## 2021-08-04 RX ORDER — NITROFURANTOIN 25; 75 MG/1; MG/1
100 CAPSULE ORAL 2 TIMES DAILY
Qty: 14 CAPSULE | Refills: 0 | Status: SHIPPED | OUTPATIENT
Start: 2021-08-04 | End: 2021-08-11

## 2021-08-04 RX ORDER — GABAPENTIN 600 MG/1
TABLET ORAL
Qty: 60 TABLET | Refills: 0 | Status: SHIPPED | OUTPATIENT
Start: 2021-08-04 | End: 2021-09-17

## 2021-08-04 NOTE — TELEPHONE ENCOUNTER
Please Approve or Refuse.   Send to Pharmacy per Pt's Request:      Next Visit Date:  Visit date not found   Last Visit Date: 7/19/2021    Hemoglobin A1C (%)   Date Value   03/23/2021 8.9   12/03/2020 10.9 (H)   04/15/2020 10.1 (H)             ( goal A1C is < 7)   BP Readings from Last 3 Encounters:   06/26/21 (!) 139/93   05/06/21 126/84   03/23/21 123/79          (goal 120/80)  BUN   Date Value Ref Range Status   05/06/2021 9 8 - 23 mg/dL Final     CREATININE   Date Value Ref Range Status   05/06/2021 0.44 (L) 0.50 - 0.90 mg/dL Final     Potassium   Date Value Ref Range Status   05/06/2021 4.4 3.7 - 5.3 mmol/L Final     Comment:     SPECIMEN MODERATELY HEMOLYZED, RESULTS MAY BE ADVERSELY AFFECTED

## 2021-08-04 NOTE — TELEPHONE ENCOUNTER
Urine culture + kelechi. Macrobid x7days sent to pharmacy. She can start D-Mannose if she hasn't already. Take D Mannose 500 mg 2x per day. If symptomatic for UTI, increase to 500 mg 4x per day for 24-48 hours, If symptomatic after that get urine culture. (Cheapest is the NOW brand off of [address] but can get locally, too). If she develops a fever with a UTI she should go to ER. She can take AZO if she has painful urination. Encourage to drink a lot of water to flush bladder out.

## 2021-08-04 NOTE — TELEPHONE ENCOUNTER
ND-Patient called in to reschedule appointment with Meeta Meraz. Patient is now scheduled 8/12. Patient states that she \"went to Urgent Care to have Urine Culture done but not sure where she went exactly\".  Please advise

## 2021-08-04 NOTE — TELEPHONE ENCOUNTER
>100,000 ORGANISMS/mL [facility]   Susceptibility    Organism Antibiotic Method Susceptibility   Escherichia Coli Ampicillin LEANDRO METHOD >=32: Resistant   Escherichia Coli AMP/SULBACTAM LEANDRO METHOD >=32/16: Resistant   Escherichia Coli Cefazolin LEANDRO METHOD <=4: Susceptible   Escherichia Coli Cefotaxime LEANDRO METHOD SUSCEPTIBLE(DEDUCED)   Escherichia Coli Ceftriaxone LEANDRO METHOD <=1: Susceptible   Escherichia Coli Ciprofloxacin LEANDRO METHOD <=0.25: Susceptible   Escherichia Coli Gentamicin LEANDRO METHOD <=1: Susceptible   Escherichia Coli Levofloxacin LEANDRO METHOD <=0.12: Susceptible   Escherichia Coli Nitrofurantoin LEANDRO METHOD <=16: Susceptible   Escherichia Coli PIPERACIL/TAZOBACTAM LEANDRO METHOD <=4: Susceptible   Escherichia Coli Tobramycin LEANDRO METHOD <=1: Susceptible   Escherichia Coli TRIMETH/SULFAMETHOXAZOLE LEANDRO METHOD <=1/19: Susceptible     Pt currently on Macrobid 100 mg BID. Please advise. Dmannose?

## 2021-08-06 DIAGNOSIS — Z12.11 COLON CANCER SCREENING: ICD-10-CM

## 2021-08-10 ENCOUNTER — HOSPITAL ENCOUNTER (OUTPATIENT)
Age: 66
Discharge: HOME | End: 2021-08-10
Payer: COMMERCIAL

## 2021-08-10 DIAGNOSIS — R39.9 UTI SYMPTOMS: ICD-10-CM

## 2021-08-10 PROCEDURE — 87086 URINE CULTURE/COLONY COUNT: CPT

## 2021-08-12 ENCOUNTER — OFFICE VISIT (OUTPATIENT)
Dept: UROLOGY | Age: 66
End: 2021-08-12
Payer: COMMERCIAL

## 2021-08-12 VITALS
HEART RATE: 83 BPM | SYSTOLIC BLOOD PRESSURE: 123 MMHG | WEIGHT: 239 LBS | DIASTOLIC BLOOD PRESSURE: 84 MMHG | HEIGHT: 62 IN | TEMPERATURE: 98 F | BODY MASS INDEX: 43.98 KG/M2

## 2021-08-12 DIAGNOSIS — R30.0 DYSURIA: Primary | ICD-10-CM

## 2021-08-12 DIAGNOSIS — Z87.440 HISTORY OF RECURRENT UTIS: ICD-10-CM

## 2021-08-12 DIAGNOSIS — Z87.442 HISTORY OF KIDNEY STONES: ICD-10-CM

## 2021-08-12 DIAGNOSIS — R81 GLUCOSURIA: ICD-10-CM

## 2021-08-12 LAB
APPEARANCE FLUID: NORMAL
BILIRUBIN, POC: NORMAL
BLOOD URINE, POC: NORMAL
CLARITY, POC: CLEAR
COLOR, POC: YELLOW
CULTURE: NORMAL
GLUCOSE URINE, POC: NORMAL
KETONES, POC: NORMAL
LEUKOCYTE EST, POC: NORMAL
Lab: NORMAL
NITRITE, POC: NORMAL
PH, POC: NORMAL
PROTEIN, POC: NORMAL
SPECIFIC GRAVITY, POC: NORMAL
SPECIMEN DESCRIPTION: NORMAL
UROBILINOGEN, POC: NORMAL

## 2021-08-12 PROCEDURE — 3017F COLORECTAL CA SCREEN DOC REV: CPT | Performed by: NURSE PRACTITIONER

## 2021-08-12 PROCEDURE — G8417 CALC BMI ABV UP PARAM F/U: HCPCS | Performed by: NURSE PRACTITIONER

## 2021-08-12 PROCEDURE — G8427 DOCREV CUR MEDS BY ELIG CLIN: HCPCS | Performed by: NURSE PRACTITIONER

## 2021-08-12 PROCEDURE — 81002 URINALYSIS NONAUTO W/O SCOPE: CPT | Performed by: NURSE PRACTITIONER

## 2021-08-12 PROCEDURE — 1036F TOBACCO NON-USER: CPT | Performed by: NURSE PRACTITIONER

## 2021-08-12 PROCEDURE — 99214 OFFICE O/P EST MOD 30 MIN: CPT | Performed by: NURSE PRACTITIONER

## 2021-08-12 ASSESSMENT — ENCOUNTER SYMPTOMS
ABDOMINAL PAIN: 1
BACK PAIN: 0
COUGH: 0
CONSTIPATION: 1
NAUSEA: 0
WHEEZING: 0
DIARRHEA: 1
EYE REDNESS: 0
VOMITING: 0
SHORTNESS OF BREATH: 0
EYE PAIN: 0

## 2021-08-12 NOTE — PROGRESS NOTES
[address] [address]  Dept: [address] [facility] Urology Office Note - Established    Patient:  Jose Clifford  YOB: 1955  Date: 8/12/2021    The patient is a 61 y.o. female whopresents today for evaluation of the following problems:   Chief Complaint   Patient presents with    Follow-up     Emptying the bladder. Urine culture done at urgent care       HPI  Patient presenting for concerns of UTI. Was on atb for UTI from urgent care and was completed last week. She had a culture 8/12/2021= negative. Post-menopausal, denies vaginal dryness. Painful urination at end of stream.   Urinating 5-6x/day. Nocturia ~3x. Admits drinking a lot of pop. Urinary incontinence with standing up, baseline- but worsens with UTIs  No fever/chills. She does have a remote hx of stones, she was seen in 2019 for and had cysto, URS, stone basketing and stent. Has not had surveillance XR recently. She did have hematuria in May or June but was associated with previous UTIs that subsided after being treated. She smoked as a teenager about 40 years ago- this was very intermittent and 1/day. Summary of old records: N/A    Additional History: N/A    Procedures Today: N/A    Urinalysis today:  Results for POC orders placed in visit on 08/12/21   POCT Urine Dipstick   Result Value Ref Range    Color, UA Yellow     Clarity, UA Clear     Glucose, UA POC ++++     Bilirubin, UA      Ketones, UA      Spec Grav, UA      Blood, UA POC Neg     pH, UA      Protein, UA POC Neg     Urobilinogen, UA      Leukocytes, UA Neg     Nitrite, UA Neg     Appearance, Fluid         Imaging Reviewed during this Office Visit: none  (results were independently reviewed by physician and radiology report verified)    AUA Symptom Score (8/12/2021):   INCOMPLETE EMPTYING: How often have you had the sensation of not emptying your bladder?: Not at all  FREQUENCY: How often do you have to urinate less than every two hours?: Not at all  INTERMITTENCY: How often have you found you stopped and started again several times when you urinated?: Not at all  URGENCY: How often have you found it difficult to postpone urination?: Not at all  WEAK STREAM: How often have you had a weak urinary stream?: About Half the time  STRAINING: How often have you had to strain to start  urination?: Not at all  NOCTURIA: How many times did you typically get up at night to uriniate?: 3 Times  TOTAL I-PSS SCORE[de-identified] 6  How would you feel if you were to spend the rest of your life with your urinary condition?: Mostly Dissatisfied    Last BUN and creatinine:  Lab Results   Component Value Date    BUN 9 05/06/2021     Lab Results   Component Value Date    CREATININE 0.44 (L) 05/06/2021       Additional Lab/Culture results:   8/12/2021: urine culture negative.   7/30/2021: ecoli     PAST MEDICAL, FAMILY AND SOCIAL HISTORY UPDATE:  Past Medical History:   Diagnosis Date    Arrhythmia     Breast mass     CAD (coronary artery disease)     with valvular disease    Chronic neck pain     Coccygeal pain 3/7/2016    Constipation     COVID-19 04/2020    Depression     Diabetic neuropathy (HCC)     History of hepatitis A     possible history of hepatitis A in the past    History of renal calculi     Hypertension     Hyperthyroidism     Hypothyroidism     Morbid obesity with BMI of 45.0-49.9, adult ([facility] [address] 3/31/2016    Nephrolithiasis     Neuropathy     Type II or unspecified type diabetes mellitus without mention of complication, not stated as uncontrolled     non insulin dependent     UTI (lower urinary tract infection)      Past Surgical History:   Procedure Laterality Date    BREAST BIOPSY  2012    negative    CARDIOVASCULAR STRESS TEST      7/17/15 no results    CHOLECYSTECTOMY      CYST REMOVAL      right hand    CYSTO/URETERO/PYELOSCOPY, CALCULUS TX Right 8/26/2019 CYSTOSCOPY URETEROSCOPY  STONE BASKET RETRIEVAL RIGHT STENT EXCHANGE performed by Saranya Jacobs MD at [address] Right 8/20/2019    CYSTOSCOPY URETERAL STENT INSERTION performed by Saranya Jacobs MD at [address] [facility]  2009    ENDOSCOPY, COLON, DIAGNOSTIC      LITHOTRIPSY      TONSILLECTOMY      TUBAL LIGATION      UPPER GASTROINTESTINAL ENDOSCOPY N/A 4/16/2020    EGD BIOPSY performed by Darek Olivarez MD at [facility]     Family History   Problem Relation Age of Onset    Coronary Art Dis Mother     Lung Cancer Mother     Diabetes Mother         mellitus    Coronary Art Dis Father     Diabetes Father         diabetes mellitus     Outpatient Medications Marked as Taking for the 8/12/21 encounter (Office Visit) with Paulo Sos, APRN - CNP   Medication Sig Dispense Refill    gabapentin (NEURONTIN) 600 MG tablet take 1 tablet by mouth twice a day 60 tablet 0    levothyroxine (SYNTHROID) 75 MCG tablet Take 1 tablet by mouth daily 30 tablet 0    famotidine (PEPCID) 20 MG tablet take 1 tablet by mouth twice a day 60 tablet 3    SM ASPIRIN ADULT LOW STRENGTH 81 MG EC tablet take 1 tablet by mouth once daily 90 tablet 1    atorvastatin (LIPITOR) 80 MG tablet take 1 tablet by mouth once daily 30 tablet 3    dapagliflozin (FARXIGA) 5 MG tablet Take 1 tablet by mouth every morning 90 tablet 1    DULoxetine (CYMBALTA) 60 MG extended release capsule take 1 capsule by mouth once daily 90 capsule 3    cyclobenzaprine (FLEXERIL) 10 MG tablet take 1 tablet by mouth nightly if needed for muscle spasm 30 tablet 0    furosemide (LASIX) 40 MG tablet Take 1 tablet by mouth daily take 1 tablet by mouth daily 90 tablet 1    lisinopril (PRINIVIL;ZESTRIL) 2.5 MG tablet Take 1 tablet by mouth daily 90 tablet 1    melatonin 3 MG TABS tablet take 1 tablet by mouth nightly 30 tablet 1    melatonin ER 3 MG TBCR Take 3 mg by mouth nightly 30 tablet 1    glipiZIDE (GLUCOTROL) 10 MG tablet take 2 tablets by mouth twice a day 180 tablet 2    RA PEN NEEDLES 31G X 8 MM MISC INJECTING TWICE DAILY DX: E11.65      fluticasone (FLONASE) 50 MCG/ACT nasal spray 2 sprays by Nasal route daily 1 Bottle 0    pantoprazole (PROTONIX) 40 MG tablet Take 40 mg by mouth daily      oxybutynin (DITROPAN) 5 MG tablet Take 1 tablet by mouth daily      metoprolol tartrate (LOPRESSOR) 25 MG tablet Take 1 tablet by mouth 2 times daily 180 tablet 0    insulin lispro, 1 Unit Dial, 100 UNIT/ML SOPN Inject 10 Units into the skin 3 times daily as needed      Insulin Degludec 200 UNIT/ML SOPN Inject 75 Units into the skin nightly 5 pen 2      (All medications reviewed and updated by provider sincelast office visit or hospitalization)   Cefuroxime axetil; Dilaudid [hydromorphone]; Metformin and related; Sulfa antibiotics; Amoxicillin; Antihistamines, loratadine-type; Eggs or egg-derived products; Lorazepam; Nubain [nalbuphine hcl]; and Vistaril [hydroxyzine hcl]  Social History     Tobacco Use   Smoking Status Former Smoker    Packs/day: 0.01    Years: 1.00    Pack years: 0.01    Types: Cigarettes    Quit date: Norm Mckenzie Years since quittin.6   Smokeless Tobacco Never Used   Tobacco Comment    pt states she quit smoking cigarettes at the age of 14      (If patient a smoker, smoking cessation counseling offered)     Social History     Substance and Sexual Activity   Alcohol Use No    Alcohol/week: 0.0 standard drinks       REVIEW OF SYSTEMS:  Review of Systems      Physical Exam:      Vitals:    21 1501   BP: 123/84   Pulse: 83   Temp: 98 °F (36.7 °C)     Body mass index is 43.71 kg/m². Patient is a 61 y.o. female in noacute distress and alert and oriented to person, place and time. Physical Exam  Constitutional: Patient in no acute distress. Neuro: Alert andoriented to person, place and time.   Lungs: Respiratory effort is normal  Cardiovascular: Warm & Pink  Abdomen: Soft,

## 2021-08-12 NOTE — PATIENT INSTRUCTIONS
1. Urine culture negative today, if symptoms resume she can drop off a urine sample to lab or office. 2. Take D Mannose 500 mg 2x per day. If symptomatic for UTI, increase to 500 mg 4x per day for 24-48 hours, If symptomatic after that get urine culture. 3. Timed and double voiding. Avoid bladder irritants- list provided. 4. If symptoms persist- consider cysto. 5. Hx kidney stones, will do KUB 6 mos.  Lemon water, avoid sodium, watch protein intake

## 2021-09-03 DIAGNOSIS — M79.89 LEG SWELLING: ICD-10-CM

## 2021-09-03 RX ORDER — FUROSEMIDE 20 MG/1
TABLET ORAL
Qty: 90 TABLET | Refills: 3 | Status: SHIPPED | OUTPATIENT
Start: 2021-09-03

## 2021-09-08 DIAGNOSIS — M79.89 LEG SWELLING: ICD-10-CM

## 2021-09-08 RX ORDER — FUROSEMIDE 40 MG/1
TABLET ORAL
Qty: 90 TABLET | Refills: 1 | Status: SHIPPED | OUTPATIENT
Start: 2021-09-08 | End: 2021-10-19

## 2021-09-08 RX ORDER — LISINOPRIL 2.5 MG/1
TABLET ORAL
Qty: 90 TABLET | Refills: 1 | Status: SHIPPED | OUTPATIENT
Start: 2021-09-08 | End: 2022-03-07

## 2021-09-17 RX ORDER — GABAPENTIN 600 MG/1
TABLET ORAL
Qty: 60 TABLET | Refills: 0 | Status: SHIPPED | OUTPATIENT
Start: 2021-09-17 | End: 2021-09-17 | Stop reason: SDUPTHER

## 2021-09-17 NOTE — TELEPHONE ENCOUNTER
Please Approve or Refuse.   Send to Pharmacy per Pt's Request:     Next Visit Date:  Visit date not found   Last Visit Date: 7/19/2021    Hemoglobin A1C (%)   Date Value   03/23/2021 8.9   12/03/2020 10.9 (H)   04/15/2020 10.1 (H)             ( goal A1C is < 7)   BP Readings from Last 3 Encounters:   08/12/21 123/84   06/26/21 (!) 139/93   05/06/21 126/84          (goal 120/80)  BUN   Date Value Ref Range Status   05/06/2021 9 8 - 23 mg/dL Final     CREATININE   Date Value Ref Range Status   05/06/2021 0.44 (L) 0.50 - 0.90 mg/dL Final     Potassium   Date Value Ref Range Status   05/06/2021 4.4 3.7 - 5.3 mmol/L Final     Comment:     SPECIMEN MODERATELY HEMOLYZED, RESULTS MAY BE ADVERSELY AFFECTED

## 2021-09-20 ENCOUNTER — TELEPHONE (OUTPATIENT)
Dept: FAMILY MEDICINE CLINIC | Age: 66
End: 2021-09-20

## 2021-09-20 RX ORDER — GABAPENTIN 600 MG/1
TABLET ORAL
Qty: 60 TABLET | Refills: 0 | Status: SHIPPED | OUTPATIENT
Start: 2021-09-20 | End: 2021-11-08

## 2021-09-20 RX ORDER — ATORVASTATIN CALCIUM 80 MG/1
TABLET, FILM COATED ORAL
Qty: 30 TABLET | Refills: 3 | Status: SHIPPED | OUTPATIENT
Start: 2021-09-20 | End: 2022-01-17

## 2021-09-20 NOTE — TELEPHONE ENCOUNTER
Pt called and stated that she fell in her tub over the weekend. She did not hit her head but her side. She says she's a little sore but feeling ok. She wanted you to be aware.

## 2021-09-21 DIAGNOSIS — R19.5 POSITIVE FIT (FECAL IMMUNOCHEMICAL TEST): Primary | ICD-10-CM

## 2021-10-01 ENCOUNTER — APPOINTMENT (OUTPATIENT)
Dept: GENERAL RADIOLOGY | Age: 66
End: 2021-10-01
Payer: COMMERCIAL

## 2021-10-01 ENCOUNTER — HOSPITAL ENCOUNTER (EMERGENCY)
Age: 66
Discharge: HOME | End: 2021-10-01
Attending: STUDENT IN AN ORGANIZED HEALTH CARE EDUCATION/TRAINING PROGRAM
Payer: COMMERCIAL

## 2021-10-01 VITALS
TEMPERATURE: 96 F | BODY MASS INDEX: 44.72 KG/M2 | WEIGHT: 243 LBS | HEART RATE: 73 BPM | DIASTOLIC BLOOD PRESSURE: 70 MMHG | HEIGHT: 62 IN | OXYGEN SATURATION: 95 % | SYSTOLIC BLOOD PRESSURE: 139 MMHG | RESPIRATION RATE: 17 BRPM

## 2021-10-01 DIAGNOSIS — S89.91XA INJURY OF RIGHT LOWER EXTREMITY, INITIAL ENCOUNTER: Primary | ICD-10-CM

## 2021-10-01 PROCEDURE — 73502 X-RAY EXAM HIP UNI 2-3 VIEWS: CPT

## 2021-10-01 PROCEDURE — 96372 THER/PROPH/DIAG INJ SC/IM: CPT

## 2021-10-01 PROCEDURE — 6360000002 HC RX W HCPCS: Performed by: PHYSICIAN ASSISTANT

## 2021-10-01 PROCEDURE — 73552 X-RAY EXAM OF FEMUR 2/>: CPT

## 2021-10-01 PROCEDURE — 99283 EMERGENCY DEPT VISIT LOW MDM: CPT

## 2021-10-01 RX ORDER — IBUPROFEN 600 MG/1
600 TABLET ORAL EVERY 8 HOURS PRN
Qty: 30 TABLET | Refills: 0 | Status: SHIPPED | OUTPATIENT
Start: 2021-10-01 | End: 2021-12-07

## 2021-10-01 RX ORDER — LIDOCAINE 50 MG/G
1 PATCH TOPICAL DAILY
Qty: 10 PATCH | Refills: 0 | Status: SHIPPED | OUTPATIENT
Start: 2021-10-01

## 2021-10-01 RX ORDER — KETOROLAC TROMETHAMINE 30 MG/ML
30 INJECTION, SOLUTION INTRAMUSCULAR; INTRAVENOUS ONCE
Status: COMPLETED | OUTPATIENT
Start: 2021-10-01 | End: 2021-10-01

## 2021-10-01 RX ADMIN — KETOROLAC TROMETHAMINE 30 MG: 30 INJECTION, SOLUTION INTRAMUSCULAR; INTRAVENOUS at 17:33

## 2021-10-01 ASSESSMENT — PAIN SCALES - GENERAL
PAINLEVEL_OUTOF10: 7
PAINLEVEL_OUTOF10: 7

## 2021-10-01 NOTE — ED TRIAGE NOTES
Mode of arrival (squad #, walk in, police, etc) : Walk in         Chief complaint(s): RT Hip and leg pain         Arrival Note (brief scenario, treatment PTA, etc). : Pt states a few weeks ago she slipped and fell in the shower. Pt states that she did the \"splits\". Pt states the pain is mostly located in the back of that RT leg. Pt states she decided to come in since the pain was not getting any better. Pt is A&Ox4, in no acute distress, respirations even and unlabored, ambulatory with steady gait. C= \"Have you ever felt that you should Cut down on your drinking? \"  No  A= \"Have people Annoyed you by criticizing your drinking? \"  No  G= \"Have you ever felt bad or Guilty about your drinking? \"  No  E= \"Have you ever had a drink as an Eye-opener first thing in the morning to steady your nerves or to help a hangover? \"  No      Deferred []      Reason for deferring: N/A    *If yes to two or more: probable alcohol abuse. *

## 2021-10-01 NOTE — ED PROVIDER NOTES
[address]  eMERGENCY dEPARTMENT eNCOUnter      Pt Name: Rob Puri  MRN: [id number]  [place] 1955  Date of evaluation: 10/1/2021  Provider: Branson Fabry, PA-C    CHIEF COMPLAINT       Chief Complaint   Patient presents with    Fall           HISTORY OF PRESENT ILLNESS  (Location/Symptom, Timing/Onset, Context/Setting, Quality, Duration, Modifying Factors, Severity.)   Rob Puri is a 61 y.o. female who presents to the emergency department  For evaluation of right hip and leg pain. Pt states a few weeks ago she slipped in the shower and did the \"splits. \"   pts leg flew into the air and her hamstring landed on the tub ledge. Pt reports since the incident she has been having pain over the back of her right leg. Pain has not improved so she came In for evaluation. States it is difficult to get comfortable at night. Denies any other complaints. Nursing Notes were reviewed. REVIEW OF SYSTEMS    (2-9 systems for level 4, 10 or more for level 5)     Review of Systems   Leg pain  Hip pain       Except as noted above the remainder of the review of systems was reviewed and negative.        PAST MEDICAL HISTORY     Past Medical History:   Diagnosis Date    Arrhythmia     Breast mass     CAD (coronary artery disease)     with valvular disease    Chronic neck pain     Coccygeal pain 3/7/2016    Constipation     COVID-19 04/2020    Depression     Diabetic neuropathy (HCC)     History of hepatitis A     possible history of hepatitis A in the past    History of renal calculi     Hypertension     Hyperthyroidism     Hypothyroidism     Morbid obesity with BMI of 45.0-49.9, adult ([facility] [address] 3/31/2016    Nephrolithiasis     Neuropathy     Type II or unspecified type diabetes mellitus without mention of complication, not stated as uncontrolled     non insulin dependent     UTI (lower urinary tract infection)      None otherwise stated in nurses notes    SURGICAL HISTORY       Past Surgical History:   Procedure Laterality Date    BREAST BIOPSY  2012    negative    CARDIOVASCULAR STRESS TEST      7/17/15 no results    CHOLECYSTECTOMY      CYST REMOVAL      right hand    CYSTO/URETERO/PYELOSCOPY, CALCULUS TX Right 8/26/2019    CYSTOSCOPY URETEROSCOPY  STONE BASKET RETRIEVAL RIGHT STENT EXCHANGE performed by Ted Cross MD at [facility] Right 8/20/2019    CYSTOSCOPY URETERAL STENT INSERTION performed by Ted Cross MD at [address] [facility]  2009    ENDOSCOPY, COLON, DIAGNOSTIC      LITHOTRIPSY      TONSILLECTOMY      TUBAL LIGATION      UPPER GASTROINTESTINAL ENDOSCOPY N/A 4/16/2020    EGD BIOPSY performed by Nargis Frazier MD at [place] [address]     None otherwise stated in nurses notes    CURRENT MEDICATIONS       Discharge Medication List as of 10/1/2021  6:03 PM      CONTINUE these medications which have NOT CHANGED    Details   gabapentin (NEURONTIN) 600 MG tablet take 1 tablet by mouth twice a day, Disp-60 tablet, R-0Normal      atorvastatin (LIPITOR) 80 MG tablet take 1 tablet by mouth once daily, Disp-30 tablet, R-3Normal      !! furosemide (LASIX) 40 MG tablet take 1 tablet by mouth once daily, Disp-90 tablet, R-1Normal      lisinopril (PRINIVIL;ZESTRIL) 2.5 MG tablet take 1 tablet by mouth once daily, Disp-90 tablet, R-1Normal      !! furosemide (LASIX) 20 MG tablet take 1 tablet by mouth once daily, Disp-90 tablet, R-3Normal      levothyroxine (SYNTHROID) 75 MCG tablet Take 1 tablet by mouth daily, Disp-30 tablet, R-0Normal      famotidine (PEPCID) 20 MG tablet take 1 tablet by mouth twice a day, Disp-60 tablet, R-3Normal      SM ASPIRIN ADULT LOW STRENGTH 81 MG EC tablet take 1 tablet by mouth once daily, Disp-90 tablet, R-1Normal      dapagliflozin (FARXIGA) 5 MG tablet Take 1 tablet by mouth every morning, Disp-90 tablet, R-1Normal      DULoxetine (CYMBALTA) 60 MG extended release capsule take 1 capsule by mouth once daily, Disp-90 capsule, R-3Normal      cyclobenzaprine (FLEXERIL) 10 MG tablet take 1 tablet by mouth nightly if needed for muscle spasm, Disp-30 tablet, R-0Normal      melatonin 3 MG TABS tablet take 1 tablet by mouth nightly, Disp-30 tablet, R-1Normal      melatonin ER 3 MG TBCR Take 3 mg by mouth nightly, Disp-30 tablet, R-1Normal      glipiZIDE (GLUCOTROL) 10 MG tablet take 2 tablets by mouth twice a day, Disp-180 tablet, R-2Normal      RA PEN NEEDLES 31G X 8 MM MISC DAVID, Historical Med      fluticasone (FLONASE) 50 MCG/ACT nasal spray 2 sprays by Nasal route daily, Disp-1 Bottle,R-0Normal      pantoprazole (PROTONIX) 40 MG tablet Take 40 mg by mouth dailyHistorical Med      oxybutynin (DITROPAN) 5 MG tablet Take 1 tablet by mouth dailyHistorical Med      metoprolol tartrate (LOPRESSOR) 25 MG tablet Take 1 tablet by mouth 2 times daily, Disp-180 tablet,R-0Adjust Sig      insulin lispro, 1 Unit Dial, 100 UNIT/ML SOPN Inject 10 Units into the skin 3 times daily as neededHistorical Med      Insulin Degludec 200 UNIT/ML SOPN Inject 75 Units into the skin nightly, Disp-5 pen, R-2Normal       !! - Potential duplicate medications found. Please discuss with provider. ALLERGIES     Cefuroxime axetil; Dilaudid [hydromorphone]; Metformin and related; Sulfa antibiotics; Amoxicillin; Antihistamines, loratadine-type; Eggs or egg-derived products; Lorazepam; Nubain [nalbuphine hcl]; and Vistaril [hydroxyzine hcl]    FAMILY HISTORY           Problem Relation Age of Onset    Coronary Art Dis Mother     Lung Cancer Mother     Diabetes Mother         mellitus    Coronary Art Dis Father     Diabetes Father         diabetes mellitus     Family Status   Relation Name Status    Mother          lung cancer    Father   at age 63's        myocardial infarction      None otherwise stated in nurses notes    SOCIAL HISTORY      reports that she quit smoking about 54 years ago. Her smoking use included cigarettes. She has a 0.01 pack-year smoking history. She has never used smokeless tobacco. She reports current drug use. Drug: Marijuana. She reports that she does not drink alcohol. lives at home with others     PHYSICAL EXAM    (up to 7 for level 4, 8 or more for level 5)     ED Triage Vitals [10/01/21 1652]   BP Temp Temp Source Pulse Resp SpO2 Height Weight   139/70 96 °F (35.6 °C) Temporal 73 17 95 % 5' 2\" (1.575 m) 243 lb (110.2 kg)       Physical Exam   Nursing note and vitals reviewed. Constitutional: Oriented to person, place, and time and well-developed, well-nourished. Head: Normocephalic and atraumatic. Ear: External ears normal.   Nose: Nose normal and midline. Eyes: Conjunctivae and EOM are normal. Pupils are equal, round, and reactive to light. Neck: Normal range of motion. Neck supple. Cardiovascular: Normal rate, regular rhythm, normal heart sounds and intact distal pulses. Pulmonary/Chest: Effort normal and breath sounds normal. No respiratory distress. No wheezes. No rales. No chest tenderness. Abdominal: Soft. No distension and no mass. There is no tenderness. There is no rebound and no guarding. Musculoskeletal: Examination of right leg reveals no visible deformities, bruising, swelling, abrasions, erythema. There is tenderness over hamstring and hip. Normal range of motion. 5/5 strength. No lumbar tenderness. 2/2 dp and pt pulses. Brisk cap refill. Distal sensation intact. No calf tenderness. Ambulatory. Neurological: Alert and oriented to person, place, and time. GCS score is 15. Skin: Skin is warm and dry. No rash noted. No erythema. No pallor.    Psychiatric: Mood, memory, affect and judgment normal.           DIAGNOSTIC RESULTS     EKG: All EKG's are interpreted by the Emergency Department Physician who either signs or Co-signs this chart in the absence of a cardiologist.        RADIOLOGY:   All plain film, CT, MRI, and formal ultrasound images (except ED bedside ultrasound) are read by the radiologist, see reports below, unless otherwise noted in MDM or here. XR FEMUR RIGHT (MIN 2 VIEWS)   Final Result   No acute osseous abnormality. Postsurgical changes consistent with ACL repair. XR HIP RIGHT (2-3 VIEWS)   Final Result   No acute abnormality right femur. Degenerative findings, as above. No results found. LABS:  Labs Reviewed - No data to display    All other labs were within normal range or not returned as of this dictation. EMERGENCY DEPARTMENT COURSE and DIFFERENTIAL DIAGNOSIS/MDM:   Vitals:    Vitals:    10/01/21 1652   BP: 139/70   Pulse: 73   Resp: 17   Temp: 96 °F (35.6 °C)   TempSrc: Temporal   SpO2: 95%   Weight: 243 lb (110.2 kg)   Height: 5' 2\" (1.575 m)     Patient instructed to return to the emergency room if symptoms worsen, return, or any other concern right away which is agreed by the patient    ED MEDS:  Orders Placed This Encounter   Medications    ketorolac (TORADOL) injection 30 mg    lidocaine (LIDODERM) 5 %     Sig: Place 1 patch onto the skin daily 12 hours on, 12 hours off. Dispense:  10 patch     Refill:  0    ibuprofen (IBU) 600 MG tablet     Sig: Take 1 tablet by mouth every 8 hours as needed for Pain     Dispense:  30 tablet     Refill:  0         CONSULTS:  None    PROCEDURES:  None      FINAL IMPRESSION      1.  Injury of right lower extremity, initial encounter          DISPOSITION/PLAN   DISPOSITION Decision To Discharge    PATIENT REFERRED TO:  Jesse Lorenz MD  [address] [address]  [address] [facility]  [facility] [address]  [facility], [address]  [address]  [address] [facility] [id number] [phone]    Call         DISCHARGE MEDICATIONS:  Discharge Medication List as of 10/1/2021  6:03 PM      START taking these medications    Details   lidocaine (LIDODERM) 5 % Place 1 patch onto the skin daily 12 hours on, 12 hours off., Disp-10 patch, R-0Print      ibuprofen (IBU) 600 MG tablet Take 1 tablet by mouth every 8 hours as needed for Pain, Disp-30 tablet, R-0Print               Summation      Patient Course:    Fall in tub 2 weeks ago. Hit hamstring on tub ledge. Did the splits. Pain over hamstring and hip area. Neurovascularly intact. No signs of infection. Low concern for DVT. Pain is reproducible over the hamstring. Imaging unremarkable. Suspect contusion vs strain. Will start on lidoderm and motrin. May continue tylenol and heat. Referred to dr. Manuel Lazo. Discussed results and plan with the pt. They expressed appropriate understanding. Pt given close follow up, supportive care instructions and strict return instructions at the bedside. ED Medications administered this visit:    Medications   ketorolac (TORADOL) injection 30 mg (30 mg IntraMUSCular Given 10/1/21 8227)       New Prescriptions from this visit:    Discharge Medication List as of 10/1/2021  6:03 PM      START taking these medications    Details   lidocaine (LIDODERM) 5 % Place 1 patch onto the skin daily 12 hours on, 12 hours off., Disp-10 patch, R-0Print      ibuprofen (IBU) 600 MG tablet Take 1 tablet by mouth every 8 hours as needed for Pain, Disp-30 tablet, R-0Print             Follow-up:  Rafa Guerrero MD  [address] [facility] [address]  [place]  [address] [facility]  [address] [facility] [id number] [phone]    Call           Final Impression:   1.  Injury of right lower extremity, initial encounter               (Please note that portions of this note were completed with a voice recognition program )        Nina Escamilla [address], PA-C  10/01/21 7563

## 2021-10-02 NOTE — ED PROVIDER NOTES
eMERGENCY dEPARTMENT eNCOUnter   Independent Attestation     Pt Name: Rob Horta  MRN: [id number]  [place] 1955  Date of evaluation: 10/1/21     Rob Horta is a 61 y.o. female with CC: Fall      Based on the medical record the care appears appropriate. I was personally available for consultation in the Emergency Department.     Juliet Huff MD  Attending Emergency Physician                    Juliet Huff MD  10/01/21 8226

## 2021-10-04 ENCOUNTER — TELEPHONE (OUTPATIENT)
Dept: FAMILY MEDICINE CLINIC | Age: 66
End: 2021-10-04

## 2021-10-05 RX ORDER — ZOSTER VACCINE RECOMBINANT, ADJUVANTED 50 MCG/0.5
0.5 KIT INTRAMUSCULAR ONCE
Qty: 0.5 ML | Refills: 0 | Status: CANCELLED | OUTPATIENT
Start: 2021-10-05 | End: 2021-10-05

## 2021-10-05 NOTE — PROGRESS NOTES
[facility] [address] [facility] [address] [place] [address]   O: [phone]  K:[phone]    Octavio Sebastian is a 61 y.o. female evaluated via telephone on 10/7/2021. CONSENT:  She and/or health care decision maker is aware that that she may receive a bill for this telephone service, depending on her insurance coverage, and has provided verbal consent to proceed: Yes    DOCUMENTATION:  Patient scheduled this appointment today due to Abdominal Pain    ABDOMINAL PAIN/PANCREATIC CYST  Patient was seen in the ED for abdominal pain. Patient CAT scan of the abdomen was found to have a small pancreatic cyst, thickening of the endometrium, constipation, and also had a UTI. Patient is being treated for UTI with Macrodantin. Patient is encouraged to drink more fluids. Patient did not necessarily have dysuria frequency or hesitancy. She denies any fever or chills. She was slightly nauseated when she came into the emergency room was given a GI cocktail and did very well. Patient denies having any nausea or vomiting today. Patient was established with [facility]. Patient was told that she did have some precancerous cells in her endometrial Hale in the past.  Patient is encouraged to follow-up and discuss further treatment. Patient did note that her mother and her grandmother on her mother side both had uterine cancer which makes her high risk. Patient also has some mild elevation of the alk phos. She does not drink any alcohol. At this point, we will go ahead and send her to the gastroenterologist for further evaluation on her pancreatic cyst.  She did complain of some occasional bloating. Constipation has been chronic and intermittent. She was given some MiraLAX to take at home. Impression 1. No correlate for pain. 2. Irregular pattern of peripherally oriented hypoenhancement involving both lobes of the liver, incompletely characterized on a single phase CT.   This is favored to represent an atypical pattern of focal fatty change in the liver. Recommend further evaluation with nonemergent liver protocol MRI with Eovist. 3. Possible 1.6 cm hypoattenuating lesion of the pancreatic head though this most likely represents a fluid-filled duodenal diverticulum seen on prior studies. Attention on above recommended MRI. 4. Abnormal endometrial thickening to 14 mm. Recommend further evaluation with nonemergent pelvic ultrasound. 5. Large volume stool throughout the colon. URINARY TRACT INFECTION  Results for Cesar Perkins (MRN [id number]) as of 10/7/2021 08:41   Ref. Range 10/6/2021 19:10   Color, UA Latest Ref Range: Yellow  Yellow   Turbidity UA Latest Ref Range: Clear  Cloudy (A)   Glucose, UA Latest Ref Range: NEGATIVE  NEGATIVE   Bilirubin, Urine Latest Ref Range: NEGATIVE  NEGATIVE   Ketones, Urine Latest Ref Range: NEGATIVE  NEGATIVE   Specific Gravity, UA Latest Ref Range: 1.000 - 1.030  1.012   pH, UA Latest Ref Range: 5.0 - 8.0  5.5   Protein, UA Latest Ref Range: NEGATIVE  1+ (A)   Urobilinogen, Urine Latest Ref Range: Normal  Normal   Nitrite, Urine Latest Ref Range: NEGATIVE  POSITIVE (A)   Leukocyte Esterase, Urine Latest Ref Range: NEGATIVE  MOD (A)   Urinalysis Comments Unknown NOT REPORTED       ENDOMETRIAL THICKENING  Was told to have precancer cells. Dr. Kaitlin Mcfarlane      No data recorded  I communicated with the patient and/or health care decision maker about: PLAN     Review of Systems   Constitutional: Positive for activity change. Negative for chills and fever. Respiratory: Negative for shortness of breath. Cardiovascular: Negative for chest pain. Gastrointestinal: Positive for abdominal pain and constipation. Genitourinary: Negative for dysuria. Neurological: Negative for headaches. Psychiatric/Behavioral: Positive for sleep disturbance. Negative for suicidal ideas. The patient is nervous/anxious.       Details of this discussion including any medical advice provided: Under assessment. PHYSICAL EXAM[INSTRUCTIONS:  \"[x]\" Indicates a positive item  \"[]\" Indicates a negative item  -- DELETE ALL ITEMS NOT EXAMINED]  Patient-Reported Vitals 12/2/2020   Patient-Reported Weight 240 LB   Patient-Reported Height 5 2   Patient-Reported Systolic -   Patient-Reported Diastolic -     Constitutional: [x] No apparent distress      [] Abnormal -   Mental status: [x] Alert and awake  [x] Oriented to person/place/time[] Abnormal -   Pulmonary/Chest: [x] Respiratory effort normal         [] Abnormal -          Psychiatric:       [x] Normal Affect [] Abnormal -        [x] No Hallucinations  Other pertinent observable physical exam findings:-Moderately anxious  Controlled Substance Monitoring:  Acute and Chronic Pain Monitoring:   RX Monitoring 9/21/2020   Attestation -   Periodic Controlled Substance Monitoring No signs of potential drug abuse or diversion identified. ASSESSMENT  1. Cystitis  Stable  Start macrodantin  DISCUSSED AND ADVISED TO:  Drink lots of fluids. Report worsening symptoms, fever, chills. Urine culture report in 2-3 days. - Urinalysis Reflex to Culture; Future    2. Pancreatic cyst  Stable  Continue to evaluate  Consult gastroenterology    - Emil Rouse MD, Gastroenterology, [place]    3. Thickened endometrium  Failure to Improve  Continue to evaluate'  Encouraged to call [facility] for further evaluation    4. Slow transit constipation  Failure to Improve  Continue therapy. DISCUSSED and ADVISED TO:  Drink plenty of fluids, 1.5 to 2 liters a day  Increase high-fiber foods  with 25-30 g each day. These include fruits, vegetables, beans, and whole grains. Get at least 30 minutes of exercise on most days of the week. Take a fiber supplement, such as Citrucel (Methylcellulose fiber) or Metamucil (Psyllium), every day. Schedule time each day for a bowel movement.        - Emil Rouse MD, Gastroenterology, [place]    5. Essential hypertension  Stable  Continue current therapy. DISCUSSED AND ADVISED TO:  Cut down on your salt intake. Cut down on caffeinated drinks, sports drinks. Instructed to check BP at home regularly. Report for any chest pains, shortness of breath, headaches, and lightheadedness. Call the office if your blood pressure continue to be higher than 140/90 or 90/50. 6. Elevated alkaline phosphatase level  Failure to Improve  Continue to monitor      7. Family history of uterine cancer  Historical    Total Time: minutes: 21-30 minutes  I affirm this is a Patient Initiated Episode with a Patient who has not had a related appointment within my department in the past 7 days or scheduled within the next 24 hours.   Patient identification was verified at the start of the visit: Yes  Note: not billable if this call serves to triage the patient into an appointment for the relevant concern  Patient-Reported Vitals 12/2/2020   Patient-Reported Weight 240 LB   Patient-Reported Height 5 2   Patient-Reported Systolic -   Patient-Reported Diastolic -     Patient Active Problem List   Diagnosis    Chronic neck pain    Abnormal mammogram    Diabetes type 2, uncontrolled ([address]    DM neuropathy takes Percocet    Recurrent UTI / Nephrolithiasis    Hypothyroidism    Depression    MVP (mitral valve prolapse)    Constipation    Chronic prescription benzodiazepine use    Chronic use of opiate drugs therapeutic purposes    Morbid obesity with BMI of 45.0-49.9, adult ([address]    Thickened endometrium    Mediastinal cyst    Uncontrolled type 2 diabetes mellitus with diabetic neuropathy, with long-term current use of insulin (HCC)    Essential hypertension    Chronic pain of both knees    Nephrolithiasis    Ureterolithiasis    Chest pain with high risk for cardiac etiology    Mixed hyperlipidemia    Coronary artery disease involving native coronary artery of native heart without angina pectoris    Generalized anxiety disorder    Migraine without aura, not intractable    Mixed dyslipidemia    Seizure disorder (HCC)    Arthritis involving multiple sites    Arthritis of right hand    Gastroesophageal reflux disease without esophagitis    Insomnia    Chronic diastolic congestive heart failure (HCC)    Major depressive disorder with single episode, in partial remission ([facility]    Pancreatic cyst    Family history of uterine cancer     Carlos Heard is a 61 y.o. female patient  being evaluated by a Virtual Visit (video visit) encounter to address concerns as mentioned above. A caregiver was present when appropriate. Due to this being a TeleHealth encounter (During IPUMW-43 public health emergency), evaluation of the following organ systems was limited:Vitals/Constitutional/EENT/Resp/CV/GI//MS/Neuro/Skin/Heme-Lymph-Imm. Services were provided through a video synchronous discussion virtually to substitute for in-person clinic visit. This is a telehealth visit that was performed with the originating site at Patient Location: home and provider Location of [address]. Pursuant to the emergency declaration under the [facility][note 3][facility], [address] [place] [address] authority and the [employer] and [employer] Act, this Virtual Visit was conducted with patient's (and/or legal guardian's) consent, to reduce the patient's risk of exposure to COVID-19 and provide necessary medical care. The patient (and/or legal guardian) has also been advised to contact this office for worsening conditions or problems, and seek emergency medical treatment and/or call 911 if deemed necessary. This note was completed by using the assistance of a speech-recognition program. However, inadvertent computerized transcription errors may be present.  Although every effort was made to ensure accuracy, no guarantees can be provided that every mistake has been identified and corrected by editing.   Electronically signed by RASHMI Tobar CNP on 10/5/21 at 7:30 AM RACQUEL

## 2021-10-06 ENCOUNTER — HOSPITAL ENCOUNTER (EMERGENCY)
Age: 66
Discharge: HOME | End: 2021-10-06
Attending: EMERGENCY MEDICINE
Payer: COMMERCIAL

## 2021-10-06 ENCOUNTER — APPOINTMENT (OUTPATIENT)
Dept: GENERAL RADIOLOGY | Age: 66
End: 2021-10-06
Payer: COMMERCIAL

## 2021-10-06 ENCOUNTER — APPOINTMENT (OUTPATIENT)
Dept: CT IMAGING | Age: 66
End: 2021-10-06
Payer: COMMERCIAL

## 2021-10-06 VITALS
HEART RATE: 78 BPM | DIASTOLIC BLOOD PRESSURE: 63 MMHG | OXYGEN SATURATION: 97 % | RESPIRATION RATE: 18 BRPM | TEMPERATURE: 98.2 F | SYSTOLIC BLOOD PRESSURE: 138 MMHG

## 2021-10-06 DIAGNOSIS — N30.00 ACUTE CYSTITIS WITHOUT HEMATURIA: ICD-10-CM

## 2021-10-06 DIAGNOSIS — R10.13 EPIGASTRIC PAIN: Primary | ICD-10-CM

## 2021-10-06 DIAGNOSIS — R11.0 NAUSEA: ICD-10-CM

## 2021-10-06 LAB
-: ABNORMAL
ABSOLUTE EOS #: 0.1 K/UL (ref 0–0.4)
ABSOLUTE IMMATURE GRANULOCYTE: ABNORMAL K/UL (ref 0–0.3)
ABSOLUTE LYMPH #: 2.9 K/UL (ref 1–4.8)
ABSOLUTE MONO #: 0.8 K/UL (ref 0.1–1.3)
ALBUMIN SERPL-MCNC: 3.6 G/DL (ref 3.5–5.2)
ALBUMIN/GLOBULIN RATIO: ABNORMAL (ref 1–2.5)
ALP BLD-CCNC: 144 U/L (ref 35–104)
ALT SERPL-CCNC: 32 U/L (ref 5–33)
AMORPHOUS: ABNORMAL
ANION GAP SERPL CALCULATED.3IONS-SCNC: 9 MMOL/L (ref 9–17)
AST SERPL-CCNC: 29 U/L
BACTERIA: ABNORMAL
BASOPHILS # BLD: 1 % (ref 0–2)
BASOPHILS ABSOLUTE: 0.1 K/UL (ref 0–0.2)
BILIRUB SERPL-MCNC: 0.52 MG/DL (ref 0.3–1.2)
BILIRUBIN DIRECT: 0.15 MG/DL
BILIRUBIN URINE: NEGATIVE
BILIRUBIN, INDIRECT: 0.37 MG/DL (ref 0–1)
BUN BLDV-MCNC: 13 MG/DL (ref 8–23)
BUN/CREAT BLD: ABNORMAL (ref 9–20)
CALCIUM SERPL-MCNC: 9.4 MG/DL (ref 8.6–10.4)
CASTS UA: ABNORMAL /LPF
CHLORIDE BLD-SCNC: 105 MMOL/L (ref 98–107)
CO2: 25 MMOL/L (ref 20–31)
COLOR: YELLOW
COMMENT UA: ABNORMAL
CREAT SERPL-MCNC: 0.44 MG/DL (ref 0.5–0.9)
CRYSTALS, UA: ABNORMAL /HPF
DIFFERENTIAL TYPE: ABNORMAL
EOSINOPHILS RELATIVE PERCENT: 1 % (ref 0–4)
EPITHELIAL CELLS UA: ABNORMAL /HPF
GFR AFRICAN AMERICAN: >60 ML/MIN
GFR NON-AFRICAN AMERICAN: >60 ML/MIN
GFR SERPL CREATININE-BSD FRML MDRD: ABNORMAL ML/MIN/{1.73_M2}
GFR SERPL CREATININE-BSD FRML MDRD: ABNORMAL ML/MIN/{1.73_M2}
GLOBULIN: ABNORMAL G/DL (ref 1.5–3.8)
GLUCOSE BLD-MCNC: 136 MG/DL (ref 70–99)
GLUCOSE URINE: NEGATIVE
HCT VFR BLD CALC: 45.1 % (ref 36–46)
HEMOGLOBIN: 15 G/DL (ref 12–16)
IMMATURE GRANULOCYTES: ABNORMAL %
KETONES, URINE: NEGATIVE
LACTIC ACID, SEPSIS WHOLE BLOOD: NORMAL MMOL/L (ref 0.5–1.9)
LACTIC ACID, SEPSIS: 1.3 MMOL/L (ref 0.5–1.9)
LEUKOCYTE ESTERASE, URINE: ABNORMAL
LIPASE: 10 U/L (ref 13–60)
LYMPHOCYTES # BLD: 24 % (ref 24–44)
MCH RBC QN AUTO: 29.6 PG (ref 26–34)
MCHC RBC AUTO-ENTMCNC: 33.2 G/DL (ref 31–37)
MCV RBC AUTO: 89.1 FL (ref 80–100)
MONOCYTES # BLD: 7 % (ref 1–7)
MUCUS: ABNORMAL
NITRITE, URINE: POSITIVE
NRBC AUTOMATED: ABNORMAL PER 100 WBC
OTHER OBSERVATIONS UA: ABNORMAL
PDW BLD-RTO: 13.8 % (ref 11.5–14.9)
PH UA: 5.5 (ref 5–8)
PLATELET # BLD: 259 K/UL (ref 150–450)
PLATELET ESTIMATE: ABNORMAL
PMV BLD AUTO: 9.5 FL (ref 6–12)
POTASSIUM SERPL-SCNC: 4.5 MMOL/L (ref 3.7–5.3)
PROTEIN UA: ABNORMAL
RBC # BLD: 5.05 M/UL (ref 4–5.2)
RBC # BLD: ABNORMAL 10*6/UL
RBC UA: ABNORMAL /HPF
RENAL EPITHELIAL, UA: ABNORMAL /HPF
SEG NEUTROPHILS: 67 % (ref 36–66)
SEGMENTED NEUTROPHILS ABSOLUTE COUNT: 8.3 K/UL (ref 1.3–9.1)
SODIUM BLD-SCNC: 139 MMOL/L (ref 135–144)
SPECIFIC GRAVITY UA: 1.01 (ref 1–1.03)
TOTAL PROTEIN: 6.8 G/DL (ref 6.4–8.3)
TRICHOMONAS: ABNORMAL
TROPONIN INTERP: NORMAL
TROPONIN T: NORMAL NG/ML
TROPONIN, HIGH SENSITIVITY: 14 NG/L (ref 0–14)
TURBIDITY: ABNORMAL
URINE HGB: ABNORMAL
UROBILINOGEN, URINE: NORMAL
WBC # BLD: 12.2 K/UL (ref 3.5–11)
WBC # BLD: ABNORMAL 10*3/UL
WBC UA: ABNORMAL /HPF
YEAST: ABNORMAL

## 2021-10-06 PROCEDURE — 83690 ASSAY OF LIPASE: CPT

## 2021-10-06 PROCEDURE — 2580000003 HC RX 258: Performed by: EMERGENCY MEDICINE

## 2021-10-06 PROCEDURE — 96375 TX/PRO/DX INJ NEW DRUG ADDON: CPT

## 2021-10-06 PROCEDURE — 2500000003 HC RX 250 WO HCPCS: Performed by: STUDENT IN AN ORGANIZED HEALTH CARE EDUCATION/TRAINING PROGRAM

## 2021-10-06 PROCEDURE — 80048 BASIC METABOLIC PNL TOTAL CA: CPT

## 2021-10-06 PROCEDURE — 80076 HEPATIC FUNCTION PANEL: CPT

## 2021-10-06 PROCEDURE — 6360000004 HC RX CONTRAST MEDICATION: Performed by: EMERGENCY MEDICINE

## 2021-10-06 PROCEDURE — 71045 X-RAY EXAM CHEST 1 VIEW: CPT

## 2021-10-06 PROCEDURE — 36415 COLL VENOUS BLD VENIPUNCTURE: CPT

## 2021-10-06 PROCEDURE — 6370000000 HC RX 637 (ALT 250 FOR IP): Performed by: STUDENT IN AN ORGANIZED HEALTH CARE EDUCATION/TRAINING PROGRAM

## 2021-10-06 PROCEDURE — 81001 URINALYSIS AUTO W/SCOPE: CPT

## 2021-10-06 PROCEDURE — 96374 THER/PROPH/DIAG INJ IV PUSH: CPT

## 2021-10-06 PROCEDURE — 85025 COMPLETE CBC W/AUTO DIFF WBC: CPT

## 2021-10-06 PROCEDURE — 83605 ASSAY OF LACTIC ACID: CPT

## 2021-10-06 PROCEDURE — 6360000002 HC RX W HCPCS: Performed by: STUDENT IN AN ORGANIZED HEALTH CARE EDUCATION/TRAINING PROGRAM

## 2021-10-06 PROCEDURE — 99284 EMERGENCY DEPT VISIT MOD MDM: CPT

## 2021-10-06 PROCEDURE — 74177 CT ABD & PELVIS W/CONTRAST: CPT

## 2021-10-06 PROCEDURE — 84484 ASSAY OF TROPONIN QUANT: CPT

## 2021-10-06 PROCEDURE — 93005 ELECTROCARDIOGRAM TRACING: CPT | Performed by: STUDENT IN AN ORGANIZED HEALTH CARE EDUCATION/TRAINING PROGRAM

## 2021-10-06 RX ORDER — LEVOTHYROXINE SODIUM 0.07 MG/1
150 TABLET ORAL WEEKLY
COMMUNITY
End: 2022-01-17

## 2021-10-06 RX ORDER — LEVOTHYROXINE SODIUM 0.07 MG/1
75 TABLET ORAL
COMMUNITY

## 2021-10-06 RX ORDER — NITROFURANTOIN 25; 75 MG/1; MG/1
100 CAPSULE ORAL 2 TIMES DAILY
Qty: 14 CAPSULE | Refills: 0 | Status: SHIPPED | OUTPATIENT
Start: 2021-10-06 | End: 2021-10-13

## 2021-10-06 RX ORDER — ACETAMINOPHEN 500 MG
1000 TABLET ORAL ONCE
Status: COMPLETED | OUTPATIENT
Start: 2021-10-06 | End: 2021-10-06

## 2021-10-06 RX ORDER — KETOROLAC TROMETHAMINE 30 MG/ML
30 INJECTION, SOLUTION INTRAMUSCULAR; INTRAVENOUS ONCE
Status: COMPLETED | OUTPATIENT
Start: 2021-10-06 | End: 2021-10-06

## 2021-10-06 RX ORDER — MAGNESIUM HYDROXIDE/ALUMINUM HYDROXICE/SIMETHICONE 120; 1200; 1200 MG/30ML; MG/30ML; MG/30ML
30 SUSPENSION ORAL ONCE
Status: COMPLETED | OUTPATIENT
Start: 2021-10-06 | End: 2021-10-06

## 2021-10-06 RX ORDER — SODIUM CHLORIDE 0.9 % (FLUSH) 0.9 %
10 SYRINGE (ML) INJECTION PRN
Status: DISCONTINUED | OUTPATIENT
Start: 2021-10-06 | End: 2021-10-07 | Stop reason: HOSPADM

## 2021-10-06 RX ORDER — POLYETHYLENE GLYCOL 3350 17 G/17G
17 POWDER, FOR SOLUTION ORAL DAILY
Qty: 510 G | Refills: 0 | Status: ON HOLD | OUTPATIENT
Start: 2021-10-06 | End: 2021-10-26 | Stop reason: ALTCHOICE

## 2021-10-06 RX ORDER — LIDOCAINE HYDROCHLORIDE 20 MG/ML
15 SOLUTION OROPHARYNGEAL ONCE
Status: COMPLETED | OUTPATIENT
Start: 2021-10-06 | End: 2021-10-06

## 2021-10-06 RX ORDER — 0.9 % SODIUM CHLORIDE 0.9 %
80 INTRAVENOUS SOLUTION INTRAVENOUS ONCE
Status: COMPLETED | OUTPATIENT
Start: 2021-10-06 | End: 2021-10-06

## 2021-10-06 RX ADMIN — KETOROLAC TROMETHAMINE 30 MG: 30 INJECTION, SOLUTION INTRAMUSCULAR; INTRAVENOUS at 21:21

## 2021-10-06 RX ADMIN — LIDOCAINE HYDROCHLORIDE 15 ML: 20 SOLUTION ORAL; TOPICAL at 18:44

## 2021-10-06 RX ADMIN — SODIUM CHLORIDE, PRESERVATIVE FREE 10 ML: 5 INJECTION INTRAVENOUS at 20:29

## 2021-10-06 RX ADMIN — FAMOTIDINE 20 MG: 10 INJECTION, SOLUTION INTRAVENOUS at 19:04

## 2021-10-06 RX ADMIN — SODIUM CHLORIDE 80 ML: 9 INJECTION, SOLUTION INTRAVENOUS at 20:29

## 2021-10-06 RX ADMIN — ALUMINUM HYDROXIDE, MAGNESIUM HYDROXIDE, AND SIMETHICONE 30 ML: 200; 200; 20 SUSPENSION ORAL at 18:45

## 2021-10-06 RX ADMIN — IOPAMIDOL 100 ML: 755 INJECTION, SOLUTION INTRAVENOUS at 20:29

## 2021-10-06 RX ADMIN — ACETAMINOPHEN 1000 MG: 500 TABLET, FILM COATED ORAL at 21:21

## 2021-10-06 ASSESSMENT — ENCOUNTER SYMPTOMS
ABDOMINAL DISTENTION: 0
SHORTNESS OF BREATH: 0
VOMITING: 0
EYE ITCHING: 0
EYE PAIN: 0
ABDOMINAL PAIN: 1
COUGH: 0
SINUS PRESSURE: 0
NAUSEA: 1
SORE THROAT: 0
SINUS PAIN: 0
CONSTIPATION: 1
DIARRHEA: 0

## 2021-10-06 ASSESSMENT — PAIN SCALES - GENERAL: PAINLEVEL_OUTOF10: 4

## 2021-10-06 NOTE — PROGRESS NOTES
Medication History completed:    New medications: none    Medications discontinued: pantoprazole, metoprolol tartrate, fluticasone nasal spray, cyclobenzaprine    Changes to dosing:   Levothyroxine changed to 150 mcg (two 75 mcg tablets) on Sundays and 75 mcg all other days    Stated allergies: As listed    Other pertinent information: Medications confirmed with [facility].      Thank you,  Giovanni Jasso, PharmD, BCPS  [phone]

## 2021-10-06 NOTE — ED PROVIDER NOTES
[address] ED  eMERGENCY dEPARTMENT eNCOUnter   Attending Attestation     Pt Name: Carlos Heard  MRN: [id number]  [place] 1955  Date of evaluation: 10/6/21    History, EXAM, MDM:    Carlos Heard is a 61 y.o. female who presents with Abdominal Pain    3 days,abdominal pain, bloated. Low back pain on the right. Symptoms progressively worsening. H/o UC. On exam VSS. Abdomen upper tenderness. Slightly distended. No cva ttp  UA appears infected. Renal function appropriate. CT scan shows signs of constipation, thickened endometrium, lesion near her pancreas, and lesions in her liver. She will need close outpatient imaging of her liver/pancreas and uterus. Starting pt on abx and stool softeners. Vitals:    10/06/21 1719   BP: 138/63   Pulse: 78   Resp: 18   Temp: 98.2 °F (36.8 °C)   TempSrc: Oral   SpO2: 97%     I performed a history and physical examination of the patient and discussed management with the resident. I reviewed the residents note and agree with the documented findings and plan of care. Any areas of disagreement are noted on the chart. I was personally present for the key portions of any procedures. I have documented in the chart those procedures where I was not present during the key portions. I have personally reviewed all images and agree with the resident's interpretation. I have reviewed the emergency nurses triage note. I agree with the chief complaint, past medical history, past surgical history, allergies, medications, social and family history as documented unless otherwise noted below. Documentation of the HPI, Physical Exam and Medical Decision Making performed by medical students or scribes is based on my personal performance of the HPI, PE and MDM.  For Phys Assistant/ Nurse Practitioner cases/documentation I have had a face to face evaluation of this patient and have completed at least one if not all key elements of the E/M (history, physical exam, and MDM). Additional findings are as noted.     Julieta Carl MD  Attending Emergency  Physician                Julieta Carl MD  10/06/21 [id number]       Julieta Carl MD  10/09/21 [id number]

## 2021-10-06 NOTE — ED PROVIDER NOTES
[address]  Emergency Department Encounter  EmergencyMedicine Resident     Pt Telma Browning  MRN: [id number]  Birthdate 1955  Date of evaluation: 10/6/21  PCP:  Marry Sadler MD    This patient was evaluated in the Emergency Department for symptoms described in the history of present illness. The patient was evaluated in the context of the global COVID-19 pandemic, which necessitated consideration that the patient might be at risk for infection with the SARS-CoV-2 virus that causes COVID-19. Institutional protocols and algorithms that pertain to the evaluation of patients at risk for COVID-19 are in a state of rapid change based on information released by regulatory bodies including the CDC and federal and state organizations. These policies and algorithms were followed during the patient's care in the ED. CHIEF COMPLAINT       Chief Complaint   Patient presents with    Abdominal Pain       HISTORY OF PRESENT ILLNESS  (Location/Symptom, Timing/Onset, Context/Setting, Quality, Duration, Modifying Factors, Severity.)      Ivonne Linda is a 61 y.o. female who presents with epigastric and RUQ abdominal pain w/associated nausea w/o vomiting of 2 days duration. C/o \"a bad taste like hot plastic\". Denies fevers, chest pain, cough, SOB. Complains of constipation. No urinary symptoms. Hx of recurrent UTI and kidney stones as well as diabetes. Hx of prior cholecystectomy.     PAST MEDICAL / SURGICAL / SOCIAL / FAMILY HISTORY      has a past medical history of Arrhythmia, Breast mass, CAD (coronary artery disease), Chronic neck pain, Coccygeal pain, Constipation, COVID-19, Depression, Diabetic neuropathy ([address] History of hepatitis A, History of renal calculi, Hypertension, Hyperthyroidism, Hypothyroidism, Morbid obesity with BMI of 45.0-49.9, adult ([address] Nephrolithiasis, Neuropathy, Type II or unspecified type diabetes mellitus without mention of complication, not stated as uncontrolled, Ulcerative colitis ([facility] [address] and UTI (lower urinary tract infection). has a past surgical history that includes Cholecystectomy; Tonsillectomy; Tubal ligation; Cystoscopy; cyst removal; Breast biopsy (); Endometrial biopsy (); Lithotripsy; cardiovascular stress test; Cystoscopy (Right, 2019); cysto/uretero/pyeloscopy, calculus tx (Right, 2019); Endoscopy, colon, diagnostic; and Upper gastrointestinal endoscopy (N/A, 2020). Social History     Socioeconomic History    Marital status: Single     Spouse name: Not on file    Number of children: Not on file    Years of education: Not on file    Highest education level: Not on file   Occupational History    Not on file   Tobacco Use    Smoking status: Former Smoker     Packs/day: 0.01     Years: 1.00     Pack years: 0.01     Types: Cigarettes     Quit date: 5     Years since quittin.10    Smokeless tobacco: Never Used    Tobacco comment: pt states she quit smoking cigarettes at the age of 14   Vaping Use    Vaping Use: Never used   Substance and Sexual Activity    Alcohol use: No     Alcohol/week: 0.0 standard drinks    Drug use: Yes     Types: Marijuana     Comment: once in a while    Sexual activity: Yes     Partners: Male   Other Topics Concern    Not on file   Social History Narrative    Not on file     Social Determinants of Health     Financial Resource Strain:     Difficulty of Paying Living Expenses:    Food Insecurity:     Worried About Running Out of Food in the Last Year:     [address] in the Last Year:    Transportation Needs:     Lack of Transportation (Medical):      Lack of Transportation (Non-Medical):    Physical Activity:     Days of Exercise per Week:     Minutes of Exercise per Session:    Stress:     Feeling of Stress :    Social Connections:     Frequency of Communication with Friends and Family:     Frequency of Social Gatherings with Friends and Family:     Attends [employer] Services:     Active Member of Clubs or Organizations:     Attends Club or Organization Meetings:     Marital Status:    Intimate Partner Violence:     Fear of Current or Ex-Partner:     Emotionally Abused:     Physically Abused:     Sexually Abused:        Family History   Problem Relation Age of Onset    Coronary Art Dis Mother     Lung Cancer Mother     Diabetes Mother         mellitus    Coronary Art Dis Father     Diabetes Father         diabetes mellitus       Allergies:  Cefuroxime axetil; Dilaudid [hydromorphone]; Metformin and related; Sulfa antibiotics; Amoxicillin; Antihistamines, loratadine-type; Eggs or egg-derived products; Lorazepam; Nubain [nalbuphine hcl]; and Vistaril [hydroxyzine hcl]    Home Medications:  Prior to Admission medications    Medication Sig Start Date End Date Taking? Authorizing Provider   levothyroxine (SYNTHROID) 75 MCG tablet Take 150 mcg by mouth once a week Indications: Sundays   Yes Historical Provider, MD   levothyroxine (SYNTHROID) 75 MCG tablet Take 75 mcg by mouth Six times weekly Indications:  All days except Sundays   Yes Historical Provider, MD   nitrofurantoin, macrocrystal-monohydrate, (MACROBID) 100 MG capsule Take 1 capsule by mouth 2 times daily for 7 days 10/6/21 10/13/21 Yes Nina Flores,    polyethylene glycol (GLYCOLAX) 17 GM/SCOOP powder Take 17 g by mouth daily 10/6/21 11/5/21 Yes Nina Flores DO   lidocaine (LIDODERM) 5 % Place 1 patch onto the skin daily 12 hours on, 12 hours off. 10/1/21  Yes Alexia Cordova PA-C   ibuprofen (IBU) 600 MG tablet Take 1 tablet by mouth every 8 hours as needed for Pain 10/1/21  Yes Alexia Cordova PA-C   gabapentin (NEURONTIN) 600 MG tablet take 1 tablet by mouth twice a day 9/20/21 10/20/21 Yes Rosilyn Mortimer, MD   atorvastatin (LIPITOR) 80 MG tablet take 1 tablet by mouth once daily 9/20/21  Yes Rosilyn Mortimer, MD   furosemide (LASIX) 40 MG tablet take 1 tablet by mouth once daily 9/8/21  Yes Rosilyn Mortimer, MD lisinopril (PRINIVIL;ZESTRIL) 2.5 MG tablet take 1 tablet by mouth once daily 9/8/21  Yes Jesse Lorenz MD   furosemide (LASIX) 20 MG tablet take 1 tablet by mouth once daily 9/3/21  Yes Jesse Lorenz MD   famotidine (PEPCID) 20 MG tablet take 1 tablet by mouth twice a day 7/14/21  Yes Jesse Lorenz MD   SM ASPIRIN ADULT LOW STRENGTH 81 MG EC tablet take 1 tablet by mouth once daily 5/19/21  Yes Jesse Lorenz MD   dapagliflozin (FARXIGA) 5 MG tablet Take 1 tablet by mouth every morning 5/6/21  Yes Jesse Lorenz MD   DULoxetine (CYMBALTA) 60 MG extended release capsule take 1 capsule by mouth once daily 4/26/21  Yes Jesse Lorenz MD   melatonin 3 MG TABS tablet take 1 tablet by mouth nightly 1/18/21  Yes Jesse Lorenz MD   glipiZIDE (GLUCOTROL) 10 MG tablet take 2 tablets by mouth twice a day 12/22/20  Yes Jesse Lorenz MD   oxybutynin (DITROPAN) 5 MG tablet Take 5 mg by mouth daily  6/30/20  Yes Historical Provider, MD   insulin lispro, 1 Unit Dial, 100 UNIT/ML SOPN Inject 10 Units into the skin 3 times daily as needed 5/18/20  Yes Historical Provider, MD   Insulin Degludec 200 UNIT/ML SOPN Inject 75 Units into the skin nightly 3/30/20  Yes Mary Pfeiffer MD   RA PEN NEEDLES 31G X 8 MM MISC INJECTING TWICE DAILY DX: E11.65 10/28/20   Historical Provider, MD       REVIEW OF SYSTEMS    (2-9 systems for level 4, 10 or more for level 5)      Review of Systems   Constitutional: Negative for activity change, chills and fever. HENT: Negative for congestion, sinus pressure, sinus pain and sore throat. Eyes: Negative for pain and itching. Respiratory: Negative for cough and shortness of breath. Cardiovascular: Negative for chest pain. Gastrointestinal: Positive for abdominal pain, constipation and nausea. Negative for abdominal distention, diarrhea and vomiting. Endocrine: Negative for polyuria. Genitourinary: Negative for dysuria and frequency. Musculoskeletal: Negative for arthralgias.    Skin: Negative for rash. Neurological: Negative for light-headedness and headaches. PHYSICAL EXAM   (up to 7 for level 4, 8 or more for level 5)      INITIAL VITALS:   /63   Pulse 78   Temp 98.2 °F (36.8 °C) (Oral)   Resp 18   LMP 01/12/2015 (Approximate)   SpO2 97%     Physical Exam  Vitals reviewed. Constitutional:       General: She is not in acute distress. HENT:      Head: Normocephalic and atraumatic. Ears:      Comments: Hearing grossly normal     Nose: Nose normal.      Mouth/Throat:      Mouth: Mucous membranes are moist.      Pharynx: Oropharynx is clear. Eyes:      General: No scleral icterus. Conjunctiva/sclera: Conjunctivae normal.      Pupils: Pupils are equal, round, and reactive to light. Cardiovascular:      Rate and Rhythm: Normal rate and regular rhythm. Pulses: Normal pulses. Pulmonary:      Effort: Pulmonary effort is normal. No respiratory distress. Breath sounds: Normal breath sounds. Abdominal:      General: There is no distension. Palpations: Abdomen is soft. Tenderness: There is abdominal tenderness in the right upper quadrant and epigastric area. There is no right CVA tenderness, left CVA tenderness or guarding. Musculoskeletal:      Cervical back: No muscular tenderness. Right lower leg: No edema. Left lower leg: No edema. Skin:     General: Skin is warm and dry. Capillary Refill: Capillary refill takes less than 2 seconds. Neurological:      General: No focal deficit present. Mental Status: She is alert and oriented to person, place, and time. Mental status is at baseline.          DIFFERENTIAL  DIAGNOSIS     PLAN (LABS / IMAGING / EKG):  Orders Placed This Encounter   Procedures    XR CHEST (SINGLE VIEW FRONTAL)    CT ABDOMEN PELVIS W IV CONTRAST Additional Contrast? None    Basic Metabolic Panel    CBC Auto Differential    Hepatic Function Panel    Lipase    Urinalysis with Microscopic    Troponin    Lactate, Sepsis    EKG 12 Lead       MEDICATIONS ORDERED:  Orders Placed This Encounter   Medications    aluminum & magnesium hydroxide-simethicone (MAALOX) 200-200-20 MG/5ML suspension 30 mL    lidocaine viscous hcl (XYLOCAINE) 2 % solution 15 mL    famotidine (PEPCID) injection 20 mg    iopamidol (ISOVUE-370) 76 % injection 100 mL    sodium chloride flush 0.9 % injection 10 mL    0.9 % sodium chloride bolus    nitrofurantoin, macrocrystal-monohydrate, (MACROBID) 100 MG capsule     Sig: Take 1 capsule by mouth 2 times daily for 7 days     Dispense:  14 capsule     Refill:  0    acetaminophen (TYLENOL) tablet 1,000 mg    ketorolac (TORADOL) injection 30 mg    polyethylene glycol (GLYCOLAX) 17 GM/SCOOP powder     Sig: Take 17 g by mouth daily     Dispense:  510 g     Refill:  0       DIAGNOSTIC RESULTS / EMERGENCY DEPARTMENT COURSE / MDM   LAB RESULTS:  Results for orders placed or performed during the hospital encounter of 21/38/29   Basic Metabolic Panel   Result Value Ref Range    Glucose 136 (H) 70 - 99 mg/dL    BUN 13 8 - 23 mg/dL    CREATININE 0.44 (L) 0.50 - 0.90 mg/dL    Bun/Cre Ratio NOT REPORTED 9 - 20    Calcium 9.4 8.6 - 10.4 mg/dL    Sodium 139 135 - 144 mmol/L    Potassium 4.5 3.7 - 5.3 mmol/L    Chloride 105 98 - 107 mmol/L    CO2 25 20 - 31 mmol/L    Anion Gap 9 9 - 17 mmol/L    GFR Non-African American >60 >60 mL/min    GFR African American >60 >60 mL/min    GFR Comment          GFR Staging NOT REPORTED    CBC Auto Differential   Result Value Ref Range    WBC 12.2 (H) 3.5 - 11.0 k/uL    RBC 5.05 4.0 - 5.2 m/uL    Hemoglobin 15.0 12.0 - 16.0 g/dL    Hematocrit 45.1 36 - 46 %    MCV 89.1 80 - 100 fL    MCH 29.6 26 - 34 pg    MCHC 33.2 31 - 37 g/dL    RDW 13.8 11.5 - 14.9 %    Platelets 654 642 - 238 k/uL    MPV 9.5 6.0 - 12.0 fL    NRBC Automated NOT REPORTED per 100 WBC    Differential Type NOT REPORTED     Seg Neutrophils 67 (H) 36 - 66 %    Lymphocytes 24 24 - 44 %    Monocytes 7 1 - 7 % Troponin T NOT REPORTED <0.03 ng/mL    Troponin Interp NOT REPORTED    Lactate, Sepsis   Result Value Ref Range    Lactic Acid, Sepsis 1.3 0.5 - 1.9 mmol/L    Lactic Acid, Sepsis, Whole Blood NOT REPORTED 0.5 - 1.9 mmol/L   EKG 12 Lead   Result Value Ref Range    Ventricular Rate 75 BPM    Atrial Rate 75 BPM    P-R Interval 158 ms    QRS Duration 94 ms    Q-T Interval 402 ms    QTc Calculation (Bazett) 448 ms    P Axis 63 degrees    R Axis -39 degrees    T Axis 18 degrees       IMPRESSION: Severo Golds is a 61 y.o. woman w/hx of UC presenting for epigastric pain and bad taste. VSS at the time of presentation, non toxic in appearance. Biochemical workup was generally unremarkable, however CT scan showed multiple abnormalities of which the patient was informed including areas of hypoenhancement of the liver, pancreatic cyst, endometrial thickening and large stool burden. Analgesia was offered with good effect and she was given a GI cocktail including pepcid, maalox and viscous lidocaine. Patient agreed to f/u w/PCP regarding her results and return to ED for new or worsening symptoms. RADIOLOGY:  XR CHEST (SINGLE VIEW FRONTAL)    Result Date: 10/6/2021  No radiographic evidence of acute pulmonary disease. Cardiomegaly. Chronic calcific rotator cuff tendinopathy right shoulder. XR HIP RIGHT (2-3 VIEWS)    Result Date: 10/1/2021  No acute abnormality right femur. Degenerative findings, as above. XR FEMUR RIGHT (MIN 2 VIEWS)    Result Date: 10/1/2021  No acute osseous abnormality. Postsurgical changes consistent with ACL repair. CT ABDOMEN PELVIS W IV CONTRAST Additional Contrast? None    Result Date: 10/6/2021  1. No correlate for pain. 2. Irregular pattern of peripherally oriented hypoenhancement involving both lobes of the liver, incompletely characterized on a single phase CT. This is favored to represent an atypical pattern of focal fatty change in the liver.  Recommend further evaluation with nonemergent liver protocol MRI with Eovist. 3. Possible 1.6 cm hypoattenuating lesion of the pancreatic head though this most likely represents a fluid-filled duodenal diverticulum seen on prior studies. Attention on above recommended MRI. 4. Abnormal endometrial thickening to 14 mm. Recommend further evaluation with nonemergent pelvic ultrasound. 5. Large volume stool throughout the colon. EKG  Regular, normal sinus, normal rate, normal intervals, no ST segment elevations or depressions, T wave inversions in aVR and lead III    All EKG's are interpreted by the Emergency Department Physician who either signs or Co-signs this chart in the absence of a cardiologist.    EMERGENCY DEPARTMENT COURSE:  Patient seen and evaluated, VSS and nontoxic in appearance. ED w/u as documented above. Patient agrees to return to ED for new or worsening symptoms. Understands to f/u w/PCP regarding CT scan results. PROCEDURES:  none    CONSULTS:  None    CRITICAL CARE:  See attending note    FINAL IMPRESSION      1. Epigastric pain    2. Acute cystitis without hematuria    3.  Nausea          DISPOSITION / PLAN     DISPOSITION Decision To Discharge 10/06/2021 09:31:48 PM      PATIENT REFERRED TO:  Fatoumata Cazares MD  [address]  [facility] [address]  [address] [phone]      As needed, If symptoms worsen    [facility] ED  [facility] [address]  [address] [address]  [phone]    As needed, If symptoms worsen      DISCHARGE MEDICATIONS:  Discharge Medication List as of 10/6/2021  9:32 PM      START taking these medications    Details   nitrofurantoin, macrocrystal-monohydrate, (MACROBID) 100 MG capsule Take 1 capsule by mouth 2 times daily for 7 days, Disp-14 capsule, R-0Print      polyethylene glycol (GLYCOLAX) 17 GM/SCOOP powder Take 17 g by mouth daily, Disp-510 g, R-0Print             Maximiliano Figueroa DO  Emergency Medicine Resident    (Please note that portions of thisnote were completed with a voice recognition program.  Efforts were made to edit the dictations but occasionally words are mis-transcribed.)       Rhett Cleaning DO  Resident  10/06/21 [id number]

## 2021-10-06 NOTE — ED TRIAGE NOTES
Mode of arrival (squad #, walk in, police, etc) : walk in        Chief complaint(s): ABD pain        Arrival Note (brief scenario, treatment PTA, etc). : Pt states she has been having epigastric pain with gas. Pt also states she has a \"weird\" taste in her mouth that is \"hot\". C= \"Have you ever felt that you should Cut down on your drinking? \"  No  A= \"Have people Annoyed you by criticizing your drinking? \"  No  G= \"Have you ever felt bad or Guilty about your drinking? \"  No  E= \"Have you ever had a drink as an Eye-opener first thing in the morning to steady your nerves or to help a hangover? \"  No      Deferred []      Reason for deferring: N/A    *If yes to two or more: probable alcohol abuse. *

## 2021-10-07 ENCOUNTER — VIRTUAL VISIT (OUTPATIENT)
Dept: FAMILY MEDICINE CLINIC | Age: 66
End: 2021-10-07
Payer: COMMERCIAL

## 2021-10-07 ENCOUNTER — TELEPHONE (OUTPATIENT)
Dept: FAMILY MEDICINE CLINIC | Age: 66
End: 2021-10-07

## 2021-10-07 ENCOUNTER — TELEPHONE (OUTPATIENT)
Dept: GASTROENTEROLOGY | Age: 66
End: 2021-10-07

## 2021-10-07 DIAGNOSIS — R74.8 ELEVATED ALKALINE PHOSPHATASE LEVEL: ICD-10-CM

## 2021-10-07 DIAGNOSIS — R93.89 THICKENED ENDOMETRIUM: ICD-10-CM

## 2021-10-07 DIAGNOSIS — N30.90 CYSTITIS: Primary | ICD-10-CM

## 2021-10-07 DIAGNOSIS — I10 ESSENTIAL HYPERTENSION: ICD-10-CM

## 2021-10-07 DIAGNOSIS — K86.2 PANCREATIC CYST: ICD-10-CM

## 2021-10-07 DIAGNOSIS — K59.01 SLOW TRANSIT CONSTIPATION: ICD-10-CM

## 2021-10-07 DIAGNOSIS — Z80.49 FAMILY HISTORY OF UTERINE CANCER: ICD-10-CM

## 2021-10-07 PROCEDURE — 99214 OFFICE O/P EST MOD 30 MIN: CPT | Performed by: FAMILY MEDICINE

## 2021-10-07 ASSESSMENT — ENCOUNTER SYMPTOMS
CONSTIPATION: 1
ABDOMINAL PAIN: 1
SHORTNESS OF BREATH: 0

## 2021-10-07 NOTE — PATIENT INSTRUCTIONS
· You passed out (lost consciousness).     · Your stools are maroon or very bloody.     · You have trouble breathing. Call your doctor now or go to the emergency room if:    · You have new or worse belly pain.     · You have pain that does not get better after you take pain medicine.     · You have a fever.     · You cannot pass stools or gas.     · You are sick to your stomach or cannot hold down fluids.     · You have blood in your stools. Watch closely for changes in your health, and be sure to contact your doctor if:    · Your throat still hurts after a day or two.     · You do not get better as expected. Where can you learn more? Go to [email or web address]://[email or web address] org and sign in to your [facility] account. Enter S958 in the [employer] box to learn more about \"Endoscopic Retrograde Cholangiopancreatogram (ERCP): What to Expect at Home. \"     If you do not have an account, please click on the \"Sign Up Now\" link. Current as of: February 10, 2021               Content Version: 13.0  © 0978-9981 [employer]wise, [employer]. Care instructions adapted under license by [address] If you have questions about a medical condition or this instruction, always ask your healthcare professional. [address] any warranty or liability for your use of this information.

## 2021-10-07 NOTE — TELEPHONE ENCOUNTER
----- Message from [facility] sent at 10/7/2021  4:26 PM EDT -----  Subject: Message to Provider    QUESTIONS  Information for Provider? Pt went to the ER and was told that she had a   cyst on her pancreas and wants to know where on her pancreas the cyst is   located. ---------------------------------------------------------------------------  --------------  Simone DUFF  What is the best way for the office to contact you? OK to leave message on   voicemail  Preferred Call Back Phone Number? [phone]  ---------------------------------------------------------------------------  --------------  SCRIPT ANSWERS  Relationship to Patient?  Self

## 2021-10-07 NOTE — ED NOTES
Sitting up in bed with visitor at bedside. Reports improvement of pain. Denies needs at this time. Updated on care plan. Call light in reach.      Larry Becker RN  10/06/21 [id number]

## 2021-10-07 NOTE — ED NOTES
Patient discharged alert and oriented. Skin pink, warm, dry. RN discussed, educated, and counseled on care plan. Denies needs or questions at this time. States will follow up as directed. Encouraged to return for worsening or new symptoms.        Winter Rodriguez RN  10/06/21 [id number]

## 2021-10-07 NOTE — TELEPHONE ENCOUNTER
Pt LVM stated she needs to be seen for a pancreatic cyst.     Returned call to pt and scheduled appt w/Dr. Shoshana Hobson on Wed 10/13/21. Referral received from PCP.     Est pt Dr. Shoshana Hobson (Last seen in ED 4/14/202) - [employer] - Pancreatic cyst, Slow transit constipation

## 2021-10-08 ENCOUNTER — TELEPHONE (OUTPATIENT)
Dept: FAMILY MEDICINE CLINIC | Age: 66
End: 2021-10-08

## 2021-10-09 LAB
EKG ATRIAL RATE: 75 BPM
EKG P AXIS: 63 DEGREES
EKG P-R INTERVAL: 158 MS
EKG Q-T INTERVAL: 402 MS
EKG QRS DURATION: 94 MS
EKG QTC CALCULATION (BAZETT): 448 MS
EKG R AXIS: -39 DEGREES
EKG T AXIS: 18 DEGREES
EKG VENTRICULAR RATE: 75 BPM

## 2021-10-09 PROCEDURE — 93010 ELECTROCARDIOGRAM REPORT: CPT | Performed by: INTERNAL MEDICINE

## 2021-10-11 ENCOUNTER — OFFICE VISIT (OUTPATIENT)
Dept: OBGYN CLINIC | Age: 66
End: 2021-10-11
Payer: COMMERCIAL

## 2021-10-11 ENCOUNTER — TELEPHONE (OUTPATIENT)
Dept: FAMILY MEDICINE CLINIC | Age: 66
End: 2021-10-11

## 2021-10-11 VITALS
BODY MASS INDEX: 45.08 KG/M2 | WEIGHT: 245 LBS | HEIGHT: 62 IN | DIASTOLIC BLOOD PRESSURE: 76 MMHG | SYSTOLIC BLOOD PRESSURE: 124 MMHG

## 2021-10-11 DIAGNOSIS — R93.89 THICKENED ENDOMETRIUM: Primary | ICD-10-CM

## 2021-10-11 DIAGNOSIS — K59.01 SLOW TRANSIT CONSTIPATION: ICD-10-CM

## 2021-10-11 DIAGNOSIS — Z87.898 HISTORY OF LUMP IN BREAST: Primary | ICD-10-CM

## 2021-10-11 DIAGNOSIS — N63.24 BREAST LUMP ON LEFT SIDE AT 7 O'CLOCK POSITION: ICD-10-CM

## 2021-10-11 PROCEDURE — 99213 OFFICE O/P EST LOW 20 MIN: CPT | Performed by: NURSE PRACTITIONER

## 2021-10-11 PROCEDURE — G8484 FLU IMMUNIZE NO ADMIN: HCPCS | Performed by: NURSE PRACTITIONER

## 2021-10-11 PROCEDURE — 3017F COLORECTAL CA SCREEN DOC REV: CPT | Performed by: NURSE PRACTITIONER

## 2021-10-11 PROCEDURE — G8417 CALC BMI ABV UP PARAM F/U: HCPCS | Performed by: NURSE PRACTITIONER

## 2021-10-11 PROCEDURE — G8427 DOCREV CUR MEDS BY ELIG CLIN: HCPCS | Performed by: NURSE PRACTITIONER

## 2021-10-11 PROCEDURE — 1036F TOBACCO NON-USER: CPT | Performed by: NURSE PRACTITIONER

## 2021-10-11 RX ORDER — SENNA PLUS 8.6 MG/1
1 TABLET ORAL 2 TIMES DAILY
Qty: 60 TABLET | Refills: 11 | Status: SHIPPED | OUTPATIENT
Start: 2021-10-11 | End: 2022-05-17

## 2021-10-11 NOTE — TELEPHONE ENCOUNTER
Pt called and states that she was given miralax, she states that she still is backed up any suggestions. She stated that ED told her not to take laxative she can mess up her Uterus as they told her she has thickening of her Uterus.
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
## 2021-10-11 NOTE — PROGRESS NOTES
Katlin Moseley  10/11/2021    YOB: 1955          The patient was seen today. Patient is here as follow up from ER. She is here regarding thickened endometrial lining of uterus on CT Scan. Denies vaginal bleeding. Complaining of left breast lump- been there for a couple of months. Complaining of pain to this area. Denies family hx of breast cancer. Her bowels are regular and she is voiding without difficulty. HPI:  Rocael Walsh is a 61 y.o. female No obstetric history on file. Follow up ER- thickened endometrial lining on CT scan  Complaining of left breast lump at 7 o'clock      OB History   No obstetric history on file.        Past Medical History:   Diagnosis Date    Arrhythmia     Breast mass     CAD (coronary artery disease)     with valvular disease    Chronic neck pain     Coccygeal pain 3/7/2016    Constipation     COVID-19 04/2020    Depression     Diabetic neuropathy (HCC)     History of hepatitis A     possible history of hepatitis A in the past    History of renal calculi     Hypertension     Hyperthyroidism     Hypothyroidism     Morbid obesity with BMI of 45.0-49.9, adult ([facility] [address] 3/31/2016    Nephrolithiasis     Neuropathy     Type II or unspecified type diabetes mellitus without mention of complication, not stated as uncontrolled     non insulin dependent     Ulcerative colitis ([address]     UTI (lower urinary tract infection)        Past Surgical History:   Procedure Laterality Date    BREAST BIOPSY  2012    negative    CARDIOVASCULAR STRESS TEST      7/17/15 no results    CHOLECYSTECTOMY      CYST REMOVAL      right hand    CYSTO/URETERO/PYELOSCOPY, CALCULUS TX Right 8/26/2019    CYSTOSCOPY URETEROSCOPY  STONE BASKET RETRIEVAL RIGHT STENT EXCHANGE performed by Niranjan Matos MD at [facility] Right 8/20/2019    CYSTOSCOPY URETERAL STENT INSERTION performed by Niranjan Matos MD at [facility] 2009    ENDOSCOPY, COLON, DIAGNOSTIC      LITHOTRIPSY      TONSILLECTOMY      TUBAL LIGATION      UPPER GASTROINTESTINAL ENDOSCOPY N/A 2020    EGD BIOPSY performed by Tone Mtz MD at [address] [place] [address] ENDO       Family History   Problem Relation Age of Onset    Coronary Art Dis Mother     Lung Cancer Mother     Diabetes Mother         mellitus    Coronary Art Dis Father     Diabetes Father         diabetes mellitus       Social History     Socioeconomic History    Marital status: Single     Spouse name: Not on file    Number of children: Not on file    Years of education: Not on file    Highest education level: Not on file   Occupational History    Not on file   Tobacco Use    Smoking status: Former Smoker     Packs/day: 0.01     Years: 1.00     Pack years: 0.01     Types: Cigarettes     Quit date: 5     Years since quittin.10    Smokeless tobacco: Never Used    Tobacco comment: pt states she quit smoking cigarettes at the age of 14   Vaping Use    Vaping Use: Never used   Substance and Sexual Activity    Alcohol use: No     Alcohol/week: 0.0 standard drinks    Drug use: Yes     Types: Marijuana     Comment: once in a while    Sexual activity: Yes     Partners: Male   Other Topics Concern    Not on file   Social History Narrative    Not on file     Social Determinants of Health     Financial Resource Strain:     Difficulty of Paying Living Expenses:    Food Insecurity:     Worried About [address] in the Last Year:     [address] [facility] [address] in the Last Year:    Transportation Needs:     Lack of Transportation (Medical):      Lack of Transportation (Non-Medical):    Physical Activity:     Days of Exercise per Week:     Minutes of Exercise per Session:    Stress:     Feeling of Stress :    Social Connections:     Frequency of Communication with Friends and Family:     Frequency of Social Gatherings with Friends and Family:     Attends Jain Services:     Active Member of Clubs or Organizations:     Attends Club or Organization Meetings:     Marital Status:    Intimate Partner Violence:     Fear of Current or Ex-Partner:     Emotionally Abused:     Physically Abused:     Sexually Abused:          MEDICATIONS:  Current Outpatient Medications   Medication Sig Dispense Refill    senna (SENOKOT) 8.6 MG tablet Take 1 tablet by mouth 2 times daily 60 tablet 11    levothyroxine (SYNTHROID) 75 MCG tablet Take 150 mcg by mouth once a week Indications: Sundays      levothyroxine (SYNTHROID) 75 MCG tablet Take 75 mcg by mouth Six times weekly Indications: All days except Sundays      nitrofurantoin, macrocrystal-monohydrate, (MACROBID) 100 MG capsule Take 1 capsule by mouth 2 times daily for 7 days 14 capsule 0    polyethylene glycol (GLYCOLAX) 17 GM/SCOOP powder Take 17 g by mouth daily 510 g 0    lidocaine (LIDODERM) 5 % Place 1 patch onto the skin daily 12 hours on, 12 hours off.  10 patch 0    ibuprofen (IBU) 600 MG tablet Take 1 tablet by mouth every 8 hours as needed for Pain 30 tablet 0    gabapentin (NEURONTIN) 600 MG tablet take 1 tablet by mouth twice a day 60 tablet 0    atorvastatin (LIPITOR) 80 MG tablet take 1 tablet by mouth once daily 30 tablet 3    furosemide (LASIX) 40 MG tablet take 1 tablet by mouth once daily 90 tablet 1    lisinopril (PRINIVIL;ZESTRIL) 2.5 MG tablet take 1 tablet by mouth once daily 90 tablet 1    furosemide (LASIX) 20 MG tablet take 1 tablet by mouth once daily 90 tablet 3    famotidine (PEPCID) 20 MG tablet take 1 tablet by mouth twice a day 60 tablet 3    SM ASPIRIN ADULT LOW STRENGTH 81 MG EC tablet take 1 tablet by mouth once daily 90 tablet 1    dapagliflozin (FARXIGA) 5 MG tablet Take 1 tablet by mouth every morning 90 tablet 1    DULoxetine (CYMBALTA) 60 MG extended release capsule take 1 capsule by mouth once daily 90 capsule 3    melatonin 3 MG TABS tablet take 1 tablet by mouth nightly 30 tablet 1    glipiZIDE (GLUCOTROL) 10 MG tablet take 2 tablets by mouth twice a day 180 tablet 2    RA PEN NEEDLES 31G X 8 MM MISC INJECTING TWICE DAILY DX: E11.65      oxybutynin (DITROPAN) 5 MG tablet Take 5 mg by mouth daily       insulin lispro, 1 Unit Dial, 100 UNIT/ML SOPN Inject 10 Units into the skin 3 times daily as needed      Insulin Degludec 200 UNIT/ML SOPN Inject 75 Units into the skin nightly 5 pen 2     No current facility-administered medications for this visit. ALLERGIES:  Allergies as of 10/11/2021 - Fully Reviewed 10/11/2021   Allergen Reaction Noted    Cefuroxime axetil Anaphylaxis 12/06/2011    Dilaudid [hydromorphone] Anaphylaxis 09/16/2013    Metformin and related Diarrhea, Itching, and Nausea And Vomiting 06/08/2016    Sulfa antibiotics Itching 12/06/2011    Amoxicillin  07/17/2015    Antihistamines, loratadine-type  07/17/2015    Eggs or egg-derived products  07/30/2018    Lorazepam  01/14/2012    Nubain [nalbuphine hcl] Other (See Comments) 07/07/2018    Vistaril [hydroxyzine hcl]  01/10/2012         REVIEW OF SYSTEMS:    yes   A minimum of an eleven point review of systems was completed. Review Of Systems (11 point):  Constitutional: No fever, chills or malaise; No weight change or fatigue  Head and Eyes: No vision, Headache, Dizziness or trauma in last 12 months  ENT ROS: No hearing, Tinnitis, sinus or taste problems  Hematological and Lymphatic ROS:No Lymphoma, Von Willebrand's, Hemophillia or Bleeding History  Psych ROS: No Depression, Homicidal thoughts,suicidal thoughts, or anxiety  Breast ROS: No prior breast abnormalities or lumps. + left breast lump  Respiratory ROS: No SOB, Pneumoniae,Cough, or Pulmonary Embolism History  Cardiovascular ROS: No Chest Pain with Exertion, Palpitations, Syncope, Edema, Arrhythmia  Gastrointestinal ROS: No Indigestion, Heartburn, Nausea, vomiting, Diarrhea, Constipation,or Bowel Changes;  No Bloody Stools or melena  Genito-Urinary ROS: No Dysuria, Hematuria or Nocturia. No Urinary Incontinence or Vaginal Discharge. + thickened endometrial lining on CT scan  Musculoskeletal ROS: No Arthralgia, Arthritis,Gout,Osteoporosis or Rheumatism  Neurological ROS: No CVA, Migraines, Epilepsy, Seizure Hx, or Limb Weakness  Dermatological ROS: No Rash, Itching, Hives, Mole Changes or Cancer          Blood pressure 124/76, height 5' 2\" (1.575 m), weight 245 lb (111.1 kg), last menstrual period 01/12/2015, not currently breastfeeding. Chaperone for Intimate Exam   Chaperone was offered and accepted as part of the rooming process.  Chaperone: Rosanna         Abdomen: Soft non-tender; good bowel sounds. No guarding, rebound or rigidity. No CVA tenderness bilaterally. Extremities: No calf tenderness, DTR 2/4, and No edema bilaterally    Pelvic: Exam deferred. Breasts: normal appearance, no masses, no tenderness on right breast.  Left breast lump noted at 7 o'clock position  No nipple retraction or dimpling. No nipple discharge or bleeding. No axillary or supraclavicular adenopathy noted. Diagnostics:  XR CHEST (SINGLE VIEW FRONTAL)    Result Date: 10/6/2021  EXAMINATION: ONE XRAY VIEW OF THE CHEST 10/6/2021 6:20 pm COMPARISON: Chest 07/30/2020 HISTORY: ORDERING SYSTEM PROVIDED HISTORY: SOB TECHNOLOGIST PROVIDED HISTORY: SOB Reason for Exam: Abdominal discomfort, indigestion, shortness of breath Acuity: Acute Type of Exam: Initial Additional signs and symptoms: Abdominal discomfort, indigestion, shortness of breath Relevant Medical/Surgical History: Abdominal discomfort, indigestion, shortness of breath FINDINGS: The cardiac silhouette is enlarged. The mediastinal and hilar silhouettes appear unremarkable. Chronic asymmetric elevation right hemidiaphragm seen as anterior hemidiaphragm eventration on prior lateral radiograph. The lungs appear clear. No pleural effusion. No pneumothorax is seen. No acute osseous abnormality is identified.   Calcification projects just superior to the right humeral greater tuberosity. No radiographic evidence of acute pulmonary disease. Cardiomegaly. Chronic calcific rotator cuff tendinopathy right shoulder. XR HIP RIGHT (2-3 VIEWS)    Result Date: 10/1/2021  EXAMINATION: TWO XRAY VIEWS OF THE RIGHT HIP 10/1/2021 5:30 pm COMPARISON: Two-view study of the right hip from 07/30/2019 HISTORY: ORDERING SYSTEM PROVIDED HISTORY: fall TECHNOLOGIST PROVIDED HISTORY: fall Reason for Exam: right hip and proximal femur pain Acuity: Unknown Type of Exam: Unknown FINDINGS: No acute fracture. No dislocation. Similar diminutive hip joint space with mild degenerative changes; mild degenerative changes greater trochanter also again seen. No destructive or blastic lesion. No large right hip joint effusion suspected. No abnormal calcification seen. Slight enthesopathy lateral right iliac crest.     No acute abnormality right femur. Degenerative findings, as above. XR FEMUR RIGHT (MIN 2 VIEWS)    Result Date: 10/1/2021  EXAMINATION: XRAY VIEWS OF THE RIGHT FEMUR 10/1/2021 5:30 pm COMPARISON: None. HISTORY: ORDERING SYSTEM PROVIDED HISTORY: fall TECHNOLOGIST PROVIDED HISTORY: fall Reason for Exam: right hip and proximal femur pain Acuity: Unknown Type of Exam: Unknown FINDINGS: There is no evidence of acute fracture. There is normal alignment. No acute joint abnormality. No focal osseous lesion. No focal soft tissue abnormality. No acute osseous abnormality. Postsurgical changes consistent with ACL repair. CT ABDOMEN PELVIS W IV CONTRAST Additional Contrast? None    Result Date: 10/6/2021  EXAMINATION: CT OF THE ABDOMEN AND PELVIS WITH CONTRAST 10/6/2021 8:11 pm TECHNIQUE: CT of the abdomen and pelvis was performed with the administration of intravenous contrast. Multiplanar reformatted images are provided for review.  Dose modulation, iterative reconstruction, and/or weight based adjustment of the mA/kV was utilized to reduce the radiation dose to as low as reasonably achievable. COMPARISON: 09/06/2019, 10/15/2011 HISTORY: ORDERING SYSTEM PROVIDED HISTORY: abdominal pain, hx of UC TECHNOLOGIST PROVIDED HISTORY: abdominal pain, hx of UC Decision Support Exception - unselect if not a suspected or confirmed emergency medical condition->Emergency Medical Condition (MA) Reason for Exam: epigastric ab pain with nausea x 1 day Acuity: Acute Type of Exam: Initial FINDINGS: Lower Chest: Partially imaged pericardial recesses versus pericardial cysts. Organs: Irregular pattern of peripherally oriented hypoenhancement involving both lobes of the liver. Gallbladder is surgically absent. No biliary ductal dilatation. Possible 1.6 cm hypoattenuating lesion of the pancreatic head, though this most likely represents fluid-filled duodenal diverticulum seen on prior studies. Adrenals are unremarkable. Spleen is normal in size. No renal calculi or hydronephrosis. Mild calcific atherosclerosis. GI/Bowel: Large volume stool throughout the colon. Colonic diverticulosis without bowel wall thickening or dilatation. Appendix is normal. Pelvis: Similar 4.6 cm intramural fibroid. Abnormal endometrial thickness of 14 mm. Peritoneum/Retroperitoneum:No free fluid, free air, organized fluid collection or lymphadenopathy. Bones: Multilevel degenerative disc disease. 1. No correlate for pain. 2. Irregular pattern of peripherally oriented hypoenhancement involving both lobes of the liver, incompletely characterized on a single phase CT. This is favored to represent an atypical pattern of focal fatty change in the liver. Recommend further evaluation with nonemergent liver protocol MRI with Eovist. 3. Possible 1.6 cm hypoattenuating lesion of the pancreatic head though this most likely represents a fluid-filled duodenal diverticulum seen on prior studies. Attention on above recommended MRI. 4. Abnormal endometrial thickening to 14 mm.   Recommend further evaluation with nonemergent pelvic ultrasound. 5. Large volume stool throughout the colon.        Lab Results:  Results for orders placed or performed during the hospital encounter of 26/24/62   Basic Metabolic Panel   Result Value Ref Range    Glucose 136 (H) 70 - 99 mg/dL    BUN 13 8 - 23 mg/dL    CREATININE 0.44 (L) 0.50 - 0.90 mg/dL    Bun/Cre Ratio NOT REPORTED 9 - 20    Calcium 9.4 8.6 - 10.4 mg/dL    Sodium 139 135 - 144 mmol/L    Potassium 4.5 3.7 - 5.3 mmol/L    Chloride 105 98 - 107 mmol/L    CO2 25 20 - 31 mmol/L    Anion Gap 9 9 - 17 mmol/L    GFR Non-African American >60 >60 mL/min    GFR African American >60 >60 mL/min    GFR Comment          GFR Staging NOT REPORTED    CBC Auto Differential   Result Value Ref Range    WBC 12.2 (H) 3.5 - 11.0 k/uL    RBC 5.05 4.0 - 5.2 m/uL    Hemoglobin 15.0 12.0 - 16.0 g/dL    Hematocrit 45.1 36 - 46 %    MCV 89.1 80 - 100 fL    MCH 29.6 26 - 34 pg    MCHC 33.2 31 - 37 g/dL    RDW 13.8 11.5 - 14.9 %    Platelets 510 229 - 886 k/uL    MPV 9.5 6.0 - 12.0 fL    NRBC Automated NOT REPORTED per 100 WBC    Differential Type NOT REPORTED     Seg Neutrophils 67 (H) 36 - 66 %    Lymphocytes 24 24 - 44 %    Monocytes 7 1 - 7 %    Eosinophils % 1 0 - 4 %    Basophils 1 0 - 2 %    Immature Granulocytes NOT REPORTED 0 %    Segs Absolute 8.30 1.3 - 9.1 k/uL    Absolute Lymph # 2.90 1.0 - 4.8 k/uL    Absolute Mono # 0.80 0.1 - 1.3 k/uL    Absolute Eos # 0.10 0.0 - 0.4 k/uL    Basophils Absolute 0.10 0.0 - 0.2 k/uL    Absolute Immature Granulocyte NOT REPORTED 0.00 - 0.30 k/uL    WBC Morphology NOT REPORTED     RBC Morphology NOT REPORTED     Platelet Estimate NOT REPORTED    Hepatic Function Panel   Result Value Ref Range    Albumin 3.6 3.5 - 5.2 g/dL    Alkaline Phosphatase 144 (H) 35 - 104 U/L    ALT 32 5 - 33 U/L    AST 29 <32 U/L    Total Bilirubin 0.52 0.3 - 1.2 mg/dL    Bilirubin, Direct 0.15 <0.31 mg/dL    Bilirubin, Indirect 0.37 0.00 - 1.00 mg/dL    Total Protein 6.8 6.4 - 8.3 g/dL    Globulin NOT REPORTED 1.5 - 3.8 g/dL    Albumin/Globulin Ratio NOT REPORTED 1.0 - 2.5   Lipase   Result Value Ref Range    Lipase 10 (L) 13 - 60 U/L   Urinalysis with Microscopic   Result Value Ref Range    Color, UA Yellow Yellow    Turbidity UA Cloudy (A) Clear    Glucose, Ur NEGATIVE NEGATIVE    Bilirubin Urine NEGATIVE NEGATIVE    Ketones, Urine NEGATIVE NEGATIVE    Specific Gravity, UA 1.012 1.000 - 1.030    Urine Hgb MOD (A) NEGATIVE    pH, UA 5.5 5.0 - 8.0    Protein, UA 1+ (A) NEGATIVE    Urobilinogen, Urine Normal Normal    Nitrite, Urine POSITIVE (A) NEGATIVE    Leukocyte Esterase, Urine MOD (A) NEGATIVE    Urinalysis Comments NOT REPORTED     -          WBC, UA 50  /HPF    RBC, UA 20 TO 50 /HPF    Casts UA NOT REPORTED /LPF    Crystals, UA NOT REPORTED None /HPF    Epithelial Cells UA 10 TO 20 /HPF    Renal Epithelial, UA NOT REPORTED 0 /HPF    Bacteria, UA MANY (A) None    Mucus, UA NOT REPORTED None    Trichomonas, UA NOT REPORTED None    Amorphous, UA NOT REPORTED None    Other Observations UA NOT REPORTED NOT REQ. Yeast, UA NOT REPORTED None   Troponin   Result Value Ref Range    Troponin, High Sensitivity 14 0 - 14 ng/L    Troponin T NOT REPORTED <0.03 ng/mL    Troponin Interp NOT REPORTED    Lactate, Sepsis   Result Value Ref Range    Lactic Acid, Sepsis 1.3 0.5 - 1.9 mmol/L    Lactic Acid, Sepsis, Whole Blood NOT REPORTED 0.5 - 1.9 mmol/L   EKG 12 Lead   Result Value Ref Range    Ventricular Rate 75 BPM    Atrial Rate 75 BPM    P-R Interval 158 ms    QRS Duration 94 ms    Q-T Interval 402 ms    QTc Calculation (Bazett) 448 ms    P Axis 63 degrees    R Axis -39 degrees    T Axis 18 degrees         Assessment:   Diagnosis Orders   1. Thickened endometrium  US PELVIS COMPLETE    US NON OB TRANSVAGINAL   2.  Breast lump on left side at 7 o'clock position  VIDHYA DIGITAL DIAGNOSTIC W OR WO CAD BILATERAL     Patient Active Problem List    Diagnosis Date Noted    Pancreatic cyst 10/07/2021    Family history of uterine cancer 10/07/2021    Chronic diastolic congestive heart failure ([facility] [address] 03/23/2021    Major depressive disorder with single episode, in partial remission ([address] 03/23/2021    Insomnia 01/11/2021    Arthritis involving multiple sites 08/17/2020    Arthritis of right hand 08/17/2020    Gastroesophageal reflux disease without esophagitis 08/17/2020    Mixed hyperlipidemia 06/16/2020    Chest pain with high risk for cardiac etiology 03/29/2020    Ureterolithiasis 08/20/2019    Nephrolithiasis 08/19/2019    Seizure disorder ([address] 09/21/2017    Coronary artery disease involving native coronary artery of native heart without angina pectoris 09/18/2017    Generalized anxiety disorder 09/18/2017    Migraine without aura, not intractable 09/18/2017    Mixed dyslipidemia 09/18/2017    Essential hypertension 02/07/2017    Chronic pain of both knees 02/07/2017    Uncontrolled type 2 diabetes mellitus with diabetic neuropathy, with long-term current use of insulin ([facility] [address] 06/08/2016    Mediastinal cyst 05/24/2016    Thickened endometrium 05/04/2016    Chronic prescription benzodiazepine use 03/31/2016    Chronic use of opiate drugs therapeutic purposes 03/31/2016     Replacing deprecated diagnoses      Morbid obesity with BMI of 45.0-49.9, adult ([facility] [address] 03/31/2016    Constipation 04/08/2015    Diabetes type 2, uncontrolled ([facility] [address] 01/14/2012    DM neuropathy takes Percocet 01/14/2012    Recurrent UTI / Nephrolithiasis 01/14/2012    Hypothyroidism 01/14/2012    Depression 01/14/2012    MVP (mitral valve prolapse) 01/14/2012    Abnormal mammogram 01/10/2012    Chronic neck pain            PLAN:  Return in about 4 weeks (around 11/8/2021) for Dr Rafy Galeas thickened endometrial lining. Diagnostic mammogram ordered. Pelvic ultrasound ordered- to complete in office. Repeat Annual every 1 year  Cervical Cytology Evaluation begins at 26years old.   If Negative Cytology, Follow-up screening per current guidelines. Return to the office in 4 weeks. Counseled on preventative health maintenance follow-up. Orders Placed This Encounter   Procedures    US PELVIS COMPLETE     Standing Status:   Future     Standing Expiration Date:   1/11/2022     Order Specific Question:   Reason for exam:     Answer:   thickened lining on CT scan    US NON OB TRANSVAGINAL     Standing Status:   Future     Standing Expiration Date:   4/11/2022     Order Specific Question:   Reason for exam:     Answer:   thickened endometrial lining    VIDHYA DIGITAL DIAGNOSTIC W OR WO CAD BILATERAL     Standing Status:   Future     Standing Expiration Date:   12/11/2022     Order Specific Question:   Reason for exam:     Answer:   screening for breast cancer, left breast lump at 7 o'clock position           The patient, Gorge Estes is a 61 y.o. female, was seen with a total time spent of 20 minutes for the visit on this date of service by the E/M provider. The time component had both face to face and non face to face time spent in determining the total time component. Counseling and education regarding her diagnosis listed below and her options regarding those diagnoses were also included in determining her time component. Diagnosis Orders   1. Thickened endometrium  US PELVIS COMPLETE    US NON OB TRANSVAGINAL   2. Breast lump on left side at 7 o'clock position  VIDHYA DIGITAL DIAGNOSTIC W OR WO CAD BILATERAL        The patient had her preventative health maintenance recommendations and follow-up reviewed with her at the completion of her visit.

## 2021-10-11 NOTE — TELEPHONE ENCOUNTER
[facility] states patient called to schedule her mammogram but is complaining of a lump in her breast. They state she received a message to schedule it ASAP because she was told she has a lump. I advised I had left her a message on Friday to call the office to schedule it. LM today to find out about this lump and what she is referring to.

## 2021-10-13 ENCOUNTER — OFFICE VISIT (OUTPATIENT)
Dept: GASTROENTEROLOGY | Age: 66
End: 2021-10-13
Payer: COMMERCIAL

## 2021-10-13 VITALS
WEIGHT: 245.8 LBS | HEART RATE: 78 BPM | TEMPERATURE: 97.6 F | HEIGHT: 62 IN | OXYGEN SATURATION: 93 % | SYSTOLIC BLOOD PRESSURE: 135 MMHG | BODY MASS INDEX: 45.23 KG/M2 | DIASTOLIC BLOOD PRESSURE: 78 MMHG

## 2021-10-13 DIAGNOSIS — K86.2 PANCREATIC CYST: ICD-10-CM

## 2021-10-13 DIAGNOSIS — K29.70 GASTRITIS WITHOUT BLEEDING, UNSPECIFIED CHRONICITY, UNSPECIFIED GASTRITIS TYPE: ICD-10-CM

## 2021-10-13 DIAGNOSIS — K59.01 SLOW TRANSIT CONSTIPATION: ICD-10-CM

## 2021-10-13 DIAGNOSIS — R19.5 POSITIVE FIT (FECAL IMMUNOCHEMICAL TEST): Primary | ICD-10-CM

## 2021-10-13 PROCEDURE — 3017F COLORECTAL CA SCREEN DOC REV: CPT | Performed by: INTERNAL MEDICINE

## 2021-10-13 PROCEDURE — G8417 CALC BMI ABV UP PARAM F/U: HCPCS | Performed by: INTERNAL MEDICINE

## 2021-10-13 PROCEDURE — APPSS45 APP SPLIT SHARED TIME 31-45 MINUTES: Performed by: NURSE PRACTITIONER

## 2021-10-13 PROCEDURE — 99215 OFFICE O/P EST HI 40 MIN: CPT | Performed by: INTERNAL MEDICINE

## 2021-10-13 PROCEDURE — G8484 FLU IMMUNIZE NO ADMIN: HCPCS | Performed by: INTERNAL MEDICINE

## 2021-10-13 PROCEDURE — 1036F TOBACCO NON-USER: CPT | Performed by: INTERNAL MEDICINE

## 2021-10-13 PROCEDURE — G8427 DOCREV CUR MEDS BY ELIG CLIN: HCPCS | Performed by: INTERNAL MEDICINE

## 2021-10-13 RX ORDER — POLYETHYLENE GLYCOL 3350 17 G/17G
POWDER, FOR SOLUTION ORAL
Qty: 238 G | Refills: 0 | Status: ON HOLD | OUTPATIENT
Start: 2021-10-13 | End: 2021-10-26 | Stop reason: ALTCHOICE

## 2021-10-13 ASSESSMENT — ENCOUNTER SYMPTOMS
CHOKING: 1
VOICE CHANGE: 1
NAUSEA: 1
SHORTNESS OF BREATH: 0
ABDOMINAL PAIN: 1
DIARRHEA: 0
BACK PAIN: 0
CONSTIPATION: 1
VOMITING: 0
BLOOD IN STOOL: 1
TROUBLE SWALLOWING: 0
COUGH: 0
SORE THROAT: 0
ANAL BLEEDING: 0
ABDOMINAL DISTENTION: 1
RECTAL PAIN: 0

## 2021-10-13 NOTE — PROGRESS NOTES
8 hours as needed for Pain, Disp: 30 tablet, Rfl: 0    gabapentin (NEURONTIN) 600 MG tablet, take 1 tablet by mouth twice a day, Disp: 60 tablet, Rfl: 0    atorvastatin (LIPITOR) 80 MG tablet, take 1 tablet by mouth once daily, Disp: 30 tablet, Rfl: 3    furosemide (LASIX) 40 MG tablet, take 1 tablet by mouth once daily, Disp: 90 tablet, Rfl: 1    lisinopril (PRINIVIL;ZESTRIL) 2.5 MG tablet, take 1 tablet by mouth once daily, Disp: 90 tablet, Rfl: 1    furosemide (LASIX) 20 MG tablet, take 1 tablet by mouth once daily, Disp: 90 tablet, Rfl: 3    famotidine (PEPCID) 20 MG tablet, take 1 tablet by mouth twice a day, Disp: 60 tablet, Rfl: 3    SM ASPIRIN ADULT LOW STRENGTH 81 MG EC tablet, take 1 tablet by mouth once daily, Disp: 90 tablet, Rfl: 1    dapagliflozin (FARXIGA) 5 MG tablet, Take 1 tablet by mouth every morning, Disp: 90 tablet, Rfl: 1    DULoxetine (CYMBALTA) 60 MG extended release capsule, take 1 capsule by mouth once daily, Disp: 90 capsule, Rfl: 3    melatonin 3 MG TABS tablet, take 1 tablet by mouth nightly, Disp: 30 tablet, Rfl: 1    glipiZIDE (GLUCOTROL) 10 MG tablet, take 2 tablets by mouth twice a day, Disp: 180 tablet, Rfl: 2    RA PEN NEEDLES 31G X 8 MM MISC, INJECTING TWICE DAILY DX: E11.65, Disp: , Rfl:     oxybutynin (DITROPAN) 5 MG tablet, Take 5 mg by mouth daily , Disp: , Rfl:     insulin lispro, 1 Unit Dial, 100 UNIT/ML SOPN, Inject 10 Units into the skin 3 times daily as needed, Disp: , Rfl:     Insulin Degludec 200 UNIT/ML SOPN, Inject 75 Units into the skin nightly, Disp: 5 pen, Rfl: 2    ALLERGIES:   Allergies   Allergen Reactions    Cefuroxime Axetil Anaphylaxis    Dilaudid [Hydromorphone] Anaphylaxis    Metformin And Related Diarrhea, Itching and Nausea And Vomiting    Sulfa Antibiotics Itching    Amoxicillin     Antihistamines, Loratadine-Type     Eggs Or Egg-Derived Products     Lorazepam     Nubain [Nalbuphine Hcl] Other (See Comments)     Hallucinations; \"I got stoned. \"    Vistaril [Hydroxyzine Hcl]        FAMILY HISTORY:       Problem Relation Age of Onset    Coronary Art Dis Mother     Lung Cancer Mother     Diabetes Mother         mellitus    Coronary Art Dis Father     Diabetes Father         diabetes mellitus         SOCIAL HISTORY:   Social History     Socioeconomic History    Marital status: Single     Spouse name: Not on file    Number of children: Not on file    Years of education: Not on file    Highest education level: Not on file   Occupational History    Not on file   Tobacco Use    Smoking status: Former Smoker     Packs/day: 0.01     Years: 1.00     Pack years: 0.01     Types: Cigarettes     Quit date: 5     Years since quittin.10    Smokeless tobacco: Never Used    Tobacco comment: pt states she quit smoking cigarettes at the age of 14   Vaping Use    Vaping Use: Never used   Substance and Sexual Activity    Alcohol use: No     Alcohol/week: 0.0 standard drinks    Drug use: Not Currently     Types: Marijuana     Comment: once in a while    Sexual activity: Yes     Partners: Male   Other Topics Concern    Not on file   Social History Narrative    Not on file     Social Determinants of Health     Financial Resource Strain:     Difficulty of Paying Living Expenses:    Food Insecurity:     Worried About Running Out of Food in the Last Year:     Ran Out of Food in the Last Year:    Transportation Needs:     Lack of Transportation (Medical):      Lack of Transportation (Non-Medical):    Physical Activity:     Days of Exercise per Week:     Minutes of Exercise per Session:    Stress:     Feeling of Stress :    Social Connections:     Frequency of Communication with Friends and Family:     Frequency of Social Gatherings with Friends and Family:     Attends Anabaptist Services:     Active Member of Clubs or Organizations:     Attends Club or Organization Meetings:     Marital Status:    Intimate Partner Violence:     Fear of Current or Ex-Partner:     Emotionally Abused:     Physically Abused:     Sexually Abused:          REVIEW OF SYSTEMS:         Review of Systems   Constitutional: Positive for fatigue. Negative for appetite change and unexpected weight change. HENT: Positive for voice change. Negative for dental problem, sore throat and trouble swallowing. Eyes: Negative for visual disturbance (glasses). Respiratory: Positive for choking. Negative for cough and shortness of breath. Cardiovascular: Negative for chest pain and leg swelling. Gastrointestinal: Positive for abdominal distention, abdominal pain, blood in stool, constipation and nausea. Negative for anal bleeding, diarrhea, rectal pain and vomiting. Genitourinary: Negative for difficulty urinating. Musculoskeletal: Negative for back pain, joint swelling and myalgias. Neurological: Positive for tremors and headaches. Negative for dizziness, weakness, light-headedness and numbness. Hematological: Does not bruise/bleed easily. Psychiatric/Behavioral: Positive for sleep disturbance. The patient is nervous/anxious. PHYSICAL EXAMINATION:     Vital signs reviewed per the nursing documentation. /78   Pulse 78   Temp 97.6 °F (36.4 °C)   Ht 5' 2\" (1.575 m)   Wt 245 lb 12.8 oz (111.5 kg)   LMP 01/12/2015 (Approximate)   SpO2 93%   BMI 44.96 kg/m²   Body mass index is 44.96 kg/m². Physical Exam  Vitals and nursing note reviewed. Constitutional:       Appearance: She is well-developed. She is obese. Comments: Moderately overweight patient. HENT:      Head: Normocephalic and atraumatic. Eyes:      General: No scleral icterus. Conjunctiva/sclera: Conjunctivae normal.      Pupils: Pupils are equal, round, and reactive to light. Neck:      Thyroid: No thyromegaly. Vascular: No hepatojugular reflux or JVD. Trachea: No tracheal deviation.    Cardiovascular:      Rate and Rhythm: Normal rate and regular rhythm. Heart sounds: Normal heart sounds. Pulmonary:      Effort: Pulmonary effort is normal. No respiratory distress. Breath sounds: Normal breath sounds. No wheezing or rales. Abdominal:      General: Bowel sounds are normal. There is no distension. Palpations: Abdomen is soft. There is no hepatomegaly or mass. Tenderness: There is no abdominal tenderness. There is no rebound. Hernia: No hernia is present. Comments: Obese abdomen. Difficult to examine. Musculoskeletal:         General: No tenderness. Cervical back: Normal range of motion and neck supple. Comments: No joint swelling   Lymphadenopathy:      Cervical: No cervical adenopathy. Skin:     General: Skin is warm. Findings: No bruising, ecchymosis, erythema or rash. Neurological:      Mental Status: She is alert and oriented to person, place, and time. Cranial Nerves: No cranial nerve deficit. Psychiatric:         Thought Content:  Thought content normal.           LABORATORY DATA: Reviewed  Lab Results   Component Value Date    WBC 12.2 (H) 10/06/2021    HGB 15.0 10/06/2021    HCT 45.1 10/06/2021    MCV 89.1 10/06/2021     10/06/2021     10/06/2021    K 4.5 10/06/2021     10/06/2021    CO2 25 10/06/2021    BUN 13 10/06/2021    CREATININE 0.44 (L) 10/06/2021    LABPROT 7.1 08/22/2012    LABALBU 3.6 10/06/2021    BILITOT 0.52 10/06/2021    ALKPHOS 144 (H) 10/06/2021    AST 29 10/06/2021    ALT 32 10/06/2021    INR 1.0 07/31/2018         Lab Results   Component Value Date    RBC 5.05 10/06/2021    HGB 15.0 10/06/2021    MCV 89.1 10/06/2021    MCH 29.6 10/06/2021    MCHC 33.2 10/06/2021    RDW 13.8 10/06/2021    MPV 9.5 10/06/2021    BASOPCT 1 10/06/2021    LYMPHSABS 2.90 10/06/2021    MONOSABS 0.80 10/06/2021    NEUTROABS 8.30 10/06/2021    EOSABS 0.10 10/06/2021    BASOSABS 0.10 10/06/2021         DIAGNOSTIC TESTING:     XR CHEST (SINGLE VIEW FRONTAL)    Result Date: 10/6/2021  EXAMINATION: ONE XRAY VIEW OF THE CHEST 10/6/2021 6:20 pm COMPARISON: Chest 07/30/2020 HISTORY: ORDERING SYSTEM PROVIDED HISTORY: SOB TECHNOLOGIST PROVIDED HISTORY: SOB Reason for Exam: Abdominal discomfort, indigestion, shortness of breath Acuity: Acute Type of Exam: Initial Additional signs and symptoms: Abdominal discomfort, indigestion, shortness of breath Relevant Medical/Surgical History: Abdominal discomfort, indigestion, shortness of breath FINDINGS: The cardiac silhouette is enlarged. The mediastinal and hilar silhouettes appear unremarkable. Chronic asymmetric elevation right hemidiaphragm seen as anterior hemidiaphragm eventration on prior lateral radiograph. The lungs appear clear. No pleural effusion. No pneumothorax is seen. No acute osseous abnormality is identified. Calcification projects just superior to the right humeral greater tuberosity. No radiographic evidence of acute pulmonary disease. Cardiomegaly. Chronic calcific rotator cuff tendinopathy right shoulder. XR HIP RIGHT (2-3 VIEWS)    Result Date: 10/1/2021  EXAMINATION: TWO XRAY VIEWS OF THE RIGHT HIP 10/1/2021 5:30 pm COMPARISON: Two-view study of the right hip from 07/30/2019 HISTORY: ORDERING SYSTEM PROVIDED HISTORY: fall TECHNOLOGIST PROVIDED HISTORY: fall Reason for Exam: right hip and proximal femur pain Acuity: Unknown Type of Exam: Unknown FINDINGS: No acute fracture. No dislocation. Similar diminutive hip joint space with mild degenerative changes; mild degenerative changes greater trochanter also again seen. No destructive or blastic lesion. No large right hip joint effusion suspected. No abnormal calcification seen. Slight enthesopathy lateral right iliac crest.     No acute abnormality right femur. Degenerative findings, as above. XR FEMUR RIGHT (MIN 2 VIEWS)    Result Date: 10/1/2021  EXAMINATION: XRAY VIEWS OF THE RIGHT FEMUR 10/1/2021 5:30 pm COMPARISON: None.  HISTORY: ORDERING SYSTEM PROVIDED HISTORY: fall TECHNOLOGIST PROVIDED HISTORY: fall Reason for Exam: right hip and proximal femur pain Acuity: Unknown Type of Exam: Unknown FINDINGS: There is no evidence of acute fracture. There is normal alignment. No acute joint abnormality. No focal osseous lesion. No focal soft tissue abnormality. No acute osseous abnormality. Postsurgical changes consistent with ACL repair. CT ABDOMEN PELVIS W IV CONTRAST Additional Contrast? None    Result Date: 10/6/2021  EXAMINATION: CT OF THE ABDOMEN AND PELVIS WITH CONTRAST 10/6/2021 8:11 pm TECHNIQUE: CT of the abdomen and pelvis was performed with the administration of intravenous contrast. Multiplanar reformatted images are provided for review. Dose modulation, iterative reconstruction, and/or weight based adjustment of the mA/kV was utilized to reduce the radiation dose to as low as reasonably achievable. COMPARISON: 09/06/2019, 10/15/2011 HISTORY: ORDERING SYSTEM PROVIDED HISTORY: abdominal pain, hx of UC TECHNOLOGIST PROVIDED HISTORY: abdominal pain, hx of UC Decision Support Exception - unselect if not a suspected or confirmed emergency medical condition->Emergency Medical Condition (MA) Reason for Exam: epigastric ab pain with nausea x 1 day Acuity: Acute Type of Exam: Initial FINDINGS: Lower Chest: Partially imaged pericardial recesses versus pericardial cysts. Organs: Irregular pattern of peripherally oriented hypoenhancement involving both lobes of the liver. Gallbladder is surgically absent. No biliary ductal dilatation. Possible 1.6 cm hypoattenuating lesion of the pancreatic head, though this most likely represents fluid-filled duodenal diverticulum seen on prior studies. Adrenals are unremarkable. Spleen is normal in size. No renal calculi or hydronephrosis. Mild calcific atherosclerosis. GI/Bowel: Large volume stool throughout the colon. Colonic diverticulosis without bowel wall thickening or dilatation.   Appendix is normal. Pelvis: Similar 4.6 cm intramural fibroid. Abnormal endometrial thickness of 14 mm. Peritoneum/Retroperitoneum:No free fluid, free air, organized fluid collection or lymphadenopathy. Bones: Multilevel degenerative disc disease. 1. No correlate for pain. 2. Irregular pattern of peripherally oriented hypoenhancement involving both lobes of the liver, incompletely characterized on a single phase CT. This is favored to represent an atypical pattern of focal fatty change in the liver. Recommend further evaluation with nonemergent liver protocol MRI with Eovist. 3. Possible 1.6 cm hypoattenuating lesion of the pancreatic head though this most likely represents a fluid-filled duodenal diverticulum seen on prior studies. Attention on above recommended MRI. 4. Abnormal endometrial thickening to 14 mm. Recommend further evaluation with nonemergent pelvic ultrasound. 5. Large volume stool throughout the colon. Assessment  1. Positive FIT (fecal immunochemical test)    2. Slow transit constipation    3. Pancreatic cyst    4. Gastritis without bleeding, unspecified chronicity, unspecified gastritis type        Plan    Imaging reviewed with patient in detail. 1.6 cm cyst in the pancreatic head vs fluid filled diverticulum. Recommend repeat imaging 1 year  Patient recently had positive fit test.  Will arrange EGD and colonoscopy. Previous colonoscopy greater than 10 years ago. The Endoscopic procedure was explained to the patient in detail  The prep and NPO were explained  All the Risks, Benefits, and Alternatives were explained  Risk of Bleeding, Perforation and Cardio Respiratory risks were explained  her questions were answered  The procedure has been scheduled with the  in the office  Patient was asked to give us a call for any questions  The patient has verbalized understanding and agreement to this plan.      The patient was counseled at length about the risks of key Covid-19 during their perioperative period and any recovery window from their procedure. The patient was made aware that key Covid-19  may worsen their prognosis for recovering from their procedure  and lend to a higher morbidity and/or mortality risk. All material risks, benefits, and reasonable alternatives including postponing the procedure were discussed. The patient does wish to proceed with the procedure at this time. Thank you for allowing me to participate in the care of Ms. Moseley. For any further questions please do not hesitate to contact me. I have reviewed and agree with the ROS entered by the MA/LPN. Note is dictated utilizing voice recognition software. Unfortunately this leads to occasional typographical errors. Please contact our office if you have any questions  GI attending physician note. Patient seen with APRN    I independently performed an evaluation on [address] [facility]. I have reviewed the above documentation completed by the Nurse Practitioner and agree with the history, examination, and management plan. Recommendations discussed. May need repeat imaging studies in the next 6 to 9 months regarding pancreatic cyst.  At present appears to be benign and it is less than 2 cm. As her stool is positive for blood, she will have EGD and colonoscopy. At that time we will check whether she has duodenal diverticulum.                 Homer Harrison MD,FACP, [facility]  Board Certified in Gastroenterology and [address] [place] [address] Gastroenterology  Office #: [phone]

## 2021-10-19 ENCOUNTER — HOSPITAL ENCOUNTER (OUTPATIENT)
Dept: PREADMISSION TESTING | Age: 66
Discharge: HOME | End: 2021-10-23
Payer: COMMERCIAL

## 2021-10-19 VITALS — BODY MASS INDEX: 45.08 KG/M2 | WEIGHT: 245 LBS | HEIGHT: 62 IN

## 2021-10-19 NOTE — PROGRESS NOTES

## 2021-10-22 ENCOUNTER — TELEPHONE (OUTPATIENT)
Dept: GASTROENTEROLOGY | Age: 66
End: 2021-10-22

## 2021-10-22 NOTE — TELEPHONE ENCOUNTER
Pt called because she is having a colonoscopy on 10/28/21, and right now she is having diarrhea, and wanted to know if she should still take the bowel prep. I placed the pt on hold, and asked the procedure jose f Sage confirmed that the pt should still take the bowel prep. I let the pt know to take the bowel prep.

## 2021-10-25 ENCOUNTER — ANESTHESIA EVENT (OUTPATIENT)
Dept: ENDOSCOPY | Age: 66
End: 2021-10-25
Payer: COMMERCIAL

## 2021-10-26 ENCOUNTER — HOSPITAL ENCOUNTER (OUTPATIENT)
Age: 66
Setting detail: OUTPATIENT SURGERY
Discharge: HOME | End: 2021-10-26
Attending: INTERNAL MEDICINE | Admitting: INTERNAL MEDICINE
Payer: COMMERCIAL

## 2021-10-26 ENCOUNTER — ANESTHESIA (OUTPATIENT)
Dept: ENDOSCOPY | Age: 66
End: 2021-10-26
Payer: COMMERCIAL

## 2021-10-26 VITALS
OXYGEN SATURATION: 97 % | WEIGHT: 245 LBS | SYSTOLIC BLOOD PRESSURE: 148 MMHG | RESPIRATION RATE: 24 BRPM | HEART RATE: 81 BPM | DIASTOLIC BLOOD PRESSURE: 80 MMHG | HEIGHT: 62 IN | BODY MASS INDEX: 45.08 KG/M2 | TEMPERATURE: 98 F

## 2021-10-26 VITALS
RESPIRATION RATE: 12 BRPM | OXYGEN SATURATION: 98 % | SYSTOLIC BLOOD PRESSURE: 133 MMHG | DIASTOLIC BLOOD PRESSURE: 74 MMHG

## 2021-10-26 LAB
GLUCOSE BLD-MCNC: 178 MG/DL (ref 65–105)
GLUCOSE BLD-MCNC: 179 MG/DL (ref 65–105)

## 2021-10-26 PROCEDURE — 43239 EGD BIOPSY SINGLE/MULTIPLE: CPT | Performed by: INTERNAL MEDICINE

## 2021-10-26 PROCEDURE — 2500000003 HC RX 250 WO HCPCS: Performed by: ANESTHESIOLOGY

## 2021-10-26 PROCEDURE — 7100000000 HC PACU RECOVERY - FIRST 15 MIN: Performed by: INTERNAL MEDICINE

## 2021-10-26 PROCEDURE — 3609010600 HC COLONOSCOPY POLYPECTOMY SNARE/COLD BIOPSY: Performed by: INTERNAL MEDICINE

## 2021-10-26 PROCEDURE — 45390 COLONOSCOPY W/RESECTION: CPT | Performed by: INTERNAL MEDICINE

## 2021-10-26 PROCEDURE — 2580000003 HC RX 258: Performed by: ANESTHESIOLOGY

## 2021-10-26 PROCEDURE — 88305 TISSUE EXAM BY PATHOLOGIST: CPT

## 2021-10-26 PROCEDURE — 2709999900 HC NON-CHARGEABLE SUPPLY: Performed by: INTERNAL MEDICINE

## 2021-10-26 PROCEDURE — 88342 IMHCHEM/IMCYTCHM 1ST ANTB: CPT

## 2021-10-26 PROCEDURE — 3700000000 HC ANESTHESIA ATTENDED CARE: Performed by: INTERNAL MEDICINE

## 2021-10-26 PROCEDURE — 2500000003 HC RX 250 WO HCPCS

## 2021-10-26 PROCEDURE — 7100000001 HC PACU RECOVERY - ADDTL 15 MIN: Performed by: INTERNAL MEDICINE

## 2021-10-26 PROCEDURE — 3700000001 HC ADD 15 MINUTES (ANESTHESIA): Performed by: INTERNAL MEDICINE

## 2021-10-26 PROCEDURE — 45385 COLONOSCOPY W/LESION REMOVAL: CPT | Performed by: INTERNAL MEDICINE

## 2021-10-26 PROCEDURE — 82947 ASSAY GLUCOSE BLOOD QUANT: CPT

## 2021-10-26 PROCEDURE — 3609012400 HC EGD TRANSORAL BIOPSY SINGLE/MULTIPLE: Performed by: INTERNAL MEDICINE

## 2021-10-26 PROCEDURE — 6370000000 HC RX 637 (ALT 250 FOR IP): Performed by: ANESTHESIOLOGY

## 2021-10-26 PROCEDURE — 6360000002 HC RX W HCPCS

## 2021-10-26 RX ORDER — HYDRALAZINE HYDROCHLORIDE 20 MG/ML
5 INJECTION INTRAMUSCULAR; INTRAVENOUS EVERY 10 MIN PRN
Status: DISCONTINUED | OUTPATIENT
Start: 2021-10-26 | End: 2021-10-26 | Stop reason: HOSPADM

## 2021-10-26 RX ORDER — LIDOCAINE HYDROCHLORIDE 10 MG/ML
1 INJECTION, SOLUTION EPIDURAL; INFILTRATION; INTRACAUDAL; PERINEURAL
Status: DISCONTINUED | OUTPATIENT
Start: 2021-10-26 | End: 2021-10-26 | Stop reason: HOSPADM

## 2021-10-26 RX ORDER — MORPHINE SULFATE 2 MG/ML
2 INJECTION, SOLUTION INTRAMUSCULAR; INTRAVENOUS EVERY 5 MIN PRN
Status: DISCONTINUED | OUTPATIENT
Start: 2021-10-26 | End: 2021-10-26 | Stop reason: HOSPADM

## 2021-10-26 RX ORDER — DIPHENHYDRAMINE HYDROCHLORIDE 50 MG/ML
12.5 INJECTION INTRAMUSCULAR; INTRAVENOUS
Status: DISCONTINUED | OUTPATIENT
Start: 2021-10-26 | End: 2021-10-26 | Stop reason: HOSPADM

## 2021-10-26 RX ORDER — HYDROCODONE BITARTRATE AND ACETAMINOPHEN 5; 325 MG/1; MG/1
1 TABLET ORAL PRN
Status: DISCONTINUED | OUTPATIENT
Start: 2021-10-26 | End: 2021-10-26 | Stop reason: HOSPADM

## 2021-10-26 RX ORDER — SODIUM CHLORIDE 0.9 % (FLUSH) 0.9 %
10 SYRINGE (ML) INJECTION PRN
Status: DISCONTINUED | OUTPATIENT
Start: 2021-10-26 | End: 2021-10-26 | Stop reason: HOSPADM

## 2021-10-26 RX ORDER — HYDROCODONE BITARTRATE AND ACETAMINOPHEN 5; 325 MG/1; MG/1
2 TABLET ORAL PRN
Status: DISCONTINUED | OUTPATIENT
Start: 2021-10-26 | End: 2021-10-26 | Stop reason: HOSPADM

## 2021-10-26 RX ORDER — SODIUM CHLORIDE 9 MG/ML
25 INJECTION, SOLUTION INTRAVENOUS PRN
Status: DISCONTINUED | OUTPATIENT
Start: 2021-10-26 | End: 2021-10-26 | Stop reason: HOSPADM

## 2021-10-26 RX ORDER — SODIUM CHLORIDE 9 MG/ML
INJECTION, SOLUTION INTRAVENOUS CONTINUOUS
Status: DISCONTINUED | OUTPATIENT
Start: 2021-10-26 | End: 2021-10-26 | Stop reason: HOSPADM

## 2021-10-26 RX ORDER — SCOLOPAMINE TRANSDERMAL SYSTEM 1 MG/1
1 PATCH, EXTENDED RELEASE TRANSDERMAL ONCE
Status: DISCONTINUED | OUTPATIENT
Start: 2021-10-26 | End: 2021-10-26 | Stop reason: HOSPADM

## 2021-10-26 RX ORDER — FENTANYL CITRATE 50 UG/ML
25 INJECTION, SOLUTION INTRAMUSCULAR; INTRAVENOUS EVERY 5 MIN PRN
Status: DISCONTINUED | OUTPATIENT
Start: 2021-10-26 | End: 2021-10-26 | Stop reason: HOSPADM

## 2021-10-26 RX ORDER — PROPOFOL 10 MG/ML
INJECTION, EMULSION INTRAVENOUS PRN
Status: DISCONTINUED | OUTPATIENT
Start: 2021-10-26 | End: 2021-10-26 | Stop reason: SDUPTHER

## 2021-10-26 RX ORDER — 0.9 % SODIUM CHLORIDE 0.9 %
500 INTRAVENOUS SOLUTION INTRAVENOUS
Status: DISCONTINUED | OUTPATIENT
Start: 2021-10-26 | End: 2021-10-26 | Stop reason: HOSPADM

## 2021-10-26 RX ORDER — ONDANSETRON 2 MG/ML
4 INJECTION INTRAMUSCULAR; INTRAVENOUS
Status: DISCONTINUED | OUTPATIENT
Start: 2021-10-26 | End: 2021-10-26 | Stop reason: HOSPADM

## 2021-10-26 RX ORDER — SODIUM CHLORIDE 0.9 % (FLUSH) 0.9 %
10 SYRINGE (ML) INJECTION EVERY 12 HOURS SCHEDULED
Status: DISCONTINUED | OUTPATIENT
Start: 2021-10-26 | End: 2021-10-26 | Stop reason: HOSPADM

## 2021-10-26 RX ORDER — LIDOCAINE HYDROCHLORIDE 10 MG/ML
INJECTION, SOLUTION EPIDURAL; INFILTRATION; INTRACAUDAL; PERINEURAL PRN
Status: DISCONTINUED | OUTPATIENT
Start: 2021-10-26 | End: 2021-10-26 | Stop reason: SDUPTHER

## 2021-10-26 RX ADMIN — PROPOFOL 30 MG: 10 INJECTION, EMULSION INTRAVENOUS at 07:58

## 2021-10-26 RX ADMIN — LIDOCAINE HYDROCHLORIDE 70 MG: 10 INJECTION, SOLUTION EPIDURAL; INFILTRATION; INTRACAUDAL; PERINEURAL at 07:46

## 2021-10-26 RX ADMIN — SODIUM CHLORIDE: 9 INJECTION, SOLUTION INTRAVENOUS at 06:57

## 2021-10-26 RX ADMIN — PROPOFOL 20 MG: 10 INJECTION, EMULSION INTRAVENOUS at 08:18

## 2021-10-26 RX ADMIN — PROPOFOL 20 MG: 10 INJECTION, EMULSION INTRAVENOUS at 08:00

## 2021-10-26 RX ADMIN — PROPOFOL 20 MG: 10 INJECTION, EMULSION INTRAVENOUS at 08:05

## 2021-10-26 RX ADMIN — PROPOFOL 20 MG: 10 INJECTION, EMULSION INTRAVENOUS at 07:52

## 2021-10-26 RX ADMIN — PROPOFOL 30 MG: 10 INJECTION, EMULSION INTRAVENOUS at 08:20

## 2021-10-26 RX ADMIN — PROPOFOL 30 MG: 10 INJECTION, EMULSION INTRAVENOUS at 07:57

## 2021-10-26 RX ADMIN — FAMOTIDINE 20 MG: 10 INJECTION, SOLUTION INTRAVENOUS at 07:00

## 2021-10-26 RX ADMIN — PROPOFOL 20 MG: 10 INJECTION, EMULSION INTRAVENOUS at 08:16

## 2021-10-26 RX ADMIN — PROPOFOL 20 MG: 10 INJECTION, EMULSION INTRAVENOUS at 08:03

## 2021-10-26 RX ADMIN — PROPOFOL 20 MG: 10 INJECTION, EMULSION INTRAVENOUS at 08:07

## 2021-10-26 RX ADMIN — PROPOFOL 30 MG: 10 INJECTION, EMULSION INTRAVENOUS at 08:17

## 2021-10-26 RX ADMIN — PROPOFOL 30 MG: 10 INJECTION, EMULSION INTRAVENOUS at 07:48

## 2021-10-26 RX ADMIN — PROPOFOL 20 MG: 10 INJECTION, EMULSION INTRAVENOUS at 08:10

## 2021-10-26 RX ADMIN — PROPOFOL 40 MG: 10 INJECTION, EMULSION INTRAVENOUS at 08:14

## 2021-10-26 RX ADMIN — PROPOFOL 20 MG: 10 INJECTION, EMULSION INTRAVENOUS at 08:09

## 2021-10-26 RX ADMIN — PROPOFOL 20 MG: 10 INJECTION, EMULSION INTRAVENOUS at 08:01

## 2021-10-26 RX ADMIN — PROPOFOL 50 MG: 10 INJECTION, EMULSION INTRAVENOUS at 08:32

## 2021-10-26 RX ADMIN — PROPOFOL 20 MG: 10 INJECTION, EMULSION INTRAVENOUS at 08:24

## 2021-10-26 RX ADMIN — PROPOFOL 30 MG: 10 INJECTION, EMULSION INTRAVENOUS at 07:50

## 2021-10-26 RX ADMIN — PROPOFOL 100 MG: 10 INJECTION, EMULSION INTRAVENOUS at 07:46

## 2021-10-26 RX ADMIN — PROPOFOL 20 MG: 10 INJECTION, EMULSION INTRAVENOUS at 08:22

## 2021-10-26 RX ADMIN — PROPOFOL 20 MG: 10 INJECTION, EMULSION INTRAVENOUS at 08:12

## 2021-10-26 RX ADMIN — PROPOFOL 20 MG: 10 INJECTION, EMULSION INTRAVENOUS at 07:54

## 2021-10-26 ASSESSMENT — PULMONARY FUNCTION TESTS
PIF_VALUE: 1
PIF_VALUE: 0
PIF_VALUE: 1
PIF_VALUE: 0
PIF_VALUE: 1
PIF_VALUE: 0
PIF_VALUE: 1
PIF_VALUE: 0
PIF_VALUE: 1

## 2021-10-26 ASSESSMENT — ENCOUNTER SYMPTOMS
SHORTNESS OF BREATH: 0
COUGH: 0
BACK PAIN: 1
SORE THROAT: 0
WHEEZING: 0

## 2021-10-26 ASSESSMENT — PAIN DESCRIPTION - DESCRIPTORS: DESCRIPTORS: SHARP

## 2021-10-26 ASSESSMENT — PAIN - FUNCTIONAL ASSESSMENT: PAIN_FUNCTIONAL_ASSESSMENT: 0-10

## 2021-10-26 ASSESSMENT — PAIN SCALES - GENERAL: PAINLEVEL_OUTOF10: 0

## 2021-10-26 NOTE — OP NOTE
ESOPHAGOGASTRODUODENOSCOPY   ( EGD )  DATE OF PROCEDURE: 10/26/2021     SURGEON: Sushma Khan MD    ASSISTANT: None    PREOPERATIVE DIAGNOSIS: Patient has history of epigastric pain and nausea. In the past known to have gastritis. Recent CT scan revealed questionable lesion. 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 Gastritis. OPERATION: Upper GI endoscopy with Biopsy    ANESTHESIA: MAC    ESTIMATED BLOOD LOSS: None    COMPLICATIONS: None. SPECIMENS:  Was Obtained: Random biopsies from the body and antrum to evaluate gastritis and        HISTORY: The patient is a 61y.o. year old female with history of above preop diagnosis. I recommended esophagogastroduodenoscopy with possible biopsy and I explained the risk, benefits, expected outcome, and alternatives to the procedure. Risks included but are not limited to bleeding, infection, respiratory distress, hypotension, and perforation of the esophagus, stomach, or duodenum. Patient understands and is in agreement. PROCEDURE: The patient was given IV conscious sedation. The patient's SPO2 remained above 90% throughout the procedure. Cetacaine spray given. Patient placed in left lateral position. Olympus  videogastroscope was inserted orally under vision into the esophagus without difficulty and advanced into the stomach then through the pylorus up to the second part of duodenum. Findings:    Retropharyngeal area was grossly normal appearing    Esophagus: normal, no hiatal hernia. No peptic esophagitis or Edwards's mucosa    Stomach:    Fundus and Cardia Examined in Retroflexed View: normal    Body: abnormal: Gastritis, mild    Antrum: abnormal: Mild antral inflammation.   Multiple random biopsies taken from the body and antrum  Duodenum:     Descending: abnormal: Diverticulum present in the second part of the duodenum calculi is at the edge of the diverticulum this diverticulum is about 1-1.5 cm    Bulb: normal    While withdrawing the scope the above findings were verified and the scope was removed. The patient has tolerated the procedure without unusual events. Recommendations/Plan:   1. F/U Biopsies  2. F/U In Office as instructed  3.  Discussed with the family                   Electronically signed by Sneha De Jesus MD  on 10/26/2021 at 7:55 AM

## 2021-10-26 NOTE — H&P
HISTORY and Treinta HALLIE [place] [address]       NAME:  Rob Puri  MRN: [id number]   YOB: 1955   Date: 10/26/2021   Age: 61 y.o. Gender: female       COMPLAINT AND PRESENT HISTORY:     Rob Puri is 61 y.o., female, having a colonoscopy and EGD. Pt has had previous many years ago. Pt reports history of colon polyps. Pt has had previous EGD in April, 2020. Pt had a positive Fit Test early this year. Prior to the Fit test she had not noticed any blood in her stool. However, she did notice bright red blood on the stool while completed prep for this colonoscopy. Pt c/o intermittent RUQ and LUQ abdominal pain. Pt has intermittent nausea without associated vomiting. Pt c/o intermittent constipation. Pt has decreased appetite. Denies diarrhea, melena, and unintended weight loss. Her maternal grandmother had suspected esophageal cancer. Denies Family Hx of colon cancer. Pt did present to ED on 10/06/2021 with c/o abdominal pain and bloating. CT abdomen pelvis completed-results below. Pt was treated with Pepcid, maalox and viscous lidocaine. She was encouraged to follow up with PCP upon discharge from ED. Pt was found to have UTI. Was treated with Saskia Plump which she did completed the course as prescribed. BS this am 126 prior to arrival. Completed and followed prescribed prep. NPO. No medications taken this am. Last dose of aspirin was yesterday. Denies taking any other blood thinning medications. Chronic SOB with exertion. Denies chest pain/pressure, palpitations, recent URI, fever or chills. CT abdomen pelvis completed on 10/06/2021:      Impression   1. No correlate for pain. 2. Irregular pattern of peripherally oriented hypoenhancement involving both   lobes of the liver, incompletely characterized on a single phase CT.  This is   favored to represent an atypical pattern of focal fatty change in the liver.    Recommend further evaluation with nonemergent liver protocol MRI with [facility].   3. Possible 1.6 cm hypoattenuating lesion of the pancreatic head though this   most likely represents a fluid-filled duodenal diverticulum seen on prior   studies.  Attention on above recommended MRI. 4. Abnormal endometrial thickening to 14 mm.  Recommend further evaluation   with nonemergent pelvic ultrasound. 5. Large volume stool throughout the colon.          PAST MEDICAL HISTORY     Past Medical History:   Diagnosis Date    Arrhythmia     Breast mass     CAD (coronary artery disease)     with valvular disease    Chronic neck pain     Coccygeal pain 3/7/2016    Constipation     COVID-19 04/2020    Depression     Diabetic neuropathy (HCC)     History of hepatitis A     possible history of hepatitis A in the past    History of renal calculi     Hypertension     Hyperthyroidism     Hypothyroidism     Morbid obesity with BMI of 45.0-49.9, adult ([facility] [address] 3/31/2016    Nephrolithiasis     Neuropathy     Pancreatic cyst     PONV (postoperative nausea and vomiting)     Type II or unspecified type diabetes mellitus without mention of complication, not stated as uncontrolled     non insulin dependent     Ulcerative colitis ([facility] [address]     UTI (lower urinary tract infection)        SURGICAL HISTORY       Past Surgical History:   Procedure Laterality Date    BREAST BIOPSY  2012    negative    CARDIOVASCULAR STRESS TEST      7/17/15 no results    CHOLECYSTECTOMY      CYST REMOVAL      right hand    CYSTO/URETERO/PYELOSCOPY, CALCULUS TX Right 8/26/2019    CYSTOSCOPY URETEROSCOPY  STONE BASKET RETRIEVAL RIGHT STENT EXCHANGE performed by Vani Blanco MD at [facility] Right 8/20/2019    CYSTOSCOPY URETERAL STENT INSERTION performed by Vani Blanco MD at [facility],[address]  2009    ENDOSCOPY, COLON, DIAGNOSTIC      LITHOTRIPSY      TONSILLECTOMY      TUBAL LIGATION      UPPER GASTROINTESTINAL ENDOSCOPY N/A 4/16/2020    EGD BIOPSY performed Violence:     Fear of Current or Ex-Partner:     Emotionally Abused:     Physically Abused:     Sexually Abused:          REVIEW OF SYSTEMS      Allergies   Allergen Reactions    Cefuroxime Axetil Anaphylaxis    Dilaudid [Hydromorphone] Anaphylaxis    Metformin And Related Diarrhea, Itching and Nausea And Vomiting    Sulfa Antibiotics Itching    Amoxicillin     Antihistamines, Loratadine-Type     Eggs Or Egg-Derived Products     Lorazepam     Nubain [Nalbuphine Hcl] Other (See Comments)     Hallucinations; \"I got stoned. \"    Vistaril [Hydroxyzine Hcl]        No current facility-administered medications on file prior to encounter. Current Outpatient Medications on File Prior to Encounter   Medication Sig Dispense Refill    senna (SENOKOT) 8.6 MG tablet Take 1 tablet by mouth 2 times daily 60 tablet 11    levothyroxine (SYNTHROID) 75 MCG tablet Take 150 mcg by mouth once a week Indications: Sundays      levothyroxine (SYNTHROID) 75 MCG tablet Take 75 mcg by mouth Six times weekly Indications: All days except Sundays      polyethylene glycol (GLYCOLAX) 17 GM/SCOOP powder Take 17 g by mouth daily 510 g 0    lidocaine (LIDODERM) 5 % Place 1 patch onto the skin daily 12 hours on, 12 hours off.  10 patch 0    ibuprofen (IBU) 600 MG tablet Take 1 tablet by mouth every 8 hours as needed for Pain 30 tablet 0    gabapentin (NEURONTIN) 600 MG tablet take 1 tablet by mouth twice a day 60 tablet 0    atorvastatin (LIPITOR) 80 MG tablet take 1 tablet by mouth once daily 30 tablet 3    lisinopril (PRINIVIL;ZESTRIL) 2.5 MG tablet take 1 tablet by mouth once daily 90 tablet 1    furosemide (LASIX) 20 MG tablet take 1 tablet by mouth once daily 90 tablet 3    famotidine (PEPCID) 20 MG tablet take 1 tablet by mouth twice a day 60 tablet 3    SM ASPIRIN ADULT LOW STRENGTH 81 MG EC tablet take 1 tablet by mouth once daily 90 tablet 1    dapagliflozin (FARXIGA) 5 MG tablet Take 1 tablet by mouth every morning 90 tablet 1    DULoxetine (CYMBALTA) 60 MG extended release capsule take 1 capsule by mouth once daily 90 capsule 3    melatonin 3 MG TABS tablet take 1 tablet by mouth nightly 30 tablet 1    glipiZIDE (GLUCOTROL) 10 MG tablet take 2 tablets by mouth twice a day 180 tablet 2    RA PEN NEEDLES 31G X 8 MM MISC INJECTING TWICE DAILY DX: E11.65      oxybutynin (DITROPAN) 5 MG tablet Take 5 mg by mouth daily       insulin lispro, 1 Unit Dial, 100 UNIT/ML SOPN Inject 10 Units into the skin 3 times daily as needed      Insulin Degludec 200 UNIT/ML SOPN Inject 75 Units into the skin nightly 5 pen 2   Notation: Above medications are not currently reconciled at time of signing this H&P note, to be reconciled in pre-op per RN. Review of Systems   Constitutional: Positive for appetite change. Negative for chills, fever and unexpected weight change. HENT: Positive for postnasal drip. Negative for congestion and sore throat. Eyes: Negative for visual disturbance. Respiratory: Negative for cough, shortness of breath and wheezing. Cardiovascular: Negative for chest pain, palpitations and leg swelling. Gastrointestinal:        See HPI   Genitourinary: Negative. Musculoskeletal: Positive for arthralgias, back pain, myalgias and neck pain. Skin: Negative. Neurological: Positive for light-headedness (When BS is low). Negative for dizziness and headaches. Hematological: Negative. GENERAL PHYSICAL EXAM     Vitals: Review vitals per RN flowsheet. Physical Exam  Constitutional:       General: She is not in acute distress. Appearance: She is obese. She is not ill-appearing, toxic-appearing or diaphoretic. HENT:      Head: Normocephalic and atraumatic. Nose: Nose normal. No congestion. Mouth/Throat:      Mouth: Mucous membranes are moist.      Pharynx: Oropharynx is clear. No oropharyngeal exudate or posterior oropharyngeal erythema.    Eyes:      General: No scleral icterus. Conjunctiva/sclera: Conjunctivae normal.   Cardiovascular:      Rate and Rhythm: Normal rate and regular rhythm. Heart sounds: Normal heart sounds. No murmur heard. No friction rub. No gallop. Pulmonary:      Effort: Pulmonary effort is normal. No respiratory distress. Breath sounds: Normal breath sounds. No wheezing, rhonchi or rales. Abdominal:      General: Bowel sounds are normal. There is no distension. Palpations: Abdomen is soft. Tenderness: There is no abdominal tenderness. There is no guarding. Musculoskeletal:         General: No swelling or tenderness. Cervical back: Neck supple. No tenderness. Right lower leg: No edema. Left lower leg: No edema. Skin:     General: Skin is warm and dry. Coloration: Skin is not jaundiced. Neurological:      General: No focal deficit present. Mental Status: She is alert and oriented to person, place, and time.       Gait: Gait normal.   Psychiatric:         Mood and Affect: Mood normal.        PROVISIONAL DIAGNOSES / SURGERY:      POSITIVE FIT    EGD / COLORECTAL CANCER SCREENING, NOT HIGH RISK    Patient Active Problem List    Diagnosis Date Noted    Pancreatic cyst 10/07/2021    Family history of uterine cancer 10/07/2021    Chronic diastolic congestive heart failure ([address] 03/23/2021    Major depressive disorder with single episode, in partial remission ([address] 03/23/2021    Insomnia 01/11/2021    Arthritis involving multiple sites 08/17/2020    Arthritis of right hand 08/17/2020    Gastroesophageal reflux disease without esophagitis 08/17/2020    Mixed hyperlipidemia 06/16/2020    Chest pain with high risk for cardiac etiology 03/29/2020    Ureterolithiasis 08/20/2019    Nephrolithiasis 08/19/2019    Seizure disorder ([address] 09/21/2017    Coronary artery disease involving native coronary artery of native heart without angina pectoris 09/18/2017    Generalized anxiety disorder 09/18/2017    Migraine without aura, not intractable 09/18/2017    Mixed dyslipidemia 09/18/2017    Essential hypertension 02/07/2017    Chronic pain of both knees 02/07/2017    Uncontrolled type 2 diabetes mellitus with diabetic neuropathy, with long-term current use of insulin ([facility] 06/08/2016    Mediastinal cyst 05/24/2016    Thickened endometrium 05/04/2016    Chronic prescription benzodiazepine use 03/31/2016    Chronic use of opiate drugs therapeutic purposes 03/31/2016    Morbid obesity with BMI of 45.0-49.9, adult ([facility] 03/31/2016    Constipation 04/08/2015    Diabetes type 2, uncontrolled ([facility] 01/14/2012    DM neuropathy takes Percocet 01/14/2012    Recurrent UTI / Nephrolithiasis 01/14/2012    Hypothyroidism 01/14/2012    Depression 01/14/2012    MVP (mitral valve prolapse) 01/14/2012    Abnormal mammogram 01/10/2012    Chronic neck pain            RASHMI Newman - CNP on 10/26/2021 at 5:59 AM

## 2021-10-26 NOTE — ANESTHESIA PRE PROCEDURE
Department of Anesthesiology  Preprocedure Note       Name:  Reji Arita   Age:  61 y.o.  :  1955                                          MRN:  [id number]         Date:  10/26/2021      Surgeon: Cyndi Burk):  Parris Carney MD    Procedure: Procedure(s):  COLORECTAL CANCER SCREENING, NOT HIGH RISK  EGD    Medications prior to admission:   Prior to Admission medications    Medication Sig Start Date End Date Taking? Authorizing Provider   senna (SENOKOT) 8.6 MG tablet Take 1 tablet by mouth 2 times daily 10/11/21 10/11/22 Yes Mary Guerra MD   levothyroxine (SYNTHROID) 75 MCG tablet Take 75 mcg by mouth Six times weekly Indications:  All days except Sundays   Yes Historical Provider, MD   gabapentin (NEURONTIN) 600 MG tablet take 1 tablet by mouth twice a day 9/20/21 10/26/21 Yes Mary Guerra MD   atorvastatin (LIPITOR) 80 MG tablet take 1 tablet by mouth once daily 21  Yes Mary Guerra MD   lisinopril (PRINIVIL;ZESTRIL) 2.5 MG tablet take 1 tablet by mouth once daily 21  Yes Mary Guerra MD   furosemide (LASIX) 20 MG tablet take 1 tablet by mouth once daily 9/3/21  Yes Mary Guerra MD   famotidine (PEPCID) 20 MG tablet take 1 tablet by mouth twice a day 21  Yes Mary Guerra MD   SM ASPIRIN ADULT LOW STRENGTH 81 MG EC tablet take 1 tablet by mouth once daily 21  Yes Mary Guerra MD   dapagliflozin (FARXIGA) 5 MG tablet Take 1 tablet by mouth every morning 21  Yes Mary Guerra MD   DULoxetine (CYMBALTA) 60 MG extended release capsule take 1 capsule by mouth once daily 21  Yes Mary Guerra MD   glipiZIDE (GLUCOTROL) 10 MG tablet take 2 tablets by mouth twice a day 20  Yes Mary Guerra MD   oxybutynin (DITROPAN) 5 MG tablet Take 5 mg by mouth daily  20  Yes Historical Provider, MD   insulin lispro, 1 Unit Dial, 100 UNIT/ML SOPN Inject 10 Units into the skin 3 times daily as needed 20  Yes Historical Provider, MD   Insulin Degludec 200 UNIT/ML SOPN Inject 75 Units into the skin nightly 3/30/20  Yes Trenton Viera MD   magnesium citrate solution Take 296 mLs by mouth once for 1 dose 10/13/21 10/13/21  Betsey Andrew MD   levothyroxine (SYNTHROID) 75 MCG tablet Take 150 mcg by mouth once a week Indications: Sundays    Historical Provider, MD   lidocaine (LIDODERM) 5 % Place 1 patch onto the skin daily 12 hours on, 12 hours off. 10/1/21   Amy Knight PA-C   ibuprofen (IBU) 600 MG tablet Take 1 tablet by mouth every 8 hours as needed for Pain 10/1/21   Amy Knight PA-C   melatonin 3 MG TABS tablet take 1 tablet by mouth nightly 1/18/21   Cranford Riedel, MD   RA PEN NEEDLES 31G X 8 MM MISC INJECTING TWICE DAILY DX: E11.65 10/28/20   Historical Provider, MD       Current medications:    Current Facility-Administered Medications   Medication Dose Route Frequency Provider Last Rate Last Admin    0.9 % sodium chloride infusion   IntraVENous Continuous Chelsi Moe MD        sodium chloride flush 0.9 % injection 10 mL  10 mL IntraVENous 2 times per day Chelsi Moe MD        sodium chloride flush 0.9 % injection 10 mL  10 mL IntraVENous PRN Chelsi Moe MD        0.9 % sodium chloride infusion  25 mL IntraVENous PRN Chelsi Moe MD        lidocaine PF 1 % injection 1 mL  1 mL IntraDERmal Once PRN Chelsi Moe MD           Allergies: Allergies   Allergen Reactions    Cefuroxime Axetil Anaphylaxis    Dilaudid [Hydromorphone] Anaphylaxis    Metformin And Related Diarrhea, Itching and Nausea And Vomiting    Sulfa Antibiotics Itching    Amoxicillin     Antihistamines, Loratadine-Type     Eggs Or Egg-Derived Products     Lorazepam     Nubain [Nalbuphine Hcl] Other (See Comments)     Hallucinations; \"I got stoned. \"    Vistaril [Hydroxyzine Hcl]        Problem List:    Patient Active Problem List   Diagnosis Code    Chronic neck pain M54.2, G89.29    Abnormal mammogram R92.8    Diabetes type 2, uncontrolled ([facility] E11.65    DM neuropathy takes Percocet E11.40    Recurrent UTI / Nephrolithiasis N39.0    Hypothyroidism E03.9    Depression F32. A    MVP (mitral valve prolapse) I34.1    Constipation K59.00    Chronic prescription benzodiazepine use Z79.899    Chronic use of opiate drugs therapeutic purposes Z79.891    Morbid obesity with BMI of 45.0-49.9, adult ([facility]) E66.01, Z68.42    Thickened endometrium R93.89    Mediastinal cyst Q34.1    Uncontrolled type 2 diabetes mellitus with diabetic neuropathy, with long-term current use of insulin ([facility]) E11.40, Z79.4, E11.65    Essential hypertension I10    Chronic pain of both knees M25.561, M25.562, G89.29    Nephrolithiasis N20.0    Ureterolithiasis N20.1    Chest pain with high risk for cardiac etiology R07.9    Mixed hyperlipidemia E78.2    Coronary artery disease involving native coronary artery of native heart without angina pectoris I25.10    Generalized anxiety disorder F41.1    Migraine without aura, not intractable G43.009    Mixed dyslipidemia E78.2    Seizure disorder ([facility]) G40.909    Arthritis involving multiple sites M12.9    Arthritis of right hand M19.041    Gastroesophageal reflux disease without esophagitis K21.9    Insomnia G47.00    Chronic diastolic congestive heart failure ([facility]) I50.32    Major depressive disorder with single episode, in partial remission ([facility]) F32.4    Pancreatic cyst K86.2    Family history of uterine cancer Z80.49       Past Medical History:        Diagnosis Date    Arrhythmia     Breast mass     CAD (coronary artery disease)     with valvular disease    Chronic neck pain     Coccygeal pain 3/7/2016    Constipation     COVID-19 04/2020    Depression     Diabetic neuropathy ([facility])     History of hepatitis A     possible history of hepatitis A in the past    History of renal calculi     Hypertension     Hyperthyroidism     Hypothyroidism     Morbid obesity with BMI of 45.0-49.9, adult ([facility] 3/31/2016    Nephrolithiasis     Neuropathy     Pancreatic cyst     PONV (postoperative nausea and vomiting)     Type II or unspecified type diabetes mellitus without mention of complication, not stated as uncontrolled     non insulin dependent     Ulcerative colitis ([facility]     UTI (lower urinary tract infection)        Past Surgical History:        Procedure Laterality Date    BREAST BIOPSY      negative    CARDIOVASCULAR STRESS TEST      7/17/15 no results    CHOLECYSTECTOMY      CYST REMOVAL      right hand    CYSTO/URETERO/PYELOSCOPY, CALCULUS TX Right 2019    CYSTOSCOPY URETEROSCOPY  STONE BASKET RETRIEVAL RIGHT STENT EXCHANGE performed by Winston Ward MD [facility] Right 2019    CYSTOSCOPY URETERAL STENT INSERTION performed by Winstno Ward MD at [facility]      ENDOSCOPY, COLON, DIAGNOSTIC      LITHOTRIPSY      TONSILLECTOMY      TUBAL LIGATION      UPPER GASTROINTESTINAL ENDOSCOPY N/A 2020    EGD BIOPSY performed by Vahe Bardales MD at [address] [place] [address] History:    Social History     Tobacco Use    Smoking status: Former Smoker     Packs/day: 0.01     Years: 1.00     Pack years: 0.01     Types: Cigarettes     Quit date:      Years since quittin.8    Smokeless tobacco: Never Used    Tobacco comment: pt states she quit smoking cigarettes at the age of 14   Substance Use Topics    Alcohol use: No     Alcohol/week: 0.0 standard drinks                                Counseling given: Not Answered  Comment: pt states she quit smoking cigarettes at the age of 14      Vital Signs (Current):   Vitals:    10/26/21 0621   BP: (!) 167/67   Pulse: 80   Resp: 18   Temp: 96 °F (35.6 °C)   TempSrc: Infrared   SpO2: 98%   Weight: 245 lb (111.1 kg)   Height: 5' 2\" (1.575 m)                                              BP Readings from Last 3 Encounters:   10/26/21 (!) 167/67   10/13/21 135/78   10/11/21 124/76 NPO Status: Time of last liquid consumption: 2200                        Time of last solid consumption: 1900                        Date of last liquid consumption: 10/25/21                        Date of last solid food consumption: 10/24/21    BMI:   Wt Readings from Last 3 Encounters:   10/26/21 245 lb (111.1 kg)   10/19/21 245 lb (111.1 kg)   10/13/21 245 lb 12.8 oz (111.5 kg)     Body mass index is 44.81 kg/m². CBC:   Lab Results   Component Value Date    WBC 12.2 10/06/2021    RBC 5.05 10/06/2021    RBC 4.79 01/23/2012    HGB 15.0 10/06/2021    HCT 45.1 10/06/2021    MCV 89.1 10/06/2021    RDW 13.8 10/06/2021     10/06/2021     01/23/2012       CMP:   Lab Results   Component Value Date     10/06/2021    K 4.5 10/06/2021     10/06/2021    CO2 25 10/06/2021    BUN 13 10/06/2021    CREATININE 0.44 10/06/2021    GFRAA >60 10/06/2021    LABGLOM >60 10/06/2021    GLUCOSE 136 10/06/2021    GLUCOSE 94 01/23/2012    PROT 6.8 10/06/2021    CALCIUM 9.4 10/06/2021    BILITOT 0.52 10/06/2021    ALKPHOS 144 10/06/2021    AST 29 10/06/2021    ALT 32 10/06/2021       POC Tests: No results for input(s): POCGLU, POCNA, POCK, POCCL, POCBUN, POCHEMO, POCHCT in the last 72 hours.     Coags:   Lab Results   Component Value Date    PROTIME 10.0 07/31/2018    INR 1.0 07/31/2018    APTT 27.7 07/30/2018       HCG (If Applicable):   Lab Results   Component Value Date    PREGTESTUR NEGATIVE 01/23/2012        ABGs: No results found for: PHART, PO2ART, BQT5WZH, ZRZ8OWW, BEART, [id number]     Type & Screen (If Applicable):  No results found for: LABABO, LABRH    Drug/Infectious Status (If Applicable):  Lab Results   Component Value Date    HEPCAB NONREACTIVE 07/03/2020       COVID-19 Screening (If Applicable):   Lab Results   Component Value Date    COVID19 DETECTED 04/17/2020           Anesthesia Evaluation  Patient summary reviewed and Nursing notes reviewed   history of anesthetic complications (had PONV even after EGD): PONV. Airway: Mallampati: III  TM distance: >3 FB   Neck ROM: full  Mouth opening: > = 3 FB Dental: normal exam         Pulmonary:Negative Pulmonary ROS and normal exam  breath sounds clear to auscultation                             Cardiovascular:    (+) hypertension:, valvular problems/murmurs: MVP, CAD:, CHF:, hyperlipidemia        Rhythm: regular  Rate: normal                    Neuro/Psych:   (+) neuromuscular disease:, headaches:, psychiatric history:             ROS comment: Diabetic neuropathy   Chronic neck pain GI/Hepatic/Renal:   (+) GERD:, PUD, renal disease: kidney stones, morbid obesity          Endo/Other:    (+) DiabetesType II DM, , hypothyroidism: arthritis: OA., .                 Abdominal:             Vascular: negative vascular ROS. Other Findings:           Anesthesia Plan      general     ASA 3       Induction: intravenous. MIPS: Prophylactic antiemetics administered. Anesthetic plan and risks discussed with patient. Plan discussed with CRNA.           Pepcid and Scopolamine patch ordered for h/o PONV        Kvng Chaudhry MD   10/26/2021

## 2021-10-26 NOTE — OP NOTE
with the snare. Subsequently this polyp is cauterized. Has a diverticulosis in the sigmoid colon         rectum/Anus: examined in normal and retroflexed positions and was normal, large amount of stool present in the rectum. Patient had poor preparation. In some areas of the colon large amount of liquid present that could not be aspirated as the scope was plugging up. Withdrawal Time was (minutes): 25          The colon was decompressed and the scope was removed. The patient tolerated the procedure without unusual events. During the procedure, the patient's blood pressure, pulse and oxygen saturation remained stable and documented. No unusual events occurred during the procedure. Patient was transferred to recovery room and will be discharged when criteria is met. Recommendations/Plan:   1. F/U Biopsies  2. F/U In Office as instructed  3. Discussed with the family  4. High fiber diet   5. Precautions to avoid constipation     Next colonoscopy: In 6 months. Patient has food preparation. Also has of multiple polyps and polyps, flat lesions can be missed.     Electronically signed by Gavino Mayer MD  on 10/26/2021 at 8:40 AM

## 2021-10-26 NOTE — ANESTHESIA POSTPROCEDURE EVALUATION
Department of Anesthesiology  Postprocedure Note    Patient: William Ramsey  MRN: [id number]  YOB: 1955  Date of evaluation: 10/26/2021  Time:  12:35 PM     Procedure Summary     Date: 10/26/21 Room / Location: [facility] ENDO 02 / [facility] ENDO    Anesthesia Start: 0739 Anesthesia Stop: 1838    Procedures:       COLONOSCOPY POLYPECTOMY SNARE/COLD BIOPSY (N/A Anus)      EGD BIOPSY (N/A Esophagus) Diagnosis:       (POSITIVE FIT)      (PT VACCINATED)    Surgeons: Joy Cobos MD Responsible Provider: Edy Braxton MD    Anesthesia Type: general ASA Status: 3          Anesthesia Type: general    Gogo Phase I: Gogo Score: 10    Gogo Phase II:      Last vitals: Reviewed and per EMR flowsheets.        Anesthesia Post Evaluation    Comments: POST- ANESTHESIA EVALUATION       Pt Name: William Ramsey  MRN: [id number]  YOB: 1955  Date of evaluation: 10/26/2021  Time:  12:35 PM      BP (!) 148/80   Pulse 81   Temp 98 °F (36.7 °C)   Resp 24   Ht 5' 2\" (1.575 m)   Wt 245 lb (111.1 kg)   LMP 01/12/2015 (Approximate)   SpO2 97%   BMI 44.81 kg/m²      Consciousness Level  Awake  Cardiopulmonary Status  Stable  Pain Adequately Treated YES  Nausea / Vomiting  NO  Adequate Hydration  YES  Anesthesia Related Complications NONE      Electronically signed by Edy Braxton MD on 10/26/2021 at 12:35 PM

## 2021-10-27 ENCOUNTER — TELEPHONE (OUTPATIENT)
Dept: GASTROENTEROLOGY | Age: 66
End: 2021-10-27

## 2021-10-27 ENCOUNTER — TELEPHONE (OUTPATIENT)
Dept: FAMILY MEDICINE CLINIC | Age: 66
End: 2021-10-27

## 2021-10-27 DIAGNOSIS — K21.9 GASTROESOPHAGEAL REFLUX DISEASE WITHOUT ESOPHAGITIS: ICD-10-CM

## 2021-10-27 LAB — SURGICAL PATHOLOGY REPORT: NORMAL

## 2021-10-27 RX ORDER — FAMOTIDINE 20 MG/1
TABLET, FILM COATED ORAL
Qty: 60 TABLET | Refills: 3 | Status: SHIPPED | OUTPATIENT
Start: 2021-10-27 | End: 2022-05-17

## 2021-10-27 NOTE — TELEPHONE ENCOUNTER
Pt called and stated that she hada scope and 7 polyps removed yesterday and is experiencing some severe pain. I tried putting in for appt however no one has availability.  Please advise

## 2021-10-27 NOTE — TELEPHONE ENCOUNTER
Scheduled virtual with Sparkle next week. She states she wants to know if she can get pain medication and wants to know if she can have Tylenol 3. I advised that you would not but she wanted me to send you a message back.

## 2021-10-27 NOTE — TELEPHONE ENCOUNTER
Please Approve or Refuse.   Send to Pharmacy per Pt's Request:      Next Visit Date:  1/10/2022   Last Visit Date: 10/7/2021    Hemoglobin A1C (%)   Date Value   03/23/2021 8.9   12/03/2020 10.9 (H)   04/15/2020 10.1 (H)             ( goal A1C is < 7)   BP Readings from Last 3 Encounters:   10/26/21 133/74   10/26/21 (!) 148/80   10/13/21 135/78          (goal 120/80)  BUN   Date Value Ref Range Status   10/06/2021 13 8 - 23 mg/dL Final     CREATININE   Date Value Ref Range Status   10/06/2021 0.44 (L) 0.50 - 0.90 mg/dL Final     Potassium   Date Value Ref Range Status   10/06/2021 4.5 3.7 - 5.3 mmol/L Final

## 2021-10-27 NOTE — TELEPHONE ENCOUNTER
Patient called office for pain medication. Informed that we do not prescribe pain medication and to follow up with her PCP.

## 2021-10-28 NOTE — TELEPHONE ENCOUNTER
Patient informed. She states she has an appointment scheduled with [facility] and still wants to know if she can get anything or if she has to wait until the appointment.

## 2021-11-01 RX ORDER — ASPIRIN 81 MG/1
TABLET, COATED ORAL
Qty: 90 TABLET | Refills: 1 | Status: SHIPPED | OUTPATIENT
Start: 2021-11-01

## 2021-11-02 RX ORDER — DIAPER,BRIEF,INFANT-TODD,DISP
EACH MISCELLANEOUS
Qty: 30 G | Refills: 1 | Status: CANCELLED | OUTPATIENT
Start: 2021-11-02 | End: 2021-11-09

## 2021-11-03 NOTE — PROGRESS NOTES
[facility] at [address] [place] [address]   O: [phone]  B:[phone]    Hector Archuleta is a 61 y.o. female evaluated via telephone on 11/4/2021. CONSENT:  She and/or health care decision maker is aware that that she may receive a bill for this telephone service, depending on her insurance coverage, and has provided verbal consent to proceed: Yes    DOCUMENTATION:  Patient scheduled this appointment today due to Abdominal Pain  . Patient had a recent colonoscopy due to some chronic constipation. Patient was found to have some duodenal diverticulum and following some polyps. Patient was having some left lower quadrant pain. After the colonoscopy. However, prior to her visit today, patient reported steady improvement. Patient was found with a large stool load and was told that she is going to need a repeat colonoscopy in 6 months due to this. Patient will also be given some Benefiber to help with the constipation. Patient also had some rectal discomfort while doing her prep. However, she was using some over-the-counter Z-sorb which helped a lot. She will continue to use this the next time she does have breath. Patient is encouraged to stay hydrated. No data recorded  I communicated with the patient and/or health care decision maker about: PLAN     Review of Systems   Constitutional: Negative for chills and fever. Respiratory: Negative for shortness of breath. Cardiovascular: Negative for chest pain. Gastrointestinal: Positive for abdominal pain (improved), constipation and rectal pain (improved). Genitourinary: Negative for dysuria. Neurological: Negative for headaches. Psychiatric/Behavioral: Negative for sleep disturbance and suicidal ideas. The patient is nervous/anxious. Details of this discussion including any medical advice provided: Under assessment.     PHYSICAL EXAM[INSTRUCTIONS:  \"[x]\" Indicates a positive item  \"[]\" Indicates a negative item  -- DELETE ALL ITEMS NOT EXAMINED]  Patient-Reported Vitals 12/2/2020   Patient-Reported Weight 240 LB   Patient-Reported Height 5 2   Patient-Reported Systolic -   Patient-Reported Diastolic -     Constitutional: [x] No apparent distress      [] Abnormal -   Mental status: [x] Alert and awake  [x] Oriented to person/place/time[] Abnormal -   Pulmonary/Chest: [x] Respiratory effort normal         [] Abnormal -          Psychiatric:       [x] Normal Affect [] Abnormal -        [x] No Hallucinations  Other pertinent observable physical exam findings:-mild anxiety  Controlled Substance Monitoring:  Acute and Chronic Pain Monitoring:   RX Monitoring 9/21/2020   Attestation -   Periodic Controlled Substance Monitoring No signs of potential drug abuse or diversion identified. ASSESSMENT  1. Duodenal diverticulum  Failure to Improve  Continue to monitor  Encouraged to ff up with GE  Repeat colonoscopy as recommended    2. Slow transit constipation  Failure to Improve  Continue therapy. DISCUSSED and ADVISED TO:  Drink plenty of fluids, 1.5 to 2 liters a day  Increase high-fiber foods  with 25-30 g each day. These include fruits, vegetables, beans, and whole grains. Get at least 30 minutes of exercise on most days of the week. Take a fiber supplement, such as Citrucel (Methylcellulose fiber) or Metamucil (Psyllium), every day. Schedule time each day for a bowel movement. - Wheat Dextrin (BENEFIBER ON THE GO) POWD; Take 1 Package by mouth daily Mix with 8 ounce drink daily. Dispense: 100 each; Refill: 3    3. Tubular adenoma of colon  Stable  Continue to monitor      4. Anxiety about health  Stable  Continue current therapy. Discussed how to recognize anxiety. Advised to relieve tension with exercise or a massage. Advised to get enough rest.  Advised to avoid alcohol, caffeine, nicotine, and illegal drugs. Which can increase anxiety level and cause sleep problems.       5. Pain, rectal  Improved  Continue to monitor  Continue z-sorb use    Total Time: minutes: 21-30 minutes  I affirm this is a Patient Initiated Episode with a Patient who has not had a related appointment within my department in the past 7 days or scheduled within the next 24 hours.   Patient identification was verified at the start of the visit: Yes  Note: not billable if this call serves to triage the patient into an appointment for the relevant concern  Patient-Reported Vitals 12/2/2020   Patient-Reported Weight 240 LB   Patient-Reported Height 5 2   Patient-Reported Systolic -   Patient-Reported Diastolic -     Patient Active Problem List   Diagnosis    Chronic neck pain    Abnormal mammogram    Diabetes type 2, uncontrolled ([address]    DM neuropathy takes Percocet    Recurrent UTI / Nephrolithiasis    Hypothyroidism    Depression    MVP (mitral valve prolapse)    Constipation    Chronic prescription benzodiazepine use    Chronic use of opiate drugs therapeutic purposes    Morbid obesity with BMI of 45.0-49.9, adult ([address]    Thickened endometrium    Mediastinal cyst    Uncontrolled type 2 diabetes mellitus with diabetic neuropathy, with long-term current use of insulin (HCC)    Essential hypertension    Chronic pain of both knees    Nephrolithiasis    Ureterolithiasis    Chest pain with high risk for cardiac etiology    Mixed hyperlipidemia    Coronary artery disease involving native coronary artery of native heart without angina pectoris    Generalized anxiety disorder    Migraine without aura, not intractable    Mixed dyslipidemia    Seizure disorder ([address]    Arthritis involving multiple sites    Arthritis of right hand    Gastroesophageal reflux disease without esophagitis    Insomnia    Chronic diastolic congestive heart failure ([address]    Major depressive disorder with single episode, in partial remission ([address]    Pancreatic cyst    Family history of uterine cancer    Polyp of ascending colon    Tubular adenoma of colon    Duodenal diverticulum     Stalin Bradshaw is a 61 y.o. female patient  being evaluated by a Virtual Visit (video visit) encounter to address concerns as mentioned above. A caregiver was present when appropriate. Due to this being a TeleHealth encounter (During XUVDY-77 public health emergency), evaluation of the following organ systems was limited:Vitals/Constitutional/EENT/Resp/CV/GI//MS/Neuro/Skin/Heme-Lymph-Imm. Services were provided through a video synchronous discussion virtually to substitute for in-person clinic visit. This is a telehealth visit that was performed with the originating site at Patient Location: home and provider Location of [place], [place]. Pursuant to the emergency declaration under the [address] [place] [address] authority and the [employer] Act, this Virtual Visit was conducted with patient's (and/or legal guardian's) consent, to reduce the patient's risk of exposure to COVID-19 and provide necessary medical care. The patient (and/or legal guardian) has also been advised to contact this office for worsening conditions or problems, and seek emergency medical treatment and/or call 911 if deemed necessary. This note was completed by using the assistance of a speech-recognition program. However, inadvertent computerized transcription errors may be present. Although every effort was made to ensure accuracy, no guarantees can be provided that every mistake has been identified and corrected by editing.   Electronically signed by RASHMI Wilson CNP on 11/2/21 at 9:03 PM EDT

## 2021-11-04 ENCOUNTER — TELEMEDICINE (OUTPATIENT)
Dept: FAMILY MEDICINE CLINIC | Age: 66
End: 2021-11-04
Payer: COMMERCIAL

## 2021-11-04 DIAGNOSIS — K62.89 PAIN, RECTAL: ICD-10-CM

## 2021-11-04 DIAGNOSIS — F41.8 ANXIETY ABOUT HEALTH: ICD-10-CM

## 2021-11-04 DIAGNOSIS — K59.01 SLOW TRANSIT CONSTIPATION: ICD-10-CM

## 2021-11-04 DIAGNOSIS — K57.10 DUODENAL DIVERTICULUM: Primary | ICD-10-CM

## 2021-11-04 DIAGNOSIS — D12.6 TUBULAR ADENOMA OF COLON: ICD-10-CM

## 2021-11-04 PROCEDURE — 99214 OFFICE O/P EST MOD 30 MIN: CPT | Performed by: FAMILY MEDICINE

## 2021-11-04 PROCEDURE — 3017F COLORECTAL CA SCREEN DOC REV: CPT | Performed by: FAMILY MEDICINE

## 2021-11-04 PROCEDURE — G8427 DOCREV CUR MEDS BY ELIG CLIN: HCPCS | Performed by: FAMILY MEDICINE

## 2021-11-04 RX ORDER — WHEAT DEXTRIN 3 G/4 G
1 POWDER IN PACKET (EA) ORAL DAILY
Qty: 100 EACH | Refills: 3 | Status: SHIPPED | OUTPATIENT
Start: 2021-11-04 | End: 2021-12-04

## 2021-11-04 ASSESSMENT — ENCOUNTER SYMPTOMS
RECTAL PAIN: 1
ABDOMINAL PAIN: 1
CONSTIPATION: 1
SHORTNESS OF BREATH: 0

## 2021-11-04 NOTE — PATIENT INSTRUCTIONS
Patient Education        Constipation: Care Instructions  Your Care Instructions     Constipation means that you have a hard time passing stools (bowel movements). People pass stools from 3 times a day to once every 3 days. What is normal for you may be different. Constipation may occur with pain in the rectum and cramping. The pain may get worse when you try to pass stools. Sometimes there are small amounts of bright red blood on toilet paper or the surface of stools. This is because of enlarged veins near the rectum (hemorrhoids). A few changes in your diet and lifestyle may help you avoid ongoing constipation. Your doctor may also prescribe medicine to help loosen your stool. Some medicines can cause constipation. These include pain medicines and antidepressants. Tell your doctor about all the medicines you take. Your doctor may want to make a medicine change to ease your symptoms. Follow-up care is a key part of your treatment and safety. Be sure to make and go to all appointments, and call your doctor if you are having problems. It's also a good idea to know your test results and keep a list of the medicines you take. How can you care for yourself at home? · Drink plenty of fluids. If you have kidney, heart, or liver disease and have to limit fluids, talk with your doctor before you increase the amount of fluids you drink. · Include high-fiber foods in your diet each day. These include fruits, vegetables, beans, and whole grains. · Get at least 30 minutes of exercise on most days of the week. Walking is a good choice. You also may want to do other activities, such as running, swimming, cycling, or playing tennis or team sports. · Take a fiber supplement, such as Citrucel or Metamucil, every day. Read and follow all instructions on the label. · Schedule time each day for a bowel movement. A daily routine may help. Take your time having your bowel movement.   · Support your feet with a small step stool when you sit on the toilet. This helps flex your hips and places your pelvis in a squatting position. · Your doctor may recommend an over-the-counter laxative to relieve your constipation. Examples are Milk of Magnesia and MiraLax. Read and follow all instructions on the label. Do not use laxatives on a long-term basis. When should you call for help? Call your doctor now or seek immediate medical care if:    · You have new or worse belly pain.     · You have new or worse nausea or vomiting.     · You have blood in your stools. Watch closely for changes in your health, and be sure to contact your doctor if:    · Your constipation is getting worse.     · You do not get better as expected. Where can you learn more? Go to https://Openbuilds.[employer]. org and sign in to your [employer] account. Enter 21 [phone] in the [employer] box to learn more about \"Constipation: Care Instructions. \"     If you do not have an account, please click on the \"Sign Up Now\" link. Current as of: July 1, 2021               Content Version: 13.0  © 8073-7594 [employer]. Care instructions adapted under license by [facility]). If you have questions about a medical condition or this instruction, always ask your healthcare professional. [place] [address] any warranty or liability for your use of this information. Patient Education        Diverticulitis: Care Instructions  Overview     Diverticulitis occurs when pouches form in the wall of the colon and become inflamed or infected. It can be very painful. Doctors aren't sure what causes diverticulitis. There is no proof that foods such as nuts, seeds, or berries cause it or make it worse. A low-fiber diet may cause the colon to work harder to push stool forward. Pouches may form because of this extra work. It may be hard to think about healthy eating while you're in pain.  But as you recover, you might think about how you can use healthy eating for overall better health. Healthy eating may help you avoid future attacks. Follow-up care is a key part of your treatment and safety. Be sure to make and go to all appointments, and call your doctor if you are having problems. It's also a good idea to know your test results and keep a list of the medicines you take. How can you care for yourself at home? · Drink plenty of fluids. If you have kidney, heart, or liver disease and have to limit fluids, talk with your doctor before you increase the amount of fluids you drink. · Stay with liquids or a bland diet (plain rice, bananas, dry toast or crackers, applesauce) until you are feeling better. Then you can return to regular foods and slowly increase the amount of fiber in your diet. · Use a heating pad set on low on your belly to relieve mild cramps and pain. · Get extra rest until you are feeling better. · Be safe with medicines. Read and follow all instructions on the label. ? If the doctor gave you a prescription medicine for pain, take it as prescribed. ? If you are not taking a prescription pain medicine, ask your doctor if you can take an over-the-counter medicine. · If your doctor prescribed antibiotics, take them as directed. Do not stop taking them just because you feel better. You need to take the full course of antibiotics. · Do not use laxatives or enemas unless your doctor tells you to use them. When should you call for help? Call your doctor now or seek immediate medical care if:    · You have a fever.     · You are vomiting.     · You have new or worse belly pain.     · You cannot pass stools or gas. Watch closely for changes in your health, and be sure to contact your doctor if you have any problems. Where can you learn more? Go to https://chpemary.AutoSpot. org and sign in to your [employer] account. Enter H901 in the [facility] box to learn more about \"Diverticulitis: Care Instructions. \"     If you do not have an account, please click on the \"Sign Up Now\" link. Current as of: February 10, 2021               Content Version: 13.0  © 3849-1561 [employer]. Care instructions adapted under license by [facility] ([facility]). If you have questions about a medical condition or this instruction, always ask your healthcare professional. [address] any warranty or liability for your use of this information.

## 2021-11-05 ENCOUNTER — TELEPHONE (OUTPATIENT)
Dept: OBGYN CLINIC | Age: 66
End: 2021-11-05

## 2021-11-08 ENCOUNTER — HOSPITAL ENCOUNTER (OUTPATIENT)
Age: 66
Discharge: HOME | End: 2021-11-08
Payer: COMMERCIAL

## 2021-11-08 ENCOUNTER — TELEPHONE (OUTPATIENT)
Dept: UROLOGY | Age: 66
End: 2021-11-08

## 2021-11-08 DIAGNOSIS — R39.9 UTI SYMPTOMS: ICD-10-CM

## 2021-11-08 DIAGNOSIS — R39.9 UTI SYMPTOMS: Primary | ICD-10-CM

## 2021-11-08 PROCEDURE — 87086 URINE CULTURE/COLONY COUNT: CPT

## 2021-11-08 PROCEDURE — 87186 SC STD MICRODIL/AGAR DIL: CPT

## 2021-11-08 PROCEDURE — 87088 URINE BACTERIA CULTURE: CPT

## 2021-11-08 RX ORDER — GABAPENTIN 600 MG/1
TABLET ORAL
Qty: 60 TABLET | Refills: 0 | Status: SHIPPED | OUTPATIENT
Start: 2021-11-08 | End: 2021-12-06

## 2021-11-08 NOTE — TELEPHONE ENCOUNTER
Patient called in and stated \"I think I have a UTI. I'm having some pain, burning and my urine is cloudy and has a foul odor. \" Writer let patient know that we would put in a urine culture in and she can go to any [facility] lab to give a urine

## 2021-11-10 LAB
CULTURE: ABNORMAL
Lab: ABNORMAL
SPECIMEN DESCRIPTION: ABNORMAL

## 2021-11-11 ENCOUNTER — TELEPHONE (OUTPATIENT)
Dept: UROLOGY | Age: 66
End: 2021-11-11

## 2021-11-11 DIAGNOSIS — N39.0 E-COLI UTI: Primary | ICD-10-CM

## 2021-11-11 DIAGNOSIS — B96.20 E-COLI UTI: Primary | ICD-10-CM

## 2021-11-11 RX ORDER — NITROFURANTOIN 25; 75 MG/1; MG/1
100 CAPSULE ORAL 2 TIMES DAILY
Qty: 14 CAPSULE | Refills: 0 | Status: SHIPPED | OUTPATIENT
Start: 2021-11-11 | End: 2021-11-18

## 2021-11-11 RX ORDER — NITROFURANTOIN 25; 75 MG/1; MG/1
100 CAPSULE ORAL 2 TIMES DAILY
Qty: 14 CAPSULE | Refills: 0 | Status: CANCELLED | OUTPATIENT
Start: 2021-11-11 | End: 2021-11-18

## 2021-11-11 NOTE — TELEPHONE ENCOUNTER
CR 0.44  Last ABX macrobid on 10/06/21. Description . [facility]   Special Requests NOT REPORTED  [facility]   Culture ESCHERICHIA COLI >[id number] CFU/ML Abnormal   [facility]          Susceptibility     Escherichia coli     BACTERIAL SUSCEPTIBILITY PANEL LEANDRO     amikacin NOT REPORTED       ampicillin >=32  Resistant     ampicillin-sulbactam NOT REPORTED       aztreonam <=1  Sensitive     ceFAZolin <=4   Cefazolin s. ..  Sensitive  Sensitive     cefepime NOT REPORTED       cefTRIAXone <=1  Sensitive     ciprofloxacin <=0.25  Sensitive     Confirmatory Extended Spectrum Beta-Lactamase NEGATIVE  Negative     ertapenem NOT REPORTED       gentamicin <=1  Sensitive     meropenem NOT REPORTED       nitrofurantoin <=16  Sensitive     piperacillin-tazobactam <=4  Sensitive     tigecycline NOT REPORTED       tobramycin <=1  Sensitive     trimethoprim-sulfamethoxazole <=20  Sensitive

## 2021-11-11 NOTE — TELEPHONE ENCOUNTER
Per Los lisa CNP Macrobid bid for 7 days order placed. Pt notified, per pt can tolerate as long as she has benadryl with it.

## 2021-11-14 RX ORDER — INSULIN LISPRO 100 [IU]/ML
10 INJECTION, SOLUTION INTRAVENOUS; SUBCUTANEOUS 3 TIMES DAILY PRN
Qty: 10 ML | Refills: 1 | Status: SHIPPED | OUTPATIENT
Start: 2021-11-14 | End: 2022-06-24 | Stop reason: SDUPTHER

## 2021-11-16 ENCOUNTER — TELEPHONE (OUTPATIENT)
Dept: FAMILY MEDICINE CLINIC | Age: 66
End: 2021-11-16

## 2021-11-16 NOTE — TELEPHONE ENCOUNTER
PATIENT CALLED IN STATING HER HANDS HAVE BEEN CRAMPING REALLY BAD SINCE  THE DOSAGE OF LASIX WAS CHANGED TO 40 mg. WHEN LOOKING INTO HER MEDICATIONS IT SHOWS PATIENT IS TO BE TAKING 20 mg TABS DAILY. I DO SHOW LASIX 40 mg ON HER PAST HX MEDS, SHOWS ON 10/19/21 COLLEEN OSUNA RN DISCONTINUED STATES REASON WAS LIST CLEAN UP. PATIENT IS CONFUSED ON WHICH DOSAGE SHE IS TO BE TAKING.  STATES SHE PUTS ALL HER MEDICATIONS IN TO ONE BOTTLE WHICH IS OLD SO SHE DIDN'T EVEN HAVE THE CORRECT DATES FOR ME.

## 2021-11-23 ENCOUNTER — OFFICE VISIT (OUTPATIENT)
Dept: GASTROENTEROLOGY | Age: 66
End: 2021-11-23
Payer: COMMERCIAL

## 2021-11-23 VITALS
HEART RATE: 78 BPM | WEIGHT: 250 LBS | DIASTOLIC BLOOD PRESSURE: 84 MMHG | BODY MASS INDEX: 45.73 KG/M2 | SYSTOLIC BLOOD PRESSURE: 140 MMHG

## 2021-11-23 DIAGNOSIS — K59.01 SLOW TRANSIT CONSTIPATION: ICD-10-CM

## 2021-11-23 DIAGNOSIS — K86.2 PANCREATIC CYST: ICD-10-CM

## 2021-11-23 DIAGNOSIS — D36.9 TUBULAR ADENOMA: Primary | ICD-10-CM

## 2021-11-23 PROCEDURE — G8484 FLU IMMUNIZE NO ADMIN: HCPCS | Performed by: INTERNAL MEDICINE

## 2021-11-23 PROCEDURE — G8427 DOCREV CUR MEDS BY ELIG CLIN: HCPCS | Performed by: INTERNAL MEDICINE

## 2021-11-23 PROCEDURE — G8417 CALC BMI ABV UP PARAM F/U: HCPCS | Performed by: INTERNAL MEDICINE

## 2021-11-23 PROCEDURE — 3017F COLORECTAL CA SCREEN DOC REV: CPT | Performed by: INTERNAL MEDICINE

## 2021-11-23 PROCEDURE — 1036F TOBACCO NON-USER: CPT | Performed by: INTERNAL MEDICINE

## 2021-11-23 PROCEDURE — 99213 OFFICE O/P EST LOW 20 MIN: CPT | Performed by: INTERNAL MEDICINE

## 2021-11-23 ASSESSMENT — ENCOUNTER SYMPTOMS
RECTAL PAIN: 0
COUGH: 0
EYES NEGATIVE: 1
NAUSEA: 0
VOMITING: 0
SORE THROAT: 0
ABDOMINAL DISTENTION: 1
DIARRHEA: 0
VOICE CHANGE: 1
ANAL BLEEDING: 0
CHOKING: 1
ALLERGIC/IMMUNOLOGIC NEGATIVE: 1
BACK PAIN: 0
SHORTNESS OF BREATH: 0
CONSTIPATION: 0
BLOOD IN STOOL: 1
TROUBLE SWALLOWING: 0
ABDOMINAL PAIN: 1

## 2021-11-23 NOTE — PROGRESS NOTES
GI OFFICE FOLLOW UP    INTERVAL HISTORY:   Yary Quinonez MD  [address],  [place] [address]    Chief Complaint   Patient presents with    Follow-up     EGD/colonoscopy f/u        No diagnosis found. HISTORY OF PRESENT ILLNESS: Krishna Woodruff is a 61 y.o. female with a past history remarkable for ,   Patient had history of positive fit test.  Also has a history of nausea and epigastric pain. Recently patient had EGD done and was found to have diverticulum in the duodenum and gastritis. Gastric biopsies did not reveal hex pylori. However colonoscopy revealed multiple colon polyps. One of the polyp in the transverse colon was about 1.5 cm, flat. This polyp was removed by EMR technique. Other polyps were less than 1 cm. The polyps that were removed were adenomatous polyps and sessile serrated adenomas. No dysplasia noted. Preparation for colonoscopy was poor. It was felt lesions can be missed in the poorly prepared colon. At present patient denies significant symptoms. Nausea is better. He has been on H2 blocker  Therapy. Past Medical,Family, and Social History reviewed and does contribute to the patient presenting condition. Patient's PMH/PSH,SH,PSYCH Hx, MEDs, ALLERGIES, and ROS were all reviewed and updated in the appropriate sections.  Yes      PAST MEDICAL HISTORY:  Past Medical History:   Diagnosis Date    Arrhythmia     Breast mass     CAD (coronary artery disease)     with valvular disease    Chronic neck pain     Coccygeal pain 3/7/2016    Constipation     COVID-19 04/2020    Depression     Diabetic neuropathy (HCC)     History of hepatitis A     possible history of hepatitis A in the past    History of renal calculi     Hypertension     Hyperthyroidism     Hypothyroidism     Morbid obesity with BMI of 45.0-49.9, adult [facility]) 3/31/2016    Nephrolithiasis     Neuropathy     Pancreatic cyst     PONV (postoperative nausea and vomiting)     Type II or unspecified type diabetes mellitus without mention of complication, not stated as uncontrolled     non insulin dependent     Ulcerative colitis ([facility] [address]     UTI (lower urinary tract infection)        Past Surgical History:   Procedure Laterality Date    BREAST BIOPSY  2012    negative    CARDIOVASCULAR STRESS TEST      7/17/15 no results    CHOLECYSTECTOMY      COLONOSCOPY N/A 10/26/2021    COLONOSCOPY POLYPECTOMY SNARE/COLD BIOPSY performed by Elsie Ganser, MD at [address] [place] [address]      right hand    CYSTO/URETERO/PYELOSCOPY, CALCULUS TX Right 8/26/2019    CYSTOSCOPY URETEROSCOPY  STONE BASKET RETRIEVAL RIGHT STENT EXCHANGE performed by Jamia Marina MD at [facility] Right 8/20/2019    CYSTOSCOPY URETERAL STENT INSERTION performed by Jamia Marina MD at [address] [place] [address],[address]  2009    ENDOSCOPY, COLON, DIAGNOSTIC      LITHOTRIPSY      TONSILLECTOMY      TUBAL LIGATION      UPPER GASTROINTESTINAL ENDOSCOPY N/A 4/16/2020    EGD BIOPSY performed by Elsie Ganser, MD at [place] [address] N/A 10/26/2021    EGD BIOPSY performed by Elsie Ganser, MD at [address] [place] [address]:    Current Outpatient Medications:     Insulin Degludec 200 UNIT/ML SOPN, Inject 75 Units into the skin nightly, Disp: 5 pen, Rfl: 2    insulin lispro, 1 Unit Dial, 100 UNIT/ML SOPN, Inject 10 Units into the skin 3 times daily as needed for High Blood Sugar, Disp: 10 mL, Rfl: 1    gabapentin (NEURONTIN) 600 MG tablet, take 1 tablet by mouth twice a day, Disp: 60 tablet, Rfl: 0    Wheat Dextrin (BENEFIBER ON THE GO) POWD, Take 1 Package by mouth daily Mix with 8 ounce drink daily. , Disp: 100 each, Rfl: 3    ASPIRIN LOW DOSE 81 MG EC tablet, take 1 tablet by mouth once daily, Disp: 90 tablet, Rfl: 1    famotidine (PEPCID) 20 MG tablet, take 1 tablet by mouth twice a day, Disp: 60 tablet, Rfl: 3    magnesium citrate solution, Take 296 mLs by mouth once for 1 dose, Disp: 296 mL, Rfl: 0    senna (SENOKOT) 8.6 MG tablet, Take 1 tablet by mouth 2 times daily, Disp: 60 tablet, Rfl: 11    levothyroxine (SYNTHROID) 75 MCG tablet, Take 150 mcg by mouth once a week Indications: Sundays, Disp: , Rfl:     levothyroxine (SYNTHROID) 75 MCG tablet, Take 75 mcg by mouth Six times weekly Indications:  All days except Sundays, Disp: , Rfl:     lidocaine (LIDODERM) 5 %, Place 1 patch onto the skin daily 12 hours on, 12 hours off., Disp: 10 patch, Rfl: 0    ibuprofen (IBU) 600 MG tablet, Take 1 tablet by mouth every 8 hours as needed for Pain, Disp: 30 tablet, Rfl: 0    atorvastatin (LIPITOR) 80 MG tablet, take 1 tablet by mouth once daily, Disp: 30 tablet, Rfl: 3    lisinopril (PRINIVIL;ZESTRIL) 2.5 MG tablet, take 1 tablet by mouth once daily, Disp: 90 tablet, Rfl: 1    furosemide (LASIX) 20 MG tablet, take 1 tablet by mouth once daily, Disp: 90 tablet, Rfl: 3    dapagliflozin (FARXIGA) 5 MG tablet, Take 1 tablet by mouth every morning, Disp: 90 tablet, Rfl: 1    DULoxetine (CYMBALTA) 60 MG extended release capsule, take 1 capsule by mouth once daily, Disp: 90 capsule, Rfl: 3    melatonin 3 MG TABS tablet, take 1 tablet by mouth nightly, Disp: 30 tablet, Rfl: 1    glipiZIDE (GLUCOTROL) 10 MG tablet, take 2 tablets by mouth twice a day, Disp: 180 tablet, Rfl: 2    RA PEN NEEDLES 31G X 8 MM MISC, INJECTING TWICE DAILY DX: E11.65, Disp: , Rfl:     oxybutynin (DITROPAN) 5 MG tablet, Take 5 mg by mouth daily , Disp: , Rfl:     ALLERGIES:   Allergies   Allergen Reactions    Cefuroxime Axetil Anaphylaxis    Dilaudid [Hydromorphone] Anaphylaxis    Metformin And Related Diarrhea, Itching and Nausea And Vomiting    Sulfa Antibiotics Itching    Amoxicillin     Antihistamines, Loratadine-Type     Eggs Or Egg-Derived Products     Lorazepam     Nubain [Nalbuphine Hcl] Other (See Comments)     Hallucinations; \"I got stoned. \"    Vistaril [Hydroxyzine Hcl]        FAMILY HISTORY:       Problem Relation Age of Onset    Coronary Art Dis Mother     Lung Cancer Mother     Diabetes Mother         mellitus    Coronary Art Dis Father     Diabetes Father         diabetes mellitus         SOCIAL HISTORY:   Social History     Socioeconomic History    Marital status: Single     Spouse name: Not on file    Number of children: Not on file    Years of education: Not on file    Highest education level: Not on file   Occupational History    Not on file   Tobacco Use    Smoking status: Former Smoker     Packs/day: 0.01     Years: 1.00     Pack years: 0.01     Types: Cigarettes     Quit date: 5     Years since quittin.5    Smokeless tobacco: Never Used    Tobacco comment: pt states she quit smoking cigarettes at the age of 14   Vaping Use    Vaping Use: Never used   Substance and Sexual Activity    Alcohol use: No     Alcohol/week: 0.0 standard drinks    Drug use: Not Currently     Types: Marijuana (Weed)     Comment: once in a while    Sexual activity: Yes     Partners: Male   Other Topics Concern    Not on file   Social History Narrative    Not on file     Social Determinants of Health     Financial Resource Strain:     Difficulty of Paying Living Expenses: Not on file   Food Insecurity:     Worried About Running Out of Food in the Last Year: Not on file    Adams of Food in the Last Year: Not on file   Transportation Needs:     Lack of Transportation (Medical): Not on file    Lack of Transportation (Non-Medical):  Not on file   Physical Activity:     Days of Exercise per Week: Not on file    Minutes of Exercise per Session: Not on file   Stress:     Feeling of Stress : Not on file   Social Connections:     Frequency of Communication with Friends and Family: Not on file    Frequency of Social Gatherings with Friends and Family: Not on file    Attends Jewish Services: Not on file    Active Member of Clubs or Organizations: Not on file    Attends Club or Organization Meetings: Not on file    Marital Status: Not on file   Intimate Partner Violence:     Fear of Current or Ex-Partner: Not on file    Emotionally Abused: Not on file    Physically Abused: Not on file    Sexually Abused: Not on file   Housing Stability:     Unable to Pay for Housing in the Last Year: Not on file    Number of [place] in the Last Year: Not on file    Unstable Housing in the Last Year: Not on file         REVIEW OF SYSTEMS:         Review of Systems   Constitutional: Positive for fatigue. Negative for appetite change and unexpected weight change. HENT: Positive for voice change. Negative for dental problem, sore throat and trouble swallowing. Eyes: Negative. Visual disturbance: glasses. Respiratory: Positive for choking. Negative for cough and shortness of breath. Cardiovascular: Negative for chest pain and leg swelling. Gastrointestinal: Positive for abdominal distention, abdominal pain and blood in stool. Negative for anal bleeding, constipation, diarrhea, nausea, rectal pain and vomiting. Endocrine: Negative. Genitourinary: Negative. Negative for difficulty urinating. Musculoskeletal: Negative. Negative for back pain, joint swelling and myalgias. Skin: Negative. Allergic/Immunologic: Negative. Neurological: Positive for tremors and headaches. Negative for dizziness, weakness, light-headedness and numbness. Hematological: Negative. Psychiatric/Behavioral: Positive for sleep disturbance. The patient is nervous/anxious. PHYSICAL EXAMINATION:     Vital signs reviewed per the nursing documentation. LMP 01/12/2015 (Approximate)   There is no height or weight on file to calculate BMI. Physical Exam  Vitals and nursing note reviewed.    Constitutional:       Appearance: She is well-developed. HENT:      Head: Normocephalic and atraumatic. Eyes:      General: No scleral icterus. Conjunctiva/sclera: Conjunctivae normal.      Pupils: Pupils are equal, round, and reactive to light. Neck:      Thyroid: No thyromegaly. Vascular: No hepatojugular reflux or JVD. Trachea: No tracheal deviation. Cardiovascular:      Rate and Rhythm: Normal rate and regular rhythm. Heart sounds: Normal heart sounds. Pulmonary:      Effort: Pulmonary effort is normal. No respiratory distress. Breath sounds: Normal breath sounds. No wheezing or rales. Abdominal:      General: Bowel sounds are normal. There is no distension. Palpations: Abdomen is soft. There is no hepatomegaly or mass. Tenderness: There is no abdominal tenderness. There is no rebound. Hernia: No hernia is present. Musculoskeletal:         General: No tenderness. Cervical back: Normal range of motion and neck supple. Comments: No joint swelling   Lymphadenopathy:      Cervical: No cervical adenopathy. Skin:     General: Skin is warm. Findings: No bruising, ecchymosis, erythema or rash. Neurological:      Mental Status: She is alert and oriented to person, place, and time. Cranial Nerves: No cranial nerve deficit. Psychiatric:         Thought Content:  Thought content normal.           LABORATORY DATA: Reviewed  Lab Results   Component Value Date    WBC 12.2 (H) 10/06/2021    HGB 15.0 10/06/2021    HCT 45.1 10/06/2021    MCV 89.1 10/06/2021     10/06/2021     10/06/2021    K 4.5 10/06/2021     10/06/2021    CO2 25 10/06/2021    BUN 13 10/06/2021    CREATININE 0.44 (L) 10/06/2021    LABPROT 7.1 08/22/2012    LABALBU 3.6 10/06/2021    BILITOT 0.52 10/06/2021    ALKPHOS 144 (H) 10/06/2021    AST 29 10/06/2021    ALT 32 10/06/2021    INR 1.0 07/31/2018         Lab Results   Component Value Date    RBC 5.05 10/06/2021    HGB 15.0 10/06/2021    MCV 89.1 10/06/2021    MCH 29.6 10/06/2021    MCHC 33.2 10/06/2021    RDW 13.8 10/06/2021    MPV 9.5 10/06/2021    BASOPCT 1 10/06/2021    LYMPHSABS 2.90 10/06/2021    MONOSABS 0.80 10/06/2021    NEUTROABS 8.30 10/06/2021    EOSABS 0.10 10/06/2021    BASOSABS 0.10 10/06/2021         DIAGNOSTIC TESTING:     No results found. Assessment  No diagnosis found. Plan  Explained to patient regarding polyp histology and the need for repeat colonoscopy with adequate preparation. Also discussed with the patient  was present with the patient. They understood and agreed. Advised to follow antireflux measures. Advised to see me in the spring 2022 and at that time with adequate preparation she needs colonoscopy. Discussed with the patient regarding medical management of constipation and brochures given. They understood and agreed to the recommendations. Thank you for allowing me to participate in the care of Ms. Moseley. For any further questions please do not hesitate to contact me. I have reviewed and agree with the ROS entered by the MA/LPN. Note is dictated utilizing voice recognition software. Unfortunately this leads to occasional typographical errors.  Please contact our office if you have any questions        Reji Dave MD,FACP, [facility]  Board Certified in Gastroenterology and [address] [place] [address] Gastroenterology  Office #: [phone]

## 2021-11-29 ENCOUNTER — TELEPHONE (OUTPATIENT)
Dept: UROLOGY | Age: 66
End: 2021-11-29

## 2021-11-29 RX ORDER — CIPROFLOXACIN 500 MG/1
500 TABLET, FILM COATED ORAL 2 TIMES DAILY
Qty: 10 TABLET | Refills: 0 | Status: SHIPPED | OUTPATIENT
Start: 2021-11-29 | End: 2021-11-30 | Stop reason: ALTCHOICE

## 2021-11-29 NOTE — TELEPHONE ENCOUNTER
abx changed to cipro, take for 5 days. Be certain she has increased water intake to help flush bacteria from bladder. Timed voiding q2h. If fever occurs with symptoms, ER for evaluation. Keep an eye on blood sugars, abx can cause enhanced effects of glipizide. Call with questions or concerns.

## 2021-11-29 NOTE — TELEPHONE ENCOUNTER
Patient lm on clinic line. Patient stated \"I finished ABX for previous infection. 3 days after I still fell funny after peeing not as bad still kind of feels weird. I don't think the medication worked. Urine is still cloudy, acts like it's going to hurt but not full blown. I go to the bathroom 2-3x. I am allergic to Sulfa ABX but would like to try Sulfa. I do have Benadryl and will take if reaction happens. Today and yesterday I did wet myself. \"     Patient was notified that Bryan Carvajal will let NP know and let pt know what NP would like to do.

## 2021-11-30 RX ORDER — BLOOD-GLUCOSE SENSOR
EACH MISCELLANEOUS
COMMUNITY
Start: 2021-11-29 | End: 2022-09-27

## 2021-11-30 SDOH — ECONOMIC STABILITY: FOOD INSECURITY: WITHIN THE PAST 12 MONTHS, YOU WORRIED THAT YOUR FOOD WOULD RUN OUT BEFORE YOU GOT MONEY TO BUY MORE.: NEVER TRUE

## 2021-11-30 SDOH — ECONOMIC STABILITY: FOOD INSECURITY: WITHIN THE PAST 12 MONTHS, THE FOOD YOU BOUGHT JUST DIDN'T LAST AND YOU DIDN'T HAVE MONEY TO GET MORE.: NEVER TRUE

## 2021-11-30 ASSESSMENT — SOCIAL DETERMINANTS OF HEALTH (SDOH): HOW HARD IS IT FOR YOU TO PAY FOR THE VERY BASICS LIKE FOOD, HOUSING, MEDICAL CARE, AND HEATING?: NOT HARD AT ALL

## 2021-12-01 ENCOUNTER — TELEMEDICINE (OUTPATIENT)
Dept: FAMILY MEDICINE CLINIC | Age: 66
End: 2021-12-01
Payer: COMMERCIAL

## 2021-12-01 DIAGNOSIS — E78.2 MIXED HYPERLIPIDEMIA: ICD-10-CM

## 2021-12-01 DIAGNOSIS — I50.32 CHRONIC DIASTOLIC CONGESTIVE HEART FAILURE (HCC): ICD-10-CM

## 2021-12-01 DIAGNOSIS — Z23 NEED FOR PROPHYLACTIC VACCINATION AND INOCULATION AGAINST VARICELLA: ICD-10-CM

## 2021-12-01 DIAGNOSIS — D12.6 ADENOMATOUS POLYP OF COLON, UNSPECIFIED PART OF COLON: ICD-10-CM

## 2021-12-01 DIAGNOSIS — Z12.31 ENCOUNTER FOR SCREENING MAMMOGRAM FOR BREAST CANCER: ICD-10-CM

## 2021-12-01 DIAGNOSIS — M47.27 LUMBOSACRAL RADICULOPATHY DUE TO DEGENERATIVE JOINT DISEASE OF SPINE: ICD-10-CM

## 2021-12-01 DIAGNOSIS — I10 ESSENTIAL HYPERTENSION: ICD-10-CM

## 2021-12-01 PROCEDURE — G8427 DOCREV CUR MEDS BY ELIG CLIN: HCPCS | Performed by: FAMILY MEDICINE

## 2021-12-01 PROCEDURE — 3052F HG A1C>EQUAL 8.0%<EQUAL 9.0%: CPT | Performed by: FAMILY MEDICINE

## 2021-12-01 PROCEDURE — 99214 OFFICE O/P EST MOD 30 MIN: CPT | Performed by: FAMILY MEDICINE

## 2021-12-01 PROCEDURE — 2022F DILAT RTA XM EVC RTNOPTHY: CPT | Performed by: FAMILY MEDICINE

## 2021-12-01 PROCEDURE — 3017F COLORECTAL CA SCREEN DOC REV: CPT | Performed by: FAMILY MEDICINE

## 2021-12-01 RX ORDER — TIZANIDINE 4 MG/1
4 TABLET ORAL NIGHTLY
Qty: 30 TABLET | Refills: 0 | Status: SHIPPED | OUTPATIENT
Start: 2021-12-01 | End: 2021-12-27 | Stop reason: SDUPTHER

## 2021-12-01 RX ORDER — POTASSIUM CHLORIDE 750 MG/1
10 TABLET, EXTENDED RELEASE ORAL DAILY
Qty: 90 TABLET | Refills: 0 | Status: SHIPPED | OUTPATIENT
Start: 2021-12-01 | End: 2022-03-01

## 2021-12-01 RX ORDER — LIDOCAINE 50 MG/G
1 PATCH TOPICAL DAILY
Qty: 30 PATCH | Refills: 0 | Status: SHIPPED | OUTPATIENT
Start: 2021-12-01 | End: 2021-12-11

## 2021-12-01 ASSESSMENT — ENCOUNTER SYMPTOMS
BLOOD IN STOOL: 0
SORE THROAT: 0
FACIAL SWELLING: 0
SINUS PRESSURE: 0
VOMITING: 0
COLOR CHANGE: 0
RHINORRHEA: 0
ABDOMINAL PAIN: 0
NAUSEA: 0
DIARRHEA: 0
CONSTIPATION: 0
ABDOMINAL DISTENTION: 0
COUGH: 0
CHEST TIGHTNESS: 0
BACK PAIN: 1
SHORTNESS OF BREATH: 0

## 2021-12-01 NOTE — PROGRESS NOTES
[address] [address]  Phone: [phone], Fax: [phone]    TELEHEALTH EVALUATION -- Audio/Visual (During DOMJQ-68 public health emergency)    Patient ID verified by me prior to start of this visit    Ulises Oneal (:  1955) has requested an audio/video evaluation for the following concern(s):  Chief Complaint   Patient presents with    Diabetes    Hypertension      HPI:  Ulises Oneal is an established patient of Anabella Romeo MD  . Patient is scheduled for diabetes, uncontrolled patient follows endocrinologist last A1c as per patient was 11 no [facility]. Patient is on multiple medications including insulin glipizide. She was started on Farxiga 6 months   before patient reports she never started that. Patient has not seen ophthalmologist. Neither she has followed with podiatrist.      Hypertension controlled denies any chest pain shortness of breath. Hyperlipidemia on statins no recent blood work    Patient has history of diastolic heart failure and is taking Lasix, patient reports she was taking 40 mg of Lasix but she feels tingling in her hands. On chart patient takes only 20 mg of Lasix. Patient has history of adenomatous polyp is due for colonoscopy. Discussed to schedule that. Patient complains of pain in the left lower extremity reports she has history of chronic back pain and that radiates to lower extremity not getting better. She denies any redness. The pain gets worse with walking. She is currently on Neurontin 600 mg and also takes NSAIDs as needed. Patient had x-rays done in 2018 has also done physical therapy in the past.      Patient is due for mammogram       [x]Negative depression screening.    []1-4 = Minimal depression   []5-9 = Mild depression   []10-14 = Moderate depression   []15-19 = Moderately severe depression   []20-27 = Severe depression  PHQ Scores 2021 3/23/2021 2020 2020 2/7/2017 6/8/2016 5/24/2016   PHQ2 Score 3 3 0 1 2 0 2   PHQ9 Score 10 15 0 1 2 0 2     Review of Systems   Constitutional: Positive for activity change. Negative for appetite change, diaphoresis, fatigue and unexpected weight change. HENT: Negative for congestion, facial swelling, nosebleeds, postnasal drip, rhinorrhea, sinus pressure and sore throat. Eyes: Negative for visual disturbance. Respiratory: Negative for cough, chest tightness and shortness of breath. Cardiovascular: Positive for leg swelling. Negative for chest pain and palpitations. Gastrointestinal: Negative for abdominal distention, abdominal pain, blood in stool, constipation, diarrhea, nausea and vomiting. Genitourinary: Negative for difficulty urinating, flank pain, frequency and urgency. Musculoskeletal: Positive for arthralgias, back pain and gait problem. Negative for joint swelling, neck pain and neck stiffness. Skin: Negative for color change and pallor. Neurological: Positive for weakness and numbness. Negative for dizziness, speech difficulty and light-headedness. Psychiatric/Behavioral: Negative for agitation, decreased concentration, dysphoric mood, hallucinations and suicidal ideas. The patient is nervous/anxious. The patient is not hyperactive.         Patient Active Problem List    Diagnosis Date Noted    Lumbosacral radiculopathy due to degenerative joint disease of spine 12/01/2021    Adenomatous polyp of colon 11/04/2021    Duodenal diverticulum 11/04/2021    Polyp of ascending colon     Pancreatic cyst 10/07/2021    Family history of uterine cancer 10/07/2021    Chronic diastolic congestive heart failure ([facility] [address] 03/23/2021    Major depressive disorder with single episode, in partial remission ([facility] 03/23/2021    Insomnia 01/11/2021    Arthritis involving multiple sites 08/17/2020    Arthritis of right hand 08/17/2020    Gastroesophageal reflux disease without esophagitis 08/17/2020    Mixed hyperlipidemia 06/16/2020    Chest pain with high risk for cardiac etiology 03/29/2020    Ureterolithiasis 08/20/2019    Nephrolithiasis 08/19/2019    Seizure disorder ([facility] [address] 09/21/2017    Coronary artery disease involving native coronary artery of native heart without angina pectoris 09/18/2017    Generalized anxiety disorder 09/18/2017    Migraine without aura, not intractable 09/18/2017    Mixed dyslipidemia 09/18/2017    Essential hypertension 02/07/2017    Chronic pain of both knees 02/07/2017    Uncontrolled type 2 diabetes mellitus with diabetic neuropathy, with long-term current use of insulin ([facility] [address] 06/08/2016    Mediastinal cyst 05/24/2016    Thickened endometrium 05/04/2016    Chronic prescription benzodiazepine use 03/31/2016    Chronic use of opiate drugs therapeutic purposes 03/31/2016    Morbid obesity with BMI of 45.0-49.9, adult ([facility] [address] 03/31/2016    Constipation 04/08/2015    Diabetes type 2, uncontrolled ([facility] [facility] 01/14/2012    DM neuropathy takes Percocet 01/14/2012    Recurrent UTI / Nephrolithiasis 01/14/2012    Hypothyroidism 01/14/2012    Depression 01/14/2012    MVP (mitral valve prolapse) 01/14/2012    Abnormal mammogram 01/10/2012    Chronic neck pain         Past Surgical History:   Procedure Laterality Date    BREAST BIOPSY  2012    negative    CARDIOVASCULAR STRESS TEST      7/17/15 no results    CHOLECYSTECTOMY      COLONOSCOPY N/A 10/26/2021    COLONOSCOPY POLYPECTOMY SNARE/COLD BIOPSY performed by Gali Rowley MD at [facility]      right hand    CYSTO/URETERO/PYELOSCOPY, CALCULUS TX Right 8/26/2019    CYSTOSCOPY URETEROSCOPY  STONE BASKET RETRIEVAL RIGHT STENT EXCHANGE performed by Elizabeth Sauceda MD at [facility] Right 8/20/2019    CYSTOSCOPY URETERAL STENT INSERTION performed by Elizabeth Sauceda MD at [facility]  2009    ENDOSCOPY, COLON, DIAGNOSTIC      LITHOTRIPSY      TONSILLECTOMY  TUBAL LIGATION      UPPER GASTROINTESTINAL ENDOSCOPY N/A 4/16/2020    EGD BIOPSY performed by Gary Farooq MD at [facility] N/A 10/26/2021    EGD BIOPSY performed by Gary Farooq MD at [facility]     Family History   Problem Relation Age of Onset    Coronary Art Dis Mother     Lung Cancer Mother     Diabetes Mother         mellitus    Coronary Art Dis Father     Diabetes Father         diabetes mellitus     Current Outpatient Medications   Medication Sig Dispense Refill    zoster recombinant adjuvanted vaccine [facility]) 50 MCG/0.5ML SUSR injection Inject 0.5 mLs into the muscle once for 1 dose 50 MCG IM then repeat 2-6 months. 1 each 1    dapagliflozin (FARXIGA) 5 MG tablet Take 1 tablet by mouth every morning 90 tablet 1    potassium chloride (KLOR-CON M) 10 MEQ extended release tablet Take 1 tablet by mouth daily 90 tablet 0    tiZANidine (ZANAFLEX) 4 MG tablet Take 1 tablet by mouth nightly 30 tablet 0    lidocaine (LIDODERM) 5 % Place 1 patch onto the skin daily for 10 days 12 hours on, 12 hours off. 30 patch 0    Continuous Blood Gluc Sensor (DEXCOM G6 SENSOR) MISC       Insulin Degludec 200 UNIT/ML SOPN Inject 75 Units into the skin nightly 5 pen 2    insulin lispro, 1 Unit Dial, 100 UNIT/ML SOPN Inject 10 Units into the skin 3 times daily as needed for High Blood Sugar 10 mL 1    gabapentin (NEURONTIN) 600 MG tablet take 1 tablet by mouth twice a day 60 tablet 0    Wheat Dextrin (BENEFIBER ON THE GO) POWD Take 1 Package by mouth daily Mix with 8 ounce drink daily.  100 each 3    ASPIRIN LOW DOSE 81 MG EC tablet take 1 tablet by mouth once daily 90 tablet 1    famotidine (PEPCID) 20 MG tablet take 1 tablet by mouth twice a day 60 tablet 3    senna (SENOKOT) 8.6 MG tablet Take 1 tablet by mouth 2 times daily 60 tablet 11    levothyroxine (SYNTHROID) 75 MCG tablet Take 150 mcg by mouth once a week Indications: Sundays     Verl Raw No     Alcohol/week: 0.0 standard drinks    Drug use: Not Currently     Types: Marijuana Rosario Reveles)     Comment: once in a while        PHYSICAL EXAMINATION:  Vital Signs: (As obtained by patient/caregiver or practitioner observation)  Patient-Reported Vitals 11/30/2021   Patient-Reported Weight 239 LB   Patient-Reported Height 5 2   Patient-Reported Systolic -   Patient-Reported Diastolic -      Constitutional: [x] Appears well-developed and well-nourished [x] No apparent distress      [] Abnormal-   Mental status  [x] Alert and awake  [x] Oriented to person/place/time [x]Able to follow commands      Eyes:  EOM    [x]  Normal  [] Abnormal-  Sclera  [x]  Normal  [] Abnormal -         Discharge [x]  None visible  [] Abnormal -    HENT:   [x] Normocephalic, atraumatic.   [] Abnormal   [x] Mouth/Throat: Mucous membranes are moist.     External Ears [x] Normal  [] Abnormal-     Neck: [x] No visualized mass     Pulmonary/Chest: [x] Respiratory effort normal.  [x] No visualized signs of difficulty breathing or respiratory distress        [] Abnormal     Musculoskeletal:   [x] Normal gait with no signs of ataxia         [x] Normal range of motion of neck        [] Abnormal-     Neurological:        [x] No Facial Asymmetry (Cranial nerve 7 motor function) (limited exam to video visit)          [x] No gaze palsy        [] Abnormal-     Skin:        [x] No significant exanthematous lesions or discoloration noted on facial skin         [] Abnormal-     Psychiatric:       [x] Normal Affect [x] No Hallucinations        [x] Abnormal- Anxious, with pressured speech    Other pertinent observable physical exam findings-   Lab Results   Component Value Date    WBC 12.2 (H) 10/06/2021    HGB 15.0 10/06/2021    HCT 45.1 10/06/2021    MCV 89.1 10/06/2021     10/06/2021     Lab Results   Component Value Date     10/06/2021    K 4.5 10/06/2021     10/06/2021    CO2 25 10/06/2021    BUN 13 10/06/2021    CREATININE 0.44 10/06/2021    GLUCOSE 136 10/06/2021    GLUCOSE 94 01/23/2012    CALCIUM 9.4 10/06/2021        Due to this being a TeleHealth encounter, evaluation of the following organ systems is limited: Vitals/Constitutional/EENT/Resp/CV/GI//MS/Neuro/Skin/Heme-Lymph-Imm. ASSESSMENT/PLAN:  1. Uncontrolled type 2 diabetes mellitus with diabetic neuropathy, with long-term current use of insulin (HCC)  Uncontrolled we will get A1c results from Dr. Merilee Kawasaki, start on [place] continue all other medications ordered blood work monitor blood sugars  - Creatinine, Random Urine; Future  - Microalbumin, Ur; Future  - TSH With Reflex Ft4; Future  - dapagliflozin (FARXIGA) 5 MG tablet; Take 1 tablet by mouth every morning  Dispense: 90 tablet; Refill: 1  - potassium chloride (KLOR-CON M) 10 MEQ extended release tablet; Take 1 tablet by mouth daily  Dispense: 90 tablet; Refill: 0    2. Mixed hyperlipidemia  Continue statins recheck lipid panel  - Lipid, Fasting; Future    3. Chronic diastolic congestive heart failure (HCC)  Continue Lasix 20 mg    4. Essential hypertension  Controlled continue same medications monitor blood pressure at home    5. Adenomatous polyp of colon, unspecified part of colon  Schedule colonoscopy    6. Lumbosacral radiculopathy due to degenerative joint disease of spine  Start on Zanaflex patient may need peripheral artery scan to rule out PAD discussed to schedule appointment in office in 4 weeks  - tiZANidine (ZANAFLEX) 4 MG tablet; Take 1 tablet by mouth nightly  Dispense: 30 tablet; Refill: 0  - lidocaine (LIDODERM) 5 %; Place 1 patch onto the skin daily for 10 days 12 hours on, 12 hours off. Dispense: 30 patch; Refill: 0    7. Encounter for screening mammogram for breast cancer    - [facility] Digital Screen Bilateral [[id number]]; Future    8. Need for prophylactic vaccination and inoculation against varicella    - zoster recombinant adjuvanted vaccine [facility]) 50 MCG/0.5ML SUSR injection;  Inject 0.5 mLs into the muscle once for 1 dose 50 MCG IM then repeat 2-6 months. Dispense: 1 each; Refill: 1    Controlled Substance Monitoring:  Acute and Chronic Pain Monitoring:   RX Monitoring 9/21/2020   Attestation -   Periodic Controlled Substance Monitoring No signs of potential drug abuse or diversion identified. Orders Placed This Encounter   Procedures    VIDHYA Digital Screen Bilateral [id number]     Standing Status:   Future     Standing Expiration Date:   11/30/2022    Creatinine, Random Urine     Standing Status:   Future     Standing Expiration Date:   11/30/2022    Microalbumin, Ur     Standing Status:   Future     Standing Expiration Date:   11/30/2022    Lipid, Fasting     Standing Status:   Future     Standing Expiration Date:   11/30/2022    TSH With Reflex Ft4     Standing Status:   Future     Standing Expiration Date:   11/30/2022      Orders Placed This Encounter   Medications    zoster recombinant adjuvanted vaccine [facility]) 50 MCG/0.5ML SUSR injection     Sig: Inject 0.5 mLs into the muscle once for 1 dose 50 MCG IM then repeat 2-6 months. Dispense:  1 each     Refill:  1    dapagliflozin (FARXIGA) 5 MG tablet     Sig: Take 1 tablet by mouth every morning     Dispense:  90 tablet     Refill:  1    potassium chloride (KLOR-CON M) 10 MEQ extended release tablet     Sig: Take 1 tablet by mouth daily     Dispense:  90 tablet     Refill:  0    tiZANidine (ZANAFLEX) 4 MG tablet     Sig: Take 1 tablet by mouth nightly     Dispense:  30 tablet     Refill:  0    lidocaine (LIDODERM) 5 %     Sig: Place 1 patch onto the skin daily for 10 days 12 hours on, 12 hours off.      Dispense:  30 patch     Refill:  0      Medications Discontinued During This Encounter   Medication Reason    ciprofloxacin (CIPRO) 500 MG tablet Therapy completed    dapagliflozin (FARXIGA) 5 MG tablet Yolande Rex received counseling on the following healthy behaviors: nutrition, exercise and medication adherence  Reviewed prior labs and health maintenance. Continue current medications, diet and exercise. Discussed use, benefit, and side effects of prescribed medications. Barriers to medication compliance addressed. Patient given educational materials - see patient instructions. All patient questions answered. Patient voiced understanding. Return in about 4 weeks (around 12/29/2021) for lumbar deg disc disease , need to rule out PAD , IN OFFICE . Lauri Sadler is a 61 y.o. female patient  being evaluated by a Virtual Visit (video visit) encounter to address concerns as mentioned above. A caregiver was present when appropriate. Due to this being a TeleHealth encounter (During [facility]- public health emergency), evaluation of the following organ systems was limited:Vitals/Constitutional/EENT/Resp/CV/GI//MS/Neuro/Skin/Heme-Lymph-Imm. Services were provided through a video synchronous discussion virtually to substitute for in-person clinic visit. This is a telehealth visit that was performed with the originating site at Patient Location: home and provider Location of [place], [place]. Verbal consent to participate in video visit was obtained. Patient ID verified by me prior to start of this visit  I discussed with the patient the nature of our telehealth visits via interactive/real-time audio/video that:  - I would evaluate the patient and recommend diagnostics and treatments based on my assessment  - Our sessions are not being recorded and that personal health information is protected  - Our team would provide follow up care in person if/when the patient needs it.      Pursuant to the emergency declaration under the [facility], [address] [place] [address] authority and the [email or web address] and [employer] Act, this Virtual Visit was conducted with patient's (and/or legal guardian's) consent, to reduce the patient's risk of exposure to COVID-19 and provide necessary medical care.  The patient (and/or legal guardian) has also been advised to contact this office for worsening conditions or problems, and seek emergency medical treatment and/or call 911 if deemed necessary. This note was completed by using the assistance of a speech-recognition program. However, inadvertent computerized transcription errors may be present. Although every effort was made to ensure accuracy, no guarantees can be provided that every mistake has been identified and corrected by editing.   Electronically signed by Vy Morales MD on 12/1/21 at 2:17 PM EST

## 2021-12-06 RX ORDER — GABAPENTIN 600 MG/1
TABLET ORAL
Qty: 60 TABLET | Refills: 0 | Status: SHIPPED | OUTPATIENT
Start: 2021-12-06 | End: 2022-01-04

## 2021-12-07 ENCOUNTER — HOSPITAL ENCOUNTER (EMERGENCY)
Age: 66
Discharge: HOME | End: 2021-12-07
Attending: EMERGENCY MEDICINE
Payer: COMMERCIAL

## 2021-12-07 ENCOUNTER — APPOINTMENT (OUTPATIENT)
Dept: GENERAL RADIOLOGY | Age: 66
End: 2021-12-07
Payer: COMMERCIAL

## 2021-12-07 VITALS
TEMPERATURE: 98.4 F | HEART RATE: 82 BPM | OXYGEN SATURATION: 95 % | RESPIRATION RATE: 18 BRPM | SYSTOLIC BLOOD PRESSURE: 153 MMHG | DIASTOLIC BLOOD PRESSURE: 69 MMHG

## 2021-12-07 DIAGNOSIS — M65.4 DE QUERVAIN'S DISEASE (TENOSYNOVITIS): Primary | ICD-10-CM

## 2021-12-07 DIAGNOSIS — M19.049 HAND ARTHRITIS: ICD-10-CM

## 2021-12-07 PROCEDURE — 73110 X-RAY EXAM OF WRIST: CPT

## 2021-12-07 PROCEDURE — 73130 X-RAY EXAM OF HAND: CPT

## 2021-12-07 PROCEDURE — 99284 EMERGENCY DEPT VISIT MOD MDM: CPT

## 2021-12-07 PROCEDURE — 6370000000 HC RX 637 (ALT 250 FOR IP): Performed by: EMERGENCY MEDICINE

## 2021-12-07 RX ORDER — HYDROCODONE BITARTRATE AND ACETAMINOPHEN 5; 325 MG/1; MG/1
2 TABLET ORAL ONCE
Status: COMPLETED | OUTPATIENT
Start: 2021-12-07 | End: 2021-12-07

## 2021-12-07 RX ORDER — IBUPROFEN 600 MG/1
600 TABLET ORAL EVERY 8 HOURS PRN
Qty: 30 TABLET | Refills: 0 | Status: SHIPPED | OUTPATIENT
Start: 2021-12-07 | End: 2022-05-17

## 2021-12-07 RX ADMIN — HYDROCODONE BITARTRATE AND ACETAMINOPHEN 2 TABLET: 5; 325 TABLET ORAL at 19:20

## 2021-12-07 ASSESSMENT — PAIN SCALES - GENERAL: PAINLEVEL_OUTOF10: 10
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
## 2021-12-08 ENCOUNTER — TELEPHONE (OUTPATIENT)
Dept: FAMILY MEDICINE CLINIC | Age: 66
End: 2021-12-08

## 2021-12-08 NOTE — TELEPHONE ENCOUNTER
[facility] Emergency Department ([facility]) ED Follow up Call            FU appts/Provider:    Future Appointments   Date Time Provider Aleksandra Wisdom   1/10/2022 11:00 AM Vy Morales MD  [facility]   2/8/2022  2:50 PM Kristin Gonzalez MD [facility]   5/11/2022  2:30 PM Ora Dakin, MD [facility]       VOICEMAIL DOCUMENTATION - ERASE IF NOT USED  Hi, this message is for  Manpreet Linne  This is Dakotah Macdonald from [facility] office. Just calling to see how you are doing after your recent visit to the Emergency Room. [facility] wants to make sure you were able to fill any prescriptions and that you understand your discharge instructions. Please return our call if you need to make a follow up appointment with your provider or have any further needs. Our phone number is [phone]. Have a great day.

## 2021-12-27 DIAGNOSIS — M47.27 LUMBOSACRAL RADICULOPATHY DUE TO DEGENERATIVE JOINT DISEASE OF SPINE: ICD-10-CM

## 2021-12-27 RX ORDER — TIZANIDINE 4 MG/1
4 TABLET ORAL NIGHTLY
Qty: 30 TABLET | Refills: 0 | Status: SHIPPED | OUTPATIENT
Start: 2021-12-27 | End: 2022-01-18 | Stop reason: SDUPTHER

## 2021-12-27 NOTE — TELEPHONE ENCOUNTER
Please Approve or Refuse.   Send to Pharmacy per Pt's Request:      Next Visit Date:  1/10/2022   Last Visit Date: 12/1/2021    Hemoglobin A1C (%)   Date Value   03/23/2021 8.9   12/03/2020 10.9 (H)   04/15/2020 10.1 (H)             ( goal A1C is < 7)   BP Readings from Last 3 Encounters:   12/07/21 (!) 153/69   11/23/21 (!) 140/84   10/26/21 133/74          (goal 120/80)  BUN   Date Value Ref Range Status   10/06/2021 13 8 - 23 mg/dL Final     CREATININE   Date Value Ref Range Status   10/06/2021 0.44 (L) 0.50 - 0.90 mg/dL Final     Potassium   Date Value Ref Range Status   10/06/2021 4.5 3.7 - 5.3 mmol/L Final

## 2022-01-04 RX ORDER — GABAPENTIN 600 MG/1
TABLET ORAL
Qty: 60 TABLET | Refills: 0 | Status: SHIPPED | OUTPATIENT
Start: 2022-01-04 | End: 2022-02-07

## 2022-01-11 ASSESSMENT — PATIENT HEALTH QUESTIONNAIRE - PHQ9
2. FEELING DOWN, DEPRESSED OR HOPELESS: 0
SUM OF ALL RESPONSES TO PHQ QUESTIONS 1-9: 0
SUM OF ALL RESPONSES TO PHQ9 QUESTIONS 1 & 2: 0
SUM OF ALL RESPONSES TO PHQ QUESTIONS 1-9: 0
1. LITTLE INTEREST OR PLEASURE IN DOING THINGS: 0

## 2022-01-11 NOTE — PROGRESS NOTES
Visit Information    Have you changed or started any medications since your last visit including any over-the-counter medicines, vitamins, or herbal medicines? no   Are you having any side effects from any of your medications? -  no  Have you stopped taking any of your medications? Is so, why? -  no    Have you seen any other physician or provider since your last visit? No  Have you had any other diagnostic tests since your last visit? No  Have you been seen in the emergency room and/or had an admission to a hospital since we last saw you? No  Have you had your routine dental cleaning in the past 6 months? yes     Have you activated your [employer] account? If not, what are your barriers?  Yes     Patient Care Team:  Binh Solano MD as PCP - General (Family Medicine)  Binh Solano MD as PCP - [address] [place] [address] Provider  Sonya Loving DO as Consulting Physician (Obstetrics & Gynecology)  Zackery Scott MD as Consulting Physician (Endocrinology)  Gurmeet Guillaume MD as Consulting Physician (Gastroenterology)    Medical History Review  Past Medical, Family, and Social History reviewed and does contribute to the patient presenting condition    Health Maintenance   Topic Date Due    Shingles Vaccine (1 of 2) Never done    Diabetic retinal exam  03/07/2017    Diabetic foot exam  07/07/2018    DTaP/Tdap/Td vaccine (2 - Td or Tdap) 09/18/2019    Breast cancer screen  03/23/2020    Diabetic microalbuminuria test  08/18/2021    Lipid screen  08/18/2021    TSH testing  08/18/2021    Flu vaccine (1) Never done    COVID-19 Vaccine (3 - Booster for Perdomo Peter series) 12/08/2021    Pneumococcal 0-64 years Vaccine (2 of 2 - PPSV23) 03/19/2022    A1C test (Diabetic or Prediabetic)  03/23/2022    Colon cancer screen colonoscopy  04/26/2022    Depression Monitoring  05/06/2022    Potassium monitoring  10/06/2022    Creatinine monitoring  10/06/2022    Cervical cancer screen  09/16/2025    Hepatitis C screen Completed    HIV screen  Addressed    Hepatitis A vaccine  Aged Out    Hib vaccine  Aged Out    Meningococcal (ACWY) vaccine  Aged Out

## 2022-01-12 ENCOUNTER — TELEMEDICINE (OUTPATIENT)
Dept: FAMILY MEDICINE CLINIC | Age: 67
End: 2022-01-12
Payer: COMMERCIAL

## 2022-01-12 DIAGNOSIS — R92.8 ABNORMAL MAMMOGRAM: ICD-10-CM

## 2022-01-12 DIAGNOSIS — I10 ESSENTIAL HYPERTENSION: ICD-10-CM

## 2022-01-12 DIAGNOSIS — E03.9 ACQUIRED HYPOTHYROIDISM: ICD-10-CM

## 2022-01-12 DIAGNOSIS — E78.2 MIXED HYPERLIPIDEMIA: ICD-10-CM

## 2022-01-12 DIAGNOSIS — E66.01 MORBID OBESITY WITH BMI OF 45.0-49.9, ADULT (HCC): ICD-10-CM

## 2022-01-12 DIAGNOSIS — E11.65 UNCONTROLLED TYPE 2 DIABETES MELLITUS WITH HYPERGLYCEMIA (HCC): Primary | ICD-10-CM

## 2022-01-12 PROCEDURE — 3046F HEMOGLOBIN A1C LEVEL >9.0%: CPT | Performed by: FAMILY MEDICINE

## 2022-01-12 PROCEDURE — 99214 OFFICE O/P EST MOD 30 MIN: CPT | Performed by: FAMILY MEDICINE

## 2022-01-12 PROCEDURE — G8427 DOCREV CUR MEDS BY ELIG CLIN: HCPCS | Performed by: FAMILY MEDICINE

## 2022-01-12 PROCEDURE — 2022F DILAT RTA XM EVC RTNOPTHY: CPT | Performed by: FAMILY MEDICINE

## 2022-01-12 PROCEDURE — 3017F COLORECTAL CA SCREEN DOC REV: CPT | Performed by: FAMILY MEDICINE

## 2022-01-12 NOTE — PROGRESS NOTES
[address] [address]  Phone: [phone], Fax: [phone]    TELEHEALTH EVALUATION -- Audio/Visual (During [id number] public health emergency)    Patient ID verified by me prior to start of this visit    Kyaw Sweet (:  1955) has requested an audio/video evaluation for the following concern(s):  Chief Complaint   Patient presents with    Diabetes     Pt would like to discuss her blood sugars.  Hypertension    Hypothyroidism    Health Maintenance     Pt has had all 3 covid vaccines. HPI:  Kyaw Sweet is an established patient of Dea Marin MD  . Patient has a history of diabetes uncontrolled last A1c is 8.9 in March. Patient is noncompliant with appointments. She has not done blood work which was ordered. Patient reports her sugars are running below 300 in the evening and also more than 120 in the morning. She is on multiple medications including both short and long-acting insulin. Hypothyroidism on Synthroid lab work is ordered she has not done that. Hypertension controlled denies any chest pain shortness of breath. Patient had hyperlipidemia on statins no recent blood work. Labs reprinted and encouraged to schedule that. Patient also had abnormal mammogram she had ordered she has not scheduled that. [x]Negative depression screening.    []1-4 = Minimal depression   []5-9 = Mild depression   []10-14 = Moderate depression   []15-19 = Moderately severe depression   []20-27 = Severe depression  PHQ Scores 2022 2021 3/23/2021 2020 2020 2017 2016   PHQ2 Score 0 3 3 0 1 2 0   PHQ9 Score 0 10 15 0 1 2 0     Review of Systems    Patient Active Problem List    Diagnosis Date Noted    Lumbosacral radiculopathy due to degenerative joint disease of spine 2021    Adenomatous polyp of colon 2021    Duodenal diverticulum 2021    Polyp of ascending colon     Pancreatic cyst 10/07/2021    Family history of uterine cancer 10/07/2021    Chronic diastolic congestive heart failure ([facility] [address] 03/23/2021    Major depressive disorder with single episode, in partial remission ([address] 03/23/2021    Insomnia 01/11/2021    Arthritis involving multiple sites 08/17/2020    Arthritis of right hand 08/17/2020    Gastroesophageal reflux disease without esophagitis 08/17/2020    Mixed hyperlipidemia 06/16/2020    Chest pain with high risk for cardiac etiology 03/29/2020    Ureterolithiasis 08/20/2019    Nephrolithiasis 08/19/2019    Seizure disorder ([address] 09/21/2017    Coronary artery disease involving native coronary artery of native heart without angina pectoris 09/18/2017    Generalized anxiety disorder 09/18/2017    Migraine without aura, not intractable 09/18/2017    Mixed dyslipidemia 09/18/2017    Essential hypertension 02/07/2017    Chronic pain of both knees 02/07/2017    Uncontrolled type 2 diabetes mellitus with diabetic neuropathy, with long-term current use of insulin ([facility] [address] 06/08/2016    Mediastinal cyst 05/24/2016    Thickened endometrium 05/04/2016    Chronic prescription benzodiazepine use 03/31/2016    Chronic use of opiate drugs therapeutic purposes 03/31/2016    Morbid obesity with BMI of 45.0-49.9, adult ([address] 03/31/2016    Constipation 04/08/2015    Diabetes type 2, uncontrolled ([address] 01/14/2012    DM neuropathy takes Percocet 01/14/2012    Recurrent UTI / Nephrolithiasis 01/14/2012    Hypothyroidism 01/14/2012    Depression 01/14/2012    MVP (mitral valve prolapse) 01/14/2012    Abnormal mammogram 01/10/2012    Chronic neck pain         Past Surgical History:   Procedure Laterality Date    BREAST BIOPSY  2012    negative    CARDIOVASCULAR STRESS TEST      7/17/15 no results    CHOLECYSTECTOMY      COLONOSCOPY N/A 10/26/2021    COLONOSCOPY POLYPECTOMY SNARE/COLD BIOPSY performed by Lisset Jaramillo MD at [address]      right [facility]  CYSTO/URETERO/PYELOSCOPY, CALCULUS TX Right 8/26/2019    CYSTOSCOPY URETEROSCOPY  STONE BASKET RETRIEVAL RIGHT STENT EXCHANGE performed by Gabriella Jones MD at [address] Right 8/20/2019    CYSTOSCOPY URETERAL STENT INSERTION performed by Gabriella Jones MD at [address] [facility]  2009    ENDOSCOPY, COLON, DIAGNOSTIC      LITHOTRIPSY      TONSILLECTOMY      TUBAL LIGATION      UPPER GASTROINTESTINAL ENDOSCOPY N/A 4/16/2020    EGD BIOPSY performed by Gurmeet Guillaume MD at [place] [address] N/A 10/26/2021    EGD BIOPSY performed by Gurmeet Guillaume MD at [facility]     Family History   Problem Relation Age of Onset    Coronary Art Dis Mother     Lung Cancer Mother     Diabetes Mother         mellitus    Coronary Art Dis Father     Diabetes Father         diabetes mellitus     Current Outpatient Medications   Medication Sig Dispense Refill    gabapentin (NEURONTIN) 600 MG tablet take 1 tablet by mouth twice a day 60 tablet 0    tiZANidine (ZANAFLEX) 4 MG tablet Take 1 tablet by mouth nightly 30 tablet 0    ibuprofen (ADVIL;MOTRIN) 600 MG tablet Take 1 tablet by mouth every 8 hours as needed for Pain 30 tablet 0    dapagliflozin (FARXIGA) 5 MG tablet Take 1 tablet by mouth every morning 90 tablet 1    potassium chloride (KLOR-CON M) 10 MEQ extended release tablet Take 1 tablet by mouth daily 90 tablet 0    Continuous Blood Gluc Sensor (DEXCOM G6 SENSOR) MISC       Insulin Degludec 200 UNIT/ML SOPN Inject 75 Units into the skin nightly 5 pen 2    insulin lispro, 1 Unit Dial, 100 UNIT/ML SOPN Inject 10 Units into the skin 3 times daily as needed for High Blood Sugar 10 mL 1    ASPIRIN LOW DOSE 81 MG EC tablet take 1 tablet by mouth once daily 90 tablet 1    famotidine (PEPCID) 20 MG tablet take 1 tablet by mouth twice a day 60 tablet 3    senna (SENOKOT) 8.6 MG tablet Take 1 tablet by mouth 2 times daily 60 tablet 11  levothyroxine (SYNTHROID) 75 MCG tablet Take 150 mcg by mouth once a week Indications: Sundays      levothyroxine (SYNTHROID) 75 MCG tablet Take 75 mcg by mouth Six times weekly Indications: All days except Sundays      lidocaine (LIDODERM) 5 % Place 1 patch onto the skin daily 12 hours on, 12 hours off. 10 patch 0    atorvastatin (LIPITOR) 80 MG tablet take 1 tablet by mouth once daily 30 tablet 3    lisinopril (PRINIVIL;ZESTRIL) 2.5 MG tablet take 1 tablet by mouth once daily 90 tablet 1    furosemide (LASIX) 20 MG tablet take 1 tablet by mouth once daily 90 tablet 3    DULoxetine (CYMBALTA) 60 MG extended release capsule take 1 capsule by mouth once daily 90 capsule 3    melatonin 3 MG TABS tablet take 1 tablet by mouth nightly 30 tablet 1    glipiZIDE (GLUCOTROL) 10 MG tablet take 2 tablets by mouth twice a day 180 tablet 2    RA PEN NEEDLES 31G X 8 MM MISC INJECTING TWICE DAILY DX: E11.65      oxybutynin (DITROPAN) 5 MG tablet Take 5 mg by mouth daily       magnesium citrate solution Take 296 mLs by mouth once for 1 dose 296 mL 0     No current facility-administered medications for this visit. Allergies   Allergen Reactions    Cefuroxime Axetil Anaphylaxis    Dilaudid [Hydromorphone] Anaphylaxis    Metformin And Related Diarrhea, Itching and Nausea And Vomiting    Sulfa Antibiotics Itching    Amoxicillin     Antihistamines, Loratadine-Type     Eggs Or Egg-Derived Products     Lorazepam     Nubain [Nalbuphine Hcl] Other (See Comments)     Hallucinations; \"I got stoned. \"    Vistaril [Hydroxyzine Hcl]         Social History     Tobacco Use    Smoking status: Former Smoker     Packs/day: 0.01     Years: 1.00     Pack years: 0.01     Types: Cigarettes     Quit date: 5     Years since quittin.0    Smokeless tobacco: Never Used    Tobacco comment: pt states she quit smoking cigarettes at the age of 14   Vaping Use    Vaping Use: Never used   Substance Use Topics    Alcohol 10/06/2021    GLUCOSE 136 10/06/2021    GLUCOSE 94 01/23/2012    CALCIUM 9.4 10/06/2021        Due to this being a TeleHealth encounter, evaluation of the following organ systems is limited: Vitals/Constitutional/EENT/Resp/CV/GI//MS/Neuro/Skin/Heme-Lymph-Imm. ASSESSMENT/PLAN:  1. Uncontrolled type 2 diabetes mellitus with hyperglycemia ([facility] [address] [facility]  Uncontrolled we will repeat A1c discussed with patient to continue all medications until I will get A1c results. If still high can add Trulicity or Victoza. - Hemoglobin A1C; Future    2. Acquired hypothyroidism  Continue Synthroid, discussed to do blood work    3. Essential hypertension  Controlled continue same medications monitor blood pressure at home    4. Mixed hyperlipidemia  Repeat lipid panel continue statins    5. Abnormal mammogram  Patient encouraged to do mammogram    6. Morbid obesity with BMI of 45.0-49.9, adult ([facility] [address]  Improving continue weight reduction continue lifestyle changes. Controlled Substance Monitoring:  Acute and Chronic Pain Monitoring:   RX Monitoring 9/21/2020   Attestation -   Periodic Controlled Substance Monitoring No signs of potential drug abuse or diversion identified. Orders Placed This Encounter   Procedures    Hemoglobin A1C     Standing Status:   Future     Standing Expiration Date:   1/12/2023      No orders of the defined types were placed in this encounter. There are no discontinued medications. Connie Parker received counseling on the following healthy behaviors: nutrition, exercise and medication adherence  Reviewed prior labs and health maintenance. Continue current medications, diet and exercise. Discussed use, benefit, and side effects of prescribed medications. Barriers to medication compliance addressed. Patient given educational materials - see patient instructions. All patient questions answered. Patient voiced understanding. No follow-ups on file.     Sha Dumont is a 61 y.o. female patient being evaluated by a Virtual Visit (video visit) encounter to address concerns as mentioned above. A caregiver was present when appropriate. Due to this being a TeleHealth encounter (During QZQVQ-44 public health emergency), evaluation of the following organ systems was limited:Vitals/Constitutional/EENT/Resp/CV/GI//MS/Neuro/Skin/Heme-Lymph-Imm. Services were provided through a video synchronous discussion virtually to substitute for in-person clinic visit. This is a telehealth visit that was performed with the originating site at Patient Location: home and provider Location of [place], [place]. Verbal consent to participate in video visit was obtained. Patient ID verified by me prior to start of this visit  I discussed with the patient the nature of our telehealth visits via interactive/real-time audio/video that:  - I would evaluate the patient and recommend diagnostics and treatments based on my assessment  - Our sessions are not being recorded and that personal health information is protected  - Our team would provide follow up care in person if/when the patient needs it. Pursuant to the emergency declaration under the [address] [place] [address] authority and the [employer] Act, this Virtual Visit was conducted with patient's (and/or legal guardian's) consent, to reduce the patient's risk of exposure to COVID-19 and provide necessary medical care. The patient (and/or legal guardian) has also been advised to contact this office for worsening conditions or problems, and seek emergency medical treatment and/or call 911 if deemed necessary. This note was completed by using the assistance of a speech-recognition program. However, inadvertent computerized transcription errors may be present.  Although every effort was made to ensure accuracy, no guarantees can be provided that every mistake has been identified and corrected

## 2022-01-14 ENCOUNTER — HOSPITAL ENCOUNTER (OUTPATIENT)
Age: 67
Discharge: HOME | End: 2022-01-14
Payer: COMMERCIAL

## 2022-01-14 DIAGNOSIS — E78.2 MIXED HYPERLIPIDEMIA: ICD-10-CM

## 2022-01-14 DIAGNOSIS — E11.65 UNCONTROLLED TYPE 2 DIABETES MELLITUS WITH HYPERGLYCEMIA (HCC): ICD-10-CM

## 2022-01-14 LAB
CHOLESTEROL, FASTING: 149 MG/DL
CHOLESTEROL/HDL RATIO: 4.3
CREATININE URINE: 39.1 MG/DL (ref 28–217)
ESTIMATED AVERAGE GLUCOSE: 232 MG/DL
HBA1C MFR BLD: 9.7 % (ref 4–6)
HDLC SERPL-MCNC: 35 MG/DL
LDL CHOLESTEROL: 69 MG/DL (ref 0–130)
MICROALBUMIN/CREAT 24H UR: <12 MG/L
MICROALBUMIN/CREAT UR-RTO: NORMAL MCG/MG CREAT
THYROXINE, FREE: 1.12 NG/DL (ref 0.93–1.7)
TRIGLYCERIDE, FASTING: 226 MG/DL
TSH SERPL DL<=0.05 MIU/L-ACNC: 6.99 MIU/L (ref 0.3–5)
VLDLC SERPL CALC-MCNC: ABNORMAL MG/DL (ref 1–30)

## 2022-01-14 PROCEDURE — 82570 ASSAY OF URINE CREATININE: CPT

## 2022-01-14 PROCEDURE — 83036 HEMOGLOBIN GLYCOSYLATED A1C: CPT

## 2022-01-14 PROCEDURE — 80061 LIPID PANEL: CPT

## 2022-01-14 PROCEDURE — 84443 ASSAY THYROID STIM HORMONE: CPT

## 2022-01-14 PROCEDURE — 82043 UR ALBUMIN QUANTITATIVE: CPT

## 2022-01-14 PROCEDURE — 36415 COLL VENOUS BLD VENIPUNCTURE: CPT

## 2022-01-14 PROCEDURE — 84439 ASSAY OF FREE THYROXINE: CPT

## 2022-01-17 DIAGNOSIS — E03.9 ACQUIRED HYPOTHYROIDISM: ICD-10-CM

## 2022-01-17 DIAGNOSIS — E11.65 UNCONTROLLED TYPE 2 DIABETES MELLITUS WITH HYPERGLYCEMIA (HCC): Primary | ICD-10-CM

## 2022-01-17 RX ORDER — LEVOTHYROXINE SODIUM 175 UG/1
175 TABLET ORAL WEEKLY
Qty: 180 TABLET | Refills: 1 | Status: SHIPPED | OUTPATIENT
Start: 2022-01-17 | End: 2022-06-20

## 2022-01-17 RX ORDER — ATORVASTATIN CALCIUM 80 MG/1
TABLET, FILM COATED ORAL
Qty: 30 TABLET | Refills: 3 | Status: SHIPPED | OUTPATIENT
Start: 2022-01-17 | End: 2022-05-31

## 2022-01-17 NOTE — TELEPHONE ENCOUNTER
Please Approve or Refuse.   Send to Pharmacy per Pt's Request:      Next Visit Date:  4/13/2022   Last Visit Date: 1/12/2022    Hemoglobin A1C (%)   Date Value   01/14/2022 9.7 (H)   03/23/2021 8.9   12/03/2020 10.9 (H)             ( goal A1C is < 7)   BP Readings from Last 3 Encounters:   12/07/21 (!) 153/69   11/23/21 (!) 140/84   10/26/21 133/74          (goal 120/80)  BUN   Date Value Ref Range Status   10/06/2021 13 8 - 23 mg/dL Final     CREATININE   Date Value Ref Range Status   10/06/2021 0.44 (L) 0.50 - 0.90 mg/dL Final     Potassium   Date Value Ref Range Status   10/06/2021 4.5 3.7 - 5.3 mmol/L Final

## 2022-01-18 ENCOUNTER — TELEPHONE (OUTPATIENT)
Dept: FAMILY MEDICINE CLINIC | Age: 67
End: 2022-01-18

## 2022-01-18 DIAGNOSIS — M47.27 LUMBOSACRAL RADICULOPATHY DUE TO DEGENERATIVE JOINT DISEASE OF SPINE: ICD-10-CM

## 2022-01-18 RX ORDER — TIZANIDINE 4 MG/1
4 TABLET ORAL 2 TIMES DAILY PRN
Qty: 60 TABLET | Refills: 0 | Status: SHIPPED | OUTPATIENT
Start: 2022-01-18 | End: 2022-04-13 | Stop reason: SDUPTHER

## 2022-01-18 NOTE — TELEPHONE ENCOUNTER
Patient called stating she needs some clarification on her current medication zanaflex 1 tablet nightly. She stated that she is unsure how she should be taking the medication if its 1 tablet nightly or twice a day. She has been currently taking it twice a day so is running low on medications. Will need a refill.

## 2022-01-18 NOTE — TELEPHONE ENCOUNTER
1. Lumbosacral radiculopathy due to degenerative joint disease of spine    - tiZANidine (ZANAFLEX) 4 MG tablet; Take 1 tablet by mouth 2 times daily as needed (for back pain)  Dispense: 60 tablet;  Refill: 0    Changed it to twice /day

## 2022-02-07 ENCOUNTER — TELEPHONE (OUTPATIENT)
Dept: UROLOGY | Age: 67
End: 2022-02-07

## 2022-02-07 RX ORDER — GABAPENTIN 600 MG/1
TABLET ORAL
Qty: 60 TABLET | Refills: 0 | Status: SHIPPED | OUTPATIENT
Start: 2022-02-07 | End: 2022-03-07

## 2022-02-07 NOTE — TELEPHONE ENCOUNTER
Left message for patient that apt for 02/08/22 was cancelled due to KUB not complete. Phone number was left for patient to call back and reschedule.

## 2022-02-07 NOTE — TELEPHONE ENCOUNTER
Please Approve or Refuse.   Send to Pharmacy per Pt's Request:      Next Visit Date:  2/21/2022   Last Visit Date: 1/12/2022    Hemoglobin A1C (%)   Date Value   01/14/2022 9.7 (H)   03/23/2021 8.9   12/03/2020 10.9 (H)             ( goal A1C is < 7)   BP Readings from Last 3 Encounters:   12/07/21 (!) 153/69   11/23/21 (!) 140/84   10/26/21 133/74          (goal 120/80)  BUN   Date Value Ref Range Status   10/06/2021 13 8 - 23 mg/dL Final     CREATININE   Date Value Ref Range Status   10/06/2021 0.44 (L) 0.50 - 0.90 mg/dL Final     Potassium   Date Value Ref Range Status   10/06/2021 4.5 3.7 - 5.3 mmol/L Final

## 2022-02-09 ENCOUNTER — HOSPITAL ENCOUNTER (OUTPATIENT)
Dept: GENERAL RADIOLOGY | Age: 67
Discharge: HOME | End: 2022-02-11
Payer: COMMERCIAL

## 2022-02-09 ENCOUNTER — HOSPITAL ENCOUNTER (OUTPATIENT)
Age: 67
Discharge: HOME | End: 2022-02-09
Payer: COMMERCIAL

## 2022-02-09 ENCOUNTER — HOSPITAL ENCOUNTER (OUTPATIENT)
Age: 67
Discharge: HOME | End: 2022-02-11
Payer: COMMERCIAL

## 2022-02-09 DIAGNOSIS — Z87.442 HISTORY OF KIDNEY STONES: ICD-10-CM

## 2022-02-09 PROCEDURE — 87186 SC STD MICRODIL/AGAR DIL: CPT

## 2022-02-09 PROCEDURE — 87077 CULTURE AEROBIC IDENTIFY: CPT

## 2022-02-09 PROCEDURE — 74018 RADEX ABDOMEN 1 VIEW: CPT

## 2022-02-09 PROCEDURE — 87086 URINE CULTURE/COLONY COUNT: CPT

## 2022-02-11 LAB
CULTURE: ABNORMAL
Lab: ABNORMAL
SPECIMEN DESCRIPTION: ABNORMAL

## 2022-02-14 DIAGNOSIS — N30.01 ACUTE CYSTITIS WITH HEMATURIA: Primary | ICD-10-CM

## 2022-02-14 RX ORDER — SULFAMETHOXAZOLE AND TRIMETHOPRIM 800; 160 MG/1; MG/1
1 TABLET ORAL 2 TIMES DAILY
Qty: 20 TABLET | Refills: 0 | Status: SHIPPED | OUTPATIENT
Start: 2022-02-14 | End: 2022-04-13 | Stop reason: ALTCHOICE

## 2022-03-01 RX ORDER — POTASSIUM CHLORIDE 750 MG/1
TABLET, EXTENDED RELEASE ORAL
Qty: 90 TABLET | Refills: 3 | Status: SHIPPED | OUTPATIENT
Start: 2022-03-01

## 2022-03-07 RX ORDER — GABAPENTIN 600 MG/1
TABLET ORAL
Qty: 60 TABLET | Refills: 0 | Status: SHIPPED | OUTPATIENT
Start: 2022-03-07 | End: 2022-04-08

## 2022-03-07 RX ORDER — LISINOPRIL 2.5 MG/1
TABLET ORAL
Qty: 90 TABLET | Refills: 1 | Status: SHIPPED | OUTPATIENT
Start: 2022-03-07 | End: 2022-08-31

## 2022-03-07 NOTE — TELEPHONE ENCOUNTER
Please Approve or Refuse.   Send to Pharmacy per Pt's Request: [email or web address]     Next Visit Date:  4/13/2022   Last Visit Date: 1/12/2022    Hemoglobin A1C (%)   Date Value   01/14/2022 9.7 (H)   03/23/2021 8.9   12/03/2020 10.9 (H)             ( goal A1C is < 7)   BP Readings from Last 3 Encounters:   12/07/21 (!) 153/69   11/23/21 (!) 140/84   10/26/21 133/74          (goal 120/80)  BUN   Date Value Ref Range Status   10/06/2021 13 8 - 23 mg/dL Final     CREATININE   Date Value Ref Range Status   10/06/2021 0.44 (L) 0.50 - 0.90 mg/dL Final     Potassium   Date Value Ref Range Status   10/06/2021 4.5 3.7 - 5.3 mmol/L Final

## 2022-03-11 ENCOUNTER — TELEPHONE (OUTPATIENT)
Dept: FAMILY MEDICINE CLINIC | Age: 67
End: 2022-03-11

## 2022-03-11 DIAGNOSIS — N39.41 URGE INCONTINENCE OF URINE: Primary | ICD-10-CM

## 2022-03-11 RX ORDER — OXYBUTYNIN CHLORIDE 10 MG/1
10 TABLET, EXTENDED RELEASE ORAL DAILY
Qty: 30 TABLET | Refills: 3 | Status: SHIPPED | OUTPATIENT
Start: 2022-03-11 | End: 2022-03-22 | Stop reason: SDUPTHER

## 2022-03-11 NOTE — TELEPHONE ENCOUNTER
Patient called stating that she is having some incontinence issues. Being unable to to hold her urine, overactive bladder is her issue. Patient stated that this issue has been ongoing for a few weeks now.  I did advise her she can also  Call and speak with her urologist. I will also send  this message to see what  will also suggest.

## 2022-03-11 NOTE — TELEPHONE ENCOUNTER
I increase her oxybutynin to 10 mg, she can try that if that did not help we will follow with urologist

## 2022-03-11 NOTE — TELEPHONE ENCOUNTER
SPOKE WITH PATIENT REGARDING MEDICATION SENT AND ALSO STATES SHE HAS AN APPOINTMENT WITH DR Tariq Jamil

## 2022-03-22 DIAGNOSIS — N39.41 URGE INCONTINENCE OF URINE: ICD-10-CM

## 2022-03-22 RX ORDER — OXYBUTYNIN CHLORIDE 10 MG/1
10 TABLET, EXTENDED RELEASE ORAL DAILY
Qty: 30 TABLET | Refills: 3 | Status: SHIPPED | OUTPATIENT
Start: 2022-03-22 | End: 2022-04-15 | Stop reason: ALTCHOICE

## 2022-03-22 NOTE — TELEPHONE ENCOUNTER
Please Approve or Refuse.   Send to Pharmacy per Pt's Request: [email or web address]     Next Visit Date:  4/13/2022   Last Visit Date: 1/12/2022    Hemoglobin A1C (%)   Date Value   01/14/2022 9.7 (H)   03/23/2021 8.9   12/03/2020 10.9 (H)             ( goal A1C is < 7)   BP Readings from Last 3 Encounters:   12/07/21 (!) 153/69   11/23/21 (!) 140/84   10/26/21 133/74          (goal 120/80)  BUN   Date Value Ref Range Status   10/06/2021 13 8 - 23 mg/dL Final     CREATININE   Date Value Ref Range Status   10/06/2021 0.44 (L) 0.50 - 0.90 mg/dL Final     Potassium   Date Value Ref Range Status   10/06/2021 4.5 3.7 - 5.3 mmol/L Final

## 2022-04-07 DIAGNOSIS — E11.42 DIABETIC POLYNEUROPATHY ASSOCIATED WITH TYPE 2 DIABETES MELLITUS (HCC): ICD-10-CM

## 2022-04-08 RX ORDER — GABAPENTIN 600 MG/1
TABLET ORAL
Qty: 60 TABLET | Refills: 0 | Status: SHIPPED | OUTPATIENT
Start: 2022-04-08 | End: 2022-05-03

## 2022-04-08 RX ORDER — DULOXETIN HYDROCHLORIDE 60 MG/1
CAPSULE, DELAYED RELEASE ORAL
Qty: 90 CAPSULE | Refills: 3 | Status: SHIPPED | OUTPATIENT
Start: 2022-04-08

## 2022-04-13 ENCOUNTER — TELEMEDICINE (OUTPATIENT)
Dept: FAMILY MEDICINE CLINIC | Age: 67
End: 2022-04-13
Payer: MEDICARE

## 2022-04-13 DIAGNOSIS — M47.27 LUMBOSACRAL RADICULOPATHY DUE TO DEGENERATIVE JOINT DISEASE OF SPINE: ICD-10-CM

## 2022-04-13 DIAGNOSIS — E78.2 MIXED DYSLIPIDEMIA: ICD-10-CM

## 2022-04-13 DIAGNOSIS — Z78.0 POST-MENOPAUSAL: ICD-10-CM

## 2022-04-13 DIAGNOSIS — E03.9 ACQUIRED HYPOTHYROIDISM: ICD-10-CM

## 2022-04-13 DIAGNOSIS — I10 ESSENTIAL HYPERTENSION: ICD-10-CM

## 2022-04-13 PROBLEM — R07.9 CHEST PAIN WITH HIGH RISK FOR CARDIAC ETIOLOGY: Status: RESOLVED | Noted: 2020-03-29 | Resolved: 2022-04-13

## 2022-04-13 PROCEDURE — 99443 PR PHYS/QHP TELEPHONE EVALUATION 21-30 MIN: CPT | Performed by: FAMILY MEDICINE

## 2022-04-13 RX ORDER — IBUPROFEN 600 MG/1
600 TABLET ORAL EVERY 8 HOURS PRN
Qty: 60 TABLET | Refills: 0 | Status: SHIPPED | OUTPATIENT
Start: 2022-04-13 | End: 2022-05-17

## 2022-04-13 RX ORDER — TIZANIDINE 4 MG/1
4 TABLET ORAL 2 TIMES DAILY PRN
Qty: 60 TABLET | Refills: 0 | Status: SHIPPED | OUTPATIENT
Start: 2022-04-13 | End: 2022-05-11

## 2022-04-13 RX ORDER — LIDOCAINE 40 MG/G
CREAM TOPICAL
Qty: 45 G | Refills: 3 | Status: SHIPPED | OUTPATIENT
Start: 2022-04-13

## 2022-04-13 NOTE — PROGRESS NOTES
Kala Tsang is a 74 y.o. female evaluated via telephone on 4/13/2022 for Follow-up, Polyuria (ongoing for a few weeks), Diabetes, Tailbone Pain (when she wipes when she uses the restroom ), and Medication Refill  . Chief Complaint   Patient presents with    Follow-up    Polyuria     ongoing for a few weeks    Diabetes    Tailbone Pain     when she wipes when she uses the restroom     Medication Refill       Patient-Reported Vitals 4/13/2022   Patient-Reported Weight 242   Patient-Reported Height 5'2   Patient-Reported Systolic -   Patient-Reported Diastolic -          Documentation:  I communicated with the patient and/or health care decision maker about . Patient is scheduled for telephone call for chronic medical conditions not seen in the office since more than a year. Encourage patient to come to the office. Patient has history of diabetes uncontrolled A1c is 9.4 is due for A1c check. Patient reports her sugars have improved, since she received her Dexcom. On average her sugars are usually running more than 1 50-1 80. She is on long-acting and short-acting insulin, long-acting 80 units and short-acting takes 18 units. Also on Farxiga. Patient reports he is busy because her son is admitted and intubated due to Covid. She recently lost her ex-. Patient reports she is not able to get time for herself. Patient is complaining of low back pain has chronic lumbar radiculopathy was taking muscle relaxant has not refilled recently, patient takes ibuprofen as needed denies any recent trauma. She has x-rays done in 2018 that showed degenerative disc disease. Patient reports she cannot do physical therapy at this time and that she cannot see specialist.  She wants medications refilled to help with pain. Patient is due for DEXA scan. Patient had mammogram ordered she has not scheduled, reports she will do after her son will be better.     Details of this discussion including any medical advice provided:   Assessment and plan    1. Uncontrolled type 2 diabetes mellitus with diabetic neuropathy, with long-term current use of insulin (HCC)  Continue same medications continue to monitor blood sugars recheck A1c  - Hemoglobin A1C; Future    2. Mixed dyslipidemia  Continue statins    3. Acquired hypothyroidism  Fairly stable continue Synthroid recheck TSH  - TSH with Reflex; Future    4. Essential hypertension  Continue current treatment follow-up in office    5. Lumbosacral radiculopathy due to degenerative joint disease of spine  Continue conservative treatment refill Zanaflex lidocaine ibuprofen can do physical therapy once patient is ready for possible pain management  - tiZANidine (ZANAFLEX) 4 MG tablet; Take 1 tablet by mouth 2 times daily as needed (for back pain)  Dispense: 60 tablet; Refill: 0  - lidocaine (LMX) 4 % cream; Apply topically every 8 hrs as needed for pain  Dispense: 45 g; Refill: 3  - ibuprofen (ADVIL;MOTRIN) 600 MG tablet; Take 1 tablet by mouth every 8 hours as needed for Pain Short term. Take with food. Dispense: 60 tablet; Refill: 0    6. Post-menopausal    - DEXA BONE DENSITY AXIAL SKELETON; Future      Total Time: minutes: 21-30 minutes    Karina Ellsworth was evaluated through a synchronous (real-time) audio encounter. Patient identification was verified at the start of the visit. She (or guardian if applicable) is aware that this is a billable service, which includes applicable co-pays. This visit was conducted with the patient's (and/or legal guardian's) verbal consent. She has not had a related appointment within my department in the past 7 days or scheduled within the next 24 hours. The patient was located in a state where the provider was licensed to provide care.     Note: not billable if this call serves to triage the patient into an appointment for the relevant concern    Paola Page MD

## 2022-04-15 ENCOUNTER — HOSPITAL ENCOUNTER (OUTPATIENT)
Age: 67
Setting detail: SPECIMEN
Discharge: HOME | End: 2022-04-15

## 2022-04-15 ENCOUNTER — OFFICE VISIT (OUTPATIENT)
Dept: UROLOGY | Age: 67
End: 2022-04-15
Payer: MEDICARE

## 2022-04-15 VITALS
RESPIRATION RATE: 16 BRPM | BODY MASS INDEX: 46.01 KG/M2 | HEIGHT: 62 IN | DIASTOLIC BLOOD PRESSURE: 71 MMHG | TEMPERATURE: 96.3 F | WEIGHT: 250 LBS | SYSTOLIC BLOOD PRESSURE: 135 MMHG | HEART RATE: 81 BPM

## 2022-04-15 DIAGNOSIS — R30.0 DYSURIA: ICD-10-CM

## 2022-04-15 DIAGNOSIS — N39.46 MIXED STRESS AND URGE URINARY INCONTINENCE: Primary | ICD-10-CM

## 2022-04-15 DIAGNOSIS — Z87.442 HISTORY OF KIDNEY STONES: ICD-10-CM

## 2022-04-15 PROCEDURE — 0509F URINE INCON PLAN DOCD: CPT | Performed by: NURSE PRACTITIONER

## 2022-04-15 PROCEDURE — 99214 OFFICE O/P EST MOD 30 MIN: CPT | Performed by: NURSE PRACTITIONER

## 2022-04-15 PROCEDURE — 1036F TOBACCO NON-USER: CPT | Performed by: NURSE PRACTITIONER

## 2022-04-15 PROCEDURE — G8427 DOCREV CUR MEDS BY ELIG CLIN: HCPCS | Performed by: NURSE PRACTITIONER

## 2022-04-15 PROCEDURE — 3017F COLORECTAL CA SCREEN DOC REV: CPT | Performed by: NURSE PRACTITIONER

## 2022-04-15 PROCEDURE — G8417 CALC BMI ABV UP PARAM F/U: HCPCS | Performed by: NURSE PRACTITIONER

## 2022-04-15 PROCEDURE — 1090F PRES/ABSN URINE INCON ASSESS: CPT | Performed by: NURSE PRACTITIONER

## 2022-04-15 PROCEDURE — 1123F ACP DISCUSS/DSCN MKR DOCD: CPT | Performed by: NURSE PRACTITIONER

## 2022-04-15 PROCEDURE — 4040F PNEUMOC VAC/ADMIN/RCVD: CPT | Performed by: NURSE PRACTITIONER

## 2022-04-15 PROCEDURE — G8400 PT W/DXA NO RESULTS DOC: HCPCS | Performed by: NURSE PRACTITIONER

## 2022-04-15 RX ORDER — OXYBUTYNIN CHLORIDE 15 MG/1
15 TABLET, EXTENDED RELEASE ORAL DAILY
Qty: 90 TABLET | Refills: 3 | Status: SHIPPED | OUTPATIENT
Start: 2022-04-15

## 2022-04-15 ASSESSMENT — ENCOUNTER SYMPTOMS
CONSTIPATION: 0
DIARRHEA: 0
ABDOMINAL PAIN: 0
SHORTNESS OF BREATH: 0
COUGH: 0
VOMITING: 0
WHEEZING: 0
NAUSEA: 0
EYE REDNESS: 0
EYE PAIN: 0
BACK PAIN: 0

## 2022-04-15 NOTE — PATIENT INSTRUCTIONS
Kegel exercise: squeeze and hold 3 seconds, relax and release 3 seconds, repeat 10-15 times in a row, at least 6 times per day.   Quick flicks: after kegel, squeeze, release no hold, 12-15 times in a row, at least 6 times per day    Increase ditropan to 15mg daily     F/u 4 weeks

## 2022-04-15 NOTE — PROGRESS NOTES
[address] [address]  Dept: [address] [facility] Urology Office Note - Established    Patient:  Hernan Do  YOB: 1955  Date: 4/18/2022    The patient is a 74 y.o. female whopresents today for evaluation of the following problems:   Chief Complaint   Patient presents with    Follow-up      loss of bladder control        HPI  Patient is here today for an overactive bladder which started several year(s) ago. Recently the OAB symptoms are worsening. She currently takes oxybutynin 10mg ER (originally prescribed for excessive sweating). C/o frequency at least hourly, nocturia x4- drinks throughout the night and does not limit fluids. She consumes bladder irritants:  1-2 bottles of pop per day. 4-5 bottles of water. No tea or coffee, no alcohol. Incontinence is mixed, stress and urge- does not feel that one is worse than the other. Wears 4-5 ppd. Had 2 full term children in past, both vaginal births- one being 8lb 8 oz. Denies hx of hysterectomy in past, she does not feel a vaginal bulge or sense of heaviness in pelvic region. Jazmín also has hx of kidney stones which previously required surgical intervention, KUB 2/9/22 was negative for stones, patient denies passing any stones recently. She denies recent infections- no hematuria, slight dysuria today, no flank pain or fevers. Summary of old records: N/A    Additional History: N/A    Procedures Today: N/A    Urinalysis today:  No results found for this visit on 04/15/22.     Imaging Reviewed during this Office Visit:   Narrative   EXAMINATION:   ONE SUPINE XRAY VIEW(S) OF THE ABDOMEN       2/9/2022 5:12 pm       COMPARISON:   None.       HISTORY:   ORDERING SYSTEM PROVIDED HISTORY: History of kidney stones   TECHNOLOGIST PROVIDED HISTORY:   hx kidney stones   Reason for Exam: Hx of kidney stones, no current complaints     FINDINGS:   Mildly increased colonic stool is noted.  No bowel obstruction, organomegaly   or free air is noted.  No radiodensities over the renal outlines are noted   which would be diagnostic of nephroliths.  Clips are present in the   gallbladder fossa.  Osseous structures are age-appropriate.           Impression   No bowel obstruction.  No radiodensities diagnostic of nephroliths.           (results were independently reviewed by physician and radiology report verified)    AUA Symptom Score (4/18/2022):   INCOMPLETE EMPTYING: How often have you had the sensation of not emptying your bladder?: Not at all  FREQUENCY: How often do you have to urinate less than every two hours?: Not at all  INTERMITTENCY: How often have you found you stopped and started again several times when you urinated?: Not at all  URGENCY: How often have you found it difficult to postpone urination?: Almost always  WEAK STREAM: How often have you had a weak urinary stream?: Not at all  STRAINING: How often have you had to strain to start  urination?: Not at all             Last BUN and creatinine:  Lab Results   Component Value Date    BUN 13 10/06/2021     Lab Results   Component Value Date    CREATININE 0.44 (L) 10/06/2021       Additional Lab/Culture results: none    PAST MEDICAL, FAMILY AND SOCIAL HISTORY UPDATE:  Past Medical History:   Diagnosis Date    Arrhythmia     Breast mass     CAD (coronary artery disease)     with valvular disease    Chronic neck pain     Coccygeal pain 3/7/2016    Constipation     COVID-19 04/2020    Depression     Diabetic neuropathy [facility] [address]     History of hepatitis A     possible history of hepatitis A in the past    History of renal calculi     Hypertension     Hyperthyroidism     Hypothyroidism     Morbid obesity with BMI of 45.0-49.9, adult ([facility] [address] 3/31/2016    Nephrolithiasis     Neuropathy     Pancreatic cyst     PONV (postoperative nausea and vomiting)     Type II or unspecified type diabetes mellitus without mention of complication, not stated as uncontrolled     non insulin dependent     Ulcerative colitis ([facility] [address]     UTI (lower urinary tract infection)      Past Surgical History:   Procedure Laterality Date    BREAST BIOPSY  2012    negative    CARDIOVASCULAR STRESS TEST      7/17/15 no results    CHOLECYSTECTOMY      COLONOSCOPY N/A 10/26/2021    COLONOSCOPY POLYPECTOMY SNARE/COLD BIOPSY performed by Camilo Vazquez MD at [facility]      right hand    CYSTO/URETERO/PYELOSCOPY, CALCULUS TX Right 8/26/2019    CYSTOSCOPY URETEROSCOPY  STONE BASKET RETRIEVAL RIGHT STENT EXCHANGE performed by Tarik Jurado MD at [facility] Right 8/20/2019    CYSTOSCOPY URETERAL STENT INSERTION performed by Tarik Jurado MD at [address] [address]    ENDOSCOPY, COLON, DIAGNOSTIC      LITHOTRIPSY      TONSILLECTOMY      TUBAL LIGATION      UPPER GASTROINTESTINAL ENDOSCOPY N/A 4/16/2020    EGD BIOPSY performed by Camilo Vazquez MD at [facility] N/A 10/26/2021    EGD BIOPSY performed by Camilo Vazquez MD at [address] History   Problem Relation Age of Onset    Coronary Art Dis Mother     Lung Cancer Mother     Diabetes Mother         mellitus    Coronary Art Dis Father     Diabetes Father         diabetes mellitus     Outpatient Medications Marked as Taking for the 4/15/22 encounter (Office Visit) with RASHMI Bernard CNP   Medication Sig Dispense Refill    oxybutynin (DITROPAN XL) 15 MG extended release tablet Take 1 tablet by mouth daily 90 tablet 3    tiZANidine (ZANAFLEX) 4 MG tablet Take 1 tablet by mouth 2 times daily as needed (for back pain) 60 tablet 0    lidocaine (LMX) 4 % cream Apply topically every 8 hrs as needed for pain 45 g 3    ibuprofen (ADVIL;MOTRIN) 600 MG tablet Take 1 tablet by mouth every 8 hours as needed for Pain Short term.  Take with food. 60 tablet 0    gabapentin (NEURONTIN) 600 MG tablet take 1 tablet by mouth twice a day 60 tablet 0    DULoxetine (CYMBALTA) 60 MG extended release capsule take 1 capsule by mouth once daily 90 capsule 3    lisinopril (PRINIVIL;ZESTRIL) 2.5 MG tablet take 1 tablet by mouth once daily 90 tablet 1    potassium chloride (KLOR-CON M) 10 MEQ extended release tablet take 1 tablet by mouth once daily 90 tablet 3    atorvastatin (LIPITOR) 80 MG tablet take 1 tablet by mouth once daily 30 tablet 3    levothyroxine (SYNTHROID) 175 MCG tablet Take 1 tablet by mouth once a week Indications: Sundays Take 150 mcg by mouth once a week Indications: Sundays 180 tablet 1    dapagliflozin (FARXIGA) 10 MG tablet Take 1 tablet by mouth every morning 90 tablet 1    ibuprofen (ADVIL;MOTRIN) 600 MG tablet Take 1 tablet by mouth every 8 hours as needed for Pain 30 tablet 0    Continuous Blood Gluc Sensor (DEXCOM G6 SENSOR) MISC       Insulin Degludec 200 UNIT/ML SOPN Inject 75 Units into the skin nightly 5 pen 2    insulin lispro, 1 Unit Dial, 100 UNIT/ML SOPN Inject 10 Units into the skin 3 times daily as needed for High Blood Sugar 10 mL 1    ASPIRIN LOW DOSE 81 MG EC tablet take 1 tablet by mouth once daily 90 tablet 1    famotidine (PEPCID) 20 MG tablet take 1 tablet by mouth twice a day 60 tablet 3    senna (SENOKOT) 8.6 MG tablet Take 1 tablet by mouth 2 times daily 60 tablet 11    levothyroxine (SYNTHROID) 75 MCG tablet Take 75 mcg by mouth Six times weekly Indications: All days except Sundays      lidocaine (LIDODERM) 5 % Place 1 patch onto the skin daily 12 hours on, 12 hours off.  10 patch 0    furosemide (LASIX) 20 MG tablet take 1 tablet by mouth once daily 90 tablet 3    melatonin 3 MG TABS tablet take 1 tablet by mouth nightly 30 tablet 1    glipiZIDE (GLUCOTROL) 10 MG tablet take 2 tablets by mouth twice a day 180 tablet 2    RA PEN NEEDLES 31G X 8 MM MISC INJECTING TWICE DAILY DX: E11.65 (All medications reviewed and updated by provider sincelast office visit or hospitalization)   Cefuroxime axetil; Dilaudid [hydromorphone]; Metformin and related; Sulfa antibiotics; Amoxicillin; Antihistamines, loratadine-type; Eggs or egg-derived products; Lorazepam; Nubain [nalbuphine hcl]; and Vistaril [hydroxyzine hcl]  Social History     Tobacco Use   Smoking Status Former Smoker    Packs/day: 0.01    Years: 1.00    Pack years: 0.01    Types: Cigarettes    Quit date: Dary Shaw Years since quittin.3   Smokeless Tobacco Never Used   Tobacco Comment    pt states she quit smoking cigarettes at the age of 14      (If patient a smoker, smoking cessation counseling offered)     Social History     Substance and Sexual Activity   Alcohol Use No    Alcohol/week: 0.0 standard drinks       REVIEW OF SYSTEMS:  Review of Systems      Physical Exam:      Vitals:    04/15/22 1105   BP: 135/71   Pulse: 81   Resp: 16   Temp: 96.3 °F (35.7 °C)     Body mass index is 45.73 kg/m². Patient is a 74 y.o. female in noacute distress and alert and oriented to person, place and time. Physical Exam  Constitutional: Patient in no acute distress. Neuro: Alert andoriented to person, place and time. Psych: Mood normal, affect normal  Skin: No rash noted  Lungs: Respiratory effort is normal  Cardiovascular: Warm & Pink  Abdomen: Soft, non-tender, non-distended with no CVA,  No flank tenderness  Bladder non-tender and not distended. Musculoskeletal: Normal gait and station      and Plan      1. Mixed stress and urge urinary incontinence    2. Dysuria    3. History of kidney stones           Plan:   Increase oxybutynin from 10mg to 15mg once daily. Discussed possible botox injections for urge incontinence, would need to get OK from Dr. Avelina Diaz. [address] reviewed for stress incontinence. Discussed timed and double voiding, avoiding irritants, and limiting fluids 2 hr prior to bed. Urine culture today.   Renal us 6 mos for hx of kidney stones. Call with any new or worsening urinary symptoms/ questions or concerns. Return in about 4 weeks (around 5/13/2022). Prescriptions Ordered:  Orders Placed This Encounter   Medications    oxybutynin (DITROPAN XL) 15 MG extended release tablet     Sig: Take 1 tablet by mouth daily     Dispense:  90 tablet     Refill:  3      Orders Placed:  Orders Placed This Encounter   Procedures    Culture, Urine     Standing Status:   Future     Number of Occurrences:   1     Standing Expiration Date:   4/15/2023     Order Specific Question:   Specify (ex-cath, midstream, cysto, etc)? Answer:   mid stream    US RENAL COMPLETE     This procedure can be scheduled via [facility]. Access your [facility] account by visiting [email or web address]. Standing Status:   Future     Standing Expiration Date:   4/15/2023            RASHMI Perez CNP    Reviewed and agree with the ROS entered by the MA.

## 2022-04-15 NOTE — PROGRESS NOTES
Review of Systems   Constitutional: Negative for appetite change, chills and fever. Eyes: Negative for pain, redness and visual disturbance. Respiratory: Negative for cough, shortness of breath and wheezing. Cardiovascular: Negative for chest pain and leg swelling. Gastrointestinal: Negative for abdominal pain, constipation, diarrhea, nausea and vomiting. Genitourinary: Positive for urgency. Negative for difficulty urinating, dysuria, flank pain, frequency and hematuria. Musculoskeletal: Negative for back pain, joint swelling and myalgias. Skin: Negative for rash and wound. Neurological: Negative for dizziness, tremors and numbness. Hematological: Does not bruise/bleed easily.    none

## 2022-04-17 LAB
CULTURE: ABNORMAL
SPECIMEN DESCRIPTION: ABNORMAL

## 2022-04-18 DIAGNOSIS — N30.00 ACUTE CYSTITIS WITHOUT HEMATURIA: Primary | ICD-10-CM

## 2022-04-18 RX ORDER — NITROFURANTOIN 25; 75 MG/1; MG/1
100 CAPSULE ORAL 2 TIMES DAILY
Qty: 14 CAPSULE | Refills: 0 | Status: SHIPPED | OUTPATIENT
Start: 2022-04-18 | End: 2022-04-25

## 2022-04-18 NOTE — PROGRESS NOTES
Culture, Urine  Order: [id number]   Status: Final result     Visible to patient: Yes (not seen)     Next appt: 05/11/2022 at 02:30 PM in Gastroenterology (Bala De La Fuente MD)     Dx: Dysuria    Specimen Information: Urine, clean catch         0 Result Notes    Component 4/15/22 2242   Specimen Description . [address]    Culture ESCHERICHIA COLI >[id number] CFU/ML Abnormal     [address]          Susceptibility      Escherichia coli (1)    Antibiotic Interpretation Microscan Method Status    ampicillin Resistant >=32 BACTERIAL SUSCEPTIBILITY PANEL LEANDRO Final    aztreonam Sensitive <=1 BACTERIAL SUSCEPTIBILITY PANEL LEANDRO Final    ceFAZolin Sensitive <=4 BACTERIAL SUSCEPTIBILITY PANEL LEANDRO Final     Cefazolin sensitivity results can be used to predict the effectiveness of oral   cephalosporins (eg.  Cephalexin) in uncomplicated Urinary Tract Infections due to E. coli, K.    pneumoniae, and P. mirabilis       cefTRIAXone Sensitive <=1 BACTERIAL SUSCEPTIBILITY PANEL LEANDRO Final    ciprofloxacin Sensitive <=0.25 BACTERIAL SUSCEPTIBILITY PANEL LEANDRO Final    Confirmatory Extended Spectrum Beta-Lactamase Negative NEGATIVE BACTERIAL SUSCEPTIBILITY PANEL LEANDRO Final    gentamicin Sensitive <=1 BACTERIAL SUSCEPTIBILITY PANEL LEANDRO Final    nitrofurantoin Sensitive <=16 BACTERIAL SUSCEPTIBILITY PANEL LEANDRO Final    tobramycin Sensitive <=1 BACTERIAL SUSCEPTIBILITY PANEL LEANDRO Final    trimethoprim-sulfamethoxazole Sensitive <=20 BACTERIAL SUSCEPTIBILITY PANEL LEANDRO Final    piperacillin-tazobactam Sensitive <=4 BACTERIAL SUSCEPTIBILITY PANEL LEANDRO Final          Specimen Collected: 04/15/22 22:42 Last Resulted: 04/17/22 13:05        Lab Flowsheet     Order Details     View Encounter     Lab and Collection Details     Routing     Result History             Result Care Coordination      Patient Communication    Add Comments  Add Notifications  Back to Top

## 2022-05-03 RX ORDER — GABAPENTIN 600 MG/1
TABLET ORAL
Qty: 60 TABLET | Refills: 0 | Status: SHIPPED | OUTPATIENT
Start: 2022-05-03 | End: 2022-06-03

## 2022-05-09 NOTE — PROGRESS NOTES
Discussed patient with Dr. Rosa Valenzuela. If no improvement at f/u appt 5/20/22, she will need office visit with him to discuss botox vs. interstim.

## 2022-05-11 DIAGNOSIS — M47.27 LUMBOSACRAL RADICULOPATHY DUE TO DEGENERATIVE JOINT DISEASE OF SPINE: ICD-10-CM

## 2022-05-11 RX ORDER — TIZANIDINE 4 MG/1
TABLET ORAL
Qty: 60 TABLET | Refills: 0 | Status: SHIPPED | OUTPATIENT
Start: 2022-05-11 | End: 2022-05-17

## 2022-05-16 ENCOUNTER — HOSPITAL ENCOUNTER (OUTPATIENT)
Age: 67
Setting detail: OBSERVATION
Discharge: HOME | End: 2022-05-17
Attending: EMERGENCY MEDICINE | Admitting: INTERNAL MEDICINE
Payer: MEDICARE

## 2022-05-16 ENCOUNTER — TELEPHONE (OUTPATIENT)
Dept: FAMILY MEDICINE CLINIC | Age: 67
End: 2022-05-16

## 2022-05-16 ENCOUNTER — APPOINTMENT (OUTPATIENT)
Dept: CT IMAGING | Age: 67
End: 2022-05-16
Payer: MEDICARE

## 2022-05-16 DIAGNOSIS — R10.11 ABDOMINAL PAIN, RIGHT UPPER QUADRANT: ICD-10-CM

## 2022-05-16 DIAGNOSIS — N39.0 URINARY TRACT INFECTION WITHOUT HEMATURIA, SITE UNSPECIFIED: Primary | ICD-10-CM

## 2022-05-16 DIAGNOSIS — R11.2 INTRACTABLE VOMITING WITH NAUSEA, UNSPECIFIED VOMITING TYPE: ICD-10-CM

## 2022-05-16 LAB
ABSOLUTE EOS #: 0.1 K/UL (ref 0–0.4)
ABSOLUTE LYMPH #: 3.5 K/UL (ref 1–4.8)
ABSOLUTE MONO #: 1 K/UL (ref 0.1–1.3)
ALBUMIN SERPL-MCNC: 3.8 G/DL (ref 3.5–5.2)
ALP BLD-CCNC: 154 U/L (ref 35–104)
ALT SERPL-CCNC: 101 U/L (ref 5–33)
ANION GAP SERPL CALCULATED.3IONS-SCNC: 12 MMOL/L (ref 9–17)
AST SERPL-CCNC: 49 U/L
BACTERIA: ABNORMAL
BASOPHILS # BLD: 1 % (ref 0–2)
BASOPHILS ABSOLUTE: 0.1 K/UL (ref 0–0.2)
BILIRUB SERPL-MCNC: 0.88 MG/DL (ref 0.3–1.2)
BILIRUBIN URINE: NEGATIVE
BUN BLDV-MCNC: 13 MG/DL (ref 8–23)
CALCIUM SERPL-MCNC: 10.2 MG/DL (ref 8.6–10.4)
CASTS UA: ABNORMAL /LPF
CASTS UA: ABNORMAL /LPF
CHLORIDE BLD-SCNC: 102 MMOL/L (ref 98–107)
CO2: 25 MMOL/L (ref 20–31)
COLOR: YELLOW
CREAT SERPL-MCNC: 0.53 MG/DL (ref 0.5–0.9)
EOSINOPHILS RELATIVE PERCENT: 1 % (ref 0–4)
EPITHELIAL CELLS UA: ABNORMAL /HPF
GFR AFRICAN AMERICAN: >60 ML/MIN
GFR NON-AFRICAN AMERICAN: >60 ML/MIN
GFR SERPL CREATININE-BSD FRML MDRD: ABNORMAL ML/MIN/{1.73_M2}
GLUCOSE BLD-MCNC: 166 MG/DL (ref 70–99)
GLUCOSE URINE: ABNORMAL
HCT VFR BLD CALC: 48.6 % (ref 36–46)
HEMOGLOBIN: 16.9 G/DL (ref 12–16)
KETONES, URINE: NEGATIVE
LEUKOCYTE ESTERASE, URINE: ABNORMAL
LIPASE: 11 U/L (ref 13–60)
LYMPHOCYTES # BLD: 25 % (ref 24–44)
MAGNESIUM: 2 MG/DL (ref 1.6–2.6)
MCH RBC QN AUTO: 30.4 PG (ref 26–34)
MCHC RBC AUTO-ENTMCNC: 34.7 G/DL (ref 31–37)
MCV RBC AUTO: 87.6 FL (ref 80–100)
MONOCYTES # BLD: 7 % (ref 1–7)
MUCUS: ABNORMAL
NITRITE, URINE: POSITIVE
PDW BLD-RTO: 13.7 % (ref 11.5–14.9)
PH UA: 5 (ref 5–8)
PLATELET # BLD: 233 K/UL (ref 150–450)
PMV BLD AUTO: 9.7 FL (ref 6–12)
POTASSIUM SERPL-SCNC: 4.6 MMOL/L (ref 3.7–5.3)
PROTEIN UA: NEGATIVE
RBC # BLD: 5.55 M/UL (ref 4–5.2)
RBC UA: ABNORMAL /HPF
SEG NEUTROPHILS: 66 % (ref 36–66)
SEGMENTED NEUTROPHILS ABSOLUTE COUNT: 9.2 K/UL (ref 1.3–9.1)
SODIUM BLD-SCNC: 139 MMOL/L (ref 135–144)
SPECIFIC GRAVITY UA: 1.04 (ref 1–1.03)
TOTAL PROTEIN: 7.8 G/DL (ref 6.4–8.3)
TURBIDITY: ABNORMAL
URINE HGB: ABNORMAL
UROBILINOGEN, URINE: NORMAL
WBC # BLD: 13.9 K/UL (ref 3.5–11)
WBC UA: ABNORMAL /HPF
YEAST: ABNORMAL

## 2022-05-16 PROCEDURE — 87186 SC STD MICRODIL/AGAR DIL: CPT

## 2022-05-16 PROCEDURE — 87086 URINE CULTURE/COLONY COUNT: CPT

## 2022-05-16 PROCEDURE — 85025 COMPLETE CBC W/AUTO DIFF WBC: CPT

## 2022-05-16 PROCEDURE — 83690 ASSAY OF LIPASE: CPT

## 2022-05-16 PROCEDURE — 87088 URINE BACTERIA CULTURE: CPT

## 2022-05-16 PROCEDURE — 6360000002 HC RX W HCPCS: Performed by: EMERGENCY MEDICINE

## 2022-05-16 PROCEDURE — 2580000003 HC RX 258: Performed by: EMERGENCY MEDICINE

## 2022-05-16 PROCEDURE — 99285 EMERGENCY DEPT VISIT HI MDM: CPT

## 2022-05-16 PROCEDURE — 83735 ASSAY OF MAGNESIUM: CPT

## 2022-05-16 PROCEDURE — 36415 COLL VENOUS BLD VENIPUNCTURE: CPT

## 2022-05-16 PROCEDURE — 74177 CT ABD & PELVIS W/CONTRAST: CPT

## 2022-05-16 PROCEDURE — 81001 URINALYSIS AUTO W/SCOPE: CPT

## 2022-05-16 PROCEDURE — 96375 TX/PRO/DX INJ NEW DRUG ADDON: CPT

## 2022-05-16 PROCEDURE — 80053 COMPREHEN METABOLIC PANEL: CPT

## 2022-05-16 RX ORDER — MORPHINE SULFATE 4 MG/ML
4 INJECTION, SOLUTION INTRAMUSCULAR; INTRAVENOUS ONCE
Status: COMPLETED | OUTPATIENT
Start: 2022-05-16 | End: 2022-05-16

## 2022-05-16 RX ORDER — KETOROLAC TROMETHAMINE 30 MG/ML
15 INJECTION, SOLUTION INTRAMUSCULAR; INTRAVENOUS ONCE
Status: DISCONTINUED | OUTPATIENT
Start: 2022-05-16 | End: 2022-05-16

## 2022-05-16 RX ORDER — MORPHINE SULFATE 4 MG/ML
4 INJECTION, SOLUTION INTRAMUSCULAR; INTRAVENOUS ONCE
Status: COMPLETED | OUTPATIENT
Start: 2022-05-16 | End: 2022-05-17

## 2022-05-16 RX ORDER — CIPROFLOXACIN 2 MG/ML
400 INJECTION, SOLUTION INTRAVENOUS ONCE
Status: COMPLETED | OUTPATIENT
Start: 2022-05-16 | End: 2022-05-17

## 2022-05-16 RX ORDER — SODIUM CHLORIDE 0.9 % (FLUSH) 0.9 %
10 SYRINGE (ML) INJECTION PRN
Status: DISCONTINUED | OUTPATIENT
Start: 2022-05-16 | End: 2022-05-17 | Stop reason: SDUPTHER

## 2022-05-16 RX ORDER — 0.9 % SODIUM CHLORIDE 0.9 %
80 INTRAVENOUS SOLUTION INTRAVENOUS ONCE
Status: COMPLETED | OUTPATIENT
Start: 2022-05-17 | End: 2022-05-17

## 2022-05-16 RX ORDER — 0.9 % SODIUM CHLORIDE 0.9 %
1000 INTRAVENOUS SOLUTION INTRAVENOUS ONCE
Status: COMPLETED | OUTPATIENT
Start: 2022-05-16 | End: 2022-05-17

## 2022-05-16 RX ORDER — ONDANSETRON 2 MG/ML
4 INJECTION INTRAMUSCULAR; INTRAVENOUS ONCE
Status: COMPLETED | OUTPATIENT
Start: 2022-05-16 | End: 2022-05-17

## 2022-05-16 RX ADMIN — MORPHINE SULFATE 4 MG: 4 INJECTION, SOLUTION INTRAMUSCULAR; INTRAVENOUS at 23:23

## 2022-05-16 RX ADMIN — SODIUM CHLORIDE 1000 ML: 9 INJECTION, SOLUTION INTRAVENOUS at 23:21

## 2022-05-16 ASSESSMENT — ENCOUNTER SYMPTOMS
SHORTNESS OF BREATH: 0
ABDOMINAL PAIN: 1
NAUSEA: 0
CONSTIPATION: 0
VOMITING: 0
CHEST TIGHTNESS: 0
COLOR CHANGE: 0
DIARRHEA: 0

## 2022-05-16 ASSESSMENT — PAIN SCALES - GENERAL
PAINLEVEL_OUTOF10: 10
PAINLEVEL_OUTOF10: 10

## 2022-05-16 ASSESSMENT — PAIN - FUNCTIONAL ASSESSMENT: PAIN_FUNCTIONAL_ASSESSMENT: 0-10

## 2022-05-16 ASSESSMENT — PAIN DESCRIPTION - DESCRIPTORS
DESCRIPTORS: ACHING
DESCRIPTORS: ACHING

## 2022-05-16 ASSESSMENT — PAIN DESCRIPTION - FREQUENCY: FREQUENCY: INTERMITTENT

## 2022-05-16 ASSESSMENT — LIFESTYLE VARIABLES: HOW OFTEN DO YOU HAVE A DRINK CONTAINING ALCOHOL: NEVER

## 2022-05-16 ASSESSMENT — PAIN DESCRIPTION - LOCATION
LOCATION: ABDOMEN
LOCATION: FLANK

## 2022-05-16 ASSESSMENT — PAIN DESCRIPTION - ORIENTATION
ORIENTATION: RIGHT
ORIENTATION: RIGHT

## 2022-05-16 NOTE — TELEPHONE ENCOUNTER
Will like a new referral placed for a new endocrinologist due to having scheduling issues. Please advise.

## 2022-05-16 NOTE — TELEPHONE ENCOUNTER
Glipizide 10 mg, can she start taking twice a day. She stated that she it has worked better for her once she increased her medication     please advise. Also she scheduled her follow up appt.

## 2022-05-17 ENCOUNTER — APPOINTMENT (OUTPATIENT)
Dept: CT IMAGING | Age: 67
End: 2022-05-17
Payer: MEDICARE

## 2022-05-17 VITALS
SYSTOLIC BLOOD PRESSURE: 113 MMHG | BODY MASS INDEX: 44.16 KG/M2 | OXYGEN SATURATION: 96 % | DIASTOLIC BLOOD PRESSURE: 94 MMHG | TEMPERATURE: 97.8 F | WEIGHT: 240 LBS | RESPIRATION RATE: 18 BRPM | HEART RATE: 106 BPM | HEIGHT: 62 IN

## 2022-05-17 PROBLEM — R11.2 INTRACTABLE NAUSEA AND VOMITING: Status: ACTIVE | Noted: 2022-05-17

## 2022-05-17 PROBLEM — R74.01 TRANSAMINASEMIA: Status: ACTIVE | Noted: 2022-05-17

## 2022-05-17 PROBLEM — E86.0 DEHYDRATION: Status: ACTIVE | Noted: 2022-05-17

## 2022-05-17 PROBLEM — N12 PYELONEPHRITIS: Status: ACTIVE | Noted: 2022-05-17

## 2022-05-17 LAB
BETA-HYDROXYBUTYRATE: 0.57 MMOL/L (ref 0.02–0.27)
CARBOXYHEMOGLOBIN: 2.4 % (ref 0–5)
GLUCOSE BLD-MCNC: 213 MG/DL (ref 65–105)
GLUCOSE BLD-MCNC: 244 MG/DL (ref 65–105)
HAV IGM SER IA-ACNC: NONREACTIVE
HAV IGM SER IA-ACNC: NONREACTIVE
HCO3 VENOUS: 24.6 MMOL/L (ref 24–30)
HEPATITIS B CORE IGM ANTIBODY: NONREACTIVE
HEPATITIS B CORE IGM ANTIBODY: NONREACTIVE
HEPATITIS B SURFACE ANTIGEN: NONREACTIVE
HEPATITIS B SURFACE ANTIGEN: NONREACTIVE
HEPATITIS C ANTIBODY: NONREACTIVE
HEPATITIS C ANTIBODY: NONREACTIVE
INFLUENZA A: NOT DETECTED
INFLUENZA B: NOT DETECTED
LACTIC ACID: 1.9 MMOL/L (ref 0.5–2.2)
METHEMOGLOBIN: 0.6 % (ref 0–1.9)
NEGATIVE BASE EXCESS, VEN: 1.1 MMOL/L (ref 0–2)
O2 DEVICE/FLOW/%: ABNORMAL
O2 SAT, VEN: 93.6 % (ref 60–85)
PATIENT TEMP: 37
PCO2, VEN: 45.3 (ref 39–55)
PH VENOUS: 7.34 (ref 7.32–7.42)
PO2, VEN: 87.8 (ref 30–50)
SARS-COV-2 RNA, RT PCR: NOT DETECTED
SOURCE: NORMAL
SPECIMEN DESCRIPTION: NORMAL

## 2022-05-17 PROCEDURE — 87636 SARSCOV2 & INF A&B AMP PRB: CPT

## 2022-05-17 PROCEDURE — 96365 THER/PROPH/DIAG IV INF INIT: CPT

## 2022-05-17 PROCEDURE — 2580000003 HC RX 258: Performed by: EMERGENCY MEDICINE

## 2022-05-17 PROCEDURE — 6360000002 HC RX W HCPCS: Performed by: NURSE PRACTITIONER

## 2022-05-17 PROCEDURE — 6360000002 HC RX W HCPCS: Performed by: EMERGENCY MEDICINE

## 2022-05-17 PROCEDURE — 82803 BLOOD GASES ANY COMBINATION: CPT

## 2022-05-17 PROCEDURE — 82805 BLOOD GASES W/O2 SATURATION: CPT

## 2022-05-17 PROCEDURE — 6360000002 HC RX W HCPCS

## 2022-05-17 PROCEDURE — 82010 KETONE BODYS QUAN: CPT

## 2022-05-17 PROCEDURE — 96376 TX/PRO/DX INJ SAME DRUG ADON: CPT

## 2022-05-17 PROCEDURE — 6360000004 HC RX CONTRAST MEDICATION: Performed by: EMERGENCY MEDICINE

## 2022-05-17 PROCEDURE — C9113 INJ PANTOPRAZOLE SODIUM, VIA: HCPCS | Performed by: NURSE PRACTITIONER

## 2022-05-17 PROCEDURE — 80074 ACUTE HEPATITIS PANEL: CPT

## 2022-05-17 PROCEDURE — 6360000002 HC RX W HCPCS: Performed by: INTERNAL MEDICINE

## 2022-05-17 PROCEDURE — 2580000003 HC RX 258: Performed by: NURSE PRACTITIONER

## 2022-05-17 PROCEDURE — A4216 STERILE WATER/SALINE, 10 ML: HCPCS | Performed by: NURSE PRACTITIONER

## 2022-05-17 PROCEDURE — G0378 HOSPITAL OBSERVATION PER HR: HCPCS

## 2022-05-17 PROCEDURE — 83605 ASSAY OF LACTIC ACID: CPT

## 2022-05-17 PROCEDURE — 6370000000 HC RX 637 (ALT 250 FOR IP): Performed by: NURSE PRACTITIONER

## 2022-05-17 PROCEDURE — 96375 TX/PRO/DX INJ NEW DRUG ADDON: CPT

## 2022-05-17 PROCEDURE — 99220 PR INITIAL OBSERVATION CARE/DAY 70 MINUTES: CPT | Performed by: INTERNAL MEDICINE

## 2022-05-17 PROCEDURE — 82947 ASSAY GLUCOSE BLOOD QUANT: CPT

## 2022-05-17 PROCEDURE — 74177 CT ABD & PELVIS W/CONTRAST: CPT

## 2022-05-17 PROCEDURE — 36415 COLL VENOUS BLD VENIPUNCTURE: CPT

## 2022-05-17 RX ORDER — SODIUM CHLORIDE 9 MG/ML
INJECTION, SOLUTION INTRAVENOUS PRN
Status: DISCONTINUED | OUTPATIENT
Start: 2022-05-17 | End: 2022-05-17 | Stop reason: HOSPADM

## 2022-05-17 RX ORDER — ONDANSETRON 2 MG/ML
INJECTION INTRAMUSCULAR; INTRAVENOUS
Status: COMPLETED
Start: 2022-05-17 | End: 2022-05-17

## 2022-05-17 RX ORDER — GABAPENTIN 600 MG/1
600 TABLET ORAL 2 TIMES DAILY
Status: DISCONTINUED | OUTPATIENT
Start: 2022-05-17 | End: 2022-05-17 | Stop reason: HOSPADM

## 2022-05-17 RX ORDER — MAGNESIUM SULFATE 1 G/100ML
1000 INJECTION INTRAVENOUS PRN
Status: DISCONTINUED | OUTPATIENT
Start: 2022-05-17 | End: 2022-05-17 | Stop reason: HOSPADM

## 2022-05-17 RX ORDER — POTASSIUM CHLORIDE 20 MEQ/1
10 TABLET, EXTENDED RELEASE ORAL DAILY
Status: DISCONTINUED | OUTPATIENT
Start: 2022-05-17 | End: 2022-05-17 | Stop reason: HOSPADM

## 2022-05-17 RX ORDER — LEVOTHYROXINE SODIUM 0.07 MG/1
75 TABLET ORAL
Status: DISCONTINUED | OUTPATIENT
Start: 2022-05-17 | End: 2022-05-17 | Stop reason: HOSPADM

## 2022-05-17 RX ORDER — CIPROFLOXACIN 2 MG/ML
400 INJECTION, SOLUTION INTRAVENOUS EVERY 12 HOURS
Status: DISCONTINUED | OUTPATIENT
Start: 2022-05-17 | End: 2022-05-17 | Stop reason: HOSPADM

## 2022-05-17 RX ORDER — ONDANSETRON 2 MG/ML
4 INJECTION INTRAMUSCULAR; INTRAVENOUS EVERY 6 HOURS PRN
Status: DISCONTINUED | OUTPATIENT
Start: 2022-05-17 | End: 2022-05-17 | Stop reason: HOSPADM

## 2022-05-17 RX ORDER — POLYETHYLENE GLYCOL 3350 17 G/17G
17 POWDER, FOR SOLUTION ORAL DAILY PRN
Status: DISCONTINUED | OUTPATIENT
Start: 2022-05-17 | End: 2022-05-17 | Stop reason: HOSPADM

## 2022-05-17 RX ORDER — POTASSIUM CHLORIDE 7.45 MG/ML
10 INJECTION INTRAVENOUS PRN
Status: DISCONTINUED | OUTPATIENT
Start: 2022-05-17 | End: 2022-05-17 | Stop reason: HOSPADM

## 2022-05-17 RX ORDER — DEXTROSE MONOHYDRATE 50 MG/ML
100 INJECTION, SOLUTION INTRAVENOUS PRN
Status: DISCONTINUED | OUTPATIENT
Start: 2022-05-17 | End: 2022-05-17 | Stop reason: HOSPADM

## 2022-05-17 RX ORDER — ONDANSETRON 4 MG/1
4 TABLET, ORALLY DISINTEGRATING ORAL EVERY 8 HOURS PRN
Status: DISCONTINUED | OUTPATIENT
Start: 2022-05-17 | End: 2022-05-17 | Stop reason: HOSPADM

## 2022-05-17 RX ORDER — POTASSIUM CHLORIDE 20 MEQ/1
40 TABLET, EXTENDED RELEASE ORAL PRN
Status: DISCONTINUED | OUTPATIENT
Start: 2022-05-17 | End: 2022-05-17 | Stop reason: HOSPADM

## 2022-05-17 RX ORDER — DULOXETIN HYDROCHLORIDE 60 MG/1
60 CAPSULE, DELAYED RELEASE ORAL DAILY
Status: DISCONTINUED | OUTPATIENT
Start: 2022-05-17 | End: 2022-05-17 | Stop reason: HOSPADM

## 2022-05-17 RX ORDER — INSULIN LISPRO 100 [IU]/ML
0-3 INJECTION, SOLUTION INTRAVENOUS; SUBCUTANEOUS NIGHTLY
Status: DISCONTINUED | OUTPATIENT
Start: 2022-05-17 | End: 2022-05-17 | Stop reason: HOSPADM

## 2022-05-17 RX ORDER — SODIUM CHLORIDE 0.9 % (FLUSH) 0.9 %
10 SYRINGE (ML) INJECTION PRN
Status: DISCONTINUED | OUTPATIENT
Start: 2022-05-17 | End: 2022-05-17 | Stop reason: HOSPADM

## 2022-05-17 RX ORDER — ENOXAPARIN SODIUM 100 MG/ML
30 INJECTION SUBCUTANEOUS 2 TIMES DAILY
Status: DISCONTINUED | OUTPATIENT
Start: 2022-05-17 | End: 2022-05-17 | Stop reason: HOSPADM

## 2022-05-17 RX ORDER — SODIUM CHLORIDE 9 MG/ML
INJECTION, SOLUTION INTRAVENOUS CONTINUOUS
Status: DISCONTINUED | OUTPATIENT
Start: 2022-05-17 | End: 2022-05-17 | Stop reason: HOSPADM

## 2022-05-17 RX ORDER — SODIUM CHLORIDE 0.9 % (FLUSH) 0.9 %
5-40 SYRINGE (ML) INJECTION EVERY 12 HOURS SCHEDULED
Status: DISCONTINUED | OUTPATIENT
Start: 2022-05-17 | End: 2022-05-17 | Stop reason: HOSPADM

## 2022-05-17 RX ORDER — ACETAMINOPHEN 325 MG/1
650 TABLET ORAL EVERY 6 HOURS PRN
Status: DISCONTINUED | OUTPATIENT
Start: 2022-05-17 | End: 2022-05-17 | Stop reason: HOSPADM

## 2022-05-17 RX ORDER — INSULIN LISPRO 100 [IU]/ML
0-6 INJECTION, SOLUTION INTRAVENOUS; SUBCUTANEOUS
Status: DISCONTINUED | OUTPATIENT
Start: 2022-05-17 | End: 2022-05-17 | Stop reason: HOSPADM

## 2022-05-17 RX ORDER — CIPROFLOXACIN 500 MG/1
500 TABLET, FILM COATED ORAL 2 TIMES DAILY
Qty: 14 TABLET | Refills: 0 | Status: SHIPPED | OUTPATIENT
Start: 2022-05-17 | End: 2022-05-24

## 2022-05-17 RX ORDER — ONDANSETRON 2 MG/ML
4 INJECTION INTRAMUSCULAR; INTRAVENOUS ONCE
Status: COMPLETED | OUTPATIENT
Start: 2022-05-17 | End: 2022-05-17

## 2022-05-17 RX ORDER — INSULIN GLARGINE 100 [IU]/ML
80 INJECTION, SOLUTION SUBCUTANEOUS NIGHTLY
Status: DISCONTINUED | OUTPATIENT
Start: 2022-05-17 | End: 2022-05-17 | Stop reason: HOSPADM

## 2022-05-17 RX ORDER — GLIPIZIDE 10 MG/1
10 TABLET ORAL 2 TIMES DAILY
Qty: 60 TABLET | Refills: 3 | Status: SHIPPED
Start: 2022-05-17 | End: 2022-05-17 | Stop reason: HOSPADM

## 2022-05-17 RX ORDER — LISINOPRIL 2.5 MG/1
2.5 TABLET ORAL DAILY
Status: DISCONTINUED | OUTPATIENT
Start: 2022-05-17 | End: 2022-05-17 | Stop reason: HOSPADM

## 2022-05-17 RX ORDER — ATORVASTATIN CALCIUM 80 MG/1
80 TABLET, FILM COATED ORAL NIGHTLY
Status: DISCONTINUED | OUTPATIENT
Start: 2022-05-17 | End: 2022-05-17 | Stop reason: HOSPADM

## 2022-05-17 RX ORDER — ACETAMINOPHEN 650 MG/1
650 SUPPOSITORY RECTAL EVERY 6 HOURS PRN
Status: DISCONTINUED | OUTPATIENT
Start: 2022-05-17 | End: 2022-05-17 | Stop reason: HOSPADM

## 2022-05-17 RX ORDER — ASPIRIN 81 MG/1
81 TABLET ORAL DAILY
Status: DISCONTINUED | OUTPATIENT
Start: 2022-05-17 | End: 2022-05-17 | Stop reason: HOSPADM

## 2022-05-17 RX ORDER — METOCLOPRAMIDE HYDROCHLORIDE 5 MG/ML
10 INJECTION INTRAMUSCULAR; INTRAVENOUS EVERY 6 HOURS
Status: DISCONTINUED | OUTPATIENT
Start: 2022-05-17 | End: 2022-05-17 | Stop reason: HOSPADM

## 2022-05-17 RX ADMIN — CIPROFLOXACIN 400 MG: 2 INJECTION, SOLUTION INTRAVENOUS at 11:34

## 2022-05-17 RX ADMIN — ONDANSETRON 4 MG: 2 INJECTION INTRAMUSCULAR; INTRAVENOUS at 07:57

## 2022-05-17 RX ADMIN — SODIUM CHLORIDE 40 MG: 9 INJECTION INTRAMUSCULAR; INTRAVENOUS; SUBCUTANEOUS at 08:41

## 2022-05-17 RX ADMIN — SODIUM CHLORIDE: 9 INJECTION, SOLUTION INTRAVENOUS at 08:47

## 2022-05-17 RX ADMIN — INSULIN LISPRO 2 UNITS: 100 INJECTION, SOLUTION INTRAVENOUS; SUBCUTANEOUS at 09:05

## 2022-05-17 RX ADMIN — ONDANSETRON 4 MG: 2 INJECTION INTRAMUSCULAR; INTRAVENOUS at 03:20

## 2022-05-17 RX ADMIN — METOCLOPRAMIDE 10 MG: 5 INJECTION, SOLUTION INTRAMUSCULAR; INTRAVENOUS at 11:25

## 2022-05-17 RX ADMIN — INSULIN LISPRO 2 UNITS: 100 INJECTION, SOLUTION INTRAVENOUS; SUBCUTANEOUS at 11:24

## 2022-05-17 RX ADMIN — IOPAMIDOL 75 ML: 755 INJECTION, SOLUTION INTRAVENOUS at 00:55

## 2022-05-17 RX ADMIN — SODIUM CHLORIDE 80 ML: 9 INJECTION, SOLUTION INTRAVENOUS at 00:55

## 2022-05-17 RX ADMIN — CIPROFLOXACIN 400 MG: 2 INJECTION, SOLUTION INTRAVENOUS at 01:09

## 2022-05-17 RX ADMIN — SODIUM CHLORIDE, PRESERVATIVE FREE 10 ML: 5 INJECTION INTRAVENOUS at 00:55

## 2022-05-17 RX ADMIN — MORPHINE SULFATE 4 MG: 4 INJECTION, SOLUTION INTRAMUSCULAR; INTRAVENOUS at 00:58

## 2022-05-17 RX ADMIN — ENOXAPARIN SODIUM 30 MG: 100 INJECTION SUBCUTANEOUS at 12:38

## 2022-05-17 RX ADMIN — ONDANSETRON 4 MG: 2 INJECTION INTRAMUSCULAR; INTRAVENOUS at 00:56

## 2022-05-17 ASSESSMENT — ENCOUNTER SYMPTOMS
SHORTNESS OF BREATH: 0
COUGH: 0
DIARRHEA: 0
VOMITING: 1
CONSTIPATION: 0
NAUSEA: 1
ABDOMINAL PAIN: 1
STRIDOR: 0
BLOOD IN STOOL: 0
WHEEZING: 0

## 2022-05-17 ASSESSMENT — PAIN DESCRIPTION - DESCRIPTORS: DESCRIPTORS: ACHING

## 2022-05-17 ASSESSMENT — PAIN SCALES - GENERAL
PAINLEVEL_OUTOF10: 4
PAINLEVEL_OUTOF10: 4
PAINLEVEL_OUTOF10: 10

## 2022-05-17 ASSESSMENT — PAIN DESCRIPTION - ORIENTATION: ORIENTATION: RIGHT

## 2022-05-17 ASSESSMENT — PAIN DESCRIPTION - LOCATION: LOCATION: FLANK

## 2022-05-17 NOTE — ED PROVIDER NOTES
EMERGENCY DEPARTMENT ENCOUNTER   ATTENDING ATTESTATION     Pt Name: Jose Clifford  MRN: [id number]  [place] 1955  Date of evaluation: 5/16/22       Jose Clifford is a 74 y.o. female who presents with Abdominal Pain      MDM:   This is a 80-year-old female with a history of recurrent stones who comes in today with flank pain and right upper quadrant abdominal pain will obtain CT.     3:25 AM EDT  Patient's LFTs are elevated from previous she has a leukocytosis she has a urinary tract infection this could be what is causing the flank pain she might have pyelonephritis she was given morphine and Zofran continues to have vomiting her CT scan is negative for any acute process will admit for UTI and LFT elevation I did order hepatitis panel. She was given Cipro given her previous allergies. I spoke to [facility] nurse practitioner who accepts the patient. Vitals:   Vitals:    05/16/22 2023   BP: (!) 135/105   Pulse: 81   Resp: 17   Temp: 98 °F (36.7 °C)   TempSrc: Oral   SpO2: 98%   Weight: 240 lb (108.9 kg)   Height: 5' 2\" (1.575 m)         I personally saw and examined the patient. I have reviewed and agree with the resident's findings, including all diagnostic interpretations and treatment plan as written. I was present for the key portions of any procedures performed and the inclusive time noted for any critical care statement. The care is provided during an unprecedented national emergency due to the novel coronavirus, COVID 19.   Sheridan Good MD  Attending Emergency Physician            Sehridan Good MD  05/17/22 Ashvin Mahan MD  05/17/22 [id number]

## 2022-05-17 NOTE — FLOWSHEET NOTE
Patient discharged to home per orders. IV discontinued intact. Discharge instructions reviewed written and verbal.  Patient states all belongings are present. Taken to awaiting vehicle per wheelchair.

## 2022-05-17 NOTE — ED PROVIDER NOTES
[address]  Emergency Department Encounter  EmergencyMedicine Resident     Pt Lauren Heath  MRN: [id number]  Birthdate 1955  Date of evaluation: 5/16/22  PCP:  MD Jose Cuellar       Chief Complaint   Patient presents with    Abdominal Pain       HISTORY OF PRESENT ILLNESS  (Location/Symptom, Timing/Onset, Context/Setting, Quality, Duration, Modifying Factors, Severity.)      Karina Ellsworth is a 74 y.o. female who presents with 3 days of abdominal pain, pain started as right upper quadrant abdominal pain and then moved into the back. Patient has a history of kidney stones. Patient rates her pain is a 10 out of 10, sharp, stabbing/throbbing. Patient states that it worsens with deep inspiration, worsens with any kind of movement or laying flat. Patient noticed that nothing seems to make it better. Patient's had incontinence of urine but no new difficulty urinating. Denies any lightness, dizziness, chest pain, shortness of breath, change in stool habits, denied constipation or diarrhea. PAST MEDICAL / SURGICAL / SOCIAL / FAMILY HISTORY      has a past medical history of Arrhythmia, Breast mass, CAD (coronary artery disease), Chronic neck pain, Coccygeal pain, Constipation, COVID-19, Depression, Diabetic neuropathy ([address] History of hepatitis A, History of renal calculi, Hypertension, Hyperthyroidism, Hypothyroidism, Morbid obesity with BMI of 45.0-49.9, adult ([address] Nephrolithiasis, Neuropathy, Pancreatic cyst, PONV (postoperative nausea and vomiting), Type II or unspecified type diabetes mellitus without mention of complication, not stated as uncontrolled, Ulcerative colitis ([address] and UTI (lower urinary tract infection). No additional pertinent     has a past surgical history that includes Cholecystectomy; Tonsillectomy; Tubal ligation; Cystoscopy; cyst removal; Breast biopsy (2012); Endometrial biopsy (2009);  Lithotripsy; cardiovascular stress test; Cystoscopy (Right, 2019); cysto/uretero/pyeloscopy, calculus tx (Right, 2019); Endoscopy, colon, diagnostic; Upper gastrointestinal endoscopy (N/A, 2020); Colonoscopy (N/A, 10/26/2021); and Upper gastrointestinal endoscopy (N/A, 10/26/2021). No additional pertinent    Social History     Socioeconomic History    Marital status: Single     Spouse name: Not on file    Number of children: Not on file    Years of education: Not on file    Highest education level: Not on file   Occupational History    Not on file   Tobacco Use    Smoking status: Former Smoker     Packs/day: 0.01     Years: 1.00     Pack years: 0.01     Types: Cigarettes     Quit date: 5     Years since quittin.4    Smokeless tobacco: Never Used    Tobacco comment: pt states she quit smoking cigarettes at the age of 14   Vaping Use    Vaping Use: Never used   Substance and Sexual Activity    Alcohol use: No     Alcohol/week: 0.0 standard drinks    Drug use: Not Currently     Types: Marijuana (Weed)     Comment: once in a while    Sexual activity: Yes     Partners: Male   Other Topics Concern    Not on file   Social History Narrative    Not on file     Social Determinants of Health     Financial Resource Strain: Low Risk     Difficulty of Paying Living Expenses: Not hard at all   Food Insecurity: No Food Insecurity    Worried About [address] in the Last Year: Never true    [address] [facility] [address] in the Last Year: Never true   Transportation Needs:     Lack of Transportation (Medical): Not on file    Lack of Transportation (Non-Medical):  Not on file   Physical Activity:     Days of Exercise per Week: Not on file    Minutes of Exercise per Session: Not on file   Stress:     Feeling of Stress : Not on file   Social Connections:     Frequency of Communication with Friends and Family: Not on file    Frequency of Social Gatherings with Friends and Family: Not on file    Attends [employer] Services: Not on file  Active Member of Clubs or Organizations: Not on file    Attends Club or Organization Meetings: Not on file    Marital Status: Not on file   Intimate Partner Violence:     Fear of Current or Ex-Partner: Not on file    Emotionally Abused: Not on file    Physically Abused: Not on file    Sexually Abused: Not on file   Housing Stability:     Unable to Pay for Housing in the Last Year: Not on file    Number of [place] in the Last Year: Not on file    Unstable Housing in the Last Year: Not on file       Family History   Problem Relation Age of Onset    Coronary Art Dis Mother     Lung Cancer Mother     Diabetes Mother         mellitus    Coronary Art Dis Father     Diabetes Father         diabetes mellitus       Allergies:  Cefuroxime axetil; Dilaudid [hydromorphone]; Metformin and related; Sulfa antibiotics; Amoxicillin; Antihistamines, loratadine-type; Eggs or egg-derived products; Lorazepam; Nubain [nalbuphine hcl]; and Vistaril [hydroxyzine hcl]    Home Medications:  Prior to Admission medications    Medication Sig Start Date End Date Taking? Authorizing Provider   tiZANidine (ZANAFLEX) 4 MG tablet TAKE ONE TABLET BY MOUTH TWICE A DAY AS NEEDED FOR BACK PAIN 5/11/22   Edmundo Berry MD   gabapentin (NEURONTIN) 600 MG tablet take 1 tablet by mouth twice a day 5/3/22 6/3/22  Edmundo Berry MD   oxybutynin (DITROPAN XL) 15 MG extended release tablet Take 1 tablet by mouth daily 4/15/22   RASHMI Jarrett CNP   lidocaine (LMX) 4 % cream Apply topically every 8 hrs as needed for pain 4/13/22   Edmundo Berry MD   ibuprofen (ADVIL;MOTRIN) 600 MG tablet Take 1 tablet by mouth every 8 hours as needed for Pain Short term. Take with food.  4/13/22   Edmundo Berry MD   DULoxetine (CYMBALTA) 60 MG extended release capsule take 1 capsule by mouth once daily 4/8/22   Edmundo Berry MD   lisinopril (PRINIVIL;ZESTRIL) 2.5 MG tablet take 1 tablet by mouth once daily 3/7/22   Edmundo Berry MD potassium chloride (KLOR-CON M) 10 MEQ extended release tablet take 1 tablet by mouth once daily 3/1/22   Alanis Broderick MD   atorvastatin (LIPITOR) 80 MG tablet take 1 tablet by mouth once daily 1/17/22   Alanis Broderick MD   levothyroxine (SYNTHROID) 175 MCG tablet Take 1 tablet by mouth once a week Indications: Sundays Take 150 mcg by mouth once a week Indications: Sundays 1/17/22   Alanis Borderick MD   dapagliflozin (FARXIGA) 10 MG tablet Take 1 tablet by mouth every morning 1/17/22   Alanis Broderick MD   ibuprofen (ADVIL;MOTRIN) 600 MG tablet Take 1 tablet by mouth every 8 hours as needed for Pain 12/7/21   Lidia Mendes MD   Continuous Blood Gluc Sensor (DEXCOM G6 SENSOR) [address] [facility]  11/29/21   Historical Provider, MD   Insulin Degludec 200 UNIT/ML SOPN Inject 75 Units into the skin nightly 11/14/21   Alanis Broderick MD   insulin lispro, 1 Unit Dial, 100 UNIT/ML SOPN Inject 10 Units into the skin 3 times daily as needed for High Blood Sugar 11/14/21   Alanis Broderick MD   ASPIRIN LOW DOSE 81 MG EC tablet take 1 tablet by mouth once daily 11/1/21   Alanis Broderick MD   famotidine (PEPCID) 20 MG tablet take 1 tablet by mouth twice a day 10/27/21   Alanis Broderick MD   magnesium citrate solution Take 296 mLs by mouth once for 1 dose 10/13/21 4/13/22  Arin Coe MD   senna (SENOKOT) 8.6 MG tablet Take 1 tablet by mouth 2 times daily 10/11/21 10/11/22  Alanis Broderick MD   levothyroxine (SYNTHROID) 75 MCG tablet Take 75 mcg by mouth Six times weekly Indications:  All days except Sundays    Historical Provider, MD   lidocaine (LIDODERM) 5 % Place 1 patch onto the skin daily 12 hours on, 12 hours off. 10/1/21   Shoaib Davila PA-C   furosemide (LASIX) 20 MG tablet take 1 tablet by mouth once daily 9/3/21   Alanis Broderick MD   melatonin 3 MG TABS tablet take 1 tablet by mouth nightly 1/18/21   Alanis Broderick MD   glipiZIDE (GLUCOTROL) 10 MG tablet take 2 tablets by mouth twice a day 12/22/20   Alanis Broderick MD RA PEN NEEDLES 31G X 8 MM MISC INJECTING TWICE DAILY DX: E11.65 10/28/20   Historical Provider, MD       REVIEW OF SYSTEMS    (2-9 systems for level 4, 10 or more for level 5)      Review of Systems   Constitutional: Negative for chills and fever. HENT: Negative for congestion. Respiratory: Negative for chest tightness and shortness of breath. Cardiovascular: Negative for chest pain and leg swelling. Gastrointestinal: Positive for abdominal pain. Negative for constipation, diarrhea, nausea and vomiting. Endocrine: Negative for polyuria. Genitourinary: Positive for flank pain. Negative for difficulty urinating. Skin: Negative for color change. Neurological: Negative for dizziness, weakness, light-headedness and headaches. Psychiatric/Behavioral: Negative for confusion. PHYSICAL EXAM   (up to 7 for level 4, 8 or more for level 5)      INITIAL VITALS:   /87   Pulse 86   Temp 98 °F (36.7 °C) (Oral)   Resp 16   Ht 5' 2\" (1.575 m)   Wt 240 lb (108.9 kg)   LMP 01/12/2015 (Approximate)   SpO2 97%   BMI 43.90 kg/m²     Physical Exam  Constitutional:       Appearance: Normal appearance. HENT:      Head: Normocephalic and atraumatic. Mouth/Throat:      Mouth: Mucous membranes are moist.      Pharynx: Oropharynx is clear. Eyes:      Extraocular Movements: Extraocular movements intact. Conjunctiva/sclera: Conjunctivae normal.   Cardiovascular:      Rate and Rhythm: Normal rate and regular rhythm. Pulses: Normal pulses. Heart sounds: Normal heart sounds. No murmur heard. Pulmonary:      Effort: Pulmonary effort is normal.      Breath sounds: Normal breath sounds. Abdominal:      General: Bowel sounds are normal. There is no distension. Tenderness: There is abdominal tenderness in the right upper quadrant. There is no right CVA tenderness, left CVA tenderness or guarding. Musculoskeletal:         General: Normal range of motion.       Comments: Range of motion noted to be normal with patient's natural movements   Skin:     General: Skin is warm and dry. Findings: No rash (On exposed skin). Neurological:      General: No focal deficit present. Mental Status: She is alert and oriented to person, place, and time.    Psychiatric:         Mood and Affect: Mood normal.         Behavior: Behavior normal.         DIFFERENTIAL  DIAGNOSIS     PLAN (LABS / IMAGING / EKG):  Orders Placed This Encounter   Procedures    Culture, Urine    CT ABDOMEN PELVIS W IV CONTRAST Additional Contrast? None    CT ABDOMEN PELVIS W IV CONTRAST Additional Contrast? None    CBC with Auto Differential    Lipase    Urinalysis with Microscopic    Comprehensive Metabolic Panel    Magnesium    Insert peripheral IV       MEDICATIONS ORDERED:  Orders Placed This Encounter   Medications    DISCONTD: ketorolac (TORADOL) injection 15 mg    morphine sulfate (PF) injection 4 mg    0.9 % sodium chloride bolus    ondansetron (ZOFRAN) injection 4 mg    morphine sulfate (PF) injection 4 mg    ciprofloxacin (CIPRO) IVPB 400 mg     Order Specific Question:   Antimicrobial Indications     Answer:   Urinary Tract Infection    0.9 % sodium chloride bolus    sodium chloride flush 0.9 % injection 10 mL    iopamidol (ISOVUE-370) 76 % injection 75 mL       DIAGNOSTIC RESULTS / EMERGENCY DEPARTMENT COURSE / MDM   LAB RESULTS:  Results for orders placed or performed during the hospital encounter of 05/16/22   CBC with Auto Differential   Result Value Ref Range    WBC 13.9 (H) 3.5 - 11.0 k/uL    RBC 5.55 (H) 4.0 - 5.2 m/uL    Hemoglobin 16.9 (H) 12.0 - 16.0 g/dL    Hematocrit 48.6 (H) 36 - 46 %    MCV 87.6 80 - 100 fL    MCH 30.4 26 - 34 pg    MCHC 34.7 31 - 37 g/dL    RDW 13.7 11.5 - 14.9 %    Platelets 863 362 - 320 k/uL    MPV 9.7 6.0 - 12.0 fL    Seg Neutrophils 66 36 - 66 %    Lymphocytes 25 24 - 44 %    Monocytes 7 1 - 7 %    Eosinophils % 1 0 - 4 %    Basophils 1 0 - 2 %    Segs Absolute 9.20 (H) 1.3 - 9.1 k/uL    Absolute Lymph # 3.50 1.0 - 4.8 k/uL    Absolute Mono # 1.00 0.1 - 1.3 k/uL    Absolute Eos # 0.10 0.0 - 0.4 k/uL    Basophils Absolute 0.10 0.0 - 0.2 k/uL   Lipase   Result Value Ref Range    Lipase 11 (L) 13 - 60 U/L   Urinalysis with Microscopic   Result Value Ref Range    Color, UA Yellow Yellow    Turbidity UA Cloudy (A) Clear    Glucose, Ur LARGE (A) NEGATIVE    Bilirubin Urine NEGATIVE NEGATIVE    Ketones, Urine NEGATIVE NEGATIVE    Specific Gravity, UA 1.038 (H) 1.000 - 1.030    Urine Hgb MOD (A) NEGATIVE    pH, UA 5.0 5.0 - 8.0    Protein, UA NEGATIVE NEGATIVE    Urobilinogen, Urine Normal Normal    Nitrite, Urine POSITIVE (A) NEGATIVE    Leukocyte Esterase, Urine SMALL (A) NEGATIVE    WBC, UA 21 TO 50 /HPF    RBC, UA 3 to 5 /HPF    Casts UA HYALINE /LPF    Casts UA 0 TO 2 /LPF    Epithelial Cells UA 10 TO 20 /HPF    Bacteria, UA MANY (A) None    Mucus, UA 2+ (A) None    Yeast, UA FEW (A) None   Comprehensive Metabolic Panel   Result Value Ref Range    Glucose 166 (H) 70 - 99 mg/dL    BUN 13 8 - 23 mg/dL    CREATININE 0.53 0.50 - 0.90 mg/dL    Calcium 10.2 8.6 - 10.4 mg/dL    Sodium 139 135 - 144 mmol/L    Potassium 4.6 3.7 - 5.3 mmol/L    Chloride 102 98 - 107 mmol/L    CO2 25 20 - 31 mmol/L    Anion Gap 12 9 - 17 mmol/L    Alkaline Phosphatase 154 (H) 35 - 104 U/L     (H) 5 - 33 U/L    AST 49 (H) <32 U/L    Total Bilirubin 0.88 0.3 - 1.2 mg/dL    Total Protein 7.8 6.4 - 8.3 g/dL    Albumin 3.8 3.5 - 5.2 g/dL    GFR Non-African American >60 >60 mL/min    GFR African American >60 >60 mL/min    GFR Comment         Magnesium   Result Value Ref Range    Magnesium 2.0 1.6 - 2.6 mg/dL       RADIOLOGY:  CT ABDOMEN PELVIS W IV CONTRAST Additional Contrast? None    (Results Pending)   CT ABDOMEN PELVIS W IV CONTRAST Additional Contrast? None    (Results Pending)          PROCEDURES:  none    CONSULTS:  None    MEDICAL DECISION MAKING:  Patient presenting with abdominal pain and flank pain, concern for urinary tract infection. Patient had a urine that demonstrated concerning findings. Patient was started on ciprofloxacin while here in the emergency department. With patient's history of nephrolithiasis, concerns for kidney stone CT scan to be obtained. Patient pending CT scan for concerns of appendicitis. Patient signed out to Dr. Kalin Trejo. CRITICAL CARE:  Please see attending note    FINAL IMPRESSION      1. Lower abdominal pain         DISPOSITION / PLAN     DISPOSITION        PATIENT REFERRED TO:  No follow-up provider specified.     DISCHARGE MEDICATIONS:  New Prescriptions    No medications on file       Jaky Silverman, [id number] [facility], DO  Emergency Medicine Resident    (Please note that portions of thisnote were completed with a voice recognition program.  Efforts were made to edit the dictations but occasionally words are mis-transcribed.)       Adri Machuca DO  Resident  05/17/22 [id number]

## 2022-05-17 NOTE — ED NOTES
The following labs labeled with pt sticker and tubed to lab:     [] Blue     [x] Lavender   [] on ice  [x] Green/yellow  [x] Green/black [] on ice  [x] Yellow  [] Red  [x] Grey on ice      [x] COVID-19 swab    [] Rapid  [] PCR  [] Peds Viral Panel        Ariel Buchanan RN  05/17/22 [id number]

## 2022-05-17 NOTE — H&P
[facility]     HISTORY AND PHYSICAL EXAMINATION            Date:   5/17/2022  Patientname:  Liz Gutierrez  Date of admission:  5/16/2022  9:50 PM  MRN:   [id number]  Account:  [de-identified]  YOB: 1955  PCP:    Gabe Boxer, MD  Room:   02/02  Code Status:    Prior    CHIEF COMPLAINT     Chief Complaint   Patient presents with    Abdominal Pain       HISTORY OF PRESENT ILLNESS  (Character, Onset, Location, Duration,  Exacerbating/RelievingFactors, Radiation,   Associated Symptoms, Severity )      The patient is a 74 y.o. female, with a history of CAD, DM, kidney stones, htn   who presents with abdominal and flank pain. According to patient,she  started with flank pain about 4 days ago and she thought that it was secondary to kidney stones. Patient tried to wait it out at home and the pain would was and wean through out the days. However today the pain was so unbearable she decided to come to the emergency room. Patient describes the pain as sharp and stabbing with a throb. Aggravating factors is movement or deep inspiration or lay flat. She denies any hematuria, urgency or frequency, however did have an episode of incontinence. She denies any fevers but admits to chills, and body ache muscle aches. .      Has been eating out over the past 4 days, no one else in home was sick outside of her son who had COVID 23, whom she is visiting in the hospital. .  Patient did admit to eating out frequently as she has been back and forth at the hospital, the only thing different she has eaten from her  was that she had honey chicken and they did not. Work up in the emergency room       Laboratories:   Metabolic panel: WNL outside glucose 166  Cardiac Markers: not done  Lactic acid: not done  Liver profile:  Fast 154, , AST 49, bilirubin 0.88  Hematology: WBC 13.9, hemoglobin 16.9 hematocrit 48.6 platelets 339  Coagulation: Not done  Urine-cloudy large smoking use included cigarettes. She has a 0.01 pack-year smoking history. She has never used smokeless tobacco. She reports previous drug use. Drug: Marijuana [place]). She reports that she does not drink alcohol. HOME MEDICATIONS        Prior to Admission medications    Medication Sig Start Date End Date Taking? Authorizing Provider   tiZANidine (ZANAFLEX) 4 MG tablet TAKE ONE TABLET BY MOUTH TWICE A DAY AS NEEDED FOR BACK PAIN 5/11/22   Norene Schirmer, MD   gabapentin (NEURONTIN) 600 MG tablet take 1 tablet by mouth twice a day 5/3/22 6/3/22  Norene Schirmer, MD   oxybutynin (DITROPAN XL) 15 MG extended release tablet Take 1 tablet by mouth daily 4/15/22   RASHMI Cassidy CNP   lidocaine (LMX) 4 % cream Apply topically every 8 hrs as needed for pain 4/13/22   Norene Schirmer, MD   ibuprofen (ADVIL;MOTRIN) 600 MG tablet Take 1 tablet by mouth every 8 hours as needed for Pain Short term. Take with food.  4/13/22   Norene Schirmer, MD   DULoxetine (CYMBALTA) 60 MG extended release capsule take 1 capsule by mouth once daily 4/8/22   Norene Schirmer, MD   lisinopril (PRINIVIL;ZESTRIL) 2.5 MG tablet take 1 tablet by mouth once daily 3/7/22   Norene Schirmer, MD   potassium chloride (KLOR-CON M) 10 MEQ extended release tablet take 1 tablet by mouth once daily 3/1/22   Norene Schirmer, MD   atorvastatin (LIPITOR) 80 MG tablet take 1 tablet by mouth once daily 1/17/22   Norene Schirmer, MD   levothyroxine (SYNTHROID) 175 MCG tablet Take 1 tablet by mouth once a week Indications: Sundays Take 150 mcg by mouth once a week Indications: Sundays 1/17/22   Norene Schirmer, MD   dapagliflozin (FARXIGA) 10 MG tablet Take 1 tablet by mouth every morning 1/17/22   Norene Schirmer, MD   ibuprofen (ADVIL;MOTRIN) 600 MG tablet Take 1 tablet by mouth every 8 hours as needed for Pain 12/7/21   Ashley Hendrix MD   Continuous Blood Gluc Sensor (DEXCOM G6 SENSOR) [facility]  11/29/21   Historical Provider, MD   Insulin Degludec 200 UNIT/ML SOPN Inject 75 Units into the skin nightly 11/14/21   Jordy Benavides MD   insulin lispro, 1 Unit Dial, 100 UNIT/ML SOPN Inject 10 Units into the skin 3 times daily as needed for High Blood Sugar 11/14/21   Jordy Benavides MD   ASPIRIN LOW DOSE 81 MG EC tablet take 1 tablet by mouth once daily 11/1/21   Jordy Benavides MD   famotidine (PEPCID) 20 MG tablet take 1 tablet by mouth twice a day 10/27/21   Jordy Benavides MD   magnesium citrate solution Take 296 mLs by mouth once for 1 dose 10/13/21 4/13/22  Flavio Tellez MD   senna (SENOKOT) 8.6 MG tablet Take 1 tablet by mouth 2 times daily 10/11/21 10/11/22  Jordy Benavides MD   levothyroxine (SYNTHROID) 75 MCG tablet Take 75 mcg by mouth Six times weekly Indications: All days except Sundays    Historical Provider, MD   lidocaine (LIDODERM) 5 % Place 1 patch onto the skin daily 12 hours on, 12 hours off. 10/1/21   Nyasia Bey PA-C   furosemide (LASIX) 20 MG tablet take 1 tablet by mouth once daily 9/3/21   Jordy Benavides MD   melatonin 3 MG TABS tablet take 1 tablet by mouth nightly 1/18/21   Jordy Benavides MD   glipiZIDE (GLUCOTROL) 10 MG tablet take 2 tablets by mouth twice a day 12/22/20   Jordy Benavides MD   RA PEN NEEDLES 31G X 8 MM MISC INJECTING TWICE DAILY DX: E11.65 10/28/20   Historical Provider, MD       ALLERGIES      Cefuroxime axetil; Dilaudid [hydromorphone]; Metformin and related; Sulfa antibiotics; Amoxicillin; Antihistamines, loratadine-type; Eggs or egg-derived products; Lorazepam; Nubain [nalbuphine hcl]; and Vistaril [hydroxyzine hcl]    REVIEW OF SYSTEMS     Review of Systems   Constitutional: Positive for activity change, appetite change, chills and fatigue. Negative for diaphoresis. HENT: Negative for congestion and hearing loss. Respiratory: Negative for cough, shortness of breath, wheezing and stridor. Cardiovascular: Negative for chest pain, palpitations and leg swelling.    Gastrointestinal: Positive for abdominal pain, nausea and vomiting. Negative for blood in stool, constipation and diarrhea. Genitourinary: Positive for flank pain. Negative for dysuria and frequency. Musculoskeletal: Positive for arthralgias and myalgias. Skin: Negative for rash. Neurological: Negative for dizziness, seizures and headaches. Psychiatric/Behavioral: The patient is not nervous/anxious. PHYSICAL EXAM      /76   Pulse 86   Temp 98 °F (36.7 °C) (Oral)   Resp 16   Ht 5' 2\" (1.575 m)   Wt 240 lb (108.9 kg)   LMP 01/12/2015 (Approximate)   SpO2 94%   BMI 43.90 kg/m²  Body mass index is 43.9 kg/m². Physical Exam  Vitals and nursing note reviewed. Constitutional:       General: She is not in acute distress. Appearance: She is well-developed. She is ill-appearing. She is not diaphoretic. HENT:      Head: Normocephalic and atraumatic. Right Ear: Hearing normal.      Left Ear: Hearing normal.      Nose: Nose normal. No rhinorrhea. Eyes:      General: Lids are normal.      Extraocular Movements:      Right eye: Normal extraocular motion. Left eye: Normal extraocular motion. Conjunctiva/sclera: Conjunctivae normal.      Right eye: Right conjunctiva is not injected. Left eye: Left conjunctiva is not injected. Pupils: Pupils are equal, round, and reactive to light. Pupils are equal.      Right eye: Pupil is reactive. Left eye: Pupil is reactive. Neck:      Thyroid: No thyromegaly. Vascular: No carotid bruit. Trachea: Trachea and phonation normal. No tracheal deviation. Cardiovascular:      Rate and Rhythm: Normal rate and regular rhythm. Pulses: Normal pulses. Heart sounds: Normal heart sounds. No murmur heard. Pulmonary:      Effort: Pulmonary effort is normal. No respiratory distress. Breath sounds: Normal breath sounds. No stridor. No decreased breath sounds. Abdominal:      General: Bowel sounds are normal. There is no distension.       Palpations: Abdomen is soft. There is no mass. Tenderness: There is generalized abdominal tenderness. There is right CVA tenderness. There is no guarding. Musculoskeletal:         General: No tenderness. Cervical back: Neck supple. Skin:     General: Skin is warm and dry. Findings: No erythema, lesion or rash. Neurological:      Mental Status: She is alert and oriented to person, place, and time. She is not disoriented. Cranial Nerves: No cranial nerve deficit. Psychiatric:         Speech: Speech normal.         Behavior: Behavior normal. Behavior is cooperative. DIAGNOSTICS          Labs:  CBC:   Recent Labs     05/16/22 2310   WBC 13.9*   HGB 16.9*        BMP:    Recent Labs     05/16/22 2310      K 4.6      CO2 25   BUN 13   CREATININE 0.53   GLUCOSE 166*     S. Calcium:  Recent Labs     05/16/22 2310   CALCIUM 10.2     S. Ionized Calcium:No results for input(s): IONCA in the last 72 hours. S. Magnesium:  Recent Labs     05/16/22 2310   MG 2.0     S. Phosphorus:No results for input(s): PHOS in the last 72 hours. S. Glucose:No results for input(s): POCGLU in the last 72 hours. Glycosylated hemoglobin A1C:   Lab Results   Component Value Date    LABA1C 9.7 01/14/2022     Hepatic:   Recent Labs     05/16/22 2310   AST 49*   *   ALKPHOS 154*     CARDIAC ENZY: No results for input(s): CKTOTAL, CKMB, CKMBINDEX, TROPHS, MYOGLOBIN in the last 72 hours. INR: No results for input(s): INR in the last 72 hours. BNP: No results for input(s): PROBNP in the last 72 hours. ABGs: No results for input(s): PH, PCO2, PO2, HCO3, O2SAT in the last 72 hours. Lipids: No results for input(s): CHOL, TRIG, HDL, LDL, LDLCALC in the last 72 hours. Pancreatic functions:  Recent Labs     05/16/22 2310   LIPASE 11*     S. LacticAcid: No results for input(s): LACTA in the last 72 hours.   Thyroid functions:   Lab Results   Component Value Date    TSH 6.99 01/14/2022      U/A:  Recent Labs 05/16/22  2316   COLORU Yellow   WBCUA 21 TO 50   RBCUA 3 to 5   MUCUS 2+*   BACTERIA MANY*   SPECGRAV 1.038*   LEUKOCYTESUR SMALL*   GLUCOSEU LARGE*     No results for input(s): COVID19 in the last 72 hours. Imaging/Diagonstics:     CT ABDOMEN PELVIS W IV CONTRAST Additional Contrast? None    Result Date: 5/17/2022  EXAMINATION: CT OF THE ABDOMEN AND PELVIS WITH CONTRAST 5/17/2022 1:38 am TECHNIQUE: CT of the abdomen and pelvis was performed with the administration of intravenous contrast. Multiplanar reformatted images are provided for review. Automated exposure control, iterative reconstruction, and/or weight based adjustment of the mA/kV was utilized to reduce the radiation dose to as low as reasonably achievable. COMPARISON: 10/06/2021 HISTORY: ORDERING SYSTEM PROVIDED HISTORY: abdominal pain TECHNOLOGIST PROVIDED HISTORY: abdominal pain Decision Support Exception - unselect if not a suspected or confirmed emergency medical condition->Emergency Medical Condition (MA) FINDINGS: Lower Chest: Trace right pleural effusion. Organs: Benign appearing hypoattenuating liver lesion in the left hepatic lobe remains in the area of previously seen more diffuse fatty infiltration on the most recent exam.  Cholecystectomy the pancreas, spleen, adrenals and kidneys reveal no acute findings. GI/Bowel: There is no bowel dilatation or wall thickening identified. Diverticulosis. Normal appendix. Pelvis: Relative enlargement of the uterine cavity is noted, abnormal for age. Peritoneum/Retroperitoneum: No free air or free fluid. The aorta is normal in caliber. The visceral branches are patent. Calcified atheromatous plaque is present. No lymphadenopathy. Bones/Soft Tissues: No acute findings. 1.  No acute inflammatory process identified in the abdomen or pelvis. 2. Enlargement of the uterine cavity is again noted, abnormal for age. This may represent endometrial hypertrophy or mass.  Follow-up with pelvic ultrasound is recommended when clinically appropriate. 3.  Trace right pleural effusion. 4.  Diverticulosis. ASSESSMENT  and  PLAN     Principal Problem:    Transaminasemia  Resolved Problems:    * No resolved hospital problems. *        Plan:    Transaminasemia  -Check hepatitis panel   -Had hepatitis as a child   -Continue to trend liver profile      Pyelonephritis- suspected   -CT Abdomen/Pelvis - No acute intra-abdominal process  -Cipro 400mg  IV administered in ED  --Continue on admission  -Pain and nausea control    Intractable nausea Vomiting  - could be symptoms of above, work up pending  -Given the fact she is on farxigia , will check beta hydroxybutyrate,VBG, lactic for euglycemia ketoacidosis. -Check Rapid covid and influenza as she has myalgias as well  - supportive treatment with zofran as able,  - NPO until able to tolerate diet, free from nausea/vomiting. Dehydration  -IV bolus in ER  -IVF for hydration, monitor labs                     IVF: ns @100/hr  Diet: npo until nausea emesis improved  GI ppx: Protonix  DVT ppx: Lovenox  Medication Reconciliation: done  Code status: full  Upcoming Procedure:   Dispo: admit observation     Consultations:     [address], CHARLES, Fred Clements, [facility]   5/17/2022  3:48 AM    [address] Barry Nuñez [address] [place] [address], [address] [place] [address]. Phone [phone]     Attending Physician Statement  I have discussed the care of Juve Moseley and I have examined the patient myselft and taken ros and hpi , including pertinent history and exam findings,  with the resident. I have reviewed the key elements of all parts of the encounter with the resident. I agree with the assessment, plan and orders as documented by the resident.   63-year-old lady morbidly obese BMI 43 weighs 240 pounds history of diabetes mellitus on now continuous monitoring history of for recurrent renal calculi recent UTI on Farxiga admitted with a right flank pain fever and leukocytosis urine is suggestive of UTI  Patient has acute pyelonephritis underlying immunosuppression from diabetes not mounting a strong response for fever leukocytosis requires inpatient admission for IV antibiotics because of multiple allergies including anaphylaxis  Admit inpatient IV antibiotics IV hydration pending cultures  Thick endometrial lining noted advised for outpatient gynecology for rule out endometrial carcinoma    Electronically signed by Pradeep Dove MD

## 2022-05-17 NOTE — PLAN OF CARE
Problem: Discharge Planning  Goal: Discharge to home or other facility with appropriate resources  Outcome: Adequate for Discharge     Problem: Pain  Goal: Verbalizes/displays adequate comfort level or baseline comfort level  Outcome: Adequate for Discharge     Problem: Discharge Planning  Goal: Discharge to home or other facility with appropriate resources  Outcome: Adequate for Discharge     Problem: Pain  Goal: Verbalizes/displays adequate comfort level or baseline comfort level  Outcome: Adequate for Discharge

## 2022-05-17 NOTE — CARE COORDINATION
CASE MANAGEMENT NOTE:    Admission Date:  5/16/2022 Bc Stanley is a 74 y.o.  female    Admitted for : Abdominal pain, right upper quadrant [R10.11]  Transaminasemia [R74.01]  Urinary tract infection without hematuria, site unspecified [N39.0]  Intractable vomiting with nausea, unspecified vomiting type [R11.2]    Met with:  Patient    PCP:  Roel Pineda MD                                Insurance:    [facility] Medicare    Is patient alert and oriented at time of discussion:  Yes    Current Residence/ Living Arrangements:  independently at home             Current Services PTA:  No    Does patient go to outpatient dialysis: No  If yes, location and chair time:     Is patient agreeable to VNS: No    Freedom of choice provided:  No    List of [address] provided: No    VNS chosen:  Yes    DME:  none    Home Oxygen: Yes    Nebulizer: No    CPAP/BIPAP: No    Supplier: N/A    Potential Assistance Needed: No    SNF needed: NA    Freedom of choice and list provided: No    Pharmacy:         Is patient currently receiving oral anticoagulation therapy? No    Is the Patient an [facility] with Readmission Risk Score greater than 14%? No  If yes, pt needs a follow up appointment made within 7 days. Family Members/Caregivers that pt would like involved in their care:    No    If yes, list name here:      Transportation Provider:  Patient             Discharge Plan:  05/17. [facility] MEDICARE. Pt from home with family. Independent and drives. DME: none. VNS refuses. Anticipate discharge today. //ss                 Electronically signed by: Sisi Hdez RN on 5/17/2022 at 3:11 PM

## 2022-05-17 NOTE — FLOWSHEET NOTE
Patient admitted to [facility] 2089 from ER. Assessment completed. VSS as charted. Patient oriented to room. POC discussed. Call light placed within reach.  remains at bedside.

## 2022-05-17 NOTE — PROGRESS NOTES
I received a call from [address] [place] [address]. Patient states she is feeling much better and requesting to be discharged. She has lost IV access. She can stay and receive oral antibiotics or discharge and advised to return if sick again.  Electronically signed by David Alamo RN on 5/17/2022 at 12:57 PM

## 2022-05-17 NOTE — TELEPHONE ENCOUNTER
1. Uncontrolled type 2 diabetes mellitus with diabetic neuropathy, with long-term current use of insulin (HCC)    - glipiZIDE (GLUCOTROL) 10 MG tablet; Take 1 tablet by mouth 2 times daily  Dispense: 60 tablet; Refill: 3  - Scooter Levy MD, Endocrinology, [place]    Both orders are done.   Please schedule appointment in

## 2022-05-18 LAB
CULTURE: ABNORMAL
SPECIMEN DESCRIPTION: ABNORMAL

## 2022-05-18 NOTE — DISCHARGE SUMMARY
[address] [facility]     DISCHARGE SUMMARY            Date:   5/17/2022  Patient name:  Costa Scott  YOB: 1955  MRN:   [id number]  PCP:    Jhony Lynch MD    Date of admission:  5/16/2022  9:50 PM  Date of discharge:    5/17/2022    DISCHARGE DIAGNOSES       Principal Problem:    Transaminasemia  Active Problems:    Pyelonephritis    Intractable nausea and vomiting    Dehydration  Resolved Problems:    * No resolved hospital problems. *      HOSPITAL COURSE     The patient is a 74 y.o. female, with a history of CAD, DM, kidney stones, htn   who presents with abdominal and flank pain. According to patient,she  started with flank pain about 4 days ago and she thought that it was secondary to kidney stones. Patient was admitted with a right flank pain fever and leukocytosis urine is suggestive of UTI and found to have elevated liver enzymes    Patient has acute pyelonephritis underlying immunosuppression from diabetes not mounting a strong response for fever leukocytosis requires inpatient admission for IV antibiotics because of multiple allergies including anaphylaxis  Admit inpatient IV antibiotics IV hydration pending cultures        Laboratories:   Metabolic panel: WNL outside glucose 166  Cardiac Markers: not done  Lactic acid: not done  Liver profile: Fast 154, , AST 49, bilirubin 0.88  Hematology: WBC 13.9, hemoglobin 16.9 hematocrit 48.6 platelets 405  Coagulation: Not done  Urine-cloudy large glucose, specific gravity 1. 038, positive nitrite small leukocyte Estrace WBC 21-50, many bacteria        Imaging and Diagnostics:   EKG (as documented in ED note):  None          CT ABDOMEN PELVIS W IV CONTRAST Additional Contrast? None     1. No acute inflammatory process identified in the abdomen or pelvis. 2. Enlargement of the uterine cavity is again noted, abnormal for age. This may represent endometrial hypertrophy or mass.  Follow-up with pelvic ultrasound is recommended when clinically appropriate. 3.  Trace right pleural effusion. 4.  Diverticulosis. Consultants:  None        DISCHARGE MEDICATIONS          Medication List        START taking these medications      ciprofloxacin 500 MG tablet  Commonly known as: Cipro  Take 1 tablet by mouth 2 times daily for 7 days            CHANGE how you take these medications      Insulin Degludec 200 UNIT/ML Sopn  Inject 75 Units into the skin nightly  What changed: how much to take            CONTINUE taking these medications      Aspirin Low Dose 81 MG EC tablet  Generic drug: aspirin  take 1 tablet by mouth once daily     atorvastatin 80 MG tablet  Commonly known as: LIPITOR  take 1 tablet by mouth once daily     Dexcom G6 Sensor Misc     DULoxetine 60 MG extended release capsule  Commonly known as: CYMBALTA  take 1 capsule by mouth once daily     furosemide 20 MG tablet  Commonly known as: LASIX  take 1 tablet by mouth once daily     gabapentin 600 MG tablet  Commonly known as: NEURONTIN  take 1 tablet by mouth twice a day     glipiZIDE 10 MG tablet  Commonly known as: GLUCOTROL  take 2 tablets by mouth twice a day     insulin lispro (1 Unit Dial) 100 UNIT/ML Sopn  Inject 10 Units into the skin 3 times daily as needed for High Blood Sugar     * levothyroxine 75 MCG tablet  Commonly known as: SYNTHROID     * levothyroxine 175 MCG tablet  Commonly known as: SYNTHROID  Take 1 tablet by mouth once a week Indications: Sundays Take 150 mcg by mouth once a week Indications: Sundays     * lidocaine 5 %  Commonly known as: LIDODERM  Place 1 patch onto the skin daily 12 hours on, 12 hours off.      * lidocaine 4 % cream  Commonly known as: LMX  Apply topically every 8 hrs as needed for pain     lisinopril 2.5 MG tablet  Commonly known as: PRINIVIL;ZESTRIL  take 1 tablet by mouth once daily     magnesium citrate solution  Take 296 mLs by mouth once for 1 dose     oxybutynin 15 MG extended release tablet  Commonly known as: DITROPAN XL  Take 1 tablet by mouth daily     potassium chloride 10 MEQ extended release tablet  Commonly known as: KLOR-CON M  take 1 tablet by mouth once daily     RA Pen Needles 31G X 8 MM Misc  Generic drug: Insulin Pen Needle           * This list has 4 medication(s) that are the same as other medications prescribed for you. Read the directions carefully, and ask your doctor or other care provider to review them with you. STOP taking these medications      dapagliflozin 10 MG tablet  Commonly known as: Yashira Weiner               Where to Get Your Medications        These medications were sent to [place] [id number] Dona Kayser, [address]  [address] [place] [address]      Phone: [phone]   ciprofloxacin 500 MG tablet         DISPOSITION AND FOLLOW-UP     Disposition: home    Condition: Stable     Diet:  Regular diet, diabetic      Activity: As tolerated     Follow-up:   with Ras Colby MD,    Thick endometrial lining noted advised for outpatient gynecology for rule out endometrial carcinoma    Urine culture and sensitivity pending- on [address] [facility], DNP, AGACNP, FNP-BC   5/17/2022  8:04 PM    Jonathan Nuñez [address] [place] [address]. Phone [phone]   Attending Physician Statement  I have discussed the care of Keesha Moseley and I have examined the patient myselft and taken ros and hpi , including pertinent history and exam findings,  with the resident. I have reviewed the key elements of all parts of the encounter with the resident. I agree with the assessment, plan and orders as documented by the resident. I spent over 35 mins in direct patient care as above and reviewing medications and counseling for discharge .         Electronically signed by Richar Cadleron MD

## 2022-05-23 ENCOUNTER — TELEPHONE (OUTPATIENT)
Dept: FAMILY MEDICINE CLINIC | Age: 67
End: 2022-05-23

## 2022-05-23 NOTE — TELEPHONE ENCOUNTER
1. Uncontrolled type 2 diabetes mellitus with diabetic neuropathy, with long-term current use of insulin (HCC)    - Magda Dwyer MD, Endocrinology, [facility]  - dapagliflozin (FARXIGA) 10 MG tablet; Take 1 tablet by mouth every morning  Dispense: 30 tablet; Refill: 5    I sent medication to pharmacy, please inform patient to keep appointment on 31st May.

## 2022-05-23 NOTE — TELEPHONE ENCOUNTER
[facility] Transitions Initial Follow Up Call    Outreach made within 2 business days of discharge: No    Patient: Cornelius Im Patient : 1955   MRN: [id number]  Reason for Admission: There are no discharge diagnoses documented for the most recent discharge. Discharge Date: 22       Spoke with: Lashawn France    Discharge department/facility: [facility] Interactive Patient Contact:  Was patient able to fill all prescriptions: Yes  Was patient instructed to bring all medications to the follow-up visit: Yes  Is patient taking all medications as directed in the discharge summary?  Yes  Does patient understand their discharge instructions: Yes  Does patient have questions or concerns that need addressed prior to 7-14 day follow up office visit: no    Scheduled appointment with PCP within 7-14 days    Follow Up  Future Appointments   Date Time Provider Aleksandra Wisdom   2022  2:15 PM Dany Byrne MD [facility] [id number]

## 2022-05-23 NOTE — TELEPHONE ENCOUNTER
Patient called and I did schdule her hospital follow up. She did also request if medication farxiga 10 mg once daily can also be called into pharmacy.  office was managing medication but she is no longer seeing him. Please advise.

## 2022-05-23 NOTE — TELEPHONE ENCOUNTER
[facility]) ED Follow up Call    Reason for ED visit:     Redd Jaquelin , this is Samantha Malone from Dr. Gaye Hart office, just calling to see how you are doing after your recent ED visit. Did you receive discharge instructions? Yes  Do you understand the discharge instructions? Yes  Did the ED give you any new prescriptions? Yes  Were you able to fill your prescriptions? Yes, cipro antibiotic      Do you have one of our red, yellow and green  Zone sheets that help you to determine when you should go to the ED? Yes      Do you need or want to make a follow up appt with your PCP? Yes    Do you have any further needs in the home i.e. Equipment?   No        FU appts/Provider:    Future Appointments   Date Time Provider Aleksandra Wisdom   5/31/2022  2:15 PM Dee Dee Avery MD fp [id number]

## 2022-05-24 NOTE — TELEPHONE ENCOUNTER
Patient needs to come to the office to discuss about the diabetes and change in medications. She is on highest dose of Farxiga we cannot increase that.

## 2022-05-24 NOTE — TELEPHONE ENCOUNTER
PATIENT DID RETURN CALL STATES SHE WILL DISCUSS THIS WITH YOU AT HER NEXT APPOINTMENT 05/31/22 STATES SHE IS COMPLETELY OUT OF THE MEDICATION AT THIS TIME

## 2022-05-24 NOTE — TELEPHONE ENCOUNTER
Pt called and I did give her the information to call amd schedule her initial appt with new endo specialty. She will like to know if she can actually increase her farxiga to twice a day instead? Please advise.

## 2022-05-31 ENCOUNTER — OFFICE VISIT (OUTPATIENT)
Dept: FAMILY MEDICINE CLINIC | Age: 67
End: 2022-05-31
Payer: MEDICARE

## 2022-05-31 VITALS
TEMPERATURE: 98.6 F | WEIGHT: 250 LBS | SYSTOLIC BLOOD PRESSURE: 130 MMHG | HEIGHT: 62 IN | DIASTOLIC BLOOD PRESSURE: 74 MMHG | BODY MASS INDEX: 46.01 KG/M2 | HEART RATE: 68 BPM

## 2022-05-31 DIAGNOSIS — I10 ESSENTIAL HYPERTENSION: ICD-10-CM

## 2022-05-31 DIAGNOSIS — E03.9 ACQUIRED HYPOTHYROIDISM: ICD-10-CM

## 2022-05-31 DIAGNOSIS — E78.2 MIXED HYPERLIPIDEMIA: ICD-10-CM

## 2022-05-31 DIAGNOSIS — R74.01 TRANSAMINASEMIA: ICD-10-CM

## 2022-05-31 DIAGNOSIS — M47.27 LUMBOSACRAL RADICULOPATHY DUE TO DEGENERATIVE JOINT DISEASE OF SPINE: ICD-10-CM

## 2022-05-31 DIAGNOSIS — I50.32 CHRONIC DIASTOLIC CONGESTIVE HEART FAILURE (HCC): ICD-10-CM

## 2022-05-31 DIAGNOSIS — B37.2 CANDIDIASIS, INTERTRIGINOUS: ICD-10-CM

## 2022-05-31 DIAGNOSIS — N12 PYELONEPHRITIS: Primary | ICD-10-CM

## 2022-05-31 DIAGNOSIS — E11.65 UNCONTROLLED TYPE 2 DIABETES MELLITUS WITH HYPERGLYCEMIA (HCC): ICD-10-CM

## 2022-05-31 DIAGNOSIS — Z09 HOSPITAL DISCHARGE FOLLOW-UP: ICD-10-CM

## 2022-05-31 LAB — HBA1C MFR BLD: 8 %

## 2022-05-31 PROCEDURE — G8417 CALC BMI ABV UP PARAM F/U: HCPCS | Performed by: FAMILY MEDICINE

## 2022-05-31 PROCEDURE — 1090F PRES/ABSN URINE INCON ASSESS: CPT | Performed by: FAMILY MEDICINE

## 2022-05-31 PROCEDURE — 3017F COLORECTAL CA SCREEN DOC REV: CPT | Performed by: FAMILY MEDICINE

## 2022-05-31 PROCEDURE — 3052F HG A1C>EQUAL 8.0%<EQUAL 9.0%: CPT | Performed by: FAMILY MEDICINE

## 2022-05-31 PROCEDURE — 2022F DILAT RTA XM EVC RTNOPTHY: CPT | Performed by: FAMILY MEDICINE

## 2022-05-31 PROCEDURE — 83036 HEMOGLOBIN GLYCOSYLATED A1C: CPT | Performed by: FAMILY MEDICINE

## 2022-05-31 PROCEDURE — G8427 DOCREV CUR MEDS BY ELIG CLIN: HCPCS | Performed by: FAMILY MEDICINE

## 2022-05-31 PROCEDURE — 1123F ACP DISCUSS/DSCN MKR DOCD: CPT | Performed by: FAMILY MEDICINE

## 2022-05-31 PROCEDURE — 1036F TOBACCO NON-USER: CPT | Performed by: FAMILY MEDICINE

## 2022-05-31 PROCEDURE — 99215 OFFICE O/P EST HI 40 MIN: CPT | Performed by: FAMILY MEDICINE

## 2022-05-31 PROCEDURE — G8400 PT W/DXA NO RESULTS DOC: HCPCS | Performed by: FAMILY MEDICINE

## 2022-05-31 PROCEDURE — 1111F DSCHRG MED/CURRENT MED MERGE: CPT | Performed by: FAMILY MEDICINE

## 2022-05-31 RX ORDER — MUPIROCIN CALCIUM 20 MG/G
CREAM TOPICAL
Qty: 1 EACH | Refills: 1 | Status: SHIPPED | OUTPATIENT
Start: 2022-05-31 | End: 2022-06-30

## 2022-05-31 RX ORDER — TIZANIDINE 4 MG/1
4 TABLET ORAL NIGHTLY
Qty: 30 TABLET | Refills: 0 | Status: SHIPPED | OUTPATIENT
Start: 2022-05-31

## 2022-05-31 RX ORDER — KETOCONAZOLE 20 MG/G
CREAM TOPICAL
Qty: 1 EACH | Refills: 1 | Status: SHIPPED | OUTPATIENT
Start: 2022-05-31

## 2022-05-31 RX ORDER — ATORVASTATIN CALCIUM 80 MG/1
TABLET, FILM COATED ORAL
Qty: 30 TABLET | Refills: 3 | Status: SHIPPED | OUTPATIENT
Start: 2022-05-31 | End: 2022-09-26

## 2022-05-31 RX ORDER — LIDOCAINE 40 MG/G
CREAM TOPICAL
Qty: 45 G | Refills: 3 | Status: SHIPPED | OUTPATIENT
Start: 2022-05-31 | End: 2022-08-30 | Stop reason: ALTCHOICE

## 2022-05-31 ASSESSMENT — ENCOUNTER SYMPTOMS
COLOR CHANGE: 1
ABDOMINAL DISTENTION: 0
COUGH: 0
DIARRHEA: 0
NAUSEA: 0
RECTAL PAIN: 0
TROUBLE SWALLOWING: 0
WHEEZING: 0
ABDOMINAL PAIN: 0
STRIDOR: 0
EYE REDNESS: 0
RHINORRHEA: 0
SHORTNESS OF BREATH: 0
CONSTIPATION: 0
CHEST TIGHTNESS: 0
SINUS PRESSURE: 0
BLOOD IN STOOL: 0
BACK PAIN: 1
SORE THROAT: 0

## 2022-05-31 NOTE — PROGRESS NOTES
Visit Information    Have you changed or started any medications since your last visit including any over-the-counter medicines, vitamins, or herbal medicines? no   Are you having any side effects from any of your medications? -  no  Have you stopped taking any of your medications? Is so, why? -  no    Have you seen any other physician or provider since your last visit? No  Have you had any other diagnostic tests since your last visit? Yes   Have you been seen in the emergency room and/or had an admission to a hospital since we last saw you? Yes   Have you had your routine dental cleaning in the past 6 months? no    Have you activated your [employer] account? If not, what are your barriers?  Yes     Patient Care Team:  Alejandra Miller MD as PCP - General (Family Medicine)  Alejandra Miller MD as PCP - [facility]  Coby Carl DO as Consulting Physician (Obstetrics & Gynecology)  Kiesha David MD as Consulting Physician (Endocrinology)  Levon Olivera MD as Consulting Physician (Gastroenterology)    Medical History Review  Past Medical, Family, and Social History reviewed and does contribute to the patient presenting condition    Health Maintenance   Topic Date Due    Annual Wellness Visit (AWV)  Never done    Shingles vaccine (1 of 2) Never done    DEXA (modify frequency per FRAX score)  Never done    Pneumococcal 65+ years Vaccine (2 - PCV) 09/18/2013    Diabetic retinal exam  03/07/2017    Diabetic foot exam  07/07/2018    DTaP/Tdap/Td vaccine (2 - Td or Tdap) 09/18/2019    Breast cancer screen  03/23/2020    A1C test (Diabetic or Prediabetic)  04/14/2022    Colorectal Cancer Screen  04/26/2022    Flu vaccine (Season Ended) 09/01/2022    Depression Monitoring  01/11/2023    Diabetic microalbuminuria test  01/14/2023    Lipids  01/14/2023    Cervical cancer screen  09/16/2025    COVID-19 Vaccine  Completed    Hepatitis C screen  Completed    HIV screen  Addressed    Hepatitis A vaccine  Aged Out    Hib vaccine  Aged Out    Meningococcal (ACWY) vaccine  Aged Out

## 2022-05-31 NOTE — PROGRESS NOTES
Post-Discharge Transitional Care  Follow Up      Jay Britt   YOB: 1955    Date of Office Visit:  5/31/2022  Date of Hospital Admission: 5/16/22  Date of Hospital Discharge: 5/17/22  Risk of hospital readmission (high >=14%. Medium >=10%) :No data recorded    Care management risk score Rising risk (score 2-5) and Complex Care (Scores >=6): 6     Non face to face  following discharge, date last encounter closed (first attempt may have been earlier): 5/23/2022 12:41 PM    Call initiated 2 business days of discharge: No    ASSESSMENT/PLAN:   Pyelonephritis  Done with antibiotics, continue to monitor discussed about hygiene. -     KS DISCHARGE MEDS RECONCILED W/ CURRENT OUTPATIENT MED LIST  Uncontrolled type 2 diabetes mellitus with hyperglycemia ([facility] [address]  -   Improved, continue Tresiba, short acting insulin we will restart on Farxiga after repeating LFT. POCT glycosylated hemoglobin (Hb A1C)  -     [facility] tulio, KVNG, KHADIJAH, Chelsie Manley[address] W/ CURRENT OUTPATIENT MED LIST  Transaminasemia  Likely due to pyelonephritis repeat LFT  -     Hepatic Function Panel; Future  -     KS DISCHARGE MEDS RECONCILED W/ CURRENT OUTPATIENT MED LIST  Mixed hyperlipidemia  Continue statins  Acquired hypothyroidism  Recheck TSH continue Synthroid  -     TSH; Future  -     KS DISCHARGE MEDS RECONCILED W/ CURRENT OUTPATIENT MED LIST  Essential hypertension  -     KS DISCHARGE MEDS RECONCILED W/ CURRENT OUTPATIENT MED LIST  Lumbosacral radiculopathy due to degenerative joint disease of spine  Chronic diastolic congestive heart failure (HCC)  -     KS DISCHARGE MEDS RECONCILED W/ CURRENT OUTPATIENT MED LIST  Candidiasis, intertriginous  -     ketoconazole (NIZORAL) 2 % cream; Apply topically  2 times a day., Disp-1 each, R-1, Normal  -     mupirocin (BACTROBAN) 2 % cream; Apply 3 times daily. , Disp-1 each, R-1, Normal  Hospital discharge follow-up  -     KS DISCHARGE MEDS RECONCILED W/ CURRENT OUTPATIENT MED LIST      Medical Decision Making: high complexity  Return in about 3 months (around 8/31/2022) for dm ,htn, hld. On this date 5/31/2022 I have spent 45 minutes reviewing previous notes, test results and face to face with the patient discussing the diagnosis and importance of compliance with the treatment plan as well as documenting on the day of the visit. Subjective:   HPI:  Follow up of Hospital problems/diagnosis(es):     Patient has history of diabetes, Uncontrolled was admitted recently 2 weeks ago with pyelonephritis. Discharge summary below patient was admitted for IV antibiotics. Patient had elevated liver enzymes from previous which was referred to [facility]. Patient reports she was taking 20 mg of Farxiga to help her sugars, she was supposed to take 1 tablet a day. Patient took that for 1 week. Patient has history of recurrent UTIs due to uncontrolled diabetes. Patient is currently on Tresiba short-acting insulin 18 units 2 times a day, 80 units of [place] and was also on Farxiga. Patient reports [place] was really helping that was discontinued in the hospital.      Patient was also evaluated for hepatitis which was normal.    Diabetes sugars have improved A1c has decreased to 8 from 9.7, patient is noncompliant with appointments was following with endocrinologist.     Hyperlipidemia on statins reports compliance, patient has not done blood work which was ordered multiple times. Patient is complaining of low back pain has lumbar radiculopathy x-ray done in 2018 results in care everywhere, used to follow with [facility] reports she has not seen them for a long time. Currently she takes NSAIDs as needed not on any muscle relaxant. She has done physical therapy in the past.      Congestive heart failure stable on current treatment. Patient is on diuretics beta-blockers statins denies any side effects.       Patient also complains of rash in genital area, reports it is red erythematous associated with itching. Patient reports she has incontinence and also uses pads. Patient did not anything over-the-counter medications. Normal            .  Discharge summary  The patient is a 76 y. o. female, with a history of CAD, DM, kidney stones, htn   who presents with abdominal and flank pain.    According to patient,she  started with flank pain about 4 days ago and she thought that it was secondary to kidney stones. Patient was admitted with a right flank pain fever and leukocytosis urine is suggestive of UTI and found to have elevated liver enzymes     Patient has acute pyelonephritis underlying immunosuppression from diabetes not mounting a strong response for fever leukocytosis requires inpatient admission for IV antibiotics because of multiple allergies including anaphylaxis  Admit inpatient IV antibiotics IV hydration pending cultures      Inpatient course: Discharge summary reviewed- see chart. Review of Systems   Constitutional: Positive for activity change. Negative for appetite change, fatigue, fever and unexpected weight change. HENT: Negative for congestion, ear pain, postnasal drip, rhinorrhea, sinus pressure, sore throat and trouble swallowing. Eyes: Negative for redness and visual disturbance. Respiratory: Negative for cough, chest tightness, shortness of breath, wheezing and stridor. Cardiovascular: Negative for chest pain, palpitations and leg swelling. Gastrointestinal: Negative for abdominal distention, abdominal pain, blood in stool, constipation, diarrhea, nausea and rectal pain. Endocrine: Negative for polyphagia and polyuria. Genitourinary: Negative for difficulty urinating, flank pain, frequency, urgency and vaginal pain. Musculoskeletal: Positive for arthralgias, back pain and gait problem. Negative for myalgias and neck pain. Skin: Positive for color change and rash. Negative for wound.    Allergic/Immunologic: Negative for food allergies and immunocompromised state. Neurological: Positive for numbness. Negative for dizziness, speech difficulty, weakness, light-headedness and headaches. Psychiatric/Behavioral: Negative for agitation, behavioral problems, decreased concentration, dysphoric mood, hallucinations, sleep disturbance and suicidal ideas. The patient is nervous/anxious. Physical Exam  Vitals and nursing note reviewed. Constitutional:       General: She is not in acute distress. Appearance: Normal appearance. She is well-developed. She is obese. She is not diaphoretic. HENT:      Head: Normocephalic and atraumatic. Nose: Nose normal.   Eyes:      General:         Right eye: No discharge. Left eye: No discharge. Extraocular Movements: Extraocular movements intact. Conjunctiva/sclera: Conjunctivae normal.      Pupils: Pupils are equal, round, and reactive to light. Neck:      Thyroid: No thyromegaly. Cardiovascular:      Rate and Rhythm: Normal rate and regular rhythm. Heart sounds: Normal heart sounds. No murmur heard. Pulmonary:      Effort: Pulmonary effort is normal. No respiratory distress. Breath sounds: Normal breath sounds. No wheezing or rhonchi. Abdominal:      General: Bowel sounds are normal. There is no distension. Palpations: Abdomen is soft. There is no mass. Tenderness: There is no abdominal tenderness. There is left CVA tenderness. There is no guarding or rebound. Musculoskeletal:      Cervical back: Normal range of motion and neck supple. Spasms present. No rigidity. Normal range of motion. Thoracic back: Spasms present. Decreased range of motion. Lumbar back: Spasms and tenderness present. No bony tenderness. Decreased range of motion. Negative right straight leg raise test and negative left straight leg raise test.   Lymphadenopathy:      Cervical: No cervical adenopathy. Skin:     Coloration: Skin is not jaundiced or pale. Findings: No bruising, erythema or rash. Neurological:      General: No focal deficit present. Mental Status: She is alert and oriented to person, place, and time. Cranial Nerves: No cranial nerve deficit. Sensory: Sensory deficit present. Motor: No weakness or tremor. Coordination: Romberg sign negative. Coordination normal.      Gait: Gait and tandem walk normal.      Deep Tendon Reflexes: Reflexes are normal and symmetric. Psychiatric:         Attention and Perception: Attention and perception normal. She is attentive. Mood and Affect: Mood is anxious. Mood is not depressed. Affect is not tearful or inappropriate. Speech: She is communicative. Speech is not rapid and pressured, delayed or slurred. Behavior: Behavior normal. Behavior is not agitated or slowed. Thought Content: Thought content normal.         Cognition and Memory: Cognition is not impaired.          Judgment: Judgment normal.         Interval history/Current status: Improving at her baseline    Patient Active Problem List   Diagnosis    Chronic neck pain    Abnormal mammogram    Diabetes type 2, uncontrolled ([facility] [address]    DM neuropathy takes Percocet    Hypothyroidism    Depression    MVP (mitral valve prolapse)    Constipation    Chronic prescription benzodiazepine use    Chronic use of opiate drugs therapeutic purposes    Morbid obesity with BMI of 45.0-49.9, adult (HCC)    Thickened endometrium    Mediastinal cyst    Uncontrolled type 2 diabetes mellitus with diabetic neuropathy, with long-term current use of insulin ([facility])    Essential hypertension    Chronic pain of both knees    Nephrolithiasis    Ureterolithiasis    Mixed hyperlipidemia    Coronary artery disease involving native coronary artery of native heart without angina pectoris    Generalized anxiety disorder    Migraine without aura, not intractable    Mixed dyslipidemia    Seizure disorder ([address]    Arthritis involving multiple sites    Arthritis of right hand    Gastroesophageal reflux disease without esophagitis    Insomnia    Chronic diastolic congestive heart failure (HCC)    Major depressive disorder with single episode, in partial remission ([facility] [address]    Pancreatic cyst    Family history of uterine cancer    Polyp of ascending colon    Adenomatous polyp of colon    Duodenal diverticulum    Lumbosacral radiculopathy due to degenerative joint disease of spine    Transaminasemia    Pyelonephritis    Intractable nausea and vomiting    Dehydration       Medications listed as ordered at the time of discharge from hospital     Medication List          Accurate as of May 31, 2022  7:13 PM. If you have any questions, ask your nurse or doctor. START taking these medications    ketoconazole 2 % cream  Commonly known as: NIZORAL  Apply topically  2 times a day. Started by: Paola Page MD     mupirocin 2 % cream  Commonly known as: Bactroban  Apply 3 times daily. Started by: Paola Page MD     tiZANidine 4 MG tablet  Commonly known as: Zanaflex  Take 1 tablet by mouth at bedtime  Started by: Paola Page MD        CHANGE how you take these medications    Insulin Degludec 200 UNIT/ML Sopn  Inject 75 Units into the skin nightly  What changed: how much to take     * lidocaine 5 %  Commonly known as: LIDODERM  Place 1 patch onto the skin daily 12 hours on, 12 hours off. What changed: Another medication with the same name was added. Make sure you understand how and when to take each. Changed by: Paola Page MD     * lidocaine 4 % cream  Commonly known as: LMX  Apply topically every 8 hrs as needed for pain  What changed: Another medication with the same name was added. Make sure you understand how and when to take each. Changed by: Paola Page MD     * lidocaine 4 % cream  Commonly known as: LMX  Apply topically every 8 hrs as needed for pain  What changed:  You were already taking a medication with the same name, and this prescription was added. Make sure you understand how and when to take each. Changed by: Jewel Suresh MD         * This list has 3 medication(s) that are the same as other medications prescribed for you. Read the directions carefully, and ask your doctor or other care provider to review them with you.             CONTINUE taking these medications    Aspirin Low Dose 81 MG EC tablet  Generic drug: aspirin  take 1 tablet by mouth once daily     atorvastatin 80 MG tablet  Commonly known as: LIPITOR  TAKE ONE TABLET BY MOUTH DAILY     dapagliflozin 10 MG tablet  Commonly known as: Farxiga  Take 1 tablet by mouth every morning     Dexcom G6 Sensor Misc     DULoxetine 60 MG extended release capsule  Commonly known as: CYMBALTA  take 1 capsule by mouth once daily     furosemide 20 MG tablet  Commonly known as: LASIX  take 1 tablet by mouth once daily     gabapentin 600 MG tablet  Commonly known as: NEURONTIN  take 1 tablet by mouth twice a day     glipiZIDE 10 MG tablet  Commonly known as: GLUCOTROL  take 2 tablets by mouth twice a day     insulin lispro (1 Unit Dial) 100 UNIT/ML Sopn  Inject 10 Units into the skin 3 times daily as needed for High Blood Sugar     * levothyroxine 75 MCG tablet  Commonly known as: SYNTHROID     * levothyroxine 175 MCG tablet  Commonly known as: SYNTHROID  Take 1 tablet by mouth once a week Indications: Sundays Take 150 mcg by mouth once a week Indications: Sundays     lisinopril 2.5 MG tablet  Commonly known as: PRINIVIL;ZESTRIL  take 1 tablet by mouth once daily     magnesium citrate solution  Take 296 mLs by mouth once for 1 dose     oxybutynin 15 MG extended release tablet  Commonly known as: DITROPAN XL  Take 1 tablet by mouth daily     potassium chloride 10 MEQ extended release tablet  Commonly known as: KLOR-CON M  take 1 tablet by mouth once daily     RA Pen Needles 31G X 8 MM Misc  Generic drug: Insulin Pen Needle         * This list has 2 medication(s) that are the same as other medications prescribed for you. Read the directions carefully, and ask your doctor or other care provider to review them with you. Where to Get Your Medications      These medications were sent to [facility] [id number], [address] [place] [address]    Phone: [phone]   · ketoconazole 2 % cream  · lidocaine 4 % cream  · mupirocin 2 % cream  · tiZANidine 4 MG tablet           Medications marked \"taking\" at this time  Outpatient Medications Marked as Taking for the 5/31/22 encounter (Office Visit) with Gabe Boxer, MD   Medication Sig Dispense Refill    atorvastatin (LIPITOR) 80 MG tablet TAKE ONE TABLET BY MOUTH DAILY 30 tablet 3    ketoconazole (NIZORAL) 2 % cream Apply topically  2 times a day. 1 each 1    mupirocin (BACTROBAN) 2 % cream Apply 3 times daily.  1 each 1    tiZANidine (ZANAFLEX) 4 MG tablet Take 1 tablet by mouth at bedtime 30 tablet 0    lidocaine (LMX) 4 % cream Apply topically every 8 hrs as needed for pain 45 g 3    gabapentin (NEURONTIN) 600 MG tablet take 1 tablet by mouth twice a day 60 tablet 0    oxybutynin (DITROPAN XL) 15 MG extended release tablet Take 1 tablet by mouth daily 90 tablet 3    DULoxetine (CYMBALTA) 60 MG extended release capsule take 1 capsule by mouth once daily 90 capsule 3    lisinopril (PRINIVIL;ZESTRIL) 2.5 MG tablet take 1 tablet by mouth once daily 90 tablet 1    potassium chloride (KLOR-CON M) 10 MEQ extended release tablet take 1 tablet by mouth once daily 90 tablet 3    levothyroxine (SYNTHROID) 175 MCG tablet Take 1 tablet by mouth once a week Indications: Sundays Take 150 mcg by mouth once a week Indications: Sundays 180 tablet 1    Continuous Blood Gluc Sensor (DEXCOM G6 SENSOR) MISC       Insulin Degludec 200 UNIT/ML SOPN Inject 75 Units into the skin nightly (Patient taking differently: Inject 80 Units into the skin nightly ) 5 pen 2    insulin lispro, 1 Unit Dial, 100 UNIT/ML SOPN Inject 10 Units into the skin 3 times daily as needed for High Blood Sugar 10 mL 1    ASPIRIN LOW DOSE 81 MG EC tablet take 1 tablet by mouth once daily 90 tablet 1    levothyroxine (SYNTHROID) 75 MCG tablet Take 75 mcg by mouth Six times weekly Indications: All days except Sundays      lidocaine (LIDODERM) 5 % Place 1 patch onto the skin daily 12 hours on, 12 hours off. 10 patch 0    furosemide (LASIX) 20 MG tablet take 1 tablet by mouth once daily 90 tablet 3    glipiZIDE (GLUCOTROL) 10 MG tablet take 2 tablets by mouth twice a day 180 tablet 2    RA PEN NEEDLES 31G X 8 MM MISC INJECTING TWICE DAILY DX: E11.65          Medications patient taking as of now reconciled against medications ordered at time of hospital discharge: Yes    Done    Objective:    /74   Pulse 68   Temp 98.6 °F (37 °C)   Ht 5' 2\" (1.575 m)   Wt 250 lb (113.4 kg)   LMP 01/12/2015 (Approximate)   BMI 45.73 kg/m²         An electronic signature was used to authenticate this note.   --Liban Valerio MD

## 2022-06-03 RX ORDER — GABAPENTIN 600 MG/1
TABLET ORAL
Qty: 60 TABLET | Refills: 0 | Status: SHIPPED | OUTPATIENT
Start: 2022-06-03 | End: 2022-07-01

## 2022-06-16 ENCOUNTER — TELEPHONE (OUTPATIENT)
Dept: FAMILY MEDICINE CLINIC | Age: 67
End: 2022-06-16

## 2022-06-16 PROBLEM — E86.0 DEHYDRATION: Status: RESOLVED | Noted: 2022-05-17 | Resolved: 2022-06-16

## 2022-06-16 NOTE — TELEPHONE ENCOUNTER
Patient called stated that she is having some \"blood posining in toes\" stated that her toes are numb may be related to neuropathy. Also she is having some raw spots on her 2 big toes. Also some bite like marks on them. Red and swollen. She is unsure of these symptoms and what to do,      I did advise her to submit an evisit, since we have no available appts today. Also I did tell her step by step how to submit an evisit, also I did help her reset her pw. She was going to attempt to try and submit an e-visit,. Please advise.

## 2022-06-17 ENCOUNTER — HOSPITAL ENCOUNTER (OUTPATIENT)
Age: 67
Discharge: HOME | End: 2022-06-17
Payer: MEDICARE

## 2022-06-17 DIAGNOSIS — E03.9 ACQUIRED HYPOTHYROIDISM: ICD-10-CM

## 2022-06-17 DIAGNOSIS — R74.01 TRANSAMINASEMIA: ICD-10-CM

## 2022-06-17 LAB
ALBUMIN SERPL-MCNC: 3.4 G/DL (ref 3.5–5.2)
ALP BLD-CCNC: 147 U/L (ref 35–104)
ALT SERPL-CCNC: 78 U/L (ref 5–33)
AST SERPL-CCNC: 36 U/L
BILIRUB SERPL-MCNC: 0.74 MG/DL (ref 0.3–1.2)
BILIRUBIN DIRECT: 0.16 MG/DL
BILIRUBIN, INDIRECT: 0.58 MG/DL (ref 0–1)
TOTAL PROTEIN: 7.1 G/DL (ref 6.4–8.3)
TSH SERPL DL<=0.05 MIU/L-ACNC: 0.1 UIU/ML (ref 0.3–5)

## 2022-06-17 PROCEDURE — 80076 HEPATIC FUNCTION PANEL: CPT

## 2022-06-17 PROCEDURE — 36415 COLL VENOUS BLD VENIPUNCTURE: CPT

## 2022-06-17 PROCEDURE — 84443 ASSAY THYROID STIM HORMONE: CPT

## 2022-06-20 DIAGNOSIS — E03.9 ACQUIRED HYPOTHYROIDISM: ICD-10-CM

## 2022-06-20 RX ORDER — LEVOTHYROXINE SODIUM 0.15 MG/1
150 TABLET ORAL WEEKLY
Qty: 60 TABLET | Refills: 1 | Status: SHIPPED | OUTPATIENT
Start: 2022-06-20

## 2022-06-20 NOTE — TELEPHONE ENCOUNTER
Please Approve or Refuse.   Send to Pharmacy per Pt's Request: Arvind Wu     Next Visit Date:  8/30/2022   Last Visit Date: 5/31/2022    Hemoglobin A1C (%)   Date Value   05/31/2022 8.0   01/14/2022 9.7 (H)   03/23/2021 8.9             ( goal A1C is < 7)   BP Readings from Last 3 Encounters:   05/31/22 130/74   05/17/22 (!) 113/94   04/15/22 135/71          (goal 120/80)  BUN   Date Value Ref Range Status   05/16/2022 13 8 - 23 mg/dL Final     CREATININE   Date Value Ref Range Status   05/16/2022 0.53 0.50 - 0.90 mg/dL Final     Potassium   Date Value Ref Range Status   05/16/2022 4.6 3.7 - 5.3 mmol/L Final

## 2022-06-23 ENCOUNTER — TELEPHONE (OUTPATIENT)
Dept: FAMILY MEDICINE CLINIC | Age: 67
End: 2022-06-23

## 2022-06-23 NOTE — TELEPHONE ENCOUNTER
Pt returned call, writer adv of note from providers and adv first available appt and writer also provided the option of doing an E-visit, pt was unsure how to proceed with that. Writer also adv she could go to the walk in clinic so she could have her sugars and bp checked.

## 2022-06-23 NOTE — TELEPHONE ENCOUNTER
Patient called and stated that she is having some onset of headache present for the past 4 days. No other symptoms. Stated that that she just feels sick to her stomach and some nausea ongoing. Her blood sugars have been in the 200's . When asked about her blood pressure readings, she stated that she does not have a bp monitor kit. Please advise.

## 2022-06-24 DIAGNOSIS — E11.65 UNCONTROLLED TYPE 2 DIABETES MELLITUS WITH HYPERGLYCEMIA (HCC): Primary | ICD-10-CM

## 2022-06-24 RX ORDER — INSULIN LISPRO 100 [IU]/ML
18 INJECTION, SOLUTION INTRAVENOUS; SUBCUTANEOUS
Qty: 10 ML | Refills: 0 | Status: SHIPPED | OUTPATIENT
Start: 2022-06-24 | End: 2022-07-07

## 2022-06-24 RX ORDER — SYRINGE-NEEDLE,INSULIN,0.5 ML 30 GX5/16"
SYRINGE, EMPTY DISPOSABLE MISCELLANEOUS
Qty: 100 EACH | Refills: 0 | Status: SHIPPED | OUTPATIENT
Start: 2022-06-24

## 2022-06-24 NOTE — TELEPHONE ENCOUNTER
Please Approve or Refuse.   Send to Pharmacy per Pt's Request:      Next Visit Date:  8/30/2022   Last Visit Date: 5/31/2022    Hemoglobin A1C (%)   Date Value   05/31/2022 8.0   01/14/2022 9.7 (H)   03/23/2021 8.9             ( goal A1C is < 7)   BP Readings from Last 3 Encounters:   05/31/22 130/74   05/17/22 (!) 113/94   04/15/22 135/71          (goal 120/80)  BUN   Date Value Ref Range Status   05/16/2022 13 8 - 23 mg/dL Final     CREATININE   Date Value Ref Range Status   05/16/2022 0.53 0.50 - 0.90 mg/dL Final     Potassium   Date Value Ref Range Status   05/16/2022 4.6 3.7 - 5.3 mmol/L Final

## 2022-06-24 NOTE — TELEPHONE ENCOUNTER
Lab Results   Component Value Date    LABA1C 8.0 05/31/2022    LABA1C 9.7 (H) 01/14/2022    LABA1C 8.9 03/23/2021

## 2022-06-25 DIAGNOSIS — E11.65 UNCONTROLLED TYPE 2 DIABETES MELLITUS WITH HYPERGLYCEMIA (HCC): ICD-10-CM

## 2022-06-28 DIAGNOSIS — N39.41 URGE INCONTINENCE OF URINE: ICD-10-CM

## 2022-06-28 RX ORDER — OXYBUTYNIN CHLORIDE 10 MG/1
TABLET, EXTENDED RELEASE ORAL
Qty: 30 TABLET | Refills: 3 | Status: SHIPPED | OUTPATIENT
Start: 2022-06-28 | End: 2022-08-30 | Stop reason: SDUPTHER

## 2022-07-01 RX ORDER — GABAPENTIN 600 MG/1
TABLET ORAL
Qty: 60 TABLET | Refills: 0 | Status: SHIPPED | OUTPATIENT
Start: 2022-07-01 | End: 2022-08-01

## 2022-07-07 DIAGNOSIS — E11.65 UNCONTROLLED TYPE 2 DIABETES MELLITUS WITH HYPERGLYCEMIA (HCC): ICD-10-CM

## 2022-07-07 RX ORDER — INSULIN LISPRO 100 [IU]/ML
INJECTION, SOLUTION INTRAVENOUS; SUBCUTANEOUS
Qty: 9 ML | Refills: 1 | Status: SHIPPED | OUTPATIENT
Start: 2022-07-07 | End: 2022-08-30 | Stop reason: SDUPTHER

## 2022-07-07 NOTE — TELEPHONE ENCOUNTER
Please Approve or Refuse.   Send to Pharmacy per Pt's Request: [email or web address]     Next Visit Date:  8/30/2022   Last Visit Date: 5/31/2022    Hemoglobin A1C (%)   Date Value   05/31/2022 8.0   01/14/2022 9.7 (H)   03/23/2021 8.9             ( goal A1C is < 7)   BP Readings from Last 3 Encounters:   05/31/22 130/74   05/17/22 (!) 113/94   04/15/22 135/71          (goal 120/80)  BUN   Date Value Ref Range Status   05/16/2022 13 8 - 23 mg/dL Final     CREATININE   Date Value Ref Range Status   05/16/2022 0.53 0.50 - 0.90 mg/dL Final     Potassium   Date Value Ref Range Status   05/16/2022 4.6 3.7 - 5.3 mmol/L Final

## 2022-07-18 DIAGNOSIS — E11.65 UNCONTROLLED TYPE 2 DIABETES MELLITUS WITH HYPERGLYCEMIA (HCC): ICD-10-CM

## 2022-07-18 RX ORDER — INSULIN DEGLUDEC 200 U/ML
INJECTION, SOLUTION SUBCUTANEOUS
Qty: 9 ML | Refills: 0 | Status: SHIPPED | OUTPATIENT
Start: 2022-07-18 | End: 2022-08-10 | Stop reason: SDUPTHER

## 2022-08-01 RX ORDER — GABAPENTIN 600 MG/1
TABLET ORAL
Qty: 60 TABLET | Refills: 0 | Status: SHIPPED | OUTPATIENT
Start: 2022-08-01 | End: 2022-08-29

## 2022-08-08 RX ORDER — DAPAGLIFLOZIN 10 MG/1
TABLET, FILM COATED ORAL
Qty: 30 TABLET | Refills: 0 | Status: SHIPPED | OUTPATIENT
Start: 2022-08-08

## 2022-08-10 DIAGNOSIS — E11.65 UNCONTROLLED TYPE 2 DIABETES MELLITUS WITH HYPERGLYCEMIA (HCC): ICD-10-CM

## 2022-08-10 RX ORDER — INSULIN DEGLUDEC 200 U/ML
INJECTION, SOLUTION SUBCUTANEOUS
Qty: 9 ML | Refills: 0 | Status: SHIPPED | OUTPATIENT
Start: 2022-08-10 | End: 2022-08-30 | Stop reason: SDUPTHER

## 2022-08-22 RX ORDER — GLIPIZIDE 10 MG/1
TABLET ORAL
Qty: 180 TABLET | Refills: 0 | Status: SHIPPED | OUTPATIENT
Start: 2022-08-22 | End: 2022-10-11

## 2022-08-29 RX ORDER — GABAPENTIN 600 MG/1
TABLET ORAL
Qty: 60 TABLET | Refills: 0 | Status: SHIPPED | OUTPATIENT
Start: 2022-08-29 | End: 2022-09-27

## 2022-08-30 ENCOUNTER — OFFICE VISIT (OUTPATIENT)
Dept: FAMILY MEDICINE CLINIC | Age: 67
End: 2022-08-30
Payer: MEDICARE

## 2022-08-30 VITALS
HEIGHT: 62 IN | SYSTOLIC BLOOD PRESSURE: 130 MMHG | OXYGEN SATURATION: 95 % | DIASTOLIC BLOOD PRESSURE: 72 MMHG | BODY MASS INDEX: 44.64 KG/M2 | WEIGHT: 242.6 LBS | TEMPERATURE: 97.3 F | HEART RATE: 72 BPM

## 2022-08-30 DIAGNOSIS — E66.01 MORBID OBESITY WITH BMI OF 45.0-49.9, ADULT (HCC): ICD-10-CM

## 2022-08-30 DIAGNOSIS — Z12.31 ENCOUNTER FOR SCREENING MAMMOGRAM FOR BREAST CANCER: ICD-10-CM

## 2022-08-30 DIAGNOSIS — Z13.6 SCREENING FOR CARDIOVASCULAR CONDITION: ICD-10-CM

## 2022-08-30 DIAGNOSIS — Z00.00 WELCOME TO MEDICARE PREVENTIVE VISIT: Primary | ICD-10-CM

## 2022-08-30 DIAGNOSIS — Z78.0 POST-MENOPAUSAL: ICD-10-CM

## 2022-08-30 DIAGNOSIS — E11.65 UNCONTROLLED TYPE 2 DIABETES MELLITUS WITH HYPERGLYCEMIA (HCC): ICD-10-CM

## 2022-08-30 DIAGNOSIS — Z23 NEED FOR PROPHYLACTIC VACCINATION AND INOCULATION AGAINST VARICELLA: ICD-10-CM

## 2022-08-30 LAB — HBA1C MFR BLD: 7.5 %

## 2022-08-30 PROCEDURE — G0402 INITIAL PREVENTIVE EXAM: HCPCS | Performed by: FAMILY MEDICINE

## 2022-08-30 PROCEDURE — 1123F ACP DISCUSS/DSCN MKR DOCD: CPT | Performed by: FAMILY MEDICINE

## 2022-08-30 PROCEDURE — G0446 INTENS BEHAVE THER CARDIO DX: HCPCS | Performed by: FAMILY MEDICINE

## 2022-08-30 PROCEDURE — 3051F HG A1C>EQUAL 7.0%<8.0%: CPT | Performed by: FAMILY MEDICINE

## 2022-08-30 PROCEDURE — 83036 HEMOGLOBIN GLYCOSYLATED A1C: CPT | Performed by: FAMILY MEDICINE

## 2022-08-30 PROCEDURE — 3017F COLORECTAL CA SCREEN DOC REV: CPT | Performed by: FAMILY MEDICINE

## 2022-08-30 PROCEDURE — G0447 BEHAVIOR COUNSEL OBESITY 15M: HCPCS | Performed by: FAMILY MEDICINE

## 2022-08-30 RX ORDER — INSULIN LISPRO 100 [IU]/ML
INJECTION, SOLUTION INTRAVENOUS; SUBCUTANEOUS
Qty: 9 ML | Refills: 1 | Status: SHIPPED | OUTPATIENT
Start: 2022-08-30 | End: 2022-10-28 | Stop reason: SDUPTHER

## 2022-08-30 RX ORDER — LEVOTHYROXINE SODIUM 175 UG/1
TABLET ORAL
COMMUNITY
Start: 2022-08-23 | End: 2022-08-30

## 2022-08-30 RX ORDER — INSULIN DEGLUDEC 200 U/ML
INJECTION, SOLUTION SUBCUTANEOUS
Qty: 9 ML | Refills: 0 | Status: SHIPPED | OUTPATIENT
Start: 2022-08-30 | End: 2022-09-23

## 2022-08-30 ASSESSMENT — PATIENT HEALTH QUESTIONNAIRE - PHQ9
10. IF YOU CHECKED OFF ANY PROBLEMS, HOW DIFFICULT HAVE THESE PROBLEMS MADE IT FOR YOU TO DO YOUR WORK, TAKE CARE OF THINGS AT HOME, OR GET ALONG WITH OTHER PEOPLE: 1
SUM OF ALL RESPONSES TO PHQ QUESTIONS 1-9: 8
5. POOR APPETITE OR OVEREATING: 0
4. FEELING TIRED OR HAVING LITTLE ENERGY: 3
SUM OF ALL RESPONSES TO PHQ QUESTIONS 1-9: 8
7. TROUBLE CONCENTRATING ON THINGS, SUCH AS READING THE NEWSPAPER OR WATCHING TELEVISION: 0
2. FEELING DOWN, DEPRESSED OR HOPELESS: 1
6. FEELING BAD ABOUT YOURSELF - OR THAT YOU ARE A FAILURE OR HAVE LET YOURSELF OR YOUR FAMILY DOWN: 1
SUM OF ALL RESPONSES TO PHQ QUESTIONS 1-9: 8
8. MOVING OR SPEAKING SO SLOWLY THAT OTHER PEOPLE COULD HAVE NOTICED. OR THE OPPOSITE, BEING SO FIGETY OR RESTLESS THAT YOU HAVE BEEN MOVING AROUND A LOT MORE THAN USUAL: 0
1. LITTLE INTEREST OR PLEASURE IN DOING THINGS: 0
SUM OF ALL RESPONSES TO PHQ QUESTIONS 1-9: 8
SUM OF ALL RESPONSES TO PHQ9 QUESTIONS 1 & 2: 1
9. THOUGHTS THAT YOU WOULD BE BETTER OFF DEAD, OR OF HURTING YOURSELF: 0
3. TROUBLE FALLING OR STAYING ASLEEP: 3

## 2022-08-30 ASSESSMENT — LIFESTYLE VARIABLES: HOW OFTEN DO YOU HAVE A DRINK CONTAINING ALCOHOL: NEVER

## 2022-08-30 NOTE — PATIENT INSTRUCTIONS
Body Mass Index: Care Instructions  Your Care Instructions     Body mass index (BMI) can help you see if your weight is raising your risk for health problems. It uses a formula to compare how much you weigh with how tallyou are. A BMI lower than 18.5 is considered underweight. A BMI between 18.5 and 24.9 is considered healthy. A BMI between 25 and 29.9 is considered overweight. A BMI of 30 or higher is considered obese. If your BMI is in the normal range, it means that you have a lower risk for weight-related health problems. If your BMI is in the overweight or obese range, you may be at increased risk for weight-related health problems, such as high blood pressure, heart disease, stroke, arthritis or joint pain, and diabetes. If your BMI is in the underweight range, you may be at increased risk for health problems such as fatigue, lower protection (immunity) againstillness, muscle loss, bone loss, hair loss, and hormone problems. BMI is just one measure of your risk for weight-related health problems. You may be at higher risk for health problems if you are not active, you eat anunhealthy diet, or you drink too much alcohol or use tobacco products. Follow-up care is a key part of your treatment and safety. Be sure to make and go to all appointments, and call your doctor if you are having problems. It's also a good idea to know your test results and keep alist of the medicines you take. How can you care for yourself at home? Practice healthy eating habits. This includes eating plenty of fruits, vegetables, whole grains, lean protein, and low-fat dairy. If your doctor recommends it, get more exercise. Walking is a good choice. Bit by bit, increase the amount you walk every day. Try for at least 30 minutes on most days of the week. Do not smoke. Smoking can increase your risk for health problems. If you need help quitting, talk to your doctor about stop-smoking programs and medicines.  These can increase diet focuses on eating foods that are high in calcium, potassium, and magnesium. These nutrients can lower blood pressure. The foods that are highest in these nutrients are fruits, vegetables, low-fat dairy products, nuts, seeds, and legumes. But taking calcium, potassium, and magnesium supplements instead of eating foods that are high in those nutrients does not have the same effect. The DASH diet also includes whole grains, fish, and poultry. The DASH diet is one of several lifestyle changes your doctor may recommend to lower your high blood pressure. Your doctor may also want you to decrease the amount of sodium in your diet. Lowering sodium while following the DASH dietcan lower blood pressure even further than just the DASH diet alone. Follow-up care is a key part of your treatment and safety. Be sure to make and go to all appointments, and call your doctor if you are having problems. It's also a good idea to know your test results and keep alist of the medicines you take. How can you care for yourself at home? Following the DASH diet  Eat 4 to 5 servings of fruit each day. A serving is 1 medium-sized piece of fruit, ½ cup chopped or canned fruit, 1/4 cup dried fruit, or 4 ounces (½ cup) of fruit juice. Choose fruit more often than fruit juice. Eat 4 to 5 servings of vegetables each day. A serving is 1 cup of lettuce or raw leafy vegetables, ½ cup of chopped or cooked vegetables, or 4 ounces (½ cup) of vegetable juice. Choose vegetables more often than vegetable juice. Get 2 to 3 servings of low-fat and fat-free dairy each day. A serving is 8 ounces of milk, 1 cup of yogurt, or 1 ½ ounces of cheese. Eat 6 to 8 servings of grains each day. A serving is 1 slice of bread, 1 ounce of dry cereal, or ½ cup of cooked rice, pasta, or cooked cereal. Try to choose whole-grain products as much as possible. Limit lean meat, poultry, and fish to 2 servings each day.  A serving is 3 ounces, about the size of a deck of mashed bustamante beans or black beans. Where can you learn more? Go to https://chpepiceweb.[employer]. org and sign in to your [employer] account. Enter H013 in the [facility] box to learn more about \"DASH Diet: Care Instructions. \"     If you do not have an account, please click on the \"Sign Up Now\" link. Current as of: January 10, 2022               Content Version: 13.3  © 2006-2022 Healthwise, [employer]. Care instructions adapted under license by [facility] ([facility]). If you have questions about a medical condition or this instruction, always ask your healthcare professional. [place] [address] any warranty or liability for your use of this information. Personalized Preventive Plan for Ulises Oneal - 8/30/2022  Medicare offers a range of preventive health benefits. Some of the tests and screenings are paid in full while other may be subject to a deductible, co-insurance, and/or copay. Some of these benefits include a comprehensive review of your medical history including lifestyle, illnesses that may run in your family, and various assessments and screenings as appropriate. After reviewing your medical record and screening and assessments performed today your provider may have ordered immunizations, labs, imaging, and/or referrals for you. A list of these orders (if applicable) as well as your Preventive Care list are included within your After Visit Summary for your review. Other Preventive Recommendations:    A preventive eye exam performed by an eye specialist is recommended every 1-2 years to screen for glaucoma; cataracts, macular degeneration, and other eye disorders. A preventive dental visit is recommended every 6 months. Try to get at least 150 minutes of exercise per week or 10,000 steps per day on a pedometer . Order or download the FREE \"Exercise & Physical Activity: Your Everyday Guide\" from [employer] on [employer].  Call 1-809.179.1877 or search [employer] A[employer] online. You need 2595-9745 mg of calcium and 2895-8473 IU of vitamin D per day. It is possible to meet your calcium requirement with diet alone, but a vitamin D supplement is usually necessary to meet this goal.  When exposed to the sun, use a sunscreen that protects against both UVA and UVB radiation with an SPF of 30 or greater. Reapply every 2 to 3 hours or after sweating, drying off with a towel, or swimming. Always wear a seat belt when traveling in a car. Always wear a helmet when riding a bicycle or motorcycle.

## 2022-08-30 NOTE — PROGRESS NOTES
Visit Information    Have you changed or started any medications since your last visit including any over-the-counter medicines, vitamins, or herbal medicines? no   Have you stopped taking any of your medications? Is so, why? -  no  Are you having any side effects from any of your medications? - no    Have you seen any other physician or provider since your last visit?  no   Have you had any other diagnostic tests since your last visit?  no   Have you been seen in the emergency room and/or had an admission in a hospital since we last saw you?  no   Have you had your routine dental cleaning in the past 6 months?  no     Do you have an active MyChart account? If no, what is the barrier?   Yes    Patient Care Team:  Marry Sadler MD as PCP - General (Family Medicine)  Marry Sadler MD as PCP - [facility] Provider  Arash Hurley DO as Consulting Physician (Obstetrics & Gynecology)  Kirsty Barboza MD as Consulting Physician (Endocrinology)  Daysi Mena MD as Consulting Physician (Gastroenterology)    Medical History Review  Past Medical, Family, and Social History reviewed and does contribute to the patient presenting condition    Health Maintenance   Topic Date Due    Annual Wellness Visit (AWV)  Never done    Shingles vaccine (1 of 2) Never done    DEXA (modify frequency per FRAX score)  Never done    Pneumococcal 65+ years Vaccine (2 - PCV) 09/18/2013    Diabetic retinal exam  03/07/2017    Diabetic foot exam  07/07/2018    Breast cancer screen  03/23/2020    Colorectal Cancer Screen  04/26/2022    COVID-19 Vaccine (4 - Booster for Pfizer series) 05/08/2022    Flu vaccine (1) 09/01/2022    Depression Monitoring  01/11/2023    Diabetic microalbuminuria test  01/14/2023    Lipids  01/14/2023    A1C test (Diabetic or Prediabetic)  05/31/2023    Cervical cancer screen  09/16/2025    DTaP/Tdap/Td vaccine (3 - Td or Tdap) 06/16/2032    Hepatitis C screen  Completed    HIV screen  Addressed Hepatitis A vaccine  Aged Out    Hib vaccine  Aged Out    Meningococcal (ACWY) vaccine  Aged Out

## 2022-08-30 NOTE — PROGRESS NOTES
Medicare Annual Wellness Visit    Onofre Cortez is here for Medicare AWV and Diabetes    Assessment & Plan   Welcome to Medicare preventive visit  Uncontrolled type 2 diabetes mellitus with hyperglycemia ([facility]  A1c 7.5 controlled, continue same medications. -     POCT glycosylated hemoglobin (Hb A1C)  -     Insulin Degludec (TRESIBA FLEXTOUCH) 200 UNIT/ML SOPN; INJECT 80 UNITS INTO THE SKIN NIGHTLY, Disp-9 mL, R-0Normal  -     insulin lispro, 1 Unit Dial, 100 UNIT/ML SOPN; INJECT 18 UNITS UNDER THE SKIN THREE TIMES A DAY BEFORE MEALS, Disp-9 mL, R-1Normal  Post-menopausal  -     DEXA Bone Density Axial Skeleton; Future  Morbid obesity with BMI of 45.0-49.9, adult ([facility] [address] [facility]  -     MS Behavior  obesity 15m []  Encounter for screening mammogram for breast cancer  -     VIDHYA Digital Screen Bilateral; Future  Need for prophylactic vaccination and inoculation against varicella  -     zoster recombinant adjuvanted vaccine (SHINGRIX) 50 MCG/0.5ML SUSR injection; 50 MCG IM then repeat 2-6 months., Disp-0.5 mL, R-1Normal  Screening for cardiovascular condition  -     MS Intens behave ther cardio dx, 15 minutes []    Recommendations for Preventive Services Due: see orders and patient instructions/AVS.  Recommended screening schedule for the next 5-10 years is provided to the patient in written form: see Patient Instructions/AVS.     Return for pap 30min as early as possible . Subjective   The following acute and/or chronic problems were also addressed today:      Patient's complete Health Risk Assessment and screening values have been reviewed and are found in Flowsheets. The following problems were reviewed today and where indicated follow up appointments were made and/or referrals ordered.     Positive Risk Factor Screenings with Interventions:      Depression:  PHQ-2 Score: 1  PHQ-9 Total Score: 8    Severity:1-4 = minimal depression, 5-9 = mild depression, 10-14 = moderate depression, 15-19 = moderately severe depression, 20-27 = severe depression  Depression Interventions: Patient is on Cymbalta reports that is helping. Patient reports she is concerned about her son who she needs to take care of. Patient reports he had multiple complications after he had COVID. He is dependent on them.     Relaxation techniques discussed          General Health and ACP:  General  In general, how would you say your health is?: Fair  In the past 7 days, have you experienced any of the following: New or Increased Pain, New or Increased Fatigue, Loneliness, Social Isolation, Stress or Anger?: No  Do you get the social and emotional support that you need?: Yes  Do you have a Living Will?: (!) No    Advance Directives       Power of  Living Will ACP-Advance Directive ACP-Power of     Not on File Not on File Not on File Not on File        General Health Risk Interventions:  No Living Will: Patient declines ACP discussion/assistance    Health Habits/Nutrition:  Physical Activity: Inactive    Days of Exercise per Week: 0 days    Minutes of Exercise per Session: 0 min     Have you lost any weight without trying in the past 3 months?: No  Body mass index: (!) 44.37  Have you seen the dentist within the past year?: (!) No  Health Habits/Nutrition Interventions:  Dental exam overdue:  patient encouraged to make appointment with his/her dentist    Hearing/Vision:  Do you or your family notice any trouble with your hearing that hasn't been managed with hearing aids?: No  Do you have difficulty driving, watching TV, or doing any of your daily activities because of your eyesight?: No  Have you had an eye exam within the past year?: (!) No  Vision Screening    Right eye Left eye Both eyes   Without correction 20/50 20/50 20/25   With correction        Hearing/Vision Interventions:  Vision concerns:  patient encouraged to make appointment with his/her eye specialist    Safety:  Do you have working smoke detectors?: Yes  Do you have mouth once a week Indications: Sundays Take 150 mcg by mouth once a week Indications: Sundays Yes Travis Lo MD   atorvastatin (LIPITOR) 80 MG tablet TAKE ONE TABLET BY MOUTH DAILY Yes Travis Lo MD   oxybutynin (DITROPAN XL) 15 MG extended release tablet Take 1 tablet by mouth daily Yes RASHMI Li CNP   DULoxetine (CYMBALTA) 60 MG extended release capsule take 1 capsule by mouth once daily Yes Travis Lo MD   lisinopril (PRINIVIL;ZESTRIL) 2.5 MG tablet take 1 tablet by mouth once daily Yes Travis Lo MD   potassium chloride (KLOR-CON M) 10 MEQ extended release tablet take 1 tablet by mouth once daily Yes Travis Lo MD   Continuous Blood Gluc Sensor (DEXCOM G6 SENSOR) [facility]  Yes Historical Provider, MD   ASPIRIN LOW DOSE 81 MG EC tablet take 1 tablet by mouth once daily Yes Travis Lo MD   magnesium citrate solution Take 296 mLs by mouth once for 1 dose Yes Nargis Frazier MD   levothyroxine (SYNTHROID) 75 MCG tablet Take 75 mcg by mouth Six times weekly Indications: All days except Sundays Yes Historical Provider, MD   lidocaine (LIDODERM) 5 % Place 1 patch onto the skin daily 12 hours on, 12 hours off. Yes Gwen Caraballo PA-C   furosemide (LASIX) 20 MG tablet take 1 tablet by mouth once daily Yes Travis Lo MD   ketoconazole (NIZORAL) 2 % cream Apply topically  2 times a day.   Patient not taking: Reported on 8/30/2022  Travis Lo MD   tiZANidine (ZANAFLEX) 4 MG tablet Take 1 tablet by mouth at bedtime  Patient not taking: Reported on 8/30/2022  Travis Lo MD   lidocaine (LMX) 4 % cream Apply topically every 8 hrs as needed for pain  Patient not taking: No sig reported  Travis Lo MD       CareTe (Including outside providers/suppliers regularly involved in providing care):   Patient Care Team:  Travis Lo MD as PCP - General (Family Medicine)  Travis Lo [facility]  Srinivasan Cee DO as Consulting Physician (Obstetrics & Gynecology)  Master Cooley MD as Consulting Physician (Endocrinology)  James Goetz MD as Consulting Physician (Gastroenterology)     Reviewed and updated this visit:  Allergies  Meds  Problems              Cardiovascular Disease Risk Counseling: Assessed the patient's risk to develop cardiovascular disease and reviewed main risk factors. Reviewed steps to reduce disease risk including:   Quitting tobacco use, reducing amount smoked, or not starting the habit  Making healthy food choices  Being physically active and gradualy increasing activity levels   Reduce weight and determine a healthy BMI goal  Monitor blood pressure and treat if higher than 140/90 mmHg  Maintain blood total cholesterol levels under 5 mmol/l or 190 mg/dl  Maintain LDL cholesterol levels under 3.0 mmol/l or 115 mg/dl   Control blood glucose levels  Consider taking aspirin (75 mg daily), once blood pressure is controlled   Provided a follow up plan. Time spent (minutes): 10  Obesity Counseling: Assessed behavioral health risks and factors affecting choice of behavior. Suggested weight control approaches, including dietary changes behavioral modification and follow up plan. Provided educational and support documentation. Time spent (minutes): 10   Recommendations for Preventive Services Due: see orders and patient instructions/AVS.  Recommended screening schedule for the next 5-10 years is provided to the patient in written form: see Patient Instructions/AVS.     Return for pap 30min as early as possible .

## 2022-08-31 RX ORDER — LISINOPRIL 2.5 MG/1
TABLET ORAL
Qty: 90 TABLET | Refills: 0 | Status: SHIPPED | OUTPATIENT
Start: 2022-08-31 | End: 2022-12-01

## 2022-08-31 NOTE — TELEPHONE ENCOUNTER
Please Approve or Refuse.   Send to Pharmacy per Pt's Request:      Next Visit Date:  9/15/2022   Last Visit Date: 8/30/2022    Hemoglobin A1C (%)   Date Value   08/30/2022 7.5   05/31/2022 8.0   01/14/2022 9.7 (H)             ( goal A1C is < 7)   BP Readings from Last 3 Encounters:   08/30/22 130/72   05/31/22 130/74   05/17/22 (!) 113/94          (goal 120/80)  BUN   Date Value Ref Range Status   05/16/2022 13 8 - 23 mg/dL Final     Creatinine   Date Value Ref Range Status   05/16/2022 0.53 0.50 - 0.90 mg/dL Final     Potassium   Date Value Ref Range Status   05/16/2022 4.6 3.7 - 5.3 mmol/L Final

## 2022-09-08 ENCOUNTER — HOSPITAL ENCOUNTER (OUTPATIENT)
Dept: WOMENS IMAGING | Age: 67
Discharge: HOME | End: 2022-09-10
Payer: MEDICARE

## 2022-09-08 DIAGNOSIS — Z12.31 ENCOUNTER FOR SCREENING MAMMOGRAM FOR BREAST CANCER: ICD-10-CM

## 2022-09-08 PROCEDURE — 77063 BREAST TOMOSYNTHESIS BI: CPT

## 2022-09-14 ENCOUNTER — HOSPITAL ENCOUNTER (OUTPATIENT)
Age: 67
Discharge: HOME | End: 2022-09-14
Payer: MEDICARE

## 2022-09-14 ENCOUNTER — TELEPHONE (OUTPATIENT)
Dept: FAMILY MEDICINE CLINIC | Age: 67
End: 2022-09-14

## 2022-09-14 DIAGNOSIS — N39.0 RECURRENT UTI: ICD-10-CM

## 2022-09-14 DIAGNOSIS — N39.0 RECURRENT UTI: Primary | ICD-10-CM

## 2022-09-14 LAB
BACTERIA: ABNORMAL
BILIRUBIN URINE: NEGATIVE
COLOR: YELLOW
EPITHELIAL CELLS UA: ABNORMAL /HPF
GLUCOSE URINE: ABNORMAL
KETONES, URINE: NEGATIVE
LEUKOCYTE ESTERASE, URINE: ABNORMAL
NITRITE, URINE: NEGATIVE
PH UA: 5.5 (ref 5–8)
PROTEIN UA: NEGATIVE
RBC UA: ABNORMAL /HPF
SPECIFIC GRAVITY UA: 1.03 (ref 1–1.03)
TURBIDITY: CLEAR
URINE HGB: NEGATIVE
UROBILINOGEN, URINE: NORMAL
WBC UA: ABNORMAL /HPF

## 2022-09-14 PROCEDURE — 81001 URINALYSIS AUTO W/SCOPE: CPT

## 2022-09-14 PROCEDURE — 87086 URINE CULTURE/COLONY COUNT: CPT

## 2022-09-14 PROCEDURE — 87186 SC STD MICRODIL/AGAR DIL: CPT

## 2022-09-14 PROCEDURE — 87077 CULTURE AEROBIC IDENTIFY: CPT

## 2022-09-14 NOTE — TELEPHONE ENCOUNTER
PATIENT CALLED STATED THAT SHE IS HAVING SOME PAIN AFTER SHE EMPTY'S BLADDER. SHE HAS FINISHED HER ANTIBIOTIC'S IS WORRIED SHE STILL MAY HAVE AN INFECTION PRESENT. ALSO SHE STATED THAT SHE SHE PREFERS TO HANDLE NOW AND CANNOT WAIT UNTIL HER NEXT VISIT FOR TOMORROW.

## 2022-09-14 NOTE — TELEPHONE ENCOUNTER
I ordered a urine test to recheck,, she can go to the lab and do it if that shows infection we will send a different antibiotic also patient is referred to urologist for recurrent UTIs. She needs to see a specialist to look any bladder problems.

## 2022-09-15 ENCOUNTER — HOSPITAL ENCOUNTER (OUTPATIENT)
Age: 67
Setting detail: SPECIMEN
Discharge: HOME | End: 2022-09-15

## 2022-09-15 ENCOUNTER — OFFICE VISIT (OUTPATIENT)
Dept: FAMILY MEDICINE CLINIC | Age: 67
End: 2022-09-15
Payer: MEDICARE

## 2022-09-15 VITALS
OXYGEN SATURATION: 97 % | DIASTOLIC BLOOD PRESSURE: 78 MMHG | BODY MASS INDEX: 43.98 KG/M2 | HEART RATE: 79 BPM | WEIGHT: 239 LBS | TEMPERATURE: 97.7 F | HEIGHT: 62 IN | SYSTOLIC BLOOD PRESSURE: 128 MMHG

## 2022-09-15 DIAGNOSIS — Z12.4 PAP SMEAR FOR CERVICAL CANCER SCREENING: Primary | ICD-10-CM

## 2022-09-15 DIAGNOSIS — Z78.0 POST-MENOPAUSAL: ICD-10-CM

## 2022-09-15 DIAGNOSIS — B37.2 CANDIDAL INTERTRIGO: ICD-10-CM

## 2022-09-15 DIAGNOSIS — L03.90 CELLULITIS, UNSPECIFIED CELLULITIS SITE: ICD-10-CM

## 2022-09-15 DIAGNOSIS — Z12.11 COLON CANCER SCREENING: ICD-10-CM

## 2022-09-15 PROCEDURE — S0610 ANNUAL GYNECOLOGICAL EXAMINA: HCPCS | Performed by: FAMILY MEDICINE

## 2022-09-15 RX ORDER — KETOCONAZOLE 20 MG/G
CREAM TOPICAL
Qty: 1 EACH | Refills: 0 | Status: SHIPPED | OUTPATIENT
Start: 2022-09-15

## 2022-09-15 RX ORDER — OXYBUTYNIN CHLORIDE 10 MG/1
TABLET, EXTENDED RELEASE ORAL
COMMUNITY
Start: 2022-08-30

## 2022-09-15 RX ORDER — MUPIROCIN CALCIUM 20 MG/G
CREAM TOPICAL
Qty: 1 EACH | Refills: 0 | Status: SHIPPED | OUTPATIENT
Start: 2022-09-15 | End: 2022-10-15

## 2022-09-15 RX ORDER — NYSTATIN 100000 [USP'U]/G
POWDER TOPICAL
Qty: 1 EACH | Refills: 0 | Status: SHIPPED | OUTPATIENT
Start: 2022-09-15

## 2022-09-15 ASSESSMENT — ENCOUNTER SYMPTOMS
BLOOD IN STOOL: 0
SINUS PRESSURE: 0
COUGH: 0
CONSTIPATION: 0
BACK PAIN: 0
NAUSEA: 0
ABDOMINAL PAIN: 0
COLOR CHANGE: 1
RHINORRHEA: 0
TROUBLE SWALLOWING: 0
SHORTNESS OF BREATH: 0
VOMITING: 0
CHEST TIGHTNESS: 0
DIARRHEA: 0
ABDOMINAL DISTENTION: 0
RECTAL PAIN: 0
EYE REDNESS: 0
SORE THROAT: 0

## 2022-09-15 NOTE — PATIENT INSTRUCTIONS
New Updates for [facility] MyChart/ [facility] ([facility]) YVAN    Thank you for choosing [facility] to give you the best care! [facility] ([facility]) is always trying to think of new ways to help their patients. We are asking all patients to try out the new digital registration that is now available through your [facility] account or the new YVAN, [facility] ([facility]). Via the yvan you're now able to update your personal and registration information prior to your upcoming appointment. This will save you time once you arrive at the office to check-in, not to mention your information remains safe!! Many other perks come from signing up for an account, such as:  Requesting refills  Scheduling an appointment  Completing an E-Visit  Sending a message to the office/provider  Having access to your medication list  Paying your bill/copay prior to your appointment  Scheduling your yearly mammogram  Review your test results    If you are not familiar with [facility] or the [facility] ([facility]) YVAN, please ask one of us and we will be happy to answer any questions or help you set-up your account.       Your [facility] office,  [address]

## 2022-09-15 NOTE — PROGRESS NOTES
9/15/2022      Danelle Moseley (:  1955) is a 74 y.o. female, here for a preventive medicine evaluation, Gynecologic Exam   .      ASSESSMENT/PLAN:    1. Pap smear for cervical cancer screening  -     PAP Smear; Future  2. Post-menopausal  3. Candidal intertrigo  -     ketoconazole (NIZORAL) 2 % cream; Apply topically  2 times a day., Disp-1 each, R-0, Normal  -     mupirocin (BACTROBAN) 2 % cream; Apply 3 times daily. , Disp-1 each, R-0, Normal  -     nystatin (MYCOSTATIN) 800843 UNIT/GM powder; Apply 3 times daily. , Disp-1 each, R-0, Normal  4. Cellulitis, unspecified cellulitis site  -     ketoconazole (NIZORAL) 2 % cream; Apply topically  2 times a day., Disp-1 each, R-0, Normal  -     mupirocin (BACTROBAN) 2 % cream; Apply 3 times daily. , Disp-1 each, R-0, Normal  -     nystatin (MYCOSTATIN) 695847 UNIT/GM powder; Apply 3 times daily. , Disp-1 each, R-0, Normal  -     CBC with Auto Differential; Future  5. Colon cancer screening      Low carb, low fat diet, increase fruits and vegetables, and exercise 4-5 times a week 30-40 minutes a day, or walk 1-2 hours per day, or wear a pedometer and get at least 10,000 steps per day. Dental exam 2-3 times /year advised. Immunizations reviewed. Health Maintenance reviewed   Smoking cessation counseling given yes      Annual eye exam advised. Marciano White received counseling on the following healthy behaviors: nutrition, exercise, medication adherence, and tobacco cessation  Reviewed prior labs and health maintenance  Discussed use, benefit, and side effects of prescribed medications. Barriers to medication compliance addressed. Patient given educational materials - see patient instructions  All patient questions answered. Patient voiced understanding. The patient's past medical, surgical, social, and family history as well as her  current medications and allergies were reviewed as documented in today's encounter.       Medications, labs, Visit Medications    Medication Sig Taking? Authorizing Provider   ketoconazole (NIZORAL) 2 % cream Apply topically  2 times a day. Yes Tosha Deluna MD   mupirocin (BACTROBAN) 2 % cream Apply 3 times daily. Yes Tosha Deluna MD   nystatin (MYCOSTATIN) 413555 UNIT/GM powder Apply 3 times daily. Yes Tosha Deluna MD   lisinopril (PRINIVIL;ZESTRIL) 2.5 MG tablet take 1 tablet by mouth once daily Yes Tosha Deluna MD   Insulin Degludec (TRESIBA FLEXTOUCH) 200 UNIT/ML SOPN INJECT 80 UNITS INTO THE SKIN NIGHTLY Yes Tosha Deluna MD   insulin lispro, 1 Unit Dial, 100 UNIT/ML SOPN INJECT 18 UNITS UNDER THE SKIN THREE TIMES A DAY BEFORE MEALS Yes Tosha Deluna MD   gabapentin (NEURONTIN) 600 MG tablet take 1 tablet by mouth twice a day Yes Tosha Deluna MD   glipiZIDE (GLUCOTROL) 10 MG tablet take 2 tablets by mouth twice a day Yes Tosha Deluna MD   FARXIGA 10 MG tablet TAKE ONE TABLET BY MOUTH EVERY MORNING Yes Tosha Deluna MD   RA PEN NEEDLES 31G X 8 MM MISC INJECTING TWICE DAILY DX: E11.65 Yes Yanet Trimble MD   levothyroxine (SYNTHROID) 150 MCG tablet Take 1 tablet by mouth once a week Indications: Sundays Take 150 mcg by mouth once a week Indications: Sundays Yes Tosha Deluna MD   atorvastatin (LIPITOR) 80 MG tablet TAKE ONE TABLET BY MOUTH DAILY Yes Tosha Deluna MD   lidocaine (LMX) 4 % cream Apply topically every 8 hrs as needed for pain Yes Tosha Deluna MD   DULoxetine (CYMBALTA) 60 MG extended release capsule take 1 capsule by mouth once daily Yes Tosha Deluna MD   potassium chloride (KLOR-CON M) 10 MEQ extended release tablet take 1 tablet by mouth once daily Yes Tosha Deluna MD   ASPIRIN LOW DOSE 81 MG EC tablet take 1 tablet by mouth once daily Yes Tosha Deluna MD   levothyroxine (SYNTHROID) 75 MCG tablet Take 75 mcg by mouth Six times weekly Indications:  All days except Sundays Yes Negrita Frank MD   lidocaine (LIDODERM) 5 % Place 1 patch onto the skin daily 12 hours on, 12 hours off. Yes Conception FERDINAND Ramirez   furosemide (LASIX) 20 MG tablet take 1 tablet by mouth once daily Yes Yary Quinonez MD   oxybutynin (DITROPAN-XL) 10 MG extended release tablet   Historical Provider, MD   zoster recombinant adjuvanted vaccine (SHINGRIX) 50 MCG/0.5ML SUSR injection 50 MCG IM then repeat 2-6 months. Patient not taking: Reported on 9/15/2022  Yary Quinonez MD   ketoconazole (NIZORAL) 2 % cream Apply topically  2 times a day. Patient not taking: No sig reported  Yary Quinonez MD   tiZANidine (ZANAFLEX) 4 MG tablet Take 1 tablet by mouth at bedtime  Patient not taking: No sig reported  Yary Quinonez MD   oxybutynin (DITROPAN XL) 15 MG extended release tablet Take 1 tablet by mouth daily  Patient not taking: Reported on 9/15/2022  RASHMI Padilla - CNP   Continuous Blood Gluc Sensor (DEXCOM G6 SENSOR) [facility]   Historical Provider, MD   magnesium citrate solution Take 296 mLs by mouth once for 1 dose  Juliet Dunne MD        Allergies   Allergen Reactions    Cefuroxime Axetil Anaphylaxis    Dilaudid [Hydromorphone] Anaphylaxis    Metformin And Related Diarrhea, Itching and Nausea And Vomiting    Sulfa Antibiotics Itching    Amoxicillin     Antihistamines, Loratadine-Type     Eggs Or Egg-Derived Products     Lorazepam     Nubain [Nalbuphine Hcl] Other (See Comments)     Hallucinations; \"I got stoned. \"    Vistaril [Hydroxyzine Hcl]        Past Medical History:   Diagnosis Date    Arrhythmia     Breast mass     CAD (coronary artery disease)     with valvular disease    Chronic neck pain     Coccygeal pain 3/7/2016    Constipation     COVID-19 04/2020    Depression     Diabetic neuropathy ([facility] [address]     History of hepatitis A     possible history of hepatitis A in the past    History of renal calculi     Hypertension     Hyperthyroidism     Hypothyroidism     Morbid obesity with BMI of 45.0-49.9, adult ([facility] [address] 3/31/2016    Nephrolithiasis     Neuropathy     Pancreatic cyst     PONV (postoperative nausea and vomiting)     Type II or unspecified type diabetes mellitus without mention of complication, not stated as uncontrolled     non insulin dependent     Ulcerative colitis ([facility] [address]     UTI (lower urinary tract infection)        Past Surgical History:   Procedure Laterality Date    BREAST BIOPSY      negative    CARDIOVASCULAR STRESS TEST      7/17/15 no results    CHOLECYSTECTOMY      COLONOSCOPY N/A 10/26/2021    COLONOSCOPY POLYPECTOMY SNARE/COLD BIOPSY performed by Gali Rowley MD at [address]      right hand    CYSTO/URETERO/PYELOSCOPY, CALCULUS TX Right 2019    CYSTOSCOPY URETEROSCOPY  STONE BASKET RETRIEVAL RIGHT STENT EXCHANGE performed by Elizabeth Sauceda MD at [address] [facility] 2019    CYSTOSCOPY URETERAL STENT INSERTION performed by Elizabeth Sauceda MD at [address] [facility]      ENDOSCOPY, COLON, DIAGNOSTIC      LITHOTRIPSY      TONSILLECTOMY      TUBAL LIGATION      UPPER GASTROINTESTINAL ENDOSCOPY N/A 2020    EGD BIOPSY performed by Gali Rowley MD at [address] [facility] 10/26/2021    EGD BIOPSY performed by Gali Rowley MD at [place] [address]       .   Social History     Tobacco Use    Smoking status: Former     Packs/day: 0.01     Years: 1.00     Pack years: 0.01     Types: Cigarettes     Quit date: 5     Years since quittin.7    Smokeless tobacco: Never    Tobacco comments:     pt states she quit smoking cigarettes at the age of 14   Vaping Use    Vaping Use: Never used   Substance Use Topics    Alcohol use: No     Alcohol/week: 0.0 standard drinks    Drug use: Not Currently     Types: Marijuana Mitra Torres)     Comment: once in a while       Family History   Problem Relation Age of Onset    Coronary Art Dis Mother     Lung Cancer Mother     Diabetes Mother         mellitus    Coronary Art Dis Father     Diabetes Father         diabetes mellitus       ADVANCE DIRECTIVE: N, <no information>    SUBJECTIVE / Amairani Webber is here for Gynecologic Exam       Urinary symptoms: yes -   History of recurrent UTIs treated with antibiotic yesterday at urgent care. Patient is complaining of rash on her genital area which is chronic reports is not improving. She has tried over-the-counter medications that did not help. The rash is outside of the labia. Sexually active: Yes     Contraception:    last pap: was normal  The patient has no history of abnormal PAP    Last mammogram: Normal  The patient has no family history of breast cancer    Wears seatbelts: yes     Regular exercise: no  Ever been transfused or tattooed?: yes  The patient reports that domestic violence in her life is absent. Last eye exam: up to date: Yes       No results found. Hearing:normal :Yes    Last dental exam and preventative dental cleaning: up to date : Yes    Nutritional assessment: Body mass index is 43.71 kg/m².  high    Weight is   Wt Readings from Last 3 Encounters:   09/15/22 239 lb (108.4 kg)   08/30/22 242 lb 9.6 oz (110 kg)   05/31/22 250 lb (113.4 kg)       Healthful diet and Physical activity counseling to prevent CVD- low carb, low fat diet, increase fruits and vegetables, and exercise 4-5 times a day 30-40minutes a day discussed    Nicotine dependence. no  Smoker, counseling given to quit smoking.     Counseling given: Not Answered  Tobacco comments: pt states she quit smoking cigarettes at the age of 14      Alcohol use: no    Immunization History   Administered Date(s) Administered    COVID-19, PFIZER PURPLE top, DILUTE for use, (age 15 y+), 30mcg/0.3mL 05/18/2021, 06/08/2021, 01/08/2022    Pneumococcal Polysaccharide ([id number]) 09/18/2012    Tdap (Boostrix, Adacel) 09/18/2009, 06/16/2022       COVID-19 vaccine:  Yes     Tdap one time, then Td every 10 years-up to date:  No:     Influenza annually-up to date:  Yes    PPSV 23 in all adults 19-63 yo with chronic conditions,smokers, alcoholism,  nursing home residents; then PCV 13 at 75 yo-up to date: No:  or N/A    Menopause: Yes   Patient's last menstrual period was 01/12/2015 (approximate). Colon cancer screening recommended at 39years old yes   The patient has no family history of colon cancer    Blood pressure is normal.  BP Readings from Last 3 Encounters:   09/15/22 128/78   08/30/22 130/72   05/31/22 130/74       Pulse is normal.  Pulse Readings from Last 3 Encounters:   09/15/22 79   08/30/22 72   05/31/22 68       A1c is   Lab Results   Component Value Date    LABA1C 7.5 08/30/2022    LABA1C 8.0 05/31/2022    LABA1C 9.7 (H) 01/14/2022       Lipid screening -    Lab Results   Component Value Date    CHOL 156 08/18/2020    CHOL 68 04/14/2020    CHOL 152 07/31/2018     Lab Results   Component Value Date    TRIG 214 (H) 08/18/2020    TRIG 132 04/14/2020    TRIG 378 (H) 07/31/2018     Lab Results   Component Value Date    HDL 35 (L) 01/14/2022    HDL 41 08/18/2020    HDL 26 (L) 04/14/2020     Lab Results   Component Value Date    LDLCHOLESTEROL 69 01/14/2022    LDLCHOLESTEROL 72 08/18/2020    LDLCHOLESTEROL 16 04/14/2020     Lab Results   Component Value Date    CHOLHDLRATIO 4.3 01/14/2022    CHOLHDLRATIO 3.8 08/18/2020    CHOLHDLRATIO 2.6 04/14/2020       Cardiovascular risk is: The 10-year ASCVD risk score (Brenda Perdue, et al., 2013) is: 14.4%    Values used to calculate the score:      Age: 74 years      Sex: Female      Is Non- : No      Diabetic: Yes      Tobacco smoker: No      Systolic Blood Pressure: 260 mmHg      Is BP treated: Yes      HDL Cholesterol: 35 mg/dL      Total Cholesterol: 149 mg/dL    Hepatitis C screening-   Lab Results   Component Value Date    HEPAIGM NONREACTIVE 05/17/2022    HEPBIGM NONREACTIVE 05/17/2022    HEPCAB NONREACTIVE 05/17/2022            [x]Negative depression screening.    []1-4 = Minimal depression   []5-9 = Mild depression   []10-14 = Moderate depression   []15-19 = Moderately severe depression   []20-27 = Severe depression    PHQ Scores 8/30/2022 1/11/2022 5/6/2021 3/23/2021 9/16/2020 6/16/2020 2/7/2017   PHQ2 Score 1 0 3 3 0 1 2   PHQ9 Score 8 0 10 15 0 1 2       Health Maintenance   Topic Date Due    Shingles vaccine (1 of 2) Never done    DEXA (modify frequency per FRAX score)  Never done    Pneumococcal 65+ years Vaccine (2 - PCV) 09/18/2013    Diabetic retinal exam  03/07/2017    Diabetic foot exam  07/07/2018    Colorectal Cancer Screen  04/26/2022    COVID-19 Vaccine (4 - Booster for Pfizer series) 05/08/2022    Flu vaccine (1) Never done    Diabetic microalbuminuria test  01/14/2023    Lipids  01/14/2023    A1C test (Diabetic or Prediabetic)  08/30/2023    Depression Monitoring  08/30/2023    Annual Wellness Visit (AWV)  08/31/2023    Breast cancer screen  09/08/2024    Cervical cancer screen  09/16/2025    DTaP/Tdap/Td vaccine (3 - Td or Tdap) 06/16/2032    Hepatitis C screen  Completed    HIV screen  Addressed    Hepatitis A vaccine  Aged Out    Hib vaccine  Aged Out    Meningococcal (ACWY) vaccine  Aged Out       -rest of complaints with corresponding details per ROS    Review of Systems   Constitutional:  Positive for unexpected weight change. Negative for activity change, appetite change, fatigue and fever. HENT:  Negative for postnasal drip, rhinorrhea, sinus pressure, sore throat and trouble swallowing. Eyes:  Negative for redness and visual disturbance. Respiratory:  Negative for cough, chest tightness and shortness of breath. Cardiovascular:  Negative for chest pain, palpitations and leg swelling. Gastrointestinal:  Negative for abdominal distention, abdominal pain, blood in stool, constipation, diarrhea, nausea, rectal pain and vomiting. Endocrine: Negative for polydipsia, polyphagia and polyuria. Genitourinary:  Positive for frequency, urgency, vaginal discharge and vaginal pain.  Negative for difficulty urinating and flank pain. Musculoskeletal:  Positive for arthralgias. Negative for back pain, gait problem, myalgias and neck pain. Skin:  Positive for color change, rash and wound. Allergic/Immunologic: Positive for immunocompromised state. Negative for food allergies. Neurological:  Negative for dizziness, speech difficulty, weakness, light-headedness, numbness and headaches. Psychiatric/Behavioral:  Negative for agitation, behavioral problems, decreased concentration, dysphoric mood, hallucinations, sleep disturbance and suicidal ideas. The patient is nervous/anxious.        -vital signs stable and within normal limits except     /78   Pulse 79   Temp 97.7 °F (36.5 °C)   Ht 5' 2\" (1.575 m)   Wt 239 lb (108.4 kg)   LMP 01/12/2015 (Approximate)   SpO2 97%   BMI 43.71 kg/m²   Vitals:    09/15/22 0959   BP: 128/78   Pulse: 79   Temp: 97.7 °F (36.5 °C)   SpO2: 97%   Weight: 239 lb (108.4 kg)   Height: 5' 2\" (1.575 m)     Estimated body mass index is 43.71 kg/m² as calculated from the following:    Height as of this encounter: 5' 2\" (1.575 m). Weight as of this encounter: 239 lb (108.4 kg). Physical Exam  Vitals and nursing note reviewed. Exam conducted with a chaperone present. Constitutional:       General: She is not in acute distress. Appearance: Normal appearance. She is well-developed. She is obese. She is not diaphoretic. HENT:      Head: Normocephalic and atraumatic. Nose: Nose normal.   Eyes:      General:         Right eye: No discharge. Left eye: No discharge. Extraocular Movements: Extraocular movements intact. Conjunctiva/sclera: Conjunctivae normal.      Pupils: Pupils are equal, round, and reactive to light. Neck:      Thyroid: No thyromegaly. Cardiovascular:      Rate and Rhythm: Normal rate and regular rhythm. Heart sounds: Normal heart sounds. No murmur heard. Pulmonary:      Effort: Pulmonary effort is normal. No respiratory distress. Breath sounds: Normal breath sounds. No wheezing or rhonchi. Abdominal:      General: Bowel sounds are normal. There is no distension. Palpations: Abdomen is soft. There is no mass. Tenderness: There is no abdominal tenderness. There is no guarding or rebound. Genitourinary:     General: Normal vulva. Exam position: Lithotomy position. Labia:         Right: Rash, tenderness and injury present. Left: Rash, tenderness and injury present. Urethra: Urethral pain present. Comments: Cervix is normal in shape, whitish discharge seen. No cervical motion tenderness. Bilateral labia and genital area is raw and red erythematous painful. Musculoskeletal:         General: No tenderness. Cervical back: Normal range of motion and neck supple. Spasms present. No rigidity. Thoracic back: No spasms. Normal range of motion. Lumbar back: Spasms present. Decreased range of motion. Lymphadenopathy:      Cervical: No cervical adenopathy. Skin:     Coloration: Skin is not jaundiced or pale. Findings: No bruising, erythema or rash. Neurological:      General: No focal deficit present. Mental Status: She is alert and oriented to person, place, and time. Cranial Nerves: No cranial nerve deficit. Sensory: Sensory deficit present. Motor: No weakness or tremor. Coordination: Coordination normal.      Gait: Gait and tandem walk normal.      Deep Tendon Reflexes: Reflexes are normal and symmetric. Psychiatric:         Attention and Perception: Attention and perception normal. She is attentive. Mood and Affect: Mood is anxious. Mood is not depressed. Affect is not tearful. Speech: She is communicative. Speech is not rapid and pressured, delayed or slurred. Behavior: Behavior normal. Behavior is not agitated or slowed. Thought Content:  Thought content normal.         Judgment: Judgment normal.           I personally reviewed testing with patient. Otherwise labs within normal limits      Lab Results   Component Value Date    WBC 13.9 (H) 05/16/2022    HGB 16.9 (H) 05/16/2022    HCT 48.6 (H) 05/16/2022    MCV 87.6 05/16/2022     05/16/2022       Lab Results   Component Value Date/Time     05/16/2022 11:10 PM    K 4.6 05/16/2022 11:10 PM     05/16/2022 11:10 PM    CO2 25 05/16/2022 11:10 PM    BUN 13 05/16/2022 11:10 PM    CREATININE 0.53 05/16/2022 11:10 PM    GLUCOSE 166 05/16/2022 11:10 PM    GLUCOSE 94 01/23/2012 05:44 PM    CALCIUM 10.2 05/16/2022 11:10 PM        Lab Results   Component Value Date    ALT 78 (H) 06/17/2022    AST 36 (H) 06/17/2022    ALKPHOS 147 (H) 06/17/2022    BILITOT 0.74 06/17/2022       Lab Results   Component Value Date    TSH 0.10 (L) 06/17/2022       Lab Results   Component Value Date    LDLCHOLESTEROL 69 01/14/2022    LDLDIRECT 200 (H) 12/08/2017       Lab Results   Component Value Date    LABA1C 7.5 08/30/2022       No results found for: [id number]    No results found for: FOLATE    Lab Results   Component Value Date    FERRITIN 699 (H) 04/17/2020       Lab Results   Component Value Date    VITD25 14.0 (L) 01/29/2014         Orders Placed This Encounter   Medications    ketoconazole (NIZORAL) 2 % cream     Sig: Apply topically  2 times a day. Dispense:  1 each     Refill:  0    mupirocin (BACTROBAN) 2 % cream     Sig: Apply 3 times daily. Dispense:  1 each     Refill:  0    nystatin (MYCOSTATIN) [id number] UNIT/GM powder     Sig: Apply 3 times daily. Dispense:  1 each     Refill:  0         There are no discontinued medications. Orders Placed This Encounter   Procedures    PAP Smear     Patient History:    Patient's last menstrual period was 01/12/2015 (approximate).   OBGYN Status: Postmenopausal  Past Surgical History:  2012: BREAST BIOPSY      Comment:  negative  No date: CARDIOVASCULAR STRESS TEST      Comment:  7/17/15 no results  No date: CHOLECYSTECTOMY  10/26/2021: COLONOSCOPY; N/A      Comment:  COLONOSCOPY POLYPECTOMY SNARE/COLD BIOPSY performed by                Cedrick Ray MD at [address] [place] [facility]  No date: CYST REMOVAL      Comment:  right hand  2019: CYSTO/URETERO/PYELOSCOPY, CALCULUS TX; Right      Comment:  CYSTOSCOPY URETEROSCOPY  STONE BASKET RETRIEVAL RIGHT                STENT EXCHANGE performed by Clair Ventura MD at [address] [facility]  No date: CYSTOSCOPY  2019: CYSTOSCOPY; Right      Comment:  CYSTOSCOPY URETERAL STENT INSERTION performed by Allison Norris MD at [address]  : ENDOMETRIAL BIOPSY  No date: ENDOSCOPY, COLON, DIAGNOSTIC  No date: LITHOTRIPSY  No date: TONSILLECTOMY  No date: TUBAL LIGATION  2020: UPPER GASTROINTESTINAL ENDOSCOPY; N/A      Comment:  EGD BIOPSY performed by Cedrick Ray MD at [address]                ENDO  10/26/2021: UPPER GASTROINTESTINAL ENDOSCOPY; N/A      Comment:  EGD BIOPSY performed by Cedrick Ray MD at [address]                ENDO      Social History    Tobacco Use      Smoking status: Former        Packs/day: 0.01        Years: 1.00        Pack years: .01        Types: Cigarettes        Quit date: 1967        Years since quittin.7      Smokeless tobacco: Never      Tobacco comments: pt states she quit smoking cigarettes at the age of 14       Standing Status:   Future     Standing Expiration Date:   9/15/2023     Order Specific Question:   Collection Type     Answer: Thin Prep     Order Specific Question:   Prior Abnormal Pap Test     Answer:   No     Order Specific Question:   Screening or Diagnostic     Answer:   Screening     Order Specific Question:   HPV Requested?      Answer:   Yes -  If ASCUS Reflex HPV     Order Specific Question:   High Risk Patient     Answer:   N/A    CBC with Auto Differential     Standing Status:   Future     Standing Expiration Date:   2023         This note was completed by using the assistance of naseem speech-recognition program. However, inadvertent computerized transcription errors may be present. Although every effort was made to ensure accuracy, no guarantees can be provided that every mistake has been identified and corrected by editing. An electronic signature was used to authenticate this note.     Electronically signed by Marry Sadler MD on 9/15/2022 at 10:16 PM

## 2022-09-15 NOTE — PROGRESS NOTES
Visit Information    Have you changed or started any medications since your last visit including any over-the-counter medicines, vitamins, or herbal medicines? no   Have you stopped taking any of your medications? Is so, why? -  no  Are you having any side effects from any of your medications? - no    Have you seen any other physician or provider since your last visit?  no   Have you had any other diagnostic tests since your last visit? yes -    Have you been seen in the emergency room and/or had an admission in a hospital since we last saw you?  no   Have you had your routine dental cleaning in the past 6 months?  no     Do you have an active MyChart account? If no, what is the barrier?   Yes    Patient Care Team:  Sneha Iraheta MD as PCP - General (Family Medicine)  Sneha Iraheta MD as PCP - [facility] Provider  Елена De Santiago DO as Consulting Physician (Obstetrics & Gynecology)  Solitario Goncalves MD as Consulting Physician (Endocrinology)  Edgar Ellis MD as Consulting Physician (Gastroenterology)    Medical History Review  Past Medical, Family, and Social History reviewed and does contribute to the patient presenting condition    Health Maintenance   Topic Date Due    Shingles vaccine (1 of 2) Never done    DEXA (modify frequency per FRAX score)  Never done    Pneumococcal 65+ years Vaccine (2 - PCV) 09/18/2013    Diabetic retinal exam  03/07/2017    Diabetic foot exam  07/07/2018    Colorectal Cancer Screen  04/26/2022    COVID-19 Vaccine (4 - Booster for Pfizer series) 05/08/2022    Flu vaccine (1) Never done    Diabetic microalbuminuria test  01/14/2023    Lipids  01/14/2023    A1C test (Diabetic or Prediabetic)  08/30/2023    Depression Monitoring  08/30/2023    Annual Wellness Visit (AWV)  08/31/2023    Breast cancer screen  09/08/2024    Cervical cancer screen  09/16/2025    DTaP/Tdap/Td vaccine (3 - Td or Tdap) 06/16/2032    Hepatitis C screen  Completed    HIV screen  Addressed Hepatitis A vaccine  Aged Out    Hib vaccine  Aged Out    Meningococcal (ACWY) vaccine  Aged Out

## 2022-09-16 DIAGNOSIS — N39.0 RECURRENT UTI: Primary | ICD-10-CM

## 2022-09-16 LAB
CULTURE: ABNORMAL
SPECIMEN DESCRIPTION: ABNORMAL

## 2022-09-16 RX ORDER — CIPROFLOXACIN 500 MG/1
500 TABLET, FILM COATED ORAL 2 TIMES DAILY
Qty: 20 TABLET | Refills: 0 | Status: SHIPPED | OUTPATIENT
Start: 2022-09-16 | End: 2022-09-26

## 2022-09-19 ENCOUNTER — OFFICE VISIT (OUTPATIENT)
Dept: UROLOGY | Age: 67
End: 2022-09-19
Payer: MEDICARE

## 2022-09-19 VITALS
HEIGHT: 62 IN | WEIGHT: 239 LBS | BODY MASS INDEX: 43.98 KG/M2 | DIASTOLIC BLOOD PRESSURE: 76 MMHG | SYSTOLIC BLOOD PRESSURE: 110 MMHG | TEMPERATURE: 98 F

## 2022-09-19 DIAGNOSIS — Z87.442 HISTORY OF KIDNEY STONES: ICD-10-CM

## 2022-09-19 DIAGNOSIS — N39.46 MIXED STRESS AND URGE URINARY INCONTINENCE: Primary | ICD-10-CM

## 2022-09-19 DIAGNOSIS — Z87.440 HISTORY OF RECURRENT UTIS: ICD-10-CM

## 2022-09-19 PROCEDURE — G8427 DOCREV CUR MEDS BY ELIG CLIN: HCPCS | Performed by: NURSE PRACTITIONER

## 2022-09-19 PROCEDURE — 0509F URINE INCON PLAN DOCD: CPT | Performed by: NURSE PRACTITIONER

## 2022-09-19 PROCEDURE — G8400 PT W/DXA NO RESULTS DOC: HCPCS | Performed by: NURSE PRACTITIONER

## 2022-09-19 PROCEDURE — 1036F TOBACCO NON-USER: CPT | Performed by: NURSE PRACTITIONER

## 2022-09-19 PROCEDURE — 99214 OFFICE O/P EST MOD 30 MIN: CPT | Performed by: NURSE PRACTITIONER

## 2022-09-19 PROCEDURE — 1090F PRES/ABSN URINE INCON ASSESS: CPT | Performed by: NURSE PRACTITIONER

## 2022-09-19 PROCEDURE — 1123F ACP DISCUSS/DSCN MKR DOCD: CPT | Performed by: NURSE PRACTITIONER

## 2022-09-19 PROCEDURE — G8417 CALC BMI ABV UP PARAM F/U: HCPCS | Performed by: NURSE PRACTITIONER

## 2022-09-19 PROCEDURE — 3017F COLORECTAL CA SCREEN DOC REV: CPT | Performed by: NURSE PRACTITIONER

## 2022-09-19 ASSESSMENT — ENCOUNTER SYMPTOMS
ABDOMINAL PAIN: 0
NAUSEA: 0
CONSTIPATION: 0
BACK PAIN: 0
EYE REDNESS: 0
COUGH: 0
WHEEZING: 0
SHORTNESS OF BREATH: 0
VOMITING: 0
EYE PAIN: 0

## 2022-09-19 NOTE — PROGRESS NOTES
[address] [address]  Dept: [address] [facility] Urology Office Note - Established    Patient:  Tish Garza  YOB: 1955  Date: 9/19/2022    The patient is a 74 y.o. female whopresents today for evaluation of the following problems:   Chief Complaint   Patient presents with    Incontinence    Rash     Down in groin, PCP has started her on medication        HPI  Overactive Bladder:  Patient is here today for an overactive bladder which started several years ago. She has a hx 2 full term vaginal births. Hx hysterectomy without vaginal bulge. Recently the OAB symptoms: are worsening. She currently takes oxybutynin 15mg (also rx'ed for ecessive sweating). She urinates at least once an hour during the daytime and has nocturia x4. She leaks urine, both stress and urge. She feels stress incontinence is worse, but is also bothered by the urge incontinence. She does drink pop. Changing pad or clothing when it gets damp, usually ~4x/day. She has a rash to [place] area which is being treated by her PCP. She has hx of frequent utis but denies symptoms today. Hx of stones in 2019, without recurrence since- did not get renal us completed as ordered last visit. Summary of old records: N/A    Additional History: N/A    Procedures Today: N/A    Urinalysis today:  No results found for this visit on 09/19/22.     Imaging Reviewed during this Office Visit: none  (results were independently reviewed by physician and radiology report verified)      Last BUN and creatinine:  Lab Results   Component Value Date    BUN 13 05/16/2022     Lab Results   Component Value Date    CREATININE 0.53 05/16/2022       Additional Lab/Culture results: none    PAST MEDICAL, FAMILY AND SOCIAL HISTORY UPDATE:  Past Medical History:   Diagnosis Date    Arrhythmia     Breast mass     CAD (coronary artery disease)     with valvular disease    Chronic neck pain     Coccygeal pain 3/7/2016    Constipation     COVID-19 04/2020    Depression     Diabetic neuropathy ([facility]     History of hepatitis A     possible history of hepatitis A in the past    History of renal calculi     Hypertension     Hyperthyroidism     Hypothyroidism     Morbid obesity with BMI of 45.0-49.9, adult ([facility] 3/31/2016    Nephrolithiasis     Neuropathy     Pancreatic cyst     PONV (postoperative nausea and vomiting)     Type II or unspecified type diabetes mellitus without mention of complication, not stated as uncontrolled     non insulin dependent     Ulcerative colitis ([facility]     UTI (lower urinary tract infection)      Past Surgical History:   Procedure Laterality Date    BREAST BIOPSY  2012    negative    CARDIOVASCULAR STRESS TEST      7/17/15 no results    CHOLECYSTECTOMY      COLONOSCOPY N/A 10/26/2021    COLONOSCOPY POLYPECTOMY SNARE/COLD BIOPSY performed by Antonia Velasco MD at [address]      right hand    CYSTO/URETERO/PYELOSCOPY, CALCULUS TX Right 8/26/2019    CYSTOSCOPY URETEROSCOPY  STONE BASKET RETRIEVAL RIGHT STENT EXCHANGE performed by Gabbie Martinez MD at [id number] [place] [address] 8/20/2019    CYSTOSCOPY URETERAL STENT INSERTION performed by Gabbie Martinez MD at [address] [address]    ENDOSCOPY, COLON, DIAGNOSTIC      LITHOTRIPSY      TONSILLECTOMY      TUBAL LIGATION      UPPER GASTROINTESTINAL ENDOSCOPY N/A 4/16/2020    EGD BIOPSY performed by Antonia Velasco MD at [address] [facility] N/A 10/26/2021    EGD BIOPSY performed by Antonia Velasco MD at [address] [facility]     Family History   Problem Relation Age of Onset    Coronary Art Dis Mother     Lung Cancer Mother     Diabetes Mother         mellitus    Coronary Art Dis Father     Diabetes Father         diabetes mellitus     Outpatient Medications Marked as Taking for the 9/19/22 encounter (Office Visit) with Melody Rubio, APRN - CNP   Medication Sig Dispense Refill    ciprofloxacin (CIPRO) 500 MG tablet Take 1 tablet by mouth 2 times daily for 10 days 20 tablet 0    oxybutynin (DITROPAN-XL) 10 MG extended release tablet       ketoconazole (NIZORAL) 2 % cream Apply topically  2 times a day. 1 each 0    mupirocin (BACTROBAN) 2 % cream Apply 3 times daily. 1 each 0    nystatin (MYCOSTATIN) 693683 UNIT/GM powder Apply 3 times daily. 1 each 0    lisinopril (PRINIVIL;ZESTRIL) 2.5 MG tablet take 1 tablet by mouth once daily 90 tablet 0    zoster recombinant adjuvanted vaccine (SHINGRIX) 50 MCG/0.5ML SUSR injection 50 MCG IM then repeat 2-6 months. 0.5 mL 1    Insulin Degludec (TRESIBA FLEXTOUCH) 200 UNIT/ML SOPN INJECT 80 UNITS INTO THE SKIN NIGHTLY 9 mL 0    insulin lispro, 1 Unit Dial, 100 UNIT/ML SOPN INJECT 18 UNITS UNDER THE SKIN THREE TIMES A DAY BEFORE MEALS 9 mL 1    gabapentin (NEURONTIN) 600 MG tablet take 1 tablet by mouth twice a day 60 tablet 0    glipiZIDE (GLUCOTROL) 10 MG tablet take 2 tablets by mouth twice a day 180 tablet 0    FARXIGA 10 MG tablet TAKE ONE TABLET BY MOUTH EVERY MORNING 30 tablet 0    RA PEN NEEDLES 31G X 8 MM MISC INJECTING TWICE DAILY DX: E11.65 100 each 0    levothyroxine (SYNTHROID) 150 MCG tablet Take 1 tablet by mouth once a week Indications: Sundays Take 150 mcg by mouth once a week Indications: Sundays 60 tablet 1    atorvastatin (LIPITOR) 80 MG tablet TAKE ONE TABLET BY MOUTH DAILY 30 tablet 3    ketoconazole (NIZORAL) 2 % cream Apply topically  2 times a day.  (Patient taking differently: Apply topically  2 times a day.) 1 each 1    tiZANidine (ZANAFLEX) 4 MG tablet Take 1 tablet by mouth at bedtime 30 tablet 0    oxybutynin (DITROPAN XL) 15 MG extended release tablet Take 1 tablet by mouth daily 90 tablet 3    lidocaine (LMX) 4 % cream Apply topically every 8 hrs as needed for pain 45 g 3    DULoxetine (CYMBALTA) 60 MG extended release capsule take 1 capsule by mouth once daily 90 capsule 3    potassium chloride (KLOR-CON M) 10 MEQ extended release tablet take 1 tablet by mouth once daily 90 tablet 3    Continuous Blood Gluc Sensor (DEXCOM G6 SENSOR) MISC       ASPIRIN LOW DOSE 81 MG EC tablet take 1 tablet by mouth once daily 90 tablet 1    levothyroxine (SYNTHROID) 75 MCG tablet Take 75 mcg by mouth Six times weekly Indications: All days except Sundays      lidocaine (LIDODERM) 5 % Place 1 patch onto the skin daily 12 hours on, 12 hours off. 10 patch 0    furosemide (LASIX) 20 MG tablet take 1 tablet by mouth once daily 90 tablet 3      (All medications reviewed and updated by provider sincelast office visit or hospitalization)   Cefuroxime axetil; Dilaudid [hydromorphone]; Metformin and related; Sulfa antibiotics; Amoxicillin; Antihistamines, loratadine-type; Eggs or egg-derived products; Lorazepam; Nubain [nalbuphine hcl]; and Vistaril [hydroxyzine hcl]  Social History     Tobacco Use   Smoking Status Former    Packs/day: 0.01    Years: 1.00    Pack years: 0.01    Types: Cigarettes    Quit date: 5    Years since quittin.7   Smokeless Tobacco Never   Tobacco Comments    pt states she quit smoking cigarettes at the age of 14      (If patient a smoker, smoking cessation counseling offered)     Social History     Substance and Sexual Activity   Alcohol Use No    Alcohol/week: 0.0 standard drinks       REVIEW OF SYSTEMS:  Review of Systems      Physical Exam:      Vitals:    22 1129   BP: 110/76   Temp: 98 °F (36.7 °C)     Body mass index is 43.71 kg/m². Patient is a 74 y.o. female in noacute distress and alert and oriented to person, place and time. Physical Exam  Constitutional: Patient in no acute distress. Neuro: Alert andoriented to person, place and time.   Psych: Mood normal, affect normal  Lungs: Respiratory effort is normal  Cardiovascular: Warm & Pink  Abdomen: Soft, non-tender, non-distended with no CVA,  No flank tenderness  Bladder non-tender and not distended. Musculoskeletal: Normal gait and station      and Plan      1. Mixed stress and urge urinary incontinence    2. History of kidney stones    3. History of recurrent UTIs           Plan:   She continues to have bothersome urinary incontinence. Discussed conservative measures for stress and urge incontinence. Encouraged weight loss, avoiding irritants, timed and double voiding. Will arrange for urodynamics then cysto. She can continue oxybutynin in the meantime, as it was originally rx'ed for hyperhidrosis. Update renal us (orders were placed last visit and still active). Stone prevention reviewed. Call with any new or worsening urinary symptoms, questions or concerns. Return for urodynamics, then cysto. Prescriptions Ordered:  No orders of the defined types were placed in this encounter. Orders Placed:  No orders of the defined types were placed in this encounter. RASHMI Malloy CNP    Reviewed and agree with the ROS entered by the MA.

## 2022-09-19 NOTE — PROGRESS NOTES
Review of Systems   Constitutional:  Negative for chills, fatigue and fever. Eyes:  Negative for pain, redness and visual disturbance. Respiratory:  Negative for cough, shortness of breath and wheezing. Cardiovascular:  Negative for chest pain and leg swelling. Gastrointestinal:  Negative for abdominal pain, constipation, nausea and vomiting. Genitourinary:  Positive for frequency and urgency. Negative for difficulty urinating, dysuria, flank pain and hematuria. Musculoskeletal:  Negative for back pain, joint swelling and myalgias. Skin:  Positive for rash. Negative for wound. Neurological:  Negative for dizziness, tremors and numbness. Hematological:  Does not bruise/bleed easily.

## 2022-09-23 DIAGNOSIS — E11.65 UNCONTROLLED TYPE 2 DIABETES MELLITUS WITH HYPERGLYCEMIA (HCC): ICD-10-CM

## 2022-09-23 RX ORDER — INSULIN DEGLUDEC 200 U/ML
INJECTION, SOLUTION SUBCUTANEOUS
Qty: 9 ML | Refills: 0 | Status: SHIPPED | OUTPATIENT
Start: 2022-09-23 | End: 2022-10-12

## 2022-09-23 NOTE — TELEPHONE ENCOUNTER
Please Approve or Refuse.   Send to Pharmacy per Pt's Request: Loreta Patel      Next Visit Date:  12/15/2022   Last Visit Date: 9/15/2022    Hemoglobin A1C (%)   Date Value   08/30/2022 7.5   05/31/2022 8.0   01/14/2022 9.7 (H)             ( goal A1C is < 7)   BP Readings from Last 3 Encounters:   09/19/22 110/76   09/15/22 128/78   08/30/22 130/72          (goal 120/80)  BUN   Date Value Ref Range Status   05/16/2022 13 8 - 23 mg/dL Final     Creatinine   Date Value Ref Range Status   05/16/2022 0.53 0.50 - 0.90 mg/dL Final     Potassium   Date Value Ref Range Status   05/16/2022 4.6 3.7 - 5.3 mmol/L Final

## 2022-09-26 LAB — CYTOLOGY REPORT: NORMAL

## 2022-09-26 RX ORDER — ATORVASTATIN CALCIUM 80 MG/1
TABLET, FILM COATED ORAL
Qty: 90 TABLET | Refills: 0 | Status: SHIPPED | OUTPATIENT
Start: 2022-09-26

## 2022-09-26 NOTE — TELEPHONE ENCOUNTER
Steffanie Breaux is calling to request a refill on the following medication(s):    Medication Request:  Requested Prescriptions     Pending Prescriptions Disp Refills    atorvastatin (LIPITOR) 80 MG tablet [Pharmacy Med Name: ATORVASTATIN 80 MG TABLET] 90 tablet      Sig: TAKE ONE TABLET BY MOUTH DAILY       Last Visit Date (If Applicable):  6/58/2138    Next Visit Date:    12/15/2022

## 2022-09-27 ENCOUNTER — HOSPITAL ENCOUNTER (OUTPATIENT)
Age: 67
Discharge: HOME | End: 2022-09-27
Payer: MEDICARE

## 2022-09-27 ENCOUNTER — TELEPHONE (OUTPATIENT)
Dept: FAMILY MEDICINE CLINIC | Age: 67
End: 2022-09-27

## 2022-09-27 DIAGNOSIS — N39.0 RECURRENT UTI: Primary | ICD-10-CM

## 2022-09-27 DIAGNOSIS — N39.0 RECURRENT UTI: ICD-10-CM

## 2022-09-27 LAB
BACTERIA: ABNORMAL
BILIRUBIN URINE: NEGATIVE
CASTS UA: ABNORMAL /LPF
COLOR: YELLOW
EPITHELIAL CELLS UA: ABNORMAL /HPF
GLUCOSE URINE: ABNORMAL
KETONES, URINE: NEGATIVE
LEUKOCYTE ESTERASE, URINE: ABNORMAL
NITRITE, URINE: NEGATIVE
PH UA: 5 (ref 5–8)
PROTEIN UA: NEGATIVE
RBC UA: ABNORMAL /HPF
SPECIFIC GRAVITY UA: 1.03 (ref 1–1.03)
TURBIDITY: CLEAR
URINE HGB: ABNORMAL
UROBILINOGEN, URINE: NORMAL
WBC UA: ABNORMAL /HPF

## 2022-09-27 PROCEDURE — 87086 URINE CULTURE/COLONY COUNT: CPT

## 2022-09-27 PROCEDURE — 81001 URINALYSIS AUTO W/SCOPE: CPT

## 2022-09-27 PROCEDURE — 36415 COLL VENOUS BLD VENIPUNCTURE: CPT

## 2022-09-27 RX ORDER — GABAPENTIN 600 MG/1
TABLET ORAL
Qty: 60 TABLET | Refills: 0 | Status: SHIPPED | OUTPATIENT
Start: 2022-09-27 | End: 2022-10-28

## 2022-09-27 RX ORDER — BLOOD-GLUCOSE,RECEIVER,CONT
EACH MISCELLANEOUS
Qty: 1 EACH | Refills: 0 | Status: SHIPPED | OUTPATIENT
Start: 2022-09-27

## 2022-09-27 RX ORDER — BLOOD-GLUCOSE TRANSMITTER
EACH MISCELLANEOUS
Qty: 1 EACH | Refills: 0 | Status: SHIPPED | OUTPATIENT
Start: 2022-09-27

## 2022-09-27 RX ORDER — BLOOD-GLUCOSE SENSOR
EACH MISCELLANEOUS
Qty: 3 EACH | Refills: 0 | Status: SHIPPED | OUTPATIENT
Start: 2022-09-27

## 2022-09-27 NOTE — TELEPHONE ENCOUNTER
Patient called stated that she is still having some ongoing issues , rash in groin area that is pinkish red, also c/o urinating freq. And also cloudy urine. Please advise. Please Approve or Refuse.   Send to Pharmacy per Pt's Request: [email or web address]        Next Visit Date:  12/15/2022   Last Visit Date: 9/15/2022    Hemoglobin A1C (%)   Date Value   08/30/2022 7.5   05/31/2022 8.0   01/14/2022 9.7 (H)             ( goal A1C is < 7)   BP Readings from Last 3 Encounters:   09/19/22 110/76   09/15/22 128/78   08/30/22 130/72          (goal 120/80)  BUN   Date Value Ref Range Status   05/16/2022 13 8 - 23 mg/dL Final     Creatinine   Date Value Ref Range Status   05/16/2022 0.53 0.50 - 0.90 mg/dL Final     Potassium   Date Value Ref Range Status   05/16/2022 4.6 3.7 - 5.3 mmol/L Final

## 2022-09-27 NOTE — TELEPHONE ENCOUNTER
I ordered a repeat urine test and also referred her to infectious disease specialist.  Patient was recently treated about a week ago she can repeat the urine test and also see urologist as well as infectious disease. Patient can use those ointments which were prescribed on previous appointment. She most probably needed surgery.

## 2022-09-28 LAB
CULTURE: NORMAL
SPECIMEN DESCRIPTION: NORMAL

## 2022-10-06 ENCOUNTER — HOSPITAL ENCOUNTER (OUTPATIENT)
Dept: ULTRASOUND IMAGING | Age: 67
Discharge: HOME | End: 2022-10-08
Payer: MEDICARE

## 2022-10-06 DIAGNOSIS — Z87.442 HISTORY OF KIDNEY STONES: ICD-10-CM

## 2022-10-06 PROCEDURE — 76770 US EXAM ABDO BACK WALL COMP: CPT

## 2022-10-10 ENCOUNTER — TELEPHONE (OUTPATIENT)
Dept: GASTROENTEROLOGY | Age: 67
End: 2022-10-10

## 2022-10-10 NOTE — TELEPHONE ENCOUNTER
Patient no showed 3 appointments within the year she is not aloud to be scheduled with this practice any more.

## 2022-10-11 RX ORDER — GLIPIZIDE 10 MG/1
TABLET ORAL
Qty: 180 TABLET | Refills: 0 | Status: SHIPPED | OUTPATIENT
Start: 2022-10-11

## 2022-10-11 NOTE — TELEPHONE ENCOUNTER
Please Approve or Refuse.   Send to Pharmacy per Pt's Request: nuzhat      Next Visit Date:  12/15/2022   Last Visit Date: 9/15/2022    Hemoglobin A1C (%)   Date Value   08/30/2022 7.5   05/31/2022 8.0   01/14/2022 9.7 (H)             ( goal A1C is < 7)   BP Readings from Last 3 Encounters:   09/19/22 110/76   09/15/22 128/78   08/30/22 130/72          (goal 120/80)  BUN   Date Value Ref Range Status   05/16/2022 13 8 - 23 mg/dL Final     Creatinine   Date Value Ref Range Status   05/16/2022 0.53 0.50 - 0.90 mg/dL Final     Potassium   Date Value Ref Range Status   05/16/2022 4.6 3.7 - 5.3 mmol/L Final

## 2022-10-12 DIAGNOSIS — E11.65 UNCONTROLLED TYPE 2 DIABETES MELLITUS WITH HYPERGLYCEMIA (HCC): ICD-10-CM

## 2022-10-12 RX ORDER — INSULIN DEGLUDEC 200 U/ML
INJECTION, SOLUTION SUBCUTANEOUS
Qty: 9 ML | Refills: 0 | Status: SHIPPED | OUTPATIENT
Start: 2022-10-12 | End: 2022-10-28 | Stop reason: SDUPTHER

## 2022-10-12 NOTE — TELEPHONE ENCOUNTER
Please Approve or Refuse.   Send to Pharmacy per Pt's Request: nuzhat pelletier      Next Visit Date:  12/15/2022   Last Visit Date: 9/15/2022    Hemoglobin A1C (%)   Date Value   08/30/2022 7.5   05/31/2022 8.0   01/14/2022 9.7 (H)             ( goal A1C is < 7)   BP Readings from Last 3 Encounters:   09/19/22 110/76   09/15/22 128/78   08/30/22 130/72          (goal 120/80)  BUN   Date Value Ref Range Status   05/16/2022 13 8 - 23 mg/dL Final     Creatinine   Date Value Ref Range Status   05/16/2022 0.53 0.50 - 0.90 mg/dL Final     Potassium   Date Value Ref Range Status   05/16/2022 4.6 3.7 - 5.3 mmol/L Final

## 2022-10-19 NOTE — TELEPHONE ENCOUNTER
Patient called in today to let you know that she is now having blood in urine, it comes and goes. She states it may be a kidney stone because she has had those in the past. She had an US done on 10/06/22 and there is notes in there that they spoke with patient on 10/10/22 to tell her it was normal but she states nobody has called her. She would like to see what she should be doing. She also states she has not followed up with urologist either. Please advise.

## 2022-10-19 NOTE — TELEPHONE ENCOUNTER
Ultrasound does not show kidney stones, usually CT scan is best test for kidney stones. If she still has blood in urine I have referred her to urologist in September, please provide her information she can schedule appointment. She needs cystoscopy which urologist only can do.

## 2022-10-28 DIAGNOSIS — E11.65 UNCONTROLLED TYPE 2 DIABETES MELLITUS WITH HYPERGLYCEMIA (HCC): ICD-10-CM

## 2022-10-28 RX ORDER — GABAPENTIN 600 MG/1
TABLET ORAL
Qty: 60 TABLET | Refills: 0 | Status: SHIPPED | OUTPATIENT
Start: 2022-10-28 | End: 2022-11-28

## 2022-10-28 RX ORDER — INSULIN DEGLUDEC 200 U/ML
INJECTION, SOLUTION SUBCUTANEOUS
Qty: 9 ML | Refills: 3 | Status: SHIPPED | OUTPATIENT
Start: 2022-10-28

## 2022-10-28 RX ORDER — INSULIN LISPRO 100 [IU]/ML
INJECTION, SOLUTION INTRAVENOUS; SUBCUTANEOUS
Qty: 9 ML | Refills: 1 | Status: SHIPPED | OUTPATIENT
Start: 2022-10-28 | End: 2022-11-28

## 2022-10-28 NOTE — TELEPHONE ENCOUNTER
Last Visit:  9/15/2022     Next Visit Date:  Future Appointments   Date Time Provider Aleksandra Cooki   11/9/2022  8:30 AM Willard Darnell, APRN - [address]   11/9/2022 10:30 AM Janet Anglin MD INF [facility]   12/15/2022  1:45 PM Piotr Monahan MD fp sc [facility]   9/5/2023 11:30 AM Piotr Monahan MD fp sc [address] Maintenance   Topic Date Due    Shingles vaccine (1 of 2) Never done    DEXA (modify frequency per FRAX score)  Never done    Diabetic retinal exam  03/07/2017    Diabetic foot exam  07/07/2018    COVID-19 Vaccine (4 - Booster for Pfizer series) 03/05/2022    Colorectal Cancer Screen  04/26/2022    Flu vaccine (1) Never done    Diabetic microalbuminuria test  01/14/2023    Lipids  01/14/2023    A1C test (Diabetic or Prediabetic)  08/30/2023    Depression Monitoring  08/30/2023    Annual Wellness Visit (AWV)  08/31/2023    Breast cancer screen  09/08/2024    Cervical cancer screen  09/16/2025    DTaP/Tdap/Td vaccine (3 - Td or Tdap) 06/16/2032    Pneumococcal 65+ years Vaccine  Completed    Hepatitis C screen  Completed    HIV screen  Addressed    Hepatitis A vaccine  Aged Out    Hib vaccine  Aged Out    Meningococcal (ACWY) vaccine  Aged Out       Hemoglobin A1C (%)   Date Value   08/30/2022 7.5   05/31/2022 8.0   01/14/2022 9.7 (H)             ( goal A1C is < 7)   Microalb/Crt.  Ratio (mcg/mg creat)   Date Value   01/14/2022 Can not be calculated     LDL Cholesterol (mg/dL)   Date Value   01/14/2022 69   08/18/2020 72       (goal LDL is <100)   AST (U/L)   Date Value   06/17/2022 36 (H)     ALT (U/L)   Date Value   06/17/2022 78 (H)     BUN (mg/dL)   Date Value   05/16/2022 13     BP Readings from Last 3 Encounters:   09/19/22 110/76   09/15/22 128/78   08/30/22 130/72          (goal 120/80)    All Future Testing planned in [facility]  Lab Frequency Next Occurrence   VIDHYA DIGITAL DIAGNOSTIC W OR WO CAD BILATERAL Once 10/11/2021   US PELVIS COMPLETE Once 04/21/2022   US NON OB TRANSVAGINAL Once 04/21/2022   VIDHYA DIGITAL DIAGNOSTIC W OR WO CAD BILATERAL Once 04/21/2022   VIDHYA Digital Screen Bilateral [[id number]] Once 11/30/2022   Hemoglobin A1C Once 07/12/2022   DEXA BONE DENSITY AXIAL SKELETON Once 04/13/2022   TSH with Reflex Once 04/13/2022   DEXA Bone Density Axial Skeleton Once 09/30/2022   PAP Smear Once 09/15/2022   CBC with Auto Differential Once 12/14/2022   Nasal cannula oxygen PRN                Patient Active Problem List:     Chronic neck pain     Abnormal mammogram     Diabetes type 2, uncontrolled     DM neuropathy takes Percocet     Hypothyroidism     Depression     MVP (mitral valve prolapse)     Constipation     Chronic prescription benzodiazepine use     Chronic use of opiate drugs therapeutic purposes     Morbid obesity with BMI of 45.0-49.9, adult (HCC)     Thickened endometrium     Mediastinal cyst     Uncontrolled type 2 diabetes mellitus with diabetic neuropathy, with long-term current use of insulin     Essential hypertension     Chronic pain of both knees     Nephrolithiasis     Ureterolithiasis     Mixed hyperlipidemia     Coronary artery disease involving native coronary artery of native heart without angina pectoris     Generalized anxiety disorder     Migraine without aura, not intractable     Mixed dyslipidemia     Seizure disorder (HCC)     Arthritis involving multiple sites     Arthritis of right hand     Gastroesophageal reflux disease without esophagitis     Insomnia     Chronic diastolic congestive heart failure ([address]     Major depressive disorder with single episode, in partial remission ([address]     Pancreatic cyst     Family history of uterine cancer     Polyp of ascending colon     Adenomatous polyp of colon     Duodenal diverticulum     Lumbosacral radiculopathy due to degenerative joint disease of spine     Transaminasemia     Pyelonephritis     Intractable nausea and vomiting

## 2022-10-31 RX ORDER — INSULIN DEGLUDEC 200 U/ML
INJECTION, SOLUTION SUBCUTANEOUS
Qty: 9 ML | Refills: 0 | OUTPATIENT
Start: 2022-10-31

## 2022-11-11 RX ORDER — LEVOTHYROXINE SODIUM 175 UG/1
TABLET ORAL
Qty: 77 TABLET | Refills: 1 | Status: SHIPPED | OUTPATIENT
Start: 2022-11-11

## 2022-11-26 DIAGNOSIS — E11.65 UNCONTROLLED TYPE 2 DIABETES MELLITUS WITH HYPERGLYCEMIA (HCC): ICD-10-CM

## 2022-11-28 RX ORDER — INSULIN LISPRO 100 [IU]/ML
INJECTION, SOLUTION INTRAVENOUS; SUBCUTANEOUS
Qty: 9 ML | Refills: 1 | Status: SHIPPED | OUTPATIENT
Start: 2022-11-28

## 2022-11-28 RX ORDER — GABAPENTIN 600 MG/1
TABLET ORAL
Qty: 60 TABLET | Refills: 0 | Status: SHIPPED | OUTPATIENT
Start: 2022-11-28 | End: 2022-12-28

## 2022-12-01 RX ORDER — LISINOPRIL 2.5 MG/1
TABLET ORAL
Qty: 90 TABLET | Refills: 3 | Status: SHIPPED | OUTPATIENT
Start: 2022-12-01

## 2022-12-12 DIAGNOSIS — E11.65 UNCONTROLLED TYPE 2 DIABETES MELLITUS WITH HYPERGLYCEMIA (HCC): ICD-10-CM

## 2022-12-12 RX ORDER — INSULIN DEGLUDEC 200 U/ML
INJECTION, SOLUTION SUBCUTANEOUS
Qty: 9 ML | Refills: 3 | Status: SHIPPED | OUTPATIENT
Start: 2022-12-12

## 2022-12-12 RX ORDER — GLIPIZIDE 10 MG/1
TABLET ORAL
Qty: 180 TABLET | Refills: 0 | Status: SHIPPED | OUTPATIENT
Start: 2022-12-12

## 2022-12-14 DIAGNOSIS — I10 ESSENTIAL HYPERTENSION: ICD-10-CM

## 2022-12-14 DIAGNOSIS — I34.1 MVP (MITRAL VALVE PROLAPSE): ICD-10-CM

## 2022-12-14 NOTE — TELEPHONE ENCOUNTER
Please Approve or Refuse.   Send to Pharmacy per Pt's Request: [address] ([id number])      Next Visit Date:  12/15/2022   Last Visit Date: 9/15/2022    Hemoglobin A1C (%)   Date Value   08/30/2022 7.5   05/31/2022 8.0   01/14/2022 9.7 (H)             ( goal A1C is < 7)   BP Readings from Last 3 Encounters:   09/19/22 110/76   09/15/22 128/78   08/30/22 130/72          (goal 120/80)  BUN   Date Value Ref Range Status   05/16/2022 13 8 - 23 mg/dL Final     Creatinine   Date Value Ref Range Status   05/16/2022 0.53 0.50 - 0.90 mg/dL Final     Potassium   Date Value Ref Range Status   05/16/2022 4.6 3.7 - 5.3 mmol/L Final

## 2022-12-15 ENCOUNTER — OFFICE VISIT (OUTPATIENT)
Dept: FAMILY MEDICINE CLINIC | Age: 67
End: 2022-12-15
Payer: MEDICARE

## 2022-12-15 VITALS
TEMPERATURE: 97.8 F | SYSTOLIC BLOOD PRESSURE: 128 MMHG | BODY MASS INDEX: 44.46 KG/M2 | WEIGHT: 241.6 LBS | DIASTOLIC BLOOD PRESSURE: 76 MMHG | HEART RATE: 83 BPM | OXYGEN SATURATION: 95 % | HEIGHT: 62 IN

## 2022-12-15 DIAGNOSIS — E78.2 MIXED HYPERLIPIDEMIA: ICD-10-CM

## 2022-12-15 DIAGNOSIS — E11.65 UNCONTROLLED TYPE 2 DIABETES MELLITUS WITH HYPERGLYCEMIA (HCC): Primary | ICD-10-CM

## 2022-12-15 DIAGNOSIS — E03.9 ACQUIRED HYPOTHYROIDISM: ICD-10-CM

## 2022-12-15 DIAGNOSIS — E55.9 VITAMIN D DEFICIENCY: ICD-10-CM

## 2022-12-15 DIAGNOSIS — R79.89 ELEVATED FERRITIN: ICD-10-CM

## 2022-12-15 DIAGNOSIS — F32.4 MAJOR DEPRESSIVE DISORDER WITH SINGLE EPISODE, IN PARTIAL REMISSION (HCC): ICD-10-CM

## 2022-12-15 DIAGNOSIS — M47.27 LUMBOSACRAL RADICULOPATHY DUE TO DEGENERATIVE JOINT DISEASE OF SPINE: ICD-10-CM

## 2022-12-15 DIAGNOSIS — Z23 NEED FOR INFLUENZA VACCINATION: ICD-10-CM

## 2022-12-15 DIAGNOSIS — I25.10 CORONARY ARTERY DISEASE INVOLVING NATIVE CORONARY ARTERY OF NATIVE HEART WITHOUT ANGINA PECTORIS: ICD-10-CM

## 2022-12-15 DIAGNOSIS — I50.32 CHRONIC DIASTOLIC CONGESTIVE HEART FAILURE (HCC): ICD-10-CM

## 2022-12-15 DIAGNOSIS — R74.8 ELEVATED ALKALINE PHOSPHATASE LEVEL: ICD-10-CM

## 2022-12-15 DIAGNOSIS — K76.9 LIVER DISEASE: ICD-10-CM

## 2022-12-15 DIAGNOSIS — I10 ESSENTIAL HYPERTENSION: ICD-10-CM

## 2022-12-15 PROBLEM — N12 PYELONEPHRITIS: Status: RESOLVED | Noted: 2022-05-17 | Resolved: 2022-12-15

## 2022-12-15 PROBLEM — G40.909 SEIZURE DISORDER (HCC): Status: RESOLVED | Noted: 2017-09-21 | Resolved: 2022-12-15

## 2022-12-15 PROBLEM — R11.2 INTRACTABLE NAUSEA AND VOMITING: Status: RESOLVED | Noted: 2022-05-17 | Resolved: 2022-12-15

## 2022-12-15 LAB — HBA1C MFR BLD: 8 %

## 2022-12-15 PROCEDURE — 3052F HG A1C>EQUAL 8.0%<EQUAL 9.0%: CPT | Performed by: FAMILY MEDICINE

## 2022-12-15 PROCEDURE — 99215 OFFICE O/P EST HI 40 MIN: CPT | Performed by: FAMILY MEDICINE

## 2022-12-15 PROCEDURE — G8400 PT W/DXA NO RESULTS DOC: HCPCS | Performed by: FAMILY MEDICINE

## 2022-12-15 PROCEDURE — 2022F DILAT RTA XM EVC RTNOPTHY: CPT | Performed by: FAMILY MEDICINE

## 2022-12-15 PROCEDURE — 1090F PRES/ABSN URINE INCON ASSESS: CPT | Performed by: FAMILY MEDICINE

## 2022-12-15 PROCEDURE — G8427 DOCREV CUR MEDS BY ELIG CLIN: HCPCS | Performed by: FAMILY MEDICINE

## 2022-12-15 PROCEDURE — 1036F TOBACCO NON-USER: CPT | Performed by: FAMILY MEDICINE

## 2022-12-15 PROCEDURE — 1123F ACP DISCUSS/DSCN MKR DOCD: CPT | Performed by: FAMILY MEDICINE

## 2022-12-15 PROCEDURE — G8417 CALC BMI ABV UP PARAM F/U: HCPCS | Performed by: FAMILY MEDICINE

## 2022-12-15 PROCEDURE — G8484 FLU IMMUNIZE NO ADMIN: HCPCS | Performed by: FAMILY MEDICINE

## 2022-12-15 PROCEDURE — 3078F DIAST BP <80 MM HG: CPT | Performed by: FAMILY MEDICINE

## 2022-12-15 PROCEDURE — 83036 HEMOGLOBIN GLYCOSYLATED A1C: CPT | Performed by: FAMILY MEDICINE

## 2022-12-15 PROCEDURE — 3074F SYST BP LT 130 MM HG: CPT | Performed by: FAMILY MEDICINE

## 2022-12-15 PROCEDURE — 3017F COLORECTAL CA SCREEN DOC REV: CPT | Performed by: FAMILY MEDICINE

## 2022-12-15 RX ORDER — DULAGLUTIDE 0.75 MG/.5ML
0.75 INJECTION, SOLUTION SUBCUTANEOUS
Qty: 4 ADJUSTABLE DOSE PRE-FILLED PEN SYRINGE | Refills: 5 | Status: SHIPPED | OUTPATIENT
Start: 2022-12-15

## 2022-12-15 SDOH — ECONOMIC STABILITY: FOOD INSECURITY: WITHIN THE PAST 12 MONTHS, THE FOOD YOU BOUGHT JUST DIDN'T LAST AND YOU DIDN'T HAVE MONEY TO GET MORE.: NEVER TRUE

## 2022-12-15 SDOH — ECONOMIC STABILITY: FOOD INSECURITY: WITHIN THE PAST 12 MONTHS, YOU WORRIED THAT YOUR FOOD WOULD RUN OUT BEFORE YOU GOT MONEY TO BUY MORE.: NEVER TRUE

## 2022-12-15 ASSESSMENT — ENCOUNTER SYMPTOMS
ABDOMINAL PAIN: 0
TROUBLE SWALLOWING: 0
BACK PAIN: 1
DIARRHEA: 0
COUGH: 0
CHEST TIGHTNESS: 0
SHORTNESS OF BREATH: 0
STRIDOR: 0
RHINORRHEA: 0
BLOOD IN STOOL: 0
VOMITING: 0
COLOR CHANGE: 0
NAUSEA: 0
EYE REDNESS: 0
SINUS PRESSURE: 0
CONSTIPATION: 0
RECTAL PAIN: 0
SORE THROAT: 0
ABDOMINAL DISTENTION: 0
WHEEZING: 0

## 2022-12-15 ASSESSMENT — PATIENT HEALTH QUESTIONNAIRE - PHQ9
4. FEELING TIRED OR HAVING LITTLE ENERGY: 3
2. FEELING DOWN, DEPRESSED OR HOPELESS: 1
SUM OF ALL RESPONSES TO PHQ QUESTIONS 1-9: 11
9. THOUGHTS THAT YOU WOULD BE BETTER OFF DEAD, OR OF HURTING YOURSELF: 0
8. MOVING OR SPEAKING SO SLOWLY THAT OTHER PEOPLE COULD HAVE NOTICED. OR THE OPPOSITE, BEING SO FIGETY OR RESTLESS THAT YOU HAVE BEEN MOVING AROUND A LOT MORE THAN USUAL: 3
6. FEELING BAD ABOUT YOURSELF - OR THAT YOU ARE A FAILURE OR HAVE LET YOURSELF OR YOUR FAMILY DOWN: 1
10. IF YOU CHECKED OFF ANY PROBLEMS, HOW DIFFICULT HAVE THESE PROBLEMS MADE IT FOR YOU TO DO YOUR WORK, TAKE CARE OF THINGS AT HOME, OR GET ALONG WITH OTHER PEOPLE: 0
SUM OF ALL RESPONSES TO PHQ QUESTIONS 1-9: 11
1. LITTLE INTEREST OR PLEASURE IN DOING THINGS: 0
SUM OF ALL RESPONSES TO PHQ QUESTIONS 1-9: 11
5. POOR APPETITE OR OVEREATING: 0
SUM OF ALL RESPONSES TO PHQ QUESTIONS 1-9: 11
SUM OF ALL RESPONSES TO PHQ9 QUESTIONS 1 & 2: 1
3. TROUBLE FALLING OR STAYING ASLEEP: 3
7. TROUBLE CONCENTRATING ON THINGS, SUCH AS READING THE NEWSPAPER OR WATCHING TELEVISION: 0

## 2022-12-15 ASSESSMENT — SOCIAL DETERMINANTS OF HEALTH (SDOH): HOW HARD IS IT FOR YOU TO PAY FOR THE VERY BASICS LIKE FOOD, HOUSING, MEDICAL CARE, AND HEATING?: NOT HARD AT ALL

## 2022-12-15 NOTE — PROGRESS NOTES
Chief Complaint   Patient presents with    Diabetes    Hypertension    Hyperlipidemia    Discuss Medications     AMOUNT OF REFILLS ON RX    Immunizations     NO TO FLU VACCINE         Heather Moseley  here today for follow up on chronic medical problems, go over labs and/or diagnostic studies, and medication refills. Diabetes, Hypertension, Hyperlipidemia, Discuss Medications (AMOUNT OF REFILLS ON RX), and Immunizations (NO TO FLU VACCINE)      HPI: Patient is scheduled for follow-up on diabetes and hypertension. Diabetes worsening A1c has again increased to 8, patient's blood sugar fluctuates, patient reports she was doing really good on Dexcom. Insurance is not covering anymore. Patient is currently on long-acting insulin and short-acting insulin 3 times per day. Patient monitors her blood sugars 3-4 times per day. Patient denies any hypoglycemic episodes. Reports compliance with medications. Patient has not seen ophthalmologist reports she has a lot of other appointments. Hypothyroidism stable is on higher dose of Synthroid recent TSH is within normal range. Hypertension controlled, patient is on lisinopril reports compliance denies any side effects. Patient has a  Obtained neuropathy and is on Neurontin, denies any side effects or any illicit drug use. CHF stable is on diuretics. Patient denies any increase in leg swelling dyspnea on exertion palpitations. Hyperlipidemia on statins is due for blood work. Patient has severe vitamin D deficiency, patient is not taking any supplements. Patient had elevated liver enzymes and also has elevated alkaline phosphatase, elevated ferritin levels. She had repeat LFT done that showed improvement in liver enzymes. Upon checking her records she had CT abdomen done last year that showed abnormal echotexture of the liver needing further testing with MRI abdomen.           /76   Pulse 83   Temp 97.8 °F (36.6 °C)   Ht 5' 2\" (1.575 m) Wt 241 lb 9.6 oz (109.6 kg)   LMP 01/12/2015 (Approximate)   SpO2 95%   BMI 44.19 kg/m²    Body mass index is 44.19 kg/m². Wt Readings from Last 3 Encounters:   12/15/22 241 lb 9.6 oz (109.6 kg)   09/19/22 239 lb (108.4 kg)   09/15/22 239 lb (108.4 kg)        []Negative depression screening. PHQ Scores 12/15/2022 8/30/2022 1/11/2022 5/6/2021 3/23/2021 9/16/2020 6/16/2020   PHQ2 Score 1 1 0 3 3 0 1   PHQ9 Score 11 8 0 10 15 0 1      []1-4 = Minimal depression   []5-9 = Milddepression   []10-14 = Moderate depression   []15-19 = Moderately severe depression   []20-27 = Severe depression    Discussed testing with the patient and all questions fully answered. Hospital Outpatient Visit on 09/27/2022   Component Date Value Ref Range Status    Color, UA 09/27/2022 Yellow  Yellow Final    Turbidity UA 09/27/2022 Clear  Clear Final    Glucose, Ur 09/27/2022 SMALL (A)  NEGATIVE Final    Bilirubin Urine 09/27/2022 NEGATIVE  NEGATIVE Final    Ketones, Urine 09/27/2022 NEGATIVE  NEGATIVE Final    Specific Gravity, UA 09/27/2022 1.027  1.000 - 1.030 Final    Urine Hgb 09/27/2022 SMALL (A)  NEGATIVE Final    pH, UA 09/27/2022 5.0  5.0 - 8.0 Final    Protein, UA 09/27/2022 NEGATIVE  NEGATIVE Final    Urobilinogen, Urine 09/27/2022 Normal  Normal Final    Nitrite, Urine 09/27/2022 NEGATIVE  NEGATIVE Final    Leukocyte Esterase, Urine 09/27/2022 SMALL (A)  NEGATIVE Final    WBC, UA 09/27/2022 21 TO 50  /HPF Final    RBC, UA 09/27/2022 0 TO 2  /HPF Final    Casts UA 09/27/2022 0 TO 2  /LPF Final    Epithelial Cells UA 09/27/2022 10 TO 20  /HPF Final    Bacteria, UA 09/27/2022 MODERATE (A)  None Final    Specimen Description 09/27/2022 . CLEAN CATCH URINE   Final    Culture 09/27/2022 NO SIGNIFICANT GROWTH   Final         Most recent labs reviewed:     Lab Results   Component Value Date    WBC 13.9 (H) 05/16/2022    HGB 16.9 (H) 05/16/2022    HCT 48.6 (H) 05/16/2022    MCV 87.6 05/16/2022     05/16/2022 @BRIEFLAB(NA,K,CL,CO2,BUN,CREATININE,GLUCOSE,CALCIUM)@     Lab Results   Component Value Date    ALT 78 (H) 06/17/2022    AST 36 (H) 06/17/2022    ALKPHOS 147 (H) 06/17/2022    BILITOT 0.74 06/17/2022       Lab Results   Component Value Date    TSH 0.10 (L) 06/17/2022       Lab Results   Component Value Date    CHOL 156 08/18/2020    CHOL 68 04/14/2020    CHOL 152 07/31/2018     Lab Results   Component Value Date    TRIG 214 (H) 08/18/2020    TRIG 132 04/14/2020    TRIG 378 (H) 07/31/2018     Lab Results   Component Value Date    HDL 35 (L) 01/14/2022    HDL 41 08/18/2020    HDL 26 (L) 04/14/2020     Lab Results   Component Value Date    LDLCHOLESTEROL 69 01/14/2022    LDLCHOLESTEROL 72 08/18/2020    LDLCHOLESTEROL 16 04/14/2020     Lab Results   Component Value Date    VLDL NOT REPORTED (H) 01/14/2022    VLDL NOT REPORTED (H) 08/18/2020    VLDL NOT REPORTED 04/14/2020     Lab Results   Component Value Date    CHOLHDLRATIO 4.3 01/14/2022    CHOLHDLRATIO 3.8 08/18/2020    CHOLHDLRATIO 2.6 04/14/2020       Lab Results   Component Value Date    LABA1C 8.0 12/15/2022       No results found for: [id number]    No results found for: FOLATE    Lab Results   Component Value Date    FERRITIN 699 (H) 04/17/2020       Lab Results   Component Value Date    VITD25 14.0 (L) 01/29/2014             Current Outpatient Medications   Medication Sig Dispense Refill    Dulaglutide (TRULICITY) 9.08 MS/1.3SG SOPN Inject 0.75 mg into the skin every 7 days If pen needle is needed, please request 4 Adjustable Dose Pre-filled Pen Syringe 5    glipiZIDE (GLUCOTROL) 10 MG tablet TAKE TWO TABLETS BY MOUTH TWICE A  tablet 0    Insulin Degludec (TRESIBA FLEXTOUCH) 200 UNIT/ML SOPN INJECT 80 UNITS UNDER THE SKIN ONCE NIGHTLY 9 mL 3    lisinopril (PRINIVIL;ZESTRIL) 2.5 MG tablet take 1 tablet by mouth once daily 90 tablet 3    insulin lispro, 1 Unit Dial, (HUMALOG/ADMELOG) 100 UNIT/ML SOPN INJECT 18 UNITS UNDER THE SKIN THREE TIMES A DAY BEFORE MEALS 9 mL 1    gabapentin (NEURONTIN) 600 MG tablet take 1 tablet by mouth twice a day 60 tablet 0    levothyroxine (SYNTHROID) 175 MCG tablet TAKE ONE TABLET BY MOUTH EVERY MORNING EXCEPT ON SUNDAYS TAKE 150 MCG 77 tablet 1    atorvastatin (LIPITOR) 80 MG tablet TAKE ONE TABLET BY MOUTH DAILY 90 tablet 0    oxybutynin (DITROPAN-XL) 10 MG extended release tablet       ketoconazole (NIZORAL) 2 % cream Apply topically  2 times a day. 1 each 0    nystatin (MYCOSTATIN) 760302 UNIT/GM powder Apply 3 times daily. 1 each 0    zoster recombinant adjuvanted vaccine (SHINGRIX) 50 MCG/0.5ML SUSR injection 50 MCG IM then repeat 2-6 months. 0.5 mL 1    FARXIGA 10 MG tablet TAKE ONE TABLET BY MOUTH EVERY MORNING 30 tablet 0    RA PEN NEEDLES 31G X 8 MM MISC INJECTING TWICE DAILY DX: E11.65 100 each 0    levothyroxine (SYNTHROID) 150 MCG tablet Take 1 tablet by mouth once a week Indications: Sundays Take 150 mcg by mouth once a week Indications: Sundays 60 tablet 1    ketoconazole (NIZORAL) 2 % cream Apply topically  2 times a day. (Patient taking differently: Apply topically  2 times a day.) 1 each 1    tiZANidine (ZANAFLEX) 4 MG tablet Take 1 tablet by mouth at bedtime 30 tablet 0    oxybutynin (DITROPAN XL) 15 MG extended release tablet Take 1 tablet by mouth daily 90 tablet 3    lidocaine (LMX) 4 % cream Apply topically every 8 hrs as needed for pain 45 g 3    DULoxetine (CYMBALTA) 60 MG extended release capsule take 1 capsule by mouth once daily 90 capsule 3    potassium chloride (KLOR-CON M) 10 MEQ extended release tablet take 1 tablet by mouth once daily 90 tablet 3    ASPIRIN LOW DOSE 81 MG EC tablet take 1 tablet by mouth once daily 90 tablet 1    levothyroxine (SYNTHROID) 75 MCG tablet Take 75 mcg by mouth Six times weekly Indications: All days except Sundays      lidocaine (LIDODERM) 5 % Place 1 patch onto the skin daily 12 hours on, 12 hours off.  10 patch 0    furosemide (LASIX) 20 MG tablet take 1 tablet by mouth once daily 90 tablet 3    magnesium citrate solution Take 296 mLs by mouth once for 1 dose 296 mL 0     No current facility-administered medications for this visit.              Social History     Socioeconomic History    Marital status: Single     Spouse name: Not on file    Number of children: Not on file    Years of education: Not on file    Highest education level: Not on file   Occupational History    Not on file   Tobacco Use    Smoking status: Former     Packs/day: 0.01     Years: 1.00     Pack years: 0.01     Types: Cigarettes     Quit date: 5     Years since quittin.9    Smokeless tobacco: Never    Tobacco comments:     pt states she quit smoking cigarettes at the age of 14   Vaping Use    Vaping Use: Never used   Substance and Sexual Activity    Alcohol use: No     Alcohol/week: 0.0 standard drinks    Drug use: Not Currently     Types: Marijuana [place])     Comment: once in a while    Sexual activity: Yes     Partners: Male   Other Topics Concern    Not on file   Social History Narrative    Not on file     Social Determinants of Health     Financial Resource Strain: Low Risk     Difficulty of Paying Living Expenses: Not hard at all   Food Insecurity: No Food Insecurity    Worried About Running Out of Food in the Last Year: Never true    Ran Out of Food in the Last Year: Never true   Transportation Needs: Not on file   Physical Activity: Inactive    Days of Exercise per Week: 0 days    Minutes of Exercise per Session: 0 min   Stress: Not on file   Social Connections: Not on file   Intimate Partner Violence: Not on file   Housing Stability: Not on file     Counseling given: Not Answered  Tobacco comments: pt states she quit smoking cigarettes at the age of 14        Family History   Problem Relation Age of Onset    Coronary Art Dis Mother     Lung Cancer Mother     Diabetes Mother         mellitus    Coronary Art Dis Father     Diabetes Father         diabetes mellitus             -rest of complaints with corresponding details per ROS    The patient's past medical, surgical, social, and family history as well as her current medications and allergies were reviewed as documented intoday's encounter. Review of Systems   Constitutional:  Negative for activity change, appetite change, fatigue, fever and unexpected weight change. HENT:  Negative for congestion, ear pain, postnasal drip, rhinorrhea, sinus pressure, sore throat and trouble swallowing. Eyes:  Positive for visual disturbance. Negative for redness. Respiratory:  Negative for cough, chest tightness, shortness of breath, wheezing and stridor. Cardiovascular:  Negative for chest pain, palpitations and leg swelling. Gastrointestinal:  Negative for abdominal distention, abdominal pain, blood in stool, constipation, diarrhea, nausea, rectal pain and vomiting. Endocrine: Negative for polydipsia, polyphagia and polyuria. Genitourinary:  Negative for difficulty urinating, flank pain, frequency and urgency. Musculoskeletal:  Positive for arthralgias, back pain, gait problem, joint swelling and myalgias. Negative for neck pain. Skin:  Negative for color change, rash and wound. Allergic/Immunologic: Negative for food allergies and immunocompromised state. Neurological:  Positive for weakness and numbness. Negative for dizziness, speech difficulty, light-headedness and headaches. Psychiatric/Behavioral:  Positive for decreased concentration. Negative for agitation, behavioral problems, dysphoric mood, hallucinations, sleep disturbance and suicidal ideas. The patient is nervous/anxious. Physical Exam  Vitals and nursing note reviewed. Constitutional:       General: She is not in acute distress. Appearance: Normal appearance. She is well-developed. She is obese. She is not diaphoretic. HENT:      Head: Normocephalic and atraumatic. Nose: Nose normal.   Eyes:      General:         Right eye: No discharge. Left eye: No discharge. Extraocular Movements: Extraocular movements intact. Conjunctiva/sclera: Conjunctivae normal.      Pupils: Pupils are equal, round, and reactive to light. Neck:      Thyroid: No thyromegaly. Cardiovascular:      Rate and Rhythm: Normal rate and regular rhythm. Heart sounds: Normal heart sounds. No murmur heard. Pulmonary:      Effort: Pulmonary effort is normal. No respiratory distress. Breath sounds: Normal breath sounds. No wheezing or rhonchi. Abdominal:      General: Bowel sounds are normal. There is no distension. Palpations: Abdomen is soft. There is no mass. Tenderness: There is no abdominal tenderness. There is no right CVA tenderness, left CVA tenderness, guarding or rebound. Musculoskeletal:         General: No tenderness. Cervical back: Normal range of motion and neck supple. Spasms present. No rigidity. Thoracic back: Spasms present. Decreased range of motion. Lumbar back: Spasms present. Decreased range of motion. Right lower leg: No edema. Left lower leg: No edema. Lymphadenopathy:      Cervical: No cervical adenopathy. Skin:     Coloration: Skin is not jaundiced or pale. Findings: No bruising, erythema or rash. Neurological:      General: No focal deficit present. Mental Status: She is alert and oriented to person, place, and time. Cranial Nerves: No cranial nerve deficit. Sensory: No sensory deficit. Motor: No weakness or tremor. Coordination: Coordination normal.      Gait: Gait and tandem walk normal.      Deep Tendon Reflexes: Reflexes are normal and symmetric. Psychiatric:         Attention and Perception: Attention and perception normal. She is attentive. Mood and Affect: Mood is anxious and depressed. Affect is not tearful. Speech: She is communicative. Speech is not rapid and pressured, delayed or slurred. Behavior: Behavior is slowed. Behavior is not agitated. Thought Content: Thought content normal.         Judgment: Judgment normal.           ASSESSMENT AND PLAN      1. Uncontrolled type 2 diabetes mellitus with hyperglycemia (HCC)  Worsening, start on Trulicity continue Tresiba, short acting insulin Farxiga and glimepiride. Monitor blood sugars  - POCT glycosylated hemoglobin (Hb A1C)  - Microalbumin / Creatinine Urine Ratio; Future  - Comprehensive Metabolic Panel; Future  - Dulaglutide (TRULICITY) 9.79 XF/3.9OT SOPN; Inject 0.75 mg into the skin every 7 days If pen needle is needed, please request  Dispense: 4 Adjustable Dose Pre-filled Pen Syringe; Refill: 5    2. Acquired hypothyroidism  Stable continue Synthroid  Continue to watch for symptoms    3. Essential hypertension  Controlled continue lisinopril  Recheck CMP  - Comprehensive Metabolic Panel; Future    4. Mixed hyperlipidemia  Stable continue statins recheck lipid panel  - Lipid Panel; Future    5. Coronary artery disease involving native coronary artery of native heart without angina pectoris  Fairly stable continue to work on risk factors  Continue statins  ACE inhibitor's      6. Chronic diastolic congestive heart failure (HCC)  Stable continue diuretics  Watch for any peripheral edema    7. Liver disease  Discussed with patient detail about the MRI abdomen, needs further evaluation recheck all blood work  - [address]; Future  - OZZY Screen With Reflex; Future  - Sedimentation Rate; Future  - Anti-microsomal antibody; Future    8. Elevated alkaline phosphatase level  Mildly improved recheck blood work    9. Elevated ferritin  Recheck blood work and if still high will need referral to GI  - Ferritin; Future  - MRI ABDOMEN W CONTRAST; Future  - OZZY Screen With Reflex; Future  - Sedimentation Rate; Future  - Anti-microsomal antibody; Future    10. Major depressive disorder with single episode, in partial remission (HCC)  Fairly stable, continue Cymbalta    11. Lumbosacral radiculopathy due to degenerative joint disease of spine  Chronic problem fairly stable continue Neurontin    12. Vitamin D deficiency  Chronic problem not on any supplements recheck vitamin D levels  - Vitamin D 25 Hydroxy; Future    13.  Need for influenza vaccination  Patient refused vaccinations      Orders Placed This Encounter   Procedures    MRI ABDOMEN W CONTRAST     Standing Status:   Future     Standing Expiration Date:   12/15/2023     Order Specific Question:   STAT Creatinine as needed:     Answer:   Yes     Order Specific Question:   Reason for exam:     Answer:   ABNORMAL ct ABDOMEN    Lipid Panel     Standing Status:   Future     Standing Expiration Date:   12/15/2023     Order Specific Question:   Is Patient Fasting?/# of Hours     Answer:   yes, 8-10 hours    Microalbumin / Creatinine Urine Ratio     Standing Status:   Future     Standing Expiration Date:   12/15/2023    Comprehensive Metabolic Panel     Standing Status:   Future     Standing Expiration Date:   12/15/2023    Vitamin D 25 Hydroxy     Standing Status:   Future     Standing Expiration Date:   12/15/2023    Ferritin     Standing Status:   Future     Standing Expiration Date:   12/15/2023    OZZY Screen With Reflex     Standing Status:   Future     Standing Expiration Date:   12/15/2023    Sedimentation Rate     Standing Status:   Future     Standing Expiration Date:   12/15/2023    Anti-microsomal antibody     Standing Status:   Future     Standing Expiration Date:   12/15/2023    POCT glycosylated hemoglobin (Hb A1C)         Medications Discontinued During This Encounter   Medication Reason    Continuous Blood Gluc  ([address]) [address] LIST CLEANUP    Continuous Blood Gluc Sensor ([address]) MIS LIST CLEANUP    Continuous Blood Gluc Transmit (DEXCOM G6 TRANSMITTER) MISC Cost of medication       Kalia Kenney received counseling on the following healthy behaviors: nutrition, exercise, and medication adherence  Reviewed prior labs and health maintenance  Continue current medications, diet and exercise. Discussed use, benefit, and side effects of prescribed medications. Barriers to medication compliance addressed. Patient given educational materials - see patient instructions  Was a self-tracking handout given in paper form or via [employer]? Yes    Requested Prescriptions     Signed Prescriptions Disp Refills    Dulaglutide (TRULICITY) 9.32 GJ/0.1LK SOPN 4 Adjustable Dose Pre-filled Pen Syringe 5     Sig: Inject 0.75 mg into the skin every 7 days If pen needle is needed, please request       All patient questions answered. Patient voiced understanding. Quality Measures    Body mass index is 44.19 kg/m². Elevated. Weight control planned discussed daily exercise regimen and Healthy diet and regular exercise. BP: 128/76 Blood pressure is normal. Treatment plan consists of Weight Reduction, DASH Eating Plan, Dietary Sodium Restriction, Increased Physical Activity, and No treatment change needed. Lab Results   Component Value Date    LDLCHOLESTEROL 69 01/14/2022    LDLDIRECT 200 (H) 12/08/2017    (goal LDL reduction with dx if diabetes is 50% LDL reduction)      PHQ Scores 12/15/2022 8/30/2022 1/11/2022 5/6/2021 3/23/2021 9/16/2020 6/16/2020   PHQ2 Score 1 1 0 3 3 0 1   PHQ9 Score 11 8 0 10 15 0 1     Interpretation of Total Score Depression Severity: 1-4 = Minimal depression, 5-9 = Mild depression, 10-14 = Moderate depression, 15-19 = Moderately severe depression, 20-27 = Severe depression    The patient'spast medical, surgical, social, and family history as well as her   current medications and allergies were reviewed as documented in today's encounter. Medications, labs, diagnostic studies, consultations andfollow-up as documented in this encounter. Return for dm ,htn, hld, lab work. Patient wasseen with total face to face time of 45 minutes.  More than 50% of this visit was counseling and education. Future Appointments   Date Time Provider Aleksandra Alyx   1/10/2023 11:00 AM Jacobs Postal, APRN - CNP [address] [facility]   3/15/2023  2:00 PM Malone Mohs, MD [facility] [address] [facility]   9/5/2023 11:30 AM Malone Mohs, MD [facility] [address] [facility]     This note was completed by using the assistance of a speech-recognition program. However, inadvertent computerized transcription errors may be present. Althoughevery effort was made to ensure accuracy, no guarantees can be provided that every mistake has been identified and corrected by editing.   Electronically signed by Malone Mohs, MD on 12/15/2022  2:42 PM

## 2022-12-15 NOTE — PATIENT INSTRUCTIONS
New Updates for [email or web address]/ [employer] ([facility] YVAN    Thank you for choosing [facility] to give you the best care! [employer] [facility]) is always trying to think of new ways to help their patients. We are asking all patients to try out the new digital registration that is now available through your [facility] account or the new YVAN, [employer] [facility] Via the yvan you're now able to update your personal and registration information prior to your upcoming appointment. This will save you time once you arrive at the office to check-in, not to mention your information remains safe!! Many other perks come from signing up for an account, such as:  Requesting refills  Scheduling an appointment  Completing an E-Visit  Sending a message to the office/provider  Having access to your medication list  Paying your bill/copay prior to your appointment  Scheduling your yearly mammogram  Review your test results    If you are not familiar with [facility] or the [employer] [facility] YVAN, please ask one of us and we will be happy to answer any questions or help you set-up your account.       Your [id number] [employer] office,  [place]

## 2022-12-27 NOTE — TELEPHONE ENCOUNTER
Pt left v/m wanting to schedule an appt with Dr Hakan Ding. I checked notes and pt was a no show on 5-11-22, 8-18-22 and 10-10-22. Letters were sent out for each no show and pt r/s for 2 of the appts and still no showed. I called pt today to let her know that she can not be scheduled here and she needs to find a new doctor. Pt hung up on me.

## 2023-01-04 ENCOUNTER — TELEMEDICINE (OUTPATIENT)
Dept: FAMILY MEDICINE CLINIC | Age: 68
End: 2023-01-04
Payer: MEDICARE

## 2023-01-04 DIAGNOSIS — Z79.4 TYPE 2 DIABETES MELLITUS WITH DIABETIC POLYNEUROPATHY, WITH LONG-TERM CURRENT USE OF INSULIN (HCC): Primary | ICD-10-CM

## 2023-01-04 DIAGNOSIS — E78.2 MIXED HYPERLIPIDEMIA: ICD-10-CM

## 2023-01-04 DIAGNOSIS — N39.0 RECURRENT UTI: ICD-10-CM

## 2023-01-04 DIAGNOSIS — E11.42 TYPE 2 DIABETES MELLITUS WITH DIABETIC POLYNEUROPATHY, WITH LONG-TERM CURRENT USE OF INSULIN (HCC): Primary | ICD-10-CM

## 2023-01-04 DIAGNOSIS — Z12.11 COLON CANCER SCREENING: ICD-10-CM

## 2023-01-04 PROCEDURE — 99213 OFFICE O/P EST LOW 20 MIN: CPT | Performed by: FAMILY MEDICINE

## 2023-01-04 PROCEDURE — G8400 PT W/DXA NO RESULTS DOC: HCPCS | Performed by: FAMILY MEDICINE

## 2023-01-04 PROCEDURE — G8427 DOCREV CUR MEDS BY ELIG CLIN: HCPCS | Performed by: FAMILY MEDICINE

## 2023-01-04 PROCEDURE — 2022F DILAT RTA XM EVC RTNOPTHY: CPT | Performed by: FAMILY MEDICINE

## 2023-01-04 PROCEDURE — 3017F COLORECTAL CA SCREEN DOC REV: CPT | Performed by: FAMILY MEDICINE

## 2023-01-04 PROCEDURE — 1090F PRES/ABSN URINE INCON ASSESS: CPT | Performed by: FAMILY MEDICINE

## 2023-01-04 PROCEDURE — 3046F HEMOGLOBIN A1C LEVEL >9.0%: CPT | Performed by: FAMILY MEDICINE

## 2023-01-04 PROCEDURE — 1123F ACP DISCUSS/DSCN MKR DOCD: CPT | Performed by: FAMILY MEDICINE

## 2023-01-04 ASSESSMENT — ENCOUNTER SYMPTOMS
VOMITING: 0
BLOOD IN STOOL: 0
SINUS PRESSURE: 0
EYE REDNESS: 0
STRIDOR: 0
CHEST TIGHTNESS: 0
NAUSEA: 0
TROUBLE SWALLOWING: 0
BACK PAIN: 0
DIARRHEA: 0
COLOR CHANGE: 0
WHEEZING: 0
SHORTNESS OF BREATH: 0
ABDOMINAL PAIN: 1
RECTAL PAIN: 0
ABDOMINAL DISTENTION: 1
CONSTIPATION: 0
RHINORRHEA: 0
COUGH: 0
SORE THROAT: 0

## 2023-01-04 NOTE — PROGRESS NOTES
Visit Information    Have you changed or started any medications since your last visit including any over-the-counter medicines, vitamins, or herbal medicines? NO   Have you stopped taking any of your medications? Is so, why? -  no  Are you having any side effects from any of your medications? - no    Have you seen any other physician or provider since your last visit? yes - INFECTIOUS DISEASE   Have you had any other diagnostic tests since your last visit?  no   Have you been seen in the emergency room and/or had an admission in a hospital since we last saw you?  no   Have you had your routine dental cleaning in the past 6 months?  no     Do you have an active MyChart account? If no, what is the barrier?   No: N/A    Patient Care Team:  Rickey Gann MD as PCP - General (Family Medicine)  Rickey Gann MD as PCP - [address] [place] [address]  [facility] Provider  Danny Baxter DO as Consulting Physician (Obstetrics & Gynecology)  Lana Calvo MD as Consulting Physician (Endocrinology)  Ulices Persaud MD as Consulting Physician (Gastroenterology)    Medical History Review  Past Medical, Family, and Social History reviewed and does not contribute to the patient presenting condition    Health Maintenance   Topic Date Due    Shingles vaccine (1 of 2) Never done    DEXA (modify frequency per FRAX score)  Never done    Diabetic Alb to Cr ratio (uACR) test  02/11/2016    Diabetic retinal exam  03/07/2017    Diabetic foot exam  07/07/2018    COVID-19 Vaccine (4 - Booster for Perdomo Chandra series) 03/05/2022    Colorectal Cancer Screen  04/26/2022    Flu vaccine (1) Never done    Lipids  01/14/2023    GFR test (Diabetes, CKD 3-4, OR last GFR 15-59)  05/16/2023    Annual Wellness Visit (AWV)  08/31/2023    A1C test (Diabetic or Prediabetic)  12/15/2023    Depression Monitoring  12/15/2023    Breast cancer screen  09/08/2024    Cervical cancer screen  09/16/2025    DTaP/Tdap/Td vaccine (3 - Td or Tdap) 06/16/2032    Pneumococcal 65+ years Vaccine  Completed    Hepatitis C screen  Completed    HIV screen  Addressed    Hepatitis A vaccine  Aged Out    Hib vaccine  Aged Out    Meningococcal (ACWY) vaccine  Aged Out

## 2023-01-04 NOTE — PATIENT INSTRUCTIONS
New Updates for [facility] MyChart/ "[employer] ([facility]) YVAN    Thank you for choosing US to give you the best care! [employer] ([facility]) is always trying to think of new ways to help their patients. We are asking all patients to try out the new digital registration that is now available through your [facility] account or the new YVAN, "[employer] ([facility]). Via the yvan you're now able to update your personal and registration information prior to your upcoming appointment. This will save you time once you arrive at the office to check-in, not to mention your information remains safe!! Many other perks come from signing up for an account, such as:  Requesting refills  Scheduling an appointment  Completing an E-Visit  Sending a message to the office/provider  Having access to your medication list  Paying your bill/copay prior to your appointment  Scheduling your yearly mammogram  Review your test results    If you are not familiar with [facility] or the "[employer] ([facility]) YVAN, please ask one of us and we will be happy to answer any questions or help you set-up your account.       Your [facility] office,  Todd

## 2023-01-04 NOTE — PROGRESS NOTES
2023    TELEHEALTH EVALUATION -- Audio/Visual (During SUBHR-30 public health emergency)      Carlos Heard (:  1955) is a 74 y.o. female,Established patient, here for evaluation of the following chief complaint(s): Telephone Appointment Visit, Abdominal Pain, Back Pain (RIGHT SIDE ONGOING FOR FEW WEEKS), Diabetes, Anxiety, and Nausea        ASSESSMENT/PLAN:    1. Type 2 diabetes mellitus with diabetic polyneuropathy, with long-term current use of insulin (HCC)  Uncontrolled but her blood sugars have improved, continue current regimen discontinue Trulicity due to intolerance. Follow-up in 3 months  2. Recurrent UTI  Contact number provided for ID  3. Mixed hyperlipidemia  Stable all lab work reprinted  4. Colon cancer screening  -     Ambulatory referral to  Sana Loving received counseling on the following healthy behaviors: nutrition, exercise, and medication adherence  Reviewed prior labs and health maintenance  Discussed use, benefit, and side effects of prescribed medications. Barriers to medication compliance addressed. Patient given educational materials - see patient instructions  All patient questions answered. Patient voiced understanding. The patient's past medical,surgical, social, and family history as well as her current medications and allergies were reviewed as documented in today's encounter. Medications, labs, diagnostic studies, consultations and follow-up as documented in this encounter. Return in about 3 months (around 2023) for dm ,htn, hld.     Data Unavailable    Future Appointments   Date Time Provider Aleksandra Wisdom   1/10/2023 11:00 AM RASHMI Martínez - CNP 10 \[facility] [place] [facility]\""   3/15/2023  2:00 PM [facility]   2023 11:30 AM [facility]         SUBJECTIVE/OBJECTIVE:  Carlos Heard (:  1955) has requested an audio/video evaluation for the following concern(s):Telephone Appointment Visit, Abdominal Pain, Back Pain (RIGHT SIDE ONGOING FOR FEW WEEKS), Diabetes, Anxiety, and Nausea        Patient is scheduled for follow-up on diabetes was started on Trulicity, patient reports she could not tolerate that due to abdominal cramping and nausea. Patient reports she has stopped taking that medications. She is taking [place] and her oral hypoglycemic agents. Patient reports she cannot tolerate that because she has already multiple GI issues. Her sugars have mildly improved and she keeps increasing her insulin and is currently on 80 units. Patient has history of colonic polyps and was referred to GI, but due to multiple no-shows she needs new referral.      Patient also has history of recurrent UTIs, was referred to infectious disease she has not scheduled appointment, contact number provided. Hyperlipidemia on statins patient has not done blood work which was ordered. PHQ Scores 12/15/2022 8/30/2022 1/11/2022 5/6/2021 3/23/2021 9/16/2020 6/16/2020   PHQ2 Score 1 1 0 3 3 0 1   PHQ9 Score 11 8 0 10 15 0 1     Interpretation of Total Score Depression Severity: 1-4 = Minimal depression, 5-9 = Mild depression, 10-14 = Moderate depression, 15-19 = Moderately severe depression, 20-27 = Severe depression    Ht Readings from Last 3 Encounters:   12/15/22 5' 2\" (1.575 m)   09/19/22 5' 2\" (1.575 m)   09/15/22 5' 2\" (1.575 m)     Wt Readings from Last 3 Encounters:   12/15/22 241 lb 9.6 oz (109.6 kg)   09/19/22 239 lb (108.4 kg)   09/15/22 239 lb (108.4 kg)         Review of Systems   Constitutional:  Positive for activity change. Negative for appetite change, fatigue, fever and unexpected weight change. HENT:  Negative for congestion, ear pain, postnasal drip, rhinorrhea, sinus pressure, sore throat and trouble swallowing. Eyes:  Negative for redness and visual disturbance. Respiratory:  Negative for cough, chest tightness, shortness of breath, wheezing and stridor.     Cardiovascular: Negative for chest pain, palpitations and leg swelling. Gastrointestinal:  Positive for abdominal distention and abdominal pain. Negative for blood in stool, constipation, diarrhea, nausea, rectal pain and vomiting. Endocrine: Negative for polydipsia, polyphagia and polyuria. Genitourinary:  Positive for frequency and urgency. Negative for difficulty urinating and flank pain. Musculoskeletal:  Positive for arthralgias. Negative for back pain, gait problem, myalgias and neck pain. Skin:  Negative for color change, rash and wound. Allergic/Immunologic: Negative for food allergies and immunocompromised state. Neurological:  Positive for numbness. Negative for dizziness, speech difficulty, weakness, light-headedness and headaches. Psychiatric/Behavioral:  Negative for agitation, behavioral problems, decreased concentration, dysphoric mood, hallucinations, sleep disturbance and suicidal ideas. The patient is nervous/anxious. Prior to Visit Medications    Medication Sig Taking?  Authorizing Provider   metoprolol tartrate (LOPRESSOR) 25 MG tablet Take 1 tablet by mouth 2 times daily Yes Lissy Dobson MD   glipiZIDE (GLUCOTROL) 10 MG tablet TAKE TWO TABLETS BY MOUTH TWICE A DAY Yes Lissy Dobson MD   Insulin Degludec (TRESIBA FLEXTOUCH) 200 UNIT/ML SOPN INJECT 80 UNITS UNDER THE SKIN ONCE NIGHTLY Yes Lissy Dobson MD   lisinopril (PRINIVIL;ZESTRIL) 2.5 MG tablet take 1 tablet by mouth once daily Yes Lissy Dobson MD   insulin lispro, 1 Unit Dial, (HUMALOG/ADMELOG) 100 UNIT/ML SOPN INJECT 18 UNITS UNDER THE SKIN THREE TIMES A DAY BEFORE MEALS Yes Lissy Dobson MD   levothyroxine (SYNTHROID) 175 MCG tablet TAKE ONE TABLET BY MOUTH EVERY MORNING EXCEPT ON SUNDAYS TAKE 150 MCG Yes Lissy Dobson MD   atorvastatin (LIPITOR) 80 MG tablet TAKE ONE TABLET BY MOUTH DAILY Yes Lissy Dobson MD   oxybutynin (DITROPAN-XL) 10 MG extended release tablet  Yes Historical Provider, MD   ketoconazole (NIZORAL) 2 % cream Apply topically  2 times a day. Yes Tamika Javier MD   nystatin (MYCOSTATIN) 523945 UNIT/GM powder Apply 3 times daily. Yes Tamika Javier MD   zoster recombinant adjuvanted vaccine [facility]) 50 MCG/0.5ML SUSR injection 50 MCG IM then repeat 2-6 months. Yes Tamika Javier MD   FARXIGA 10 MG tablet TAKE ONE TABLET BY MOUTH EVERY MORNING Yes Tamika Javier MD   RA PEN NEEDLES 31G X 8 MM MISC INJECTING TWICE DAILY DX: E11.65 Yes Breana Schneider MD   levothyroxine (SYNTHROID) 150 MCG tablet Take 1 tablet by mouth once a week Indications: Sundays Take 150 mcg by mouth once a week Indications: Sundays Yes Tamika Javier MD   ketoconazole (NIZORAL) 2 % cream Apply topically  2 times a day. Patient taking differently: Apply topically  2 times a day. Yes Tamika Javier MD   tiZANidine (ZANAFLEX) 4 MG tablet Take 1 tablet by mouth at bedtime Yes Tamika Javier MD   oxybutynin (DITROPAN XL) 15 MG extended release tablet Take 1 tablet by mouth daily Yes RASHMI Stauffer - CNP   lidocaine (LMX) 4 % cream Apply topically every 8 hrs as needed for pain Yes Tamika Javier MD   DULoxetine (CYMBALTA) 60 MG extended release capsule take 1 capsule by mouth once daily Yes Tamika Javier MD   potassium chloride (KLOR-CON M) 10 MEQ extended release tablet take 1 tablet by mouth once daily Yes Tamika Javier MD   ASPIRIN LOW DOSE 81 MG EC tablet take 1 tablet by mouth once daily Yes Tamika Javier MD   magnesium citrate solution Take 296 mLs by mouth once for 1 dose Yes Gary Farooq MD   levothyroxine (SYNTHROID) 75 MCG tablet Take 75 mcg by mouth Six times weekly Indications: All days except Sundays Yes Negrita Frank MD   lidocaine (LIDODERM) 5 % Place 1 patch onto the skin daily 12 hours on, 12 hours off.  Yes Mary Hill PA-C   furosemide (LASIX) 20 MG tablet take 1 tablet by mouth once daily Yes Tamika Javier MD   gabapentin (NEURONTIN) 600 MG tablet take 1 tablet by mouth twice a day  Agnes Lise Watson MD       Social History     Tobacco Use    Smoking status: Former     Packs/day: 0.01     Years: 1.00     Pack years: 0.01     Types: Cigarettes     Quit date: 5     Years since quittin.0    Smokeless tobacco: Never    Tobacco comments:     pt states she quit smoking cigarettes at the age of 14   Vaping Use    Vaping Use: Never used   Substance Use Topics    Alcohol use: No     Alcohol/week: 0.0 standard drinks    Drug use: Not Currently     Types: Marijuana Darryle Pimple)     Comment: once in a while          PHYSICAL EXAMINATION:    Vital Signs: (As obtained by patient/caregiver or practitioner observation)  -vital signs stable and within normal limits except   Patient-Reported Vitals 2023   Patient-Reported Weight 240   Patient-Reported Height 5'2   Patient-Reported Systolic -   Patient-Reported Diastolic -           Intensity of pain is:none      Constitutional: [x] Appears well-developed and well-nourished [x] No apparent distress      [] Abnormal-       Mental status  [x] Alert and awake  [x] Oriented to person/place/time [x]Able to follow commands      Eyes:  EOM    [x]  Normal  [] Abnormal-  Sclera  [x]  Normal  [] Abnormal -         Discharge [x]  None visible  [] Abnormal -    HENT:   [x] Normocephalic, atraumatic.   [] Abnormal   [x] Mouth/Throat: Mucous membranes are moist.     External Ears [x] Normal  [] Abnormal-     Neck: [x] No visualized mass     Pulmonary/Chest: [x] Respiratory effort normal.  [x] No visualized signs of difficulty breathing or respiratory distress        [] Abnormal        Musculoskeletal:   [x] Normal gait with no signs of ataxia         [x] Normal range of motion of neck        [] Abnormal-       Neurological:        [x] No Facial Asymmetry (Cranial nerve 7 motor function) (limited exam to video visit)          [x] No gaze palsy        [] Abnormal-            Skin:        [x] No significant exanthematous lesions or discoloration noted on facial skin         [] Abnormal-            Psychiatric:      [x] No Hallucinations       [x]Mood is normal      [x]Behavior is normal      [x]Judgment is normal      [x]Thought content is normal       [] Abnormal-     Other pertinent observable physical exam findings-     Due to this being a TeleHealth encounter, evaluation of the following organ systems is limited: Vitals/Constitutional/EENT/Resp/CV/GI//MS/Neuro/Skin/Heme-Lymph-Imm. I personally reviewed most recent labs reviewed with the patient and all questions fully answered.      Otherwise labs within normal limits        Lab Results   Component Value Date    WBC 13.9 (H) 05/16/2022    HGB 16.9 (H) 05/16/2022    HCT 48.6 (H) 05/16/2022    MCV 87.6 05/16/2022     05/16/2022       Lab Results   Component Value Date/Time     05/16/2022 11:10 PM    K 4.6 05/16/2022 11:10 PM     05/16/2022 11:10 PM    CO2 25 05/16/2022 11:10 PM    BUN 13 05/16/2022 11:10 PM    CREATININE 0.53 05/16/2022 11:10 PM    GLUCOSE 166 05/16/2022 11:10 PM    GLUCOSE 94 01/23/2012 05:44 PM    CALCIUM 10.2 05/16/2022 11:10 PM        Lab Results   Component Value Date    ALT 78 (H) 06/17/2022    AST 36 (H) 06/17/2022    ALKPHOS 147 (H) 06/17/2022    BILITOT 0.74 06/17/2022       Lab Results   Component Value Date    TSH 0.10 (L) 06/17/2022       Lab Results   Component Value Date    CHOL 156 08/18/2020    CHOL 68 04/14/2020    CHOL 152 07/31/2018     Lab Results   Component Value Date    TRIG 214 (H) 08/18/2020    TRIG 132 04/14/2020    TRIG 378 (H) 07/31/2018     Lab Results   Component Value Date    HDL 35 (L) 01/14/2022    HDL 41 08/18/2020    HDL 26 (L) 04/14/2020     Lab Results   Component Value Date    LDLCHOLESTEROL 69 01/14/2022    LDLCHOLESTEROL 72 08/18/2020    LDLCHOLESTEROL 16 04/14/2020       Lab Results   Component Value Date    CHOLHDLRATIO 4.3 01/14/2022    CHOLHDLRATIO 3.8 08/18/2020    CHOLHDLRATIO 2.6 04/14/2020       Lab Results   Component Value Date    LABA1C 8.0 12/15/2022    LABA1C 7.5 08/30/2022    LABA1C 8.0 05/31/2022         No results found for: [id number]    Lab Results   Component Value Date    VITD25 14.0 (L) 01/29/2014       No orders of the defined types were placed in this encounter. Orders Placed This Encounter   Procedures    Ambulatory referral to General Surgery     Referral Priority:   Routine     Referral Type:   Eval and Treat     Referral Reason:   Specialty Services Required     Referred to Provider:   Lisseth Dumont DO     Requested Specialty:   General Surgery     Number of Visits Requested:   1       Medications Discontinued During This Encounter   Medication Reason    Dulaglutide (TRULICITY) 7.06 IB/2.5TB SOPN Side effects              Keyur Moseley, was evaluated through a synchronous (real-time) audio-video encounter. The patient (or guardian if applicable) is aware that this is a billable service, which includes applicable co-pays. The virtual visit was conducted with patient's (and/or legal guardian consent). Verbal consent to proceed has been obtained within the past 12 months. The visit was conducted pursuant to the emergency declaration under the [address] [place] [address], [address] [place] [address] authority and the [employer] General Act. Patient identification was verified    Patient was alone yes   Provider was located at primary practice location. The patient was located at home, in a state where the provider was licensed to provide care. On this date 1/4/2023 I have spent 30 minutes reviewing previous notes, test results and face to face with the patient discussing the diagnosis and importance of compliance with the treatment plan as well as documenting on the day of the visit. --Rickey Gann MD on 1/4/2023 at 1:54 PM    An electronic signature was used to authenticate this note.

## 2023-01-06 DIAGNOSIS — Z12.11 COLON CANCER SCREENING: Primary | ICD-10-CM

## 2023-01-09 NOTE — TELEPHONE ENCOUNTER
Please Approve or Refuse.   Send to Pharmacy per Pt's Request: [facility]     Next Visit Date:  3/15/2023   Last Visit Date: 1/4/2023    Hemoglobin A1C (%)   Date Value   12/15/2022 8.0   08/30/2022 7.5   05/31/2022 8.0             ( goal A1C is < 7)   BP Readings from Last 3 Encounters:   12/15/22 128/76   09/19/22 110/76   09/15/22 128/78          (goal 120/80)  BUN   Date Value Ref Range Status   05/16/2022 13 8 - 23 mg/dL Final     Creatinine   Date Value Ref Range Status   05/16/2022 0.53 0.50 - 0.90 mg/dL Final     Potassium   Date Value Ref Range Status   05/16/2022 4.6 3.7 - 5.3 mmol/L Final

## 2023-01-10 ENCOUNTER — TELEPHONE (OUTPATIENT)
Dept: SURGERY | Age: 68
End: 2023-01-10

## 2023-01-10 RX ORDER — GABAPENTIN 600 MG/1
TABLET ORAL
Qty: 60 TABLET | Refills: 0 | Status: SHIPPED | OUTPATIENT
Start: 2023-01-10 | End: 2023-02-10

## 2023-01-10 NOTE — TELEPHONE ENCOUNTER
[address] [facility] Surgery  Screening colonoscopy questionnaire  Lashawn Mendoza    Pt Name: Wynonia Baumgarten  MRN: [id number]  YOB: 1955  Primary Care Physician: Lexa Jasmine MD      Have you ever had a colonoscopy? YES  When was your last colonoscopy?   Were any polyps removed or any other significant findings? POLYPS REMOVED     Have you recently had a stool test to check for colon cancer? No  Was it positive? No    Do you have any family history of colon cancer? No  If yes, which family member had colon cancer? N/A  Were they diagnosed younger or older than the age of 61? N/A    Do you have a history of Crohn's disease or Ulcerative Colitis? Yes    Do you have a history of constipation? Yes    Do you have a history of bloody or black stools? No    Have you ever had surgery done inside your abdomen? Yes  What surgery? TUBAL LIGATION       Schedulin/10/23- Spoke to patient, colonoscopy scheduled at [facility], patient confirmed and information mailed to patient.      Surgery date/time: 3/24/23 at 11:30AM  Arrival time: 10AM  Prep appt: 3/15/23 at 10:30AM

## 2023-01-23 RX ORDER — GLIPIZIDE 10 MG/1
TABLET ORAL
Qty: 180 TABLET | Refills: 0 | Status: SHIPPED | OUTPATIENT
Start: 2023-01-23

## 2023-01-23 NOTE — TELEPHONE ENCOUNTER
Please Approve or Refuse.   Send to Pharmacy per Pt's Request:      Next Visit Date:  3/15/2023   Last Visit Date: 1/4/2023    Hemoglobin A1C (%)   Date Value   12/15/2022 8.0   08/30/2022 7.5   05/31/2022 8.0             ( goal A1C is < 7)   BP Readings from Last 3 Encounters:   12/15/22 128/76   09/19/22 110/76   09/15/22 128/78          (goal 120/80)  BUN   Date Value Ref Range Status   05/16/2022 13 8 - 23 mg/dL Final     Creatinine   Date Value Ref Range Status   05/16/2022 0.53 0.50 - 0.90 mg/dL Final     Potassium   Date Value Ref Range Status   05/16/2022 4.6 3.7 - 5.3 mmol/L Final

## 2023-01-26 DIAGNOSIS — E11.65 UNCONTROLLED TYPE 2 DIABETES MELLITUS WITH HYPERGLYCEMIA (HCC): ICD-10-CM

## 2023-01-26 NOTE — TELEPHONE ENCOUNTER
Pt is inquiring on what the dosage is that she is supposed to be taking of her levothyroxine (SYNTHROID)  Pt has prescriptions for 75 MCG (6xs per week, not on sundays) and 150 MCG & 175 MCG - 1 tablet every Sunday. Please Approve or Refuse.   Send to Pharmacy per Pt's Request:      Next Visit Date:  3/15/2023   Last Visit Date: 1/4/2023    Hemoglobin A1C (%)   Date Value   12/15/2022 8.0   08/30/2022 7.5   05/31/2022 8.0             ( goal A1C is < 7)   BP Readings from Last 3 Encounters:   12/15/22 128/76   09/19/22 110/76   09/15/22 128/78          (goal 120/80)  BUN   Date Value Ref Range Status   05/16/2022 13 8 - 23 mg/dL Final     Creatinine   Date Value Ref Range Status   05/16/2022 0.53 0.50 - 0.90 mg/dL Final     Potassium   Date Value Ref Range Status   05/16/2022 4.6 3.7 - 5.3 mmol/L Final

## 2023-01-27 RX ORDER — ATORVASTATIN CALCIUM 80 MG/1
TABLET, FILM COATED ORAL
Qty: 90 TABLET | Refills: 0 | Status: SHIPPED | OUTPATIENT
Start: 2023-01-27

## 2023-01-27 RX ORDER — INSULIN DEGLUDEC 200 U/ML
INJECTION, SOLUTION SUBCUTANEOUS
Qty: 9 ML | Refills: 3 | Status: SHIPPED | OUTPATIENT
Start: 2023-01-27

## 2023-02-02 RX ORDER — BLOOD-GLUCOSE TRANSMITTER
EACH MISCELLANEOUS
Qty: 1 EACH | Refills: 0 | Status: SHIPPED | OUTPATIENT
Start: 2023-02-02

## 2023-02-02 NOTE — TELEPHONE ENCOUNTER
Pt called in requesting a new order for a tContinuous Blood Gluc Transmit (DEXCOM G6 TRANSMITTER)  to be sent in to [facility] on . Pt states they will fill the order there. Pt is also inquiring on her levothyroxine wanting clarification on how she is to be taking the medication. Pt has prescriptions for 75 MCG (6xs per week, not on sundays) and 150 MCG & 175 MCG - 1 tablet every Sunday. Pt was also reminded to get labwork completed and call to schedule her MRI.

## 2023-02-07 RX ORDER — GABAPENTIN 600 MG/1
TABLET ORAL
Qty: 60 TABLET | Refills: 0 | Status: SHIPPED | OUTPATIENT
Start: 2023-02-07 | End: 2023-03-07

## 2023-02-16 ENCOUNTER — HOSPITAL ENCOUNTER (OUTPATIENT)
Age: 68
Discharge: HOME | End: 2023-02-16
Payer: MEDICARE

## 2023-02-16 DIAGNOSIS — E78.2 MIXED HYPERLIPIDEMIA: ICD-10-CM

## 2023-02-16 DIAGNOSIS — R79.89 ELEVATED FERRITIN: ICD-10-CM

## 2023-02-16 DIAGNOSIS — I10 ESSENTIAL HYPERTENSION: ICD-10-CM

## 2023-02-16 DIAGNOSIS — K76.9 LIVER DISEASE: ICD-10-CM

## 2023-02-16 DIAGNOSIS — E11.65 UNCONTROLLED TYPE 2 DIABETES MELLITUS WITH HYPERGLYCEMIA (HCC): ICD-10-CM

## 2023-02-16 DIAGNOSIS — E55.9 VITAMIN D DEFICIENCY: ICD-10-CM

## 2023-02-16 LAB
25(OH)D3 SERPL-MCNC: 11.5 NG/ML
ALBUMIN SERPL-MCNC: 3.6 G/DL (ref 3.5–5.2)
ALP SERPL-CCNC: 130 U/L (ref 35–104)
ALT SERPL-CCNC: 20 U/L (ref 5–33)
ANION GAP SERPL CALCULATED.3IONS-SCNC: 7 MMOL/L (ref 9–17)
AST SERPL-CCNC: 13 U/L
BILIRUB SERPL-MCNC: 0.8 MG/DL (ref 0.3–1.2)
BUN SERPL-MCNC: 14 MG/DL (ref 8–23)
CALCIUM SERPL-MCNC: 9.4 MG/DL (ref 8.6–10.4)
CHLORIDE SERPL-SCNC: 105 MMOL/L (ref 98–107)
CHOLEST SERPL-MCNC: 113 MG/DL
CHOLESTEROL/HDL RATIO: 3.1
CO2 SERPL-SCNC: 28 MMOL/L (ref 20–31)
CREAT SERPL-MCNC: 0.45 MG/DL (ref 0.5–0.9)
ERYTHROCYTE [SEDIMENTATION RATE] IN BLOOD BY WESTERGREN METHOD: 18 MM/HR (ref 0–30)
FERRITIN SERPL-MCNC: 84 NG/ML (ref 13–150)
GFR SERPL CREATININE-BSD FRML MDRD: >60 ML/MIN/1.73M2
GLUCOSE SERPL-MCNC: 113 MG/DL (ref 70–99)
HDLC SERPL-MCNC: 36 MG/DL
LDLC SERPL CALC-MCNC: 48 MG/DL (ref 0–130)
POTASSIUM SERPL-SCNC: 4.4 MMOL/L (ref 3.7–5.3)
PROT SERPL-MCNC: 6.9 G/DL (ref 6.4–8.3)
REASON FOR REJECTION: NORMAL
SODIUM SERPL-SCNC: 140 MMOL/L (ref 135–144)
TRIGL SERPL-MCNC: 146 MG/DL
ZZ NTE CLEAN UP: ORDERED TEST: NORMAL
ZZ NTE WITH NAME CLEAN UP: SPECIMEN SOURCE: NORMAL

## 2023-02-16 PROCEDURE — 82306 VITAMIN D 25 HYDROXY: CPT

## 2023-02-16 PROCEDURE — 80053 COMPREHEN METABOLIC PANEL: CPT

## 2023-02-16 PROCEDURE — 82728 ASSAY OF FERRITIN: CPT

## 2023-02-16 PROCEDURE — 86225 DNA ANTIBODY NATIVE: CPT

## 2023-02-16 PROCEDURE — 86376 MICROSOMAL ANTIBODY EACH: CPT

## 2023-02-16 PROCEDURE — 86038 ANTINUCLEAR ANTIBODIES: CPT

## 2023-02-16 PROCEDURE — 80061 LIPID PANEL: CPT

## 2023-02-16 PROCEDURE — 85652 RBC SED RATE AUTOMATED: CPT

## 2023-02-16 PROCEDURE — 36415 COLL VENOUS BLD VENIPUNCTURE: CPT

## 2023-02-17 LAB
ANA SER QL IA: NEGATIVE
DSDNA IGG SER QL IA: <0.5 IU/ML
NUCLEAR IGG SER IA-RTO: 0.4 U/ML
THYROPEROXIDASE AB SERPL IA-ACNC: 11 IU/ML (ref 0–25)

## 2023-02-18 LAB — LIVER-KIDNEY MICROSOMAL AB: NORMAL

## 2023-02-20 DIAGNOSIS — E55.9 VITAMIN D DEFICIENCY: Primary | ICD-10-CM

## 2023-02-20 RX ORDER — ERGOCALCIFEROL 1.25 MG/1
50000 CAPSULE ORAL WEEKLY
Qty: 12 CAPSULE | Refills: 1 | Status: SHIPPED | OUTPATIENT
Start: 2023-02-20

## 2023-02-25 ENCOUNTER — HOSPITAL ENCOUNTER (OUTPATIENT)
Age: 68
Setting detail: SPECIMEN
Discharge: HOME | End: 2023-02-25
Payer: MEDICARE

## 2023-02-25 DIAGNOSIS — E11.65 UNCONTROLLED TYPE 2 DIABETES MELLITUS WITH HYPERGLYCEMIA (HCC): ICD-10-CM

## 2023-02-25 PROCEDURE — 82043 UR ALBUMIN QUANTITATIVE: CPT

## 2023-02-25 PROCEDURE — 82570 ASSAY OF URINE CREATININE: CPT

## 2023-02-26 LAB
CREATININE URINE: 70.6 MG/DL (ref 28–217)
MICROALBUMIN/CREAT 24H UR: <12 MG/L
MICROALBUMIN/CREAT UR-RTO: NORMAL MCG/MG CREAT

## 2023-03-06 ENCOUNTER — TELEPHONE (OUTPATIENT)
Dept: FAMILY MEDICINE CLINIC | Age: 68
End: 2023-03-06

## 2023-03-06 DIAGNOSIS — R74.8 ELEVATED ALKALINE PHOSPHATASE LEVEL: ICD-10-CM

## 2023-03-06 DIAGNOSIS — R79.89 ELEVATED FERRITIN: ICD-10-CM

## 2023-03-06 DIAGNOSIS — K76.9 LIVER DISEASE: Primary | ICD-10-CM

## 2023-03-06 RX ORDER — POTASSIUM CHLORIDE 750 MG/1
TABLET, EXTENDED RELEASE ORAL
Qty: 90 TABLET | Refills: 3 | Status: SHIPPED | OUTPATIENT
Start: 2023-03-06

## 2023-03-06 NOTE — TELEPHONE ENCOUNTER
Please Approve or Refuse.   Send to Pharmacy per Pt's Request:      Next Visit Date:  3/15/2023   Last Visit Date: 1/4/2023    Hemoglobin A1C (%)   Date Value   12/15/2022 8.0   08/30/2022 7.5   05/31/2022 8.0             ( goal A1C is < 7)   BP Readings from Last 3 Encounters:   12/15/22 128/76   09/19/22 110/76   09/15/22 128/78          (goal 120/80)  BUN   Date Value Ref Range Status   02/16/2023 14 8 - 23 mg/dL Final     Creatinine   Date Value Ref Range Status   02/16/2023 0.45 (L) 0.50 - 0.90 mg/dL Final     Potassium   Date Value Ref Range Status   02/16/2023 4.4 3.7 - 5.3 mmol/L Final

## 2023-03-06 NOTE — TELEPHONE ENCOUNTER
Incoming call came from [employer] [facility], stated that pt order below, needs corrected to states with or without contrast     Test Ordered: MRI ABDOMEN W CONTRAST [place]   Code: 60542   ORD #: [id number]  Associated Diagnosis: Liver disease (K76.9 [ICD-10-CM]); Elevated ferritin (R79.89 [ICD-10-CM])  STAT Creatinine as needed: Yes  Reason for exam: ABNORMAL ct ABDOMEN Priority  Routine Class  Ancillary Performed

## 2023-03-07 DIAGNOSIS — E11.65 UNCONTROLLED TYPE 2 DIABETES MELLITUS WITH HYPERGLYCEMIA (HCC): ICD-10-CM

## 2023-03-07 RX ORDER — GLIPIZIDE 10 MG/1
TABLET ORAL
Qty: 180 TABLET | Refills: 0 | Status: SHIPPED | OUTPATIENT
Start: 2023-03-07

## 2023-03-07 RX ORDER — INSULIN LISPRO 100 [IU]/ML
INJECTION, SOLUTION INTRAVENOUS; SUBCUTANEOUS
Qty: 27 ML | Refills: 0 | Status: SHIPPED | OUTPATIENT
Start: 2023-03-07

## 2023-03-07 RX ORDER — OXYBUTYNIN CHLORIDE 10 MG/1
TABLET, EXTENDED RELEASE ORAL
Qty: 90 TABLET | Refills: 1 | Status: SHIPPED | OUTPATIENT
Start: 2023-03-07

## 2023-03-08 RX ORDER — GABAPENTIN 600 MG/1
TABLET ORAL
Qty: 60 TABLET | Refills: 0 | Status: SHIPPED | OUTPATIENT
Start: 2023-03-08 | End: 2023-04-08

## 2023-03-14 DIAGNOSIS — I34.1 MVP (MITRAL VALVE PROLAPSE): ICD-10-CM

## 2023-03-14 DIAGNOSIS — I10 ESSENTIAL HYPERTENSION: ICD-10-CM

## 2023-03-15 ENCOUNTER — HOSPITAL ENCOUNTER (OUTPATIENT)
Age: 68
Discharge: HOME | End: 2023-03-15
Payer: MEDICARE

## 2023-03-15 ENCOUNTER — OFFICE VISIT (OUTPATIENT)
Dept: FAMILY MEDICINE CLINIC | Age: 68
End: 2023-03-15
Payer: MEDICARE

## 2023-03-15 VITALS
DIASTOLIC BLOOD PRESSURE: 86 MMHG | TEMPERATURE: 98.6 F | OXYGEN SATURATION: 96 % | WEIGHT: 233.2 LBS | HEIGHT: 62 IN | BODY MASS INDEX: 42.91 KG/M2 | HEART RATE: 66 BPM | SYSTOLIC BLOOD PRESSURE: 104 MMHG

## 2023-03-15 DIAGNOSIS — Z78.0 POST-MENOPAUSAL: ICD-10-CM

## 2023-03-15 DIAGNOSIS — E03.9 ACQUIRED HYPOTHYROIDISM: ICD-10-CM

## 2023-03-15 DIAGNOSIS — E11.42 TYPE 2 DIABETES MELLITUS WITH DIABETIC POLYNEUROPATHY, WITH LONG-TERM CURRENT USE OF INSULIN (HCC): Primary | ICD-10-CM

## 2023-03-15 DIAGNOSIS — I50.32 CHRONIC DIASTOLIC CONGESTIVE HEART FAILURE (HCC): ICD-10-CM

## 2023-03-15 DIAGNOSIS — Z79.4 TYPE 2 DIABETES MELLITUS WITH DIABETIC POLYNEUROPATHY, WITH LONG-TERM CURRENT USE OF INSULIN (HCC): Primary | ICD-10-CM

## 2023-03-15 DIAGNOSIS — E78.2 MIXED HYPERLIPIDEMIA: ICD-10-CM

## 2023-03-15 DIAGNOSIS — N95.0 POSTMENOPAUSAL BLEEDING: ICD-10-CM

## 2023-03-15 DIAGNOSIS — I10 ESSENTIAL HYPERTENSION: ICD-10-CM

## 2023-03-15 LAB
HBA1C MFR BLD: 6.7 %
T4 FREE SERPL-MCNC: 0.84 NG/DL (ref 0.93–1.7)
TSH SERPL-ACNC: 5.34 UIU/ML (ref 0.3–5)

## 2023-03-15 PROCEDURE — 3044F HG A1C LEVEL LT 7.0%: CPT | Performed by: FAMILY MEDICINE

## 2023-03-15 PROCEDURE — G8427 DOCREV CUR MEDS BY ELIG CLIN: HCPCS | Performed by: FAMILY MEDICINE

## 2023-03-15 PROCEDURE — 1036F TOBACCO NON-USER: CPT | Performed by: FAMILY MEDICINE

## 2023-03-15 PROCEDURE — G8484 FLU IMMUNIZE NO ADMIN: HCPCS | Performed by: FAMILY MEDICINE

## 2023-03-15 PROCEDURE — 83036 HEMOGLOBIN GLYCOSYLATED A1C: CPT | Performed by: FAMILY MEDICINE

## 2023-03-15 PROCEDURE — 84439 ASSAY OF FREE THYROXINE: CPT

## 2023-03-15 PROCEDURE — 3017F COLORECTAL CA SCREEN DOC REV: CPT | Performed by: FAMILY MEDICINE

## 2023-03-15 PROCEDURE — 1090F PRES/ABSN URINE INCON ASSESS: CPT | Performed by: FAMILY MEDICINE

## 2023-03-15 PROCEDURE — 36415 COLL VENOUS BLD VENIPUNCTURE: CPT

## 2023-03-15 PROCEDURE — 84443 ASSAY THYROID STIM HORMONE: CPT

## 2023-03-15 PROCEDURE — G8400 PT W/DXA NO RESULTS DOC: HCPCS | Performed by: FAMILY MEDICINE

## 2023-03-15 PROCEDURE — 1123F ACP DISCUSS/DSCN MKR DOCD: CPT | Performed by: FAMILY MEDICINE

## 2023-03-15 PROCEDURE — G8417 CALC BMI ABV UP PARAM F/U: HCPCS | Performed by: FAMILY MEDICINE

## 2023-03-15 PROCEDURE — 2022F DILAT RTA XM EVC RTNOPTHY: CPT | Performed by: FAMILY MEDICINE

## 2023-03-15 PROCEDURE — 3074F SYST BP LT 130 MM HG: CPT | Performed by: FAMILY MEDICINE

## 2023-03-15 PROCEDURE — 99214 OFFICE O/P EST MOD 30 MIN: CPT | Performed by: FAMILY MEDICINE

## 2023-03-15 PROCEDURE — 3079F DIAST BP 80-89 MM HG: CPT | Performed by: FAMILY MEDICINE

## 2023-03-15 RX ORDER — LEVOTHYROXINE SODIUM 0.15 MG/1
150 TABLET ORAL WEEKLY
Qty: 60 TABLET | Refills: 1 | Status: SHIPPED | OUTPATIENT
Start: 2023-03-15

## 2023-03-15 RX ORDER — BLOOD-GLUCOSE SENSOR
EACH MISCELLANEOUS
COMMUNITY
Start: 2023-01-15

## 2023-03-15 SDOH — ECONOMIC STABILITY: FOOD INSECURITY: WITHIN THE PAST 12 MONTHS, THE FOOD YOU BOUGHT JUST DIDN'T LAST AND YOU DIDN'T HAVE MONEY TO GET MORE.: NEVER TRUE

## 2023-03-15 SDOH — ECONOMIC STABILITY: HOUSING INSECURITY
IN THE LAST 12 MONTHS, WAS THERE A TIME WHEN YOU DID NOT HAVE A STEADY PLACE TO SLEEP OR SLEPT IN A SHELTER (INCLUDING NOW)?: NO

## 2023-03-15 SDOH — ECONOMIC STABILITY: INCOME INSECURITY: HOW HARD IS IT FOR YOU TO PAY FOR THE VERY BASICS LIKE FOOD, HOUSING, MEDICAL CARE, AND HEATING?: NOT HARD AT ALL

## 2023-03-15 SDOH — ECONOMIC STABILITY: FOOD INSECURITY: WITHIN THE PAST 12 MONTHS, YOU WORRIED THAT YOUR FOOD WOULD RUN OUT BEFORE YOU GOT MONEY TO BUY MORE.: NEVER TRUE

## 2023-03-15 ASSESSMENT — ENCOUNTER SYMPTOMS
BLOOD IN STOOL: 0
ABDOMINAL DISTENTION: 0
STRIDOR: 0
SORE THROAT: 0
ABDOMINAL PAIN: 0
DIARRHEA: 0
VOMITING: 0
EYE REDNESS: 0
SINUS PRESSURE: 0
SHORTNESS OF BREATH: 0
BACK PAIN: 1
COUGH: 0
WHEEZING: 0
CONSTIPATION: 0
COLOR CHANGE: 0
CHEST TIGHTNESS: 0
TROUBLE SWALLOWING: 0
NAUSEA: 0
RECTAL PAIN: 0
RHINORRHEA: 0

## 2023-03-15 ASSESSMENT — PATIENT HEALTH QUESTIONNAIRE - PHQ9
6. FEELING BAD ABOUT YOURSELF - OR THAT YOU ARE A FAILURE OR HAVE LET YOURSELF OR YOUR FAMILY DOWN: 0
SUM OF ALL RESPONSES TO PHQ QUESTIONS 1-9: 3
8. MOVING OR SPEAKING SO SLOWLY THAT OTHER PEOPLE COULD HAVE NOTICED. OR THE OPPOSITE, BEING SO FIGETY OR RESTLESS THAT YOU HAVE BEEN MOVING AROUND A LOT MORE THAN USUAL: 0
SUM OF ALL RESPONSES TO PHQ QUESTIONS 1-9: 3
7. TROUBLE CONCENTRATING ON THINGS, SUCH AS READING THE NEWSPAPER OR WATCHING TELEVISION: 0
9. THOUGHTS THAT YOU WOULD BE BETTER OFF DEAD, OR OF HURTING YOURSELF: 0
3. TROUBLE FALLING OR STAYING ASLEEP: 3
10. IF YOU CHECKED OFF ANY PROBLEMS, HOW DIFFICULT HAVE THESE PROBLEMS MADE IT FOR YOU TO DO YOUR WORK, TAKE CARE OF THINGS AT HOME, OR GET ALONG WITH OTHER PEOPLE: 0
SUM OF ALL RESPONSES TO PHQ9 QUESTIONS 1 & 2: 0
SUM OF ALL RESPONSES TO PHQ QUESTIONS 1-9: 3
5. POOR APPETITE OR OVEREATING: 0
1. LITTLE INTEREST OR PLEASURE IN DOING THINGS: 0
2. FEELING DOWN, DEPRESSED OR HOPELESS: 0
4. FEELING TIRED OR HAVING LITTLE ENERGY: 0
SUM OF ALL RESPONSES TO PHQ QUESTIONS 1-9: 3

## 2023-03-15 NOTE — PROGRESS NOTES
Chief Complaint   Patient presents with    Diabetes     STATES NO CONCERNS     Hypertension    Hyperlipidemia         Kurtis Wynnhumberto  here today for follow up on chronic medical problems, go over labs and/or diagnostic studies, and medication refills. Diabetes (STATES NO CONCERNS ), Hypertension, and Hyperlipidemia      HPI: Patient is scheduled for follow-up on chronic medical conditions. Diabetes, controlled, patient is currently on multiple medications, A1c has improved to 6.7 from 8.1. Patient reports compliance with medications, is currently on Dexcom. On average blood sugars are running 1 30-1 50. Patient reports it has helped, she still checks blood sugars occasionally. Patient denies any hypo or hyperglycemic episodes. Patient needs ophthalmology referral, reports her previous ophthalmologist is not taking her insurance. She is currently on insulin, Farxiga, short-acting insulin and glipizide. Hypothyroidism, patient reports she ran out of the Synthroid and she cut down her medication to half dose. She is currently on 150 mcg, no recent TSH. Her previous TSH was below normal levels. Patient reports she feels great denies any fatigue tiredness. Hypertension controlled, patient is on diuretics lisinopril denies any side effects. Patient denies any chest pain shortness of breath. Hyperlipidemia stable on statins reports compliance. The CVD Risk score (D'Agostino, et al., 2008) failed to calculate for the following reasons: The patient has a prior MI, stroke, CHF, or peripheral vascular disease diagnosis        Patient is complaining of postmenopausal bleeding reports she had 2 cycles which are very small and last 2 months. Patient never had this issue in the past has strong family history of uterine cancer as per patient. Patient denies any pain did not had any ultrasound done recently.     Patient had MRI abdomen ordered due to elevated ferritin levels but repeat blood work came back normal.      Patient is also due for DEXA scan which was ordered multiple times.        /86   Pulse 66   Temp 98.6 °F (37 °C)   Ht 5' 2\" (1.575 m)   Wt 233 lb 3.2 oz (105.8 kg)   LMP 01/12/2015 (Approximate)   SpO2 96%   BMI 42.65 kg/m²    Body mass index is 42.65 kg/m².  Wt Readings from Last 3 Encounters:   03/15/23 233 lb 3.2 oz (105.8 kg)   12/15/22 241 lb 9.6 oz (109.6 kg)   09/19/22 239 lb (108.4 kg)        []Negative depression screening.  PHQ Scores 3/15/2023 12/15/2022 8/30/2022 1/11/2022 5/6/2021 3/23/2021 9/16/2020   PHQ2 Score 0 1 1 0 3 3 0   PHQ9 Score 3 11 8 0 10 15 0      []1-4 = Minimal depression   []5-9 = Milddepression   []10-14 = Moderate depression   []15-19 = Moderately severe depression   []20-27 = Severe depression    Discussed testing with the patient and all questions fully answered.    Hospital Outpatient Visit on 02/25/2023   Component Date Value Ref Range Status    Microalb, Ur 02/25/2023 <12  <21 mg/L Final    Creatinine, Ur 02/25/2023 70.6  28.0 - 217.0 mg/dL Final    Microalb/Crt. Ratio 02/25/2023 Can not be calculated  <25 mcg/mg creat Final         Most recent labs reviewed:     Lab Results   Component Value Date    WBC 13.9 (H) 05/16/2022    HGB 16.9 (H) 05/16/2022    HCT 48.6 (H) 05/16/2022    MCV 87.6 05/16/2022     05/16/2022       @BRIEFLAB(NA,K,CL,CO2,BUN,CREATININE,GLUCOSE,CALCIUM)@     Lab Results   Component Value Date    ALT 20 02/16/2023    AST 13 02/16/2023    ALKPHOS 130 (H) 02/16/2023    BILITOT 0.8 02/16/2023       Lab Results   Component Value Date    TSH 5.34 (H) 03/15/2023       Lab Results   Component Value Date    CHOL 113 02/16/2023    CHOL 156 08/18/2020    CHOL 68 04/14/2020     Lab Results   Component Value Date    TRIG 146 02/16/2023    TRIG 214 (H) 08/18/2020    TRIG 132 04/14/2020     Lab Results   Component Value Date    HDL 36 (L) 02/16/2023    HDL 35 (L) 01/14/2022    HDL 41 08/18/2020     Lab Results   Component Value  Date    LDLCHOLESTEROL 48 02/16/2023    LDLCHOLESTEROL 69 01/14/2022    LDLCHOLESTEROL 72 08/18/2020     Lab Results   Component Value Date    VLDL NOT REPORTED (H) 01/14/2022    VLDL NOT REPORTED (H) 08/18/2020    VLDL NOT REPORTED 04/14/2020     Lab Results   Component Value Date    CHOLHDLRATIO 3.1 02/16/2023    CHOLHDLRATIO 4.3 01/14/2022    CHOLHDLRATIO 3.8 08/18/2020       Lab Results   Component Value Date    LABA1C 6.7 03/15/2023       No results found for: [id number]    No results found for: FOLATE    Lab Results   Component Value Date    FERRITIN 84 02/16/2023       Lab Results   Component Value Date    VITD25 11.5 (L) 02/16/2023             Current Outpatient Medications   Medication Sig Dispense Refill    Continuous Blood Gluc Sensor (DEXCOM G6 SENSOR) MISC USE AS DIRECTED      levothyroxine (SYNTHROID) 150 MCG tablet Take 1 tablet by mouth once a week Indications: Sundays Take 150 mcg by mouth once a week Indications: Sundays 60 tablet 1    metoprolol tartrate (LOPRESSOR) 25 MG tablet TAKE ONE TABLET BY MOUTH TWICE A  tablet 0    gabapentin (NEURONTIN) 600 MG tablet take 1 tablet by mouth twice a day 60 tablet 0    oxybutynin (DITROPAN-XL) 10 MG extended release tablet take 1 tablet by mouth once daily 90 tablet 1    glipiZIDE (GLUCOTROL) 10 MG tablet TAKE TWO TABLETS BY MOUTH TWICE A  tablet 0    insulin lispro, 1 Unit Dial, (HUMALOG/ADMELOG) 100 UNIT/ML SOPN INJECT 18 UNITS UNDER THE SKIN THREE TIMES A DAY BEFORE MEALS 27 mL 0    vitamin D (ERGOCALCIFEROL) 1.25 MG (45450 UT) CAPS capsule Take 1 capsule by mouth once a week 12 capsule 1    Continuous Blood Gluc Transmit (DEXCOM G6 TRANSMITTER) MISC USE AS DIRECTED 1 each 0    Insulin Degludec (TRESIBA FLEXTOUCH) 200 UNIT/ML SOPN INJECT 80 UNITS UNDER THE SKIN ONCE NIGHTLY 9 mL 3    atorvastatin (LIPITOR) 80 MG tablet TAKE ONE TABLET BY MOUTH DAILY 90 tablet 0    FARXIGA 10 MG tablet TAKE ONE TABLET BY MOUTH EVERY MORNING 30 tablet 0    RA PEN NEEDLES 31G X 8 MM MISC INJECTING TWICE DAILY DX: E11.65 100 each 0    DULoxetine (CYMBALTA) 60 MG extended release capsule take 1 capsule by mouth once daily 90 capsule 3    ASPIRIN LOW DOSE 81 MG EC tablet take 1 tablet by mouth once daily 90 tablet 1    lidocaine (LIDODERM) 5 % Place 1 patch onto the skin daily 12 hours on, 12 hours off. 10 patch 0    furosemide (LASIX) 20 MG tablet take 1 tablet by mouth once daily 90 tablet 3    potassium chloride (KLOR-CON M) 10 MEQ extended release tablet take 1 tablet by mouth once daily (Patient not taking: Reported on 3/15/2023) 90 tablet 3    lisinopril (PRINIVIL;ZESTRIL) 2.5 MG tablet take 1 tablet by mouth once daily 90 tablet 3    nystatin (MYCOSTATIN) 596423 UNIT/GM powder Apply 3 times daily. (Patient not taking: Reported on 3/15/2023) 1 each 0    ketoconazole (NIZORAL) 2 % cream Apply topically  2 times a day. (Patient not taking: Reported on 3/15/2023) 1 each 1    tiZANidine (ZANAFLEX) 4 MG tablet Take 1 tablet by mouth at bedtime (Patient not taking: Reported on 3/15/2023) 30 tablet 0    magnesium citrate solution Take 296 mLs by mouth once for 1 dose 296 mL 0     No current facility-administered medications for this visit.              Social History     Socioeconomic History    Marital status: Single     Spouse name: Not on file    Number of children: Not on file    Years of education: Not on file    Highest education level: Not on file   Occupational History    Not on file   Tobacco Use    Smoking status: Former     Packs/day: 0.01     Years: 1.00     Pack years: 0.01     Types: Cigarettes     Quit date: 5     Years since quittin.2    Smokeless tobacco: Never    Tobacco comments:     pt states she quit smoking cigarettes at the age of 14   Vaping Use    Vaping Use: Never used   Substance and Sexual Activity    Alcohol use: No     Alcohol/week: 0.0 standard drinks    Drug use: Yes     Frequency: 7.0 times per week     Types: Marijuana [place] [facility])    Sexual activity: Yes     Partners: Male   Other Topics Concern    Not on file   Social History Narrative    Not on file     Social Determinants of Health     Financial Resource Strain: Low Risk     Difficulty of Paying Living Expenses: Not hard at all   Food Insecurity: No Food Insecurity    Worried About [address] in the Last Year: Never true    [address] in the Last Year: Never true   Transportation Needs: Unknown    Lack of Transportation (Medical): Not on file    Lack of Transportation (Non-Medical): No   Physical Activity: Inactive    Days of Exercise per Week: 0 days    Minutes of Exercise per Session: 0 min   Stress: Not on file   Social Connections: Not on file   Intimate Partner Violence: Not on file   Housing Stability: Unknown    Unable to Pay for Housing in the Last Year: Not on file    Number of [place] in the Last Year: Not on file    Unstable Housing in the Last Year: No     Counseling given: Not Answered  Tobacco comments: pt states she quit smoking cigarettes at the age of 14        Family History   Problem Relation Age of Onset    Coronary Art Dis Mother     Lung Cancer Mother     Diabetes Mother         mellitus    Coronary Art Dis Father     Diabetes Father         diabetes mellitus             -rest of complaints with corresponding details per ROS    The patient's past medical, surgical, social, and family history as well as her current medications and allergies were reviewed as documented intoday's encounter. Review of Systems   Constitutional:  Positive for unexpected weight change. Negative for activity change, appetite change, fatigue and fever. HENT:  Negative for congestion, ear pain, postnasal drip, rhinorrhea, sinus pressure, sore throat and trouble swallowing. Eyes:  Negative for redness and visual disturbance. Respiratory:  Negative for cough, chest tightness, shortness of breath, wheezing and stridor.     Cardiovascular:  Negative for chest pain, palpitations and leg swelling. Gastrointestinal:  Negative for abdominal distention, abdominal pain, blood in stool, constipation, diarrhea, nausea, rectal pain and vomiting. Endocrine: Negative for polydipsia, polyphagia and polyuria. Genitourinary:  Positive for menstrual problem. Negative for difficulty urinating, flank pain, frequency, pelvic pain and urgency. Musculoskeletal:  Positive for arthralgias, back pain and gait problem. Negative for myalgias and neck pain. Skin:  Negative for color change, rash and wound. Allergic/Immunologic: Negative for food allergies and immunocompromised state. Neurological:  Positive for weakness and numbness. Negative for dizziness, speech difficulty, light-headedness and headaches. Psychiatric/Behavioral:  Negative for agitation, behavioral problems, decreased concentration, dysphoric mood, hallucinations, sleep disturbance and suicidal ideas. The patient is nervous/anxious. Physical Exam  Vitals and nursing note reviewed. Constitutional:       General: She is not in acute distress. Appearance: Normal appearance. She is well-developed. She is obese. She is not diaphoretic. HENT:      Head: Normocephalic and atraumatic. Nose: Nose normal.   Eyes:      General:         Right eye: No discharge. Left eye: No discharge. Extraocular Movements: Extraocular movements intact. Conjunctiva/sclera: Conjunctivae normal.      Pupils: Pupils are equal, round, and reactive to light. Neck:      Thyroid: No thyromegaly. Cardiovascular:      Rate and Rhythm: Normal rate and regular rhythm. Heart sounds: Normal heart sounds. No murmur heard. Pulmonary:      Effort: Pulmonary effort is normal. No respiratory distress. Breath sounds: Normal breath sounds. No wheezing or rhonchi. Abdominal:      General: Bowel sounds are normal. There is no distension. Palpations: Abdomen is soft. There is no mass. Tenderness:  There is no abdominal tenderness. There is no right CVA tenderness, left CVA tenderness, guarding or rebound. Musculoskeletal:      Cervical back: Normal range of motion and neck supple. Spasms present. No rigidity. Thoracic back: No spasms. Normal range of motion. Lumbar back: Spasms and tenderness present. No deformity. Decreased range of motion. Negative right straight leg raise test and negative left straight leg raise test.      Right lower leg: No edema. Left lower leg: No edema. Lymphadenopathy:      Cervical: No cervical adenopathy. Skin:     Coloration: Skin is not jaundiced or pale. Findings: No bruising, erythema or rash. Neurological:      General: No focal deficit present. Mental Status: She is alert and oriented to person, place, and time. Cranial Nerves: No cranial nerve deficit. Sensory: No sensory deficit. Motor: No weakness or tremor. Coordination: Romberg sign negative. Coordination normal.      Gait: Gait and tandem walk normal.      Deep Tendon Reflexes: Reflexes are normal and symmetric. Psychiatric:         Attention and Perception: Attention and perception normal. She is attentive. Mood and Affect: Mood is anxious. Mood is not depressed. Affect is not angry or tearful. Speech: She is communicative. Speech is not rapid and pressured, delayed or slurred. Behavior: Behavior normal. Behavior is not agitated or slowed. Thought Content: Thought content normal.         Cognition and Memory: Cognition is not impaired. Judgment: Judgment normal.           ASSESSMENT AND PLAN      1. Type 2 diabetes mellitus with diabetic polyneuropathy, with long-term current use of insulin (HCC)  Controlled, A1c has improved to 6.7 discussed with patient in detail referral placed to ophthalmologist for eye exam.  Continue current treatment  Continue continuous glucose monitoring.   Watch for high carb intake  - POCT glycosylated hemoglobin (Hb A1C)  - DELON Argueta MD, Ophthalmology, [place]    2. Acquired hypothyroidism  Uncontrolled, recheck TSH restart on Synthroid. - levothyroxine (SYNTHROID) 150 MCG tablet; Take 1 tablet by mouth once a week Indications: Sundays Take 150 mcg by mouth once a week Indications: Sundays  Dispense: 60 tablet; Refill: 1  - TSH with Reflex; Future    3. Essential hypertension  Controlled continue diuretics continue lisinopril  Discussed and advised  Low-salt diet  Home monitoring of blood pressure    4. Mixed hyperlipidemia  Stable continue statins  Discussed low-fat diet  Increase physical activity  Weight reduction    5. Postmenopausal bleeding  Discussed with patient that you need to do ultrasound of the pelvis to look for endometrial hyperplasia. - US PELVIS COMPLETE; Future    6. Chronic diastolic congestive heart failure (HCC)  Stable continue diuretics continue ACE inhibitors    7. Post-menopausal  Schedule DEXA scan  - DEXA Bone Density Axial Skeleton; Future      Orders Placed This Encounter   Procedures    DEXA Bone Density Axial Skeleton     Standing Status:   Future     Standing Expiration Date:   3/14/2024    US PELVIS COMPLETE     This procedure can be scheduled via [employer]. Access your [employer] account by visiting [email or web address].      Standing Status:   Future     Standing Expiration Date:   3/15/2024    TSH with Reflex     Standing Status:   Future     Number of Occurrences:   1     Standing Expiration Date:   3/15/2024    DELON Argueta MD, Ophthalmology, [place]     Referral Priority:   Routine     Referral Type:   Eval and Treat     Referral Reason:   Specialty Services Required     Referred to Provider:   Butch Mejía MD     Requested Specialty:   Ophthalmology     Number of Visits Requested:   1    POCT glycosylated hemoglobin (Hb A1C)         Medications Discontinued During This Encounter   Medication Reason    ketoconazole (NIZORAL) 2 % cream Therapy completed    lidocaine (LMX) 4 % cream Therapy completed    zoster recombinant adjuvanted vaccine (SHINGRIX) 50 MCG/0.5ML SUSR injection Therapy completed    oxybutynin (DITROPAN XL) 15 MG extended release tablet DOSE ADJUSTMENT    levothyroxine (SYNTHROID) 150 MCG tablet Therapy completed    levothyroxine (SYNTHROID) 75 MCG tablet Therapy completed    levothyroxine (SYNTHROID) 175 MCG tablet DOSE ADJUSTMENT       Jaspreet Rodriguez received counseling on the following healthy behaviors: nutrition, exercise, and medication adherence  Reviewed prior labs and health maintenance  Continue current medications, diet and exercise. Discussed use, benefit, and side effects of prescribed medications. Barriers to medication compliance addressed. Patient given educational materials - see patient instructions  Was a self-tracking handout given in paper form or via [employer]? Yes    Requested Prescriptions     Signed Prescriptions Disp Refills    levothyroxine (SYNTHROID) 150 MCG tablet 60 tablet 1     Sig: Take 1 tablet by mouth once a week Indications: Sundays Take 150 mcg by mouth once a week Indications: Sundays       All patient questions answered. Patient voiced understanding. Quality Measures    Body mass index is 42.65 kg/m². Elevated. Weight control planned discussed daily exercise regimen and Healthy diet and regular exercise. BP: 104/86. Blood pressure is normal. Treatment plan consists of Weight Reduction, DASH Eating Plan, Dietary Sodium Restriction, Increased Physical Activity, and No treatment change needed. Fall Risk 8/30/2022 5/31/2022   2 or more falls in past year? no no   Fall with injury in past year? no no     The patient does not have a history of falls. I did , complete a risk assessment for falls.  A plan of care for falls in-office gait and balance testing performed using The Timed Up and Go Test was negative for increased falls risk- no further intervention is currently indicated, home safety tips provided, No Treatment plan indicated    Lab Results   Component Value Date    LDLCHOLESTEROL 48 02/16/2023    LDLDIRECT 200 (H) 12/08/2017    (goal LDL reduction with dx if diabetes is 50% LDL reduction)    PHQ Scores 3/15/2023 12/15/2022 8/30/2022 1/11/2022 5/6/2021 3/23/2021 9/16/2020   PHQ2 Score 0 1 1 0 3 3 0   PHQ9 Score 3 11 8 0 10 15 0     Interpretation of Total Score Depression Severity: 1-4 = Minimal depression, 5-9 = Mild depression, 10-14 = Moderate depression, 15-19 = Moderately severe depression, 20-27 = Severe depression    The patient'spast medical, surgical, social, and family history as well as her   current medications and allergies were reviewed as documented in today's encounter. Medications, labs, diagnostic studies, consultations andfollow-up as documented in this encounter. Return in about 3 months (around 6/15/2023) for dm ,htn, hld, 30min,always chronic conditons. Patient wasseen with total face to face time of 35 minutes. More than 50% of this visit was counseling and education. Future Appointments   Date Time Provider Aleksandra Wisdom   9/5/2023 11:30 AM Catrachita Rodriguez MD fp sc [id number]     This note was completed by using the assistance of a speech-recognition program. However, inadvertent computerized transcription errors may be present. Althoughevery effort was made to ensure accuracy, no guarantees can be provided that every mistake has been identified and corrected by editing.   Electronically signed by Catrachita Rodriguez MD on 3/15/2023  4:40 PM

## 2023-03-15 NOTE — PATIENT INSTRUCTIONS
Thank you for choosing [facility]) as your healthcare provider as your care is important to us. Please arrive at your appointment on time. If you are unable to make your appointment, please call our office as soon as possible so that we may reschedule your appointment. Missing 3 appointments in a calendar year without notifying the office may lead to dismissal from the practice. We appreciate you calling at least 24 hours in advance, when possible. Thank you. New Updates for [facility]    Thank you for choosing [facility] to give you the best care! [facility]) is always trying to think of new ways to help their patients. We are asking all patients to try out the new digital registration that is now available through your [facility] account Via the yvan you're now able to update your personal and registration information prior to your upcoming appointment. This will save you time once you arrive at the office to check-in, not to mention your information remains safe!! Many other perks come from signing up for an account, such as:  Requesting refills  Scheduling an appointment  Completing an E-Visit  Sending a message to the office/provider  Having access to your medication list  Paying your bill/copay prior to your appointment  Scheduling your yearly mammogram  Review your test results    If you are not familiar with [facility] please ask one of us and we will be happy to answer any questions or help you set-up your account.       Your [employer],

## 2023-03-15 NOTE — PROGRESS NOTES
Visit Information    Have you changed or started any medications since your last visit including any over-the-counter medicines, vitamins, or herbal medicines? no   Have you stopped taking any of your medications? Is so, why? -  no  Are you having any side effects from any of your medications? - no    Have you seen any other physician or provider since your last visit? yes -    Have you had any other diagnostic tests since your last visit?  no   Have you been seen in the emergency room and/or had an admission in a hospital since we last saw you?  no   Have you had your routine dental cleaning in the past 6 months?  no     Do you have an active MyChart account? If no, what is the barrier?   Yes    Patient Care Team:  Piotr Monahan MD as PCP - General (Family Medicine)  Piotr Monahan MD as PCP - Empaneled Provider  Jose Manuel Murphy DO as Consulting Physician (Obstetrics & Gynecology)  Dickson Barragan MD as Consulting Physician (Endocrinology)  [facility] [address], MD as Consulting Physician (Gastroenterology)    Medical History Review  Past Medical, Family, and Social History reviewed and does contribute to the patient presenting condition    Health Maintenance   Topic Date Due    Shingles vaccine (1 of 2) Never done    DEXA (modify frequency per FRAX score)  Never done    Diabetic retinal exam  03/07/2017    Diabetic foot exam  07/07/2018    COVID-19 Vaccine (4 - Booster for Pfizer series) 03/05/2022    Colorectal Cancer Screen  04/26/2022    Flu vaccine (1) Never done    Annual Wellness Visit (AWV)  08/31/2023    A1C test (Diabetic or Prediabetic)  12/15/2023    Depression Monitoring  12/15/2023    Lipids  02/16/2024    GFR test (Diabetes, CKD 3-4, OR last GFR 15-59)  02/16/2024    Diabetic Alb to Cr ratio (uACR) test  02/25/2024    Breast cancer screen  09/08/2024    Cervical cancer screen  09/16/2025    DTaP/Tdap/Td vaccine (3 - Td or Tdap) 06/16/2032    Pneumococcal 65+ years Vaccine  Completed Hepatitis C screen  Completed    HIV screen  Addressed    Hepatitis A vaccine  Aged Out    Hib vaccine  Aged Out    Meningococcal (ACWY) vaccine  Aged Out

## 2023-03-23 DIAGNOSIS — M79.89 LEG SWELLING: ICD-10-CM

## 2023-03-23 RX ORDER — FUROSEMIDE 20 MG/1
TABLET ORAL
Qty: 90 TABLET | Refills: 3 | Status: SHIPPED | OUTPATIENT
Start: 2023-03-23

## 2023-03-27 ENCOUNTER — TELEPHONE (OUTPATIENT)
Dept: FAMILY MEDICINE CLINIC | Age: 68
End: 2023-03-27

## 2023-03-27 RX ORDER — DAPAGLIFLOZIN 10 MG/1
TABLET, FILM COATED ORAL
Qty: 90 TABLET | Refills: 3 | Status: SHIPPED | OUTPATIENT
Start: 2023-03-27

## 2023-03-27 NOTE — TELEPHONE ENCOUNTER
Pt called in to request a refill of her FARXIGA 10 MG tablet. Could not request a refill, will need a new order placed for pts medication and she requests that it be sent to [facility] on [facility]. Pharmacy has been updated for the patient.

## 2023-03-27 NOTE — TELEPHONE ENCOUNTER
Please Approve or Refuse.   Send to Pharmacy per Pt's Request:      Next Visit Date:  9/5/2023   Last Visit Date: 3/15/2023    Hemoglobin A1C (%)   Date Value   03/15/2023 6.7   12/15/2022 8.0   08/30/2022 7.5             ( goal A1C is < 7)   BP Readings from Last 3 Encounters:   03/15/23 104/86   12/15/22 128/76   09/19/22 110/76          (goal 120/80)  BUN   Date Value Ref Range Status   02/16/2023 14 8 - 23 mg/dL Final     Creatinine   Date Value Ref Range Status   02/16/2023 0.45 (L) 0.50 - 0.90 mg/dL Final     Potassium   Date Value Ref Range Status   02/16/2023 4.4 3.7 - 5.3 mmol/L Final

## 2023-03-29 ENCOUNTER — OFFICE VISIT (OUTPATIENT)
Dept: INFECTIOUS DISEASES | Age: 68
End: 2023-03-29

## 2023-03-29 ENCOUNTER — HOSPITAL ENCOUNTER (OUTPATIENT)
Age: 68
Setting detail: SPECIMEN
Discharge: HOME | End: 2023-03-29

## 2023-03-29 VITALS
DIASTOLIC BLOOD PRESSURE: 77 MMHG | TEMPERATURE: 97.1 F | BODY MASS INDEX: 42.69 KG/M2 | SYSTOLIC BLOOD PRESSURE: 131 MMHG | HEART RATE: 59 BPM | HEIGHT: 62 IN | WEIGHT: 232 LBS

## 2023-03-29 DIAGNOSIS — N39.0 URINARY TRACT INFECTION WITHOUT HEMATURIA, SITE UNSPECIFIED: ICD-10-CM

## 2023-03-29 DIAGNOSIS — Z20.2 CHLAMYDIA CONTACT: Primary | ICD-10-CM

## 2023-03-29 DIAGNOSIS — A54.9 NEISSERIA GONORRHEAE: ICD-10-CM

## 2023-03-29 DIAGNOSIS — Z20.2 CHLAMYDIA CONTACT: ICD-10-CM

## 2023-03-29 DIAGNOSIS — R32 URINARY INCONTINENCE, UNSPECIFIED TYPE: ICD-10-CM

## 2023-03-29 LAB
BACTERIA: ABNORMAL
BILIRUBIN URINE: NEGATIVE
CASTS UA: ABNORMAL /LPF (ref 0–2)
CASTS UA: ABNORMAL /LPF (ref 0–2)
COLOR: YELLOW
EPITHELIAL CELLS UA: ABNORMAL /HPF (ref 0–5)
GLUCOSE UR STRIP.AUTO-MCNC: ABNORMAL MG/DL
KETONES UR STRIP.AUTO-MCNC: NEGATIVE MG/DL
LEUKOCYTE ESTERASE UR QL STRIP.AUTO: ABNORMAL
MUCUS: ABNORMAL
NITRITE UR QL STRIP.AUTO: POSITIVE
PROT UR STRIP.AUTO-MCNC: 5.5 MG/DL (ref 5–8)
PROT UR STRIP.AUTO-MCNC: NEGATIVE MG/DL
RBC CLUMPS #/AREA URNS AUTO: ABNORMAL /HPF (ref 0–2)
SPECIFIC GRAVITY UA: 1.03 (ref 1–1.03)
TURBIDITY: ABNORMAL
URINE HGB: NEGATIVE
UROBILINOGEN, URINE: NORMAL
WBC UA: ABNORMAL /HPF (ref 0–5)

## 2023-03-29 RX ORDER — FLUCONAZOLE 100 MG/1
200 TABLET ORAL DAILY
Qty: 14 TABLET | Refills: 0 | Status: SHIPPED | OUTPATIENT
Start: 2023-03-29 | End: 2023-04-05

## 2023-03-29 NOTE — PROGRESS NOTES
7/17/15 no results    CHOLECYSTECTOMY      COLONOSCOPY N/A 10/26/2021    COLONOSCOPY POLYPECTOMY SNARE/COLD BIOPSY performed by Parris Carney MD at [address]      right hand    CYSTO/URETERO/PYELOSCOPY, CALCULUS TX Right 8/26/2019    CYSTOSCOPY URETEROSCOPY  STONE BASKET RETRIEVAL RIGHT STENT EXCHANGE performed by Tuan Vallejo MD at [address] [facility] 8/20/2019    CYSTOSCOPY URETERAL STENT INSERTION performed by Tuan Vallejo MD at [address] [facility]  2009    ENDOSCOPY, COLON, DIAGNOSTIC      LITHOTRIPSY      TONSILLECTOMY      TUBAL LIGATION      UPPER GASTROINTESTINAL ENDOSCOPY N/A 4/16/2020    EGD BIOPSY performed by Parris Carney MD at [address] [facility] 10/26/2021    EGD BIOPSY performed by Parris Carney MD at [facility]          Admission Meds  Current Outpatient Medications on File Prior to Visit   Medication Sig Dispense Refill    FARXIGA 10 MG tablet TAKE ONE TABLET BY MOUTH EVERY MORNING 90 tablet 3    furosemide (LASIX) 20 MG tablet take 1 tablet by mouth once daily 90 tablet 3    Continuous Blood Gluc Sensor (DEXCOM G6 SENSOR) MISC USE AS DIRECTED      levothyroxine (SYNTHROID) 150 MCG tablet Take 1 tablet by mouth once a week Indications: Sundays Take 150 mcg by mouth once a week Indications: Sundays 60 tablet 1    metoprolol tartrate (LOPRESSOR) 25 MG tablet TAKE ONE TABLET BY MOUTH TWICE A  tablet 0    gabapentin (NEURONTIN) 600 MG tablet take 1 tablet by mouth twice a day 60 tablet 0    oxybutynin (DITROPAN-XL) 10 MG extended release tablet take 1 tablet by mouth once daily 90 tablet 1    glipiZIDE (GLUCOTROL) 10 MG tablet TAKE TWO TABLETS BY MOUTH TWICE A  tablet 0    insulin lispro, 1 Unit Dial, (HUMALOG/ADMELOG) 100 UNIT/ML SOPN INJECT 18 UNITS UNDER THE SKIN THREE TIMES A DAY BEFORE MEALS 27 mL 0    potassium chloride (KLOR-CON M) 10 MEQ extended release tablet take 1 tablet

## 2023-03-30 ENCOUNTER — TELEPHONE (OUTPATIENT)
Dept: FAMILY MEDICINE CLINIC | Age: 68
End: 2023-03-30

## 2023-03-30 DIAGNOSIS — N39.0 RECURRENT UTI: Primary | ICD-10-CM

## 2023-03-30 LAB
CHLAMYDIA DNA UR QL NAA+PROBE: NEGATIVE
N GONORRHOEA DNA UR QL NAA+PROBE: NEGATIVE
SPECIMEN DESCRIPTION: NORMAL

## 2023-03-30 NOTE — TELEPHONE ENCOUNTER
Pt called in and stated when she went to her appt yesterday for her UTI the doctor told her she needed to see an OBGYN and have a Pap Smear done. Pt also stated that she gave her an antibiotic that is for yeast infections. Pt does have some urine tests pending as well. Pt is requesting a new referral to Dr. Jc Sanchez.

## 2023-03-31 LAB
MICROORGANISM SPEC CULT: ABNORMAL
SPECIMEN DESCRIPTION: ABNORMAL

## 2023-04-05 ENCOUNTER — TELEPHONE (OUTPATIENT)
Dept: UROLOGY | Age: 68
End: 2023-04-05

## 2023-04-05 ENCOUNTER — SCHEDULED TELEPHONE ENCOUNTER (OUTPATIENT)
Dept: INFECTIOUS DISEASES | Age: 68
End: 2023-04-05
Payer: MEDICARE

## 2023-04-05 DIAGNOSIS — E11.42 DIABETIC POLYNEUROPATHY ASSOCIATED WITH TYPE 2 DIABETES MELLITUS (HCC): ICD-10-CM

## 2023-04-05 DIAGNOSIS — N39.0 URINARY TRACT INFECTION WITHOUT HEMATURIA, SITE UNSPECIFIED: Primary | ICD-10-CM

## 2023-04-05 PROCEDURE — 99421 OL DIG E/M SVC 5-10 MIN: CPT | Performed by: INTERNAL MEDICINE

## 2023-04-05 RX ORDER — DULOXETIN HYDROCHLORIDE 60 MG/1
CAPSULE, DELAYED RELEASE ORAL
Qty: 90 CAPSULE | Refills: 3 | Status: SHIPPED | OUTPATIENT
Start: 2023-04-05

## 2023-04-05 RX ORDER — NITROFURANTOIN 25; 75 MG/1; MG/1
100 CAPSULE ORAL 2 TIMES DAILY
Qty: 28 CAPSULE | Refills: 0 | Status: SHIPPED | OUTPATIENT
Start: 2023-04-05 | End: 2023-04-19

## 2023-04-05 NOTE — PROGRESS NOTES
Shai Baker is a 79 y.o. female evaluated via telephone on 4/5/2023 for Follow-up  . Documentation:  I communicated with the patient and/or health care decision maker about UTI. Details of this discussion including any medical advice provided: She still complaining of dysuria. Urinalysis on 3/29/2023 suggestive of UTI, urine culture grew E. coli that was pansensitive. Urine for GC and chlamydia was negative  The patient had allergies to cephalosporin with anaphylaxis, amoxicillin and sulfa allergy with itching. Plan:  Macrobid for 2 weeks  Follow-up with urology and OB/GYN    Total Time: minutes: 5-10 minutes    Shai Baker was evaluated through a synchronous (real-time) audio encounter. Patient identification was verified at the start of the visit. She (or guardian if applicable) is aware that this is a billable service, which includes applicable co-pays. This visit was conducted with the patient's (and/or legal guardian's) verbal consent. She has not had a related appointment within my department in the past 7 days or scheduled within the next 24 hours. The patient was located at Home: [address] [place] [address]. The provider was located at  (Appt Dept): [address]  [address]  [address],  [address] [facility].     Note: not billable if this call serves to triage the patient into an appointment for the relevant concern    Christine Underwood MD

## 2023-04-10 RX ORDER — OXYBUTYNIN CHLORIDE 15 MG/1
TABLET, EXTENDED RELEASE ORAL
Qty: 90 TABLET | Refills: 3 | OUTPATIENT
Start: 2023-04-10

## 2023-04-14 ENCOUNTER — TELEPHONE (OUTPATIENT)
Dept: FAMILY MEDICINE CLINIC | Age: 68
End: 2023-04-14

## 2023-04-14 DIAGNOSIS — N39.0 RECURRENT UTI: Primary | ICD-10-CM

## 2023-04-17 RX ORDER — SULFAMETHOXAZOLE AND TRIMETHOPRIM 800; 160 MG/1; MG/1
1 TABLET ORAL 2 TIMES DAILY
Qty: 20 TABLET | Refills: 0 | Status: SHIPPED | OUTPATIENT
Start: 2023-04-17

## 2023-04-17 NOTE — TELEPHONE ENCOUNTER
Please notify patient I sent new antibiotic, patient is allergic to most of the antibiotics we have very limited options. If she has itchiness she can try Benadryl to help with itchiness.

## 2023-04-20 RX ORDER — GLIPIZIDE 10 MG/1
TABLET ORAL
Qty: 180 TABLET | Refills: 3 | Status: SHIPPED | OUTPATIENT
Start: 2023-04-20

## 2023-04-24 ENCOUNTER — TELEPHONE (OUTPATIENT)
Dept: SURGERY | Age: 68
End: 2023-04-24

## 2023-04-24 RX ORDER — SOD SULF/POT CHLORIDE/MAG SULF 1.479 G
TABLET ORAL
Qty: 24 TABLET | Refills: 0 | Status: SHIPPED | OUTPATIENT
Start: 2023-04-24

## 2023-05-01 RX ORDER — LEVOTHYROXINE SODIUM 175 UG/1
TABLET ORAL
Qty: 90 TABLET | Refills: 1 | Status: SHIPPED | OUTPATIENT
Start: 2023-05-01

## 2023-05-02 ENCOUNTER — TELEPHONE (OUTPATIENT)
Dept: UROLOGY | Age: 68
End: 2023-05-02

## 2023-05-02 NOTE — TELEPHONE ENCOUNTER
Patient called into the office, stated \" The medication myrbetriq is not working, I leak when I seenze and cough, I feel like there is something stuck in my urethra. \" Writer advised a message would be sent to the NP [place], and the office would follow up.

## 2023-05-04 ENCOUNTER — ANESTHESIA EVENT (OUTPATIENT)
Dept: OPERATING ROOM | Age: 68
End: 2023-05-04
Payer: MEDICARE

## 2023-05-04 RX ORDER — SODIUM CHLORIDE 9 MG/ML
INJECTION, SOLUTION INTRAVENOUS PRN
Status: CANCELLED | OUTPATIENT
Start: 2023-05-04

## 2023-05-04 RX ORDER — SODIUM CHLORIDE 0.9 % (FLUSH) 0.9 %
5-40 SYRINGE (ML) INJECTION EVERY 12 HOURS SCHEDULED
Status: CANCELLED | OUTPATIENT
Start: 2023-05-04

## 2023-05-04 RX ORDER — SODIUM CHLORIDE, SODIUM LACTATE, POTASSIUM CHLORIDE, CALCIUM CHLORIDE 600; 310; 30; 20 MG/100ML; MG/100ML; MG/100ML; MG/100ML
INJECTION, SOLUTION INTRAVENOUS CONTINUOUS
Status: CANCELLED | OUTPATIENT
Start: 2023-05-04

## 2023-05-04 RX ORDER — LIDOCAINE HYDROCHLORIDE 10 MG/ML
1 INJECTION, SOLUTION INFILTRATION; PERINEURAL
Status: CANCELLED | OUTPATIENT
Start: 2023-05-04 | End: 2023-05-05

## 2023-05-04 RX ORDER — SODIUM CHLORIDE 0.9 % (FLUSH) 0.9 %
5-40 SYRINGE (ML) INJECTION PRN
Status: CANCELLED | OUTPATIENT
Start: 2023-05-04

## 2023-05-05 ENCOUNTER — ANESTHESIA (OUTPATIENT)
Dept: OPERATING ROOM | Age: 68
End: 2023-05-05
Payer: MEDICARE

## 2023-05-09 RX ORDER — ATORVASTATIN CALCIUM 80 MG/1
TABLET, FILM COATED ORAL
Qty: 90 TABLET | Refills: 0 | Status: SHIPPED | OUTPATIENT
Start: 2023-05-09

## 2023-05-09 NOTE — TELEPHONE ENCOUNTER
Please Approve or Refuse.   Send to Pharmacy per Pt's Request: [facility]     Next Visit Date:  9/5/2023   Last Visit Date: 3/15/2023    Hemoglobin A1C (%)   Date Value   03/15/2023 6.7   12/15/2022 8.0   08/30/2022 7.5             ( goal A1C is < 7)   BP Readings from Last 3 Encounters:   03/29/23 131/77   03/15/23 104/86   12/15/22 128/76          (goal 120/80)  BUN   Date Value Ref Range Status   02/16/2023 14 8 - 23 mg/dL Final     Creatinine   Date Value Ref Range Status   02/16/2023 0.45 (L) 0.50 - 0.90 mg/dL Final     Potassium   Date Value Ref Range Status   02/16/2023 4.4 3.7 - 5.3 mmol/L Final

## 2023-05-16 ENCOUNTER — E-VISIT (OUTPATIENT)
Dept: FAMILY MEDICINE CLINIC | Age: 68
End: 2023-05-16
Payer: MEDICARE

## 2023-05-16 DIAGNOSIS — Z79.4 TYPE 2 DIABETES MELLITUS WITH DIABETIC POLYNEUROPATHY, WITH LONG-TERM CURRENT USE OF INSULIN (HCC): ICD-10-CM

## 2023-05-16 DIAGNOSIS — N39.0 URINARY TRACT INFECTION WITHOUT HEMATURIA, SITE UNSPECIFIED: Primary | ICD-10-CM

## 2023-05-16 DIAGNOSIS — E11.42 TYPE 2 DIABETES MELLITUS WITH DIABETIC POLYNEUROPATHY, WITH LONG-TERM CURRENT USE OF INSULIN (HCC): ICD-10-CM

## 2023-05-16 DIAGNOSIS — E11.42 DIABETIC POLYNEUROPATHY ASSOCIATED WITH TYPE 2 DIABETES MELLITUS (HCC): ICD-10-CM

## 2023-05-16 PROCEDURE — 99423 OL DIG E/M SVC 21+ MIN: CPT | Performed by: FAMILY MEDICINE

## 2023-05-16 RX ORDER — GABAPENTIN 600 MG/1
600 TABLET ORAL 2 TIMES DAILY
Qty: 60 TABLET | Refills: 0 | Status: SHIPPED | OUTPATIENT
Start: 2023-05-16 | End: 2023-06-16

## 2023-05-16 RX ORDER — DULOXETIN HYDROCHLORIDE 60 MG/1
60 CAPSULE, DELAYED RELEASE ORAL DAILY
Qty: 90 CAPSULE | Refills: 3 | Status: SHIPPED | OUTPATIENT
Start: 2023-05-16

## 2023-05-16 NOTE — TELEPHONE ENCOUNTER
Please Approve or Refuse.   Send to Pharmacy per Pt's Request:      Next Visit Date:  9/5/2023   Last Visit Date: 3/15/2023    Hemoglobin A1C (%)   Date Value   03/15/2023 6.7   12/15/2022 8.0   08/30/2022 7.5             ( goal A1C is < 7)   BP Readings from Last 3 Encounters:   03/29/23 131/77   03/15/23 104/86   12/15/22 128/76          (goal 120/80)  BUN   Date Value Ref Range Status   02/16/2023 14 8 - 23 mg/dL Final     Creatinine   Date Value Ref Range Status   02/16/2023 0.45 (L) 0.50 - 0.90 mg/dL Final     Potassium   Date Value Ref Range Status   02/16/2023 4.4 3.7 - 5.3 mmol/L Final

## 2023-05-16 NOTE — PROGRESS NOTES
Nighat Moseley (1955) initiated an asynchronous digital communication through [employer]. Date of service: 5/16/2023     HPI: per patient's questionnaire    EXAM: not applicable    Diagnoses and all orders for this visit:    1. Urinary tract infection without hematuria, site unspecified    - Urinalysis with Reflex to Culture; Future    2. Type 2 diabetes mellitus with diabetic polyneuropathy, with long-term current use of insulin (HCC)    - SITagliptin (JANUVIA) 50 MG tablet; Take 1 tablet by mouth daily  Dispense: 90 tablet; Refill: 1      Orders Placed This Encounter   Procedures    Urinalysis with Reflex to Culture     Standing Status:   Future     Standing Expiration Date:   5/16/2024     Order Specific Question:   SPECIFY(EX-CATH,MIDSTREAM,CYSTO,ETC)? Answer:   midstream         Patient was advised to contact PCP if symptoms worsen or failing to change as expected      Time: EV3 - 21 or more minutes were spent on the digital evaluation and management of this patient.     Electronically signed by Polo Alaniz MD on 5/16/23 at 3:01 PM.

## 2023-05-19 ENCOUNTER — HOSPITAL ENCOUNTER (OUTPATIENT)
Age: 68
Discharge: HOME | End: 2023-05-19
Payer: MEDICARE

## 2023-05-19 DIAGNOSIS — N39.0 URINARY TRACT INFECTION WITHOUT HEMATURIA, SITE UNSPECIFIED: ICD-10-CM

## 2023-05-19 LAB
BACTERIA URNS QL MICRO: ABNORMAL
BILIRUB UR QL STRIP: NEGATIVE
CASTS #/AREA URNS LPF: ABNORMAL /LPF
CLARITY UR: ABNORMAL
COLOR UR: YELLOW
EPI CELLS #/AREA URNS HPF: ABNORMAL /HPF
GLUCOSE UR STRIP-MCNC: ABNORMAL MG/DL
HGB UR QL STRIP.AUTO: ABNORMAL
KETONES UR STRIP-MCNC: NEGATIVE MG/DL
LEUKOCYTE ESTERASE UR QL STRIP: ABNORMAL
NITRITE UR QL STRIP: POSITIVE
PH UR STRIP: 6.5 [PH] (ref 5–8)
PROT UR STRIP-MCNC: ABNORMAL MG/DL
RBC #/AREA URNS HPF: ABNORMAL /HPF
SP GR UR STRIP: 1.03 (ref 1–1.03)
UROBILINOGEN UR STRIP-ACNC: NORMAL
WBC #/AREA URNS HPF: ABNORMAL /HPF

## 2023-05-19 PROCEDURE — 87088 URINE BACTERIA CULTURE: CPT

## 2023-05-19 PROCEDURE — 81001 URINALYSIS AUTO W/SCOPE: CPT

## 2023-05-19 PROCEDURE — 87086 URINE CULTURE/COLONY COUNT: CPT

## 2023-05-19 PROCEDURE — 87186 SC STD MICRODIL/AGAR DIL: CPT

## 2023-05-21 LAB
MICROORGANISM SPEC CULT: ABNORMAL
SPECIMEN DESCRIPTION: ABNORMAL

## 2023-05-22 DIAGNOSIS — N30.01 ACUTE CYSTITIS WITH HEMATURIA: Primary | ICD-10-CM

## 2023-05-22 RX ORDER — NITROFURANTOIN 25; 75 MG/1; MG/1
100 CAPSULE ORAL 2 TIMES DAILY
Qty: 20 CAPSULE | Refills: 0 | Status: SHIPPED | OUTPATIENT
Start: 2023-05-22 | End: 2023-05-24 | Stop reason: SINTOL

## 2023-05-24 ENCOUNTER — OFFICE VISIT (OUTPATIENT)
Dept: UROLOGY | Age: 68
End: 2023-05-24
Payer: MEDICARE

## 2023-05-24 VITALS
BODY MASS INDEX: 42.88 KG/M2 | HEART RATE: 66 BPM | SYSTOLIC BLOOD PRESSURE: 130 MMHG | DIASTOLIC BLOOD PRESSURE: 82 MMHG | HEIGHT: 62 IN | TEMPERATURE: 97.5 F | WEIGHT: 233 LBS

## 2023-05-24 DIAGNOSIS — N30.00 ACUTE CYSTITIS WITHOUT HEMATURIA: Primary | ICD-10-CM

## 2023-05-24 DIAGNOSIS — N39.46 MIXED STRESS AND URGE URINARY INCONTINENCE: ICD-10-CM

## 2023-05-24 PROCEDURE — 1090F PRES/ABSN URINE INCON ASSESS: CPT | Performed by: NURSE PRACTITIONER

## 2023-05-24 PROCEDURE — 0509F URINE INCON PLAN DOCD: CPT | Performed by: NURSE PRACTITIONER

## 2023-05-24 PROCEDURE — 3078F DIAST BP <80 MM HG: CPT | Performed by: NURSE PRACTITIONER

## 2023-05-24 PROCEDURE — G8427 DOCREV CUR MEDS BY ELIG CLIN: HCPCS | Performed by: NURSE PRACTITIONER

## 2023-05-24 PROCEDURE — G8399 PT W/DXA RESULTS DOCUMENT: HCPCS | Performed by: NURSE PRACTITIONER

## 2023-05-24 PROCEDURE — 1123F ACP DISCUSS/DSCN MKR DOCD: CPT | Performed by: NURSE PRACTITIONER

## 2023-05-24 PROCEDURE — 1036F TOBACCO NON-USER: CPT | Performed by: NURSE PRACTITIONER

## 2023-05-24 PROCEDURE — 3074F SYST BP LT 130 MM HG: CPT | Performed by: NURSE PRACTITIONER

## 2023-05-24 PROCEDURE — G8417 CALC BMI ABV UP PARAM F/U: HCPCS | Performed by: NURSE PRACTITIONER

## 2023-05-24 PROCEDURE — 99214 OFFICE O/P EST MOD 30 MIN: CPT | Performed by: NURSE PRACTITIONER

## 2023-05-24 PROCEDURE — 3017F COLORECTAL CA SCREEN DOC REV: CPT | Performed by: NURSE PRACTITIONER

## 2023-05-24 RX ORDER — SULFAMETHOXAZOLE AND TRIMETHOPRIM 800; 160 MG/1; MG/1
1 TABLET ORAL 2 TIMES DAILY
Qty: 20 TABLET | Refills: 0 | Status: SHIPPED | OUTPATIENT
Start: 2023-05-24 | End: 2023-06-03

## 2023-05-24 RX ORDER — MIRABEGRON 50 MG/1
50 TABLET, FILM COATED, EXTENDED RELEASE ORAL DAILY
Qty: 30 TABLET | Refills: 11 | Status: SHIPPED | OUTPATIENT
Start: 2023-05-24

## 2023-05-24 ASSESSMENT — ENCOUNTER SYMPTOMS
DIARRHEA: 0
VOMITING: 0
COUGH: 0
NAUSEA: 0
CONSTIPATION: 0
ABDOMINAL PAIN: 0
EYE PAIN: 0
EYE REDNESS: 0
BACK PAIN: 0
WHEEZING: 0
SHORTNESS OF BREATH: 0

## 2023-05-29 DIAGNOSIS — E11.42 TYPE 2 DIABETES MELLITUS WITH DIABETIC POLYNEUROPATHY, WITH LONG-TERM CURRENT USE OF INSULIN (HCC): Primary | ICD-10-CM

## 2023-05-29 DIAGNOSIS — Z79.4 TYPE 2 DIABETES MELLITUS WITH DIABETIC POLYNEUROPATHY, WITH LONG-TERM CURRENT USE OF INSULIN (HCC): Primary | ICD-10-CM

## 2023-05-30 DIAGNOSIS — E11.65 UNCONTROLLED TYPE 2 DIABETES MELLITUS WITH HYPERGLYCEMIA (HCC): ICD-10-CM

## 2023-05-30 RX ORDER — PROCHLORPERAZINE 25 MG/1
SUPPOSITORY RECTAL
Qty: 1 EACH | Refills: 0 | Status: SHIPPED | OUTPATIENT
Start: 2023-05-30

## 2023-05-30 RX ORDER — PROCHLORPERAZINE 25 MG/1
SUPPOSITORY RECTAL
Qty: 3 EACH | Refills: 3 | Status: SHIPPED | OUTPATIENT
Start: 2023-05-30

## 2023-05-30 NOTE — TELEPHONE ENCOUNTER
Current Outpatient Medications on File Prior to Visit   Medication Sig Dispense Refill    sulfamethoxazole-trimethoprim (BACTRIM DS;SEPTRA DS) 800-160 MG per tablet Take 1 tablet by mouth 2 times daily for 10 days 20 tablet 0    MYRBETRIQ 50 MG TB24 Take 50 mg by mouth daily 30 tablet 11    DULoxetine (CYMBALTA) 60 MG extended release capsule Take 1 capsule by mouth daily 90 capsule 3    gabapentin (NEURONTIN) 600 MG tablet Take 1 tablet by mouth 2 times daily for 31 days. 60 tablet 0    gabapentin (NEURONTIN) 600 MG tablet Take 1 tablet by mouth 2 times daily for 31 days. 60 tablet 0    DULoxetine (CYMBALTA) 60 MG extended release capsule Take 1 capsule by mouth daily 90 capsule 3    SITagliptin (JANUVIA) 50 MG tablet Take 1 tablet by mouth daily 90 tablet 1    atorvastatin (LIPITOR) 80 MG tablet TAKE ONE TABLET BY MOUTH DAILY 90 tablet 0    levothyroxine (SYNTHROID) 175 MCG tablet TAKE ONE TABLET BY MOUTH EVERY MORNING EXCEPT ON SUNDAYS. ON SUNDAYS TAKE 150 MCG.  90 tablet 1    Sodium Sulfate-Mag Sulfate-KCl (SUTAB) 0495-436-290 MG TABS Take as directed by office for colonoscopy 24 tablet 0    glipiZIDE (GLUCOTROL) 10 MG tablet TAKE TWO TABLETS BY MOUTH TWICE A  tablet 3    TRESIBA FLEXTOUCH 200 UNIT/ML SOPN INJECT 80 UNITS UNDER THE SKIN ONCE NIGHTLY 9 mL 3    furosemide (LASIX) 20 MG tablet take 1 tablet by mouth once daily 90 tablet 3    Continuous Blood Gluc Sensor (DEXCOM G6 SENSOR) MISC USE AS DIRECTED      levothyroxine (SYNTHROID) 150 MCG tablet Take 1 tablet by mouth once a week Indications: Sundays Take 150 mcg by mouth once a week Indications: Sundays 60 tablet 1    metoprolol tartrate (LOPRESSOR) 25 MG tablet TAKE ONE TABLET BY MOUTH TWICE A  tablet 0    insulin lispro, 1 Unit Dial, (HUMALOG/ADMELOG) 100 UNIT/ML SOPN INJECT 18 UNITS UNDER THE SKIN THREE TIMES A DAY BEFORE MEALS 27 mL 0    potassium chloride (KLOR-CON M) 10 MEQ extended release tablet take 1 tablet by

## 2023-05-30 NOTE — TELEPHONE ENCOUNTER
Please Approve or Refuse.   Send to Pharmacy per Pt's Request: [facility]     Next Visit Date:  9/5/2023   Last Visit Date: 5/16/2023    Hemoglobin A1C (%)   Date Value   03/15/2023 6.7   12/15/2022 8.0   08/30/2022 7.5             ( goal A1C is < 7)   BP Readings from Last 3 Encounters:   05/24/23 130/82   03/29/23 131/77   03/15/23 104/86          (goal 120/80)  BUN   Date Value Ref Range Status   02/16/2023 14 8 - 23 mg/dL Final     Creatinine   Date Value Ref Range Status   02/16/2023 0.45 (L) 0.50 - 0.90 mg/dL Final     Potassium   Date Value Ref Range Status   02/16/2023 4.4 3.7 - 5.3 mmol/L Final

## 2023-05-31 ENCOUNTER — TELEPHONE (OUTPATIENT)
Dept: FAMILY MEDICINE CLINIC | Age: 68
End: 2023-05-31

## 2023-05-31 ENCOUNTER — TELEPHONE (OUTPATIENT)
Dept: UROLOGY | Age: 68
End: 2023-05-31

## 2023-05-31 DIAGNOSIS — N39.0 URINARY TRACT INFECTION WITHOUT HEMATURIA, SITE UNSPECIFIED: Primary | ICD-10-CM

## 2023-05-31 RX ORDER — PEN NEEDLE, DIABETIC 31 GX5/16"
NEEDLE, DISPOSABLE MISCELLANEOUS
Qty: 100 EACH | Refills: 3 | Status: SHIPPED | OUTPATIENT
Start: 2023-05-31

## 2023-05-31 NOTE — TELEPHONE ENCOUNTER
PATIENT CALLED IN TO INFORM SHE PUT A CALL INTO UROLOGY DUE TO LAST NIGHT EXPERIENCED LOSS OF BLADDER. WAITING ON UROLOGY TO RETURN CONCERNS.

## 2023-05-31 NOTE — TELEPHONE ENCOUNTER
To do urine test  Has recent UTI     Diagnosis Orders   1.  Urinary tract infection without hematuria, site unspecified  Urinalysis with Reflex to Culture           Future Appointments   Date Time Provider Aleksandra Wisdom   6/14/2023  2:00 PM RASHMI Chambers - CNP [facility]   9/5/2023 11:30 AM Luther Coburn MD fp [facility]

## 2023-05-31 NOTE — TELEPHONE ENCOUNTER
Pt lm stating she is soaking herself at night and having trouble making it to the bathroom without soaking herself. She stated she did not have this issue with her last medication.

## 2023-06-01 NOTE — TELEPHONE ENCOUNTER
If she did better with oxybutynin, can start back on that. She just cannot be on both mybetriq and oxybutynin- it is one or the other. Other recommendations:   Timed/double voiding every 2 hours. Kegel exercises. Avoid irritants. Manage any constipation.

## 2023-06-01 NOTE — TELEPHONE ENCOUNTER
Patient was notified. She will start back on Oxybutynin. Also moved appt from 06/29/2023 to 06/15/2023 OK per THEA Galdamez. Patient was very appreciative.

## 2023-06-01 NOTE — TELEPHONE ENCOUNTER
Patient left voicemail very upset that appointment was moved from the 13th to the 29th. She wants to know if there is something else she can do to try an stop the incontinence.

## 2023-06-02 ENCOUNTER — HOSPITAL ENCOUNTER (OUTPATIENT)
Age: 68
Discharge: HOME | End: 2023-06-02
Payer: MEDICARE

## 2023-06-02 PROCEDURE — 82570 ASSAY OF URINE CREATININE: CPT

## 2023-06-02 PROCEDURE — 36415 COLL VENOUS BLD VENIPUNCTURE: CPT

## 2023-06-02 PROCEDURE — 82043 UR ALBUMIN QUANTITATIVE: CPT

## 2023-06-05 DIAGNOSIS — Z79.4 TYPE 2 DIABETES MELLITUS WITH MICROALBUMINURIA, WITH LONG-TERM CURRENT USE OF INSULIN (HCC): Primary | ICD-10-CM

## 2023-06-05 DIAGNOSIS — R80.9 TYPE 2 DIABETES MELLITUS WITH MICROALBUMINURIA, WITH LONG-TERM CURRENT USE OF INSULIN (HCC): Primary | ICD-10-CM

## 2023-06-05 DIAGNOSIS — E11.29 TYPE 2 DIABETES MELLITUS WITH MICROALBUMINURIA, WITH LONG-TERM CURRENT USE OF INSULIN (HCC): Primary | ICD-10-CM

## 2023-06-05 LAB
CREAT UR-MCNC: 168.2 MG/DL (ref 28–217)
MICROALBUMIN UR-MCNC: 37 MG/L
MICROALBUMIN/CREAT UR-RTO: 22 MCG/MG CREAT

## 2023-06-07 ENCOUNTER — TELEPHONE (OUTPATIENT)
Dept: UROLOGY | Age: 68
End: 2023-06-07

## 2023-06-07 NOTE — TELEPHONE ENCOUNTER
Called patient insurance, spoke to connor Winters PA required for UDS on 6/15/2023. Reference number [id number].

## 2023-06-20 DIAGNOSIS — I10 ESSENTIAL HYPERTENSION: ICD-10-CM

## 2023-06-20 DIAGNOSIS — I34.1 MVP (MITRAL VALVE PROLAPSE): ICD-10-CM

## 2023-06-20 NOTE — TELEPHONE ENCOUNTER
Please Approve or Refuse.   Send to Pharmacy per Pt's Request: [facility]      Next Visit Date:  9/5/2023   Last Visit Date: 5/16/2023    Hemoglobin A1C (%)   Date Value   03/15/2023 6.7   12/15/2022 8.0   08/30/2022 7.5             ( goal A1C is < 7)   BP Readings from Last 3 Encounters:   05/24/23 130/82   03/29/23 131/77   03/15/23 104/86          (goal 120/80)  BUN   Date Value Ref Range Status   02/16/2023 14 8 - 23 mg/dL Final     Creatinine   Date Value Ref Range Status   02/16/2023 0.45 (L) 0.50 - 0.90 mg/dL Final     Potassium   Date Value Ref Range Status   02/16/2023 4.4 3.7 - 5.3 mmol/L Final

## 2023-06-22 DIAGNOSIS — I10 ESSENTIAL HYPERTENSION: ICD-10-CM

## 2023-06-22 DIAGNOSIS — I34.1 MVP (MITRAL VALVE PROLAPSE): ICD-10-CM

## 2023-06-22 DIAGNOSIS — M79.89 LEG SWELLING: ICD-10-CM

## 2023-06-22 RX ORDER — FUROSEMIDE 20 MG/1
TABLET ORAL
Qty: 90 TABLET | Refills: 0 | Status: SHIPPED | OUTPATIENT
Start: 2023-06-22

## 2023-06-22 NOTE — TELEPHONE ENCOUNTER
Please Approve or Refuse.   Send to Pharmacy per Pt's Request:      Next Visit Date:  9/5/2023   Last Visit Date: 5/16/2023    Hemoglobin A1C (%)   Date Value   03/15/2023 6.7   12/15/2022 8.0   08/30/2022 7.5             ( goal A1C is < 7)   BP Readings from Last 3 Encounters:   05/24/23 130/82   03/29/23 131/77   03/15/23 104/86          (goal 120/80)  BUN   Date Value Ref Range Status   02/16/2023 14 8 - 23 mg/dL Final     Creatinine   Date Value Ref Range Status   02/16/2023 0.45 (L) 0.50 - 0.90 mg/dL Final     Potassium   Date Value Ref Range Status   02/16/2023 4.4 3.7 - 5.3 mmol/L Final

## 2023-06-29 ENCOUNTER — TELEPHONE (OUTPATIENT)
Dept: SURGERY | Age: 68
End: 2023-06-29

## 2023-06-30 ENCOUNTER — HOSPITAL ENCOUNTER (OUTPATIENT)
Age: 68
Setting detail: OUTPATIENT SURGERY
Discharge: HOME | End: 2023-06-30
Attending: SURGERY | Admitting: SURGERY
Payer: MEDICARE

## 2023-06-30 VITALS
HEIGHT: 62 IN | BODY MASS INDEX: 43.06 KG/M2 | RESPIRATION RATE: 15 BRPM | HEART RATE: 75 BPM | DIASTOLIC BLOOD PRESSURE: 102 MMHG | OXYGEN SATURATION: 97 % | SYSTOLIC BLOOD PRESSURE: 154 MMHG | TEMPERATURE: 97.1 F | WEIGHT: 234 LBS

## 2023-06-30 DIAGNOSIS — Z12.11 SCREEN FOR COLON CANCER: ICD-10-CM

## 2023-06-30 PROBLEM — Z86.0100 HISTORY OF COLON POLYPS: Status: ACTIVE | Noted: 2023-06-30

## 2023-06-30 PROBLEM — Z86.010 HISTORY OF COLON POLYPS: Status: ACTIVE | Noted: 2023-06-30

## 2023-06-30 LAB — GLUCOSE BLD-MCNC: 128 MG/DL (ref 65–105)

## 2023-06-30 PROCEDURE — 2500000003 HC RX 250 WO HCPCS: Performed by: NURSE ANESTHETIST, CERTIFIED REGISTERED

## 2023-06-30 PROCEDURE — 2580000003 HC RX 258: Performed by: NURSE ANESTHETIST, CERTIFIED REGISTERED

## 2023-06-30 PROCEDURE — 88305 TISSUE EXAM BY PATHOLOGIST: CPT

## 2023-06-30 PROCEDURE — 7100000011 HC PHASE II RECOVERY - ADDTL 15 MIN: Performed by: SURGERY

## 2023-06-30 PROCEDURE — 45380 COLONOSCOPY AND BIOPSY: CPT | Performed by: SURGERY

## 2023-06-30 PROCEDURE — 3700000000 HC ANESTHESIA ATTENDED CARE: Performed by: SURGERY

## 2023-06-30 PROCEDURE — 6360000002 HC RX W HCPCS: Performed by: NURSE ANESTHETIST, CERTIFIED REGISTERED

## 2023-06-30 PROCEDURE — 82947 ASSAY GLUCOSE BLOOD QUANT: CPT

## 2023-06-30 PROCEDURE — 3609010400 HC COLONOSCOPY POLYPECTOMY HOT BIOPSY: Performed by: SURGERY

## 2023-06-30 PROCEDURE — 3700000001 HC ADD 15 MINUTES (ANESTHESIA): Performed by: SURGERY

## 2023-06-30 PROCEDURE — 45385 COLONOSCOPY W/LESION REMOVAL: CPT | Performed by: SURGERY

## 2023-06-30 PROCEDURE — 2709999900 HC NON-CHARGEABLE SUPPLY: Performed by: SURGERY

## 2023-06-30 PROCEDURE — 7100000010 HC PHASE II RECOVERY - FIRST 15 MIN: Performed by: SURGERY

## 2023-06-30 RX ORDER — MIDAZOLAM HYDROCHLORIDE 1 MG/ML
INJECTION INTRAMUSCULAR; INTRAVENOUS PRN
Status: DISCONTINUED | OUTPATIENT
Start: 2023-06-30 | End: 2023-06-30 | Stop reason: SDUPTHER

## 2023-06-30 RX ORDER — OXYCODONE HYDROCHLORIDE 5 MG/1
10 TABLET ORAL PRN
Status: DISCONTINUED | OUTPATIENT
Start: 2023-06-30 | End: 2023-06-30 | Stop reason: HOSPADM

## 2023-06-30 RX ORDER — OXYCODONE HYDROCHLORIDE 5 MG/1
5 TABLET ORAL PRN
Status: DISCONTINUED | OUTPATIENT
Start: 2023-06-30 | End: 2023-06-30 | Stop reason: HOSPADM

## 2023-06-30 RX ORDER — SODIUM CHLORIDE 9 MG/ML
INJECTION, SOLUTION INTRAVENOUS CONTINUOUS PRN
Status: DISCONTINUED | OUTPATIENT
Start: 2023-06-30 | End: 2023-06-30 | Stop reason: SDUPTHER

## 2023-06-30 RX ORDER — DIPHENHYDRAMINE HYDROCHLORIDE 50 MG/ML
12.5 INJECTION INTRAMUSCULAR; INTRAVENOUS
Status: DISCONTINUED | OUTPATIENT
Start: 2023-06-30 | End: 2023-06-30 | Stop reason: HOSPADM

## 2023-06-30 RX ORDER — FENTANYL CITRATE 50 UG/ML
INJECTION, SOLUTION INTRAMUSCULAR; INTRAVENOUS PRN
Status: DISCONTINUED | OUTPATIENT
Start: 2023-06-30 | End: 2023-06-30 | Stop reason: SDUPTHER

## 2023-06-30 RX ORDER — MEPERIDINE HYDROCHLORIDE 50 MG/ML
12.5 INJECTION INTRAMUSCULAR; INTRAVENOUS; SUBCUTANEOUS ONCE
Status: DISCONTINUED | OUTPATIENT
Start: 2023-06-30 | End: 2023-06-30 | Stop reason: HOSPADM

## 2023-06-30 RX ORDER — LIDOCAINE HYDROCHLORIDE 10 MG/ML
INJECTION, SOLUTION INFILTRATION; PERINEURAL PRN
Status: DISCONTINUED | OUTPATIENT
Start: 2023-06-30 | End: 2023-06-30 | Stop reason: SDUPTHER

## 2023-06-30 RX ORDER — PROPOFOL 10 MG/ML
INJECTION, EMULSION INTRAVENOUS CONTINUOUS PRN
Status: DISCONTINUED | OUTPATIENT
Start: 2023-06-30 | End: 2023-06-30 | Stop reason: SDUPTHER

## 2023-06-30 RX ORDER — METOCLOPRAMIDE HYDROCHLORIDE 5 MG/ML
10 INJECTION INTRAMUSCULAR; INTRAVENOUS
Status: DISCONTINUED | OUTPATIENT
Start: 2023-06-30 | End: 2023-06-30 | Stop reason: HOSPADM

## 2023-06-30 RX ORDER — SODIUM CHLORIDE 0.9 % (FLUSH) 0.9 %
5-40 SYRINGE (ML) INJECTION EVERY 12 HOURS SCHEDULED
Status: DISCONTINUED | OUTPATIENT
Start: 2023-06-30 | End: 2023-06-30 | Stop reason: HOSPADM

## 2023-06-30 RX ORDER — SODIUM CHLORIDE 9 MG/ML
INJECTION, SOLUTION INTRAVENOUS PRN
Status: DISCONTINUED | OUTPATIENT
Start: 2023-06-30 | End: 2023-06-30 | Stop reason: HOSPADM

## 2023-06-30 RX ORDER — SODIUM CHLORIDE 0.9 % (FLUSH) 0.9 %
5-40 SYRINGE (ML) INJECTION PRN
Status: DISCONTINUED | OUTPATIENT
Start: 2023-06-30 | End: 2023-06-30 | Stop reason: HOSPADM

## 2023-06-30 RX ORDER — HYDRALAZINE HYDROCHLORIDE 20 MG/ML
10 INJECTION INTRAMUSCULAR; INTRAVENOUS
Status: DISCONTINUED | OUTPATIENT
Start: 2023-06-30 | End: 2023-06-30 | Stop reason: HOSPADM

## 2023-06-30 RX ORDER — ONDANSETRON 2 MG/ML
4 INJECTION INTRAMUSCULAR; INTRAVENOUS
Status: DISCONTINUED | OUTPATIENT
Start: 2023-06-30 | End: 2023-06-30 | Stop reason: HOSPADM

## 2023-06-30 RX ORDER — MORPHINE SULFATE 2 MG/ML
1 INJECTION, SOLUTION INTRAMUSCULAR; INTRAVENOUS EVERY 5 MIN PRN
Status: DISCONTINUED | OUTPATIENT
Start: 2023-06-30 | End: 2023-06-30 | Stop reason: HOSPADM

## 2023-06-30 RX ADMIN — LIDOCAINE HYDROCHLORIDE 5 ML: 10 INJECTION, SOLUTION INFILTRATION; PERINEURAL at 11:43

## 2023-06-30 RX ADMIN — MIDAZOLAM 2 MG: 1 INJECTION INTRAMUSCULAR; INTRAVENOUS at 12:06

## 2023-06-30 RX ADMIN — FENTANYL CITRATE 25 MCG: 50 INJECTION, SOLUTION INTRAMUSCULAR; INTRAVENOUS at 12:13

## 2023-06-30 RX ADMIN — SODIUM CHLORIDE: 9 INJECTION, SOLUTION INTRAVENOUS at 11:40

## 2023-06-30 RX ADMIN — FENTANYL CITRATE 25 MCG: 50 INJECTION, SOLUTION INTRAMUSCULAR; INTRAVENOUS at 12:17

## 2023-06-30 RX ADMIN — PROPOFOL 130 MCG/KG/MIN: 10 INJECTION, EMULSION INTRAVENOUS at 11:43

## 2023-06-30 RX ADMIN — PROPOFOL 30 MG: 10 INJECTION, EMULSION INTRAVENOUS at 11:46

## 2023-06-30 RX ADMIN — FENTANYL CITRATE 25 MCG: 50 INJECTION, SOLUTION INTRAMUSCULAR; INTRAVENOUS at 12:19

## 2023-06-30 RX ADMIN — PROPOFOL 50 MG: 10 INJECTION, EMULSION INTRAVENOUS at 11:44

## 2023-06-30 RX ADMIN — PROPOFOL 30 MG: 10 INJECTION, EMULSION INTRAVENOUS at 11:48

## 2023-06-30 RX ADMIN — FENTANYL CITRATE 25 MCG: 50 INJECTION, SOLUTION INTRAMUSCULAR; INTRAVENOUS at 12:22

## 2023-06-30 ASSESSMENT — PAIN - FUNCTIONAL ASSESSMENT: PAIN_FUNCTIONAL_ASSESSMENT: 0-10

## 2023-06-30 ASSESSMENT — PAIN DESCRIPTION - DESCRIPTORS: DESCRIPTORS: ACHING

## 2023-07-03 LAB — SURGICAL PATHOLOGY REPORT: NORMAL

## 2023-07-13 ENCOUNTER — TELEPHONE (OUTPATIENT)
Dept: SURGERY | Age: 68
End: 2023-07-13

## 2023-07-13 ENCOUNTER — HOSPITAL ENCOUNTER (OUTPATIENT)
Age: 68
Discharge: HOME | End: 2023-07-13
Payer: MEDICARE

## 2023-07-13 LAB
CREAT UR-MCNC: 88.3 MG/DL (ref 28–217)
MICROALBUMIN UR-MCNC: 20 MG/L
MICROALBUMIN/CREAT UR-RTO: 23 MCG/MG CREAT

## 2023-07-13 PROCEDURE — 86376 MICROSOMAL ANTIBODY EACH: CPT

## 2023-07-13 PROCEDURE — 36415 COLL VENOUS BLD VENIPUNCTURE: CPT

## 2023-07-13 PROCEDURE — 82043 UR ALBUMIN QUANTITATIVE: CPT

## 2023-07-13 PROCEDURE — 82570 ASSAY OF URINE CREATININE: CPT

## 2023-07-13 NOTE — TELEPHONE ENCOUNTER
Patient called and writer updated on patient's biopsy results. Patient voiced understanding. Patient requesting to get remaining polyps out as soon as possible.

## 2023-07-15 LAB — THYROPEROXIDASE AB SERPL IA-ACNC: 10 IU/ML (ref 0–25)

## 2023-07-31 RX ORDER — GLIPIZIDE 10 MG/1
TABLET ORAL
Qty: 360 TABLET | Refills: 1 | Status: SHIPPED | OUTPATIENT
Start: 2023-07-31

## 2023-07-31 NOTE — TELEPHONE ENCOUNTER
Please Approve or Refuse.   Send to Pharmacy per Pt's Request: [facility]     Next Visit Date:  9/5/2023   Last Visit Date: 5/16/2023    Hemoglobin A1C (%)   Date Value   03/15/2023 6.7   12/15/2022 8.0   08/30/2022 7.5             ( goal A1C is < 7)   BP Readings from Last 3 Encounters:   06/30/23 (!) 154/102   05/24/23 130/82   03/29/23 131/77          (goal 120/80)  BUN   Date Value Ref Range Status   02/16/2023 14 8 - 23 mg/dL Final     Creatinine   Date Value Ref Range Status   02/16/2023 0.45 (L) 0.50 - 0.90 mg/dL Final     Potassium   Date Value Ref Range Status   02/16/2023 4.4 3.7 - 5.3 mmol/L Final

## 2023-08-18 NOTE — DISCHARGE INSTRUCTIONS

## 2023-08-24 ENCOUNTER — ANESTHESIA EVENT (OUTPATIENT)
Dept: OPERATING ROOM | Age: 68
End: 2023-08-24
Payer: MEDICARE

## 2023-08-25 ENCOUNTER — HOSPITAL ENCOUNTER (OUTPATIENT)
Age: 68
Setting detail: OUTPATIENT SURGERY
Discharge: HOME | End: 2023-08-25
Attending: SURGERY | Admitting: SURGERY
Payer: MEDICARE

## 2023-08-25 ENCOUNTER — ANESTHESIA (OUTPATIENT)
Dept: OPERATING ROOM | Age: 68
End: 2023-08-25
Payer: MEDICARE

## 2023-08-25 VITALS
SYSTOLIC BLOOD PRESSURE: 106 MMHG | BODY MASS INDEX: 42.76 KG/M2 | HEIGHT: 62 IN | OXYGEN SATURATION: 97 % | HEART RATE: 73 BPM | RESPIRATION RATE: 22 BRPM | WEIGHT: 232.4 LBS | DIASTOLIC BLOOD PRESSURE: 79 MMHG | TEMPERATURE: 97 F

## 2023-08-25 DIAGNOSIS — Z86.010 HISTORY OF COLON POLYPS: ICD-10-CM

## 2023-08-25 PROBLEM — Z86.0101 HISTORY OF ADENOMATOUS POLYP OF COLON: Status: ACTIVE | Noted: 2023-06-30

## 2023-08-25 PROCEDURE — 3700000001 HC ADD 15 MINUTES (ANESTHESIA): Performed by: SURGERY

## 2023-08-25 PROCEDURE — 88342 IMHCHEM/IMCYTCHM 1ST ANTB: CPT

## 2023-08-25 PROCEDURE — 93005 ELECTROCARDIOGRAM TRACING: CPT | Performed by: ANESTHESIOLOGY

## 2023-08-25 PROCEDURE — 2500000003 HC RX 250 WO HCPCS: Performed by: NURSE ANESTHETIST, CERTIFIED REGISTERED

## 2023-08-25 PROCEDURE — 3700000000 HC ANESTHESIA ATTENDED CARE: Performed by: SURGERY

## 2023-08-25 PROCEDURE — 2709999900 HC NON-CHARGEABLE SUPPLY: Performed by: SURGERY

## 2023-08-25 PROCEDURE — 45380 COLONOSCOPY AND BIOPSY: CPT | Performed by: SURGERY

## 2023-08-25 PROCEDURE — 7100000011 HC PHASE II RECOVERY - ADDTL 15 MIN: Performed by: SURGERY

## 2023-08-25 PROCEDURE — 2580000003 HC RX 258: Performed by: ANESTHESIOLOGY

## 2023-08-25 PROCEDURE — 7100000010 HC PHASE II RECOVERY - FIRST 15 MIN: Performed by: SURGERY

## 2023-08-25 PROCEDURE — 3609012400 HC EGD TRANSORAL BIOPSY SINGLE/MULTIPLE: Performed by: SURGERY

## 2023-08-25 PROCEDURE — 45385 COLONOSCOPY W/LESION REMOVAL: CPT | Performed by: SURGERY

## 2023-08-25 PROCEDURE — 43239 EGD BIOPSY SINGLE/MULTIPLE: CPT | Performed by: SURGERY

## 2023-08-25 PROCEDURE — 6360000002 HC RX W HCPCS: Performed by: NURSE ANESTHETIST, CERTIFIED REGISTERED

## 2023-08-25 PROCEDURE — 3609010400 HC COLONOSCOPY POLYPECTOMY HOT BIOPSY: Performed by: SURGERY

## 2023-08-25 PROCEDURE — 88305 TISSUE EXAM BY PATHOLOGIST: CPT

## 2023-08-25 RX ORDER — METOCLOPRAMIDE HYDROCHLORIDE 5 MG/ML
10 INJECTION INTRAMUSCULAR; INTRAVENOUS
Status: DISCONTINUED | OUTPATIENT
Start: 2023-08-25 | End: 2023-08-25 | Stop reason: HOSPADM

## 2023-08-25 RX ORDER — ONDANSETRON 2 MG/ML
4 INJECTION INTRAMUSCULAR; INTRAVENOUS
Status: DISCONTINUED | OUTPATIENT
Start: 2023-08-25 | End: 2023-08-25 | Stop reason: HOSPADM

## 2023-08-25 RX ORDER — SODIUM CHLORIDE 9 MG/ML
INJECTION, SOLUTION INTRAVENOUS PRN
Status: DISCONTINUED | OUTPATIENT
Start: 2023-08-25 | End: 2023-08-25 | Stop reason: HOSPADM

## 2023-08-25 RX ORDER — OXYCODONE HYDROCHLORIDE 5 MG/1
5 TABLET ORAL PRN
Status: DISCONTINUED | OUTPATIENT
Start: 2023-08-25 | End: 2023-08-25 | Stop reason: HOSPADM

## 2023-08-25 RX ORDER — LIDOCAINE HYDROCHLORIDE 10 MG/ML
INJECTION, SOLUTION INFILTRATION; PERINEURAL PRN
Status: DISCONTINUED | OUTPATIENT
Start: 2023-08-25 | End: 2023-08-25 | Stop reason: SDUPTHER

## 2023-08-25 RX ORDER — PROPOFOL 10 MG/ML
INJECTION, EMULSION INTRAVENOUS PRN
Status: DISCONTINUED | OUTPATIENT
Start: 2023-08-25 | End: 2023-08-25 | Stop reason: SDUPTHER

## 2023-08-25 RX ORDER — SODIUM CHLORIDE 0.9 % (FLUSH) 0.9 %
5-40 SYRINGE (ML) INJECTION PRN
Status: DISCONTINUED | OUTPATIENT
Start: 2023-08-25 | End: 2023-08-25 | Stop reason: HOSPADM

## 2023-08-25 RX ORDER — PROPOFOL 10 MG/ML
INJECTION, EMULSION INTRAVENOUS
Status: COMPLETED
Start: 2023-08-25 | End: 2023-08-25

## 2023-08-25 RX ORDER — SUCRALFATE 1 G/1
1 TABLET ORAL 4 TIMES DAILY
Qty: 120 TABLET | Refills: 3 | Status: SHIPPED | OUTPATIENT
Start: 2023-08-25

## 2023-08-25 RX ORDER — OXYCODONE HYDROCHLORIDE 5 MG/1
10 TABLET ORAL PRN
Status: DISCONTINUED | OUTPATIENT
Start: 2023-08-25 | End: 2023-08-25 | Stop reason: HOSPADM

## 2023-08-25 RX ORDER — DIPHENHYDRAMINE HYDROCHLORIDE 50 MG/ML
12.5 INJECTION INTRAMUSCULAR; INTRAVENOUS
Status: DISCONTINUED | OUTPATIENT
Start: 2023-08-25 | End: 2023-08-25 | Stop reason: HOSPADM

## 2023-08-25 RX ORDER — MORPHINE SULFATE 2 MG/ML
1 INJECTION, SOLUTION INTRAMUSCULAR; INTRAVENOUS EVERY 5 MIN PRN
Status: DISCONTINUED | OUTPATIENT
Start: 2023-08-25 | End: 2023-08-25 | Stop reason: HOSPADM

## 2023-08-25 RX ORDER — PANTOPRAZOLE SODIUM 40 MG/1
40 TABLET, DELAYED RELEASE ORAL
Qty: 60 TABLET | Refills: 0 | Status: SHIPPED | OUTPATIENT
Start: 2023-08-25 | End: 2023-09-24

## 2023-08-25 RX ORDER — MEPERIDINE HYDROCHLORIDE 50 MG/ML
12.5 INJECTION INTRAMUSCULAR; INTRAVENOUS; SUBCUTANEOUS ONCE
Status: DISCONTINUED | OUTPATIENT
Start: 2023-08-25 | End: 2023-08-25 | Stop reason: HOSPADM

## 2023-08-25 RX ORDER — PROPOFOL 10 MG/ML
INJECTION, EMULSION INTRAVENOUS CONTINUOUS PRN
Status: DISCONTINUED | OUTPATIENT
Start: 2023-08-25 | End: 2023-08-25 | Stop reason: SDUPTHER

## 2023-08-25 RX ORDER — HYDRALAZINE HYDROCHLORIDE 20 MG/ML
10 INJECTION INTRAMUSCULAR; INTRAVENOUS
Status: DISCONTINUED | OUTPATIENT
Start: 2023-08-25 | End: 2023-08-25 | Stop reason: HOSPADM

## 2023-08-25 RX ORDER — SODIUM CHLORIDE, SODIUM LACTATE, POTASSIUM CHLORIDE, CALCIUM CHLORIDE 600; 310; 30; 20 MG/100ML; MG/100ML; MG/100ML; MG/100ML
INJECTION, SOLUTION INTRAVENOUS CONTINUOUS
Status: DISCONTINUED | OUTPATIENT
Start: 2023-08-25 | End: 2023-08-25 | Stop reason: HOSPADM

## 2023-08-25 RX ORDER — SODIUM CHLORIDE 0.9 % (FLUSH) 0.9 %
5-40 SYRINGE (ML) INJECTION EVERY 12 HOURS SCHEDULED
Status: DISCONTINUED | OUTPATIENT
Start: 2023-08-25 | End: 2023-08-25 | Stop reason: HOSPADM

## 2023-08-25 RX ORDER — LABETALOL HYDROCHLORIDE 5 MG/ML
10 INJECTION, SOLUTION INTRAVENOUS
Status: DISCONTINUED | OUTPATIENT
Start: 2023-08-25 | End: 2023-08-25 | Stop reason: HOSPADM

## 2023-08-25 RX ADMIN — PROPOFOL 50 MG: 10 INJECTION, EMULSION INTRAVENOUS at 11:17

## 2023-08-25 RX ADMIN — PROPOFOL 150 MCG/KG/MIN: 10 INJECTION, EMULSION INTRAVENOUS at 11:17

## 2023-08-25 RX ADMIN — PROPOFOL 50 MG: 10 INJECTION, EMULSION INTRAVENOUS at 11:43

## 2023-08-25 RX ADMIN — PROPOFOL 50 MG: 10 INJECTION, EMULSION INTRAVENOUS at 11:47

## 2023-08-25 RX ADMIN — LIDOCAINE HYDROCHLORIDE 40 MG: 10 INJECTION, SOLUTION INFILTRATION; PERINEURAL at 11:17

## 2023-08-25 RX ADMIN — SODIUM CHLORIDE: 9 INJECTION, SOLUTION INTRAVENOUS at 10:51

## 2023-08-25 ASSESSMENT — PAIN - FUNCTIONAL ASSESSMENT
PAIN_FUNCTIONAL_ASSESSMENT: ACTIVITIES ARE NOT PREVENTED
PAIN_FUNCTIONAL_ASSESSMENT: 0-10

## 2023-08-25 ASSESSMENT — PAIN DESCRIPTION - DESCRIPTORS: DESCRIPTORS: BURNING

## 2023-08-25 NOTE — ANESTHESIA POSTPROCEDURE EVALUATION
Department of Anesthesiology  Postprocedure Note    Patient: Ravin Merida  MRN: [id number]  YOB: 1955  Date of evaluation: 8/25/2023      Procedure Summary     Date: 08/25/23 Room / Location: [address]) [facility]    Anesthesia Start: 9584 Anesthesia Stop: 0262    Procedures:       COLONOSCOPY POLYPECTOMY HOT BIOPSY OF CECAL POLYPS, ASCENDING COLON POLYPS. COLD BIOPSY OF TRANSVERSE COLON POLYPS      EGD BIOPSY OF DUODENUM, ANTRUM AND DISTAL ESOPHAGUS Diagnosis:       History of colon polyps      (History of colon polyps [Z86.010])    Surgeons: Celina Healy DO Responsible Provider: Gwen Preciado MD    Anesthesia Type: MAC ASA Status: 3          Anesthesia Type: No value filed.     Gogo Phase I: Gogo Score: 10    Gogo Phase II: Gogo Score: 8      Anesthesia Post Evaluation    Patient location during evaluation: PACU  Patient participation: complete - patient participated  Level of consciousness: awake and alert  Airway patency: patent  Nausea & Vomiting: no nausea and no vomiting  Complications: no  Cardiovascular status: blood pressure returned to baseline  Respiratory status: acceptable and room air  Hydration status: euvolemic  Pain management: adequate and satisfactory to patient

## 2023-08-25 NOTE — H&P
[address] [facility]      Patient's Name/ Date of Birth/ Gender: Armando Charles / 1955 (65 y.o.) / female     Attending physician: Jarocho Lopez DO    CC: colorectal cancer screening    History of present Illness: Armando Charles is a 79 y.o. female, presents today for colonoscopy for:    Active Hospital Problems    Diagnosis Date Noted    History of adenomatous polyp of colon [Z86.010] 06/30/2023         Colonoscopy history: Last cscope was 6/2023 significant for several polyps removed, aborted due to IV issues. Pt incidentally reports long history of reflux and upset stomach, does regularly consume caffeine, no nicotine use, reports intermittent heartburn worse with certain foods, reports PCP has requested EGD. Past Medical History:  has a past medical history of Arrhythmia, Breast mass, CAD (coronary artery disease), Chronic neck pain, Coccygeal pain, Constipation, COVID-19, Depression, Diabetic neuropathy ([address]), History of hepatitis A, History of renal calculi, Hypertension, Hyperthyroidism, Hypothyroidism, Morbid obesity with BMI of 45.0-49.9, adult ([address]), Nephrolithiasis, Neuropathy, Obesity, Pancreatic cyst, PONV (postoperative nausea and vomiting), Type II or unspecified type diabetes mellitus without mention of complication, not stated as uncontrolled, Ulcerative colitis ([address]), and UTI (lower urinary tract infection).     Past Surgical History:   Past Surgical History:   Procedure Laterality Date    BREAST BIOPSY  2012    negative    CARDIOVASCULAR STRESS TEST      7/17/15 no results    CHOLECYSTECTOMY      COLONOSCOPY N/A 10/26/2021    COLONOSCOPY POLYPECTOMY SNARE/COLD BIOPSY performed by Eric Malone MD at [address] [place] [address]  06/30/2023    COLORECTAL CANCER SCREENING, NOT HIGH RISK    COLONOSCOPY N/A 6/30/2023    COLONOSCOPY POLYPECTOMY performed by Jarocho Lopez DO at [address]      right hand CYSTO/URETERO/PYELOSCOPY, CALCULUS TX Right 08/26/2019    CYSTOSCOPY URETEROSCOPY  STONE BASKET RETRIEVAL RIGHT STENT EXCHANGE performed by Wing Carl MD at [address] [facility] Right 08/20/2019    CYSTOSCOPY URETERAL STENT INSERTION performed by Wing Carl MD at [address] [facility]  2009    ENDOSCOPY, COLON, DIAGNOSTIC      LITHOTRIPSY      TONSILLECTOMY      TUBAL LIGATION      UPPER GASTROINTESTINAL ENDOSCOPY N/A 04/16/2020    EGD BIOPSY performed by Hussein Richey MD at [address] [facility] 10/26/2021    EGD BIOPSY performed by Hussein Richey MD at [address] [place] [address] History:  reports that she quit smoking about 56 years ago. Her smoking use included cigarettes. She has a 0.01 pack-year smoking history. She has never used smokeless tobacco. She reports current drug use. Frequency: 7.00 times per week. Drug: Marijuana Marijane Christiano). She reports that she does not drink alcohol. Family History: family history includes Coronary Art Dis in her father and mother; Diabetes in her father and mother; Christy James in her mother. Review of Systems:   General: Denies fever, chills, night sweats, weight loss, malaise, fatigue  HEENT: Denies sore throat, sinus problems, allergic rhinosinusitis  Card: Denies chest pain, palpitations, orthopnea/PND. Denies h/o murmurs  Pulm: Denies cough, shortness of breath, MARQUEZ  GI:  per HPI; denies history of constipation, diarrhea, hematochezia or melena  : Denies polyuria, dysuria, hematuria  Endo: Denies diabetes, thyroid problems. Heme: Denies anemia, h/o bleeding or clotting problems. Neuro: Denies h/o CVA, TIA  Skin: Denies rashes, ulcers  Musculoskeletal: Denies muscle, joint, back pain. Allergies: Cefuroxime axetil; Dilaudid [hydromorphone]; Metformin and related; Sulfa antibiotics; Amoxicillin;  Antihistamines, loratadine-type; Eggs or egg-derived products; Lorazepam; Nubain [nalbuphine hcl];

## 2023-08-25 NOTE — ANESTHESIA PRE PROCEDURE
Department of Anesthesiology  Preprocedure Note       Name:  Xander Tinajero   Age:  79 y.o.  :  1955                                          MRN:  [id number]         Date:  2023      Surgeon: Olaf Taylor):  Yanet Carrasco DO    Procedure: Procedure(s):  COLONOSCOPY DIAGNOSTIC    Medications prior to admission:   Prior to Admission medications    Medication Sig Start Date End Date Taking? Authorizing Provider   glipiZIDE (GLUCOTROL) 10 MG tablet take 2 tablets by mouth twice a day 23   Josue Martinez MD   Sodium Sulfate-Mag Sulfate-KCl 9683-475-672 MG TABS Day before scope take 12 tabs with water over 20min then drink 32oz water over the next 2hrs. 8hrs before scope: repeat same process. 23   Boncarbo Console,    furosemide (LASIX) 20 MG tablet take 1 tablet by mouth once daily, always take with potassium 23   Kota Guzmán MD   metoprolol tartrate (LOPRESSOR) 25 MG tablet Take 1 tablet by mouth 2 times daily 23   Kota Guzmán MD   Insulin Pen Needle (DROPLET PEN NEEDLES) 31G X 8 MM MISC use 1 PEN NEEDLE to inject MEDICATION subcutaneously four times a day 23   Kota Guzmán MD   Continuous Blood Gluc Transmit (DEXCOM G6 TRANSMITTER) MISC USE AS DIRECTED 23   Kota Guzmán MD   Continuous Blood Gluc Sensor (DEXCOM G6 SENSOR) MISC USE AS DIRECTED 23   Kota Guzmán MD   MYRBETRIQ 50 MG TB24 Take 50 mg by mouth daily 23   RASHMI Puga - CNP   DULoxetine (CYMBALTA) 60 MG extended release capsule Take 1 capsule by mouth daily 23   Josue Martinez MD   gabapentin (NEURONTIN) 600 MG tablet Take 1 tablet by mouth 2 times daily for 31 days. 23  Josue Martinez MD   gabapentin (NEURONTIN) 600 MG tablet Take 1 tablet by mouth 2 times daily for 31 days.  23  Josue Martinez MD   DULoxetine (CYMBALTA) 60 MG extended release capsule Take 1 capsule by mouth daily 23   Josue Martinez MD   SITagliptin (Jakub Lin) 50

## 2023-08-25 NOTE — OP NOTE
Operative Note      Patient: Cong Jaquez  YOB: 1955  MRN: [id number]    Date of Procedure: 8/25/2023    Pre-Op Diagnosis Codes:  Acid reflux  Upset stomach    Post-Op Diagnosis:   Moderate gastritis  Chronic ulcers with eschar, no evidence of active bleeding  Small hiatal hernia  Mild duodenitis 1st portion       Procedure(s):  COLONOSCOPY POLYPECTOMY HOT BIOPSY OF CECAL POLYPS, ASCENDING COLON POLYPS. COLD BIOPSY OF TRANSVERSE COLON POLYPS  EGD BIOPSY OF DUODENUM, ANTRUM AND DISTAL ESOPHAGUS    Surgeon(s):  Eduardo Tanner DO    Assistant:   Resident: Kaushal Mcpherson DO    Anesthesia: Monitor Anesthesia Care    Estimated Blood Loss (mL): Minimal    Complications: None    Specimens:   ID Type Source Tests Collected by Time Destination   A : duodenum biopsy  Tissue Tissue SURGICAL PATHOLOGY Eduardo Tanner,  8/25/2023 1121    B : antrum biopsy Tissue Tissue SURGICAL PATHOLOGY Eduardo Tanner,  8/25/2023 1122    C : distal esophagus biopsy Tissue Tissue SURGICAL PATHOLOGY Eduardo [facility],  8/25/2023 1122    D : CECAL POLYPS Tissue Tissue SURGICAL PATHOLOGY Eduardo [facility],  8/25/2023 1138    E : ASCENDING COLON POLYPS Tissue Tissue SURGICAL PATHOLOGY Eduardo [facility],  8/25/2023 1148    F : TRANSVERSE COLON POLYPS  Tissue Tissue SURGICAL PATHOLOGY Eduardo [facility],  8/25/2023 1158        Implants:  * No implants in log *      Drains: * No LDAs found *    Findings: as above        Detailed Description of Procedure:   HISTORY: The patient is a 79y.o. year old female with history of above preop diagnosis. The risk, benefits, expected outcome, and alternatives to the procedure were explained to the patient's understanding and written informed consent was obtained. OPERATIVE DETAIL     The risks and benefits were explained in detail to the patient who agreed and consented to the procedure. The patient was taken to the endoscopic suite and placed in a lateral position.   Oxygen was administered via nasal cannula and a

## 2023-08-25 NOTE — OP NOTE
Operative Note      Patient: Jason Page  YOB: 1955  MRN: [id number]    Date of Procedure: 8/25/2023    Pre-Op Diagnosis Codes:     * History of colon polyps [Z86.010]    Post-Op Diagnosis:   Cecal polyp x3  Ascending colon polyp x2  Transverse colon polyp x1  Pandiverticulosis  Moderately poor prep       Procedure(s):  COLONOSCOPY POLYPECTOMY HOT BIOPSY OF CECAL POLYPS, ASCENDING COLON POLYPS. COLD BIOPSY OF TRANSVERSE COLON POLYPS  EGD BIOPSY OF DUODENUM, ANTRUM AND DISTAL ESOPHAGUS    Surgeon(s):  René Norton DO    Assistant:   Resident: Jose Juan Melo DO    Anesthesia: Monitor Anesthesia Care    Estimated Blood Loss (mL): Minimal    Complications: None    Specimens:   ID Type Source Tests Collected by Time Destination   A : duodenum biopsy  Tissue Tissue SURGICAL PATHOLOGY René Norton,  8/25/2023 1121    B : antrum biopsy Tissue Tissue SURGICAL PATHOLOGY René Norton,  8/25/2023 1122    C : distal esophagus biopsy Tissue Tissue SURGICAL PATHOLOGY René Norton,  8/25/2023 1122    D : CECAL POLYPS Tissue Tissue SURGICAL PATHOLOGY René Norton,  8/25/2023 1138    E : ASCENDING COLON POLYPS Tissue Tissue SURGICAL PATHOLOGY René Norton,  8/25/2023 1148    F : TRANSVERSE COLON POLYPS  Tissue Tissue SURGICAL PATHOLOGY René Norton,  8/25/2023 1158        Implants:  * No implants in log *      Drains: * No LDAs found *    Findings: as above        Detailed Description of Procedure:     INDICATIONS FOR PROCEDURE:   The patient is a 79y.o. year old female with history of above preop diagnosis. Colonoscopy with possible biopsy or polypectomy was recommend, the risk, benefits, expected outcome, and alternatives to the procedure were explained. Risks included but are not limited to bleeding, infection, respiratory distress, hypotension, and perforation of the colon. The patient understands and is in agreement.       PROCEDURE DETAILS:  Patient was brought to the endoscopy suite and placed in the left lateral recumbent position. A time out was completed verifying correct patient, procedure and equipment. Anesthesia was induced using MAC guidelines. A digital rectal exam was performed. The colonoscope was inserted into the rectum without difficulty and the scope was advanced to the cecum which was identified by anatomy and by palpation. Bowel prep was adequate. The scope was slowly withdrawn visualizing the cecum, ascending colon, transverse colon, descending colon, sigmoid colon and rectum. The scope was withdrawn to the rectal vault and then retroflexed. The scope was then straightened and removed. The patient tolerated the procedure well and was transferred to the recovery unit in stable condition. Findings:    Polyps:   Cecal polyp x3 6mm each removed with cold forceps x2 and hot snare x2  Ascending colon polyp x2 removed with hot snare  Transverse colon polyp x1 removed with cold forcep      Other findings:   internal hemorrhoids and pandiverticulosis  Thin sticky stool throughout the colon, majority was able to be washed away but some areas may have contained small lesions      Greater than 9 minutes was spent withdrawing the colonoscope.       IMPRESSION/PLAN:   Increase dietary fiber and water  Patient is advised to have a repeat colonoscopy in 1 years unless otherwise dictated by pathology  Colonoscopy sooner if blood in the stool, unexplained weight loss, or change in the bowels  Findings were discussed with the patient's  who expresses understanding and agreement with treatment plan        Electronically signed by Mariza Santiago DO on 8/25/2023 at 12:18 PM

## 2023-08-26 LAB
EKG ATRIAL RATE: 69 BPM
EKG P AXIS: 67 DEGREES
EKG P-R INTERVAL: 160 MS
EKG Q-T INTERVAL: 428 MS
EKG QRS DURATION: 94 MS
EKG QTC CALCULATION (BAZETT): 458 MS
EKG R AXIS: -25 DEGREES
EKG T AXIS: 14 DEGREES
EKG VENTRICULAR RATE: 69 BPM

## 2023-08-27 DIAGNOSIS — K29.30 CHRONIC SUPERFICIAL GASTRITIS WITHOUT BLEEDING: Primary | ICD-10-CM

## 2023-08-28 DIAGNOSIS — Z79.4 TYPE 2 DIABETES MELLITUS WITH DIABETIC POLYNEUROPATHY, WITH LONG-TERM CURRENT USE OF INSULIN (HCC): ICD-10-CM

## 2023-08-28 DIAGNOSIS — E11.42 TYPE 2 DIABETES MELLITUS WITH DIABETIC POLYNEUROPATHY, WITH LONG-TERM CURRENT USE OF INSULIN (HCC): ICD-10-CM

## 2023-08-29 LAB — SURGICAL PATHOLOGY REPORT: NORMAL

## 2023-08-29 RX ORDER — PROCHLORPERAZINE 25 MG/1
SUPPOSITORY RECTAL
Qty: 1 EACH | Refills: 0 | Status: SHIPPED | OUTPATIENT
Start: 2023-08-29

## 2023-08-29 NOTE — TELEPHONE ENCOUNTER
Please Approve or Refuse.   Send to Pharmacy per Pt's Request:      Next Visit Date:  9/5/2023   Last Visit Date: 5/16/2023    Hemoglobin A1C (%)   Date Value   03/15/2023 6.7   12/15/2022 8.0   08/30/2022 7.5             ( goal A1C is < 7)   BP Readings from Last 3 Encounters:   08/25/23 106/79   06/30/23 (!) 154/102   05/24/23 130/82          (goal 120/80)  BUN   Date Value Ref Range Status   02/16/2023 14 8 - 23 mg/dL Final     Creatinine   Date Value Ref Range Status   02/16/2023 0.45 (L) 0.50 - 0.90 mg/dL Final     Potassium   Date Value Ref Range Status   02/16/2023 4.4 3.7 - 5.3 mmol/L Final

## 2023-09-06 DIAGNOSIS — M79.89 LEG SWELLING: ICD-10-CM

## 2023-09-06 RX ORDER — FUROSEMIDE 20 MG/1
TABLET ORAL
Qty: 90 TABLET | Refills: 0 | Status: SHIPPED | OUTPATIENT
Start: 2023-09-06

## 2023-09-06 NOTE — TELEPHONE ENCOUNTER
Please Approve or Refuse.   Send to Pharmacy per Pt's Request:      Next Visit Date:  10/6/2023  Last Visit Date: 5/16/2023    Hemoglobin A1C (%)   Date Value   03/15/2023 6.7   12/15/2022 8.0   08/30/2022 7.5             ( goal A1C is < 7)   BP Readings from Last 3 Encounters:   08/25/23 106/79   06/30/23 (!) 154/102   05/24/23 130/82          (goal 120/80)  BUN   Date Value Ref Range Status   02/16/2023 14 8 - 23 mg/dL Final     Creatinine   Date Value Ref Range Status   02/16/2023 0.45 (L) 0.50 - 0.90 mg/dL Final     Potassium   Date Value Ref Range Status   02/16/2023 4.4 3.7 - 5.3 mmol/L Final

## 2023-09-12 ENCOUNTER — TELEPHONE (OUTPATIENT)
Dept: SURGERY | Age: 68
End: 2023-09-12

## 2023-09-12 NOTE — TELEPHONE ENCOUNTER
Patient called and informed writer that 2 days ago she stopped her Protonix and Carafate due to stomach cramps, loose stools and headaches. Patient states symptoms have stopped since stopping medication, and unsure which rx is causing issues. Patient inquiring which medication could be causing side effects and what she should take now. Writer to update surgeon and will call patient with instructions. Patient voiced understanding and appreciation.

## 2023-09-13 NOTE — TELEPHONE ENCOUNTER
Called to update patient on surgeon's instructions to resume protonix and stop Carafate. Also to get patient to schedule with Dr. Godwin Najera. No answer, LVM.

## 2023-09-14 ENCOUNTER — TELEPHONE (OUTPATIENT)
Dept: PULMONOLOGY | Age: 68
End: 2023-09-14

## 2023-09-14 NOTE — TELEPHONE ENCOUNTER
Pt was informed of medication recommendations per Dr. Vy Rey. Please call pt to schedule her for a NP appt with Dr. Nisreen Dunn. Thank you.

## 2023-09-18 RX ORDER — PANTOPRAZOLE SODIUM 40 MG/1
80 TABLET, DELAYED RELEASE ORAL
Qty: 60 TABLET | Refills: 0 | OUTPATIENT
Start: 2023-09-18

## 2023-09-20 DIAGNOSIS — E11.42 TYPE 2 DIABETES MELLITUS WITH DIABETIC POLYNEUROPATHY, WITH LONG-TERM CURRENT USE OF INSULIN (HCC): ICD-10-CM

## 2023-09-20 DIAGNOSIS — Z79.4 TYPE 2 DIABETES MELLITUS WITH DIABETIC POLYNEUROPATHY, WITH LONG-TERM CURRENT USE OF INSULIN (HCC): ICD-10-CM

## 2023-09-20 RX ORDER — PROCHLORPERAZINE 25 MG/1
SUPPOSITORY RECTAL
Qty: 3 EACH | Refills: 3 | Status: SHIPPED | OUTPATIENT
Start: 2023-09-20

## 2023-09-28 ENCOUNTER — TELEPHONE (OUTPATIENT)
Dept: GASTROENTEROLOGY | Age: 68
End: 2023-09-28

## 2023-09-28 DIAGNOSIS — E11.65 UNCONTROLLED TYPE 2 DIABETES MELLITUS WITH HYPERGLYCEMIA (HCC): ICD-10-CM

## 2023-09-28 RX ORDER — PEN NEEDLE, DIABETIC 31 GX5/16"
NEEDLE, DISPOSABLE MISCELLANEOUS
Qty: 100 EACH | Refills: 3 | Status: SHIPPED | OUTPATIENT
Start: 2023-09-28

## 2023-09-28 NOTE — TELEPHONE ENCOUNTER
This patient has been referred  to Colette Whatley from , We are sending her to [facility] as a new patient.

## 2023-10-04 ENCOUNTER — TELEPHONE (OUTPATIENT)
Dept: FAMILY MEDICINE CLINIC | Age: 68
End: 2023-10-04

## 2023-10-18 ENCOUNTER — TELEPHONE (OUTPATIENT)
Dept: ADMINISTRATIVE | Age: 68
End: 2023-10-18

## 2023-10-18 NOTE — TELEPHONE ENCOUNTER
Received call from patient requesting to speak to office staff. Pt was to have appt on 10/18/2023 but no showed due to her ride not showing up. Pt has been r/s for next available on 11/22/2023. Pt stated she has been very gasy and would like to know if Dr. Kathleen Gaviria wants her to start taking a different medication? Please advise.     Call back#: [phone]

## 2023-10-27 RX ORDER — ASPIRIN 81 MG/1
81 TABLET ORAL DAILY
Qty: 90 TABLET | Refills: 0 | Status: SHIPPED | OUTPATIENT
Start: 2023-10-27

## 2023-10-27 RX ORDER — POTASSIUM CHLORIDE 750 MG/1
10 TABLET, EXTENDED RELEASE ORAL DAILY
Qty: 90 TABLET | Refills: 0 | Status: SHIPPED | OUTPATIENT
Start: 2023-10-27

## 2023-10-27 RX ORDER — GABAPENTIN 600 MG/1
600 TABLET ORAL 2 TIMES DAILY
Qty: 60 TABLET | Refills: 0 | Status: SHIPPED | OUTPATIENT
Start: 2023-10-27 | End: 2023-11-26

## 2023-10-27 RX ORDER — ATORVASTATIN CALCIUM 80 MG/1
80 TABLET, FILM COATED ORAL DAILY
Qty: 90 TABLET | Refills: 0 | Status: SHIPPED | OUTPATIENT
Start: 2023-10-27

## 2023-10-27 RX ORDER — GLIPIZIDE 10 MG/1
TABLET ORAL
Qty: 360 TABLET | Refills: 0 | Status: SHIPPED | OUTPATIENT
Start: 2023-10-27

## 2023-10-27 RX ORDER — LISINOPRIL 2.5 MG/1
2.5 TABLET ORAL DAILY
Qty: 90 TABLET | Refills: 0 | Status: SHIPPED | OUTPATIENT
Start: 2023-10-27

## 2023-11-02 RX ORDER — PANTOPRAZOLE SODIUM 40 MG/1
80 TABLET, DELAYED RELEASE ORAL
Qty: 60 TABLET | Refills: 0 | OUTPATIENT
Start: 2023-11-02

## 2023-11-05 DIAGNOSIS — Z79.4 TYPE 2 DIABETES MELLITUS WITH DIABETIC POLYNEUROPATHY, WITH LONG-TERM CURRENT USE OF INSULIN (HCC): ICD-10-CM

## 2023-11-05 DIAGNOSIS — E11.42 TYPE 2 DIABETES MELLITUS WITH DIABETIC POLYNEUROPATHY, WITH LONG-TERM CURRENT USE OF INSULIN (HCC): ICD-10-CM

## 2023-11-06 RX ORDER — SITAGLIPTIN 50 MG/1
50 TABLET, FILM COATED ORAL DAILY
Qty: 90 TABLET | Refills: 1 | Status: SHIPPED | OUTPATIENT
Start: 2023-11-06

## 2023-11-06 NOTE — TELEPHONE ENCOUNTER
Please Approve or Refuse.   Send to Pharmacy per Pt's Request:      Next Visit Date:  Visit date not found   Last Visit Date: 5/16/2023    Hemoglobin A1C (%)   Date Value   03/15/2023 6.7   12/15/2022 8.0   08/30/2022 7.5             ( goal A1C is < 7)   BP Readings from Last 3 Encounters:   08/25/23 106/79   06/30/23 (!) 154/102   05/24/23 130/82          (goal 120/80)  BUN   Date Value Ref Range Status   02/16/2023 14 8 - 23 mg/dL Final     Creatinine   Date Value Ref Range Status   02/16/2023 0.45 (L) 0.50 - 0.90 mg/dL Final     Potassium   Date Value Ref Range Status   02/16/2023 4.4 3.7 - 5.3 mmol/L Final

## 2023-11-07 RX ORDER — ATORVASTATIN CALCIUM 80 MG/1
80 TABLET, FILM COATED ORAL DAILY
Qty: 90 TABLET | Refills: 0 | Status: SHIPPED | OUTPATIENT
Start: 2023-11-07

## 2023-11-07 RX ORDER — GABAPENTIN 600 MG/1
600 TABLET ORAL 2 TIMES DAILY
Qty: 60 TABLET | Refills: 0 | OUTPATIENT
Start: 2023-11-07 | End: 2023-12-07

## 2023-11-07 RX ORDER — LISINOPRIL 2.5 MG/1
2.5 TABLET ORAL DAILY
Qty: 90 TABLET | Refills: 0 | Status: SHIPPED | OUTPATIENT
Start: 2023-11-07

## 2023-11-07 NOTE — TELEPHONE ENCOUNTER
Please Approve or Refuse.   Send to Pharmacy per Pt's Request: [facility]     Next Visit Date:  11/21/2023  Last Visit Date: 5/16/2023    Hemoglobin A1C (%)   Date Value   03/15/2023 6.7   12/15/2022 8.0   08/30/2022 7.5             ( goal A1C is < 7)   BP Readings from Last 3 Encounters:   08/25/23 106/79   06/30/23 (!) 154/102   05/24/23 130/82          (goal 120/80)  BUN   Date Value Ref Range Status   02/16/2023 14 8 - 23 mg/dL Final     Creatinine   Date Value Ref Range Status   02/16/2023 0.45 (L) 0.50 - 0.90 mg/dL Final     Potassium   Date Value Ref Range Status   02/16/2023 4.4 3.7 - 5.3 mmol/L Final

## 2023-12-10 ENCOUNTER — HOSPITAL ENCOUNTER (EMERGENCY)
Age: 68
Discharge: HOME | End: 2023-12-10
Attending: EMERGENCY MEDICINE
Payer: MEDICARE

## 2023-12-10 VITALS
OXYGEN SATURATION: 97 % | TEMPERATURE: 98.1 F | HEIGHT: 62 IN | WEIGHT: 221 LBS | HEART RATE: 74 BPM | BODY MASS INDEX: 40.67 KG/M2 | DIASTOLIC BLOOD PRESSURE: 78 MMHG | SYSTOLIC BLOOD PRESSURE: 155 MMHG | RESPIRATION RATE: 18 BRPM

## 2023-12-10 DIAGNOSIS — N39.0 URINARY TRACT INFECTION WITHOUT HEMATURIA, SITE UNSPECIFIED: Primary | ICD-10-CM

## 2023-12-10 LAB
ALBUMIN SERPL-MCNC: 3.2 G/DL (ref 3.5–5.2)
ALP SERPL-CCNC: 114 U/L (ref 35–104)
ALT SERPL-CCNC: 30 U/L (ref 5–33)
ANION GAP SERPL CALCULATED.3IONS-SCNC: 8 MMOL/L (ref 9–17)
AST SERPL-CCNC: 15 U/L
BACTERIA URNS QL MICRO: ABNORMAL
BASOPHILS # BLD: 0 K/UL (ref 0–0.2)
BASOPHILS NFR BLD: 1 % (ref 0–2)
BILIRUB SERPL-MCNC: 0.5 MG/DL (ref 0.3–1.2)
BILIRUB UR QL STRIP: NEGATIVE
BUN SERPL-MCNC: 9 MG/DL (ref 8–23)
CALCIUM SERPL-MCNC: 8.7 MG/DL (ref 8.6–10.4)
CASTS #/AREA URNS LPF: ABNORMAL /LPF
CHLORIDE SERPL-SCNC: 109 MMOL/L (ref 98–107)
CLARITY UR: ABNORMAL
CO2 SERPL-SCNC: 23 MMOL/L (ref 20–31)
COLOR UR: YELLOW
CREAT SERPL-MCNC: <0.4 MG/DL (ref 0.5–0.9)
EOSINOPHIL # BLD: 0.2 K/UL (ref 0–0.4)
EOSINOPHILS RELATIVE PERCENT: 2 % (ref 0–4)
EPI CELLS #/AREA URNS HPF: ABNORMAL /HPF
ERYTHROCYTE [DISTWIDTH] IN BLOOD BY AUTOMATED COUNT: 13.9 % (ref 11.5–14.9)
GFR SERPL CREATININE-BSD FRML MDRD: ABNORMAL ML/MIN/1.73M2
GLUCOSE SERPL-MCNC: 196 MG/DL (ref 70–99)
GLUCOSE UR STRIP-MCNC: ABNORMAL MG/DL
HCT VFR BLD AUTO: 44.4 % (ref 36–46)
HGB BLD-MCNC: 14 G/DL (ref 12–16)
HGB UR QL STRIP.AUTO: NEGATIVE
KETONES UR STRIP-MCNC: ABNORMAL MG/DL
LEUKOCYTE ESTERASE UR QL STRIP: ABNORMAL
LIPASE SERPL-CCNC: 17 U/L (ref 13–60)
LYMPHOCYTES NFR BLD: 2 K/UL (ref 1–4.8)
LYMPHOCYTES RELATIVE PERCENT: 20 % (ref 24–44)
MAGNESIUM SERPL-MCNC: 1.7 MG/DL (ref 1.6–2.6)
MCH RBC QN AUTO: 30.1 PG (ref 26–34)
MCHC RBC AUTO-ENTMCNC: 31.6 G/DL (ref 31–37)
MCV RBC AUTO: 95.3 FL (ref 80–100)
MONOCYTES NFR BLD: 0.7 K/UL (ref 0.1–1.3)
MONOCYTES NFR BLD: 7 % (ref 1–7)
NEUTROPHILS NFR BLD: 70 % (ref 36–66)
NEUTS SEG NFR BLD: 7.1 K/UL (ref 1.3–9.1)
NITRITE UR QL STRIP: NEGATIVE
PH UR STRIP: 5 [PH] (ref 5–8)
PLATELET # BLD AUTO: 178 K/UL (ref 150–450)
PMV BLD AUTO: 9.8 FL (ref 6–12)
POTASSIUM SERPL-SCNC: 4.2 MMOL/L (ref 3.7–5.3)
PROT SERPL-MCNC: 6.6 G/DL (ref 6.4–8.3)
PROT UR STRIP-MCNC: ABNORMAL MG/DL
RBC # BLD AUTO: 4.66 M/UL (ref 4–5.2)
RBC #/AREA URNS HPF: ABNORMAL /HPF
SODIUM SERPL-SCNC: 140 MMOL/L (ref 135–144)
SP GR UR STRIP: 1.02 (ref 1–1.03)
UROBILINOGEN UR STRIP-ACNC: NORMAL EU/DL (ref 0–1)
WBC #/AREA URNS HPF: ABNORMAL /HPF
WBC OTHER # BLD: 10.1 K/UL (ref 3.5–11)

## 2023-12-10 PROCEDURE — 87086 URINE CULTURE/COLONY COUNT: CPT

## 2023-12-10 PROCEDURE — 87186 SC STD MICRODIL/AGAR DIL: CPT

## 2023-12-10 PROCEDURE — 83690 ASSAY OF LIPASE: CPT

## 2023-12-10 PROCEDURE — 80053 COMPREHEN METABOLIC PANEL: CPT

## 2023-12-10 PROCEDURE — 6360000002 HC RX W HCPCS: Performed by: EMERGENCY MEDICINE

## 2023-12-10 PROCEDURE — 96374 THER/PROPH/DIAG INJ IV PUSH: CPT

## 2023-12-10 PROCEDURE — 99284 EMERGENCY DEPT VISIT MOD MDM: CPT

## 2023-12-10 PROCEDURE — 81001 URINALYSIS AUTO W/SCOPE: CPT

## 2023-12-10 PROCEDURE — 83735 ASSAY OF MAGNESIUM: CPT

## 2023-12-10 PROCEDURE — 36415 COLL VENOUS BLD VENIPUNCTURE: CPT

## 2023-12-10 PROCEDURE — 6370000000 HC RX 637 (ALT 250 FOR IP): Performed by: EMERGENCY MEDICINE

## 2023-12-10 PROCEDURE — 85025 COMPLETE CBC W/AUTO DIFF WBC: CPT

## 2023-12-10 PROCEDURE — 87077 CULTURE AEROBIC IDENTIFY: CPT

## 2023-12-10 RX ORDER — CYCLOBENZAPRINE HCL 10 MG
10 TABLET ORAL 3 TIMES DAILY PRN
Qty: 3 TABLET | Refills: 0 | Status: SHIPPED | OUTPATIENT
Start: 2023-12-10 | End: 2023-12-11

## 2023-12-10 RX ORDER — CYCLOBENZAPRINE HCL 10 MG
10 TABLET ORAL ONCE
Status: COMPLETED | OUTPATIENT
Start: 2023-12-10 | End: 2023-12-10

## 2023-12-10 RX ORDER — NITROFURANTOIN 25; 75 MG/1; MG/1
100 CAPSULE ORAL ONCE
Status: DISCONTINUED | OUTPATIENT
Start: 2023-12-10 | End: 2023-12-10

## 2023-12-10 RX ORDER — IBUPROFEN 800 MG/1
800 TABLET ORAL ONCE
Status: COMPLETED | OUTPATIENT
Start: 2023-12-10 | End: 2023-12-10

## 2023-12-10 RX ORDER — DIPHENHYDRAMINE HCL 25 MG
25 CAPSULE ORAL 2 TIMES DAILY
Qty: 1 CAPSULE | Refills: 0 | Status: SHIPPED | OUTPATIENT
Start: 2023-12-10 | End: 2023-12-15

## 2023-12-10 RX ORDER — SULFAMETHOXAZOLE AND TRIMETHOPRIM 800; 160 MG/1; MG/1
1 TABLET ORAL ONCE
Status: COMPLETED | OUTPATIENT
Start: 2023-12-10 | End: 2023-12-10

## 2023-12-10 RX ORDER — SULFAMETHOXAZOLE AND TRIMETHOPRIM 800; 160 MG/1; MG/1
1 TABLET ORAL 2 TIMES DAILY
Qty: 10 TABLET | Refills: 0 | Status: SHIPPED | OUTPATIENT
Start: 2023-12-10 | End: 2023-12-15

## 2023-12-10 RX ORDER — DIPHENHYDRAMINE HYDROCHLORIDE 50 MG/ML
25 INJECTION INTRAMUSCULAR; INTRAVENOUS ONCE
Status: COMPLETED | OUTPATIENT
Start: 2023-12-10 | End: 2023-12-10

## 2023-12-10 RX ORDER — ACETAMINOPHEN 500 MG
1000 TABLET ORAL ONCE
Status: COMPLETED | OUTPATIENT
Start: 2023-12-10 | End: 2023-12-10

## 2023-12-10 RX ORDER — NITROFURANTOIN 25; 75 MG/1; MG/1
100 CAPSULE ORAL 2 TIMES DAILY
Qty: 10 CAPSULE | Refills: 0 | Status: SHIPPED | OUTPATIENT
Start: 2023-12-10 | End: 2023-12-10

## 2023-12-10 RX ADMIN — DIPHENHYDRAMINE HYDROCHLORIDE 25 MG: 50 INJECTION INTRAMUSCULAR; INTRAVENOUS at 19:04

## 2023-12-10 RX ADMIN — ACETAMINOPHEN 1000 MG: 500 TABLET ORAL at 16:28

## 2023-12-10 RX ADMIN — CYCLOBENZAPRINE 10 MG: 10 TABLET, FILM COATED ORAL at 16:28

## 2023-12-10 RX ADMIN — SULFAMETHOXAZOLE AND TRIMETHOPRIM 1 TABLET: 800; 160 TABLET ORAL at 19:04

## 2023-12-10 RX ADMIN — IBUPROFEN 800 MG: 800 TABLET, FILM COATED ORAL at 16:28

## 2023-12-10 ASSESSMENT — PAIN SCALES - GENERAL
PAINLEVEL_OUTOF10: 8
PAINLEVEL_OUTOF10: 8

## 2023-12-10 ASSESSMENT — LIFESTYLE VARIABLES
HOW MANY STANDARD DRINKS CONTAINING ALCOHOL DO YOU HAVE ON A TYPICAL DAY: PATIENT DOES NOT DRINK
HOW OFTEN DO YOU HAVE A DRINK CONTAINING ALCOHOL: NEVER

## 2023-12-10 ASSESSMENT — PAIN DESCRIPTION - PAIN TYPE: TYPE: CHRONIC PAIN

## 2023-12-10 ASSESSMENT — ENCOUNTER SYMPTOMS
NAUSEA: 0
SHORTNESS OF BREATH: 0
ABDOMINAL PAIN: 1
COUGH: 0
VOMITING: 0
BACK PAIN: 1

## 2023-12-10 ASSESSMENT — PAIN DESCRIPTION - LOCATION
LOCATION: ABDOMEN;FLANK;BACK
LOCATION: BACK

## 2023-12-10 ASSESSMENT — PAIN - FUNCTIONAL ASSESSMENT: PAIN_FUNCTIONAL_ASSESSMENT: 0-10

## 2023-12-10 NOTE — DISCHARGE INSTRUCTIONS
You have a UTI. You were started on Macrobid, take this antibiotic as prescribed as it will treat your UTI. You are also given a prescription for Flexeril, this is a muscle relaxer. Only take this medication if you are still having right-sided back pain despite Tylenol and Motrin. If you take the Flexeril, you cannot drive or operate heavy machinery. You need to call and schedule follow-up appointment with your primary care doctor for soon as possible. Return to the emergency department immediately if you experience worsening symptoms, develop any new symptoms, or if you have any other concerns.

## 2023-12-10 NOTE — ED TRIAGE NOTES
Mode of arrival (squad #, walk in, police, etc) : walk in         Chief complaint(s): abd, back, flank pain        Arrival Note (brief scenario, treatment PTA, etc). : onset since August after fall. C= \"Have you ever felt that you should Cut down on your drinking? \"  No  A= \"Have people Annoyed you by criticizing your drinking? \"  No  G= \"Have you ever felt bad or Guilty about your drinking? \"  No  E= \"Have you ever had a drink as an Eye-opener first thing in the morning to steady your nerves or to help a hangover? \"  No      Deferred []      Reason for deferring: N/A    *If yes to two or more: probable alcohol abuse. *

## 2023-12-11 ENCOUNTER — TELEPHONE (OUTPATIENT)
Dept: UROLOGY | Age: 68
End: 2023-12-11

## 2023-12-11 ENCOUNTER — TELEPHONE (OUTPATIENT)
Dept: FAMILY MEDICINE CLINIC | Age: 68
End: 2023-12-11

## 2023-12-11 DIAGNOSIS — Z79.4 TYPE 2 DIABETES MELLITUS WITH DIABETIC POLYNEUROPATHY, WITH LONG-TERM CURRENT USE OF INSULIN (HCC): ICD-10-CM

## 2023-12-11 DIAGNOSIS — E11.42 TYPE 2 DIABETES MELLITUS WITH DIABETIC POLYNEUROPATHY, WITH LONG-TERM CURRENT USE OF INSULIN (HCC): ICD-10-CM

## 2023-12-11 NOTE — TELEPHONE ENCOUNTER
[facility]) ED Follow up Call    Reason for ED visit:  UTI         Hi Rosa Rosanna , this is Jodeen Seip from Ag[facility] Edmonds's office, just calling to see how you are doing after your recent ED visit. Did you receive discharge instructions? Yes  Do you understand the discharge instructions? Yes  Did the ED give you any new prescriptions? Yes  Were you able to fill your prescriptions? Yes      Do you have one of our red, yellow and green  Zone sheets that help you to determine when you should go to the ED? Not Applicable      Do you need or want to make a follow up appt with your PCP? Yes    Do you have any further needs in the home i.e. Equipment? Yes        FU appts/Provider:    No future appointments.

## 2023-12-11 NOTE — TELEPHONE ENCOUNTER
Patient called in stating \"I am having pain. I was in the hospital yesterday. I have a UTI and there's pain in my right side. There is blood in my urine. Patient wants to know if she needs any imaging done or a procedure.

## 2023-12-11 NOTE — TELEPHONE ENCOUNTER
Please Approve or Refuse.   Send to Pharmacy per Pt's Request:      Next Visit Date:  12/13/2023   Last Visit Date: 5/16/2023    Hemoglobin A1C (%)   Date Value   03/15/2023 6.7   12/15/2022 8.0   08/30/2022 7.5             ( goal A1C is < 7)   BP Readings from Last 3 Encounters:   12/10/23 (!) 155/78   08/25/23 106/79   06/30/23 (!) 154/102          (goal 120/80)  BUN   Date Value Ref Range Status   12/10/2023 9 8 - 23 mg/dL Final     Creatinine   Date Value Ref Range Status   12/10/2023 <0.4 (L) 0.5 - 0.9 mg/dL Final     Potassium   Date Value Ref Range Status   12/10/2023 4.2 3.7 - 5.3 mmol/L Final

## 2023-12-12 DIAGNOSIS — N30.01 ACUTE CYSTITIS WITH HEMATURIA: Primary | ICD-10-CM

## 2023-12-12 LAB
MICROORGANISM SPEC CULT: ABNORMAL
SPECIMEN DESCRIPTION: ABNORMAL

## 2023-12-12 RX ORDER — PROCHLORPERAZINE 25 MG/1
SUPPOSITORY RECTAL
Qty: 1 EACH | Refills: 0 | Status: SHIPPED | OUTPATIENT
Start: 2023-12-12

## 2023-12-13 ENCOUNTER — OFFICE VISIT (OUTPATIENT)
Dept: FAMILY MEDICINE CLINIC | Age: 68
End: 2023-12-13
Payer: MEDICARE

## 2023-12-13 VITALS
BODY MASS INDEX: 41.33 KG/M2 | TEMPERATURE: 97.8 F | OXYGEN SATURATION: 99 % | WEIGHT: 224.6 LBS | SYSTOLIC BLOOD PRESSURE: 134 MMHG | HEART RATE: 79 BPM | DIASTOLIC BLOOD PRESSURE: 84 MMHG | HEIGHT: 62 IN

## 2023-12-13 DIAGNOSIS — E03.9 ACQUIRED HYPOTHYROIDISM: ICD-10-CM

## 2023-12-13 DIAGNOSIS — I50.32 CHRONIC DIASTOLIC CONGESTIVE HEART FAILURE (HCC): ICD-10-CM

## 2023-12-13 DIAGNOSIS — Z79.4 TYPE 2 DIABETES MELLITUS WITH MICROALBUMINURIA, WITH LONG-TERM CURRENT USE OF INSULIN (HCC): Primary | ICD-10-CM

## 2023-12-13 DIAGNOSIS — E55.9 VITAMIN D DEFICIENCY: ICD-10-CM

## 2023-12-13 DIAGNOSIS — L03.031 PARONYCHIA OF TOE OF RIGHT FOOT: ICD-10-CM

## 2023-12-13 DIAGNOSIS — E11.29 TYPE 2 DIABETES MELLITUS WITH MICROALBUMINURIA, WITH LONG-TERM CURRENT USE OF INSULIN (HCC): Primary | ICD-10-CM

## 2023-12-13 DIAGNOSIS — N39.0 RECURRENT UTI: ICD-10-CM

## 2023-12-13 DIAGNOSIS — E11.42 TYPE 2 DIABETES MELLITUS WITH DIABETIC POLYNEUROPATHY, WITH LONG-TERM CURRENT USE OF INSULIN (HCC): ICD-10-CM

## 2023-12-13 DIAGNOSIS — M47.27 LUMBOSACRAL RADICULOPATHY DUE TO DEGENERATIVE JOINT DISEASE OF SPINE: ICD-10-CM

## 2023-12-13 DIAGNOSIS — Z79.4 TYPE 2 DIABETES MELLITUS WITH DIABETIC POLYNEUROPATHY, WITH LONG-TERM CURRENT USE OF INSULIN (HCC): ICD-10-CM

## 2023-12-13 DIAGNOSIS — R80.9 TYPE 2 DIABETES MELLITUS WITH MICROALBUMINURIA, WITH LONG-TERM CURRENT USE OF INSULIN (HCC): Primary | ICD-10-CM

## 2023-12-13 DIAGNOSIS — I10 ESSENTIAL HYPERTENSION: ICD-10-CM

## 2023-12-13 DIAGNOSIS — F41.1 GENERALIZED ANXIETY DISORDER: ICD-10-CM

## 2023-12-13 PROBLEM — R74.01 TRANSAMINASEMIA: Status: RESOLVED | Noted: 2022-05-17 | Resolved: 2023-12-13

## 2023-12-13 LAB — HBA1C MFR BLD: 7 %

## 2023-12-13 PROCEDURE — G8427 DOCREV CUR MEDS BY ELIG CLIN: HCPCS | Performed by: FAMILY MEDICINE

## 2023-12-13 PROCEDURE — 1036F TOBACCO NON-USER: CPT | Performed by: FAMILY MEDICINE

## 2023-12-13 PROCEDURE — 83036 HEMOGLOBIN GLYCOSYLATED A1C: CPT | Performed by: FAMILY MEDICINE

## 2023-12-13 PROCEDURE — G8399 PT W/DXA RESULTS DOCUMENT: HCPCS | Performed by: FAMILY MEDICINE

## 2023-12-13 PROCEDURE — 3075F SYST BP GE 130 - 139MM HG: CPT | Performed by: FAMILY MEDICINE

## 2023-12-13 PROCEDURE — G8484 FLU IMMUNIZE NO ADMIN: HCPCS | Performed by: FAMILY MEDICINE

## 2023-12-13 PROCEDURE — 3079F DIAST BP 80-89 MM HG: CPT | Performed by: FAMILY MEDICINE

## 2023-12-13 PROCEDURE — 99214 OFFICE O/P EST MOD 30 MIN: CPT | Performed by: FAMILY MEDICINE

## 2023-12-13 PROCEDURE — 1090F PRES/ABSN URINE INCON ASSESS: CPT | Performed by: FAMILY MEDICINE

## 2023-12-13 PROCEDURE — 2022F DILAT RTA XM EVC RTNOPTHY: CPT | Performed by: FAMILY MEDICINE

## 2023-12-13 PROCEDURE — 3051F HG A1C>EQUAL 7.0%<8.0%: CPT | Performed by: FAMILY MEDICINE

## 2023-12-13 PROCEDURE — 3017F COLORECTAL CA SCREEN DOC REV: CPT | Performed by: FAMILY MEDICINE

## 2023-12-13 PROCEDURE — G8417 CALC BMI ABV UP PARAM F/U: HCPCS | Performed by: FAMILY MEDICINE

## 2023-12-13 PROCEDURE — 1123F ACP DISCUSS/DSCN MKR DOCD: CPT | Performed by: FAMILY MEDICINE

## 2023-12-13 RX ORDER — CYCLOBENZAPRINE HCL 10 MG
TABLET ORAL
COMMUNITY
Start: 2023-12-11 | End: 2023-12-13 | Stop reason: ALTCHOICE

## 2023-12-13 RX ORDER — TIZANIDINE 4 MG/1
4 TABLET ORAL NIGHTLY PRN
Qty: 30 TABLET | Refills: 0 | Status: SHIPPED | OUTPATIENT
Start: 2023-12-13

## 2023-12-13 RX ORDER — OXYBUTYNIN CHLORIDE 10 MG/1
TABLET, EXTENDED RELEASE ORAL
COMMUNITY
Start: 2023-03-07 | End: 2023-12-13 | Stop reason: ALTCHOICE

## 2023-12-13 RX ORDER — PROCHLORPERAZINE 25 MG/1
SUPPOSITORY RECTAL
Qty: 10 EACH | Refills: 1 | Status: SHIPPED | OUTPATIENT
Start: 2023-12-13

## 2023-12-13 RX ORDER — ERGOCALCIFEROL 1.25 MG/1
50000 CAPSULE ORAL WEEKLY
Qty: 12 CAPSULE | Refills: 1 | Status: SHIPPED | OUTPATIENT
Start: 2023-12-13

## 2023-12-13 SDOH — ECONOMIC STABILITY: FOOD INSECURITY: WITHIN THE PAST 12 MONTHS, THE FOOD YOU BOUGHT JUST DIDN'T LAST AND YOU DIDN'T HAVE MONEY TO GET MORE.: NEVER TRUE

## 2023-12-13 SDOH — ECONOMIC STABILITY: FOOD INSECURITY: WITHIN THE PAST 12 MONTHS, YOU WORRIED THAT YOUR FOOD WOULD RUN OUT BEFORE YOU GOT MONEY TO BUY MORE.: NEVER TRUE

## 2023-12-13 SDOH — ECONOMIC STABILITY: INCOME INSECURITY: HOW HARD IS IT FOR YOU TO PAY FOR THE VERY BASICS LIKE FOOD, HOUSING, MEDICAL CARE, AND HEATING?: NOT HARD AT ALL

## 2023-12-13 ASSESSMENT — PATIENT HEALTH QUESTIONNAIRE - PHQ9
6. FEELING BAD ABOUT YOURSELF - OR THAT YOU ARE A FAILURE OR HAVE LET YOURSELF OR YOUR FAMILY DOWN: 0
1. LITTLE INTEREST OR PLEASURE IN DOING THINGS: 0
SUM OF ALL RESPONSES TO PHQ QUESTIONS 1-9: 0
SUM OF ALL RESPONSES TO PHQ QUESTIONS 1-9: 0
SUM OF ALL RESPONSES TO PHQ9 QUESTIONS 1 & 2: 0
SUM OF ALL RESPONSES TO PHQ QUESTIONS 1-9: 0
2. FEELING DOWN, DEPRESSED OR HOPELESS: 0
SUM OF ALL RESPONSES TO PHQ QUESTIONS 1-9: 0
3. TROUBLE FALLING OR STAYING ASLEEP: 0
9. THOUGHTS THAT YOU WOULD BE BETTER OFF DEAD, OR OF HURTING YOURSELF: 0
5. POOR APPETITE OR OVEREATING: 0
8. MOVING OR SPEAKING SO SLOWLY THAT OTHER PEOPLE COULD HAVE NOTICED. OR THE OPPOSITE, BEING SO FIGETY OR RESTLESS THAT YOU HAVE BEEN MOVING AROUND A LOT MORE THAN USUAL: 0
7. TROUBLE CONCENTRATING ON THINGS, SUCH AS READING THE NEWSPAPER OR WATCHING TELEVISION: 0
4. FEELING TIRED OR HAVING LITTLE ENERGY: 0
10. IF YOU CHECKED OFF ANY PROBLEMS, HOW DIFFICULT HAVE THESE PROBLEMS MADE IT FOR YOU TO DO YOUR WORK, TAKE CARE OF THINGS AT HOME, OR GET ALONG WITH OTHER PEOPLE: 0

## 2023-12-13 ASSESSMENT — ENCOUNTER SYMPTOMS
DIARRHEA: 0
WHEEZING: 0
SORE THROAT: 0
STRIDOR: 0
RECTAL PAIN: 0
BLOOD IN STOOL: 0
ABDOMINAL PAIN: 0
COUGH: 0
COLOR CHANGE: 0
RHINORRHEA: 0
EYE REDNESS: 0
ABDOMINAL DISTENTION: 0
CONSTIPATION: 0
CHEST TIGHTNESS: 0
SHORTNESS OF BREATH: 0
VOMITING: 0
NAUSEA: 0
SINUS PRESSURE: 0
TROUBLE SWALLOWING: 0
BACK PAIN: 0

## 2023-12-13 ASSESSMENT — COLUMBIA-SUICIDE SEVERITY RATING SCALE - C-SSRS
7. DID THIS OCCUR IN THE LAST THREE MONTHS: NO
4. HAVE YOU HAD THESE THOUGHTS AND HAD SOME INTENTION OF ACTING ON THEM?: NO
5. HAVE YOU STARTED TO WORK OUT OR WORKED OUT THE DETAILS OF HOW TO KILL YOURSELF? DO YOU INTEND TO CARRY OUT THIS PLAN?: NO
3. HAVE YOU BEEN THINKING ABOUT HOW YOU MIGHT KILL YOURSELF?: NO

## 2023-12-13 NOTE — PATIENT INSTRUCTIONS
Thank you for choosing [facility]) as your healthcare provider as your care is important to us. Please arrive at your appointment on time. If you are unable to make your appointment, please call our office as soon as possible so that we may reschedule your appointment. Missing 3 appointments in a calendar year without notifying the office may lead to dismissal from the practice. We appreciate you calling at least 24 hours in advance, when possible. Thank you. New Updates for [facility]    Thank you for choosing US to give you the best care! [facility]) is always trying to think of new ways to help their patients. We are asking all patients to try out the new digital registration that is now available through your [facility] account Via the yvan you're now able to update your personal and registration information prior to your upcoming appointment. This will save you time once you arrive at the office to check-in, not to mention your information remains safe!! Many other perks come from signing up for an account, such as:  Requesting refills  Scheduling an appointment  Completing an E-Visit  Sending a message to the office/provider  Having access to your medication list  Paying your bill/copay prior to your appointment  Scheduling your yearly mammogram  Review your test results    If you are not familiar with [facility] please ask one of us and we will be happy to answer any questions or help you set-up your account.       Your [employer],

## 2023-12-15 NOTE — TELEPHONE ENCOUNTER
Per Dr. Angelika Eckert, patient does not need any imaging or procedures at this time, patient was notified of this.

## 2023-12-21 RX ORDER — SUCRALFATE 1 G/1
1 TABLET ORAL 4 TIMES DAILY
Qty: 120 TABLET | Refills: 3 | OUTPATIENT
Start: 2023-12-21

## 2023-12-26 ENCOUNTER — TELEPHONE (OUTPATIENT)
Dept: FAMILY MEDICINE CLINIC | Age: 68
End: 2023-12-26

## 2023-12-26 DIAGNOSIS — I34.1 MVP (MITRAL VALVE PROLAPSE): ICD-10-CM

## 2023-12-26 DIAGNOSIS — I10 ESSENTIAL HYPERTENSION: ICD-10-CM

## 2023-12-26 NOTE — TELEPHONE ENCOUNTER
PATIENT CALLED TO REPORT SHE PASSED A KIDNEY STONE ON 11/24/2023. NO CONCERNS AT THIS TIME BUT WILL CALL IF ANY OTHER SYMPTOMS ARISE

## 2023-12-27 NOTE — TELEPHONE ENCOUNTER
Please Approve or Refuse.  Send to Pharmacy per Pt's Request:      Next Visit Date:  4/17/2024   Last Visit Date: 12/13/2023    Hemoglobin A1C (%)   Date Value   12/13/2023 7.0   03/15/2023 6.7   12/15/2022 8.0             ( goal A1C is < 7)   BP Readings from Last 3 Encounters:   12/13/23 134/84   12/10/23 (!) 155/78   08/25/23 106/79          (goal 120/80)  BUN   Date Value Ref Range Status   12/10/2023 9 8 - 23 mg/dL Final     Creatinine   Date Value Ref Range Status   12/10/2023 <0.4 (L) 0.5 - 0.9 mg/dL Final     Potassium   Date Value Ref Range Status   12/10/2023 4.2 3.7 - 5.3 mmol/L Final

## 2024-01-09 DIAGNOSIS — E11.65 UNCONTROLLED TYPE 2 DIABETES MELLITUS WITH HYPERGLYCEMIA (HCC): ICD-10-CM

## 2024-01-09 RX ORDER — PEN NEEDLE, DIABETIC 31 GX5/16"
NEEDLE, DISPOSABLE MISCELLANEOUS
Qty: 100 EACH | Refills: 3 | Status: SHIPPED | OUTPATIENT
Start: 2024-01-09

## 2024-01-09 NOTE — TELEPHONE ENCOUNTER
Amelia Moseley is calling to request a refill on the following medication(s):    Medication Request:  Requested Prescriptions     Pending Prescriptions Disp Refills    DROPLET PEN NEEDLES 31G X 8 MM MISC [Pharmacy Med Name: DROPLET PEN NEEDLE 31GX5/16\"] 100 each 3     Sig: use 1 PEN NEEDLE to inject MEDICATION subcutaneously four times a day       Last Visit Date (If Applicable):  12/13/2023    Next Visit Date:    4/17/2024

## 2024-01-10 DIAGNOSIS — M79.89 LEG SWELLING: ICD-10-CM

## 2024-01-11 RX ORDER — FUROSEMIDE 20 MG/1
TABLET ORAL
Qty: 90 TABLET | Refills: 0 | OUTPATIENT
Start: 2024-01-11

## 2024-01-18 DIAGNOSIS — E11.65 UNCONTROLLED TYPE 2 DIABETES MELLITUS WITH HYPERGLYCEMIA (HCC): ICD-10-CM

## 2024-01-18 DIAGNOSIS — E11.42 TYPE 2 DIABETES MELLITUS WITH DIABETIC POLYNEUROPATHY, WITH LONG-TERM CURRENT USE OF INSULIN (HCC): ICD-10-CM

## 2024-01-18 DIAGNOSIS — Z79.4 TYPE 2 DIABETES MELLITUS WITH DIABETIC POLYNEUROPATHY, WITH LONG-TERM CURRENT USE OF INSULIN (HCC): ICD-10-CM

## 2024-01-18 RX ORDER — PROCHLORPERAZINE 25 MG/1
SUPPOSITORY RECTAL
Qty: 3 EACH | Refills: 3 | Status: SHIPPED | OUTPATIENT
Start: 2024-01-18

## 2024-01-18 RX ORDER — INSULIN DEGLUDEC 200 U/ML
INJECTION, SOLUTION SUBCUTANEOUS
Qty: 27 ML | Refills: 1 | Status: SHIPPED | OUTPATIENT
Start: 2024-01-18

## 2024-01-22 RX ORDER — GLIPIZIDE 10 MG/1
TABLET ORAL
Qty: 360 TABLET | Refills: 0 | Status: SHIPPED | OUTPATIENT
Start: 2024-01-22

## 2024-01-22 NOTE — TELEPHONE ENCOUNTER
Please Approve or Refuse.  Send to Pharmacy per Pt's Request: [facility]     Next Visit Date:  4/17/2024   Last Visit Date: 12/13/2023    Hemoglobin A1C (%)   Date Value   12/13/2023 7.0   03/15/2023 6.7   12/15/2022 8.0             ( goal A1C is < 7)   BP Readings from Last 3 Encounters:   12/13/23 134/84   12/10/23 (!) 155/78   08/25/23 106/79          (goal 120/80)  BUN   Date Value Ref Range Status   12/10/2023 9 8 - 23 mg/dL Final     Creatinine   Date Value Ref Range Status   12/10/2023 <0.4 (L) 0.5 - 0.9 mg/dL Final     Potassium   Date Value Ref Range Status   12/10/2023 4.2 3.7 - 5.3 mmol/L Final

## 2024-01-29 RX ORDER — LISINOPRIL 2.5 MG/1
2.5 TABLET ORAL DAILY
Qty: 90 TABLET | Refills: 0 | Status: SHIPPED | OUTPATIENT
Start: 2024-01-29

## 2024-01-29 RX ORDER — ATORVASTATIN CALCIUM 80 MG/1
80 TABLET, FILM COATED ORAL DAILY
Qty: 90 TABLET | Refills: 0 | Status: SHIPPED | OUTPATIENT
Start: 2024-01-29

## 2024-02-16 ENCOUNTER — OFFICE VISIT (OUTPATIENT)
Dept: PODIATRY | Age: 69
End: 2024-02-16
Payer: MEDICARE

## 2024-02-16 VITALS — BODY MASS INDEX: 40.85 KG/M2 | WEIGHT: 222 LBS | HEIGHT: 62 IN

## 2024-02-16 DIAGNOSIS — M19.072 OSTEOARTHRITIS OF JOINT OF TOE OF LEFT FOOT: Primary | ICD-10-CM

## 2024-02-16 DIAGNOSIS — M79.674 PAIN OF TOES OF BOTH FEET: ICD-10-CM

## 2024-02-16 DIAGNOSIS — B35.1 ONYCHOMYCOSIS OF TOENAIL: ICD-10-CM

## 2024-02-16 DIAGNOSIS — E11.9 TYPE 2 DIABETES MELLITUS WITHOUT COMPLICATION, WITH LONG-TERM CURRENT USE OF INSULIN (HCC): ICD-10-CM

## 2024-02-16 DIAGNOSIS — M79.675 PAIN OF TOES OF BOTH FEET: ICD-10-CM

## 2024-02-16 DIAGNOSIS — Z79.4 TYPE 2 DIABETES MELLITUS WITHOUT COMPLICATION, WITH LONG-TERM CURRENT USE OF INSULIN (HCC): ICD-10-CM

## 2024-02-16 DIAGNOSIS — M79.672 PAIN IN LEFT FOOT: ICD-10-CM

## 2024-02-16 PROCEDURE — 2022F DILAT RTA XM EVC RTNOPTHY: CPT | Performed by: PODIATRIST

## 2024-02-16 PROCEDURE — G8417 CALC BMI ABV UP PARAM F/U: HCPCS | Performed by: PODIATRIST

## 2024-02-16 PROCEDURE — 11721 DEBRIDE NAIL 6 OR MORE: CPT | Performed by: PODIATRIST

## 2024-02-16 PROCEDURE — G8399 PT W/DXA RESULTS DOCUMENT: HCPCS | Performed by: PODIATRIST

## 2024-02-16 PROCEDURE — 1036F TOBACCO NON-USER: CPT | Performed by: PODIATRIST

## 2024-02-16 PROCEDURE — 3017F COLORECTAL CA SCREEN DOC REV: CPT | Performed by: PODIATRIST

## 2024-02-16 PROCEDURE — 99203 OFFICE O/P NEW LOW 30 MIN: CPT | Performed by: PODIATRIST

## 2024-02-16 PROCEDURE — G8428 CUR MEDS NOT DOCUMENT: HCPCS | Performed by: PODIATRIST

## 2024-02-16 PROCEDURE — 3046F HEMOGLOBIN A1C LEVEL >9.0%: CPT | Performed by: PODIATRIST

## 2024-02-16 PROCEDURE — 1090F PRES/ABSN URINE INCON ASSESS: CPT | Performed by: PODIATRIST

## 2024-02-16 PROCEDURE — G8484 FLU IMMUNIZE NO ADMIN: HCPCS | Performed by: PODIATRIST

## 2024-02-16 PROCEDURE — 1123F ACP DISCUSS/DSCN MKR DOCD: CPT | Performed by: PODIATRIST

## 2024-02-16 RX ORDER — FUROSEMIDE 20 MG/1
20 TABLET ORAL DAILY
COMMUNITY
Start: 2024-01-16

## 2024-02-16 NOTE — PROGRESS NOTES
varicosities noted to the right foot/ankle.     There are no varicosities noted to the left foot/ankle.     Erythema is absent to the bilateral feet.    Neurological: Reflexes are present to the right plantar foot and to the Achilles tendon.     Reflexes are present to the left plantar foot and to the Achilles tendon.     Protective sensation is present to the right plantar foot as noted with a 5.07 [place] monofilament.     Protective sensation is present to the left plantar foot as noted with a 5.07 [place] monofilament.     Musculoskeletal:  Muscle strength is +5/5 to all four muscle groups of the right lower extremity and +5/5 to all four muscle groups of the left lower extremity.     There are no areas of subluxation, dislocation, or laxity noted to either lower extremity.     Range of motion to the right ankle is  free of pain or grinding.     Range of motion to the left ankle is  free of pain or grinding.     Range of motion to the right subtalar joint is  free of pain or grinding.     Range of motion to the left subtalar joint is  free of pain or grinding.     No abnormalities, asymmetries, or misalignments are seen between the extremities.    Weightbearing evaluation does not reveal rearfoot eversion, medial prominence of the talar head, loss of the medial longitudinal arch height, and too many toes sign bilaterally.    The lesser digits of the right foot are contracted.     The lesser digits of the left foot are contracted.     There is no prominence noted to the first metatarsal head without abduction of the hallux of the right foot.     There is no prominence noted to the first metatarsal head without abduction of the hallux of the left foot.    There is pain with palpation to the IPJ of the left hallux.  There is pain with range of motion to the left hallux IPJ.  No grinding or crepitus is noted with this range of motion.    Shoe examination was performed.    Biomechanical Exam: normal

## 2024-02-22 DIAGNOSIS — E11.42 DIABETIC POLYNEUROPATHY ASSOCIATED WITH TYPE 2 DIABETES MELLITUS (HCC): ICD-10-CM

## 2024-02-22 RX ORDER — DULOXETIN HYDROCHLORIDE 60 MG/1
60 CAPSULE, DELAYED RELEASE ORAL DAILY
Qty: 90 CAPSULE | Refills: 3 | Status: SHIPPED | OUTPATIENT
Start: 2024-02-22

## 2024-02-22 RX ORDER — GABAPENTIN 600 MG/1
600 TABLET ORAL 2 TIMES DAILY
Qty: 60 TABLET | Refills: 0 | Status: SHIPPED | OUTPATIENT
Start: 2024-02-22 | End: 2024-03-23

## 2024-03-08 ENCOUNTER — TELEPHONE (OUTPATIENT)
Dept: FAMILY MEDICINE CLINIC | Age: 69
End: 2024-03-08

## 2024-03-08 DIAGNOSIS — E11.65 UNCONTROLLED TYPE 2 DIABETES MELLITUS WITH HYPERGLYCEMIA (HCC): ICD-10-CM

## 2024-03-08 RX ORDER — INSULIN LISPRO 100 [IU]/ML
INJECTION, SOLUTION INTRAVENOUS; SUBCUTANEOUS
Qty: 27 ML | Refills: 0 | Status: SHIPPED | OUTPATIENT
Start: 2024-03-08

## 2024-03-08 NOTE — TELEPHONE ENCOUNTER
This has happened 1 year ago, patient needs to discuss this appointment.  Currently we can help with teaching.  She can follow-up with ophthalmologist and discussed with them as well

## 2024-03-11 NOTE — TELEPHONE ENCOUNTER
Pt returned call and has been adv will discuss at appt and to also f/u with her ophthalmologist. Pt verbalized understanding.

## 2024-03-21 ENCOUNTER — TELEPHONE (OUTPATIENT)
Dept: UROLOGY | Age: 69
End: 2024-03-21

## 2024-03-21 NOTE — TELEPHONE ENCOUNTER
Patient spouse called in and left a voicemail asking for a call back. Writer called patient back and left a voicemail asking for a returned telephone call.

## 2024-03-22 DIAGNOSIS — E11.42 TYPE 2 DIABETES MELLITUS WITH DIABETIC POLYNEUROPATHY, WITH LONG-TERM CURRENT USE OF INSULIN (HCC): ICD-10-CM

## 2024-03-22 DIAGNOSIS — Z79.4 TYPE 2 DIABETES MELLITUS WITH DIABETIC POLYNEUROPATHY, WITH LONG-TERM CURRENT USE OF INSULIN (HCC): ICD-10-CM

## 2024-03-25 RX ORDER — PROCHLORPERAZINE 25 MG/1
SUPPOSITORY RECTAL
Qty: 1 EACH | Refills: 0 | Status: SHIPPED | OUTPATIENT
Start: 2024-03-25

## 2024-03-26 ENCOUNTER — TELEPHONE (OUTPATIENT)
Dept: UROLOGY | Age: 69
End: 2024-03-26

## 2024-03-26 ENCOUNTER — OFFICE VISIT (OUTPATIENT)
Dept: UROLOGY | Age: 69
End: 2024-03-26
Payer: MEDICARE

## 2024-03-26 VITALS
DIASTOLIC BLOOD PRESSURE: 79 MMHG | SYSTOLIC BLOOD PRESSURE: 138 MMHG | WEIGHT: 228 LBS | BODY MASS INDEX: 41.96 KG/M2 | HEIGHT: 62 IN | HEART RATE: 70 BPM | TEMPERATURE: 97.5 F | OXYGEN SATURATION: 99 %

## 2024-03-26 DIAGNOSIS — N39.46 MIXED STRESS AND URGE URINARY INCONTINENCE: Primary | ICD-10-CM

## 2024-03-26 DIAGNOSIS — Z87.440 HISTORY OF RECURRENT UTIS: ICD-10-CM

## 2024-03-26 DIAGNOSIS — Z87.442 HISTORY OF KIDNEY STONES: ICD-10-CM

## 2024-03-26 PROCEDURE — 1090F PRES/ABSN URINE INCON ASSESS: CPT | Performed by: UROLOGY

## 2024-03-26 PROCEDURE — 99212 OFFICE O/P EST SF 10 MIN: CPT | Performed by: UROLOGY

## 2024-03-26 PROCEDURE — 1036F TOBACCO NON-USER: CPT | Performed by: UROLOGY

## 2024-03-26 PROCEDURE — 3078F DIAST BP <80 MM HG: CPT | Performed by: UROLOGY

## 2024-03-26 PROCEDURE — 3075F SYST BP GE 130 - 139MM HG: CPT | Performed by: UROLOGY

## 2024-03-26 PROCEDURE — 3017F COLORECTAL CA SCREEN DOC REV: CPT | Performed by: UROLOGY

## 2024-03-26 PROCEDURE — 0509F URINE INCON PLAN DOCD: CPT | Performed by: UROLOGY

## 2024-03-26 PROCEDURE — 1123F ACP DISCUSS/DSCN MKR DOCD: CPT | Performed by: UROLOGY

## 2024-03-26 PROCEDURE — G8417 CALC BMI ABV UP PARAM F/U: HCPCS | Performed by: UROLOGY

## 2024-03-26 PROCEDURE — G8427 DOCREV CUR MEDS BY ELIG CLIN: HCPCS | Performed by: UROLOGY

## 2024-03-26 PROCEDURE — G8399 PT W/DXA RESULTS DOCUMENT: HCPCS | Performed by: UROLOGY

## 2024-03-26 PROCEDURE — G8484 FLU IMMUNIZE NO ADMIN: HCPCS | Performed by: UROLOGY

## 2024-03-26 ASSESSMENT — ENCOUNTER SYMPTOMS
EYE REDNESS: 0
NAUSEA: 0
EYE PAIN: 0
WHEEZING: 0
VOMITING: 0
SHORTNESS OF BREATH: 0
ABDOMINAL PAIN: 0
COUGH: 0
CONSTIPATION: 0
BACK PAIN: 0
DIARRHEA: 0

## 2024-03-26 NOTE — PROGRESS NOTES
[facility]  [address]  [facility] [address]  Dept: [phone]    [facility] Urology Office Note - New Patient    Patient:  Amelia Moseley  YOB: 1955  Date: 3/26/2024    The patient is a 69 y.o. female who presentstoday for evaluation of the following problems:   Chief Complaint   Patient presents with    Other     Hospital follow up-recurrent UTI    referred by Agnes Acharya MD.    HPI  She si here for leaking and infections.   She was last seen by me in September of 2019.  She has not had a UTI since December.   She wears 1 pad per day.   Meds have not helped.   She has never had urodynamics.       (Patient's old records have been requested, reviewed and summarized in today's note.)    Summary of old records: N/A    History: N/A    ProceduresToday: N/A    Urinalysis today:  No results found for this visit on 03/26/24.    AUA Symptom Score (3/26/2024):  INCOMPLETE EMPTYING: How often have you had the sensation of not emptying your bladder?: Less than Half the time  FREQUENCY: How often do you have to urinate less than every two hours?: Less than Half the time  INTERMITTENCY: How often have you found you stopped and started again several times when you urinated?: Not at all  URGENCY: How often have you found it difficult to postpone urination?: Less than Half the time  WEAK STREAM: How often have you had a weak urinary stream?: Not at all  STRAINING: How often have you had to strain to start  urination?: Not at all  NOCTURIA: How many times did you typically get up at night to uriniate?: 3 Times  TOTAL I-PSS SCORE:: 9  How would you feel if you were to spend the rest of your life with your urinary condition?: Mostly Dissatisfied    Last BUN andcreatinine:  Lab Results   Component Value Date    BUN 9 12/10/2023     Lab Results   Component Value Date    CREATININE <0.4 (L) 12/10/2023       Additional Lab/Culture

## 2024-03-30 DIAGNOSIS — E03.9 ACQUIRED HYPOTHYROIDISM: ICD-10-CM

## 2024-04-01 RX ORDER — LEVOTHYROXINE SODIUM 0.15 MG/1
TABLET ORAL
Qty: 120 TABLET | Refills: 3 | Status: SHIPPED | OUTPATIENT
Start: 2024-04-01

## 2024-04-05 RX ORDER — FUROSEMIDE 20 MG/1
20 TABLET ORAL DAILY
Qty: 270 TABLET | Refills: 1 | Status: SHIPPED | OUTPATIENT
Start: 2024-04-05

## 2024-04-05 NOTE — TELEPHONE ENCOUNTER
Please Approve or Refuse.  Send to Pharmacy per Pt's Request: [facility]     Next Visit Date:  4/17/2024   Last Visit Date: 12/13/2023    Hemoglobin A1C (%)   Date Value   12/13/2023 7.0   03/15/2023 6.7   12/15/2022 8.0             ( goal A1C is < 7)   BP Readings from Last 3 Encounters:   03/26/24 138/79   12/13/23 134/84   12/10/23 (!) 155/78          (goal 120/80)  BUN   Date Value Ref Range Status   12/10/2023 9 8 - 23 mg/dL Final     Creatinine   Date Value Ref Range Status   12/10/2023 <0.4 (L) 0.5 - 0.9 mg/dL Final     Potassium   Date Value Ref Range Status   12/10/2023 4.2 3.7 - 5.3 mmol/L Final

## 2024-04-08 DIAGNOSIS — E11.42 TYPE 2 DIABETES MELLITUS WITH DIABETIC POLYNEUROPATHY, WITH LONG-TERM CURRENT USE OF INSULIN (HCC): ICD-10-CM

## 2024-04-08 DIAGNOSIS — Z79.4 TYPE 2 DIABETES MELLITUS WITH DIABETIC POLYNEUROPATHY, WITH LONG-TERM CURRENT USE OF INSULIN (HCC): ICD-10-CM

## 2024-04-08 RX ORDER — PROCHLORPERAZINE 25 MG/1
SUPPOSITORY RECTAL
Qty: 1 EACH | Refills: 0 | Status: SHIPPED | OUTPATIENT
Start: 2024-04-08

## 2024-04-08 RX ORDER — PROCHLORPERAZINE 25 MG/1
SUPPOSITORY RECTAL
Qty: 3 EACH | Refills: 3 | Status: SHIPPED | OUTPATIENT
Start: 2024-04-08

## 2024-04-08 NOTE — TELEPHONE ENCOUNTER
PATIENT WILL NEED NEW SCRIPT SENT OVER FOR DEXCOM READER. FIRST ONE WAS SENT ELECTRONICALLY TO [facility] WHICH WONT FILL THIS SCRIPT.     PENDED SCRIPTS TO GET PRINTED

## 2024-04-15 ENCOUNTER — TELEPHONE (OUTPATIENT)
Dept: UROLOGY | Age: 69
End: 2024-04-15

## 2024-04-15 DIAGNOSIS — E11.65 UNCONTROLLED TYPE 2 DIABETES MELLITUS WITH HYPERGLYCEMIA (HCC): ICD-10-CM

## 2024-04-15 RX ORDER — PEN NEEDLE, DIABETIC 31 GX5/16"
NEEDLE, DISPOSABLE MISCELLANEOUS
Qty: 100 EACH | Refills: 3 | Status: SHIPPED | OUTPATIENT
Start: 2024-04-15

## 2024-04-15 NOTE — TELEPHONE ENCOUNTER
Message on nurse line from patient asking for a call back, writer called back, left a message to call the office back.

## 2024-04-16 ENCOUNTER — TELEPHONE (OUTPATIENT)
Dept: SURGERY | Age: 69
End: 2024-04-16

## 2024-04-16 NOTE — TELEPHONE ENCOUNTER
Patient could not give the name of the medication. Patient just stated that she is requesting a refill of the \"medication for stomach issues\" that Dr. Licona has prescribed her in the past.     Please advise.

## 2024-04-16 NOTE — TELEPHONE ENCOUNTER
Left voicemail  Dr Licona prescribe pantoprazole (Protonix) for acid reflux . Patient can discuss with her PCP

## 2024-04-17 ENCOUNTER — PROCEDURE VISIT (OUTPATIENT)
Dept: UROLOGY | Age: 69
End: 2024-04-17

## 2024-04-17 VITALS — DIASTOLIC BLOOD PRESSURE: 74 MMHG | SYSTOLIC BLOOD PRESSURE: 139 MMHG | TEMPERATURE: 97.7 F | HEART RATE: 66 BPM

## 2024-04-17 DIAGNOSIS — Z87.440 HISTORY OF RECURRENT UTIS: ICD-10-CM

## 2024-04-17 DIAGNOSIS — N39.46 MIXED STRESS AND URGE URINARY INCONTINENCE: Primary | ICD-10-CM

## 2024-04-17 NOTE — TELEPHONE ENCOUNTER
Patient call on regards her medication patient stated her PCP has not refill her Protonix. Writer call her PCP office close, informed patient I will reach out tomorrow and verified

## 2024-04-17 NOTE — PROGRESS NOTES
Urodynamic Procedure Note    Amelia Moseley  1955        INDICATIONS FOR PROCEDURE:  The patient is a 69 y.o. female with a history of urinary incontinence and infections..  The patient presents today for urodynamics to evaluate the cause of voiding dysfunction. The risks and benefits of the procedure, as well as possible alternatives and complications were discussed and the  patient consented to the procedure.    Equipment: [employer]      DETAILS OF THE PROCEDURE:  Procedure:  The Patient was taken to the Urodynamics suite and a free urine flow was obtained followed by catheterization for residual urine. A double-lumen urodynamic catheter was introduced to the bladder for measuring bladder pressure, and for filling. EMG patch electrodes were placed perianally for recording activity of the anal sphincter. A vaginal catheter was inserted to record the abdominal pressure. The entire process was displayed and analyzed using the [employer] Urodynamic machine and software.    Uroflow was performed with the following results:  Voided volume: 32mL  Voiding time: 66s  Peak flow: 5.9 mL/s  Average flow: 2.8 mL/s  PVR: 8 mL    A urethral catheter was placed, as was a vaginalcatheter  and EMG electrodes. After an appropriate timeout was performed bladder  filling commenced. Filling parameters were as follows:  1st sensation: 33  mL  1st desire to void: 72 mL  Strong desire to void: 233 mL  Max capacity: 367 mL.   Leaked volume: 7mL (stress UI)    Valsalva: yes, no leak.  Cough: yes, with leak.   Detrusor contractions: no uninhibited contractions, no urge leaks.      The patient was then instructed to void.  Pressure flow study demonstrated the following:  Voided volume: 368cc  Average flow: 4.7 mL/s  Max Flow: 13.5 mL/s  Pdet @ max: 28 cmH2O  Pdet max: 51 cmH2O.   PVR: 0 mL        Summary: tolerated procedure well. UDS report to be interpreted by Dr. Rodriguez.  F/u as scheduled in May.

## 2024-04-18 ENCOUNTER — TELEPHONE (OUTPATIENT)
Dept: UROLOGY | Age: 69
End: 2024-04-18

## 2024-04-18 DIAGNOSIS — N39.46 MIXED STRESS AND URGE URINARY INCONTINENCE: Primary | ICD-10-CM

## 2024-04-18 DIAGNOSIS — Z87.440 HISTORY OF RECURRENT UTIS: ICD-10-CM

## 2024-04-18 RX ORDER — PANTOPRAZOLE SODIUM 40 MG/1
40 TABLET, DELAYED RELEASE ORAL
Qty: 60 TABLET | Refills: 0 | Status: SHIPPED | OUTPATIENT
Start: 2024-04-18 | End: 2024-05-18

## 2024-04-18 NOTE — TELEPHONE ENCOUNTER
Please Approve or Refuse.  Send to Pharmacy per Pt's Request:      Next Visit Date:  5/13/2024  Last Visit Date: 12/13/2023    Hemoglobin A1C (%)   Date Value   12/13/2023 7.0   03/15/2023 6.7   12/15/2022 8.0             ( goal A1C is < 7)   BP Readings from Last 3 Encounters:   04/17/24 139/74   03/26/24 138/79   12/13/23 134/84          (goal 120/80)  BUN   Date Value Ref Range Status   12/10/2023 9 8 - 23 mg/dL Final     Creatinine   Date Value Ref Range Status   12/10/2023 <0.4 (L) 0.5 - 0.9 mg/dL Final     Potassium   Date Value Ref Range Status   12/10/2023 4.2 3.7 - 5.3 mmol/L Final

## 2024-04-18 NOTE — TELEPHONE ENCOUNTER
Patient called into the office, stated that she is having trouble controlling her urine. \"It feels like I'm back to square one again, is this normal\"? Writer advised Susan DELFIN is out of the office until Monday, but a message would be sent and we would follow up. Patient verbalized understanding,call was ended.

## 2024-04-18 NOTE — TELEPHONE ENCOUNTER
Spoke wit Dr Acharya office informed of the refill request, they stated that will contact patient to clarified if appointment is need it

## 2024-04-22 ENCOUNTER — TELEPHONE (OUTPATIENT)
Dept: ORTHOPEDIC SURGERY | Age: 69
End: 2024-04-22

## 2024-04-22 NOTE — TELEPHONE ENCOUNTER
The urodynamics procedure should not have affected her ability to control her urine. I would ask her to drop off a urine culture to ensure no infection. I placed the order

## 2024-04-22 NOTE — TELEPHONE ENCOUNTER
Patient called in requesting to speak to Ekaterina concerning a medication that is not working. She would like a call back at [phone].

## 2024-04-23 RX ORDER — POTASSIUM CHLORIDE 750 MG/1
10 TABLET, EXTENDED RELEASE ORAL DAILY
Qty: 90 TABLET | Refills: 0 | Status: SHIPPED | OUTPATIENT
Start: 2024-04-23

## 2024-04-23 NOTE — TELEPHONE ENCOUNTER
Please Approve or Refuse.  Send to Pharmacy per Pt's Request: [facility]      Next Visit Date:  5/13/2024   Last Visit Date: 12/13/2023    Hemoglobin A1C (%)   Date Value   12/13/2023 7.0   03/15/2023 6.7   12/15/2022 8.0             ( goal A1C is < 7)   BP Readings from Last 3 Encounters:   04/17/24 139/74   03/26/24 138/79   12/13/23 134/84          (goal 120/80)  BUN   Date Value Ref Range Status   12/10/2023 9 8 - 23 mg/dL Final     Creatinine   Date Value Ref Range Status   12/10/2023 <0.4 (L) 0.5 - 0.9 mg/dL Final     Potassium   Date Value Ref Range Status   12/10/2023 4.2 3.7 - 5.3 mmol/L Final

## 2024-04-23 NOTE — TELEPHONE ENCOUNTER
Patient call asking information about establish care whit a new GI . Patient call was forward to Dr Shefali kennedy

## 2024-04-26 RX ORDER — GLIPIZIDE 10 MG/1
TABLET ORAL
Qty: 360 TABLET | Refills: 3 | Status: SHIPPED | OUTPATIENT
Start: 2024-04-26

## 2024-04-30 RX ORDER — ATORVASTATIN CALCIUM 80 MG/1
80 TABLET, FILM COATED ORAL DAILY
Qty: 90 TABLET | Refills: 0 | Status: SHIPPED | OUTPATIENT
Start: 2024-04-30

## 2024-04-30 RX ORDER — LISINOPRIL 2.5 MG/1
2.5 TABLET ORAL DAILY
Qty: 90 TABLET | Refills: 0 | Status: SHIPPED | OUTPATIENT
Start: 2024-04-30

## 2024-05-01 DIAGNOSIS — E11.42 TYPE 2 DIABETES MELLITUS WITH DIABETIC POLYNEUROPATHY, WITH LONG-TERM CURRENT USE OF INSULIN (HCC): ICD-10-CM

## 2024-05-01 DIAGNOSIS — Z79.4 TYPE 2 DIABETES MELLITUS WITH DIABETIC POLYNEUROPATHY, WITH LONG-TERM CURRENT USE OF INSULIN (HCC): ICD-10-CM

## 2024-05-01 RX ORDER — SITAGLIPTIN 50 MG/1
50 TABLET, FILM COATED ORAL DAILY
Qty: 90 TABLET | Refills: 1 | Status: SHIPPED | OUTPATIENT
Start: 2024-05-01

## 2024-05-01 RX ORDER — GABAPENTIN 600 MG/1
600 TABLET ORAL 2 TIMES DAILY
Qty: 60 TABLET | Refills: 0 | Status: SHIPPED | OUTPATIENT
Start: 2024-05-01 | End: 2024-05-31

## 2024-05-11 DIAGNOSIS — E11.42 TYPE 2 DIABETES MELLITUS WITH DIABETIC POLYNEUROPATHY, WITH LONG-TERM CURRENT USE OF INSULIN (HCC): ICD-10-CM

## 2024-05-11 DIAGNOSIS — Z79.4 TYPE 2 DIABETES MELLITUS WITH DIABETIC POLYNEUROPATHY, WITH LONG-TERM CURRENT USE OF INSULIN (HCC): ICD-10-CM

## 2024-05-14 RX ORDER — PANTOPRAZOLE SODIUM 40 MG/1
80 TABLET, DELAYED RELEASE ORAL
Qty: 60 TABLET | Refills: 0 | Status: SHIPPED | OUTPATIENT
Start: 2024-05-14

## 2024-05-14 RX ORDER — PROCHLORPERAZINE 25 MG/1
SUPPOSITORY RECTAL
Qty: 3 EACH | Refills: 3 | Status: SHIPPED | OUTPATIENT
Start: 2024-05-14

## 2024-05-16 ENCOUNTER — TELEPHONE (OUTPATIENT)
Dept: UROLOGY | Age: 69
End: 2024-05-16

## 2024-05-16 NOTE — TELEPHONE ENCOUNTER
Writer called patient and left a voicemail asking patient to get her KUB completed for her appointment on 5/21.

## 2024-05-20 ENCOUNTER — HOSPITAL ENCOUNTER (OUTPATIENT)
Age: 69
Discharge: HOME | End: 2024-05-22
Payer: MEDICARE

## 2024-05-20 ENCOUNTER — HOSPITAL ENCOUNTER (OUTPATIENT)
Dept: GENERAL RADIOLOGY | Age: 69
Discharge: HOME | End: 2024-05-22
Payer: MEDICARE

## 2024-05-20 DIAGNOSIS — Z87.442 HISTORY OF KIDNEY STONES: ICD-10-CM

## 2024-05-20 PROCEDURE — 74018 RADEX ABDOMEN 1 VIEW: CPT

## 2024-05-21 ENCOUNTER — OFFICE VISIT (OUTPATIENT)
Dept: UROLOGY | Age: 69
End: 2024-05-21
Payer: MEDICARE

## 2024-05-21 VITALS
HEART RATE: 68 BPM | WEIGHT: 225 LBS | DIASTOLIC BLOOD PRESSURE: 73 MMHG | HEIGHT: 62 IN | TEMPERATURE: 97.1 F | BODY MASS INDEX: 41.41 KG/M2 | SYSTOLIC BLOOD PRESSURE: 122 MMHG

## 2024-05-21 DIAGNOSIS — R39.15 URINARY URGENCY: ICD-10-CM

## 2024-05-21 DIAGNOSIS — N39.46 MIXED STRESS AND URGE URINARY INCONTINENCE: Primary | ICD-10-CM

## 2024-05-21 DIAGNOSIS — Z87.440 HISTORY OF RECURRENT UTIS: ICD-10-CM

## 2024-05-21 PROCEDURE — 3017F COLORECTAL CA SCREEN DOC REV: CPT | Performed by: UROLOGY

## 2024-05-21 PROCEDURE — G8427 DOCREV CUR MEDS BY ELIG CLIN: HCPCS | Performed by: UROLOGY

## 2024-05-21 PROCEDURE — 3078F DIAST BP <80 MM HG: CPT | Performed by: UROLOGY

## 2024-05-21 PROCEDURE — G8399 PT W/DXA RESULTS DOCUMENT: HCPCS | Performed by: UROLOGY

## 2024-05-21 PROCEDURE — 3074F SYST BP LT 130 MM HG: CPT | Performed by: UROLOGY

## 2024-05-21 PROCEDURE — 99214 OFFICE O/P EST MOD 30 MIN: CPT | Performed by: UROLOGY

## 2024-05-21 PROCEDURE — 0509F URINE INCON PLAN DOCD: CPT | Performed by: UROLOGY

## 2024-05-21 PROCEDURE — G8417 CALC BMI ABV UP PARAM F/U: HCPCS | Performed by: UROLOGY

## 2024-05-21 PROCEDURE — 1090F PRES/ABSN URINE INCON ASSESS: CPT | Performed by: UROLOGY

## 2024-05-21 PROCEDURE — 1036F TOBACCO NON-USER: CPT | Performed by: UROLOGY

## 2024-05-21 PROCEDURE — 1123F ACP DISCUSS/DSCN MKR DOCD: CPT | Performed by: UROLOGY

## 2024-05-21 ASSESSMENT — ENCOUNTER SYMPTOMS
WHEEZING: 0
COUGH: 0
NAUSEA: 0
EYE PAIN: 0
EYE REDNESS: 0
BACK PAIN: 0
VOMITING: 0
ABDOMINAL PAIN: 0
SHORTNESS OF BREATH: 0

## 2024-05-21 NOTE — PROGRESS NOTES
..Review of Systems   Constitutional:  Negative for activity change, chills and fever.   Eyes:  Negative for pain, redness and visual disturbance.   Respiratory:  Negative for cough, shortness of breath and wheezing.    Cardiovascular:  Negative for chest pain and leg swelling.   Gastrointestinal:  Negative for abdominal pain, nausea and vomiting.   Genitourinary:  Positive for difficulty urinating and flank pain. Negative for dysuria, enuresis, frequency, hematuria and urgency.   Musculoskeletal:  Negative for back pain, joint swelling and myalgias.   Skin:  Negative for rash and wound.   Neurological:  Negative for dizziness, tremors and numbness.   Hematological:  Does not bruise/bleed easily.

## 2024-05-21 NOTE — PROGRESS NOTES
[facility] PHYSICIANS [facility] UROLOGY CENTER  [address]  [facility] [address]  Dept: [phone]    [facility] Urology Office Note - Established    Patient:  Amelia Moseley  YOB: 1955  Date: 5/21/2024    The patient is a 69 y.o. female whopresents today for evaluation of the following problems:   Chief Complaint   Patient presents with    Nephrolithiasis       HPI  She has issues with leaking.   Urgency is her biggest issue.   She does leak.   She is not on anything at this time.   She is diabetic.   She tired Myrbetriq and Oxybutynin, which did not help.           Summary of old records: N/A    Additional History: N/A    Procedures Today: N/A    Urinalysis today:  No results found for this visit on 05/21/24.    Imaging Reviewed during this Office Visit: none  (results were independently reviewed by physician and radiology report verified)    AUA Symptom Score (5/21/2024):  INCOMPLETE EMPTYING: How often have you had the sensation of not emptying your bladder?: Less than Half the time  FREQUENCY: How often do you have to urinate less than every two hours?: Not at all  INTERMITTENCY: How often have you found you stopped and started again several times when you urinated?: Not at all  URGENCY: How often have you found it difficult to postpone urination?: Not at all  WEAK STREAM: How often have you had a weak urinary stream?: Less than Half the time  STRAINING: How often have you had to strain to start  urination?: Not at all  NOCTURIA: How many times did you typically get up at night to uriniate?: 2 Times  TOTAL I-PSS SCORE:: 6  How would you feel if you were to spend the rest of your life with your urinary condition?: Delighted    Last BUN and creatinine:  Lab Results   Component Value Date    BUN 9 12/10/2023     Lab Results   Component Value Date    CREATININE <0.4 (L) 12/10/2023       Additional Lab/Culture results: none    PAST MEDICAL, FAMILY

## 2024-05-22 DIAGNOSIS — E55.9 VITAMIN D DEFICIENCY: ICD-10-CM

## 2024-05-22 RX ORDER — ERGOCALCIFEROL 1.25 MG/1
50000 CAPSULE ORAL WEEKLY
Qty: 12 CAPSULE | Refills: 1 | Status: SHIPPED | OUTPATIENT
Start: 2024-05-22

## 2024-05-22 NOTE — TELEPHONE ENCOUNTER
Please Approve or Refuse.  Send to Pharmacy per Pt's Request:      Next Visit Date:  6/10/2024   Last Visit Date: 12/13/2023    Hemoglobin A1C (%)   Date Value   12/13/2023 7.0   03/15/2023 6.7   12/15/2022 8.0             ( goal A1C is < 7)   BP Readings from Last 3 Encounters:   05/21/24 122/73   04/17/24 139/74   03/26/24 138/79          (goal 120/80)  BUN   Date Value Ref Range Status   12/10/2023 9 8 - 23 mg/dL Final     Creatinine   Date Value Ref Range Status   12/10/2023 <0.4 (L) 0.5 - 0.9 mg/dL Final     Potassium   Date Value Ref Range Status   12/10/2023 4.2 3.7 - 5.3 mmol/L Final

## 2024-05-24 ENCOUNTER — HOSPITAL ENCOUNTER (OUTPATIENT)
Age: 69
Discharge: HOME | End: 2024-05-24
Payer: MEDICARE

## 2024-05-24 PROCEDURE — 87186 SC STD MICRODIL/AGAR DIL: CPT

## 2024-05-24 PROCEDURE — 87077 CULTURE AEROBIC IDENTIFY: CPT

## 2024-05-24 PROCEDURE — 87086 URINE CULTURE/COLONY COUNT: CPT

## 2024-05-26 LAB
MICROORGANISM SPEC CULT: ABNORMAL
SPECIMEN DESCRIPTION: ABNORMAL

## 2024-05-28 DIAGNOSIS — E11.65 UNCONTROLLED TYPE 2 DIABETES MELLITUS WITH HYPERGLYCEMIA (HCC): ICD-10-CM

## 2024-05-28 DIAGNOSIS — N30.00 ACUTE CYSTITIS WITHOUT HEMATURIA: Primary | ICD-10-CM

## 2024-05-28 RX ORDER — INSULIN DEGLUDEC 200 U/ML
INJECTION, SOLUTION SUBCUTANEOUS
Qty: 27 ML | Refills: 1 | Status: SHIPPED | OUTPATIENT
Start: 2024-05-28

## 2024-05-28 RX ORDER — TRIMETHOPRIM 100 MG/1
100 TABLET ORAL 2 TIMES DAILY
Qty: 14 TABLET | Refills: 0 | Status: SHIPPED | OUTPATIENT
Start: 2024-05-28 | End: 2024-06-04

## 2024-05-30 ENCOUNTER — APPOINTMENT (OUTPATIENT)
Dept: GENERAL RADIOLOGY | Age: 69
End: 2024-05-30
Payer: MEDICARE

## 2024-05-30 ENCOUNTER — APPOINTMENT (OUTPATIENT)
Dept: CT IMAGING | Age: 69
End: 2024-05-30
Payer: MEDICARE

## 2024-05-30 ENCOUNTER — HOSPITAL ENCOUNTER (EMERGENCY)
Age: 69
Discharge: HOME | End: 2024-05-30
Attending: EMERGENCY MEDICINE
Payer: MEDICARE

## 2024-05-30 VITALS
BODY MASS INDEX: 40.85 KG/M2 | HEART RATE: 72 BPM | OXYGEN SATURATION: 95 % | RESPIRATION RATE: 15 BRPM | HEIGHT: 62 IN | WEIGHT: 222 LBS | TEMPERATURE: 97.9 F | SYSTOLIC BLOOD PRESSURE: 148 MMHG | DIASTOLIC BLOOD PRESSURE: 67 MMHG

## 2024-05-30 DIAGNOSIS — F41.9 ANXIETY: ICD-10-CM

## 2024-05-30 DIAGNOSIS — R00.2 PALPITATIONS: Primary | ICD-10-CM

## 2024-05-30 DIAGNOSIS — J06.9 VIRAL URI: ICD-10-CM

## 2024-05-30 LAB
ALBUMIN SERPL-MCNC: 3.9 G/DL (ref 3.5–5.2)
ALP SERPL-CCNC: 125 U/L (ref 35–104)
ALT SERPL-CCNC: 41 U/L (ref 5–33)
ANION GAP SERPL CALCULATED.3IONS-SCNC: 14 MMOL/L (ref 9–17)
AST SERPL-CCNC: 35 U/L
BASOPHILS # BLD: 0 K/UL (ref 0–0.2)
BASOPHILS NFR BLD: 0 % (ref 0–2)
BILIRUB SERPL-MCNC: 0.5 MG/DL (ref 0.3–1.2)
BUN SERPL-MCNC: 10 MG/DL (ref 8–23)
CALCIUM SERPL-MCNC: 9.9 MG/DL (ref 8.6–10.4)
CHLORIDE SERPL-SCNC: 101 MMOL/L (ref 98–107)
CO2 SERPL-SCNC: 23 MMOL/L (ref 20–31)
CREAT SERPL-MCNC: 0.5 MG/DL (ref 0.5–0.9)
EOSINOPHIL # BLD: 0 K/UL (ref 0–0.4)
EOSINOPHILS RELATIVE PERCENT: 0 % (ref 0–4)
ERYTHROCYTE [DISTWIDTH] IN BLOOD BY AUTOMATED COUNT: 13.5 % (ref 11.5–14.9)
FLUAV RNA RESP QL NAA+PROBE: NOT DETECTED
FLUBV RNA RESP QL NAA+PROBE: NOT DETECTED
GFR, ESTIMATED: >90 ML/MIN/1.73M2
GLUCOSE SERPL-MCNC: 187 MG/DL (ref 70–99)
HCT VFR BLD AUTO: 49.8 % (ref 36–46)
HGB BLD-MCNC: 16.2 G/DL (ref 12–16)
INR PPP: 1
LYMPHOCYTES NFR BLD: 1.8 K/UL (ref 1–4.8)
LYMPHOCYTES RELATIVE PERCENT: 15 % (ref 24–44)
MAGNESIUM SERPL-MCNC: 1.5 MG/DL (ref 1.6–2.6)
MCH RBC QN AUTO: 29.8 PG (ref 26–34)
MCHC RBC AUTO-ENTMCNC: 32.6 G/DL (ref 31–37)
MCV RBC AUTO: 91.4 FL (ref 80–100)
MONOCYTES NFR BLD: 1 K/UL (ref 0.1–1.3)
MONOCYTES NFR BLD: 8 % (ref 1–7)
NEUTROPHILS NFR BLD: 77 % (ref 36–66)
NEUTS SEG NFR BLD: 9.1 K/UL (ref 1.3–9.1)
PARTIAL THROMBOPLASTIN TIME: 28.5 SEC (ref 24–36)
PLATELET # BLD AUTO: 179 K/UL (ref 150–450)
PMV BLD AUTO: 10.4 FL (ref 6–12)
POTASSIUM SERPL-SCNC: 3.6 MMOL/L (ref 3.7–5.3)
PROT SERPL-MCNC: 7.3 G/DL (ref 6.4–8.3)
PROTHROMBIN TIME: 13.5 SEC (ref 11.8–14.6)
RBC # BLD AUTO: 5.45 M/UL (ref 4–5.2)
SARS-COV-2 RNA RESP QL NAA+PROBE: NOT DETECTED
SODIUM SERPL-SCNC: 138 MMOL/L (ref 135–144)
SOURCE: NORMAL
SPECIMEN DESCRIPTION: NORMAL
SPECIMEN SOURCE: NORMAL
STREP A, MOLECULAR: NEGATIVE
T4 FREE SERPL-MCNC: 0.9 NG/DL (ref 0.9–1.7)
TROPONIN I SERPL HS-MCNC: 16 NG/L (ref 0–14)
TROPONIN I SERPL HS-MCNC: 16 NG/L (ref 0–14)
TSH SERPL DL<=0.05 MIU/L-ACNC: 7.94 UIU/ML (ref 0.3–5)
WBC OTHER # BLD: 11.9 K/UL (ref 3.5–11)

## 2024-05-30 PROCEDURE — 83735 ASSAY OF MAGNESIUM: CPT

## 2024-05-30 PROCEDURE — 2500000003 HC RX 250 WO HCPCS: Performed by: EMERGENCY MEDICINE

## 2024-05-30 PROCEDURE — 36415 COLL VENOUS BLD VENIPUNCTURE: CPT

## 2024-05-30 PROCEDURE — 84443 ASSAY THYROID STIM HORMONE: CPT

## 2024-05-30 PROCEDURE — 6370000000 HC RX 637 (ALT 250 FOR IP): Performed by: EMERGENCY MEDICINE

## 2024-05-30 PROCEDURE — 99285 EMERGENCY DEPT VISIT HI MDM: CPT

## 2024-05-30 PROCEDURE — 71045 X-RAY EXAM CHEST 1 VIEW: CPT

## 2024-05-30 PROCEDURE — 93005 ELECTROCARDIOGRAM TRACING: CPT | Performed by: EMERGENCY MEDICINE

## 2024-05-30 PROCEDURE — 84439 ASSAY OF FREE THYROXINE: CPT

## 2024-05-30 PROCEDURE — 87636 SARSCOV2 & INF A&B AMP PRB: CPT

## 2024-05-30 PROCEDURE — 80053 COMPREHEN METABOLIC PANEL: CPT

## 2024-05-30 PROCEDURE — 84484 ASSAY OF TROPONIN QUANT: CPT

## 2024-05-30 PROCEDURE — 74177 CT ABD & PELVIS W/CONTRAST: CPT

## 2024-05-30 PROCEDURE — 2580000003 HC RX 258: Performed by: EMERGENCY MEDICINE

## 2024-05-30 PROCEDURE — 96365 THER/PROPH/DIAG IV INF INIT: CPT

## 2024-05-30 PROCEDURE — 85025 COMPLETE CBC W/AUTO DIFF WBC: CPT

## 2024-05-30 PROCEDURE — 85610 PROTHROMBIN TIME: CPT

## 2024-05-30 PROCEDURE — 85730 THROMBOPLASTIN TIME PARTIAL: CPT

## 2024-05-30 PROCEDURE — 87651 STREP A DNA AMP PROBE: CPT

## 2024-05-30 PROCEDURE — 6360000004 HC RX CONTRAST MEDICATION: Performed by: EMERGENCY MEDICINE

## 2024-05-30 RX ORDER — SODIUM CHLORIDE 0.9 % (FLUSH) 0.9 %
10 SYRINGE (ML) INJECTION ONCE
Status: COMPLETED | OUTPATIENT
Start: 2024-05-30 | End: 2024-05-30

## 2024-05-30 RX ORDER — POTASSIUM CHLORIDE 20 MEQ/1
40 TABLET, EXTENDED RELEASE ORAL ONCE
Status: COMPLETED | OUTPATIENT
Start: 2024-05-30 | End: 2024-05-30

## 2024-05-30 RX ORDER — 0.9 % SODIUM CHLORIDE 0.9 %
100 INTRAVENOUS SOLUTION INTRAVENOUS ONCE
Status: COMPLETED | OUTPATIENT
Start: 2024-05-30 | End: 2024-05-30

## 2024-05-30 RX ORDER — 0.9 % SODIUM CHLORIDE 0.9 %
1000 INTRAVENOUS SOLUTION INTRAVENOUS ONCE
Status: COMPLETED | OUTPATIENT
Start: 2024-05-30 | End: 2024-05-30

## 2024-05-30 RX ORDER — MAGNESIUM SULFATE HEPTAHYDRATE 40 MG/ML
2000 INJECTION, SOLUTION INTRAVENOUS ONCE
Status: COMPLETED | OUTPATIENT
Start: 2024-05-30 | End: 2024-05-30

## 2024-05-30 RX ADMIN — SODIUM CHLORIDE, PRESERVATIVE FREE 10 ML: 5 INJECTION INTRAVENOUS at 05:18

## 2024-05-30 RX ADMIN — SODIUM CHLORIDE 100 ML: 9 INJECTION, SOLUTION INTRAVENOUS at 05:18

## 2024-05-30 RX ADMIN — POTASSIUM CHLORIDE 40 MEQ: 1500 TABLET, EXTENDED RELEASE ORAL at 04:41

## 2024-05-30 RX ADMIN — IOPAMIDOL 75 ML: 755 INJECTION, SOLUTION INTRAVENOUS at 05:18

## 2024-05-30 RX ADMIN — SODIUM CHLORIDE 1000 ML: 9 INJECTION, SOLUTION INTRAVENOUS at 04:54

## 2024-05-30 RX ADMIN — MAGNESIUM SULFATE HEPTAHYDRATE 2000 MG: 40 INJECTION, SOLUTION INTRAVENOUS at 04:50

## 2024-05-30 ASSESSMENT — ENCOUNTER SYMPTOMS
COUGH: 1
VOMITING: 0
ABDOMINAL PAIN: 0
RHINORRHEA: 1
NAUSEA: 0
SORE THROAT: 1
SHORTNESS OF BREATH: 1

## 2024-05-30 ASSESSMENT — LIFESTYLE VARIABLES
HOW OFTEN DO YOU HAVE A DRINK CONTAINING ALCOHOL: NEVER
HOW MANY STANDARD DRINKS CONTAINING ALCOHOL DO YOU HAVE ON A TYPICAL DAY: PATIENT DOES NOT DRINK

## 2024-05-30 ASSESSMENT — PAIN - FUNCTIONAL ASSESSMENT: PAIN_FUNCTIONAL_ASSESSMENT: NONE - DENIES PAIN

## 2024-05-30 NOTE — ED TRIAGE NOTES
Mode of arrival (squad #, walk in, police, etc) : Walk in        Chief complaint(s): Palpitations, pharyngitis, cough        Arrival Note (brief scenario, treatment PTA, etc).: Pt states she has had heart palpitations, pharyngitis, cough, nasal congestion, and shortness of breath x 1 week. Pt diaphoretic upon arrival. Pt A&Ox4.         C= \"Have you ever felt that you should Cut down on your drinking?\"  No  A= \"Have people Annoyed you by criticizing your drinking?\"  No  G= \"Have you ever felt bad or Guilty about your drinking?\"  No  E= \"Have you ever had a drink as an Eye-opener first thing in the morning to steady your nerves or to help a hangover?\"  No      Deferred []      Reason for deferring: N/A    *If yes to two or more: probable alcohol abuse.*

## 2024-05-30 NOTE — ED PROVIDER NOTES
21 15   Temp:       TempSrc:       SpO2: 96% 94% 96% 95%   Weight:       Height:         MEDICATIONS GIVEN TO PATIENT THIS ENCOUNTER:  Orders Placed This Encounter   Medications    magnesium sulfate 2000 mg in water 50 mL IVPB    potassium chloride (KLOR-CON M) extended release tablet 40 mEq    sodium chloride 0.9 % bolus 1,000 mL    sodium chloride 0.9 % bolus 100 mL    sodium chloride flush 0.9 % injection 10 mL    iopamidol (ISOVUE-370) 76 % injection 75 mL     DISCHARGE PRESCRIPTIONS:  New Prescriptions    No medications on file     PHYSICIAN CONSULTS ORDERED THIS ENCOUNTER:  None  FINAL IMPRESSION      1. Palpitations    2. Anxiety    3. Viral URI          DISPOSITION/PLAN   DISPOSITION Decision To Discharge 05/30/2024 06:10:43 AM      OUTPATIENT FOLLOW UP THE PATIENT:  Agnes Acharya MD  [address] [facility] [address]  [facility] [id number] [phone]    Schedule an appointment as soon as possible for a visit       Jason Cui MD  [address], [address]  [place] [address]  [phone]    Schedule an appointment as soon as possible for a visit       [facility] ED  [address] [place] [address]  [phone]  Go to   As needed, If symptoms worsen      DO Polina Braun Mansour Mohamed I, DO  05/30/24 0614

## 2024-05-30 NOTE — ED NOTES
The following labs labeled with pt sticker and tubed to lab:     [] COVID-19 swab & Flu  [] RSV  [x] Strep  [] Pelvic

## 2024-05-31 ENCOUNTER — TELEPHONE (OUTPATIENT)
Dept: FAMILY MEDICINE CLINIC | Age: 69
End: 2024-05-31

## 2024-05-31 LAB
EKG ATRIAL RATE: 100 BPM
EKG P AXIS: 70 DEGREES
EKG P-R INTERVAL: 144 MS
EKG Q-T INTERVAL: 352 MS
EKG QRS DURATION: 96 MS
EKG QTC CALCULATION (BAZETT): 454 MS
EKG R AXIS: -45 DEGREES
EKG T AXIS: 60 DEGREES
EKG VENTRICULAR RATE: 100 BPM

## 2024-05-31 NOTE — TELEPHONE ENCOUNTER
[facility] ED Follow up Call    Reason for ED visit:  Palpitations,Cough,Pharyngitis,     Hi Amelia , this is Tracey from Agnes Rankin's office, just calling to see how you are doing after your recent ED visit.    Did you receive discharge instructions?  Yes  Do you understand the discharge instructions? Yes  Did the ED give you any new prescriptions? No:   Were you able to fill your prescriptions? No:       Do you have one of our red, yellow and green  Zone sheets that help you to determine when you should go to the ED?Not Applicable      Do you need or want to make a follow up appt with your PCP?Yes    Do you have any further needs in the home i.e. Equipment?  No        FU appts/Provider:    Future Appointments   Date Time Provider Department Center   6/6/2024  2:00 PM [facility]   6/7/2024 11:00 AM [facility]   6/18/2024 10:50 AM Susan Aviles APRN - CNP [facility]   7/19/2024 11:15 AM [facility]   8/23/2024 10:20 AM Tremaine Rodriguez MD [facility]

## 2024-06-03 ENCOUNTER — TELEPHONE (OUTPATIENT)
Dept: FAMILY MEDICINE CLINIC | Age: 69
End: 2024-06-03

## 2024-06-03 DIAGNOSIS — E55.9 VITAMIN D DEFICIENCY: ICD-10-CM

## 2024-06-03 NOTE — TELEPHONE ENCOUNTER
Patient was recently in the ER and has been tested, the patient does not have any symptoms does not mean that she has the same virus.  She can keep her appointment for ER follow-up.

## 2024-06-03 NOTE — TELEPHONE ENCOUNTER
Pt called in stating her son has been diagnosed with meta pneumo virus, and was advised to call PCP to determine a plan of action. Pt is scheduled to be seen on 6/7/2024 for ED follow up.

## 2024-06-04 RX ORDER — ERGOCALCIFEROL 1.25 MG/1
50000 CAPSULE ORAL WEEKLY
Qty: 12 CAPSULE | Refills: 1 | Status: SHIPPED | OUTPATIENT
Start: 2024-06-04 | End: 2024-06-05 | Stop reason: SDUPTHER

## 2024-06-04 NOTE — TELEPHONE ENCOUNTER
Please Approve or Refuse.  Send to Pharmacy per Pt's Request: [facility]     Next Visit Date:  6/7/2024   Last Visit Date: 12/13/2023    Hemoglobin A1C (%)   Date Value   12/13/2023 7.0   03/15/2023 6.7   12/15/2022 8.0             ( goal A1C is < 7)   BP Readings from Last 3 Encounters:   05/30/24 (!) 148/67   05/21/24 122/73   04/17/24 139/74          (goal 120/80)  BUN   Date Value Ref Range Status   05/30/2024 10 8 - 23 mg/dL Final     Creatinine   Date Value Ref Range Status   05/30/2024 0.5 0.5 - 0.9 mg/dL Final     Potassium   Date Value Ref Range Status   05/30/2024 3.6 (L) 3.7 - 5.3 mmol/L Final

## 2024-06-05 ENCOUNTER — TELEPHONE (OUTPATIENT)
Dept: FAMILY MEDICINE CLINIC | Age: 69
End: 2024-06-05

## 2024-06-05 ENCOUNTER — TELEMEDICINE (OUTPATIENT)
Dept: FAMILY MEDICINE CLINIC | Age: 69
End: 2024-06-05
Payer: MEDICARE

## 2024-06-05 DIAGNOSIS — E55.9 VITAMIN D DEFICIENCY: ICD-10-CM

## 2024-06-05 DIAGNOSIS — Z79.4 TYPE 2 DIABETES MELLITUS WITH DIABETIC POLYNEUROPATHY, WITH LONG-TERM CURRENT USE OF INSULIN (HCC): Primary | ICD-10-CM

## 2024-06-05 DIAGNOSIS — E11.42 TYPE 2 DIABETES MELLITUS WITH DIABETIC POLYNEUROPATHY, WITH LONG-TERM CURRENT USE OF INSULIN (HCC): Primary | ICD-10-CM

## 2024-06-05 DIAGNOSIS — Z79.4 TYPE 2 DIABETES MELLITUS WITH DIABETIC POLYNEUROPATHY, WITH LONG-TERM CURRENT USE OF INSULIN (HCC): ICD-10-CM

## 2024-06-05 DIAGNOSIS — I50.32 CHRONIC DIASTOLIC CONGESTIVE HEART FAILURE (HCC): ICD-10-CM

## 2024-06-05 DIAGNOSIS — R71.8 ELEVATED HEMATOCRIT: ICD-10-CM

## 2024-06-05 DIAGNOSIS — B34.9 VIRAL ILLNESS: ICD-10-CM

## 2024-06-05 DIAGNOSIS — E83.42 HYPOMAGNESEMIA: ICD-10-CM

## 2024-06-05 DIAGNOSIS — D75.1 SECONDARY POLYCYTHEMIA: ICD-10-CM

## 2024-06-05 DIAGNOSIS — E11.42 TYPE 2 DIABETES MELLITUS WITH DIABETIC POLYNEUROPATHY, WITH LONG-TERM CURRENT USE OF INSULIN (HCC): ICD-10-CM

## 2024-06-05 DIAGNOSIS — R79.89 ELEVATED FERRITIN: ICD-10-CM

## 2024-06-05 PROCEDURE — G8428 CUR MEDS NOT DOCUMENT: HCPCS | Performed by: FAMILY MEDICINE

## 2024-06-05 PROCEDURE — 2022F DILAT RTA XM EVC RTNOPTHY: CPT | Performed by: FAMILY MEDICINE

## 2024-06-05 PROCEDURE — G8399 PT W/DXA RESULTS DOCUMENT: HCPCS | Performed by: FAMILY MEDICINE

## 2024-06-05 PROCEDURE — 1090F PRES/ABSN URINE INCON ASSESS: CPT | Performed by: FAMILY MEDICINE

## 2024-06-05 PROCEDURE — 3046F HEMOGLOBIN A1C LEVEL >9.0%: CPT | Performed by: FAMILY MEDICINE

## 2024-06-05 PROCEDURE — 1123F ACP DISCUSS/DSCN MKR DOCD: CPT | Performed by: FAMILY MEDICINE

## 2024-06-05 PROCEDURE — 3017F COLORECTAL CA SCREEN DOC REV: CPT | Performed by: FAMILY MEDICINE

## 2024-06-05 PROCEDURE — 99214 OFFICE O/P EST MOD 30 MIN: CPT | Performed by: FAMILY MEDICINE

## 2024-06-05 RX ORDER — GLIPIZIDE 10 MG/1
20 TABLET ORAL 2 TIMES DAILY
Qty: 360 TABLET | Refills: 3 | Status: SHIPPED | OUTPATIENT
Start: 2024-06-05

## 2024-06-05 RX ORDER — ERGOCALCIFEROL 1.25 MG/1
50000 CAPSULE ORAL WEEKLY
Qty: 12 CAPSULE | Refills: 1 | Status: SHIPPED | OUTPATIENT
Start: 2024-06-05

## 2024-06-05 ASSESSMENT — ENCOUNTER SYMPTOMS
VOMITING: 0
STRIDOR: 0
BLOOD IN STOOL: 0
BACK PAIN: 1
COUGH: 0
DIARRHEA: 0
SORE THROAT: 0
SHORTNESS OF BREATH: 0
EYE REDNESS: 0
COLOR CHANGE: 0
RHINORRHEA: 0
TROUBLE SWALLOWING: 0
RECTAL PAIN: 0
ABDOMINAL DISTENTION: 0
SINUS PRESSURE: 0
NAUSEA: 0
ABDOMINAL PAIN: 0
CONSTIPATION: 0
WHEEZING: 0
CHEST TIGHTNESS: 0

## 2024-06-05 NOTE — PROGRESS NOTES
Amelia Moseley, was evaluated through a synchronous (real-time) audio-video encounter. The patient (or guardian if applicable) is aware that this is a billable service, which includes applicable co-pays. This Virtual Visit was conducted with patient's (and/or legal guardian's) consent. Patient identification was verified, and a caregiver was present when appropriate.   The patient was located at Home: [address] [place] [id number]  Provider was located at Facility (Appt Dept): [address] [place] [phone]  Confirm you are appropriately licensed, registered, or certified to deliver care in the state where the patient is located as indicated above. If you are not or unsure, please re-schedule the visit: Yes, I confirm.     Amelia Moseley (:  1955) is a Established patient, presenting virtually for evaluation of the following:    Assessment & Plan   Below is the assessment and plan developed based on review of pertinent history, physical exam, labs, studies, and medications.  1. Type 2 diabetes mellitus with microalbuminuria, with long-term current use of insulin (HCC)  Worsening, blood sugars are running high, likely due to acute illness and patient is also stopped short acting insulin.  Discussed with patient to restart Tresiba 80 units recheck A1c restart fast acting insulin, refill Januvia.  Discussed to check with pharmacy about the medication list.  -     Hemoglobin A1C; Future  2. Hypomagnesemia  Slightly low restart on magnesium supplements.  -     magnesium citrate solution; Take 296 mLs by mouth once for 1 dose, Disp-296 mL, R-0Normal  3. Chronic diastolic congestive heart failure (HCC)  Stable no recent flareups, continue ACE inhibitors beta-blockers diuretics.  4. Elevated hematocrit  Hematocrit is persistently high check for JAK2 mutation.  -     Jak2 Gene Mutation, Quantitative; Future  5. Elevated ferritin  Check for JAK2 mutation follows with GI also  -

## 2024-06-05 NOTE — TELEPHONE ENCOUNTER
Incoming call came from Patient, in regard to having onset current symptoms of high blood sugar readings.  Around 300's been very high , watery itchy eyes. Sneezing , congested feeling  That have been ongoing for the past 4 days. Due to our availability of schedule, nothing is available today to offer telemedicine visit with provider.     Subjective: Caller states: \"high blood sugar readings      What has been tried: she has been taking    insulin lispro, 1 Unit Dial, (HUMALOG/ADMELOG) 100 UNIT/ML SOPN [[id number]]    Order Details  Dose, Route, Frequency: As Directed   Dispense Quantity: 27 mL Refills: 0          Sig: INJECT 18 UNITS UNDER THE SKIN THREE TIMES A DAY BEFORE MEALS         Start Date: 03/08/24 End Date: --   Written Date: 03/08/24 Expiration Date: 03/08/25         She stated that she has been taking this medication double units, she is confused on her diabetic medication.        Patient does have MY-CHART ACCESS     Recommended disposition:  she has been added to schedule.  Since opening was available.      Future Appointments   Date Time Provider Department Center   6/6/2024  2:15 PM Mark Meehan MD [facility]   6/7/2024 11:00 AM [facility]   6/18/2024 10:50 AM Susan Aviles APRN - CNP [facility]   7/19/2024 11:15 AM [facility]   8/23/2024 10:20 AM Tremaine Rodriguez MD [facility]

## 2024-06-05 NOTE — TELEPHONE ENCOUNTER
Please Approve or Refuse.  Send to Pharmacy per Pt's Request: PATIENT WILL NEED ALL MEDICATIONS SENT TO [facility]. CAN YOU RESEND MEDICATIONS TO CURRENT PHARMACY    PATIENT STATED HAS NOT HAD GLIPIZIDE IN 3 MONTHS     Next Visit Date:  6/7/2024   Last Visit Date: 6/5/2024    Hemoglobin A1C (%)   Date Value   12/13/2023 7.0   03/15/2023 6.7   12/15/2022 8.0             ( goal A1C is < 7)   BP Readings from Last 3 Encounters:   05/30/24 (!) 148/67   05/21/24 122/73   04/17/24 139/74          (goal 120/80)  BUN   Date Value Ref Range Status   05/30/2024 10 8 - 23 mg/dL Final     Creatinine   Date Value Ref Range Status   05/30/2024 0.5 0.5 - 0.9 mg/dL Final     Potassium   Date Value Ref Range Status   05/30/2024 3.6 (L) 3.7 - 5.3 mmol/L Final

## 2024-06-06 ENCOUNTER — HOSPITAL ENCOUNTER (OUTPATIENT)
Age: 69
Discharge: HOME | End: 2024-06-06
Payer: MEDICARE

## 2024-06-06 ENCOUNTER — TELEPHONE (OUTPATIENT)
Dept: GASTROENTEROLOGY | Age: 69
End: 2024-06-06

## 2024-06-06 ENCOUNTER — OFFICE VISIT (OUTPATIENT)
Dept: GASTROENTEROLOGY | Age: 69
End: 2024-06-06
Payer: MEDICARE

## 2024-06-06 VITALS — BODY MASS INDEX: 40.89 KG/M2 | HEIGHT: 62 IN | WEIGHT: 222.2 LBS | TEMPERATURE: 98.4 F | RESPIRATION RATE: 18 BRPM

## 2024-06-06 DIAGNOSIS — R79.89 ABNORMAL LFTS: ICD-10-CM

## 2024-06-06 DIAGNOSIS — R14.0 BLOATING: ICD-10-CM

## 2024-06-06 DIAGNOSIS — K29.70 GASTRITIS WITHOUT BLEEDING, UNSPECIFIED CHRONICITY, UNSPECIFIED GASTRITIS TYPE: ICD-10-CM

## 2024-06-06 DIAGNOSIS — R19.4 ALTERED BOWEL HABITS: ICD-10-CM

## 2024-06-06 DIAGNOSIS — D36.9 TUBULAR ADENOMA: Primary | ICD-10-CM

## 2024-06-06 LAB
ALBUMIN SERPL-MCNC: 3.9 G/DL (ref 3.5–5.2)
ALP SERPL-CCNC: 125 U/L (ref 35–104)
ALT SERPL-CCNC: 23 U/L (ref 5–33)
AST SERPL-CCNC: 15 U/L
BILIRUB DIRECT SERPL-MCNC: 0.2 MG/DL
BILIRUB INDIRECT SERPL-MCNC: 0.4 MG/DL (ref 0–1)
BILIRUB SERPL-MCNC: 0.6 MG/DL (ref 0.3–1.2)
PROT SERPL-MCNC: 7.3 G/DL (ref 6.4–8.3)

## 2024-06-06 PROCEDURE — G8399 PT W/DXA RESULTS DOCUMENT: HCPCS | Performed by: INTERNAL MEDICINE

## 2024-06-06 PROCEDURE — G8427 DOCREV CUR MEDS BY ELIG CLIN: HCPCS | Performed by: INTERNAL MEDICINE

## 2024-06-06 PROCEDURE — G2211 COMPLEX E/M VISIT ADD ON: HCPCS | Performed by: INTERNAL MEDICINE

## 2024-06-06 PROCEDURE — 1123F ACP DISCUSS/DSCN MKR DOCD: CPT | Performed by: INTERNAL MEDICINE

## 2024-06-06 PROCEDURE — 80076 HEPATIC FUNCTION PANEL: CPT

## 2024-06-06 PROCEDURE — 36415 COLL VENOUS BLD VENIPUNCTURE: CPT

## 2024-06-06 PROCEDURE — G8417 CALC BMI ABV UP PARAM F/U: HCPCS | Performed by: INTERNAL MEDICINE

## 2024-06-06 PROCEDURE — 1090F PRES/ABSN URINE INCON ASSESS: CPT | Performed by: INTERNAL MEDICINE

## 2024-06-06 PROCEDURE — 3017F COLORECTAL CA SCREEN DOC REV: CPT | Performed by: INTERNAL MEDICINE

## 2024-06-06 PROCEDURE — 99214 OFFICE O/P EST MOD 30 MIN: CPT | Performed by: INTERNAL MEDICINE

## 2024-06-06 PROCEDURE — 1036F TOBACCO NON-USER: CPT | Performed by: INTERNAL MEDICINE

## 2024-06-06 RX ORDER — POLYETHYLENE GLYCOL 3350 17 G/17G
POWDER, FOR SOLUTION ORAL
Qty: 1530 G | Refills: 1 | Status: SHIPPED | OUTPATIENT
Start: 2024-06-06

## 2024-06-06 RX ORDER — BISACODYL 5 MG/1
10 TABLET, DELAYED RELEASE ORAL
Qty: 90 TABLET | Refills: 0 | Status: SHIPPED | OUTPATIENT
Start: 2024-06-06 | End: 2024-09-04

## 2024-06-06 ASSESSMENT — ENCOUNTER SYMPTOMS
CONSTIPATION: 1
NAUSEA: 1
VOICE CHANGE: 0
ABDOMINAL DISTENTION: 1
DIARRHEA: 1
WHEEZING: 0
COUGH: 0
RECTAL PAIN: 0
SORE THROAT: 0
COLOR CHANGE: 0
BLOOD IN STOOL: 0
TROUBLE SWALLOWING: 0
CHOKING: 0
ABDOMINAL PAIN: 1
ANAL BLEEDING: 0
VOMITING: 0

## 2024-06-06 NOTE — PROGRESS NOTES
capful with 1 glass of water/liquid.  -     bisacodyl 5 MG EC tablet; Take 2 tablets by mouth every 48 hours             RTC:3 months    Additional comments:          Thank you for allowing me to participate in the care of Ms. Moseley. For any further questions please do not hesitate to contact me.      I have reviewed and agree with the MA/LPN ROS please refer to their documentation from today's encounter on a separate note.     Mark Meehan MD  Gastroenterology & Hepatology  Office #: [phone]          this note is created with the assistance of a speech recognition program.  While intending to generate a document that actually reflects the content of the visit, the document can still have some errors including those of syntax and sound a like substitutions which may escape proof reading.  It such instances, actual meaning can be extrapolated by contextual diversion.

## 2024-06-06 NOTE — TELEPHONE ENCOUNTER
Pt saw Shefali in clinic. Pt states she does not take blood thinners.     Procedure scheduled/Dr Meehan  Procedure: Colonoscopy (Diagnostic)  Dx: Tubular adenoma  Date: Friday 08/09/24  Time: 11:30 am/Arrive at 9:30 am  Hospital: [facility]: Surgery Entrance, back of hospital  PAT Phone Call: Friday 07/26/24 at 8:45 am  Bowel Prep instructions given: SuPrep Bowel Prep  In office/via phone: in office  Clearance needed: N/A    Pt informed they will receive a PAT Phone Call from a [facility] PAT Nurse 1-2 weeks prior to procedure. Pt informed they must complete PAT Call or procedure may be cancelled.

## 2024-06-07 ENCOUNTER — HOSPITAL ENCOUNTER (OUTPATIENT)
Age: 69
End: 2024-06-07
Payer: MEDICARE

## 2024-06-07 ENCOUNTER — OFFICE VISIT (OUTPATIENT)
Dept: FAMILY MEDICINE CLINIC | Age: 69
End: 2024-06-07
Payer: MEDICARE

## 2024-06-07 ENCOUNTER — HOSPITAL ENCOUNTER (OUTPATIENT)
Dept: GENERAL RADIOLOGY | Age: 69
End: 2024-06-07
Payer: MEDICARE

## 2024-06-07 ENCOUNTER — HOSPITAL ENCOUNTER (OUTPATIENT)
Age: 69
Discharge: HOME | End: 2024-06-07
Payer: MEDICARE

## 2024-06-07 VITALS
WEIGHT: 220 LBS | BODY MASS INDEX: 40.48 KG/M2 | DIASTOLIC BLOOD PRESSURE: 84 MMHG | HEIGHT: 62 IN | TEMPERATURE: 97.8 F | HEART RATE: 83 BPM | OXYGEN SATURATION: 98 % | SYSTOLIC BLOOD PRESSURE: 126 MMHG

## 2024-06-07 DIAGNOSIS — R06.02 SOB (SHORTNESS OF BREATH): ICD-10-CM

## 2024-06-07 DIAGNOSIS — R94.31 ABNORMAL EKG: ICD-10-CM

## 2024-06-07 DIAGNOSIS — D25.2 INTRAMURAL AND SUBSEROUS LEIOMYOMA OF UTERUS: ICD-10-CM

## 2024-06-07 DIAGNOSIS — D25.1 INTRAMURAL AND SUBSEROUS LEIOMYOMA OF UTERUS: ICD-10-CM

## 2024-06-07 DIAGNOSIS — N95.0 POSTMENOPAUSAL BLEEDING: ICD-10-CM

## 2024-06-07 DIAGNOSIS — D17.0: ICD-10-CM

## 2024-06-07 DIAGNOSIS — R93.89 INCREASED ENDOMETRIAL STRIPE THICKNESS: Primary | ICD-10-CM

## 2024-06-07 PROCEDURE — 1036F TOBACCO NON-USER: CPT | Performed by: FAMILY MEDICINE

## 2024-06-07 PROCEDURE — 71046 X-RAY EXAM CHEST 2 VIEWS: CPT

## 2024-06-07 PROCEDURE — G8417 CALC BMI ABV UP PARAM F/U: HCPCS | Performed by: FAMILY MEDICINE

## 2024-06-07 PROCEDURE — 99214 OFFICE O/P EST MOD 30 MIN: CPT | Performed by: FAMILY MEDICINE

## 2024-06-07 PROCEDURE — G8427 DOCREV CUR MEDS BY ELIG CLIN: HCPCS | Performed by: FAMILY MEDICINE

## 2024-06-07 PROCEDURE — 1090F PRES/ABSN URINE INCON ASSESS: CPT | Performed by: FAMILY MEDICINE

## 2024-06-07 PROCEDURE — 3074F SYST BP LT 130 MM HG: CPT | Performed by: FAMILY MEDICINE

## 2024-06-07 PROCEDURE — 3017F COLORECTAL CA SCREEN DOC REV: CPT | Performed by: FAMILY MEDICINE

## 2024-06-07 PROCEDURE — 83880 ASSAY OF NATRIURETIC PEPTIDE: CPT

## 2024-06-07 PROCEDURE — G8399 PT W/DXA RESULTS DOCUMENT: HCPCS | Performed by: FAMILY MEDICINE

## 2024-06-07 PROCEDURE — 36415 COLL VENOUS BLD VENIPUNCTURE: CPT

## 2024-06-07 PROCEDURE — 3079F DIAST BP 80-89 MM HG: CPT | Performed by: FAMILY MEDICINE

## 2024-06-07 PROCEDURE — 1123F ACP DISCUSS/DSCN MKR DOCD: CPT | Performed by: FAMILY MEDICINE

## 2024-06-07 RX ORDER — SODIUM, POTASSIUM,MAG SULFATES 17.5-3.13G
SOLUTION, RECONSTITUTED, ORAL ORAL
Qty: 1 EACH | Refills: 0 | Status: SHIPPED | OUTPATIENT
Start: 2024-06-07

## 2024-06-07 ASSESSMENT — ENCOUNTER SYMPTOMS
DIARRHEA: 0
VOMITING: 0
COUGH: 0
BACK PAIN: 1
RHINORRHEA: 0
ABDOMINAL DISTENTION: 0
SHORTNESS OF BREATH: 1
STRIDOR: 0
SORE THROAT: 0
WHEEZING: 0
CONSTIPATION: 0
COLOR CHANGE: 0
EYE REDNESS: 0
SINUS PRESSURE: 0
ABDOMINAL PAIN: 0
TROUBLE SWALLOWING: 0
RECTAL PAIN: 0
CHEST TIGHTNESS: 0
BLOOD IN STOOL: 0
NAUSEA: 0

## 2024-06-07 ASSESSMENT — PATIENT HEALTH QUESTIONNAIRE - PHQ9
8. MOVING OR SPEAKING SO SLOWLY THAT OTHER PEOPLE COULD HAVE NOTICED. OR THE OPPOSITE, BEING SO FIGETY OR RESTLESS THAT YOU HAVE BEEN MOVING AROUND A LOT MORE THAN USUAL: NOT AT ALL
7. TROUBLE CONCENTRATING ON THINGS, SUCH AS READING THE NEWSPAPER OR WATCHING TELEVISION: NOT AT ALL
SUM OF ALL RESPONSES TO PHQ QUESTIONS 1-9: 0
10. IF YOU CHECKED OFF ANY PROBLEMS, HOW DIFFICULT HAVE THESE PROBLEMS MADE IT FOR YOU TO DO YOUR WORK, TAKE CARE OF THINGS AT HOME, OR GET ALONG WITH OTHER PEOPLE: NOT DIFFICULT AT ALL
SUM OF ALL RESPONSES TO PHQ9 QUESTIONS 1 & 2: 0
5. POOR APPETITE OR OVEREATING: NOT AT ALL
SUM OF ALL RESPONSES TO PHQ QUESTIONS 1-9: 0
1. LITTLE INTEREST OR PLEASURE IN DOING THINGS: NOT AT ALL
4. FEELING TIRED OR HAVING LITTLE ENERGY: NOT AT ALL
6. FEELING BAD ABOUT YOURSELF - OR THAT YOU ARE A FAILURE OR HAVE LET YOURSELF OR YOUR FAMILY DOWN: NOT AT ALL
SUM OF ALL RESPONSES TO PHQ QUESTIONS 1-9: 0
3. TROUBLE FALLING OR STAYING ASLEEP: NOT AT ALL
2. FEELING DOWN, DEPRESSED OR HOPELESS: NOT AT ALL
9. THOUGHTS THAT YOU WOULD BE BETTER OFF DEAD, OR OF HURTING YOURSELF: NOT AT ALL
SUM OF ALL RESPONSES TO PHQ QUESTIONS 1-9: 0

## 2024-06-07 NOTE — PROGRESS NOTES
Chief Complaint   Patient presents with    ED Follow-up    Palpitations    Other     Seen GI, concern about her ovary, liver infection     Mass     Eyes    Shortness of Breath         Amelia Moseley  here today for follow up on chronic medical problems, go over labs and/or diagnostic studies, and medication refills. ED Follow-up, Palpitations, Other (Seen GI, concern about her ovary, liver infection ), Mass (Eyes), and Shortness of Breath      HPI: Patient is scheduled for ED follow-up she was seen 2 days before with high blood sugar readings, started on Januvia reports her blood sugar has improved.    Patient has CT abdomen done that showed multiple uterine fibroids which patient already has since last few years.  Patient is currently postmenopausal, she was seen by GI yesterday and is planning for colonoscopy.    CT abdomen   No acute intra-abdominal or pelvic abnormality.  2. Status post cholecystectomy.  3. Fibroid uterus.  4. Thickening of the endometrial canal up to 10 mm.  Please refer to prior  ultrasound reports for further details.      Patient reports she is concerned about the CT results which GI doctor discussed, patient has a history of increased endometrial thickening, which is present since last 3 years, she did had ultrasound of the pelvis done last year.  She was referred to gynecologist reports she was seen but she did not recommend any biopsy.  Patient reports she does not think she has any problems with the uterus.  Patient reports she does not want to see the same gynecologist she would like to see different gynecologist.  Patient denies any postmenopausal bleeding since last 1 year.    Uterine fibroids, present since last few years, denies any bleeding or any pain.        Patient is complaining of shortness of breath, reports she has been using the inhalers from his  and that is helping.  Patient did had a cough episode which has improved.  She does not have any underlying COPD, she

## 2024-06-07 NOTE — PROGRESS NOTES
Visit Information    Have you changed or started any medications since your last visit including any over-the-counter medicines, vitamins, or herbal medicines? no   Are you having any side effects from any of your medications? -  no  Have you stopped taking any of your medications? Is so, why? -  no    Have you seen any other physician or provider since your last visit? No  Have you had any other diagnostic tests since your last visit? No  Have you been seen in the emergency room and/or had an admission to a hospital since we last saw you? No  Have you had your routine dental cleaning in the past 6 months? no    Have you activated your [employer] account? If not, what are your barriers? Yes     Patient Care Team:  Agnes Acharya MD as PCP - General (Family Medicine)  Agnes Acharya MD as PCP - Empaneled Provider  Alexandria Sandoval DO as Consulting Physician (Obstetrics & Gynecology)  Gemma Key MD as Consulting Physician (Endocrinology)  Mathew Pandey MD as Consulting Physician (Gastroenterology)    Medical History Review  Past Medical, Family, and Social History reviewed and does contribute to the patient presenting condition    Health Maintenance   Topic Date Due    Shingles vaccine (1 of 2) Never done    Pneumococcal 65+ years Vaccine (2 of 2 - PCV) 09/18/2013    Diabetic retinal exam  03/07/2017    Respiratory Syncytial Virus (RSV) Pregnant or age 60 yrs+ (1 - 1-dose 60+ series) Never done    COVID-19 Vaccine (4 - 2023-24 season) 09/01/2023    Annual Wellness Visit (Medicare Advantage)  01/01/2024    Lipids  02/16/2024    Colorectal Cancer Screen  02/25/2024    Diabetic Alb to Cr ratio (uACR) test  07/13/2024    Flu vaccine (Season Ended) 08/01/2024    Breast cancer screen  09/08/2024    A1C test (Diabetic or Prediabetic)  12/13/2024    Depression Monitoring  12/13/2024    Diabetic foot exam  02/19/2025    GFR test (Diabetes, CKD 3-4, OR last GFR 15-59)  05/30/2025    DTaP/Tdap/Td

## 2024-06-11 ENCOUNTER — TELEPHONE (OUTPATIENT)
Dept: GASTROENTEROLOGY | Age: 69
End: 2024-06-11

## 2024-06-11 NOTE — TELEPHONE ENCOUNTER
Patient called requesting a call back in regards to getting antibiotics ordered for a liver infection. Please advise.

## 2024-06-12 ENCOUNTER — HOSPITAL ENCOUNTER (OUTPATIENT)
Dept: ULTRASOUND IMAGING | Age: 69
Discharge: HOME | End: 2024-06-14
Payer: MEDICARE

## 2024-06-12 DIAGNOSIS — R93.89 INCREASED ENDOMETRIAL STRIPE THICKNESS: ICD-10-CM

## 2024-06-12 DIAGNOSIS — N95.0 POSTMENOPAUSAL BLEEDING: ICD-10-CM

## 2024-06-12 DIAGNOSIS — D25.2 INTRAMURAL AND SUBSEROUS LEIOMYOMA OF UTERUS: ICD-10-CM

## 2024-06-12 DIAGNOSIS — D25.1 INTRAMURAL AND SUBSEROUS LEIOMYOMA OF UTERUS: ICD-10-CM

## 2024-06-12 PROCEDURE — 76830 TRANSVAGINAL US NON-OB: CPT

## 2024-06-12 RX ORDER — PANTOPRAZOLE SODIUM 40 MG/1
80 TABLET, DELAYED RELEASE ORAL
Qty: 60 TABLET | Refills: 0 | Status: SHIPPED | OUTPATIENT
Start: 2024-06-12

## 2024-06-12 NOTE — TELEPHONE ENCOUNTER
Please Approve or Refuse.  Send to Pharmacy per Pt's Request:      Next Visit Date:  7/19/2024   Last Visit Date: 6/7/2024    Hemoglobin A1C (%)   Date Value   12/13/2023 7.0   03/15/2023 6.7   12/15/2022 8.0             ( goal A1C is < 7)   BP Readings from Last 3 Encounters:   06/07/24 126/84   05/30/24 (!) 148/67   05/21/24 122/73          (goal 120/80)  BUN   Date Value Ref Range Status   05/30/2024 10 8 - 23 mg/dL Final     Creatinine   Date Value Ref Range Status   05/30/2024 0.5 0.5 - 0.9 mg/dL Final     Potassium   Date Value Ref Range Status   05/30/2024 3.6 (L) 3.7 - 5.3 mmol/L Final

## 2024-06-15 NOTE — RESULT ENCOUNTER NOTE
Please notify patient: Ultrasound pelvis shows fibroid 3.1 cm thickened endometrium for her age, she needs endometrial biopsy,    We need to refer her to GYN, does she have any preference?  If not I can refer her to GYN upstairs Dr. Sandoval, please let me know      Future Appointments  6/17/2024  8:45 AM    [facility] NM SPECT/CT            [facility] N[facility] MED        [facility] Radiolog  6/18/2024  10:50 AM   Susan Aviles APRN * [facility]           [facility]  7/19/2024  11:15 AM   Agnes Acharya MD          sc               [facility]  7/26/2024  8:45 AM    [facility] PAT RM 5              [facility] PAT            [facility]  8/23/2024  10:20 AM   Tremaine Rodriguez MD       [facility]           [facility]  8/26/2024  3:00 PM    Judy Valencia APRN - * [facility]         [facility]  9/19/2024  2:00 PM    [facility]         [facility]

## 2024-06-17 ENCOUNTER — HOSPITAL ENCOUNTER (OUTPATIENT)
Dept: NUCLEAR MEDICINE | Age: 69
Discharge: HOME | End: 2024-06-19
Attending: INTERNAL MEDICINE
Payer: MEDICARE

## 2024-06-17 DIAGNOSIS — R14.0 BLOATING: ICD-10-CM

## 2024-06-17 PROCEDURE — A9541 TC99M SULFUR COLLOID: HCPCS | Performed by: INTERNAL MEDICINE

## 2024-06-17 PROCEDURE — 78264 GASTRIC EMPTYING IMG STUDY: CPT

## 2024-06-17 PROCEDURE — 3430000000 HC RX DIAGNOSTIC RADIOPHARMACEUTICAL: Performed by: INTERNAL MEDICINE

## 2024-06-17 RX ADMIN — Medication 1 MILLICURIE: at 08:45

## 2024-06-18 DIAGNOSIS — I34.1 MVP (MITRAL VALVE PROLAPSE): ICD-10-CM

## 2024-06-18 DIAGNOSIS — I10 ESSENTIAL HYPERTENSION: ICD-10-CM

## 2024-06-18 NOTE — TELEPHONE ENCOUNTER
Please Approve or Refuse.  Send to Pharmacy per Pt's Request: [facility]     Next Visit Date:  7/19/2024   Last Visit Date: 6/7/2024    Hemoglobin A1C (%)   Date Value   12/13/2023 7.0   03/15/2023 6.7   12/15/2022 8.0             ( goal A1C is < 7)   BP Readings from Last 3 Encounters:   06/07/24 126/84   05/30/24 (!) 148/67   05/21/24 122/73          (goal 120/80)  BUN   Date Value Ref Range Status   05/30/2024 10 8 - 23 mg/dL Final     Creatinine   Date Value Ref Range Status   05/30/2024 0.5 0.5 - 0.9 mg/dL Final     Potassium   Date Value Ref Range Status   05/30/2024 3.6 (L) 3.7 - 5.3 mmol/L Final

## 2024-06-21 DIAGNOSIS — R79.89 ABNORMAL LFTS: Primary | ICD-10-CM

## 2024-06-24 DIAGNOSIS — R14.0 BLOATING: ICD-10-CM

## 2024-06-24 DIAGNOSIS — R19.4 ALTERED BOWEL HABITS: Primary | ICD-10-CM

## 2024-06-25 ENCOUNTER — OFFICE VISIT (OUTPATIENT)
Dept: UROLOGY | Age: 69
End: 2024-06-25
Payer: MEDICARE

## 2024-06-25 VITALS
TEMPERATURE: 97.7 F | DIASTOLIC BLOOD PRESSURE: 78 MMHG | HEIGHT: 62 IN | BODY MASS INDEX: 40.48 KG/M2 | WEIGHT: 220 LBS | OXYGEN SATURATION: 96 % | HEART RATE: 77 BPM | SYSTOLIC BLOOD PRESSURE: 126 MMHG

## 2024-06-25 DIAGNOSIS — N39.46 MIXED STRESS AND URGE URINARY INCONTINENCE: Primary | ICD-10-CM

## 2024-06-25 PROCEDURE — 3078F DIAST BP <80 MM HG: CPT | Performed by: NURSE PRACTITIONER

## 2024-06-25 PROCEDURE — 3074F SYST BP LT 130 MM HG: CPT | Performed by: NURSE PRACTITIONER

## 2024-06-25 PROCEDURE — 0509F URINE INCON PLAN DOCD: CPT | Performed by: NURSE PRACTITIONER

## 2024-06-25 PROCEDURE — 1036F TOBACCO NON-USER: CPT | Performed by: NURSE PRACTITIONER

## 2024-06-25 PROCEDURE — G8417 CALC BMI ABV UP PARAM F/U: HCPCS | Performed by: NURSE PRACTITIONER

## 2024-06-25 PROCEDURE — G8427 DOCREV CUR MEDS BY ELIG CLIN: HCPCS | Performed by: NURSE PRACTITIONER

## 2024-06-25 PROCEDURE — 1090F PRES/ABSN URINE INCON ASSESS: CPT | Performed by: NURSE PRACTITIONER

## 2024-06-25 PROCEDURE — 1123F ACP DISCUSS/DSCN MKR DOCD: CPT | Performed by: NURSE PRACTITIONER

## 2024-06-25 PROCEDURE — 3017F COLORECTAL CA SCREEN DOC REV: CPT | Performed by: NURSE PRACTITIONER

## 2024-06-25 PROCEDURE — G8399 PT W/DXA RESULTS DOCUMENT: HCPCS | Performed by: NURSE PRACTITIONER

## 2024-06-25 PROCEDURE — 99213 OFFICE O/P EST LOW 20 MIN: CPT | Performed by: NURSE PRACTITIONER

## 2024-06-25 ASSESSMENT — ENCOUNTER SYMPTOMS
NAUSEA: 0
BACK PAIN: 1
EYE REDNESS: 0
EYES NEGATIVE: 1
SHORTNESS OF BREATH: 0
ABDOMINAL PAIN: 1
ALLERGIC/IMMUNOLOGIC NEGATIVE: 1
RESPIRATORY NEGATIVE: 1
EYE PAIN: 0
VOMITING: 0
COUGH: 0
WHEEZING: 0

## 2024-06-25 NOTE — PROGRESS NOTES
Review of Systems   Constitutional: Negative.  Negative for activity change, chills and fever.   HENT: Negative.     Eyes: Negative.  Negative for pain, redness and visual disturbance.   Respiratory: Negative.  Negative for cough, shortness of breath and wheezing.    Cardiovascular: Negative.  Negative for chest pain and leg swelling.   Gastrointestinal:  Positive for abdominal pain. Negative for nausea and vomiting.   Endocrine: Negative.    Genitourinary:  Positive for frequency. Negative for difficulty urinating, dysuria, enuresis, flank pain, hematuria and urgency.   Musculoskeletal:  Positive for back pain. Negative for joint swelling and myalgias.   Skin: Negative.  Negative for rash and wound.   Allergic/Immunologic: Negative.    Neurological:  Positive for dizziness. Negative for tremors and numbness.   Hematological: Negative.  Does not bruise/bleed easily.   Psychiatric/Behavioral: Negative.       
inject 80 units subcutaneously nightly 27 mL 1    Continuous Glucose Sensor (DEXCOM G6 SENSOR) MISC USE AS DIRECTED 3 each 3    lisinopril (PRINIVIL;ZESTRIL) 2.5 MG tablet take 1 tablet by mouth once daily 90 tablet 0    atorvastatin (LIPITOR) 80 MG tablet take 1 tablet by mouth once daily 90 tablet 0    potassium chloride (KLOR-CON M) 10 MEQ extended release tablet take 1 tablet by mouth once daily 90 tablet 0    DROPLET PEN NEEDLES 31G X 8 MM MISC use 1 PEN NEEDLE to inject MEDICATION subcutaneously four times a day 100 each 3    Continuous Blood Gluc Transmit (DEXCOM G6 TRANSMITTER) MISC USE AS DIRECTED 1 each 0    furosemide (LASIX) 20 MG tablet take 1 tablet by mouth once daily 270 tablet 1    levothyroxine (SYNTHROID) 150 MCG tablet take 1 tablet EVERY WEEK ON SUNDAYS 120 tablet 3    insulin lispro, 1 Unit Dial, (HUMALOG/ADMELOG) 100 UNIT/ML SOPN INJECT 18 UNITS UNDER THE SKIN THREE TIMES A DAY BEFORE MEALS 27 mL 0    DULoxetine (CYMBALTA) 60 MG extended release capsule Take 1 capsule by mouth daily 90 capsule 3    dapagliflozin (FARXIGA) 10 MG tablet Take by mouth      Continuous Blood Gluc Transmit (DEXCOM G6 TRANSMITTER) MISC Use for checking blood sugars 10 each 1    tiZANidine (ZANAFLEX) 4 MG tablet Take 1 tablet by mouth nightly as needed (muscle spasms) 30 tablet 0    aspirin (ASPIRIN LOW DOSE) 81 MG EC tablet Take 1 tablet by mouth daily 90 tablet 0    MYRBETRIQ 50 MG TB24 Take 50 mg by mouth daily 30 tablet 11    nystatin (MYCOSTATIN) 958292 UNIT/GM powder Apply 3 times daily. 1 each 0    ketoconazole (NIZORAL) 2 % cream Apply topically  2 times a day. 1 each 1    lidocaine (LIDODERM) 5 % Place 1 patch onto the skin daily 12 hours on, 12 hours off. 10 patch 0      (All medications reviewed and updated by provider sincelast office visit or hospitalization)   Cefuroxime axetil; Dilaudid [hydromorphone]; Metformin and related; Sulfa antibiotics; Amoxicillin; Antihistamines, loratadine-type; Egg-derived

## 2024-06-27 ENCOUNTER — TELEPHONE (OUTPATIENT)
Dept: SURGERY | Age: 69
End: 2024-06-27

## 2024-06-27 NOTE — TELEPHONE ENCOUNTER
Jazmín called in with a question about if she would need to fast for her XR. She mentioned something about having to drink something prior to doing the imaging. After looking into this further, writer noted that she is undergoing a repeat colonoscopy on 08/09/24. Writer stated that more than likely she would need to be on a clear liquid diet, due to having to do a colonoscopy. This was not related to XR.    She also mentioned that around 4 A.M. she went to the bathroom, where she did have a bowel movement. She thought she was done, and after a few moments went by, she went back to the bathroom. She mentioned that she's been having a lot of gas and diarrhea. She reported that \"something came out that was the size of her hand.\" She did not look at it, so she was unable to describe what it looked like. She's feeling very gassy. She is not actively taking Miralax.     Walter Cerda., MA

## 2024-06-28 ENCOUNTER — HOSPITAL ENCOUNTER (OUTPATIENT)
Dept: GENERAL RADIOLOGY | Age: 69
End: 2024-06-28
Payer: MEDICARE

## 2024-06-28 ENCOUNTER — HOSPITAL ENCOUNTER (OUTPATIENT)
Age: 69
End: 2024-06-28
Payer: MEDICARE

## 2024-06-28 DIAGNOSIS — R14.0 BLOATING: ICD-10-CM

## 2024-06-28 DIAGNOSIS — R19.4 ALTERED BOWEL HABITS: ICD-10-CM

## 2024-06-28 PROCEDURE — 74019 RADEX ABDOMEN 2 VIEWS: CPT

## 2024-07-01 DIAGNOSIS — M47.27 LUMBOSACRAL RADICULOPATHY DUE TO DEGENERATIVE JOINT DISEASE OF SPINE: ICD-10-CM

## 2024-07-01 RX ORDER — TIZANIDINE 4 MG/1
4 TABLET ORAL NIGHTLY PRN
Qty: 30 TABLET | Refills: 0 | Status: SHIPPED | OUTPATIENT
Start: 2024-07-01

## 2024-07-01 NOTE — TELEPHONE ENCOUNTER
Please Approve or Refuse.  Send to Pharmacy per Pt's Request: [facility]      Next Visit Date:  7/19/2024   Last Visit Date: 6/7/2024    Hemoglobin A1C (%)   Date Value   12/13/2023 7.0   03/15/2023 6.7   12/15/2022 8.0             ( goal A1C is < 7)   BP Readings from Last 3 Encounters:   06/25/24 126/78   06/07/24 126/84   05/30/24 (!) 148/67          (goal 120/80)  BUN   Date Value Ref Range Status   05/30/2024 10 8 - 23 mg/dL Final     Creatinine   Date Value Ref Range Status   05/30/2024 0.5 0.5 - 0.9 mg/dL Final     Potassium   Date Value Ref Range Status   05/30/2024 3.6 (L) 3.7 - 5.3 mmol/L Final

## 2024-07-16 SDOH — HEALTH STABILITY: PHYSICAL HEALTH: ON AVERAGE, HOW MANY MINUTES DO YOU ENGAGE IN EXERCISE AT THIS LEVEL?: 20 MIN

## 2024-07-16 SDOH — HEALTH STABILITY: PHYSICAL HEALTH: ON AVERAGE, HOW MANY DAYS PER WEEK DO YOU ENGAGE IN MODERATE TO STRENUOUS EXERCISE (LIKE A BRISK WALK)?: 7 DAYS

## 2024-07-16 ASSESSMENT — PATIENT HEALTH QUESTIONNAIRE - PHQ9
SUM OF ALL RESPONSES TO PHQ QUESTIONS 1-9: 0
4. FEELING TIRED OR HAVING LITTLE ENERGY: NOT AT ALL
SUM OF ALL RESPONSES TO PHQ QUESTIONS 1-9: 0
9. THOUGHTS THAT YOU WOULD BE BETTER OFF DEAD, OR OF HURTING YOURSELF: NOT AT ALL
2. FEELING DOWN, DEPRESSED OR HOPELESS: NOT AT ALL
1. LITTLE INTEREST OR PLEASURE IN DOING THINGS: NOT AT ALL
6. FEELING BAD ABOUT YOURSELF - OR THAT YOU ARE A FAILURE OR HAVE LET YOURSELF OR YOUR FAMILY DOWN: NOT AT ALL
8. MOVING OR SPEAKING SO SLOWLY THAT OTHER PEOPLE COULD HAVE NOTICED. OR THE OPPOSITE, BEING SO FIGETY OR RESTLESS THAT YOU HAVE BEEN MOVING AROUND A LOT MORE THAN USUAL: NOT AT ALL
SUM OF ALL RESPONSES TO PHQ QUESTIONS 1-9: 0
SUM OF ALL RESPONSES TO PHQ QUESTIONS 1-9: 0
10. IF YOU CHECKED OFF ANY PROBLEMS, HOW DIFFICULT HAVE THESE PROBLEMS MADE IT FOR YOU TO DO YOUR WORK, TAKE CARE OF THINGS AT HOME, OR GET ALONG WITH OTHER PEOPLE: NOT DIFFICULT AT ALL
SUM OF ALL RESPONSES TO PHQ9 QUESTIONS 1 & 2: 0
3. TROUBLE FALLING OR STAYING ASLEEP: NOT AT ALL
7. TROUBLE CONCENTRATING ON THINGS, SUCH AS READING THE NEWSPAPER OR WATCHING TELEVISION: NOT AT ALL
5. POOR APPETITE OR OVEREATING: NOT AT ALL

## 2024-07-16 ASSESSMENT — LIFESTYLE VARIABLES
HOW MANY STANDARD DRINKS CONTAINING ALCOHOL DO YOU HAVE ON A TYPICAL DAY: 1
HOW OFTEN DO YOU HAVE A DRINK CONTAINING ALCOHOL: 2
HOW OFTEN DO YOU HAVE A DRINK CONTAINING ALCOHOL: MONTHLY OR LESS
HOW OFTEN DO YOU HAVE SIX OR MORE DRINKS ON ONE OCCASION: 1
HOW MANY STANDARD DRINKS CONTAINING ALCOHOL DO YOU HAVE ON A TYPICAL DAY: 1 OR 2

## 2024-07-19 ENCOUNTER — OFFICE VISIT (OUTPATIENT)
Dept: FAMILY MEDICINE CLINIC | Age: 69
End: 2024-07-19
Payer: MEDICARE

## 2024-07-19 VITALS
DIASTOLIC BLOOD PRESSURE: 84 MMHG | HEART RATE: 67 BPM | WEIGHT: 227.8 LBS | HEIGHT: 62 IN | SYSTOLIC BLOOD PRESSURE: 132 MMHG | TEMPERATURE: 97.8 F | BODY MASS INDEX: 41.92 KG/M2 | OXYGEN SATURATION: 93 %

## 2024-07-19 DIAGNOSIS — Z71.89 ACP (ADVANCE CARE PLANNING): ICD-10-CM

## 2024-07-19 DIAGNOSIS — E55.9 VITAMIN D DEFICIENCY: ICD-10-CM

## 2024-07-19 DIAGNOSIS — Z00.00 INITIAL MEDICARE ANNUAL WELLNESS VISIT: Primary | ICD-10-CM

## 2024-07-19 DIAGNOSIS — Z79.4 TYPE 2 DIABETES MELLITUS WITH DIABETIC POLYNEUROPATHY, WITH LONG-TERM CURRENT USE OF INSULIN (HCC): ICD-10-CM

## 2024-07-19 DIAGNOSIS — E03.9 ACQUIRED HYPOTHYROIDISM: ICD-10-CM

## 2024-07-19 DIAGNOSIS — Z12.31 SCREENING MAMMOGRAM FOR HIGH-RISK PATIENT: ICD-10-CM

## 2024-07-19 DIAGNOSIS — E11.42 TYPE 2 DIABETES MELLITUS WITH DIABETIC POLYNEUROPATHY, WITH LONG-TERM CURRENT USE OF INSULIN (HCC): ICD-10-CM

## 2024-07-19 DIAGNOSIS — E78.2 MIXED HYPERLIPIDEMIA: ICD-10-CM

## 2024-07-19 LAB — HBA1C MFR BLD: 7.6 %

## 2024-07-19 PROCEDURE — 3017F COLORECTAL CA SCREEN DOC REV: CPT | Performed by: FAMILY MEDICINE

## 2024-07-19 PROCEDURE — G0438 PPPS, INITIAL VISIT: HCPCS | Performed by: FAMILY MEDICINE

## 2024-07-19 PROCEDURE — 3079F DIAST BP 80-89 MM HG: CPT | Performed by: FAMILY MEDICINE

## 2024-07-19 PROCEDURE — 3075F SYST BP GE 130 - 139MM HG: CPT | Performed by: FAMILY MEDICINE

## 2024-07-19 PROCEDURE — 1123F ACP DISCUSS/DSCN MKR DOCD: CPT | Performed by: FAMILY MEDICINE

## 2024-07-19 PROCEDURE — 3051F HG A1C>EQUAL 7.0%<8.0%: CPT | Performed by: FAMILY MEDICINE

## 2024-07-19 PROCEDURE — 83036 HEMOGLOBIN GLYCOSYLATED A1C: CPT | Performed by: FAMILY MEDICINE

## 2024-07-19 RX ORDER — PROCHLORPERAZINE 25 MG/1
SUPPOSITORY RECTAL
Qty: 1 EACH | Refills: 0 | Status: SHIPPED | OUTPATIENT
Start: 2024-07-19

## 2024-07-19 RX ORDER — PROCHLORPERAZINE 25 MG/1
SUPPOSITORY RECTAL
Qty: 3 EACH | Refills: 3 | Status: SHIPPED | OUTPATIENT
Start: 2024-07-19

## 2024-07-19 SDOH — ECONOMIC STABILITY: FOOD INSECURITY: WITHIN THE PAST 12 MONTHS, YOU WORRIED THAT YOUR FOOD WOULD RUN OUT BEFORE YOU GOT MONEY TO BUY MORE.: NEVER TRUE

## 2024-07-19 SDOH — ECONOMIC STABILITY: INCOME INSECURITY: HOW HARD IS IT FOR YOU TO PAY FOR THE VERY BASICS LIKE FOOD, HOUSING, MEDICAL CARE, AND HEATING?: NOT HARD AT ALL

## 2024-07-19 SDOH — ECONOMIC STABILITY: FOOD INSECURITY: WITHIN THE PAST 12 MONTHS, THE FOOD YOU BOUGHT JUST DIDN'T LAST AND YOU DIDN'T HAVE MONEY TO GET MORE.: NEVER TRUE

## 2024-07-19 NOTE — PROGRESS NOTES
Visit Information    Have you changed or started any medications since your last visit including any over-the-counter medicines, vitamins, or herbal medicines? no   Have you stopped taking any of your medications? Is so, why? -  no  Are you having any side effects from any of your medications? - no    Have you seen any other physician or provider since your last visit?  no   Have you had any other diagnostic tests since your last visit?  no   Have you been seen in the emergency room and/or had an admission in a hospital since we last saw you?  no   Have you had your routine dental cleaning in the past 6 months?  no     Do you have an active MyChart account? If no, what is the barrier?  Yes    Patient Care Team:  Agnes Acharya MD as PCP - General (Family Medicine)  Agnes Acharya MD as PCP - Empaneled Provider  Alexandria Sandoval DO as Consulting Physician (Obstetrics & Gynecology)  Gemma Key MD as Consulting Physician (Endocrinology)  Mathew Pandey MD as Consulting Physician (Gastroenterology)    Medical History Review  Past Medical, Family, and Social History reviewed and does contribute to the patient presenting condition    Health Maintenance   Topic Date Due    Shingles vaccine (1 of 2) Never done    Pneumococcal 65+ years Vaccine (2 of 2 - PCV) 09/18/2013    Diabetic retinal exam  03/07/2017    Respiratory Syncytial Virus (RSV) Pregnant or age 60 yrs+ (1 - 1-dose 60+ series) Never done    COVID-19 Vaccine (4 - 2023-24 season) 09/01/2023    Annual Wellness Visit (Medicare Advantage)  01/01/2024    Lipids  02/16/2024    Colorectal Cancer Screen  02/25/2024    Diabetic Alb to Cr ratio (uACR) test  07/13/2024    Flu vaccine (1) 08/01/2024    Breast cancer screen  09/08/2024    A1C test (Diabetic or Prediabetic)  12/13/2024    Diabetic foot exam  02/19/2025    GFR test (Diabetes, CKD 3-4, OR last GFR 15-59)  05/30/2025    Depression Monitoring  07/16/2025    DTaP/Tdap/Td vaccine (3 - 
choices for care and treatment preferences in case of a health event that adversely affects decision-making abilities or is life-limiting. Recommended the patient document care preferences in state-specific advance directives. Also reviewed the process of designating a trusted capable adult as an Agent (or Health Care Power of ) to make health care decisions for the patient if the patient becomes unable due to incapacity. Patient was asked to complete advance directive forms, if not already done, and to provide a dated, signed and witnessed (or notarized) copy, per the forms' requirements, to the practice office.    Time spent (minutes): 20 minutes        CV Risk Counseling:  Patient was asked about her current diet and exercise habits, and personalized advice was provided regarding recommended lifestyle changes. Patient's individual cardiovascular disease risk factors, including advanced age (> 55 for men, > 65 for women), diabetes mellitus, dyslipidemia, and hypertension, were discussed, as well as the likely benefits of lifestyle changes. Based upon patient's motivation to change her behavior, the following plan was agreed upon to work toward lowering cardiovascular disease risk: increase physical activity, as tolerated.  Aspirin use for primary prevention of cardiovascular disease for men 45-79 and women 55-79: Indicated- start daily aspirin. Educational materials for lifestyle changes were provided. Patient will follow-up in 3 month(s) with PCP. Provider spent 5 minutes counseling patient.            Objective   Vision Screening    Right eye Left eye Both eyes   Without correction 20/40 20/40 20/40   With correction         Vitals:    07/19/24 1103   BP: 132/84   Pulse: 67   Temp: 97.8 °F (36.6 °C)   SpO2: 93%   Weight: 103.3 kg (227 lb 12.8 oz)   Height: 1.575 m (5' 2.01\")      Body mass index is 41.65 kg/m².                    Allergies   Allergen Reactions    Cefuroxime Axetil Anaphylaxis    Dilaudid

## 2024-07-26 ENCOUNTER — HOSPITAL ENCOUNTER (OUTPATIENT)
Dept: PREADMISSION TESTING | Age: 69
Discharge: HOME | End: 2024-07-30

## 2024-07-26 VITALS — BODY MASS INDEX: 41.41 KG/M2 | HEIGHT: 62 IN | WEIGHT: 225 LBS

## 2024-07-28 DIAGNOSIS — M47.27 LUMBOSACRAL RADICULOPATHY DUE TO DEGENERATIVE JOINT DISEASE OF SPINE: ICD-10-CM

## 2024-07-29 RX ORDER — TIZANIDINE 4 MG/1
TABLET ORAL
Qty: 30 TABLET | Refills: 3 | Status: SHIPPED | OUTPATIENT
Start: 2024-07-29

## 2024-08-07 NOTE — PRE-PROCEDURE INSTRUCTIONS
Have you received your Prep? Any questions with prep instructions? Y  Only Clear Liquid Diet day before.Y  Nothing to eat after midnight day before procedure.Y  Are you taking any blood thinners? If so, you need to Stop. DIDN'T STOP ASA, INSTRUCTED TO CALL OFFICE  Remove any jewelry and body piercings.Y  Do you wear glasses? If so, please bring a case to store them in.  Are you having any Covid symptoms?N  Do you have any new rashes, infections, etc. that we should be aware of?N  Do you have a ride home the day of surgery? It cannot be a cab or medical transportation.Y  Verify surgery time/date and what time to arrive at hospital.[id number]

## 2024-08-08 ENCOUNTER — ANESTHESIA EVENT (OUTPATIENT)
Dept: ENDOSCOPY | Age: 69
End: 2024-08-08
Payer: MEDICARE

## 2024-08-08 DIAGNOSIS — I50.32 CHRONIC DIASTOLIC CONGESTIVE HEART FAILURE (HCC): Primary | ICD-10-CM

## 2024-08-08 RX ORDER — FUROSEMIDE 20 MG/1
20 TABLET ORAL DAILY
Qty: 270 TABLET | Refills: 1 | Status: SHIPPED | OUTPATIENT
Start: 2024-08-08

## 2024-08-08 NOTE — TELEPHONE ENCOUNTER
Please Approve or Refuse.  Send to Pharmacy per Pt's Request:      Next Visit Date:  10/18/2024   Last Visit Date: 7/19/2024    Hemoglobin A1C (%)   Date Value   07/19/2024 7.6   12/13/2023 7.0   03/15/2023 6.7             ( goal A1C is < 7)   BP Readings from Last 3 Encounters:   07/19/24 132/84   06/25/24 126/78   06/07/24 126/84          (goal 120/80)  BUN   Date Value Ref Range Status   05/30/2024 10 8 - 23 mg/dL Final     Creatinine   Date Value Ref Range Status   05/30/2024 0.5 0.5 - 0.9 mg/dL Final     Potassium   Date Value Ref Range Status   05/30/2024 3.6 (L) 3.7 - 5.3 mmol/L Final

## 2024-08-09 ENCOUNTER — HOSPITAL ENCOUNTER (OUTPATIENT)
Age: 69
Setting detail: OUTPATIENT SURGERY
Discharge: HOME | End: 2024-08-09
Attending: INTERNAL MEDICINE | Admitting: INTERNAL MEDICINE
Payer: MEDICARE

## 2024-08-09 ENCOUNTER — ANESTHESIA (OUTPATIENT)
Dept: ENDOSCOPY | Age: 69
End: 2024-08-09
Payer: MEDICARE

## 2024-08-09 VITALS
DIASTOLIC BLOOD PRESSURE: 92 MMHG | WEIGHT: 225 LBS | TEMPERATURE: 97.5 F | HEIGHT: 62 IN | HEART RATE: 69 BPM | SYSTOLIC BLOOD PRESSURE: 150 MMHG | RESPIRATION RATE: 15 BRPM | BODY MASS INDEX: 41.41 KG/M2 | OXYGEN SATURATION: 94 %

## 2024-08-09 DIAGNOSIS — D12.6 TUBULAR ADENOMA OF COLON: ICD-10-CM

## 2024-08-09 LAB — GLUCOSE BLD-MCNC: 98 MG/DL (ref 65–105)

## 2024-08-09 PROCEDURE — 2709999900 HC NON-CHARGEABLE SUPPLY: Performed by: INTERNAL MEDICINE

## 2024-08-09 PROCEDURE — 7100000010 HC PHASE II RECOVERY - FIRST 15 MIN: Performed by: INTERNAL MEDICINE

## 2024-08-09 PROCEDURE — 3700000000 HC ANESTHESIA ATTENDED CARE: Performed by: INTERNAL MEDICINE

## 2024-08-09 PROCEDURE — 88305 TISSUE EXAM BY PATHOLOGIST: CPT

## 2024-08-09 PROCEDURE — 2580000003 HC RX 258: Performed by: ANESTHESIOLOGY

## 2024-08-09 PROCEDURE — 2500000003 HC RX 250 WO HCPCS: Performed by: NURSE ANESTHETIST, CERTIFIED REGISTERED

## 2024-08-09 PROCEDURE — 82947 ASSAY GLUCOSE BLOOD QUANT: CPT

## 2024-08-09 PROCEDURE — 6360000002 HC RX W HCPCS: Performed by: NURSE ANESTHETIST, CERTIFIED REGISTERED

## 2024-08-09 PROCEDURE — 3700000001 HC ADD 15 MINUTES (ANESTHESIA): Performed by: INTERNAL MEDICINE

## 2024-08-09 PROCEDURE — 7100000011 HC PHASE II RECOVERY - ADDTL 15 MIN: Performed by: INTERNAL MEDICINE

## 2024-08-09 PROCEDURE — 3609010600 HC COLONOSCOPY POLYPECTOMY SNARE/COLD BIOPSY: Performed by: INTERNAL MEDICINE

## 2024-08-09 RX ORDER — SODIUM CHLORIDE 9 MG/ML
INJECTION, SOLUTION INTRAVENOUS PRN
Status: DISCONTINUED | OUTPATIENT
Start: 2024-08-09 | End: 2024-08-09 | Stop reason: HOSPADM

## 2024-08-09 RX ORDER — SODIUM CHLORIDE 9 MG/ML
INJECTION, SOLUTION INTRAVENOUS CONTINUOUS
Status: DISCONTINUED | OUTPATIENT
Start: 2024-08-09 | End: 2024-08-09 | Stop reason: HOSPADM

## 2024-08-09 RX ORDER — PROPOFOL 10 MG/ML
INJECTION, EMULSION INTRAVENOUS PRN
Status: DISCONTINUED | OUTPATIENT
Start: 2024-08-09 | End: 2024-08-09 | Stop reason: SDUPTHER

## 2024-08-09 RX ORDER — LIDOCAINE HYDROCHLORIDE 10 MG/ML
1 INJECTION, SOLUTION EPIDURAL; INFILTRATION; INTRACAUDAL; PERINEURAL
Status: DISCONTINUED | OUTPATIENT
Start: 2024-08-09 | End: 2024-08-09 | Stop reason: HOSPADM

## 2024-08-09 RX ORDER — SODIUM CHLORIDE 0.9 % (FLUSH) 0.9 %
5-40 SYRINGE (ML) INJECTION EVERY 12 HOURS SCHEDULED
Status: DISCONTINUED | OUTPATIENT
Start: 2024-08-09 | End: 2024-08-09 | Stop reason: HOSPADM

## 2024-08-09 RX ORDER — SODIUM CHLORIDE 0.9 % (FLUSH) 0.9 %
5-40 SYRINGE (ML) INJECTION PRN
Status: DISCONTINUED | OUTPATIENT
Start: 2024-08-09 | End: 2024-08-09 | Stop reason: HOSPADM

## 2024-08-09 RX ORDER — LIDOCAINE HYDROCHLORIDE 10 MG/ML
INJECTION, SOLUTION INFILTRATION; PERINEURAL PRN
Status: DISCONTINUED | OUTPATIENT
Start: 2024-08-09 | End: 2024-08-09 | Stop reason: SDUPTHER

## 2024-08-09 RX ADMIN — PROPOFOL 150 MCG/KG/MIN: 10 INJECTION, EMULSION INTRAVENOUS at 10:57

## 2024-08-09 RX ADMIN — SODIUM CHLORIDE: 9 INJECTION, SOLUTION INTRAVENOUS at 10:06

## 2024-08-09 RX ADMIN — LIDOCAINE HYDROCHLORIDE 40 MG: 10 INJECTION, SOLUTION INFILTRATION; PERINEURAL at 10:56

## 2024-08-09 RX ADMIN — PROPOFOL 100 MG: 10 INJECTION, EMULSION INTRAVENOUS at 10:56

## 2024-08-09 ASSESSMENT — ENCOUNTER SYMPTOMS
COUGH: 0
SHORTNESS OF BREATH: 1
SHORTNESS OF BREATH: 0
BACK PAIN: 1
SORE THROAT: 0
TROUBLE SWALLOWING: 0

## 2024-08-09 ASSESSMENT — PAIN - FUNCTIONAL ASSESSMENT
PAIN_FUNCTIONAL_ASSESSMENT: ADULT NONVERBAL PAIN SCALE (NPVS)
PAIN_FUNCTIONAL_ASSESSMENT: 0-10

## 2024-08-09 NOTE — ANESTHESIA POSTPROCEDURE EVALUATION
Department of Anesthesiology  Postprocedure Note    Patient: Amelia Moseley  MRN: [id number]  YOB: 1955  Date of evaluation: 8/9/2024    Procedure Summary       Date: 08/09/24 Room / Location: [place] [address] / [facility]    Anesthesia Start: 1052 Anesthesia Stop: 1133    Procedure: COLONOSCOPY DIAGNOSTIC Diagnosis:       Tubular adenoma of colon      (Tubular adenoma of colon [D12.6])    Surgeons: Mark Meehan MD Responsible Provider: Wang Agustin MD    Anesthesia Type: general, TIVA ASA Status: 3            Anesthesia Type: No value filed.    Gogo Phase I: Gogo Score: 5    Gogo Phase II: Gogo Score: 10    Anesthesia Post Evaluation    Comments: POST- ANESTHESIA EVALUATION       Pt Name: Amelia Moseley  MRN: [id number]  YOB: 1955  Date of evaluation: 8/9/2024  Time:  12:47 PM      BP (!) 150/92   Pulse 69   Temp 97.5 °F (36.4 °C)   Resp 15   Ht 1.575 m (5' 2.01\")   Wt 102.1 kg (225 lb)   LMP 01/12/2011   SpO2 94%   BMI 41.14 kg/m²      Consciousness Level  Awake  Cardiopulmonary Status  Stable  Pain Adequately Treated YES  Nausea / Vomiting  NO  Adequate Hydration  YES  Anesthesia Related Complications NONE      Electronically signed by Wang Agustin MD on 8/9/2024 at 12:47 PM           No notable events documented.

## 2024-08-09 NOTE — OP NOTE
Colonoscopy report    Colonoscopy Procedure Note    Procedure:  Colonoscopy with polypectomy with snare    Procedure Date: 8/9/2024    Indications: History of polyps, altered bowel habits (constipation dominant)    Sedation:  MAC    Attending Physician:  Dr. Mark Meehan MD.     Assistant Physician: None    Consent:   Informed consent was obtained for the procedure after explaining the risks including bleeding, perforation, aspiration, arrhythmia, risks related to sedation, reaction to medications and rarely death, benefits and alternatives to the patient. The patient verbalized understanding and agreed to proceed with the procedure.       Procedure Details:  The patient was placed in the left lateral decubitus position.  Oxygen and cardiac monitoring equipment was attached. The patient's vital signs were monitored continuously  throughout the procedure. After appropriate sedation was achieved, a rectal examination was performed.  The pediatric colonoscopy was inserted into the rectum and advanced under direct vision to the cecum which was identified by ileocecal valve and appendiceal orifice.  The quality of the colonic preparation was fair.  A careful inspection was made as the colonoscope was withdrawn, including a retroflexed view of the rectum; findings and interventions are described below.  Appropriate photodocumentation was obtained.           Complications:  None    Estimated blood loss:  Minimal           Disposition:  Home           Condition: stable    Withdrawal Time: 11 minutes    Findings:   The bowel prep was fair.  Multiple small and large mouth diverticula noted throughout the colon.  The colon was extremely redundant, successful intubation to cecum achieved with abdominal pressure.  3 mm polyp noted in the ascending colon and 3 mm polyp noted in the sigmoid colon removed with cold snare.  Retroflexion examination in the rectum with moderate to large internal hemorrhoids.    Specimen Removed:  Ascending colon polyp, sigmoid colon polyp    Endoscopic Impression:    Fair prep  Severe diverticulosis.  Redundant colon.  2 polyps (1 sigmoid, 1 ascending) both 3 mm, removed with cold snare.  Moderate to large internal hemorrhoids    Recommendations:  Follow pathology results.  Repeat colonoscopy for screening/surveillance in 5 years.  High-fiber diet.  Continue MiraLAX 1 capful daily.  Dulcolax 1 tablet every other day.  Low FODMAP diet    Attending Attestation: I performed the procedure.    Mark Meehan MD

## 2024-08-09 NOTE — DISCHARGE INSTRUCTIONS
Sedation or General Anesthesia, Adult  Care After  Refer to this sheet in the next 24 hours. These instructions provide you with information on caring for yourself after your procedure. Your caregiver may also give you more specific instructions. Your treatment has been planned according to current medical practices, but problems sometimes occur. Call your caregiver if you have any problems or questions after your procedure.   HOME CARE INSTRUCTIONS   Do not participate in any activities that require you to be alert or coordinated. Do not:  Drive.  Swim.  Ride a bicycle.  Operate heavy machinery.  Cook.  Use power tools.  Climb ladders.  Work at heights.  Take a bath.  Do not drink alcohol.  Do not make any important decisions or sign legal documents.  Stay with an adult.  The first meal following your procedure should be light and small. Avoid solid foods if you feel sick to your stomach (nauseous) or if you throw up (vomit).  Drink enough fluids to keep your urine clear or pale yellow.  Only take your usual medicines or new medicines if your caregiver approves them.  Only take over-the-counter or prescription medicines for pain, discomfort, or fever as directed by your caregiver.  Keep all follow-up appointments as directed by your caregiver.  SEEK IMMEDIATE MEDICAL CARE IF:   You are not feeling normal or behaving normally after 24 hours.  You have persistent nausea and vomiting.  You are unable to drink fluids or eat food.  You have difficulty urinating.  You have difficulty breathing or speaking.  You have blue or gray skin.  There is difficulty waking or you cannot be woken up.  You have heavy bleeding, redness, or a lot of swelling where the sedative or anesthesia entered your skin (intravenous site).  You have a rash.  MAKE SURE YOU:  Understand these instructions.  Will watch your condition.  Will get help right away if you are not doing well or get worse.  Document Released: 12/18/2006 Document Revised:  appointments, and call your doctor if you are having problems. It's also a good idea to know your test results and keep a list of the medicines you take.  How can you care for yourself at home?  Drink plenty of fluids. If you have kidney, heart, or liver disease and have to limit fluids, talk with your doctor before you increase the amount of fluids you drink.  Include high-fiber foods in your diet each day. These include fruits, vegetables, beans, and whole grains.  Get at least 30 minutes of exercise on most days of the week. Walking is a good choice. You also may want to do other activities, such as running, swimming, cycling, or playing tennis or team sports.  Take a fiber supplement, such as Citrucel or Metamucil, every day. Read and follow all instructions on the label.  Schedule time each day for a bowel movement. A daily routine may help. Take your time having a bowel movement, but don't sit for more than 10 minutes at a time. And don't strain too much.  Support your feet with a small step stool when you sit on the toilet. This helps flex your hips and places your pelvis in a squatting position.  Your doctor may recommend an over-the-counter laxative to relieve your constipation. Examples are Milk of Magnesia and MiraLax. Read and follow all instructions on the label. Do not use laxatives on a long-term basis.  When should you call for help?   Call your doctor now or seek immediate medical care if:    You have new or worse belly pain.     You have new or worse nausea or vomiting.     You have blood in your stools.   Watch closely for changes in your health, and be sure to contact your doctor if:    Your constipation is getting worse.     You do not get better as expected.   Where can you learn more?  Go to https://www.[employer].net/patientEd and enter P343 to learn more about \"Constipation: Care Instructions.\"  Current as of: October 19, 2023  Content Version: 14.1  © 0615-9744 [employer].

## 2024-08-09 NOTE — H&P
reactive to light.   Neck:      Trachea: No tracheal deviation.   Cardiovascular:      Rate and Rhythm: Normal rate and regular rhythm.      Heart sounds: Normal heart sounds. No murmur heard.     No friction rub. No gallop.   Pulmonary:      Effort: Pulmonary effort is normal. No respiratory distress.      Breath sounds: Normal breath sounds. No wheezing, rhonchi or rales.   Abdominal:      General: Bowel sounds are normal. There is no distension.      Palpations: Abdomen is soft.      Tenderness: There is abdominal tenderness (generalized). There is no guarding.   Musculoskeletal:      Cervical back: Neck supple.   Skin:     General: Skin is warm and dry.      Coloration: Skin is not jaundiced.      Findings: No bruising, erythema or rash.   Neurological:      General: No focal deficit present.      Mental Status: She is alert and oriented to person, place, and time.      Cranial Nerves: No cranial nerve deficit.      Gait: Gait normal.   Psychiatric:         Mood and Affect: Mood normal.        PROVISIONAL DIAGNOSES / SURGERY:      COLONOSCOPY DIAGNOSTIC    Pre-Op Diagnosis Codes:     * Tubular adenoma of colon [D12.6]     Patient Active Problem List    Diagnosis Date Noted    Postmenopausal bleeding 03/15/2023    Elevated alkaline phosphatase level 12/15/2022    Vitamin D deficiency 12/15/2022    Liver disease 12/15/2022    Elevated ferritin 12/15/2022    Intramural and subserous leiomyoma of uterus 06/07/2024    Lipoma of eyelid 06/07/2024    Abnormal EKG 06/07/2024    Hypomagnesemia 06/05/2024    Elevated hematocrit 06/05/2024    Secondary polycythemia 06/05/2024    History of adenomatous polyp of colon 06/30/2023    Type 2 diabetes mellitus with microalbuminuria, with long-term current use of insulin ([facility]) 06/05/2023    Lumbosacral radiculopathy due to degenerative joint disease of spine 12/01/2021    Adenomatous polyp of colon 11/04/2021    Duodenal diverticulum 11/04/2021    Polyp of ascending colon

## 2024-08-09 NOTE — ANESTHESIA PRE PROCEDURE
03:45 AM    GLUCOSE 94 01/23/2012 05:44 PM    CALCIUM 9.9 05/30/2024 03:45 AM    BILITOT 0.6 06/06/2024 03:49 PM    ALKPHOS 125 06/06/2024 03:49 PM    AST 15 06/06/2024 03:49 PM    ALT 23 06/06/2024 03:49 PM       POC Tests: No results for input(s): \"POCGLU\", \"POCNA\", \"POCK\", \"POCCL\", \"POCBUN\", \"POCHEMO\", \"POCHCT\" in the last 72 hours.    Coags:   Lab Results   Component Value Date/Time    PROTIME 13.5 05/30/2024 03:45 AM    INR 1.0 05/30/2024 03:45 AM    APTT 28.5 05/30/2024 03:45 AM       HCG (If Applicable):   Lab Results   Component Value Date    PREGTESTUR NEGATIVE 01/23/2012        ABGs: No results found for: \"PHART\", \"PO2ART\", \"HNW1XUZ\", \"HMI5ZNL\", \"BEART\", \"L1BMALLQ\"     Type & Screen (If Applicable):  No results found for: \"LABABO\"    Drug/Infectious Status (If Applicable):  Lab Results   Component Value Date/Time    HEPCAB NONREACTIVE 05/17/2022 09:36 AM       COVID-19 Screening (If Applicable):   Lab Results   Component Value Date/Time    COVID19 Not Detected 05/30/2024 03:52 AM           Anesthesia Evaluation  Patient summary reviewed and Nursing notes reviewed   history of anesthetic complications:   Airway: Mallampati: III  TM distance: >3 FB   Neck ROM: full  Mouth opening: > = 3 FB   Dental:          Pulmonary:normal exam  breath sounds clear to auscultation  (+)   shortness of breath:                                    Cardiovascular:    (+) hypertension:, CAD:, CHF:      ECG reviewed  Rhythm: regular  Rate: normal  Echocardiogram reviewed         Beta Blocker:  Dose within 24 Hrs         Neuro/Psych:   (+) neuromuscular disease:, headaches:, psychiatric history:            GI/Hepatic/Renal:   (+) GERD:, PUD, liver disease:          Endo/Other:    (+) DiabetesType II DM, hypothyroidism, hyperthyroidism::..                 Abdominal:             Vascular: negative vascular ROS.         Other Findings: With fillings      Anesthesia Plan      general and TIVA     ASA 3       Induction:

## 2024-08-10 DIAGNOSIS — I10 ESSENTIAL HYPERTENSION: Primary | ICD-10-CM

## 2024-08-10 DIAGNOSIS — M47.27 LUMBOSACRAL RADICULOPATHY DUE TO DEGENERATIVE JOINT DISEASE OF SPINE: ICD-10-CM

## 2024-08-10 DIAGNOSIS — E11.42 TYPE 2 DIABETES MELLITUS WITH DIABETIC POLYNEUROPATHY, WITH LONG-TERM CURRENT USE OF INSULIN (HCC): ICD-10-CM

## 2024-08-10 DIAGNOSIS — E11.65 UNCONTROLLED TYPE 2 DIABETES MELLITUS WITH HYPERGLYCEMIA (HCC): ICD-10-CM

## 2024-08-10 DIAGNOSIS — Z79.4 TYPE 2 DIABETES MELLITUS WITH DIABETIC POLYNEUROPATHY, WITH LONG-TERM CURRENT USE OF INSULIN (HCC): ICD-10-CM

## 2024-08-12 RX ORDER — GLIPIZIDE 10 MG/1
20 TABLET ORAL 2 TIMES DAILY
Qty: 360 TABLET | Refills: 3 | Status: SHIPPED | OUTPATIENT
Start: 2024-08-12

## 2024-08-12 RX ORDER — INSULIN DEGLUDEC 200 U/ML
INJECTION, SOLUTION SUBCUTANEOUS
Qty: 27 ML | Refills: 3 | Status: SHIPPED | OUTPATIENT
Start: 2024-08-12

## 2024-08-12 RX ORDER — POTASSIUM CHLORIDE 750 MG/1
10 TABLET, EXTENDED RELEASE ORAL DAILY
Qty: 90 TABLET | Refills: 3 | Status: SHIPPED | OUTPATIENT
Start: 2024-08-12

## 2024-08-12 RX ORDER — TIZANIDINE 4 MG/1
4 TABLET ORAL EVERY 8 HOURS PRN
Qty: 30 TABLET | Refills: 3 | Status: SHIPPED | OUTPATIENT
Start: 2024-08-12

## 2024-08-12 RX ORDER — LISINOPRIL 2.5 MG/1
2.5 TABLET ORAL DAILY
Qty: 90 TABLET | Refills: 3 | Status: SHIPPED | OUTPATIENT
Start: 2024-08-12

## 2024-08-13 DIAGNOSIS — E78.2 MIXED HYPERLIPIDEMIA: Primary | ICD-10-CM

## 2024-08-13 DIAGNOSIS — E03.9 ACQUIRED HYPOTHYROIDISM: ICD-10-CM

## 2024-08-13 LAB — SURGICAL PATHOLOGY REPORT: NORMAL

## 2024-08-13 RX ORDER — LEVOTHYROXINE SODIUM 0.15 MG/1
150 TABLET ORAL DAILY
Qty: 120 TABLET | Refills: 3 | Status: SHIPPED | OUTPATIENT
Start: 2024-08-13

## 2024-08-13 RX ORDER — ATORVASTATIN CALCIUM 80 MG/1
80 TABLET, FILM COATED ORAL DAILY
Qty: 90 TABLET | Refills: 3 | Status: SHIPPED | OUTPATIENT
Start: 2024-08-13

## 2024-08-20 LAB
ESTIMATED AVERAGE GLUCOSE: NORMAL
HBA1C MFR BLD: 8.8 %

## 2024-09-03 DIAGNOSIS — E11.42 DIABETIC POLYNEUROPATHY ASSOCIATED WITH TYPE 2 DIABETES MELLITUS (HCC): ICD-10-CM

## 2024-09-04 RX ORDER — DULOXETIN HYDROCHLORIDE 60 MG/1
60 CAPSULE, DELAYED RELEASE ORAL DAILY
Qty: 90 CAPSULE | Refills: 3 | Status: SHIPPED | OUTPATIENT
Start: 2024-09-04

## 2024-09-05 ENCOUNTER — OFFICE VISIT (OUTPATIENT)
Dept: FAMILY MEDICINE CLINIC | Age: 69
End: 2024-09-05
Payer: MEDICARE

## 2024-09-05 ENCOUNTER — HOSPITAL ENCOUNTER (OUTPATIENT)
Age: 69
Discharge: HOME | End: 2024-09-05
Payer: MEDICARE

## 2024-09-05 VITALS
BODY MASS INDEX: 40.85 KG/M2 | WEIGHT: 222 LBS | HEIGHT: 62 IN | DIASTOLIC BLOOD PRESSURE: 64 MMHG | OXYGEN SATURATION: 97 % | SYSTOLIC BLOOD PRESSURE: 120 MMHG | HEART RATE: 83 BPM

## 2024-09-05 DIAGNOSIS — I50.32 CHRONIC DIASTOLIC CONGESTIVE HEART FAILURE (HCC): ICD-10-CM

## 2024-09-05 DIAGNOSIS — Z23 ENCOUNTER FOR IMMUNIZATION: ICD-10-CM

## 2024-09-05 DIAGNOSIS — I10 ESSENTIAL HYPERTENSION: ICD-10-CM

## 2024-09-05 DIAGNOSIS — F32.4 MAJOR DEPRESSIVE DISORDER WITH SINGLE EPISODE, IN PARTIAL REMISSION (HCC): ICD-10-CM

## 2024-09-05 DIAGNOSIS — E03.9 ACQUIRED HYPOTHYROIDISM: ICD-10-CM

## 2024-09-05 DIAGNOSIS — M47.27 LUMBOSACRAL RADICULOPATHY DUE TO DEGENERATIVE JOINT DISEASE OF SPINE: ICD-10-CM

## 2024-09-05 DIAGNOSIS — G25.81 RESTLESS LEG SYNDROME: ICD-10-CM

## 2024-09-05 DIAGNOSIS — E78.2 MIXED DYSLIPIDEMIA: ICD-10-CM

## 2024-09-05 DIAGNOSIS — Z79.4 TYPE 2 DIABETES MELLITUS WITH DIABETIC POLYNEUROPATHY, WITH LONG-TERM CURRENT USE OF INSULIN (HCC): Primary | ICD-10-CM

## 2024-09-05 DIAGNOSIS — E11.42 TYPE 2 DIABETES MELLITUS WITH DIABETIC POLYNEUROPATHY, WITH LONG-TERM CURRENT USE OF INSULIN (HCC): Primary | ICD-10-CM

## 2024-09-05 PROBLEM — K76.9 LIVER DISEASE: Status: RESOLVED | Noted: 2022-12-15 | Resolved: 2024-09-05

## 2024-09-05 PROBLEM — E83.42 HYPOMAGNESEMIA: Status: RESOLVED | Noted: 2024-06-05 | Resolved: 2024-09-05

## 2024-09-05 PROBLEM — N95.0 POSTMENOPAUSAL BLEEDING: Status: RESOLVED | Noted: 2023-03-15 | Resolved: 2024-09-05

## 2024-09-05 PROBLEM — M19.041 ARTHRITIS OF RIGHT HAND: Status: RESOLVED | Noted: 2020-08-17 | Resolved: 2024-09-05

## 2024-09-05 PROBLEM — G43.009 MIGRAINE WITHOUT AURA, NOT INTRACTABLE: Status: RESOLVED | Noted: 2017-09-18 | Resolved: 2024-09-05

## 2024-09-05 PROBLEM — K86.2 PANCREATIC CYST: Status: RESOLVED | Noted: 2021-10-07 | Resolved: 2024-09-05

## 2024-09-05 PROBLEM — R71.8 ELEVATED HEMATOCRIT: Status: RESOLVED | Noted: 2024-06-05 | Resolved: 2024-09-05

## 2024-09-05 LAB
25(OH)D3 SERPL-MCNC: 28.1 NG/ML (ref 30–100)
ALBUMIN SERPL-MCNC: 3.5 G/DL (ref 3.5–5.2)
ALP SERPL-CCNC: 135 U/L (ref 35–104)
ALT SERPL-CCNC: 24 U/L (ref 5–33)
ANION GAP SERPL CALCULATED.3IONS-SCNC: 10 MMOL/L (ref 9–17)
AST SERPL-CCNC: 21 U/L
BASOPHILS # BLD: 0.1 K/UL (ref 0–0.2)
BASOPHILS NFR BLD: 1 % (ref 0–2)
BILIRUB SERPL-MCNC: 0.6 MG/DL (ref 0.3–1.2)
BUN SERPL-MCNC: 12 MG/DL (ref 8–23)
CALCIUM SERPL-MCNC: 9.6 MG/DL (ref 8.6–10.4)
CHLORIDE SERPL-SCNC: 106 MMOL/L (ref 98–107)
CHOLEST SERPL-MCNC: 153 MG/DL
CHOLESTEROL/HDL RATIO: 3.7
CO2 SERPL-SCNC: 25 MMOL/L (ref 20–31)
CREAT SERPL-MCNC: 0.5 MG/DL (ref 0.5–0.9)
CREAT UR-MCNC: 114 MG/DL (ref 28–217)
EOSINOPHIL # BLD: 0.1 K/UL (ref 0–0.4)
EOSINOPHILS RELATIVE PERCENT: 1 % (ref 0–4)
ERYTHROCYTE [DISTWIDTH] IN BLOOD BY AUTOMATED COUNT: 13.5 % (ref 11.5–14.9)
GFR, ESTIMATED: >90 ML/MIN/1.73M2
GLUCOSE SERPL-MCNC: 112 MG/DL (ref 70–99)
HCT VFR BLD AUTO: 47.1 % (ref 36–46)
HDLC SERPL-MCNC: 41 MG/DL
HGB BLD-MCNC: 15.4 G/DL (ref 12–16)
LDLC SERPL CALC-MCNC: 90 MG/DL (ref 0–130)
LYMPHOCYTES NFR BLD: 2.3 K/UL (ref 1–4.8)
LYMPHOCYTES RELATIVE PERCENT: 21 % (ref 24–44)
MAGNESIUM SERPL-MCNC: 1.7 MG/DL (ref 1.6–2.6)
MCH RBC QN AUTO: 30 PG (ref 26–34)
MCHC RBC AUTO-ENTMCNC: 32.7 G/DL (ref 31–37)
MCV RBC AUTO: 91.7 FL (ref 80–100)
MICROALBUMIN UR-MCNC: 125 MG/L (ref 0–20)
MICROALBUMIN/CREAT UR-RTO: 110 MCG/MG CREAT (ref 0–25)
MONOCYTES NFR BLD: 0.7 K/UL (ref 0.1–1.3)
MONOCYTES NFR BLD: 7 % (ref 1–7)
NEUTROPHILS NFR BLD: 70 % (ref 36–66)
NEUTS SEG NFR BLD: 7.8 K/UL (ref 1.3–9.1)
PLATELET # BLD AUTO: 256 K/UL (ref 150–450)
PMV BLD AUTO: 10.6 FL (ref 6–12)
POTASSIUM SERPL-SCNC: 4.4 MMOL/L (ref 3.7–5.3)
PROT SERPL-MCNC: 6.8 G/DL (ref 6.4–8.3)
RBC # BLD AUTO: 5.13 M/UL (ref 4–5.2)
SODIUM SERPL-SCNC: 141 MMOL/L (ref 135–144)
T4 FREE SERPL-MCNC: 1 NG/DL (ref 0.9–1.7)
TRIGL SERPL-MCNC: 111 MG/DL
TSH SERPL DL<=0.05 MIU/L-ACNC: 8.56 UIU/ML (ref 0.3–5)
URATE SERPL-MCNC: 6.3 MG/DL (ref 2.4–5.7)
WBC OTHER # BLD: 11 K/UL (ref 3.5–11)

## 2024-09-05 PROCEDURE — 80053 COMPREHEN METABOLIC PANEL: CPT

## 2024-09-05 PROCEDURE — 36415 COLL VENOUS BLD VENIPUNCTURE: CPT

## 2024-09-05 PROCEDURE — 83735 ASSAY OF MAGNESIUM: CPT

## 2024-09-05 PROCEDURE — 82746 ASSAY OF FOLIC ACID SERUM: CPT

## 2024-09-05 PROCEDURE — 84443 ASSAY THYROID STIM HORMONE: CPT

## 2024-09-05 PROCEDURE — 1036F TOBACCO NON-USER: CPT | Performed by: FAMILY MEDICINE

## 2024-09-05 PROCEDURE — 1090F PRES/ABSN URINE INCON ASSESS: CPT | Performed by: FAMILY MEDICINE

## 2024-09-05 PROCEDURE — 82043 UR ALBUMIN QUANTITATIVE: CPT

## 2024-09-05 PROCEDURE — G8427 DOCREV CUR MEDS BY ELIG CLIN: HCPCS | Performed by: FAMILY MEDICINE

## 2024-09-05 PROCEDURE — G8399 PT W/DXA RESULTS DOCUMENT: HCPCS | Performed by: FAMILY MEDICINE

## 2024-09-05 PROCEDURE — 3078F DIAST BP <80 MM HG: CPT | Performed by: FAMILY MEDICINE

## 2024-09-05 PROCEDURE — 85025 COMPLETE CBC W/AUTO DIFF WBC: CPT

## 2024-09-05 PROCEDURE — 82306 VITAMIN D 25 HYDROXY: CPT

## 2024-09-05 PROCEDURE — 1123F ACP DISCUSS/DSCN MKR DOCD: CPT | Performed by: FAMILY MEDICINE

## 2024-09-05 PROCEDURE — 3051F HG A1C>EQUAL 7.0%<8.0%: CPT | Performed by: FAMILY MEDICINE

## 2024-09-05 PROCEDURE — 80061 LIPID PANEL: CPT

## 2024-09-05 PROCEDURE — 82570 ASSAY OF URINE CREATININE: CPT

## 2024-09-05 PROCEDURE — G8417 CALC BMI ABV UP PARAM F/U: HCPCS | Performed by: FAMILY MEDICINE

## 2024-09-05 PROCEDURE — 84550 ASSAY OF BLOOD/URIC ACID: CPT

## 2024-09-05 PROCEDURE — 3074F SYST BP LT 130 MM HG: CPT | Performed by: FAMILY MEDICINE

## 2024-09-05 PROCEDURE — 84439 ASSAY OF FREE THYROXINE: CPT

## 2024-09-05 PROCEDURE — 99214 OFFICE O/P EST MOD 30 MIN: CPT | Performed by: FAMILY MEDICINE

## 2024-09-05 PROCEDURE — 2022F DILAT RTA XM EVC RTNOPTHY: CPT | Performed by: FAMILY MEDICINE

## 2024-09-05 PROCEDURE — 82607 VITAMIN B-12: CPT

## 2024-09-05 PROCEDURE — 3017F COLORECTAL CA SCREEN DOC REV: CPT | Performed by: FAMILY MEDICINE

## 2024-09-05 RX ORDER — ROPINIROLE 0.5 MG/1
0.5 TABLET, FILM COATED ORAL 3 TIMES DAILY
Qty: 90 TABLET | Refills: 3 | Status: SHIPPED | OUTPATIENT
Start: 2024-09-05 | End: 2024-09-06 | Stop reason: SDUPTHER

## 2024-09-05 RX ORDER — LEVOTHYROXINE SODIUM 175 UG/1
175 TABLET ORAL DAILY
Qty: 90 TABLET | Refills: 1 | Status: SHIPPED | OUTPATIENT
Start: 2024-09-05 | End: 2024-09-06 | Stop reason: SDUPTHER

## 2024-09-05 SDOH — ECONOMIC STABILITY: FOOD INSECURITY: WITHIN THE PAST 12 MONTHS, THE FOOD YOU BOUGHT JUST DIDN'T LAST AND YOU DIDN'T HAVE MONEY TO GET MORE.: NEVER TRUE

## 2024-09-05 SDOH — ECONOMIC STABILITY: FOOD INSECURITY: WITHIN THE PAST 12 MONTHS, YOU WORRIED THAT YOUR FOOD WOULD RUN OUT BEFORE YOU GOT MONEY TO BUY MORE.: NEVER TRUE

## 2024-09-05 SDOH — ECONOMIC STABILITY: INCOME INSECURITY: HOW HARD IS IT FOR YOU TO PAY FOR THE VERY BASICS LIKE FOOD, HOUSING, MEDICAL CARE, AND HEATING?: PATIENT DECLINED

## 2024-09-05 ASSESSMENT — PATIENT HEALTH QUESTIONNAIRE - PHQ9
1. LITTLE INTEREST OR PLEASURE IN DOING THINGS: SEVERAL DAYS
8. MOVING OR SPEAKING SO SLOWLY THAT OTHER PEOPLE COULD HAVE NOTICED. OR THE OPPOSITE, BEING SO FIGETY OR RESTLESS THAT YOU HAVE BEEN MOVING AROUND A LOT MORE THAN USUAL: SEVERAL DAYS
5. POOR APPETITE OR OVEREATING: SEVERAL DAYS
SUM OF ALL RESPONSES TO PHQ QUESTIONS 1-9: 8
2. FEELING DOWN, DEPRESSED OR HOPELESS: SEVERAL DAYS
4. FEELING TIRED OR HAVING LITTLE ENERGY: SEVERAL DAYS
6. FEELING BAD ABOUT YOURSELF - OR THAT YOU ARE A FAILURE OR HAVE LET YOURSELF OR YOUR FAMILY DOWN: SEVERAL DAYS
SUM OF ALL RESPONSES TO PHQ9 QUESTIONS 1 & 2: 2
10. IF YOU CHECKED OFF ANY PROBLEMS, HOW DIFFICULT HAVE THESE PROBLEMS MADE IT FOR YOU TO DO YOUR WORK, TAKE CARE OF THINGS AT HOME, OR GET ALONG WITH OTHER PEOPLE: EXTREMELY DIFFICULT
SUM OF ALL RESPONSES TO PHQ QUESTIONS 1-9: 8
SUM OF ALL RESPONSES TO PHQ QUESTIONS 1-9: 8
9. THOUGHTS THAT YOU WOULD BE BETTER OFF DEAD, OR OF HURTING YOURSELF: NOT AT ALL
3. TROUBLE FALLING OR STAYING ASLEEP: SEVERAL DAYS
SUM OF ALL RESPONSES TO PHQ QUESTIONS 1-9: 8
7. TROUBLE CONCENTRATING ON THINGS, SUCH AS READING THE NEWSPAPER OR WATCHING TELEVISION: SEVERAL DAYS

## 2024-09-05 ASSESSMENT — ENCOUNTER SYMPTOMS
ABDOMINAL DISTENTION: 0
SHORTNESS OF BREATH: 0
SINUS PRESSURE: 0
EYE REDNESS: 0
BACK PAIN: 1
DIARRHEA: 0
BLOOD IN STOOL: 0
RECTAL PAIN: 0
SORE THROAT: 0
VOMITING: 0
CHEST TIGHTNESS: 0
COUGH: 0
TROUBLE SWALLOWING: 0
COLOR CHANGE: 0
RHINORRHEA: 0
STRIDOR: 0
ABDOMINAL PAIN: 0
NAUSEA: 0
WHEEZING: 0

## 2024-09-05 NOTE — PROGRESS NOTES
Visit Information    Have you changed or started any medications since your last visit including any over-the-counter medicines, vitamins, or herbal medicines? no   Are you having any side effects from any of your medications? -  no  Have you stopped taking any of your medications? Is so, why? -  no    Have you seen any other physician or provider since your last visit? No  Have you had any other diagnostic tests since your last visit? No  Have you been seen in the emergency room and/or had an admission to a hospital since we last saw you? No  Have you had your routine dental cleaning in the past 6 months?     Have you activated your QR [employer] account? If not, what are your barriers? Yes     Patient Care Team:  Agnes Acharya MD as PCP - General (Family Medicine)  Agnes Acharya MD as PCP - Empaneled Provider  Alexandria Sandoval DO as Consulting Physician (Obstetrics & Gynecology)  Gemma Key MD as Consulting Physician (Endocrinology)  Mathew Pandey MD as Consulting Physician (Gastroenterology)    Medical History Review  Past Medical, Family, and Social History reviewed and does contribute to the patient presenting condition    Health Maintenance   Topic Date Due    Shingles vaccine (1 of 2) Never done    Pneumococcal 65+ years Vaccine (2 of 2 - PCV) 09/18/2013    Diabetic retinal exam  03/07/2017    Respiratory Syncytial Virus (RSV) Pregnant or age 60 yrs+ (1 - 1-dose 60+ series) Never done    Diabetic Alb to Cr ratio (uACR) test  07/13/2024    Flu vaccine (1) Never done    COVID-19 Vaccine (4 - 2023-24 season) 09/01/2024    Breast cancer screen  09/08/2024    Colorectal Cancer Screen  02/09/2025    Diabetic foot exam  02/19/2025    Depression Monitoring  07/16/2025    A1C test (Diabetic or Prediabetic)  07/19/2025    Lipids  09/05/2025    GFR test (Diabetes, CKD 3-4, OR last GFR 15-59)  09/05/2025    DTaP/Tdap/Td vaccine (3 - Td or Tdap) 06/16/2032    DEXA (modify frequency per FRAX

## 2024-09-05 NOTE — PROGRESS NOTES
Chief Complaint   Patient presents with    Leg Pain     Bilateral leg and muscle spasms    Diabetes    Immunizations     Declines vaccinations     Depression     Since loss of dog past weekend    Sleep Problem     Ongoing for the past few days    Discuss Medications     Stated no changes          Amelia Moseley  here today for follow up on chronic medical problems, go over labs and/or diagnostic studies, and medication refills. Leg Pain (Bilateral leg and muscle spasms), Diabetes, Immunizations (Declines vaccinations ), Depression (Since loss of dog past weekend), Sleep Problem (Ongoing for the past few days), and Discuss Medications (Stated no changes )      HPI: Patient is scheduled for follow-up on chronic medical conditions and to discuss blood work which was done today.    Type 2 diabetes, controlled on previous A1c.  Patient is not due for A1c till next week.  Patient reports she has stopped taking gabapentin.  Reports that was not helping.  Patient is due for eye exam reports she has to schedule appointment.  Patient reports blood sugars are usually running on average about 1 20-1 30.  Patient denies any hypo or hyperglycemic episodes.    Hypothyroidism, recent TSH is high, patient does have chronic fatigue and tiredness.  Discussed to increase TSH to 175 mcg.      Patient reports she is depressed lately because her dog passed away suddenly.  Patient has chronic history of depression and is Cymbalta.  Discussed with patient we will continue the same medications is normal grief.      Patient reports she feels restlessness at nighttime, her legs are always restless.  She was on gabapentin and is also muscle relaxant.  Patient reports sometimes she takes to muscle relaxants.  Patient was try some medication to help with the restless leg syndrome.  Patient reports she feels pain in both lower legs.      Patient has chronic lumbar degenerative disc disease, had x-rays done in 2018 that showed mild degenerative

## 2024-09-06 DIAGNOSIS — E03.9 ACQUIRED HYPOTHYROIDISM: ICD-10-CM

## 2024-09-06 DIAGNOSIS — G25.81 RESTLESS LEG SYNDROME: ICD-10-CM

## 2024-09-06 LAB
FOLATE SERPL-MCNC: 9.1 NG/ML (ref 4.8–24.2)
VIT B12 SERPL-MCNC: 582 PG/ML (ref 232–1245)

## 2024-09-06 RX ORDER — LEVOTHYROXINE SODIUM 175 UG/1
175 TABLET ORAL DAILY
Qty: 90 TABLET | Refills: 1 | Status: SHIPPED | OUTPATIENT
Start: 2024-09-06

## 2024-09-06 RX ORDER — ROPINIROLE 0.5 MG/1
0.5 TABLET, FILM COATED ORAL 3 TIMES DAILY
Qty: 90 TABLET | Refills: 3 | Status: SHIPPED | OUTPATIENT
Start: 2024-09-06

## 2024-09-06 NOTE — TELEPHONE ENCOUNTER
WILL LIKE MEDICATIONS RECALLED IN.     Please Approve or Refuse.  Send to Pharmacy per Pt's Request: [email or web address]     Next Visit Date:  10/18/2024   Last Visit Date: 9/5/2024    Hemoglobin A1C (%)   Date Value   08/20/2024 8.8   07/19/2024 7.6   12/13/2023 7.0             ( goal A1C is < 7)   BP Readings from Last 3 Encounters:   09/05/24 120/64   08/09/24 (!) 150/92   07/19/24 132/84          (goal 120/80)  BUN   Date Value Ref Range Status   09/05/2024 12 8 - 23 mg/dL Final     Creatinine   Date Value Ref Range Status   09/05/2024 0.5 0.5 - 0.9 mg/dL Final     Potassium   Date Value Ref Range Status   09/05/2024 4.4 3.7 - 5.3 mmol/L Final

## 2024-09-11 DIAGNOSIS — E55.9 VITAMIN D DEFICIENCY: ICD-10-CM

## 2024-09-11 RX ORDER — ERGOCALCIFEROL 1.25 MG/1
50000 CAPSULE, LIQUID FILLED ORAL WEEKLY
Qty: 12 CAPSULE | Refills: 1 | Status: SHIPPED | OUTPATIENT
Start: 2024-09-11

## 2024-09-11 NOTE — PROGRESS NOTES
Chart review, exam, documentation, d/w pt, wife, Nsx Hyperthyroidism     Hypothyroidism     Morbid obesity with BMI of 45.0-49.9, adult ([facility] [address] 3/31/2016    Nephrolithiasis     Neuropathy     Type II or unspecified type diabetes mellitus without mention of complication, not stated as uncontrolled     non insulin dependent     UTI (lower urinary tract infection)      Past Surgical History:   Procedure Laterality Date    BREAST BIOPSY  2012    negative    CARDIOVASCULAR STRESS TEST      7/17/15 no results    CHOLECYSTECTOMY      CYST REMOVAL      right hand    CYSTO/URETERO/PYELOSCOPY, CALCULUS TX Right 8/26/2019    CYSTOSCOPY URETEROSCOPY  STONE BASKET RETRIEVAL RIGHT STENT EXCHANGE performed by Stefanie Sever, MD at [facility] Right 8/20/2019    CYSTOSCOPY URETERAL STENT INSERTION performed by Stefanie Sever, MD at [facility],[address]  2009    LITHOTRIPSY      TONSILLECTOMY      TUBAL LIGATION       Family History   Problem Relation Age of Onset    Coronary Art Dis Mother     Lung Cancer Mother     Diabetes Mother         mellitus    Coronary Art Dis Father     Diabetes Father         diabetes mellitus     No outpatient medications have been marked as taking for the 9/10/19 encounter (Office Visit) with Stefanie Sever, MD.      (All medications reviewed and updated by provider sincelast office visit or hospitalization)   Cefuroxime axetil; Dilaudid [hydromorphone]; Metformin and related; Sulfa antibiotics; Amoxicillin;  Antihistamines, loratadine-type; Aspartame and phenylalanine; Eggs or egg-derived products; Lorazepam; Nubain [nalbuphine hcl]; and Vistaril [hydroxyzine hcl]  Social History     Tobacco Use   Smoking Status Former Smoker    Types: Cigarettes   Smokeless Tobacco Never Used   Tobacco Comment    pt states she quit smoking cigarettes at the age of 14      (If patient a smoker, smoking cessation counseling offered)     Social History     Substance and Sexual Activity   Alcohol Use No    Alcohol/week: 0.0 standard drinks       REVIEW OF SYSTEMS:  Review of Systems      Physical Exam:      Vitals:    09/10/19 1324   BP: 121/65   Pulse: 69   Temp: 98.6 °F (37 °C)     Body mass index is 42.97 kg/m². Patient is a 60 y.o. female in noacute distress and alert and oriented to person, place and time. Physical Exam  Constitutional: Patient in no acute distress. Neuro: Alert andoriented to person, place and time. Psych: Mood normal, affect normal  Lungs: Respiratory effort is normal  Cardiovascular: Warm & Pink  Abdomen: Soft, non-tender, non-distended with no CVA,  No flank tenderness,  Or hepatosplenomegaly   Lymphatics: No palpable lymphadenopathy. Bladder non-tender and not distended. Musculoskeletal: Normal gait and station      Assessment and Plan      1. Kidney stone    2. Flank pain    3. Acute cystitis without hematuria           Plan:         Doing better at this time. Will send urine for culture  Needs a 24 hour urine. Discussed stone prevention. Flank pain has resolved. Return in about 6 weeks (around 10/22/2019). Prescriptions Ordered:  No orders of the defined types were placed in this encounter. Orders Placed:  No orders of the defined types were placed in this encounter. Clare Mendoza MD    Agree with the ROS entered by the MA.

## 2024-09-19 ENCOUNTER — HOSPITAL ENCOUNTER (OUTPATIENT)
Age: 69
Discharge: HOME | End: 2024-09-19
Payer: MEDICARE

## 2024-09-19 ENCOUNTER — OFFICE VISIT (OUTPATIENT)
Dept: GASTROENTEROLOGY | Age: 69
End: 2024-09-19
Payer: MEDICARE

## 2024-09-19 VITALS
BODY MASS INDEX: 42.14 KG/M2 | WEIGHT: 229 LBS | HEIGHT: 62 IN | HEART RATE: 66 BPM | RESPIRATION RATE: 19 BRPM | TEMPERATURE: 98 F | DIASTOLIC BLOOD PRESSURE: 82 MMHG | SYSTOLIC BLOOD PRESSURE: 134 MMHG

## 2024-09-19 DIAGNOSIS — K31.84 GASTROPARESIS: Primary | ICD-10-CM

## 2024-09-19 DIAGNOSIS — K29.70 GASTRITIS WITHOUT BLEEDING, UNSPECIFIED CHRONICITY, UNSPECIFIED GASTRITIS TYPE: ICD-10-CM

## 2024-09-19 DIAGNOSIS — R74.8 ELEVATED ALKALINE PHOSPHATASE LEVEL: ICD-10-CM

## 2024-09-19 DIAGNOSIS — D12.6 TUBULAR ADENOMA OF COLON: ICD-10-CM

## 2024-09-19 DIAGNOSIS — K59.00 CONSTIPATION, UNSPECIFIED CONSTIPATION TYPE: ICD-10-CM

## 2024-09-19 LAB — GGT SERPL-CCNC: 12 U/L (ref 5–36)

## 2024-09-19 PROCEDURE — G8417 CALC BMI ABV UP PARAM F/U: HCPCS | Performed by: INTERNAL MEDICINE

## 2024-09-19 PROCEDURE — 82977 ASSAY OF GGT: CPT

## 2024-09-19 PROCEDURE — G8399 PT W/DXA RESULTS DOCUMENT: HCPCS | Performed by: INTERNAL MEDICINE

## 2024-09-19 PROCEDURE — G8427 DOCREV CUR MEDS BY ELIG CLIN: HCPCS | Performed by: INTERNAL MEDICINE

## 2024-09-19 PROCEDURE — 3075F SYST BP GE 130 - 139MM HG: CPT | Performed by: INTERNAL MEDICINE

## 2024-09-19 PROCEDURE — 1123F ACP DISCUSS/DSCN MKR DOCD: CPT | Performed by: INTERNAL MEDICINE

## 2024-09-19 PROCEDURE — G2211 COMPLEX E/M VISIT ADD ON: HCPCS | Performed by: INTERNAL MEDICINE

## 2024-09-19 PROCEDURE — 3017F COLORECTAL CA SCREEN DOC REV: CPT | Performed by: INTERNAL MEDICINE

## 2024-09-19 PROCEDURE — 3079F DIAST BP 80-89 MM HG: CPT | Performed by: INTERNAL MEDICINE

## 2024-09-19 PROCEDURE — 99214 OFFICE O/P EST MOD 30 MIN: CPT | Performed by: INTERNAL MEDICINE

## 2024-09-19 PROCEDURE — 1036F TOBACCO NON-USER: CPT | Performed by: INTERNAL MEDICINE

## 2024-09-19 PROCEDURE — 36415 COLL VENOUS BLD VENIPUNCTURE: CPT

## 2024-09-19 PROCEDURE — 1090F PRES/ABSN URINE INCON ASSESS: CPT | Performed by: INTERNAL MEDICINE

## 2024-09-19 RX ORDER — POLYETHYLENE GLYCOL 3350 17 G/17G
POWDER, FOR SOLUTION ORAL
COMMUNITY
Start: 2024-09-11

## 2024-09-19 RX ORDER — BISACODYL 5 MG/1
10 TABLET, DELAYED RELEASE ORAL
Qty: 90 TABLET | Refills: 0 | Status: SHIPPED | OUTPATIENT
Start: 2024-09-19 | End: 2024-12-18

## 2024-09-19 RX ORDER — PANTOPRAZOLE SODIUM 40 MG/1
80 TABLET, DELAYED RELEASE ORAL
Qty: 60 TABLET | Refills: 1 | Status: SHIPPED | OUTPATIENT
Start: 2024-09-19

## 2024-09-19 ASSESSMENT — ENCOUNTER SYMPTOMS
BLOOD IN STOOL: 0
COUGH: 0
ANAL BLEEDING: 0
COLOR CHANGE: 0
WHEEZING: 0
DIARRHEA: 1
ABDOMINAL DISTENTION: 1
RECTAL PAIN: 0
CHOKING: 0
VOMITING: 0
VOICE CHANGE: 0
SORE THROAT: 0
TROUBLE SWALLOWING: 0
ABDOMINAL PAIN: 1
NAUSEA: 1
CONSTIPATION: 1

## 2024-09-23 DIAGNOSIS — G25.81 RESTLESS LEG SYNDROME: ICD-10-CM

## 2024-09-24 DIAGNOSIS — G25.81 RESTLESS LEG SYNDROME: ICD-10-CM

## 2024-09-24 RX ORDER — ROPINIROLE 0.5 MG/1
0.5 TABLET, FILM COATED ORAL 3 TIMES DAILY
Qty: 270 TABLET | OUTPATIENT
Start: 2024-09-24

## 2024-09-24 RX ORDER — ROPINIROLE 0.5 MG/1
0.5 TABLET, FILM COATED ORAL 3 TIMES DAILY
Qty: 90 TABLET | Refills: 3 | Status: SHIPPED | OUTPATIENT
Start: 2024-09-24

## 2024-09-24 NOTE — TELEPHONE ENCOUNTER
Please Approve or Refuse.  [facility] to [facility]      Next Visit Date:  10/18/2024   Last Visit Date: 9/5/2024    Hemoglobin A1C (%)   Date Value   08/20/2024 8.8   07/19/2024 7.6   12/13/2023 7.0             ( goal A1C is < 7)   BP Readings from Last 3 Encounters:   09/19/24 134/82   09/05/24 120/64   08/09/24 (!) 150/92          (goal 120/80)  BUN   Date Value Ref Range Status   09/05/2024 12 8 - 23 mg/dL Final     Creatinine   Date Value Ref Range Status   09/05/2024 0.5 0.5 - 0.9 mg/dL Final     Potassium   Date Value Ref Range Status   09/05/2024 4.4 3.7 - 5.3 mmol/L Final

## 2024-09-25 RX ORDER — PANTOPRAZOLE SODIUM 40 MG/1
80 TABLET, DELAYED RELEASE ORAL
Qty: 60 TABLET | Refills: 1 | OUTPATIENT
Start: 2024-09-25

## 2024-09-26 RX ORDER — PANTOPRAZOLE SODIUM 40 MG/1
80 TABLET, DELAYED RELEASE ORAL
Qty: 60 TABLET | Refills: 1 | Status: SHIPPED | OUTPATIENT
Start: 2024-09-26

## 2024-10-28 DIAGNOSIS — K29.70 GASTRITIS WITHOUT BLEEDING, UNSPECIFIED CHRONICITY, UNSPECIFIED GASTRITIS TYPE: Primary | ICD-10-CM

## 2024-10-28 DIAGNOSIS — E55.9 VITAMIN D DEFICIENCY: ICD-10-CM

## 2024-10-29 DIAGNOSIS — K29.70 GASTRITIS WITHOUT BLEEDING, UNSPECIFIED CHRONICITY, UNSPECIFIED GASTRITIS TYPE: ICD-10-CM

## 2024-10-29 RX ORDER — PANTOPRAZOLE SODIUM 40 MG/1
40 TABLET, DELAYED RELEASE ORAL
Qty: 60 TABLET | Refills: 1 | Status: SHIPPED | OUTPATIENT
Start: 2024-10-29 | End: 2024-10-29

## 2024-10-29 RX ORDER — PANTOPRAZOLE SODIUM 40 MG/1
40 TABLET, DELAYED RELEASE ORAL
Qty: 180 TABLET | Refills: 1 | Status: SHIPPED | OUTPATIENT
Start: 2024-10-29

## 2024-10-29 RX ORDER — ERGOCALCIFEROL 1.25 MG/1
50000 CAPSULE, LIQUID FILLED ORAL WEEKLY
Qty: 12 CAPSULE | Refills: 1 | Status: SHIPPED | OUTPATIENT
Start: 2024-10-29

## 2024-10-29 NOTE — TELEPHONE ENCOUNTER
Please Approve or Refuse.  Send to Pharmacy per Pt's Request:      Next Visit Date:  10/29/2024   Last Visit Date: 9/5/2024    Hemoglobin A1C (%)   Date Value   08/20/2024 8.8   07/19/2024 7.6   12/13/2023 7.0             ( goal A1C is < 7)   BP Readings from Last 3 Encounters:   09/19/24 134/82   09/05/24 120/64   08/09/24 (!) 150/92          (goal 120/80)  BUN   Date Value Ref Range Status   09/05/2024 12 8 - 23 mg/dL Final     Creatinine   Date Value Ref Range Status   09/05/2024 0.5 0.5 - 0.9 mg/dL Final     Potassium   Date Value Ref Range Status   09/05/2024 4.4 3.7 - 5.3 mmol/L Final

## 2024-11-01 DIAGNOSIS — K29.70 GASTRITIS WITHOUT BLEEDING, UNSPECIFIED CHRONICITY, UNSPECIFIED GASTRITIS TYPE: ICD-10-CM

## 2024-11-04 ENCOUNTER — TELEPHONE (OUTPATIENT)
Dept: OBGYN CLINIC | Age: 69
End: 2024-11-04

## 2024-11-04 RX ORDER — PANTOPRAZOLE SODIUM 40 MG/1
40 TABLET, DELAYED RELEASE ORAL
Qty: 180 TABLET | Refills: 1 | Status: SHIPPED | OUTPATIENT
Start: 2024-11-04

## 2024-11-04 NOTE — TELEPHONE ENCOUNTER
Writer left message to schedule new patient appointment from referral.    Area H Indication Text: Tumors in this location are included in Area H (eyelids, eyebrows, nose, lips, chin, ear, pre-auricular, post-auricular, temple, genitalia, hands, feet, ankles and areola).  Tissue conservation is critical in these anatomic locations.

## 2024-11-06 ENCOUNTER — TELEPHONE (OUTPATIENT)
Dept: FAMILY MEDICINE CLINIC | Age: 69
End: 2024-11-06

## 2024-11-06 NOTE — TELEPHONE ENCOUNTER
Noted. Thank you!  To be discussed at upcoming appointment, at the last appointment in September she did stop gabapentin, that is all I can see    Future Appointments   Date Time Provider Department Center   11/21/2024 11:45 AM Agnes Acharya MD [facility] DEP   12/12/2024  2:30 PM Agnes Acharya MD [facility] DEP   1/9/2025  3:45 PM Mark Meehan MD [facility]

## 2024-11-06 NOTE — TELEPHONE ENCOUNTER
Last Appt: 9/5/24  Next Appt: 11/21/24 & 12/12/24    Jazmín called to ask Dr Acharya about being put back on a medication she was on in the past to control her sweating.She is starting to notice it happen again and wants to stop it from becoming an issue again.    She cannot remember the medication she was taking for it?    But she had an incident today where she went from her house to car to store and she looked soaked from like being in the rain and had someone look out the window of the store to see if it was she was that wet.    Please call her and let her know what Dr Acharya advises.    Thank you.

## 2024-11-09 DIAGNOSIS — K29.70 GASTRITIS WITHOUT BLEEDING, UNSPECIFIED CHRONICITY, UNSPECIFIED GASTRITIS TYPE: ICD-10-CM

## 2024-11-09 NOTE — TELEPHONE ENCOUNTER
Please Approve or Refuse.  Send to Pharmacy per Pt's Request:      Next Visit Date:  11/21/2024   Last Visit Date: 9/5/2024    Hemoglobin A1C (%)   Date Value   08/20/2024 8.8   07/19/2024 7.6   12/13/2023 7.0             ( goal A1C is < 7)   BP Readings from Last 3 Encounters:   09/19/24 134/82   09/05/24 120/64   08/09/24 (!) 150/92          (goal 120/80)  BUN   Date Value Ref Range Status   09/05/2024 12 8 - 23 mg/dL Final     Creatinine   Date Value Ref Range Status   09/05/2024 0.5 0.5 - 0.9 mg/dL Final     Potassium   Date Value Ref Range Status   09/05/2024 4.4 3.7 - 5.3 mmol/L Final

## 2024-11-10 RX ORDER — ASPIRIN 81 MG/1
81 TABLET ORAL DAILY
Qty: 90 TABLET | Refills: 0 | Status: SHIPPED | OUTPATIENT
Start: 2024-11-10

## 2024-11-11 RX ORDER — PANTOPRAZOLE SODIUM 40 MG/1
40 TABLET, DELAYED RELEASE ORAL
Qty: 180 TABLET | Refills: 1 | Status: SHIPPED | OUTPATIENT
Start: 2024-11-11

## 2024-11-11 RX ORDER — POLYETHYLENE GLYCOL 3350 17 G/17G
POWDER, FOR SOLUTION ORAL
Qty: 527 G | Refills: 1 | Status: SHIPPED | OUTPATIENT
Start: 2024-11-11

## 2024-11-12 DIAGNOSIS — E78.2 MIXED HYPERLIPIDEMIA: ICD-10-CM

## 2024-11-12 DIAGNOSIS — I10 ESSENTIAL HYPERTENSION: ICD-10-CM

## 2024-11-12 DIAGNOSIS — E11.42 TYPE 2 DIABETES MELLITUS WITH DIABETIC POLYNEUROPATHY, WITH LONG-TERM CURRENT USE OF INSULIN (HCC): ICD-10-CM

## 2024-11-12 DIAGNOSIS — Z79.4 TYPE 2 DIABETES MELLITUS WITH DIABETIC POLYNEUROPATHY, WITH LONG-TERM CURRENT USE OF INSULIN (HCC): ICD-10-CM

## 2024-11-12 DIAGNOSIS — E11.65 UNCONTROLLED TYPE 2 DIABETES MELLITUS WITH HYPERGLYCEMIA (HCC): ICD-10-CM

## 2024-11-12 RX ORDER — POLYETHYLENE GLYCOL 3350 17 G/17G
POWDER, FOR SOLUTION ORAL
Qty: 527 G | Refills: 1 | OUTPATIENT
Start: 2024-11-12

## 2024-11-13 DIAGNOSIS — E11.65 UNCONTROLLED TYPE 2 DIABETES MELLITUS WITH HYPERGLYCEMIA (HCC): ICD-10-CM

## 2024-11-13 DIAGNOSIS — I10 ESSENTIAL HYPERTENSION: ICD-10-CM

## 2024-11-13 DIAGNOSIS — Z79.4 TYPE 2 DIABETES MELLITUS WITH DIABETIC POLYNEUROPATHY, WITH LONG-TERM CURRENT USE OF INSULIN (HCC): ICD-10-CM

## 2024-11-13 DIAGNOSIS — E78.2 MIXED HYPERLIPIDEMIA: ICD-10-CM

## 2024-11-13 DIAGNOSIS — E11.42 TYPE 2 DIABETES MELLITUS WITH DIABETIC POLYNEUROPATHY, WITH LONG-TERM CURRENT USE OF INSULIN (HCC): ICD-10-CM

## 2024-11-13 RX ORDER — GLIPIZIDE 10 MG/1
20 TABLET ORAL 2 TIMES DAILY
Qty: 360 TABLET | Refills: 3 | Status: SHIPPED | OUTPATIENT
Start: 2024-11-13 | End: 2024-11-13 | Stop reason: SDUPTHER

## 2024-11-13 RX ORDER — POTASSIUM CHLORIDE 750 MG/1
10 TABLET, EXTENDED RELEASE ORAL DAILY
Qty: 90 TABLET | Refills: 3 | Status: SHIPPED | OUTPATIENT
Start: 2024-11-13 | End: 2024-11-13 | Stop reason: SDUPTHER

## 2024-11-13 RX ORDER — ATORVASTATIN CALCIUM 80 MG/1
80 TABLET, FILM COATED ORAL DAILY
Qty: 90 TABLET | Refills: 3 | Status: SHIPPED | OUTPATIENT
Start: 2024-11-13 | End: 2024-11-13 | Stop reason: SDUPTHER

## 2024-11-13 RX ORDER — LISINOPRIL 2.5 MG/1
2.5 TABLET ORAL DAILY
Qty: 90 TABLET | Refills: 3 | Status: SHIPPED | OUTPATIENT
Start: 2024-11-13

## 2024-11-14 RX ORDER — ATORVASTATIN CALCIUM 80 MG/1
80 TABLET, FILM COATED ORAL DAILY
Qty: 90 TABLET | Refills: 3 | Status: SHIPPED | OUTPATIENT
Start: 2024-11-14

## 2024-11-14 RX ORDER — GLIPIZIDE 10 MG/1
20 TABLET ORAL 2 TIMES DAILY
Qty: 360 TABLET | Refills: 3 | Status: SHIPPED | OUTPATIENT
Start: 2024-11-14

## 2024-11-14 RX ORDER — POTASSIUM CHLORIDE 750 MG/1
10 TABLET, EXTENDED RELEASE ORAL DAILY
Qty: 90 TABLET | Refills: 3 | Status: SHIPPED | OUTPATIENT
Start: 2024-11-14

## 2024-11-14 NOTE — TELEPHONE ENCOUNTER
Please Approve or Refuse.  Send to Pharmacy per Pt[facility] Request: [facility]      Next Visit Date:  11/21/2024   Last Visit Date: 9/5/2024    Hemoglobin A1C (%)   Date Value   08/20/2024 8.8   07/19/2024 7.6   12/13/2023 7.0             ( goal A1C is < 7)   BP Readings from Last 3 Encounters:   09/19/24 134/82   09/05/24 120/64   08/09/24 (!) 150/92          (goal 120/80)  BUN   Date Value Ref Range Status   09/05/2024 12 8 - 23 mg/dL Final     Creatinine   Date Value Ref Range Status   09/05/2024 0.5 0.5 - 0.9 mg/dL Final     Potassium   Date Value Ref Range Status   09/05/2024 4.4 3.7 - 5.3 mmol/L Final

## 2024-11-15 DIAGNOSIS — I50.32 CHRONIC DIASTOLIC CONGESTIVE HEART FAILURE (HCC): ICD-10-CM

## 2024-11-16 DIAGNOSIS — E11.42 TYPE 2 DIABETES MELLITUS WITH DIABETIC POLYNEUROPATHY, WITH LONG-TERM CURRENT USE OF INSULIN (HCC): ICD-10-CM

## 2024-11-16 DIAGNOSIS — I10 ESSENTIAL HYPERTENSION: ICD-10-CM

## 2024-11-16 DIAGNOSIS — E11.65 UNCONTROLLED TYPE 2 DIABETES MELLITUS WITH HYPERGLYCEMIA (HCC): ICD-10-CM

## 2024-11-16 DIAGNOSIS — Z79.4 TYPE 2 DIABETES MELLITUS WITH DIABETIC POLYNEUROPATHY, WITH LONG-TERM CURRENT USE OF INSULIN (HCC): ICD-10-CM

## 2024-11-16 DIAGNOSIS — E78.2 MIXED HYPERLIPIDEMIA: ICD-10-CM

## 2024-11-18 RX ORDER — ATORVASTATIN CALCIUM 80 MG/1
80 TABLET, FILM COATED ORAL DAILY
Qty: 90 TABLET | Refills: 3 | Status: SHIPPED | OUTPATIENT
Start: 2024-11-18

## 2024-11-18 RX ORDER — LISINOPRIL 2.5 MG/1
2.5 TABLET ORAL DAILY
Qty: 90 TABLET | Refills: 3 | Status: SHIPPED | OUTPATIENT
Start: 2024-11-18

## 2024-11-18 RX ORDER — GLIPIZIDE 10 MG/1
20 TABLET ORAL 2 TIMES DAILY
Qty: 360 TABLET | Refills: 3 | Status: SHIPPED | OUTPATIENT
Start: 2024-11-18

## 2024-11-18 RX ORDER — FUROSEMIDE 20 MG/1
20 TABLET ORAL DAILY
Qty: 270 TABLET | Refills: 1 | Status: SHIPPED | OUTPATIENT
Start: 2024-11-18

## 2024-11-22 ENCOUNTER — TELEPHONE (OUTPATIENT)
Dept: FAMILY MEDICINE CLINIC | Age: 69
End: 2024-11-22

## 2024-11-22 DIAGNOSIS — R93.89 INCREASED ENDOMETRIAL STRIPE THICKNESS: Primary | ICD-10-CM

## 2024-11-22 NOTE — TELEPHONE ENCOUNTER
Patient called in and requested if you could send her a referral to a gynecologist, but she said she does not want to see Dr Sandoval or Hattie so if it could be somewhere else.

## 2024-12-09 DIAGNOSIS — E11.42 DIABETIC POLYNEUROPATHY ASSOCIATED WITH TYPE 2 DIABETES MELLITUS (HCC): ICD-10-CM

## 2024-12-09 RX ORDER — DULOXETIN HYDROCHLORIDE 60 MG/1
60 CAPSULE, DELAYED RELEASE ORAL DAILY
Qty: 90 CAPSULE | Refills: 3 | Status: SHIPPED | OUTPATIENT
Start: 2024-12-09

## 2024-12-09 NOTE — TELEPHONE ENCOUNTER
Please Approve or Refuse.  Send to Pharmacy per Pt's Request:      Next Visit Date:  12/12/2024   Last Visit Date: 9/5/2024    Hemoglobin A1C (%)   Date Value   08/20/2024 8.8   07/19/2024 7.6   12/13/2023 7.0             ( goal A1C is < 7)   BP Readings from Last 3 Encounters:   09/19/24 134/82   09/05/24 120/64   08/09/24 (!) 150/92          (goal 120/80)  BUN   Date Value Ref Range Status   09/05/2024 12 8 - 23 mg/dL Final     Creatinine   Date Value Ref Range Status   09/05/2024 0.5 0.5 - 0.9 mg/dL Final     Potassium   Date Value Ref Range Status   09/05/2024 4.4 3.7 - 5.3 mmol/L Final

## 2024-12-12 DIAGNOSIS — I50.32 CHRONIC DIASTOLIC CONGESTIVE HEART FAILURE (HCC): ICD-10-CM

## 2024-12-12 DIAGNOSIS — M47.27 LUMBOSACRAL RADICULOPATHY DUE TO DEGENERATIVE JOINT DISEASE OF SPINE: ICD-10-CM

## 2024-12-12 RX ORDER — POTASSIUM CHLORIDE 750 MG/1
10 TABLET, EXTENDED RELEASE ORAL DAILY
COMMUNITY
Start: 2024-09-27

## 2024-12-13 RX ORDER — FUROSEMIDE 20 MG/1
20 TABLET ORAL DAILY
Qty: 270 TABLET | Refills: 1 | Status: SHIPPED | OUTPATIENT
Start: 2024-12-13

## 2024-12-16 ENCOUNTER — OFFICE VISIT (OUTPATIENT)
Dept: OBGYN CLINIC | Age: 69
End: 2024-12-16
Payer: MEDICARE

## 2024-12-16 ENCOUNTER — HOSPITAL ENCOUNTER (OUTPATIENT)
Age: 69
Setting detail: SPECIMEN
Discharge: HOME | End: 2024-12-16

## 2024-12-16 VITALS
SYSTOLIC BLOOD PRESSURE: 118 MMHG | BODY MASS INDEX: 40.75 KG/M2 | WEIGHT: 230 LBS | DIASTOLIC BLOOD PRESSURE: 82 MMHG | HEIGHT: 63 IN

## 2024-12-16 DIAGNOSIS — N95.0 PMB (POSTMENOPAUSAL BLEEDING): ICD-10-CM

## 2024-12-16 DIAGNOSIS — Z12.31 ENCOUNTER FOR SCREENING MAMMOGRAM FOR MALIGNANT NEOPLASM OF BREAST: ICD-10-CM

## 2024-12-16 DIAGNOSIS — Z01.419 WELL WOMAN EXAM WITH ROUTINE GYNECOLOGICAL EXAM: Primary | ICD-10-CM

## 2024-12-16 DIAGNOSIS — Z11.51 SPECIAL SCREENING EXAMINATION FOR HUMAN PAPILLOMAVIRUS (HPV): ICD-10-CM

## 2024-12-16 PROCEDURE — 1159F MED LIST DOCD IN RCRD: CPT | Performed by: NURSE PRACTITIONER

## 2024-12-16 PROCEDURE — 1036F TOBACCO NON-USER: CPT | Performed by: NURSE PRACTITIONER

## 2024-12-16 PROCEDURE — G8399 PT W/DXA RESULTS DOCUMENT: HCPCS | Performed by: NURSE PRACTITIONER

## 2024-12-16 PROCEDURE — 3079F DIAST BP 80-89 MM HG: CPT | Performed by: NURSE PRACTITIONER

## 2024-12-16 PROCEDURE — G8484 FLU IMMUNIZE NO ADMIN: HCPCS | Performed by: NURSE PRACTITIONER

## 2024-12-16 PROCEDURE — G8417 CALC BMI ABV UP PARAM F/U: HCPCS | Performed by: NURSE PRACTITIONER

## 2024-12-16 PROCEDURE — 99387 INIT PM E/M NEW PAT 65+ YRS: CPT | Performed by: NURSE PRACTITIONER

## 2024-12-16 PROCEDURE — 3017F COLORECTAL CA SCREEN DOC REV: CPT | Performed by: NURSE PRACTITIONER

## 2024-12-16 PROCEDURE — G8427 DOCREV CUR MEDS BY ELIG CLIN: HCPCS | Performed by: NURSE PRACTITIONER

## 2024-12-16 PROCEDURE — 3074F SYST BP LT 130 MM HG: CPT | Performed by: NURSE PRACTITIONER

## 2024-12-16 PROCEDURE — 1090F PRES/ABSN URINE INCON ASSESS: CPT | Performed by: NURSE PRACTITIONER

## 2024-12-16 PROCEDURE — 99203 OFFICE O/P NEW LOW 30 MIN: CPT | Performed by: NURSE PRACTITIONER

## 2024-12-16 NOTE — PROGRESS NOTES
bleeding)  US PELVIS COMPLETE    US NON OB TRANSVAGINAL           The patient had her preventative health maintenance recommendations and follow-up reviewed with her at the completion of her visit.

## 2024-12-18 LAB
HPV I/H RISK 4 DNA CVX QL NAA+PROBE: NOT DETECTED
HPV SAMPLE: NORMAL
HPV, INTERPRETATION: NORMAL
HPV16 DNA CVX QL NAA+PROBE: NOT DETECTED
HPV18 DNA CVX QL NAA+PROBE: NOT DETECTED
SPECIMEN DESCRIPTION: NORMAL

## 2024-12-19 ENCOUNTER — OFFICE VISIT (OUTPATIENT)
Dept: FAMILY MEDICINE CLINIC | Age: 69
End: 2024-12-19

## 2024-12-19 VITALS
SYSTOLIC BLOOD PRESSURE: 130 MMHG | HEIGHT: 63 IN | BODY MASS INDEX: 41.11 KG/M2 | OXYGEN SATURATION: 98 % | WEIGHT: 232 LBS | DIASTOLIC BLOOD PRESSURE: 74 MMHG | HEART RATE: 68 BPM

## 2024-12-19 DIAGNOSIS — I50.32 CHRONIC DIASTOLIC CONGESTIVE HEART FAILURE (HCC): ICD-10-CM

## 2024-12-19 DIAGNOSIS — E78.2 MIXED HYPERLIPIDEMIA: ICD-10-CM

## 2024-12-19 DIAGNOSIS — J01.90 ACUTE SINUSITIS, RECURRENCE NOT SPECIFIED, UNSPECIFIED LOCATION: ICD-10-CM

## 2024-12-19 DIAGNOSIS — Z23 ENCOUNTER FOR IMMUNIZATION: ICD-10-CM

## 2024-12-19 DIAGNOSIS — E03.9 ACQUIRED HYPOTHYROIDISM: ICD-10-CM

## 2024-12-19 DIAGNOSIS — E11.42 TYPE 2 DIABETES MELLITUS WITH DIABETIC POLYNEUROPATHY, WITH LONG-TERM CURRENT USE OF INSULIN (HCC): Primary | ICD-10-CM

## 2024-12-19 DIAGNOSIS — I10 ESSENTIAL HYPERTENSION: ICD-10-CM

## 2024-12-19 DIAGNOSIS — E11.29 TYPE 2 DIABETES MELLITUS WITH MICROALBUMINURIA, WITH LONG-TERM CURRENT USE OF INSULIN (HCC): ICD-10-CM

## 2024-12-19 DIAGNOSIS — Z79.4 TYPE 2 DIABETES MELLITUS WITH MICROALBUMINURIA, WITH LONG-TERM CURRENT USE OF INSULIN (HCC): ICD-10-CM

## 2024-12-19 DIAGNOSIS — Z79.4 TYPE 2 DIABETES MELLITUS WITH DIABETIC POLYNEUROPATHY, WITH LONG-TERM CURRENT USE OF INSULIN (HCC): Primary | ICD-10-CM

## 2024-12-19 DIAGNOSIS — I25.10 CORONARY ARTERY DISEASE INVOLVING NATIVE CORONARY ARTERY OF NATIVE HEART WITHOUT ANGINA PECTORIS: ICD-10-CM

## 2024-12-19 DIAGNOSIS — R80.9 TYPE 2 DIABETES MELLITUS WITH MICROALBUMINURIA, WITH LONG-TERM CURRENT USE OF INSULIN (HCC): ICD-10-CM

## 2024-12-19 DIAGNOSIS — T21.14XS: ICD-10-CM

## 2024-12-19 PROBLEM — D17.0: Status: RESOLVED | Noted: 2024-06-07 | Resolved: 2024-12-19

## 2024-12-19 PROBLEM — R06.02 SOB (SHORTNESS OF BREATH): Status: RESOLVED | Noted: 2020-04-14 | Resolved: 2024-12-19

## 2024-12-19 PROBLEM — N20.1 URETEROLITHIASIS: Status: RESOLVED | Noted: 2019-08-20 | Resolved: 2024-12-19

## 2024-12-19 LAB — HBA1C MFR BLD: 9.1 %

## 2024-12-19 RX ORDER — PROCHLORPERAZINE 25 MG/1
SUPPOSITORY RECTAL
Qty: 10 EACH | Refills: 1 | Status: SHIPPED | OUTPATIENT
Start: 2024-12-19

## 2024-12-19 RX ORDER — SILVER SULFADIAZINE 10 MG/G
CREAM TOPICAL
Qty: 50 G | Refills: 0 | Status: SHIPPED | OUTPATIENT
Start: 2024-12-19

## 2024-12-19 RX ORDER — DOXYCYCLINE HYCLATE 100 MG
100 TABLET ORAL 2 TIMES DAILY
Qty: 14 TABLET | Refills: 0 | Status: SHIPPED | OUTPATIENT
Start: 2024-12-19 | End: 2024-12-26

## 2024-12-19 RX ORDER — INSULIN LISPRO 100 [IU]/ML
INJECTION, SOLUTION INTRAVENOUS; SUBCUTANEOUS
Qty: 27 ML | Refills: 0 | Status: SHIPPED | OUTPATIENT
Start: 2024-12-19

## 2024-12-19 RX ORDER — ECHINACEA PURPUREA EXTRACT 125 MG
1 TABLET ORAL PRN
Qty: 1 EACH | Refills: 3 | Status: SHIPPED | OUTPATIENT
Start: 2024-12-19

## 2024-12-19 RX ORDER — PROCHLORPERAZINE 25 MG/1
SUPPOSITORY RECTAL
Qty: 3 EACH | Refills: 3 | Status: SHIPPED | OUTPATIENT
Start: 2024-12-19

## 2024-12-19 RX ORDER — CETIRIZINE HYDROCHLORIDE 10 MG/1
10 TABLET ORAL DAILY
Qty: 30 TABLET | Refills: 0 | Status: SHIPPED | OUTPATIENT
Start: 2024-12-19

## 2024-12-19 ASSESSMENT — ENCOUNTER SYMPTOMS
WHEEZING: 0
DIARRHEA: 0
NAUSEA: 0
RHINORRHEA: 1
RECTAL PAIN: 0
ABDOMINAL DISTENTION: 0
CHEST TIGHTNESS: 0
VOMITING: 0
CONSTIPATION: 0
SHORTNESS OF BREATH: 0
ABDOMINAL PAIN: 0
STRIDOR: 0
TROUBLE SWALLOWING: 0
SORE THROAT: 0
BACK PAIN: 1
BLOOD IN STOOL: 0
EYE REDNESS: 0
COUGH: 0
SINUS PRESSURE: 1
COLOR CHANGE: 0

## 2024-12-19 NOTE — PROGRESS NOTES
Visit Information    Have you changed or started any medications since your last visit including any over-the-counter medicines, vitamins, or herbal medicines? no   Are you having any side effects from any of your medications? -  no  Have you stopped taking any of your medications? Is so, why? -  no    Have you seen any other physician or provider since your last visit? No  Have you had any other diagnostic tests since your last visit? No  Have you been seen in the emergency room and/or had an admission to a hospital since we last saw you? No  Have you had your routine dental cleaning in the past 6 months? no    Have you activated your [employer] account? If not, what are your barriers? Yes     Patient Care Team:  Agnes Acharya MD as PCP - General (Family Medicine)  Agnes Acharya MD as PCP - Empaneled Provider  Alexandria Sandoval DO as Consulting Physician (Obstetrics & Gynecology)  Gemma Key MD as Consulting Physician (Endocrinology)  Mathew Pandey MD as Consulting Physician (Gastroenterology)    Medical History Review  Past Medical, Family, and Social History reviewed and does contribute to the patient presenting condition    Health Maintenance   Topic Date Due    Shingles vaccine (1 of 2) Never done    Pneumococcal 65+ years Vaccine (2 of 2 - PCV) 09/18/2013    Diabetic retinal exam  03/07/2017    Respiratory Syncytial Virus (RSV) Pregnant or age 60 yrs+ (1 - Risk 60-74 years 1-dose series) Never done    Flu vaccine (1) Never done    COVID-19 Vaccine (4 - 2023-24 season) 09/01/2024    Breast cancer screen  09/08/2024    Colorectal Cancer Screen  02/09/2025    Diabetic foot exam  02/19/2025    A1C test (Diabetic or Prediabetic)  08/20/2025    Diabetic Alb to Cr ratio (uACR) test  09/05/2025    Lipids  09/05/2025    Depression Monitoring  09/05/2025    GFR test (Diabetes, CKD 3-4, OR last GFR 15-59)  09/05/2025    DTaP/Tdap/Td vaccine (3 - Td or Tdap) 06/16/2032    DEXA (modify

## 2024-12-19 NOTE — PROGRESS NOTES
Chief Complaint   Patient presents with    Diabetes    Burn     On her back from a massage gun -x 4 days     Cough    Congestion     Green     Shortness of Breath    Wheezing    Sinus Problem     Sinus headache     Other     Refused vaccines          Amelia Moseley  here today for follow up on chronic medical problems, go over labs and/or diagnostic studies, and medication refills. Diabetes, Burn (On her back from a massage gun -x 4 days ), Cough, Congestion (Green ), Shortness of Breath, Wheezing, Sinus Problem (Sinus headache ), and Other (Refused vaccines )      HPI:  Patient is scheduled for follow-up on chronic medical conditions, missed and canceled multiple appointments.  Patient is a noncompliant with appointments.    Type 2 diabetes uncontrolled A1c has increased to 9.1, patient is currently on long-acting insulin short-acting insulin and is also on oral hypoglycemic agents.  Patient reports she is not taking the short acting insulin because she thought long-acting insulin is enough for her.  Patient is on Dexcom and she has not received and not been using the transmitter recently.  Patient is not monitoring her blood sugars.      Hypertension controlled, patient is on multiple medication reports compliance.  Patient had blood work done and is fairly stable.      Hyperlipidemia no recent blood work is currently on statins.    Hypothyroidism is on Synthroid TSH previously is within normal range.      Patient has history of mild coronary artery disease no stent placed.  Patient denies any chest pain dyspnea on exertion.    CHF stable denies any leg swelling.  Patient is on beta-blockers diuretics.      Patient reports she had the burn on her back after she was using back massager.  Patient reports she has 2 spots of the bone, she did not use anything over-the-counter.    Patient also complains of sinus congestion on and off, complains of mucopurulent nasal discharge, she did not use anything

## 2024-12-26 ENCOUNTER — TELEPHONE (OUTPATIENT)
Dept: FAMILY MEDICINE CLINIC | Age: 69
End: 2024-12-26

## 2024-12-26 DIAGNOSIS — M47.27 LUMBOSACRAL RADICULOPATHY DUE TO DEGENERATIVE JOINT DISEASE OF SPINE: ICD-10-CM

## 2024-12-26 DIAGNOSIS — I50.32 CHRONIC DIASTOLIC CONGESTIVE HEART FAILURE (HCC): ICD-10-CM

## 2024-12-26 NOTE — TELEPHONE ENCOUNTER
Patient called in and wanted to know exactly what her antibiotic she is taking is for, she didn't know if it was for something for her lab results or her chest cold.

## 2024-12-27 RX ORDER — FUROSEMIDE 20 MG/1
20 TABLET ORAL DAILY
Qty: 270 TABLET | Refills: 1 | Status: SHIPPED | OUTPATIENT
Start: 2024-12-27

## 2024-12-27 NOTE — TELEPHONE ENCOUNTER
Please Approve or Refuse.  Send to Pharmacy per Pt's Request:      Next Visit Date:  3/19/2025   Last Visit Date: 12/19/2024    Hemoglobin A1C (%)   Date Value   12/19/2024 9.1   08/20/2024 8.8   07/19/2024 7.6             ( goal A1C is < 7)   BP Readings from Last 3 Encounters:   12/19/24 130/74   12/16/24 118/82   09/19/24 134/82          (goal 120/80)  BUN   Date Value Ref Range Status   09/05/2024 12 8 - 23 mg/dL Final     Creatinine   Date Value Ref Range Status   09/05/2024 0.5 0.5 - 0.9 mg/dL Final     Potassium   Date Value Ref Range Status   09/05/2024 4.4 3.7 - 5.3 mmol/L Final

## 2024-12-28 ENCOUNTER — HOSPITAL ENCOUNTER (OUTPATIENT)
Age: 69
Discharge: HOME | End: 2024-12-28
Payer: MEDICARE

## 2024-12-28 DIAGNOSIS — Z79.4 TYPE 2 DIABETES MELLITUS WITH DIABETIC POLYNEUROPATHY, WITH LONG-TERM CURRENT USE OF INSULIN (HCC): ICD-10-CM

## 2024-12-28 DIAGNOSIS — E78.2 MIXED HYPERLIPIDEMIA: ICD-10-CM

## 2024-12-28 DIAGNOSIS — E11.42 TYPE 2 DIABETES MELLITUS WITH DIABETIC POLYNEUROPATHY, WITH LONG-TERM CURRENT USE OF INSULIN (HCC): ICD-10-CM

## 2024-12-28 DIAGNOSIS — E55.9 VITAMIN D DEFICIENCY: ICD-10-CM

## 2024-12-28 LAB
25(OH)D3 SERPL-MCNC: 26.3 NG/ML (ref 30–100)
ALBUMIN SERPL-MCNC: 3.6 G/DL (ref 3.5–5.2)
ALP SERPL-CCNC: 124 U/L (ref 35–104)
ALT SERPL-CCNC: 22 U/L (ref 10–35)
ANION GAP SERPL CALCULATED.3IONS-SCNC: 10 MMOL/L (ref 9–16)
AST SERPL-CCNC: 21 U/L (ref 10–35)
BASOPHILS # BLD: 0.1 K/UL (ref 0–0.2)
BASOPHILS NFR BLD: 1 % (ref 0–2)
BILIRUB SERPL-MCNC: 0.5 MG/DL (ref 0–1.2)
BUN SERPL-MCNC: 12 MG/DL (ref 8–23)
CALCIUM SERPL-MCNC: 9.5 MG/DL (ref 8.6–10.4)
CHLORIDE SERPL-SCNC: 105 MMOL/L (ref 98–107)
CHOLEST SERPL-MCNC: 134 MG/DL (ref 0–199)
CHOLESTEROL/HDL RATIO: 3.5
CO2 SERPL-SCNC: 25 MMOL/L (ref 20–31)
CREAT SERPL-MCNC: 0.6 MG/DL (ref 0.7–1.2)
EOSINOPHIL # BLD: 0.1 K/UL (ref 0–0.4)
EOSINOPHILS RELATIVE PERCENT: 1 % (ref 0–4)
ERYTHROCYTE [DISTWIDTH] IN BLOOD BY AUTOMATED COUNT: 13.7 % (ref 11.5–14.9)
FOLATE SERPL-MCNC: 11.4 NG/ML (ref 4.8–24.2)
GFR, ESTIMATED: >90 ML/MIN/1.73M2
GLUCOSE SERPL-MCNC: 109 MG/DL (ref 74–99)
HCT VFR BLD AUTO: 46.1 % (ref 36–46)
HDLC SERPL-MCNC: 38 MG/DL
HGB BLD-MCNC: 15.1 G/DL (ref 12–16)
LDLC SERPL CALC-MCNC: 72 MG/DL (ref 0–100)
LYMPHOCYTES NFR BLD: 2.5 K/UL (ref 1–4.8)
LYMPHOCYTES RELATIVE PERCENT: 22 % (ref 24–44)
MAGNESIUM SERPL-MCNC: 1.8 MG/DL (ref 1.6–2.4)
MCH RBC QN AUTO: 29.8 PG (ref 26–34)
MCHC RBC AUTO-ENTMCNC: 32.8 G/DL (ref 31–37)
MCV RBC AUTO: 90.8 FL (ref 80–100)
MONOCYTES NFR BLD: 0.6 K/UL (ref 0.1–1.3)
MONOCYTES NFR BLD: 6 % (ref 1–7)
NEUTROPHILS NFR BLD: 70 % (ref 36–66)
NEUTS SEG NFR BLD: 8.1 K/UL (ref 1.3–9.1)
PLATELET # BLD AUTO: 231 K/UL (ref 150–450)
PMV BLD AUTO: 10.1 FL (ref 6–12)
POTASSIUM SERPL-SCNC: 4 MMOL/L (ref 3.7–5.3)
PROT SERPL-MCNC: 6.6 G/DL (ref 6.6–8.7)
RBC # BLD AUTO: 5.07 M/UL (ref 4–5.2)
SODIUM SERPL-SCNC: 140 MMOL/L (ref 136–145)
TRIGL SERPL-MCNC: 118 MG/DL (ref 0–149)
VIT B12 SERPL-MCNC: 552 PG/ML (ref 232–1245)
WBC OTHER # BLD: 11.4 K/UL (ref 3.5–11)

## 2024-12-28 PROCEDURE — 80061 LIPID PANEL: CPT

## 2024-12-28 PROCEDURE — 80053 COMPREHEN METABOLIC PANEL: CPT

## 2024-12-28 PROCEDURE — 85025 COMPLETE CBC W/AUTO DIFF WBC: CPT

## 2024-12-28 PROCEDURE — 82746 ASSAY OF FOLIC ACID SERUM: CPT

## 2024-12-28 PROCEDURE — 82607 VITAMIN B-12: CPT

## 2024-12-28 PROCEDURE — 36415 COLL VENOUS BLD VENIPUNCTURE: CPT

## 2024-12-28 PROCEDURE — 82306 VITAMIN D 25 HYDROXY: CPT

## 2024-12-28 PROCEDURE — 83735 ASSAY OF MAGNESIUM: CPT

## 2024-12-31 DIAGNOSIS — B37.2 CANDIDAL INTERTRIGO: ICD-10-CM

## 2024-12-31 DIAGNOSIS — L03.90 CELLULITIS, UNSPECIFIED CELLULITIS SITE: ICD-10-CM

## 2024-12-31 DIAGNOSIS — I10 ESSENTIAL HYPERTENSION: ICD-10-CM

## 2025-01-02 RX ORDER — NYSTATIN 100000 [USP'U]/G
POWDER TOPICAL
Qty: 1 EACH | Refills: 0 | Status: SHIPPED | OUTPATIENT
Start: 2025-01-02

## 2025-01-02 RX ORDER — POTASSIUM CHLORIDE 750 MG/1
10 TABLET, EXTENDED RELEASE ORAL DAILY
Qty: 90 TABLET | Refills: 3 | Status: SHIPPED | OUTPATIENT
Start: 2025-01-02

## 2025-01-02 NOTE — TELEPHONE ENCOUNTER
Please Approve or Refuse.  Send to Pharmacy per Pt's Request:      Next Visit Date:  3/19/2025   Last Visit Date: 12/19/2024    Hemoglobin A1C (%)   Date Value   12/19/2024 9.1   08/20/2024 8.8   07/19/2024 7.6             ( goal A1C is < 7)   BP Readings from Last 3 Encounters:   12/19/24 130/74   12/16/24 118/82   09/19/24 134/82          (goal 120/80)  BUN   Date Value Ref Range Status   12/28/2024 12 8 - 23 mg/dL Final     Creatinine   Date Value Ref Range Status   12/28/2024 0.6 (L) 0.7 - 1.2 mg/dL Final     Potassium   Date Value Ref Range Status   12/28/2024 4.0 3.7 - 5.3 mmol/L Final

## 2025-01-06 ENCOUNTER — OFFICE VISIT (OUTPATIENT)
Dept: OBGYN CLINIC | Age: 70
End: 2025-01-06
Payer: MEDICARE

## 2025-01-06 VITALS
SYSTOLIC BLOOD PRESSURE: 126 MMHG | BODY MASS INDEX: 42.69 KG/M2 | DIASTOLIC BLOOD PRESSURE: 84 MMHG | WEIGHT: 232 LBS | HEIGHT: 62 IN

## 2025-01-06 DIAGNOSIS — B37.9 CANDIDA INFECTION: ICD-10-CM

## 2025-01-06 DIAGNOSIS — N95.0 PMB (POSTMENOPAUSAL BLEEDING): ICD-10-CM

## 2025-01-06 DIAGNOSIS — R87.610 ASCUS OF CERVIX WITH NEGATIVE HIGH RISK HPV: Primary | ICD-10-CM

## 2025-01-06 LAB — CYTOLOGY REPORT: NORMAL

## 2025-01-06 PROCEDURE — 3017F COLORECTAL CA SCREEN DOC REV: CPT | Performed by: NURSE PRACTITIONER

## 2025-01-06 PROCEDURE — G8427 DOCREV CUR MEDS BY ELIG CLIN: HCPCS | Performed by: NURSE PRACTITIONER

## 2025-01-06 PROCEDURE — 1123F ACP DISCUSS/DSCN MKR DOCD: CPT | Performed by: NURSE PRACTITIONER

## 2025-01-06 PROCEDURE — 1090F PRES/ABSN URINE INCON ASSESS: CPT | Performed by: NURSE PRACTITIONER

## 2025-01-06 PROCEDURE — 3079F DIAST BP 80-89 MM HG: CPT | Performed by: NURSE PRACTITIONER

## 2025-01-06 PROCEDURE — G8399 PT W/DXA RESULTS DOCUMENT: HCPCS | Performed by: NURSE PRACTITIONER

## 2025-01-06 PROCEDURE — 3074F SYST BP LT 130 MM HG: CPT | Performed by: NURSE PRACTITIONER

## 2025-01-06 PROCEDURE — 1159F MED LIST DOCD IN RCRD: CPT | Performed by: NURSE PRACTITIONER

## 2025-01-06 PROCEDURE — G8417 CALC BMI ABV UP PARAM F/U: HCPCS | Performed by: NURSE PRACTITIONER

## 2025-01-06 PROCEDURE — 1036F TOBACCO NON-USER: CPT | Performed by: NURSE PRACTITIONER

## 2025-01-06 PROCEDURE — 99213 OFFICE O/P EST LOW 20 MIN: CPT | Performed by: NURSE PRACTITIONER

## 2025-01-06 RX ORDER — FLUCONAZOLE 150 MG/1
150 TABLET ORAL
Qty: 2 TABLET | Refills: 2 | Status: SHIPPED | OUTPATIENT
Start: 2025-01-06

## 2025-01-06 NOTE — PROGRESS NOTES
Amelia Moseley  1/6/2025    YOB: 1955          The patient was seen today. She is here regarding pap smear results. Concerned she has cancer. Denies hx of abnormal pap smears. Pap smear ASCUS/no HPV. + candida.  Sexually active with long term partner. Type II diabetes. Questions answered regarding abnormal pap smears/ HPV.  Her bowels are regular and she is voiding without difficulty.     HPI:  Amelia Moseley is a 69 y.o. female No obstetric history on file.     Pap smear results      OB History   No obstetric history on file.       Past Medical History:   Diagnosis Date    Arrhythmia     Breast mass     CAD (coronary artery disease)     with valvular disease    Chronic neck pain     Coccygeal pain 03/07/2016    Constipation     COVID-19 04/2020    Depression     Diabetic neuropathy (HCC)     History of hepatitis A     possible history of hepatitis A in the past    History of renal calculi     Hypertension     Hyperthyroidism     Hypothyroidism     Morbid obesity with BMI of 45.0-49.9, adult 03/31/2016    Nephrolithiasis     Neuropathy     Obesity     Pancreatic cyst     PONV (postoperative nausea and vomiting)     Type II or unspecified type diabetes mellitus without mention of complication, not stated as uncontrolled     non insulin dependent     Ulcerative colitis (HCC)     UTI (lower urinary tract infection)        Past Surgical History:   Procedure Laterality Date    BREAST BIOPSY  2012    negative    CARDIAC CATHETERIZATION  2000    no stents    CARDIOVASCULAR STRESS TEST      7/17/15 no results    CHOLECYSTECTOMY      COLONOSCOPY N/A 10/26/2021    COLONOSCOPY POLYPECTOMY SNARE/COLD BIOPSY performed by Mathew Pandey MD at [facility]    COLONOSCOPY  06/30/2023    COLORECTAL CANCER SCREENING, NOT HIGH RISK    COLONOSCOPY N/A 06/30/2023    COLONOSCOPY POLYPECTOMY performed by Sherwin Licona DO at [facility] OR    COLONOSCOPY  08/25/2023    COLONOSCOPY N/A 08/25/2023    COLONOSCOPY

## 2025-01-09 ENCOUNTER — OFFICE VISIT (OUTPATIENT)
Dept: GASTROENTEROLOGY | Age: 70
End: 2025-01-09
Payer: MEDICARE

## 2025-01-09 VITALS
HEIGHT: 62 IN | WEIGHT: 227.2 LBS | TEMPERATURE: 97.4 F | HEART RATE: 80 BPM | DIASTOLIC BLOOD PRESSURE: 85 MMHG | RESPIRATION RATE: 20 BRPM | BODY MASS INDEX: 41.81 KG/M2 | SYSTOLIC BLOOD PRESSURE: 134 MMHG

## 2025-01-09 DIAGNOSIS — R19.4 ALTERED BOWEL HABITS: Primary | ICD-10-CM

## 2025-01-09 DIAGNOSIS — K59.00 CONSTIPATION, UNSPECIFIED CONSTIPATION TYPE: ICD-10-CM

## 2025-01-09 DIAGNOSIS — K31.84 GASTROPARESIS: ICD-10-CM

## 2025-01-09 PROCEDURE — 3075F SYST BP GE 130 - 139MM HG: CPT | Performed by: INTERNAL MEDICINE

## 2025-01-09 PROCEDURE — 1090F PRES/ABSN URINE INCON ASSESS: CPT | Performed by: INTERNAL MEDICINE

## 2025-01-09 PROCEDURE — 99214 OFFICE O/P EST MOD 30 MIN: CPT | Performed by: INTERNAL MEDICINE

## 2025-01-09 PROCEDURE — G8427 DOCREV CUR MEDS BY ELIG CLIN: HCPCS | Performed by: INTERNAL MEDICINE

## 2025-01-09 PROCEDURE — 3079F DIAST BP 80-89 MM HG: CPT | Performed by: INTERNAL MEDICINE

## 2025-01-09 PROCEDURE — 3017F COLORECTAL CA SCREEN DOC REV: CPT | Performed by: INTERNAL MEDICINE

## 2025-01-09 PROCEDURE — 1126F AMNT PAIN NOTED NONE PRSNT: CPT | Performed by: INTERNAL MEDICINE

## 2025-01-09 PROCEDURE — G2211 COMPLEX E/M VISIT ADD ON: HCPCS | Performed by: INTERNAL MEDICINE

## 2025-01-09 PROCEDURE — 1123F ACP DISCUSS/DSCN MKR DOCD: CPT | Performed by: INTERNAL MEDICINE

## 2025-01-09 PROCEDURE — G8417 CALC BMI ABV UP PARAM F/U: HCPCS | Performed by: INTERNAL MEDICINE

## 2025-01-09 PROCEDURE — G8399 PT W/DXA RESULTS DOCUMENT: HCPCS | Performed by: INTERNAL MEDICINE

## 2025-01-09 PROCEDURE — 1159F MED LIST DOCD IN RCRD: CPT | Performed by: INTERNAL MEDICINE

## 2025-01-09 PROCEDURE — 1036F TOBACCO NON-USER: CPT | Performed by: INTERNAL MEDICINE

## 2025-01-09 ASSESSMENT — ENCOUNTER SYMPTOMS
ANAL BLEEDING: 0
NAUSEA: 1
BLOOD IN STOOL: 0
WHEEZING: 0
SORE THROAT: 0
COLOR CHANGE: 0
DIARRHEA: 1
ABDOMINAL DISTENTION: 1
VOICE CHANGE: 0
VOMITING: 0
RECTAL PAIN: 0
ABDOMINAL PAIN: 1
COUGH: 0
CONSTIPATION: 1
TROUBLE SWALLOWING: 0
CHOKING: 0

## 2025-01-09 NOTE — PROGRESS NOTES
document that actually reflects the content of the visit, the document can still have some errors including those of syntax and sound a like substitutions which may escape proof reading.  It such instances, actual meaning can be extrapolated by contextual diversion.

## 2025-01-10 ENCOUNTER — PATIENT MESSAGE (OUTPATIENT)
Dept: GASTROENTEROLOGY | Age: 70
End: 2025-01-10

## 2025-01-12 DIAGNOSIS — I50.32 CHRONIC DIASTOLIC CONGESTIVE HEART FAILURE (HCC): ICD-10-CM

## 2025-01-12 DIAGNOSIS — I10 ESSENTIAL HYPERTENSION: ICD-10-CM

## 2025-01-12 DIAGNOSIS — I34.1 MVP (MITRAL VALVE PROLAPSE): ICD-10-CM

## 2025-01-12 DIAGNOSIS — E55.9 VITAMIN D DEFICIENCY: ICD-10-CM

## 2025-01-13 DIAGNOSIS — I34.1 MVP (MITRAL VALVE PROLAPSE): ICD-10-CM

## 2025-01-13 DIAGNOSIS — I10 ESSENTIAL HYPERTENSION: ICD-10-CM

## 2025-01-13 RX ORDER — FUROSEMIDE 20 MG/1
20 TABLET ORAL DAILY
Qty: 270 TABLET | Refills: 1 | Status: SHIPPED | OUTPATIENT
Start: 2025-01-13

## 2025-01-13 RX ORDER — METOPROLOL TARTRATE 25 MG/1
25 TABLET, FILM COATED ORAL 2 TIMES DAILY
Qty: 180 TABLET | Refills: 1 | Status: SHIPPED | OUTPATIENT
Start: 2025-01-13

## 2025-01-13 RX ORDER — LISINOPRIL 2.5 MG/1
2.5 TABLET ORAL DAILY
Qty: 90 TABLET | Refills: 3 | Status: SHIPPED | OUTPATIENT
Start: 2025-01-13

## 2025-01-13 RX ORDER — ERGOCALCIFEROL 1.25 MG/1
50000 CAPSULE, LIQUID FILLED ORAL WEEKLY
Qty: 12 CAPSULE | Refills: 1 | Status: SHIPPED | OUTPATIENT
Start: 2025-01-13

## 2025-01-13 RX ORDER — ASPIRIN 81 MG/1
81 TABLET ORAL DAILY
Qty: 90 TABLET | Refills: 0 | Status: SHIPPED | OUTPATIENT
Start: 2025-01-13

## 2025-01-13 NOTE — TELEPHONE ENCOUNTER
Please Approve or Refuse.  Send to Pharmacy per Pt's Request:      Next Visit Date:  3/19/2025   Last Visit Date: 12/19/2024    Hemoglobin A1C (%)   Date Value   12/19/2024 9.1   08/20/2024 8.8   07/19/2024 7.6             ( goal A1C is < 7)   BP Readings from Last 3 Encounters:   01/09/25 134/85   01/06/25 126/84   12/19/24 130/74          (goal 120/80)  BUN   Date Value Ref Range Status   12/28/2024 12 8 - 23 mg/dL Final     Creatinine   Date Value Ref Range Status   12/28/2024 0.6 (L) 0.7 - 1.2 mg/dL Final     Potassium   Date Value Ref Range Status   12/28/2024 4.0 3.7 - 5.3 mmol/L Final

## 2025-01-14 ENCOUNTER — PATIENT MESSAGE (OUTPATIENT)
Dept: GASTROENTEROLOGY | Age: 70
End: 2025-01-14

## 2025-01-15 ENCOUNTER — PATIENT MESSAGE (OUTPATIENT)
Dept: GASTROENTEROLOGY | Age: 70
End: 2025-01-15

## 2025-01-15 DIAGNOSIS — R11.0 NAUSEA: Primary | ICD-10-CM

## 2025-01-15 RX ORDER — ONDANSETRON 4 MG/1
4 TABLET, FILM COATED ORAL 3 TIMES DAILY PRN
Qty: 30 TABLET | Refills: 0 | Status: SHIPPED | OUTPATIENT
Start: 2025-01-15

## 2025-01-15 NOTE — TELEPHONE ENCOUNTER
Noted to My Chart Message from yesterday that zofran can be sent electronically to patients choosing with pharmacy. Please advise.

## 2025-01-20 ENCOUNTER — TELEPHONE (OUTPATIENT)
Dept: GASTROENTEROLOGY | Age: 70
End: 2025-01-20

## 2025-01-20 NOTE — TELEPHONE ENCOUNTER
Patient showed up to [facility] lab with stool sample and there are no orders in Epic. Can you put an order in, please

## 2025-01-21 ENCOUNTER — PATIENT MESSAGE (OUTPATIENT)
Dept: GASTROENTEROLOGY | Age: 70
End: 2025-01-21

## 2025-01-21 ENCOUNTER — TELEPHONE (OUTPATIENT)
Dept: SURGERY | Age: 70
End: 2025-01-21

## 2025-01-21 DIAGNOSIS — J01.90 ACUTE SINUSITIS, RECURRENCE NOT SPECIFIED, UNSPECIFIED LOCATION: ICD-10-CM

## 2025-01-21 DIAGNOSIS — Z79.4 TYPE 2 DIABETES MELLITUS WITH DIABETIC POLYNEUROPATHY, WITH LONG-TERM CURRENT USE OF INSULIN (HCC): ICD-10-CM

## 2025-01-21 DIAGNOSIS — E11.42 TYPE 2 DIABETES MELLITUS WITH DIABETIC POLYNEUROPATHY, WITH LONG-TERM CURRENT USE OF INSULIN (HCC): ICD-10-CM

## 2025-01-21 DIAGNOSIS — T21.14XS: ICD-10-CM

## 2025-01-21 RX ORDER — SILVER SULFADIAZINE 10 MG/G
CREAM TOPICAL
Qty: 50 G | Refills: 0 | Status: SHIPPED | OUTPATIENT
Start: 2025-01-21

## 2025-01-21 RX ORDER — INSULIN LISPRO 100 [IU]/ML
INJECTION, SOLUTION INTRAVENOUS; SUBCUTANEOUS
Qty: 27 ML | Refills: 0 | Status: SHIPPED | OUTPATIENT
Start: 2025-01-21 | End: 2025-01-22 | Stop reason: SDUPTHER

## 2025-01-21 RX ORDER — CETIRIZINE HYDROCHLORIDE 10 MG/1
10 TABLET ORAL DAILY
Qty: 30 TABLET | Refills: 0 | Status: SHIPPED | OUTPATIENT
Start: 2025-01-21

## 2025-01-21 NOTE — TELEPHONE ENCOUNTER
Patient called in with questions on a procedure she has schedule for tomorrow at 10:45 to \"test her gut for parasites\". I did not see a test and informed her she has an appointment at that time tomorrow with Dr. Sandoval and the patient replied that must be it and the phone was disconnected.

## 2025-01-22 DIAGNOSIS — Z79.4 TYPE 2 DIABETES MELLITUS WITH DIABETIC POLYNEUROPATHY, WITH LONG-TERM CURRENT USE OF INSULIN (HCC): ICD-10-CM

## 2025-01-22 DIAGNOSIS — E11.42 TYPE 2 DIABETES MELLITUS WITH DIABETIC POLYNEUROPATHY, WITH LONG-TERM CURRENT USE OF INSULIN (HCC): ICD-10-CM

## 2025-01-22 DIAGNOSIS — E11.65 UNCONTROLLED TYPE 2 DIABETES MELLITUS WITH HYPERGLYCEMIA (HCC): ICD-10-CM

## 2025-01-23 DIAGNOSIS — I10 ESSENTIAL HYPERTENSION: ICD-10-CM

## 2025-01-23 RX ORDER — PEN NEEDLE, DIABETIC 31 GX5/16"
NEEDLE, DISPOSABLE MISCELLANEOUS
Qty: 100 EACH | Refills: 3 | Status: SHIPPED | OUTPATIENT
Start: 2025-01-23

## 2025-01-23 RX ORDER — INSULIN LISPRO 100 [IU]/ML
INJECTION, SOLUTION INTRAVENOUS; SUBCUTANEOUS
Qty: 27 ML | Refills: 0 | Status: SHIPPED | OUTPATIENT
Start: 2025-01-23

## 2025-01-23 RX ORDER — LISINOPRIL 2.5 MG/1
2.5 TABLET ORAL DAILY
Qty: 90 TABLET | Refills: 3 | Status: SHIPPED | OUTPATIENT
Start: 2025-01-23

## 2025-01-23 RX ORDER — INSULIN DEGLUDEC 200 U/ML
INJECTION, SOLUTION SUBCUTANEOUS
Qty: 27 ML | Refills: 3 | Status: SHIPPED | OUTPATIENT
Start: 2025-01-23

## 2025-01-23 NOTE — TELEPHONE ENCOUNTER
Please Approve or Refuse.  Send to Pharmacy per Pt's Request: [facility]      Next Visit Date:  3/19/2025   Last Visit Date: 12/19/2024    Hemoglobin A1C (%)   Date Value   12/19/2024 9.1   08/20/2024 8.8   07/19/2024 7.6             ( goal A1C is < 7)   BP Readings from Last 3 Encounters:   01/09/25 134/85   01/06/25 126/84   12/19/24 130/74          (goal 120/80)  BUN   Date Value Ref Range Status   12/28/2024 12 8 - 23 mg/dL Final     Creatinine   Date Value Ref Range Status   12/28/2024 0.6 (L) 0.7 - 1.2 mg/dL Final     Potassium   Date Value Ref Range Status   12/28/2024 4.0 3.7 - 5.3 mmol/L Final

## 2025-01-27 RX ORDER — ASPIRIN 81 MG/1
81 TABLET, COATED ORAL DAILY
Qty: 90 TABLET | Refills: 0 | Status: SHIPPED | OUTPATIENT
Start: 2025-01-27

## 2025-01-28 ENCOUNTER — APPOINTMENT (OUTPATIENT)
Dept: CT IMAGING | Age: 70
DRG: 193 | End: 2025-01-28
Payer: MEDICARE

## 2025-01-28 ENCOUNTER — HOSPITAL ENCOUNTER (INPATIENT)
Age: 70
LOS: 2 days | Discharge: HOME | DRG: 193 | End: 2025-01-30
Attending: EMERGENCY MEDICINE | Admitting: INTERNAL MEDICINE
Payer: MEDICARE

## 2025-01-28 ENCOUNTER — APPOINTMENT (OUTPATIENT)
Dept: GENERAL RADIOLOGY | Age: 70
DRG: 193 | End: 2025-01-28
Payer: MEDICARE

## 2025-01-28 DIAGNOSIS — E11.42 TYPE 2 DIABETES MELLITUS WITH DIABETIC POLYNEUROPATHY, WITH LONG-TERM CURRENT USE OF INSULIN (HCC): ICD-10-CM

## 2025-01-28 DIAGNOSIS — R09.02 HYPOXIA: ICD-10-CM

## 2025-01-28 DIAGNOSIS — J10.1 INFLUENZA A: Primary | ICD-10-CM

## 2025-01-28 DIAGNOSIS — J18.9 PNEUMONIA OF RIGHT LOWER LOBE DUE TO INFECTIOUS ORGANISM: ICD-10-CM

## 2025-01-28 DIAGNOSIS — Z79.4 TYPE 2 DIABETES MELLITUS WITH DIABETIC POLYNEUROPATHY, WITH LONG-TERM CURRENT USE OF INSULIN (HCC): ICD-10-CM

## 2025-01-28 DIAGNOSIS — R80.9 TYPE 2 DIABETES MELLITUS WITH MICROALBUMINURIA, WITH LONG-TERM CURRENT USE OF INSULIN (HCC): ICD-10-CM

## 2025-01-28 DIAGNOSIS — Z79.4 TYPE 2 DIABETES MELLITUS WITH MICROALBUMINURIA, WITH LONG-TERM CURRENT USE OF INSULIN (HCC): ICD-10-CM

## 2025-01-28 DIAGNOSIS — E11.29 TYPE 2 DIABETES MELLITUS WITH MICROALBUMINURIA, WITH LONG-TERM CURRENT USE OF INSULIN (HCC): ICD-10-CM

## 2025-01-28 PROBLEM — J96.01 ACUTE HYPOXIC RESPIRATORY FAILURE: Status: ACTIVE | Noted: 2025-01-28

## 2025-01-28 LAB
ABSOLUTE BANDS: 0.47 K/UL (ref 0–1)
ALBUMIN SERPL-MCNC: 3.8 G/DL (ref 3.5–5.2)
ALP SERPL-CCNC: 124 U/L (ref 35–104)
ALT SERPL-CCNC: 39 U/L (ref 10–35)
ANION GAP SERPL CALCULATED.3IONS-SCNC: 15 MMOL/L (ref 9–16)
AST SERPL-CCNC: 39 U/L (ref 10–35)
B PARAP IS1001 DNA NPH QL NAA+NON-PROBE: NOT DETECTED
B PERT DNA SPEC QL NAA+PROBE: NOT DETECTED
BANDS: 5 % (ref 0–10)
BASOPHILS # BLD: 0 K/UL (ref 0–0.2)
BASOPHILS NFR BLD: 0 % (ref 0–2)
BILIRUB SERPL-MCNC: 0.5 MG/DL (ref 0–1.2)
BUN SERPL-MCNC: 8 MG/DL (ref 8–23)
C PNEUM DNA NPH QL NAA+NON-PROBE: NOT DETECTED
CALCIUM SERPL-MCNC: 9.4 MG/DL (ref 8.6–10.4)
CHLORIDE SERPL-SCNC: 99 MMOL/L (ref 98–107)
CO2 SERPL-SCNC: 21 MMOL/L (ref 20–31)
CREAT SERPL-MCNC: 0.6 MG/DL (ref 0.7–1.2)
EOSINOPHIL # BLD: 0 K/UL (ref 0–0.4)
EOSINOPHILS RELATIVE PERCENT: 0 % (ref 0–4)
ERYTHROCYTE [DISTWIDTH] IN BLOOD BY AUTOMATED COUNT: 13.5 % (ref 11.5–14.9)
FLUAV H3 RNA NPH QL NAA+NON-PROBE: DETECTED
FLUAV RNA NPH QL NAA+NON-PROBE: DETECTED
FLUAV RNA RESP QL NAA+PROBE: DETECTED
FLUBV RNA NPH QL NAA+NON-PROBE: NOT DETECTED
FLUBV RNA RESP QL NAA+PROBE: NOT DETECTED
GFR, ESTIMATED: >90 ML/MIN/1.73M2
GLUCOSE BLD-MCNC: 201 MG/DL (ref 65–105)
GLUCOSE SERPL-MCNC: 304 MG/DL (ref 74–99)
HADV DNA NPH QL NAA+NON-PROBE: NOT DETECTED
HCOV 229E RNA NPH QL NAA+NON-PROBE: NOT DETECTED
HCOV HKU1 RNA NPH QL NAA+NON-PROBE: NOT DETECTED
HCOV NL63 RNA NPH QL NAA+NON-PROBE: NOT DETECTED
HCOV OC43 RNA NPH QL NAA+NON-PROBE: NOT DETECTED
HCT VFR BLD AUTO: 47.4 % (ref 36–46)
HGB BLD-MCNC: 15.9 G/DL (ref 12–16)
HMPV RNA NPH QL NAA+NON-PROBE: NOT DETECTED
HPIV1 RNA NPH QL NAA+NON-PROBE: NOT DETECTED
HPIV2 RNA NPH QL NAA+NON-PROBE: NOT DETECTED
HPIV3 RNA NPH QL NAA+NON-PROBE: NOT DETECTED
HPIV4 RNA NPH QL NAA+NON-PROBE: NOT DETECTED
L PNEUMO1 AG UR QL IA.RAPID: NEGATIVE
LIPASE SERPL-CCNC: 16 U/L (ref 13–60)
LYMPHOCYTES NFR BLD: 0.47 K/UL (ref 1–4.8)
LYMPHOCYTES RELATIVE PERCENT: 5 % (ref 24–44)
M PNEUMO DNA NPH QL NAA+NON-PROBE: NOT DETECTED
MAGNESIUM SERPL-MCNC: 1.5 MG/DL (ref 1.6–2.4)
MCH RBC QN AUTO: 29.5 PG (ref 26–34)
MCHC RBC AUTO-ENTMCNC: 33.5 G/DL (ref 31–37)
MCV RBC AUTO: 88.1 FL (ref 80–100)
MONOCYTES NFR BLD: 0.47 K/UL (ref 0.1–1.3)
MONOCYTES NFR BLD: 5 % (ref 1–7)
MORPHOLOGY: NORMAL
NEUTROPHILS NFR BLD: 85 % (ref 36–66)
NEUTS SEG NFR BLD: 7.99 K/UL (ref 1.3–9.1)
PLATELET # BLD AUTO: 167 K/UL (ref 150–450)
PMV BLD AUTO: 10.2 FL (ref 6–12)
POTASSIUM SERPL-SCNC: 3.6 MMOL/L (ref 3.7–5.3)
PROCALCITONIN SERPL-MCNC: 0.07 NG/ML (ref 0–0.09)
PROT SERPL-MCNC: 7.4 G/DL (ref 6.6–8.7)
RBC # BLD AUTO: 5.38 M/UL (ref 4–5.2)
RSV RNA NPH QL NAA+NON-PROBE: NOT DETECTED
RV+EV RNA NPH QL NAA+NON-PROBE: NOT DETECTED
S PNEUM AG SPEC QL: NEGATIVE
SARS-COV-2 RNA NPH QL NAA+NON-PROBE: NOT DETECTED
SARS-COV-2 RNA RESP QL NAA+PROBE: NOT DETECTED
SODIUM SERPL-SCNC: 135 MMOL/L (ref 136–145)
SOURCE: ABNORMAL
SPECIMEN DESCRIPTION: ABNORMAL
SPECIMEN DESCRIPTION: ABNORMAL
SPECIMEN SOURCE: NORMAL
T4 FREE SERPL-MCNC: 0.9 NG/DL (ref 0.9–1.7)
TROPONIN I SERPL HS-MCNC: 22 NG/L (ref 0–14)
TROPONIN I SERPL HS-MCNC: 23 NG/L (ref 0–14)
TROPONIN I SERPL HS-MCNC: 24 NG/L (ref 0–14)
TSH SERPL DL<=0.05 MIU/L-ACNC: 11.2 UIU/ML (ref 0.27–4.2)
WBC OTHER # BLD: 9.4 K/UL (ref 3.5–11)

## 2025-01-28 PROCEDURE — 6360000004 HC RX CONTRAST MEDICATION: Performed by: EMERGENCY MEDICINE

## 2025-01-28 PROCEDURE — 2500000003 HC RX 250 WO HCPCS: Performed by: EMERGENCY MEDICINE

## 2025-01-28 PROCEDURE — 71045 X-RAY EXAM CHEST 1 VIEW: CPT

## 2025-01-28 PROCEDURE — 87899 AGENT NOS ASSAY W/OPTIC: CPT

## 2025-01-28 PROCEDURE — 84484 ASSAY OF TROPONIN QUANT: CPT

## 2025-01-28 PROCEDURE — 80053 COMPREHEN METABOLIC PANEL: CPT

## 2025-01-28 PROCEDURE — 6360000002 HC RX W HCPCS

## 2025-01-28 PROCEDURE — 99285 EMERGENCY DEPT VISIT HI MDM: CPT

## 2025-01-28 PROCEDURE — 93005 ELECTROCARDIOGRAM TRACING: CPT | Performed by: EMERGENCY MEDICINE

## 2025-01-28 PROCEDURE — 84145 PROCALCITONIN (PCT): CPT

## 2025-01-28 PROCEDURE — 6370000000 HC RX 637 (ALT 250 FOR IP): Performed by: EMERGENCY MEDICINE

## 2025-01-28 PROCEDURE — 85025 COMPLETE CBC W/AUTO DIFF WBC: CPT

## 2025-01-28 PROCEDURE — 6370000000 HC RX 637 (ALT 250 FOR IP)

## 2025-01-28 PROCEDURE — 36415 COLL VENOUS BLD VENIPUNCTURE: CPT

## 2025-01-28 PROCEDURE — 96374 THER/PROPH/DIAG INJ IV PUSH: CPT

## 2025-01-28 PROCEDURE — 87636 SARSCOV2 & INF A&B AMP PRB: CPT

## 2025-01-28 PROCEDURE — 87641 MR-STAPH DNA AMP PROBE: CPT

## 2025-01-28 PROCEDURE — 6360000002 HC RX W HCPCS: Performed by: EMERGENCY MEDICINE

## 2025-01-28 PROCEDURE — 96375 TX/PRO/DX INJ NEW DRUG ADDON: CPT

## 2025-01-28 PROCEDURE — 74177 CT ABD & PELVIS W/CONTRAST: CPT

## 2025-01-28 PROCEDURE — 82947 ASSAY GLUCOSE BLOOD QUANT: CPT

## 2025-01-28 PROCEDURE — 84443 ASSAY THYROID STIM HORMONE: CPT

## 2025-01-28 PROCEDURE — 2500000003 HC RX 250 WO HCPCS

## 2025-01-28 PROCEDURE — 84439 ASSAY OF FREE THYROXINE: CPT

## 2025-01-28 PROCEDURE — 0202U NFCT DS 22 TRGT SARS-COV-2: CPT

## 2025-01-28 PROCEDURE — 2580000003 HC RX 258: Performed by: EMERGENCY MEDICINE

## 2025-01-28 PROCEDURE — 83735 ASSAY OF MAGNESIUM: CPT

## 2025-01-28 PROCEDURE — 83690 ASSAY OF LIPASE: CPT

## 2025-01-28 PROCEDURE — 86738 MYCOPLASMA ANTIBODY: CPT

## 2025-01-28 PROCEDURE — 2060000000 HC ICU INTERMEDIATE R&B

## 2025-01-28 PROCEDURE — 87449 NOS EACH ORGANISM AG IA: CPT

## 2025-01-28 RX ORDER — ACETAMINOPHEN 650 MG/1
650 SUPPOSITORY RECTAL EVERY 6 HOURS PRN
Status: DISCONTINUED | OUTPATIENT
Start: 2025-01-28 | End: 2025-01-30 | Stop reason: HOSPADM

## 2025-01-28 RX ORDER — METOPROLOL TARTRATE 25 MG/1
25 TABLET, FILM COATED ORAL 2 TIMES DAILY
Status: DISCONTINUED | OUTPATIENT
Start: 2025-01-28 | End: 2025-01-30 | Stop reason: HOSPADM

## 2025-01-28 RX ORDER — MORPHINE SULFATE 4 MG/ML
4 INJECTION, SOLUTION INTRAMUSCULAR; INTRAVENOUS
Status: COMPLETED | OUTPATIENT
Start: 2025-01-28 | End: 2025-01-28

## 2025-01-28 RX ORDER — DULOXETIN HYDROCHLORIDE 60 MG/1
60 CAPSULE, DELAYED RELEASE ORAL DAILY
Status: DISCONTINUED | OUTPATIENT
Start: 2025-01-28 | End: 2025-01-30 | Stop reason: HOSPADM

## 2025-01-28 RX ORDER — MAGNESIUM SULFATE HEPTAHYDRATE 40 MG/ML
2000 INJECTION, SOLUTION INTRAVENOUS PRN
Status: DISCONTINUED | OUTPATIENT
Start: 2025-01-28 | End: 2025-01-30 | Stop reason: HOSPADM

## 2025-01-28 RX ORDER — LISINOPRIL 5 MG/1
2.5 TABLET ORAL DAILY
Status: DISCONTINUED | OUTPATIENT
Start: 2025-01-28 | End: 2025-01-30 | Stop reason: HOSPADM

## 2025-01-28 RX ORDER — INSULIN LISPRO 100 [IU]/ML
0-16 INJECTION, SOLUTION INTRAVENOUS; SUBCUTANEOUS
Status: DISCONTINUED | OUTPATIENT
Start: 2025-01-28 | End: 2025-01-30 | Stop reason: HOSPADM

## 2025-01-28 RX ORDER — ACETAMINOPHEN 325 MG/1
650 TABLET ORAL EVERY 6 HOURS PRN
Status: DISCONTINUED | OUTPATIENT
Start: 2025-01-28 | End: 2025-01-30 | Stop reason: HOSPADM

## 2025-01-28 RX ORDER — ONDANSETRON 4 MG/1
4 TABLET, ORALLY DISINTEGRATING ORAL EVERY 8 HOURS PRN
Status: DISCONTINUED | OUTPATIENT
Start: 2025-01-28 | End: 2025-01-30 | Stop reason: HOSPADM

## 2025-01-28 RX ORDER — ONDANSETRON 2 MG/ML
4 INJECTION INTRAMUSCULAR; INTRAVENOUS EVERY 6 HOURS PRN
Status: DISCONTINUED | OUTPATIENT
Start: 2025-01-28 | End: 2025-01-30 | Stop reason: HOSPADM

## 2025-01-28 RX ORDER — 0.9 % SODIUM CHLORIDE 0.9 %
1000 INTRAVENOUS SOLUTION INTRAVENOUS ONCE
Status: COMPLETED | OUTPATIENT
Start: 2025-01-28 | End: 2025-01-28

## 2025-01-28 RX ORDER — SODIUM CHLORIDE 0.9 % (FLUSH) 0.9 %
5-40 SYRINGE (ML) INJECTION PRN
Status: DISCONTINUED | OUTPATIENT
Start: 2025-01-28 | End: 2025-01-30 | Stop reason: HOSPADM

## 2025-01-28 RX ORDER — ASPIRIN 81 MG/1
81 TABLET ORAL DAILY
Status: DISCONTINUED | OUTPATIENT
Start: 2025-01-28 | End: 2025-01-30 | Stop reason: HOSPADM

## 2025-01-28 RX ORDER — SODIUM CHLORIDE 0.9 % (FLUSH) 0.9 %
10 SYRINGE (ML) INJECTION PRN
Status: DISCONTINUED | OUTPATIENT
Start: 2025-01-28 | End: 2025-01-30 | Stop reason: HOSPADM

## 2025-01-28 RX ORDER — SODIUM CHLORIDE 0.9 % (FLUSH) 0.9 %
5-40 SYRINGE (ML) INJECTION EVERY 12 HOURS SCHEDULED
Status: DISCONTINUED | OUTPATIENT
Start: 2025-01-28 | End: 2025-01-30 | Stop reason: HOSPADM

## 2025-01-28 RX ORDER — IOPAMIDOL 755 MG/ML
75 INJECTION, SOLUTION INTRAVASCULAR
Status: COMPLETED | OUTPATIENT
Start: 2025-01-28 | End: 2025-01-28

## 2025-01-28 RX ORDER — DEXTROSE MONOHYDRATE 100 MG/ML
INJECTION, SOLUTION INTRAVENOUS CONTINUOUS PRN
Status: DISCONTINUED | OUTPATIENT
Start: 2025-01-28 | End: 2025-01-30 | Stop reason: HOSPADM

## 2025-01-28 RX ORDER — ROPINIROLE 0.5 MG/1
0.5 TABLET, FILM COATED ORAL 3 TIMES DAILY
Status: DISCONTINUED | OUTPATIENT
Start: 2025-01-28 | End: 2025-01-30 | Stop reason: HOSPADM

## 2025-01-28 RX ORDER — LEVOFLOXACIN 5 MG/ML
750 INJECTION, SOLUTION INTRAVENOUS EVERY 24 HOURS
Status: DISCONTINUED | OUTPATIENT
Start: 2025-01-29 | End: 2025-01-30 | Stop reason: HOSPADM

## 2025-01-28 RX ORDER — ATORVASTATIN CALCIUM 80 MG/1
80 TABLET, FILM COATED ORAL DAILY
Status: DISCONTINUED | OUTPATIENT
Start: 2025-01-28 | End: 2025-01-30 | Stop reason: HOSPADM

## 2025-01-28 RX ORDER — POTASSIUM CHLORIDE 1500 MG/1
10 TABLET, EXTENDED RELEASE ORAL 2 TIMES DAILY
Status: DISCONTINUED | OUTPATIENT
Start: 2025-01-28 | End: 2025-01-30 | Stop reason: HOSPADM

## 2025-01-28 RX ORDER — INSULIN GLARGINE 100 [IU]/ML
34 INJECTION, SOLUTION SUBCUTANEOUS 2 TIMES DAILY
Status: DISCONTINUED | OUTPATIENT
Start: 2025-01-28 | End: 2025-01-30 | Stop reason: HOSPADM

## 2025-01-28 RX ORDER — SODIUM CHLORIDE 9 MG/ML
INJECTION, SOLUTION INTRAVENOUS PRN
Status: DISCONTINUED | OUTPATIENT
Start: 2025-01-28 | End: 2025-01-30 | Stop reason: HOSPADM

## 2025-01-28 RX ORDER — PANTOPRAZOLE SODIUM 40 MG/1
40 TABLET, DELAYED RELEASE ORAL
Status: DISCONTINUED | OUTPATIENT
Start: 2025-01-28 | End: 2025-01-30 | Stop reason: HOSPADM

## 2025-01-28 RX ORDER — POLYETHYLENE GLYCOL 3350 17 G/17G
17 POWDER, FOR SOLUTION ORAL DAILY PRN
Status: DISCONTINUED | OUTPATIENT
Start: 2025-01-28 | End: 2025-01-30 | Stop reason: HOSPADM

## 2025-01-28 RX ORDER — POTASSIUM CHLORIDE 1500 MG/1
40 TABLET, EXTENDED RELEASE ORAL PRN
Status: DISCONTINUED | OUTPATIENT
Start: 2025-01-28 | End: 2025-01-30 | Stop reason: HOSPADM

## 2025-01-28 RX ORDER — FUROSEMIDE 20 MG/1
20 TABLET ORAL DAILY
Status: DISCONTINUED | OUTPATIENT
Start: 2025-01-28 | End: 2025-01-30 | Stop reason: HOSPADM

## 2025-01-28 RX ORDER — OSELTAMIVIR PHOSPHATE 75 MG/1
75 CAPSULE ORAL 2 TIMES DAILY
Status: DISCONTINUED | OUTPATIENT
Start: 2025-01-28 | End: 2025-01-30 | Stop reason: HOSPADM

## 2025-01-28 RX ORDER — MAGNESIUM SULFATE HEPTAHYDRATE 40 MG/ML
2000 INJECTION, SOLUTION INTRAVENOUS ONCE
Status: COMPLETED | OUTPATIENT
Start: 2025-01-28 | End: 2025-01-28

## 2025-01-28 RX ORDER — LEVOFLOXACIN 5 MG/ML
750 INJECTION, SOLUTION INTRAVENOUS EVERY 24 HOURS
Status: DISCONTINUED | OUTPATIENT
Start: 2025-01-28 | End: 2025-01-28

## 2025-01-28 RX ORDER — POTASSIUM CHLORIDE 7.45 MG/ML
10 INJECTION INTRAVENOUS PRN
Status: DISCONTINUED | OUTPATIENT
Start: 2025-01-28 | End: 2025-01-30 | Stop reason: HOSPADM

## 2025-01-28 RX ORDER — 0.9 % SODIUM CHLORIDE 0.9 %
100 INTRAVENOUS SOLUTION INTRAVENOUS ONCE
Status: COMPLETED | OUTPATIENT
Start: 2025-01-28 | End: 2025-01-28

## 2025-01-28 RX ORDER — ENOXAPARIN SODIUM 100 MG/ML
30 INJECTION SUBCUTANEOUS 2 TIMES DAILY
Status: DISCONTINUED | OUTPATIENT
Start: 2025-01-28 | End: 2025-01-30 | Stop reason: HOSPADM

## 2025-01-28 RX ORDER — ONDANSETRON 2 MG/ML
4 INJECTION INTRAMUSCULAR; INTRAVENOUS ONCE
Status: COMPLETED | OUTPATIENT
Start: 2025-01-28 | End: 2025-01-28

## 2025-01-28 RX ADMIN — SODIUM CHLORIDE 1000 ML: 9 INJECTION, SOLUTION INTRAVENOUS at 12:42

## 2025-01-28 RX ADMIN — LEVOFLOXACIN 750 MG: 5 INJECTION, SOLUTION INTRAVENOUS at 16:04

## 2025-01-28 RX ADMIN — SODIUM CHLORIDE, PRESERVATIVE FREE 10 ML: 5 INJECTION INTRAVENOUS at 20:49

## 2025-01-28 RX ADMIN — INSULIN LISPRO 4 UNITS: 100 INJECTION, SOLUTION INTRAVENOUS; SUBCUTANEOUS at 20:45

## 2025-01-28 RX ADMIN — OSELTAMIVIR PHOSPHATE 75 MG: 75 CAPSULE ORAL at 15:33

## 2025-01-28 RX ADMIN — METOPROLOL TARTRATE 25 MG: 25 TABLET, FILM COATED ORAL at 20:48

## 2025-01-28 RX ADMIN — POTASSIUM CHLORIDE 10 MEQ: 20 TABLET, EXTENDED RELEASE ORAL at 20:45

## 2025-01-28 RX ADMIN — SODIUM CHLORIDE 100 ML: 9 INJECTION, SOLUTION INTRAVENOUS at 13:31

## 2025-01-28 RX ADMIN — MAGNESIUM SULFATE HEPTAHYDRATE 2000 MG: 40 INJECTION, SOLUTION INTRAVENOUS at 21:13

## 2025-01-28 RX ADMIN — ENOXAPARIN SODIUM 30 MG: 100 INJECTION SUBCUTANEOUS at 20:45

## 2025-01-28 RX ADMIN — MORPHINE SULFATE 4 MG: 4 INJECTION, SOLUTION INTRAMUSCULAR; INTRAVENOUS at 12:35

## 2025-01-28 RX ADMIN — ONDANSETRON 4 MG: 2 INJECTION, SOLUTION INTRAMUSCULAR; INTRAVENOUS at 12:39

## 2025-01-28 RX ADMIN — SODIUM CHLORIDE, PRESERVATIVE FREE 10 ML: 5 INJECTION INTRAVENOUS at 13:31

## 2025-01-28 RX ADMIN — INSULIN GLARGINE 34 UNITS: 100 INJECTION, SOLUTION SUBCUTANEOUS at 20:46

## 2025-01-28 RX ADMIN — IOPAMIDOL 75 ML: 755 INJECTION, SOLUTION INTRAVENOUS at 13:31

## 2025-01-28 RX ADMIN — ROPINIROLE HYDROCHLORIDE 0.5 MG: 0.5 TABLET, FILM COATED ORAL at 20:45

## 2025-01-28 ASSESSMENT — ENCOUNTER SYMPTOMS
SHORTNESS OF BREATH: 1
VOMITING: 1
CONSTIPATION: 1
ABDOMINAL PAIN: 1
COLOR CHANGE: 0
DIARRHEA: 1
NAUSEA: 1
COUGH: 1

## 2025-01-28 ASSESSMENT — PAIN DESCRIPTION - LOCATION
LOCATION: ABDOMEN
LOCATION: ABDOMEN

## 2025-01-28 ASSESSMENT — PAIN - FUNCTIONAL ASSESSMENT: PAIN_FUNCTIONAL_ASSESSMENT: 0-10

## 2025-01-28 ASSESSMENT — PAIN DESCRIPTION - DESCRIPTORS: DESCRIPTORS: GNAWING

## 2025-01-28 ASSESSMENT — PAIN SCALES - GENERAL
PAINLEVEL_OUTOF10: 5
PAINLEVEL_OUTOF10: 3

## 2025-01-28 NOTE — ED NOTES
Pt denies wanting home medications at this time as patient doesn't want to upset her stomach, pt has had no home meds today.

## 2025-01-28 NOTE — ED NOTES
Patient's oxygen level noticed to be at 88 on room air. When writer when to check on patient she was lying on her side. Writer sat patient up straight and patient did not come back up. Patient dropped down to 77 on room air. Patient placed on 2L nc physician notified.

## 2025-01-28 NOTE — H&P
excreted contrast material in the renal collecting systems. GI/Bowel: The stomach is unremarkable.  The small and large bowel are nondilated.  No evidence of bowel obstruction.  Colonic diverticulosis without evidence of acute diverticulitis.  Normal appendix. Pelvis: The urinary bladder and uterus are unremarkable. Peritoneum/Retroperitoneum: No free intraperitoneal air or free fluid.  No evidence of lymphadenopathy. Bones/Soft Tissues: No acute bony or soft tissue abnormality identified.     1. Bilateral perinephric fat stranding, which is nonspecific and can be seen in the setting of pyelonephritis. Correlate with urinalysis. 2. No other acute pathology identified in the abdomen or pelvis.     XR CHEST PORTABLE    Result Date: 1/28/2025  EXAMINATION: ONE XRAY VIEW OF THE CHEST 1/28/2025 9:50 am COMPARISON: 06/07/2024 HISTORY: ORDERING SYSTEM PROVIDED HISTORY: cough TECHNOLOGIST PROVIDED HISTORY: cough Reason for Exam: cough FINDINGS: Bibasilar atelectasis with possible developing infiltrate in the right base.. There is no effusion or pneumothorax. The cardiomediastinal silhouette is stable. The osseous structures are stable.     Bibasilar atelectasis with possible developing infiltrate in the right base.     US PELVIS COMPLETE NON-OB TRANSABDOMINAL AND TRANSVAGINAL    Result Date: 1/6/2025  Table formatting from the original result was not included. GYNECOLOGY ULTRASOUND REPORT [facility]; [address] [place], [place] [id number] [phone] mn [phone] Fax 1/6/2025 MRN: [id number] Contact Serial #: [id number] Amelia Moseley YOB: 1955 Age: 69 y.o. PCP: Agnes Acharya The ultrasound images were reviewed. Please see the attached ultrasound report. Ultrasonographer: Lisa Chavez RDMS Assessment: Amelia Moseley is a 69 y.o. female 1. PMB (postmenopausal bleeding)  Specific Ultrasound Imaging Obtained: Transabdominal Approach: Yes Transvaginal Approach:

## 2025-01-28 NOTE — ED PROVIDER NOTES
oz)      Height:         MEDICATIONS GIVEN TO PATIENT THIS ENCOUNTER:  Orders Placed This Encounter   Medications    morphine injection 4 mg    ondansetron (ZOFRAN) injection 4 mg    sodium chloride 0.9 % bolus 1,000 mL    iopamidol (ISOVUE-370) 76 % injection 75 mL    sodium chloride flush 0.9 % injection 10 mL    sodium chloride 0.9 % bolus 100 mL    oseltamivir (TAMIFLU) capsule 75 mg    DISCONTD: levoFLOXacin (LEVAQUIN) 750 MG/150ML infusion 750 mg     Order Specific Question:   Antimicrobial Indications     Answer:   Pneumonia (CAP)    aspirin EC tablet 81 mg    atorvastatin (LIPITOR) tablet 80 mg    DULoxetine (CYMBALTA) extended release capsule 60 mg    furosemide (LASIX) tablet 20 mg    insulin glargine (LANTUS) injection vial 34 Units    insulin lispro (HUMALOG,ADMELOG) injection vial 0-16 Units    levoFLOXacin (LEVAQUIN) 750 MG/150ML infusion 750 mg     Order Specific Question:   Antimicrobial Indications     Answer:   Pneumonia (CAP)     Order Specific Question:   CAP duration of therapy     Answer:   5 days    levothyroxine (SYNTHROID) tablet 175 mcg    lisinopril (PRINIVIL;ZESTRIL) tablet 2.5 mg    metoprolol tartrate (LOPRESSOR) tablet 25 mg    pantoprazole (PROTONIX) tablet 40 mg    Prucalopride Succinate (MOTEGRITY) tablet 2 mg    rOPINIRole (REQUIP) tablet 0.5 mg    tiZANidine (ZANAFLEX) tablet 4 mg    glucose chewable tablet 16 g    OR Linked Order Group     dextrose bolus 10% 125 mL     dextrose bolus 10% 250 mL    glucagon injection 1 mg    dextrose 10 % infusion    sodium chloride flush 0.9 % injection 5-40 mL    sodium chloride flush 0.9 % injection 5-40 mL    0.9 % sodium chloride infusion    OR Linked Order Group     potassium chloride (KLOR-CON M) extended release tablet 40 mEq     potassium bicarb-citric acid (EFFER-K) effervescent tablet 40 mEq     potassium chloride 10 mEq/100 mL IVPB (Peripheral Line)    magnesium sulfate 2000 mg in water 50 mL IVPB    enoxaparin Sodium (LOVENOX)

## 2025-01-29 LAB
ABSOLUTE BANDS: 0.59 K/UL (ref 0–1)
ANION GAP SERPL CALCULATED.3IONS-SCNC: 11 MMOL/L (ref 9–16)
BACTERIA URNS QL MICRO: ABNORMAL
BANDS: 9 % (ref 0–10)
BASOPHILS # BLD: 0 K/UL (ref 0–0.2)
BASOPHILS NFR BLD: 0 % (ref 0–2)
BILIRUB UR QL STRIP: NEGATIVE
BUN SERPL-MCNC: 10 MG/DL (ref 8–23)
C DIFF GDH + TOXINS A+B STL QL IA.RAPID: NEGATIVE
CALCIUM SERPL-MCNC: 8.4 MG/DL (ref 8.6–10.4)
CASTS #/AREA URNS LPF: ABNORMAL /LPF
CHLORIDE SERPL-SCNC: 102 MMOL/L (ref 98–107)
CLARITY UR: CLEAR
CO2 SERPL-SCNC: 23 MMOL/L (ref 20–31)
COLOR UR: YELLOW
CREAT SERPL-MCNC: 0.6 MG/DL (ref 0.7–1.2)
EKG ATRIAL RATE: 97 BPM
EKG P AXIS: 53 DEGREES
EKG P-R INTERVAL: 148 MS
EKG Q-T INTERVAL: 368 MS
EKG QRS DURATION: 96 MS
EKG QTC CALCULATION (BAZETT): 467 MS
EKG R AXIS: -31 DEGREES
EKG T AXIS: 6 DEGREES
EKG VENTRICULAR RATE: 97 BPM
EOSINOPHIL # BLD: 0 K/UL (ref 0–0.4)
EOSINOPHILS RELATIVE PERCENT: 0 % (ref 0–4)
EPI CELLS #/AREA URNS HPF: ABNORMAL /HPF
ERYTHROCYTE [DISTWIDTH] IN BLOOD BY AUTOMATED COUNT: 13.7 % (ref 11.5–14.9)
GFR, ESTIMATED: >90 ML/MIN/1.73M2
GLUCOSE BLD-MCNC: 177 MG/DL (ref 65–105)
GLUCOSE BLD-MCNC: 178 MG/DL (ref 65–105)
GLUCOSE BLD-MCNC: 191 MG/DL (ref 65–105)
GLUCOSE BLD-MCNC: 249 MG/DL (ref 65–105)
GLUCOSE SERPL-MCNC: 236 MG/DL (ref 74–99)
GLUCOSE UR STRIP-MCNC: NEGATIVE MG/DL
HCT VFR BLD AUTO: 45.9 % (ref 36–46)
HGB BLD-MCNC: 15.2 G/DL (ref 12–16)
HGB UR QL STRIP.AUTO: NEGATIVE
KETONES UR STRIP-MCNC: NEGATIVE MG/DL
LEUKOCYTE ESTERASE UR QL STRIP: NEGATIVE
LYMPHOCYTES NFR BLD: 0.72 K/UL (ref 1–4.8)
LYMPHOCYTES RELATIVE PERCENT: 11 % (ref 24–44)
M PNEUMO IGM SER QL IA: 0.09
MCH RBC QN AUTO: 29.9 PG (ref 26–34)
MCHC RBC AUTO-ENTMCNC: 33.1 G/DL (ref 31–37)
MCV RBC AUTO: 90.2 FL (ref 80–100)
METAMYELOCYTES ABSOLUTE COUNT: 0.07 K/UL
METAMYELOCYTES: 1 %
MONOCYTES NFR BLD: 0.98 K/UL (ref 0.1–1.3)
MONOCYTES NFR BLD: 15 % (ref 1–7)
MORPHOLOGY: NORMAL
MRSA, DNA, NASAL: ABNORMAL
NEUTROPHILS NFR BLD: 64 % (ref 36–66)
NEUTS SEG NFR BLD: 4.14 K/UL (ref 1.3–9.1)
NITRITE UR QL STRIP: NEGATIVE
PH UR STRIP: 5.5 [PH] (ref 5–8)
PLATELET # BLD AUTO: 149 K/UL (ref 150–450)
PMV BLD AUTO: 10.6 FL (ref 6–12)
POTASSIUM SERPL-SCNC: 4.4 MMOL/L (ref 3.7–5.3)
PROT UR STRIP-MCNC: ABNORMAL MG/DL
RBC # BLD AUTO: 5.09 M/UL (ref 4–5.2)
RBC #/AREA URNS HPF: ABNORMAL /HPF
SODIUM SERPL-SCNC: 136 MMOL/L (ref 136–145)
SP GR UR STRIP: 1.02 (ref 1–1.03)
SPECIMEN DESCRIPTION: ABNORMAL
SPECIMEN DESCRIPTION: NORMAL
UROBILINOGEN UR STRIP-ACNC: NORMAL EU/DL (ref 0–1)
WBC #/AREA URNS HPF: ABNORMAL /HPF
WBC OTHER # BLD: 6.5 K/UL (ref 3.5–11)

## 2025-01-29 PROCEDURE — 97110 THERAPEUTIC EXERCISES: CPT

## 2025-01-29 PROCEDURE — 6370000000 HC RX 637 (ALT 250 FOR IP): Performed by: INTERNAL MEDICINE

## 2025-01-29 PROCEDURE — 97162 PT EVAL MOD COMPLEX 30 MIN: CPT

## 2025-01-29 PROCEDURE — 2500000003 HC RX 250 WO HCPCS

## 2025-01-29 PROCEDURE — 85025 COMPLETE CBC W/AUTO DIFF WBC: CPT

## 2025-01-29 PROCEDURE — 80048 BASIC METABOLIC PNL TOTAL CA: CPT

## 2025-01-29 PROCEDURE — 81001 URINALYSIS AUTO W/SCOPE: CPT

## 2025-01-29 PROCEDURE — 6370000000 HC RX 637 (ALT 250 FOR IP)

## 2025-01-29 PROCEDURE — 87205 SMEAR GRAM STAIN: CPT

## 2025-01-29 PROCEDURE — 82947 ASSAY GLUCOSE BLOOD QUANT: CPT

## 2025-01-29 PROCEDURE — 6360000002 HC RX W HCPCS

## 2025-01-29 PROCEDURE — 87324 CLOSTRIDIUM AG IA: CPT

## 2025-01-29 PROCEDURE — 87070 CULTURE OTHR SPECIMN AEROBIC: CPT

## 2025-01-29 PROCEDURE — 93010 ELECTROCARDIOGRAM REPORT: CPT | Performed by: INTERNAL MEDICINE

## 2025-01-29 PROCEDURE — 2500000003 HC RX 250 WO HCPCS: Performed by: EMERGENCY MEDICINE

## 2025-01-29 PROCEDURE — 2060000000 HC ICU INTERMEDIATE R&B

## 2025-01-29 PROCEDURE — 36415 COLL VENOUS BLD VENIPUNCTURE: CPT

## 2025-01-29 PROCEDURE — 87449 NOS EACH ORGANISM AG IA: CPT

## 2025-01-29 RX ORDER — BENZONATATE 100 MG/1
100 CAPSULE ORAL 3 TIMES DAILY PRN
Status: DISCONTINUED | OUTPATIENT
Start: 2025-01-29 | End: 2025-01-30 | Stop reason: HOSPADM

## 2025-01-29 RX ADMIN — ATORVASTATIN CALCIUM 80 MG: 80 TABLET, FILM COATED ORAL at 08:42

## 2025-01-29 RX ADMIN — ROPINIROLE HYDROCHLORIDE 0.5 MG: 0.5 TABLET, FILM COATED ORAL at 08:42

## 2025-01-29 RX ADMIN — POTASSIUM CHLORIDE 10 MEQ: 20 TABLET, EXTENDED RELEASE ORAL at 08:41

## 2025-01-29 RX ADMIN — LEVOTHYROXINE SODIUM 175 MCG: 0.03 TABLET ORAL at 08:43

## 2025-01-29 RX ADMIN — METOPROLOL TARTRATE 25 MG: 25 TABLET, FILM COATED ORAL at 20:42

## 2025-01-29 RX ADMIN — LEVOTHYROXINE SODIUM 175 MCG: 0.03 TABLET ORAL at 06:00

## 2025-01-29 RX ADMIN — ACETAMINOPHEN 650 MG: 325 TABLET ORAL at 06:00

## 2025-01-29 RX ADMIN — METOPROLOL TARTRATE 25 MG: 25 TABLET, FILM COATED ORAL at 08:42

## 2025-01-29 RX ADMIN — SODIUM CHLORIDE, PRESERVATIVE FREE 10 ML: 5 INJECTION INTRAVENOUS at 17:59

## 2025-01-29 RX ADMIN — INSULIN GLARGINE 34 UNITS: 100 INJECTION, SOLUTION SUBCUTANEOUS at 08:45

## 2025-01-29 RX ADMIN — INSULIN GLARGINE 34 UNITS: 100 INJECTION, SOLUTION SUBCUTANEOUS at 20:48

## 2025-01-29 RX ADMIN — LEVOFLOXACIN 750 MG: 5 INJECTION, SOLUTION INTRAVENOUS at 18:14

## 2025-01-29 RX ADMIN — PANTOPRAZOLE SODIUM 40 MG: 40 TABLET, DELAYED RELEASE ORAL at 17:59

## 2025-01-29 RX ADMIN — INSULIN LISPRO 8 UNITS: 100 INJECTION, SOLUTION INTRAVENOUS; SUBCUTANEOUS at 08:45

## 2025-01-29 RX ADMIN — FUROSEMIDE 20 MG: 20 TABLET ORAL at 08:41

## 2025-01-29 RX ADMIN — ENOXAPARIN SODIUM 30 MG: 100 INJECTION SUBCUTANEOUS at 20:42

## 2025-01-29 RX ADMIN — SODIUM CHLORIDE, PRESERVATIVE FREE 10 ML: 5 INJECTION INTRAVENOUS at 12:17

## 2025-01-29 RX ADMIN — ROPINIROLE HYDROCHLORIDE 0.5 MG: 0.5 TABLET, FILM COATED ORAL at 13:39

## 2025-01-29 RX ADMIN — ENOXAPARIN SODIUM 30 MG: 100 INJECTION SUBCUTANEOUS at 08:41

## 2025-01-29 RX ADMIN — DULOXETINE HYDROCHLORIDE 60 MG: 60 CAPSULE, DELAYED RELEASE ORAL at 08:42

## 2025-01-29 RX ADMIN — OSELTAMIVIR PHOSPHATE 75 MG: 75 CAPSULE ORAL at 20:46

## 2025-01-29 RX ADMIN — LISINOPRIL 2.5 MG: 5 TABLET ORAL at 08:42

## 2025-01-29 RX ADMIN — INSULIN LISPRO 4 UNITS: 100 INJECTION, SOLUTION INTRAVENOUS; SUBCUTANEOUS at 12:11

## 2025-01-29 RX ADMIN — ONDANSETRON 4 MG: 2 INJECTION, SOLUTION INTRAMUSCULAR; INTRAVENOUS at 18:11

## 2025-01-29 RX ADMIN — POTASSIUM CHLORIDE 10 MEQ: 20 TABLET, EXTENDED RELEASE ORAL at 20:41

## 2025-01-29 RX ADMIN — ROPINIROLE HYDROCHLORIDE 0.5 MG: 0.5 TABLET, FILM COATED ORAL at 20:42

## 2025-01-29 RX ADMIN — ONDANSETRON 4 MG: 2 INJECTION, SOLUTION INTRAMUSCULAR; INTRAVENOUS at 12:17

## 2025-01-29 RX ADMIN — OSELTAMIVIR PHOSPHATE 75 MG: 75 CAPSULE ORAL at 08:43

## 2025-01-29 RX ADMIN — PANTOPRAZOLE SODIUM 40 MG: 40 TABLET, DELAYED RELEASE ORAL at 05:59

## 2025-01-29 RX ADMIN — SODIUM CHLORIDE, PRESERVATIVE FREE 10 ML: 5 INJECTION INTRAVENOUS at 20:46

## 2025-01-29 RX ADMIN — ASPIRIN 81 MG: 81 TABLET, COATED ORAL at 08:42

## 2025-01-29 RX ADMIN — BENZONATATE 100 MG: 100 CAPSULE ORAL at 13:39

## 2025-01-29 RX ADMIN — SODIUM CHLORIDE, PRESERVATIVE FREE 10 ML: 5 INJECTION INTRAVENOUS at 08:41

## 2025-01-29 ASSESSMENT — PAIN SCALES - GENERAL: PAINLEVEL_OUTOF10: 4

## 2025-01-29 ASSESSMENT — ENCOUNTER SYMPTOMS
NAUSEA: 1
EYE PAIN: 0
DIARRHEA: 1
CONSTIPATION: 1
BACK PAIN: 0
SHORTNESS OF BREATH: 1
COLOR CHANGE: 0
VOMITING: 1
COUGH: 1
ABDOMINAL PAIN: 1

## 2025-01-29 NOTE — PLAN OF CARE
Problem: Chronic Conditions and Co-morbidities  Goal: Patient's chronic conditions and co-morbidity symptoms are monitored and maintained or improved  1/29/2025 1503 by Flor Ireland RN  Outcome: Progressing     Problem: Discharge Planning  Goal: Discharge to home or other facility with appropriate resources  1/29/2025 1503 by Flor Ireland, RN  Outcome: Progressing     Problem: Pain  Goal: Verbalizes/displays adequate comfort level or baseline comfort level  1/29/2025 1503 by Flor Ireland RN  Outcome: Progressing     Problem: Safety - Adult  Goal: Free from fall injury  1/29/2025 1503 by Flor Ireland RN  Outcome: Progressing     Problem: ABCDS Injury Assessment  Goal: Absence of physical injury  1/29/2025 1503 by Flor Ireland RN  Outcome: Progressing

## 2025-01-29 NOTE — CARE COORDINATION
Case Management Assessment  Initial Evaluation    Date/Time of Evaluation: 1/29/2025 9:26 AM  Assessment Completed by: Blanquita Haley RN    If patient is discharged prior to next notation, then this note serves as note for discharge by case management.    Patient Name: Amelia Moseley                   YOB: 1955  Diagnosis: Acute hypoxic respiratory failure [J96.01]                   Date / Time: 1/28/2025 11:40 AM    Patient Admission Status: Inpatient   Readmission Risk (Low < 19, Mod (19-27), High > 27): Readmission Risk Score: 11.9    Current PCP: Agnes Acharya MD  PCP verified by CM? Yes    Chart Reviewed: Yes      History Provided by: Patient  Patient Orientation: Alert and Oriented    Patient Cognition: Alert    Hospitalization in the last 30 days (Readmission):  No    If yes, Readmission Assessment in  Navigator will be completed.    Advance Directives:      Code Status: Full Code   Patient's Primary Decision Maker is: Legal Next of Kin    Primary Decision Maker: [facility] - [phone]    Discharge Planning:    Patient lives with: Spouse/Significant Other, Children Type of Home: House  Primary Care Giver: Self  Patient Support Systems include: Spouse/Significant Other, Family Members   Current Financial resources: Medicare  Current community resources: None  Current services prior to admission: None            Current DME:              Type of Home Care services:  None    ADLS  Prior functional level: Independent in ADLs/IADLs  Current functional level: Independent in ADLs/IADLs    PT AM-PAC:   /24  OT AM-PAC:   /24    Family can provide assistance at DC: No  Would you like Case Management to discuss the discharge plan with any other family members/significant others, and if so, who? No  Plans to Return to Present Housing: Yes  Other Identified Issues/Barriers to RETURNING to current housing: no barriers   Potential Assistance needed at discharge: N/A

## 2025-01-29 NOTE — PLAN OF CARE
Problem: Chronic Conditions and Co-morbidities  Goal: Patient's chronic conditions and co-morbidity symptoms are monitored and maintained or improved  Outcome: Progressing    Problem: Discharge Planning  Goal: Discharge to home or other facility with appropriate resources  Outcome: Progressing    Problem: Pain  Goal: Verbalizes/displays adequate comfort level or baseline comfort level  Outcome: Progressing    Problem: Safety - Adult  Goal: Free from fall injury  Outcome: Progressing     Problem: ABCDS Injury Assessment  Goal: Absence of physical injury  Outcome: Progressing

## 2025-01-29 NOTE — ACP (ADVANCE CARE PLANNING)
Advance Care Planning     Advance Care Planning Activator (Inpatient)  Conversation Note      Date of ACP Conversation: 1/29/2025     Conversation Conducted with: Patient with Decision Making Capacity    ACP Activator: Blanquita Haley RN      Health Care Decision Maker:     Current Designated Health Care Decision Maker:       Today we discussed Healthcare Decision Makers. The patient is considering options.    Patient does have 2 adult children.     Care Preferences    Ventilation:  \"If you were in your present state of health and suddenly became very ill and were unable to breathe on your own, what would your preference be about the use of a ventilator (breathing machine) if it were available to you?\"      Would the patient desire the use of ventilator (breathing machine)?: yes    \"If your health worsens and it becomes clear that your chance of recovery is unlikely, what would your preference be about the use of a ventilator (breathing machine) if it were available to you?\"     Would the patient desire the use of ventilator (breathing machine)?: Yes      Resuscitation  \"CPR works best to restart the heart when there is a sudden event, like a heart attack, in someone who is otherwise healthy. Unfortunately, CPR does not typically restart the heart for people who have serious health conditions or who are very sick.\"    \"In the event your heart stopped as a result of an underlying serious health condition, would you want attempts to be made to restart your heart (answer \"yes\" for attempt to resuscitate) or would you prefer a natural death (answer \"no\" for do not attempt to resuscitate)?\" yes       [] Yes   [] No   Educated Patient / Decision Maker regarding differences between Advance Directives and portable DNR orders.    Length of ACP Conversation in minutes:      Conversation Outcomes:  ACP discussion completed    Follow-up plan:    [] Schedule follow-up conversation to continue planning  [] Referred individual

## 2025-01-30 VITALS
SYSTOLIC BLOOD PRESSURE: 110 MMHG | HEIGHT: 62 IN | WEIGHT: 236.33 LBS | TEMPERATURE: 98.4 F | DIASTOLIC BLOOD PRESSURE: 60 MMHG | RESPIRATION RATE: 16 BRPM | BODY MASS INDEX: 43.49 KG/M2 | OXYGEN SATURATION: 93 % | HEART RATE: 68 BPM

## 2025-01-30 LAB
ANION GAP SERPL CALCULATED.3IONS-SCNC: 10 MMOL/L (ref 9–16)
ATYPICAL LYMPHOCYTE ABSOLUTE COUNT: 1.05 K/UL
ATYPICAL LYMPHOCYTES: 30 %
BASOPHILS # BLD: 0 K/UL (ref 0–0.2)
BASOPHILS NFR BLD: 0 % (ref 0–2)
BUN SERPL-MCNC: 9 MG/DL (ref 8–23)
CALCIUM SERPL-MCNC: 8.8 MG/DL (ref 8.6–10.4)
CHLORIDE SERPL-SCNC: 104 MMOL/L (ref 98–107)
CO2 SERPL-SCNC: 23 MMOL/L (ref 20–31)
CREAT SERPL-MCNC: 0.6 MG/DL (ref 0.7–1.2)
EOSINOPHIL # BLD: 0 K/UL (ref 0–0.4)
EOSINOPHILS RELATIVE PERCENT: 0 % (ref 0–4)
ERYTHROCYTE [DISTWIDTH] IN BLOOD BY AUTOMATED COUNT: 13.9 % (ref 11.5–14.9)
GFR, ESTIMATED: >90 ML/MIN/1.73M2
GLUCOSE BLD-MCNC: 173 MG/DL (ref 65–105)
GLUCOSE BLD-MCNC: 215 MG/DL (ref 65–105)
GLUCOSE BLD-MCNC: 236 MG/DL (ref 65–105)
GLUCOSE SERPL-MCNC: 192 MG/DL (ref 74–99)
HCT VFR BLD AUTO: 46.9 % (ref 36–46)
HGB BLD-MCNC: 15.4 G/DL (ref 12–16)
LYMPHOCYTES NFR BLD: 0.7 K/UL (ref 1–4.8)
LYMPHOCYTES RELATIVE PERCENT: 20 % (ref 24–44)
MCH RBC QN AUTO: 29.9 PG (ref 26–34)
MCHC RBC AUTO-ENTMCNC: 32.9 G/DL (ref 31–37)
MCV RBC AUTO: 91 FL (ref 80–100)
MICROORGANISM SPEC CULT: ABNORMAL
MICROORGANISM/AGENT SPEC: ABNORMAL
MONOCYTES NFR BLD: 0.46 K/UL (ref 0.1–1.3)
MONOCYTES NFR BLD: 13 % (ref 1–7)
MORPHOLOGY: NORMAL
NEUTROPHILS NFR BLD: 37 % (ref 36–66)
NEUTS SEG NFR BLD: 1.29 K/UL (ref 1.3–9.1)
PLATELET # BLD AUTO: 147 K/UL (ref 150–450)
PMV BLD AUTO: 10.6 FL (ref 6–12)
POTASSIUM SERPL-SCNC: 3.9 MMOL/L (ref 3.7–5.3)
RBC # BLD AUTO: 5.16 M/UL (ref 4–5.2)
SODIUM SERPL-SCNC: 137 MMOL/L (ref 136–145)
SPECIMEN DESCRIPTION: ABNORMAL
WBC OTHER # BLD: 3.5 K/UL (ref 3.5–11)

## 2025-01-30 PROCEDURE — 6370000000 HC RX 637 (ALT 250 FOR IP)

## 2025-01-30 PROCEDURE — 82947 ASSAY GLUCOSE BLOOD QUANT: CPT

## 2025-01-30 PROCEDURE — 2500000003 HC RX 250 WO HCPCS

## 2025-01-30 PROCEDURE — 6360000002 HC RX W HCPCS

## 2025-01-30 PROCEDURE — 36415 COLL VENOUS BLD VENIPUNCTURE: CPT

## 2025-01-30 PROCEDURE — 80048 BASIC METABOLIC PNL TOTAL CA: CPT

## 2025-01-30 PROCEDURE — 85025 COMPLETE CBC W/AUTO DIFF WBC: CPT

## 2025-01-30 PROCEDURE — 6370000000 HC RX 637 (ALT 250 FOR IP): Performed by: INTERNAL MEDICINE

## 2025-01-30 RX ORDER — METRONIDAZOLE 500 MG/1
500 TABLET ORAL 2 TIMES DAILY
Qty: 14 TABLET | Refills: 0 | Status: SHIPPED | OUTPATIENT
Start: 2025-01-30 | End: 2025-02-06

## 2025-01-30 RX ORDER — OSELTAMIVIR PHOSPHATE 75 MG/1
75 CAPSULE ORAL 2 TIMES DAILY
Qty: 6 CAPSULE | Refills: 0 | Status: SHIPPED | OUTPATIENT
Start: 2025-01-30 | End: 2025-02-02

## 2025-01-30 RX ORDER — CIPROFLOXACIN 500 MG/1
500 TABLET, FILM COATED ORAL 2 TIMES DAILY
Qty: 14 TABLET | Refills: 0 | Status: SHIPPED | OUTPATIENT
Start: 2025-01-30 | End: 2025-02-06

## 2025-01-30 RX ORDER — ONDANSETRON 4 MG/1
4 TABLET, FILM COATED ORAL ONCE
Status: COMPLETED | OUTPATIENT
Start: 2025-01-30 | End: 2025-01-30

## 2025-01-30 RX ADMIN — INSULIN GLARGINE 34 UNITS: 100 INJECTION, SOLUTION SUBCUTANEOUS at 09:35

## 2025-01-30 RX ADMIN — ONDANSETRON HYDROCHLORIDE 4 MG: 4 TABLET, FILM COATED ORAL at 09:47

## 2025-01-30 RX ADMIN — OSELTAMIVIR PHOSPHATE 75 MG: 75 CAPSULE ORAL at 09:34

## 2025-01-30 RX ADMIN — PANTOPRAZOLE SODIUM 40 MG: 40 TABLET, DELAYED RELEASE ORAL at 05:51

## 2025-01-30 RX ADMIN — ENOXAPARIN SODIUM 30 MG: 100 INJECTION SUBCUTANEOUS at 09:35

## 2025-01-30 RX ADMIN — POTASSIUM CHLORIDE 10 MEQ: 20 TABLET, EXTENDED RELEASE ORAL at 09:34

## 2025-01-30 RX ADMIN — ATORVASTATIN CALCIUM 80 MG: 80 TABLET, FILM COATED ORAL at 09:35

## 2025-01-30 RX ADMIN — LEVOTHYROXINE SODIUM 175 MCG: 0.03 TABLET ORAL at 05:51

## 2025-01-30 RX ADMIN — FUROSEMIDE 20 MG: 20 TABLET ORAL at 09:35

## 2025-01-30 RX ADMIN — ASPIRIN 81 MG: 81 TABLET, COATED ORAL at 09:35

## 2025-01-30 RX ADMIN — BENZONATATE 100 MG: 100 CAPSULE ORAL at 05:51

## 2025-01-30 RX ADMIN — SODIUM CHLORIDE, PRESERVATIVE FREE 10 ML: 5 INJECTION INTRAVENOUS at 09:48

## 2025-01-30 RX ADMIN — DULOXETINE HYDROCHLORIDE 60 MG: 60 CAPSULE, DELAYED RELEASE ORAL at 09:34

## 2025-01-30 RX ADMIN — ROPINIROLE HYDROCHLORIDE 0.5 MG: 0.5 TABLET, FILM COATED ORAL at 09:34

## 2025-01-30 RX ADMIN — METOPROLOL TARTRATE 25 MG: 25 TABLET, FILM COATED ORAL at 09:34

## 2025-01-30 RX ADMIN — INSULIN LISPRO 4 UNITS: 100 INJECTION, SOLUTION INTRAVENOUS; SUBCUTANEOUS at 12:33

## 2025-01-30 RX ADMIN — LISINOPRIL 2.5 MG: 5 TABLET ORAL at 09:34

## 2025-01-30 ASSESSMENT — ENCOUNTER SYMPTOMS
CHEST TIGHTNESS: 0
WHEEZING: 0
COUGH: 1
NAUSEA: 1

## 2025-01-30 NOTE — DISCHARGE INSTRUCTIONS
-Do not take your tizanidine with ciprofloxacin  -See your primary care within 1 week  -If you begin to experience any symptoms such as chest pain, shortness of breath, nausea, vomiting, dizziness, drowsiness, abdominal pain, loss of consciousness, or any other symptoms you find concerning please return to the ED for follow-up evaluation.  -If you have been given medication please take them as prescribed. Do not take more medication than recommended at any given time. Finish all antibiotic  -Please follow-up with your primary care provider within 7 days for continued care.   -Please feel free return to the hospital if your symptoms worsen or any new concerning symptoms develop.  Follow-up with your primary care physician as needed for all other the concerns.

## 2025-01-30 NOTE — CARE COORDINATION
Home care RN for dressing changes every 48-72 hours.  Cleanse site with saline.  Apply Iodosorb.  Gauze covering.  Kerlix wrap.  Ace wrap.  Discussed signs and symptoms of infection.  Wound care today per NP.  No signs of infection.  Edges well approximated.   ONGOING DISCHARGE PLAN:    Patient is alert and oriented x4.    Spoke with patient regarding discharge plan and patient confirms that plan is still home. Denies needs.    +FLU A  94% on RA    Oral tamiflu  IV levaquin    +MRSA nasal  +sputum culture    PT/OT    Will continue to follow for additional discharge needs.    If patient is discharged prior to next notation, then this note serves as note for discharge by case management.    Electronically signed by Blanquita Haley RN on 1/30/2025 at 9:24 AM

## 2025-01-30 NOTE — FLOWSHEET NOTE
01/29/25 2236   Treatment Team Notification   Reason for Communication Review case   Name of Team Member Notified uAgustina Garay   Treatment Team Role Advanced Practice Nurse   Method of Communication Secure Message   Response See orders   Notification Time 2238       THEA Jackman notified that,  pt called out, tells the writer that she went to use the bathroom and all over sudden she started shaking and seen stars. Pt denies any dizziness and, currently not shaking or seeing stars. Vitals temp 98.8, HR 78, /100 and 94 RA. Pt alert and oriented X4. Per NP, writer to do a full set of orthostatic vitals.       22:41 Lying /83 HR 70, sitting 138/114 HR 87 and standing 161/117 HR 90.  No new orders.

## 2025-01-30 NOTE — PLAN OF CARE
Problem: Chronic Conditions and Co-morbidities  Goal: Patient's chronic conditions and co-morbidity symptoms are monitored and maintained or improved  Outcome: Adequate for Discharge  Flowsheets (Taken 1/30/2025 0940)  Care Plan - Patient's Chronic Conditions and Co-Morbidity Symptoms are Monitored and Maintained or Improved:   Monitor and assess patient's chronic conditions and comorbid symptoms for stability, deterioration, or improvement   Collaborate with multidisciplinary team to address chronic and comorbid conditions and prevent exacerbation or deterioration   Update acute care plan with appropriate goals if chronic or comorbid symptoms are exacerbated and prevent overall improvement and discharge     Problem: Discharge Planning  Goal: Discharge to home or other facility with appropriate resources  Outcome: Adequate for Discharge  Flowsheets (Taken 1/30/2025 0940)  Discharge to home or other facility with appropriate resources:   Identify barriers to discharge with patient and caregiver   Arrange for needed discharge resources and transportation as appropriate   Identify discharge learning needs (meds, wound care, etc)   Refer to discharge planning if patient needs post-hospital services based on physician order or complex needs related to functional status, cognitive ability or social support system     Problem: Pain  Goal: Verbalizes/displays adequate comfort level or baseline comfort level  Outcome: Adequate for Discharge     Problem: Safety - Adult  Goal: Free from fall injury  Outcome: Adequate for Discharge     Problem: ABCDS Injury Assessment  Goal: Absence of physical injury  Outcome: Adequate for Discharge

## 2025-01-30 NOTE — DISCHARGE INSTR - DIET
Good nutrition is important when healing from an illness, injury, or surgery.  Follow any nutrition recommendations given to you during your hospital stay.   If you were given an oral nutrition supplement while in the hospital, continue to take this supplement at home.  You can take it with meals, in-between meals, and/or before bedtime. These supplements can be purchased at most local grocery stores, pharmacies, and chain [employer]-stores.   If you have any questions about your diet or nutrition, call the hospital and ask for the dietitian.      Regular 4 Carb control

## 2025-01-30 NOTE — PLAN OF CARE
Problem: Chronic Conditions and Co-morbidities  Goal: Patient's chronic conditions and co-morbidity symptoms are monitored and maintained or improved  1/30/2025 0120 by Yesenia Campbell RN  Outcome: Progressing    Problem: Discharge Planning  Goal: Discharge to home or other facility with appropriate resources  1/30/2025 0120 by Yesenia Campbell RN  Outcome: Progressing    Problem: Pain  Goal: Verbalizes/displays adequate comfort level or baseline comfort level  1/30/2025 0120 by Yesenia Campbell RN  Outcome: Progressing    Problem: Safety - Adult  Goal: Free from fall injury  1/30/2025 0120 by Yesenia Campbell RN  Outcome: Progressing     Problem: ABCDS Injury Assessment  Goal: Absence of physical injury  1/30/2025 0120 by Yesenia Campbell RN  Outcome: Progressing

## 2025-01-30 NOTE — PROGRESS NOTES
[facility]  IN-PATIENT SERVICE  [facility]  IN-PATIENT SERVICE   [facility]                 Date:   1/29/2025  Patient name:  Amelia Moseley  Date of admission:  1/28/2025 11:40 AM  MRN:   [id number]  Account:  [id number]  YOB: 1955  PCP:    Agnes Acharya MD  Room:   2107/2107-  Code Status:    Full Code    Chief Complaint:     Chief Complaint   Patient presents with    Nausea    Vomiting    Diarrhea           Dizziness    Pleurisy       History Obtained From:     patient    History of Present Illness:   This patient is 69 years old female with past medical history significant for generalized anxiety disorder, hypothyroidism, depression, constipation, type 2 diabetes mellitus on insulin therapy, chronic diastolic congestive heart failure, restless leg syndrome who presented to the emergency department with complaints of flulike symptoms for the past 3 days.  She described a sudden onset of chills, fatigue, myalgias, nausea, vomiting and productive cough with clear sputum production.  Patient endorses shortness of breath, accompanied with intermittent chest tightness.  In addition, the patient noted diarrhea and diffuse abdominal pain, which she states have been ongoing issue since her cholecystectomy.  Patient revealed that she is following up with gastroenterology for this chronic symptoms.    In the emergency department, the patient tested positive for influenza A, WBC within normal limits, potassium low at 3.6, sodium 135, magnesium 1.5.  Her troponin was 22, second reading 24.  Chest x-ray shows bibasilar atelectasis with possible developing infiltrate in the right base.  The patient required supplemental oxygen and she was admitted for acute hypoxic respiratory failure in the context of influenza infection/pneumonia.  Past Medical History:     Past Medical History:   Diagnosis Date    
  [facility]  IN-PATIENT SERVICE  [facility]    PROGRESS NOTE             1/30/2025    8:10 AM    Name:   Amelia Moseley  MRN:     [id number]     Acct:      [id number]   Room:   2107/2107-01   Day:  2  Admit Date:  1/28/2025 11:40 AM    PCP:  Agnes Acharya MD  Code Status:  Full Code    Subjective:     C/C:   Chief Complaint   Patient presents with    Nausea    Vomiting    Diarrhea           Dizziness    Pleurisy     Interval History Status: improved.    Vitally stable.  Oxygen saturation 94 to 96% on room air  Patient had chills in the morning.    Patient diet decreased due to nausea; ordered Zofran  MRSA positive and Gram stain showed moderate gram-positive cocci in pairs; patient currently on levofloxacin and Tamiflu; will adjust based on culture  WBC 3.5 and hemoglobin 15.4  Kidney function WNL  Urine analysis WNL  C. difficile negative  Mycoplasma pneumonia, Legionella and Streptococcus pneumonia negative  TSH 11.20        Brief History:     This patient is 69 years old female with past medical history significant for generalized anxiety disorder, hypothyroidism, depression, constipation, type 2 diabetes mellitus on insulin therapy, chronic diastolic congestive heart failure, restless leg syndrome who presented to the emergency department with complaints of flulike symptoms for the past 3 days.    She described a sudden onset of chills, fatigue, myalgias, nausea, vomiting and productive cough with clear sputum production.  Patient endorses shortness of breath, accompanied with intermittent chest tightness.  In addition, the patient noted diarrhea and diffuse abdominal pain, which she states have been ongoing issue since her cholecystectomy.  Patient revealed that she is following up with gastroenterology for this chronic symptoms.     In the emergency department, the patient tested positive for influenza A, WBC within normal limits, potassium low at 3.6, 
CDIFF negative. Cdiff isolation discontinued. Pt resting quietly with her eyes closed.  
[facility]   OCCUPATIONAL THERAPY MISSED TREATMENT NOTE   INPATIENT   Date: 25  Patient Name: Amelia Moseley       Room:   MRN: [id number]   Account #: [id number]    : 1955  (69 y.o.)  Gender: female                 REASON FOR MISSED TREATMENT:  25    -    Refusal by Patient - pt sleeping soundly upon arrival, not rousing to verbal stimuli. OT will check back in PM as time permits.    [id number]         Electronically signed by XAVIER Ruiz on 25 at 12:09 PM EST    
[facility]   OCCUPATIONAL THERAPY MISSED TREATMENT NOTE   INPATIENT   Date: 25  Patient Name: Amelia Moseley       Room:   MRN: [id number]   Account #: [id number]    : 1955  (69 y.o.)  Gender: female                 REASON FOR MISSED TREATMENT:  25    -   OT being discontinued at this time. Patient functioning at Premorbid Level  No further needs  - pt up independently in room and bathroom, completing self-care independently. Pt denies need for skilled OT services and reports she is at baseline in regard to self-care. D/C OT - please reorder should future OT needs arise.    1425         Electronically signed by XAVIER Ruiz on 25 at 3:07 PM EST    
Pharmacy Medication History Note      List of current medications patient is taking is complete.    Source of information: [employer], Care Everywhere, patient, [facility] patient care reports from 12/19/2024 and 1/9/2025.    Changes made to medication list:  Medications removed (include reason, ex. therapy complete or physician discontinued, noncompliance):  Dapagliflozin 10mg - Discontinued on 5/16/2023 by Dr. Agnes Acharya. Last filled 3/27/2023 for 90ds  Metoprolol tartrate 25mg - duplicate entry  Potassium chloride 10mEq - duplicate entry    Medications flagged for provider review:  Nystatin powder - patient not using  Insulin degludec 80 units nightly - patient reports taking 86 units nightly  Insulin lispro 18 units three times daily before meals - patient reports taking 15 units three times daily before meals  Silver sulfadiazine 1% cream applied daily - patient reports applying as needed    Medications added/doses adjusted:  None    Other notes (ex. Recent course of antibiotics, Coumadin dosing):  OARRS negative  Polyethylene glycol was put on hold on 1/9 by Dr. Acharya and the patient is not currently taking it. I did not remove it from the list as it was not discontinued.  Next dose of fluconazole and vitamin D are due on 1/30.  Doxycycline 100mg by mouth twice daily  for 7 days completed on 12/26  Patient reports that she is unsure if she is taking sitaglipitn 50mg, however it was filled on 1/17 for 30ds and is listed as an active medication by Dr. Acharya. The patient reports putting all of her meds into a weekly pill box and taking them as prescribed.      Current Home Medication List at Time of Admission:  Prior to Admission medications    Medication Sig   ASPIRIN EC ADULT LOW DOSE 81 MG EC tablet Take 1 tablet by mouth daily   lisinopril (PRINIVIL;ZESTRIL) 2.5 MG tablet Take 1 tablet by mouth daily   cetirizine (ZYRTEC) 10 MG tablet Take 1 tablet by mouth daily   silver sulfADIAZINE (SSD) 1 % cream Apply 
Pt is now a high fall risk but refusing bed alarm on. Pt alert and oriented X4, educated on the benefits and risks, verbalized understanding but still refusing. Bed locked and in lowest position, bedside table and call light within reach. Pt to call out if in need, hourly rounding in place. Pt signed refusal form, see chart.   
Writer agrees with CHRISTOPH Patterson charting.   
Writer agrees with CHRISTOPH Patterson charting.   
Prognosis: Good  Decision Making: Medium Complexity  History: pt admitted due to acute hypoxic respiratory failure  Exam: ROM, MMT, balance and mobility assessments  Clinical Presentation: D/C PT. Pt up independently in the room. Therapist reviewed energy conservation techniques for activity- pt verbalized understanding  Barriers to Learning: none  No Skilled PT: Independent with functional mobility   Discharge Recommendations: Home independently  Activity Tolerance  Activity Tolerance: Patient tolerated treatment well     Patient Education  Patient Education  Education Given To: Patient  Education Provided: Role of Therapy, Plan of Care, Energy Conservation  Education Method: Demonstration, Verbal  Barriers to Learning: None  Education Outcome: Verbalized understanding, Demonstrated understanding     Functional Outcome Measures  AM-PAC Basic Mobility - Inpatient   How much help is needed turning from your back to your side while in a flat bed without using bedrails?: None  How much help is needed moving from lying on your back to sitting on the side of a flat bed without using bedrails?: None  How much help is needed moving to and from a bed to a chair?: None  How much help is needed standing up from a chair using your arms?: None  How much help is needed walking in hospital room?: None  How much help is needed climbing 3-5 steps with a railing?: Total  AM-PAC Inpatient Mobility Raw Score : 21  AM-PAC Inpatient T-Scale Score : 50.25  Mobility Inpatient CMS 0-100% Score: 28.97  Mobility Inpatient CMS G-Code Modifier : CJ     Goals  Patient Goals   Patient Goals : return home  Short Term Goals  Time Frame for Short Term Goals: D/C PT- no need for skilled PT.      Plan  Physical Therapy Plan  General Plan: Discharge with evaluation only   Safety Devices  Type of Devices: Call light within reach, Left in bed, Nurse notified (Nurse Flor; pt signed consent for no bed alarm)       PT Individual Minutes  Time In:

## 2025-01-30 NOTE — DISCHARGE INSTR - COC
Continuity of Care Form    Patient Name: Amelia Moseley   :  1955  MRN:  [id number]    Admit date:  2025  Discharge date:  ***    Code Status Order: Full Code   Advance Directives:   Advance Care Flowsheet Documentation             Admitting Physician:  Feng Billingsley MD  PCP: Agnes Acharya MD    Discharging Nurse: ***  Discharging Hospital Unit/Room#: 2107/2107-01  Discharging Unit Phone Number: ***    Emergency Contact:   Extended Emergency Contact Information  Primary Emergency Contact: Alex Burton  Address:  [address]  Home Phone: [phone]  Mobile Phone: [phone]  Relation: Other    Past Surgical History:  Past Surgical History:   Procedure Laterality Date    BREAST BIOPSY      negative    CARDIAC CATHETERIZATION      no stents    CARDIOVASCULAR STRESS TEST      7/17/15 no results    CHOLECYSTECTOMY      COLONOSCOPY N/A 10/26/2021    COLONOSCOPY POLYPECTOMY SNARE/COLD BIOPSY performed by Mathew Pandey MD at [facility] ENDO    COLONOSCOPY  2023    COLORECTAL CANCER SCREENING, NOT HIGH RISK    COLONOSCOPY N/A 2023    COLONOSCOPY POLYPECTOMY performed by Sherwin Licona DO at [facility] OR    COLONOSCOPY  2023    COLONOSCOPY N/A 2023    COLONOSCOPY POLYPECTOMY HOT BIOPSY OF CECAL POLYPS, ASCENDING COLON POLYPS. COLD BIOPSY OF TRANSVERSE COLON POLYPS performed by Sherwin Licona DO at [facility] OR    COLONOSCOPY N/A 2024    COLONOSCOPY POLYPECTOMY SNARE/BIOPSY performed by Mark Meehan MD at [facility] ENDO    CYST REMOVAL      right hand    CYSTO/URETERO/PYELOSCOPY, CALCULUS TX Right 2019    CYSTOSCOPY URETEROSCOPY  STONE BASKET RETRIEVAL RIGHT STENT EXCHANGE performed by Tremaine Rodriguez MD at [facility] OR    CYSTOSCOPY      CYSTOSCOPY Right 2019    CYSTOSCOPY URETERAL STENT INSERTION performed by Tremaine Rodriguez MD at [facility] OR    ENDOMETRIAL BIOPSY  2009    ENDOSCOPY, COLON,

## 2025-01-31 ENCOUNTER — TELEPHONE (OUTPATIENT)
Dept: FAMILY MEDICINE CLINIC | Age: 70
End: 2025-01-31

## 2025-01-31 ENCOUNTER — CARE COORDINATION (OUTPATIENT)
Dept: CARE COORDINATION | Age: 70
End: 2025-01-31

## 2025-01-31 NOTE — TELEPHONE ENCOUNTER
Care Transitions Initial Follow Up Call    Outreach made within 2 business days of discharge: Yes    Patient: Amelia Moseley Patient : 1955   MRN: [id number]  Reason for Admission: flu   Discharge Date: 25       Spoke with:     Discharge department/facility: [facility] Interactive Patient Contact:  Was patient able to fill all prescriptions:   Was patient instructed to bring all medications to the follow-up visit:   Is patient taking all medications as directed in the discharge summary?   Does patient understand their discharge instructions:   Does patient have questions or concerns that need addressed prior to 7-14 day follow up office visit:     Additional needs identified to be addressed with provider               Viktoriya Perkins MA

## 2025-01-31 NOTE — CARE COORDINATION
Care Transitions Note    Initial Call - Call within 2 business days of discharge: Yes    Attempted to reach patient for transitions of care follow up. Unable to reach patient.    Outreach Attempts:   Multiple attempts to contact patient at phone numbers on file.     Patient: Amelia Moseley    Patient : 1955   MRN: [id number]    Reason for Admission: Influenza A  Discharge Date: 25  RURS: Readmission Risk Score: 11.9    Last Discharge Facility       Date Complaint Diagnosis Description Type Department Provider    25 Nausea; Vomiting; Diarrhea; Dizziness; Pleurisy Influenza A ... ED to Hosp-Admission (Discharged) (ADMITTED) Feng Armenta MD; [place], ...            Was this an external facility discharge? No    Follow Up Appointment:   Patient has hospital follow up appointment scheduled greater than 14 days after discharge; Routed to office for hospital follow up scheduling.    Future Appointments         Provider Specialty Dept Phone    3/19/2025 12:30 PM Agnes Acharya MD Family Medicine [phone]    2025 3:15 PM Mark Meehan MD Gastroenterology [phone]            Plan for follow-up on next business day.      Breana Dennis RN

## 2025-02-03 ENCOUNTER — CARE COORDINATION (OUTPATIENT)
Dept: CARE COORDINATION | Age: 70
End: 2025-02-03

## 2025-02-03 NOTE — CARE COORDINATION
Care Transitions Note    Initial Call - Call within 2 business days of discharge: Yes    Attempted to reach patient for transitions of care follow up. Unable to reach patient.    Outreach Attempts:   Multiple attempts to contact patient at phone numbers on file.     Patient: Amelia Moseley    Patient : 1955   MRN: [id number]    Reason for Admission: Influenza A, Acute hypoxic respiratory failure  Discharge Date: 25  RURS: Readmission Risk Score: 11.9    Last Discharge Facility       Date Complaint Diagnosis Description Type Department Provider    25 Nausea; Vomiting; Diarrhea; Dizziness; Pleurisy Influenza A ... ED to Hosp-Admission (Discharged) (ADMITTED) Feng Armenta MD; [place], ...            Was this an external facility discharge? No    Follow Up Appointment:   Patient does not have a follow up appointment scheduled at time of call.  Unable to reach, message sent to office for scheduling  Future Appointments         Provider Specialty Dept Phone    3/19/2025 12:30 PM Agnes Acharya MD Family Medicine [phone]    2025 3:15 PM Mark Meehan MD Gastroenterology [phone]            No further follow-up call indicated     Breana Dennis RN

## 2025-02-12 DIAGNOSIS — E11.65 UNCONTROLLED TYPE 2 DIABETES MELLITUS WITH HYPERGLYCEMIA (HCC): ICD-10-CM

## 2025-02-12 DIAGNOSIS — E55.9 VITAMIN D DEFICIENCY: ICD-10-CM

## 2025-02-12 DIAGNOSIS — Z79.4 TYPE 2 DIABETES MELLITUS WITH DIABETIC POLYNEUROPATHY, WITH LONG-TERM CURRENT USE OF INSULIN (HCC): ICD-10-CM

## 2025-02-12 DIAGNOSIS — E11.42 TYPE 2 DIABETES MELLITUS WITH DIABETIC POLYNEUROPATHY, WITH LONG-TERM CURRENT USE OF INSULIN (HCC): ICD-10-CM

## 2025-02-13 RX ORDER — PEN NEEDLE, DIABETIC 31 GX5/16"
NEEDLE, DISPOSABLE MISCELLANEOUS
Qty: 100 EACH | Refills: 3 | Status: SHIPPED | OUTPATIENT
Start: 2025-02-13

## 2025-02-13 RX ORDER — ASPIRIN 81 MG/1
81 TABLET ORAL DAILY
Qty: 90 TABLET | Refills: 0 | Status: SHIPPED | OUTPATIENT
Start: 2025-02-13

## 2025-02-13 RX ORDER — ERGOCALCIFEROL 1.25 MG/1
50000 CAPSULE, LIQUID FILLED ORAL WEEKLY
Qty: 12 CAPSULE | Refills: 1 | Status: SHIPPED | OUTPATIENT
Start: 2025-02-13

## 2025-02-13 RX ORDER — PROCHLORPERAZINE 25 MG/1
SUPPOSITORY RECTAL
Qty: 3 EACH | Refills: 3 | Status: SHIPPED | OUTPATIENT
Start: 2025-02-13

## 2025-02-13 NOTE — TELEPHONE ENCOUNTER
Please Approve or Refuse.  Send to Pharmacy per Pt's Request: [facility]      Next Visit Date:  3/19/2025   Last Visit Date: 12/19/2024    Hemoglobin A1C (%)   Date Value   12/19/2024 9.1   08/20/2024 8.8   07/19/2024 7.6             ( goal A1C is < 7)   BP Readings from Last 3 Encounters:   01/30/25 110/60   01/09/25 134/85   01/06/25 126/84          (goal 120/80)  BUN   Date Value Ref Range Status   01/30/2025 9 8 - 23 mg/dL Final     Creatinine   Date Value Ref Range Status   01/30/2025 0.6 (L) 0.7 - 1.2 mg/dL Final     Potassium   Date Value Ref Range Status   01/30/2025 3.9 3.7 - 5.3 mmol/L Final

## 2025-03-04 ENCOUNTER — OFFICE VISIT (OUTPATIENT)
Dept: OBGYN CLINIC | Age: 70
End: 2025-03-04
Payer: MEDICARE

## 2025-03-04 ENCOUNTER — HOSPITAL ENCOUNTER (OUTPATIENT)
Age: 70
Setting detail: SPECIMEN
Discharge: HOME | End: 2025-03-04

## 2025-03-04 VITALS
HEIGHT: 62 IN | SYSTOLIC BLOOD PRESSURE: 126 MMHG | BODY MASS INDEX: 43.24 KG/M2 | DIASTOLIC BLOOD PRESSURE: 84 MMHG | WEIGHT: 235 LBS

## 2025-03-04 DIAGNOSIS — N95.0 PMB (POSTMENOPAUSAL BLEEDING): ICD-10-CM

## 2025-03-04 DIAGNOSIS — N95.0 PMB (POSTMENOPAUSAL BLEEDING): Primary | ICD-10-CM

## 2025-03-04 LAB
BACTERIA URNS QL MICRO: NORMAL
BILIRUB UR QL STRIP: NEGATIVE
CASTS #/AREA URNS LPF: NORMAL /LPF (ref 0–8)
CLARITY UR: CLEAR
COLOR UR: YELLOW
EPI CELLS #/AREA URNS HPF: NORMAL /HPF (ref 0–5)
GLUCOSE UR STRIP-MCNC: NEGATIVE MG/DL
HGB UR QL STRIP.AUTO: ABNORMAL
KETONES UR STRIP-MCNC: NEGATIVE MG/DL
LEUKOCYTE ESTERASE UR QL STRIP: NEGATIVE
NITRITE UR QL STRIP: NEGATIVE
PH UR STRIP: 5 [PH] (ref 5–8)
PROT UR STRIP-MCNC: NEGATIVE MG/DL
RBC #/AREA URNS HPF: NORMAL /HPF (ref 0–4)
SP GR UR STRIP: 1.02 (ref 1–1.03)
UROBILINOGEN UR STRIP-ACNC: NORMAL EU/DL (ref 0–1)
WBC #/AREA URNS HPF: NORMAL /HPF (ref 0–5)

## 2025-03-04 PROCEDURE — G8427 DOCREV CUR MEDS BY ELIG CLIN: HCPCS | Performed by: NURSE PRACTITIONER

## 2025-03-04 PROCEDURE — 99213 OFFICE O/P EST LOW 20 MIN: CPT | Performed by: NURSE PRACTITIONER

## 2025-03-04 PROCEDURE — 1123F ACP DISCUSS/DSCN MKR DOCD: CPT | Performed by: NURSE PRACTITIONER

## 2025-03-04 PROCEDURE — 1159F MED LIST DOCD IN RCRD: CPT | Performed by: NURSE PRACTITIONER

## 2025-03-04 PROCEDURE — G8399 PT W/DXA RESULTS DOCUMENT: HCPCS | Performed by: NURSE PRACTITIONER

## 2025-03-04 PROCEDURE — 1036F TOBACCO NON-USER: CPT | Performed by: NURSE PRACTITIONER

## 2025-03-04 PROCEDURE — G8417 CALC BMI ABV UP PARAM F/U: HCPCS | Performed by: NURSE PRACTITIONER

## 2025-03-04 PROCEDURE — 3074F SYST BP LT 130 MM HG: CPT | Performed by: NURSE PRACTITIONER

## 2025-03-04 PROCEDURE — 1090F PRES/ABSN URINE INCON ASSESS: CPT | Performed by: NURSE PRACTITIONER

## 2025-03-04 PROCEDURE — 3079F DIAST BP 80-89 MM HG: CPT | Performed by: NURSE PRACTITIONER

## 2025-03-04 PROCEDURE — 3017F COLORECTAL CA SCREEN DOC REV: CPT | Performed by: NURSE PRACTITIONER

## 2025-03-04 NOTE — PROGRESS NOTES
Glucose Fingerstick   Result Value Ref Range    POC Glucose 177 (H) 65 - 105 mg/dL   POC Glucose Fingerstick   Result Value Ref Range    POC Glucose 173 (H) 65 - 105 mg/dL   POC Glucose Fingerstick   Result Value Ref Range    POC Glucose 236 (H) 65 - 105 mg/dL   POC Glucose Fingerstick   Result Value Ref Range    POC Glucose 215 (H) 65 - 105 mg/dL   EKG 12 Lead   Result Value Ref Range    Ventricular Rate 97 BPM    Atrial Rate 97 BPM    P-R Interval 148 ms    QRS Duration 96 ms    Q-T Interval 368 ms    QTc Calculation (Bazett) 467 ms    P Axis 53 degrees    R Axis -31 degrees    T Axis 6 degrees     *Note: Due to a large number of results and/or encounters for the requested time period, some results have not been displayed. A complete set of results can be found in Results Review.         Assessment:   Diagnosis Orders   1. PMB (postmenopausal bleeding)  Urinalysis with Reflex to Culture        Patient Active Problem List    Diagnosis Date Noted    Elevated alkaline phosphatase level 12/15/2022     Priority: Medium    Vitamin D deficiency 12/15/2022     Priority: Medium    Elevated ferritin 12/15/2022     Priority: Medium    Acute hypoxic respiratory failure (HCC) 01/28/2025    Body mass index [BMI] 40.0-44.9, adult (Z68.41) 12/19/2024    Restless leg syndrome 09/05/2024    Intramural and subserous leiomyoma of uterus 06/07/2024    Abnormal EKG 06/07/2024    Secondary polycythemia 06/05/2024    History of adenomatous polyp of colon 06/30/2023    Type 2 diabetes mellitus with microalbuminuria, with long-term current use of insulin (HCC) 06/05/2023    Lumbosacral radiculopathy due to degenerative joint disease of spine 12/01/2021    Adenomatous polyp of colon 11/04/2021    Duodenal diverticulum 11/04/2021    Polyp of ascending colon     Family history of uterine cancer 10/07/2021    Chronic diastolic congestive heart failure (HCC) 03/23/2021    Major depressive disorder with single episode, in partial remission

## 2025-03-05 ENCOUNTER — TELEPHONE (OUTPATIENT)
Dept: OBGYN CLINIC | Age: 70
End: 2025-03-05

## 2025-03-05 DIAGNOSIS — R82.90 ABNORMAL URINALYSIS: Primary | ICD-10-CM

## 2025-03-05 RX ORDER — NITROFURANTOIN 25; 75 MG/1; MG/1
100 CAPSULE ORAL 2 TIMES DAILY
Qty: 14 CAPSULE | Refills: 0 | Status: SHIPPED | OUTPATIENT
Start: 2025-03-05 | End: 2025-03-12

## 2025-03-05 NOTE — TELEPHONE ENCOUNTER
[facility] Result Note Patient Contact Communication   [address] [place] [address]      03/05/25   11:15 AM     Patients Name: Amelia Moseley   Medical Record Number: [id number]   Contact Serial Number: [id number]  YOB: 1955       Patients Phone Number: [phone]     Description of Recommendations:  The patient was contacted and her identity was confirmed with two of the specific identifiers listed above.  The information regarding her recent testing results and provider recommendations were reviewed with her in detail and all questions were answered.   Recent labs/Cultures/ Imaging Studies/Pathology reports and Urinalysis results are included:      Recommendations given by provider:  + hgb in urine  Macrobid 100mg PO BID x7 days  Repeat UA C&S after finishing antibiotic.  If hgb remains, refer to urology.       The patient verbalized understanding of the information above and the recommendation by the provider. She will contact her PCP if any other questions arise or contact our office for any other clarifications.        Electronically signed by Chidi Miller MA on 3/5/2025 at 11:15 AM

## 2025-03-05 NOTE — TELEPHONE ENCOUNTER
----- Message from RASHMI Pond CNP sent at 3/5/2025  7:59 AM EST -----  + hgb in urine  Macrobid 100mg PO BID x7 days  Repeat UA C&S after finishing antibiotic.  If hgb remains, refer to urology.

## 2025-03-06 DIAGNOSIS — M47.27 OTHER SPONDYLOSIS WITH RADICULOPATHY, LUMBOSACRAL REGION: ICD-10-CM

## 2025-03-09 ENCOUNTER — PATIENT MESSAGE (OUTPATIENT)
Dept: GASTROENTEROLOGY | Age: 70
End: 2025-03-09

## 2025-03-16 DIAGNOSIS — I10 ESSENTIAL HYPERTENSION: ICD-10-CM

## 2025-03-16 DIAGNOSIS — E11.65 UNCONTROLLED TYPE 2 DIABETES MELLITUS WITH HYPERGLYCEMIA (HCC): ICD-10-CM

## 2025-03-16 DIAGNOSIS — E11.42 DIABETIC POLYNEUROPATHY ASSOCIATED WITH TYPE 2 DIABETES MELLITUS: ICD-10-CM

## 2025-03-16 DIAGNOSIS — Z79.4 TYPE 2 DIABETES MELLITUS WITH DIABETIC POLYNEUROPATHY, WITH LONG-TERM CURRENT USE OF INSULIN (HCC): ICD-10-CM

## 2025-03-16 DIAGNOSIS — I50.32 CHRONIC DIASTOLIC CONGESTIVE HEART FAILURE (HCC): ICD-10-CM

## 2025-03-16 DIAGNOSIS — E55.9 VITAMIN D DEFICIENCY: ICD-10-CM

## 2025-03-16 DIAGNOSIS — E11.42 TYPE 2 DIABETES MELLITUS WITH DIABETIC POLYNEUROPATHY, WITH LONG-TERM CURRENT USE OF INSULIN (HCC): ICD-10-CM

## 2025-03-16 DIAGNOSIS — K59.00 CONSTIPATION, UNSPECIFIED CONSTIPATION TYPE: Primary | ICD-10-CM

## 2025-03-17 RX ORDER — PRUCALOPRIDE 2 MG/1
2 TABLET ORAL DAILY
Qty: 30 TABLET | Refills: 2 | Status: SHIPPED | OUTPATIENT
Start: 2025-03-17 | End: 2025-06-15

## 2025-03-17 NOTE — TELEPHONE ENCOUNTER
Please Approve or Refuse.  Send to Pharmacy per Pt's Request:      Next Visit Date:  3/19/2025   Last Visit Date: 12/19/2024    Hemoglobin A1C (%)   Date Value   12/19/2024 9.1   08/20/2024 8.8   07/19/2024 7.6             ( goal A1C is < 7)   BP Readings from Last 3 Encounters:   03/04/25 126/84   01/30/25 110/60   01/09/25 134/85          (goal 120/80)  BUN   Date Value Ref Range Status   01/30/2025 9 8 - 23 mg/dL Final     Creatinine   Date Value Ref Range Status   01/30/2025 0.6 (L) 0.7 - 1.2 mg/dL Final     Potassium   Date Value Ref Range Status   01/30/2025 3.9 3.7 - 5.3 mmol/L Final

## 2025-03-19 ENCOUNTER — OFFICE VISIT (OUTPATIENT)
Dept: FAMILY MEDICINE CLINIC | Age: 70
End: 2025-03-19
Payer: MEDICARE

## 2025-03-19 ENCOUNTER — TELEPHONE (OUTPATIENT)
Dept: FAMILY MEDICINE CLINIC | Age: 70
End: 2025-03-19

## 2025-03-19 VITALS
HEIGHT: 62 IN | HEART RATE: 79 BPM | SYSTOLIC BLOOD PRESSURE: 118 MMHG | DIASTOLIC BLOOD PRESSURE: 70 MMHG | BODY MASS INDEX: 44.16 KG/M2 | WEIGHT: 240 LBS | OXYGEN SATURATION: 94 %

## 2025-03-19 DIAGNOSIS — D25.1 INTRAMURAL AND SUBSEROUS LEIOMYOMA OF UTERUS: ICD-10-CM

## 2025-03-19 DIAGNOSIS — Z79.4 TYPE 2 DIABETES MELLITUS WITH DIABETIC POLYNEUROPATHY, WITH LONG-TERM CURRENT USE OF INSULIN (HCC): ICD-10-CM

## 2025-03-19 DIAGNOSIS — M47.27 OTHER SPONDYLOSIS WITH RADICULOPATHY, LUMBOSACRAL REGION: ICD-10-CM

## 2025-03-19 DIAGNOSIS — E66.01 CLASS 3 SEVERE OBESITY WITH SERIOUS COMORBIDITY AND BODY MASS INDEX (BMI) OF 40.0 TO 44.9 IN ADULT, UNSPECIFIED OBESITY TYPE: ICD-10-CM

## 2025-03-19 DIAGNOSIS — J41.1 MUCOPURULENT CHRONIC BRONCHITIS (HCC): ICD-10-CM

## 2025-03-19 DIAGNOSIS — I10 ESSENTIAL HYPERTENSION: ICD-10-CM

## 2025-03-19 DIAGNOSIS — E11.65 UNCONTROLLED TYPE 2 DIABETES MELLITUS WITH HYPERGLYCEMIA (HCC): ICD-10-CM

## 2025-03-19 DIAGNOSIS — E55.9 VITAMIN D DEFICIENCY: ICD-10-CM

## 2025-03-19 DIAGNOSIS — Z79.4 TYPE 2 DIABETES MELLITUS WITH DIABETIC POLYNEUROPATHY, WITH LONG-TERM CURRENT USE OF INSULIN (HCC): Primary | ICD-10-CM

## 2025-03-19 DIAGNOSIS — E66.813 CLASS 3 SEVERE OBESITY WITH SERIOUS COMORBIDITY AND BODY MASS INDEX (BMI) OF 40.0 TO 44.9 IN ADULT, UNSPECIFIED OBESITY TYPE: ICD-10-CM

## 2025-03-19 DIAGNOSIS — E78.2 MIXED HYPERLIPIDEMIA: ICD-10-CM

## 2025-03-19 DIAGNOSIS — E11.42 TYPE 2 DIABETES MELLITUS WITH DIABETIC POLYNEUROPATHY, WITH LONG-TERM CURRENT USE OF INSULIN (HCC): Primary | ICD-10-CM

## 2025-03-19 DIAGNOSIS — E11.42 TYPE 2 DIABETES MELLITUS WITH DIABETIC POLYNEUROPATHY, WITH LONG-TERM CURRENT USE OF INSULIN (HCC): ICD-10-CM

## 2025-03-19 DIAGNOSIS — D25.2 INTRAMURAL AND SUBSEROUS LEIOMYOMA OF UTERUS: ICD-10-CM

## 2025-03-19 DIAGNOSIS — E03.9 ACQUIRED HYPOTHYROIDISM: ICD-10-CM

## 2025-03-19 DIAGNOSIS — I25.10 CORONARY ARTERY DISEASE INVOLVING NATIVE CORONARY ARTERY OF NATIVE HEART WITHOUT ANGINA PECTORIS: ICD-10-CM

## 2025-03-19 PROBLEM — J44.9 CHRONIC OBSTRUCTIVE PULMONARY DISEASE, UNSPECIFIED: Status: ACTIVE | Noted: 2025-03-19

## 2025-03-19 PROBLEM — G47.00 INSOMNIA: Status: RESOLVED | Noted: 2021-01-11 | Resolved: 2025-03-19

## 2025-03-19 PROBLEM — I50.32 CHRONIC DIASTOLIC CONGESTIVE HEART FAILURE (HCC): Status: RESOLVED | Noted: 2021-03-23 | Resolved: 2025-03-19

## 2025-03-19 PROBLEM — R79.89 ELEVATED FERRITIN: Status: RESOLVED | Noted: 2022-12-15 | Resolved: 2025-03-19

## 2025-03-19 PROBLEM — J96.01 ACUTE HYPOXIC RESPIRATORY FAILURE: Status: RESOLVED | Noted: 2025-01-28 | Resolved: 2025-03-19

## 2025-03-19 LAB — HBA1C MFR BLD: 7.6 %

## 2025-03-19 PROCEDURE — G8427 DOCREV CUR MEDS BY ELIG CLIN: HCPCS | Performed by: FAMILY MEDICINE

## 2025-03-19 PROCEDURE — 1036F TOBACCO NON-USER: CPT | Performed by: FAMILY MEDICINE

## 2025-03-19 PROCEDURE — 1160F RVW MEDS BY RX/DR IN RCRD: CPT | Performed by: FAMILY MEDICINE

## 2025-03-19 PROCEDURE — 1090F PRES/ABSN URINE INCON ASSESS: CPT | Performed by: FAMILY MEDICINE

## 2025-03-19 PROCEDURE — 3017F COLORECTAL CA SCREEN DOC REV: CPT | Performed by: FAMILY MEDICINE

## 2025-03-19 PROCEDURE — 3078F DIAST BP <80 MM HG: CPT | Performed by: FAMILY MEDICINE

## 2025-03-19 PROCEDURE — 3051F HG A1C>EQUAL 7.0%<8.0%: CPT | Performed by: FAMILY MEDICINE

## 2025-03-19 PROCEDURE — 1123F ACP DISCUSS/DSCN MKR DOCD: CPT | Performed by: FAMILY MEDICINE

## 2025-03-19 PROCEDURE — 1125F AMNT PAIN NOTED PAIN PRSNT: CPT | Performed by: FAMILY MEDICINE

## 2025-03-19 PROCEDURE — 99214 OFFICE O/P EST MOD 30 MIN: CPT | Performed by: FAMILY MEDICINE

## 2025-03-19 PROCEDURE — 3074F SYST BP LT 130 MM HG: CPT | Performed by: FAMILY MEDICINE

## 2025-03-19 PROCEDURE — G2211 COMPLEX E/M VISIT ADD ON: HCPCS | Performed by: FAMILY MEDICINE

## 2025-03-19 PROCEDURE — 2022F DILAT RTA XM EVC RTNOPTHY: CPT | Performed by: FAMILY MEDICINE

## 2025-03-19 PROCEDURE — 3023F SPIROM DOC REV: CPT | Performed by: FAMILY MEDICINE

## 2025-03-19 PROCEDURE — 1159F MED LIST DOCD IN RCRD: CPT | Performed by: FAMILY MEDICINE

## 2025-03-19 PROCEDURE — 83036 HEMOGLOBIN GLYCOSYLATED A1C: CPT | Performed by: FAMILY MEDICINE

## 2025-03-19 PROCEDURE — G8417 CALC BMI ABV UP PARAM F/U: HCPCS | Performed by: FAMILY MEDICINE

## 2025-03-19 PROCEDURE — G8399 PT W/DXA RESULTS DOCUMENT: HCPCS | Performed by: FAMILY MEDICINE

## 2025-03-19 RX ORDER — LISINOPRIL 2.5 MG/1
2.5 TABLET ORAL DAILY
Qty: 90 TABLET | Refills: 3 | Status: SHIPPED | OUTPATIENT
Start: 2025-03-19 | End: 2025-03-20 | Stop reason: SDUPTHER

## 2025-03-19 RX ORDER — DULOXETIN HYDROCHLORIDE 60 MG/1
60 CAPSULE, DELAYED RELEASE ORAL DAILY
Qty: 90 CAPSULE | Refills: 3 | Status: SHIPPED | OUTPATIENT
Start: 2025-03-19

## 2025-03-19 RX ORDER — INSULIN DEGLUDEC 200 U/ML
INJECTION, SOLUTION SUBCUTANEOUS
Qty: 27 ML | Refills: 3 | Status: SHIPPED | OUTPATIENT
Start: 2025-03-19 | End: 2025-03-19 | Stop reason: SDUPTHER

## 2025-03-19 RX ORDER — ERGOCALCIFEROL 1.25 MG/1
50000 CAPSULE, LIQUID FILLED ORAL WEEKLY
Qty: 12 CAPSULE | Refills: 1 | Status: SHIPPED | OUTPATIENT
Start: 2025-03-19

## 2025-03-19 RX ORDER — PROCHLORPERAZINE 25 MG/1
SUPPOSITORY RECTAL
Qty: 3 EACH | Refills: 3 | Status: SHIPPED | OUTPATIENT
Start: 2025-03-19

## 2025-03-19 RX ORDER — FUROSEMIDE 20 MG/1
20 TABLET ORAL DAILY
Qty: 270 TABLET | Refills: 1 | Status: SHIPPED | OUTPATIENT
Start: 2025-03-19

## 2025-03-19 RX ORDER — INSULIN LISPRO 100 [IU]/ML
INJECTION, SOLUTION INTRAVENOUS; SUBCUTANEOUS
Qty: 27 ML | Refills: 0 | Status: SHIPPED | OUTPATIENT
Start: 2025-03-19 | End: 2025-03-19 | Stop reason: SDUPTHER

## 2025-03-19 RX ORDER — PEN NEEDLE, DIABETIC 31 GX5/16"
NEEDLE, DISPOSABLE MISCELLANEOUS
Qty: 100 EACH | Refills: 3 | Status: SHIPPED | OUTPATIENT
Start: 2025-03-19

## 2025-03-19 RX ORDER — INSULIN DEGLUDEC 200 U/ML
INJECTION, SOLUTION SUBCUTANEOUS
Qty: 27 ML | Refills: 3 | Status: SHIPPED | OUTPATIENT
Start: 2025-03-19

## 2025-03-19 RX ORDER — INSULIN LISPRO 100 [IU]/ML
INJECTION, SOLUTION INTRAVENOUS; SUBCUTANEOUS
Qty: 27 ML | Refills: 0 | Status: SHIPPED | OUTPATIENT
Start: 2025-03-19

## 2025-03-19 ASSESSMENT — ENCOUNTER SYMPTOMS
CHEST TIGHTNESS: 0
ABDOMINAL PAIN: 0
RECTAL PAIN: 0
BACK PAIN: 1
CONSTIPATION: 0
STRIDOR: 0
TROUBLE SWALLOWING: 0
WHEEZING: 0
NAUSEA: 0
SHORTNESS OF BREATH: 0
RHINORRHEA: 0
SORE THROAT: 0
VOMITING: 0
DIARRHEA: 0
COUGH: 0
COLOR CHANGE: 0
EYE REDNESS: 0
BLOOD IN STOOL: 0
SINUS PRESSURE: 0
ABDOMINAL DISTENTION: 0

## 2025-03-19 NOTE — PROGRESS NOTES
Chief Complaint   Patient presents with    Diabetes     The patient (or guardian, if applicable) and other individuals in attendance with the patient were advised that Artificial Intelligence will be utilized during this visit to record, process the conversation to generate a clinical note, and support improvement of the AI technology. The patient (or guardian, if applicable) and other individuals in attendance at the appointment consented to the use of AI, including the recording.                    Diabetes      History of Present Illness  The patient presents for a follow-up on type 2 diabetes, high blood pressure, and to address chronic medical problems. She is accompanied by her son.    She has not yet completed the blood work that was previously ordered. She reports experiencing elevated blood glucose levels at night, which significantly decrease in the morning, leading to hypoglycemic episodes. Her continuous glucose monitor alerts her to these episodes, prompting her to consume food. She typically experiences low blood sugar readings around 6:00 AM. Occasionally, her blood glucose levels spike to 200 or 300, causing her to consider administering an additional insulin dose. She has been experiencing pain in her arms and legs.She has gained weight, which she attributes to increased food intake. She has been unable to maintain a healthy diet due to her inability to consume salads. She has been eating more during the night. Her current medication regimen includes long-acting insulin, short-acting insulin, and Januvia. She administers her insulin at 9:00 PM, with 3 doses of fast-acting insulin during the day and 1 dose of slow-acting insulin at night.  A1c has improved to 7.6 from 9.1.  Patient due for diabetic foot exam done today in the office.        Hypothyroidism stable on Synthroid, up-to-date on blood work.  Patient reports compliance with the medications.    COPD stable denies any recent 
vaccine (3 - Td or Tdap) 06/16/2032    DEXA (modify frequency per FRAX score)  Completed    Hepatitis C screen  Completed    Hepatitis A vaccine  Aged Out    Hepatitis B vaccine  Aged Out    Hib vaccine  Aged Out    Polio vaccine  Aged Out    Meningococcal (ACWY) vaccine  Aged Out    Meningococcal B vaccine  Aged Out    Pneumococcal 0-49 years Vaccine  Discontinued    HIV screen  Discontinued    Cervical cancer screen  Discontinued

## 2025-03-19 NOTE — TELEPHONE ENCOUNTER
I updated the medication list please fax the current medication list with the pharmacy.  Patient can send her cell medication request through the [facility].

## 2025-03-19 NOTE — TELEPHONE ENCOUNTER
Amelia called after her appointment today 3/19/25 because she went to the pharmacy to  her medications and there were issues with them.    1) Tresiba Flextouch needs to be upped to 87ML, not 80ML as prescribed. Pharmacy needs a new prescription is this is dispense amount is changing.  **And states she told Dr Acharya about this change at the appointment today how she is taking as originally prescribed and more everyday.**    2) Humalog is too early to fill. This prescription need to be upped from 15 units and upped to 19 units. **States she told Dr Acharya this also at the appointment today.**    3) Tizanidine prescription, she states she do not need to take this anymore and wants to cancel prescription because she keeps getting refills. **Does DOROTA Acharya agree with this?**    4) Synthroid prescriptions, she states has extras and keeps getting refills and was wondering if there is a way to pause for a time to not keep getting more than she needs?    Please inform pharmacy of changes. And call patient if any further questions or information is needed.    Thank you.

## 2025-03-20 DIAGNOSIS — I10 ESSENTIAL HYPERTENSION: ICD-10-CM

## 2025-03-20 RX ORDER — LISINOPRIL 2.5 MG/1
2.5 TABLET ORAL DAILY
Qty: 90 TABLET | Refills: 3 | Status: SHIPPED | OUTPATIENT
Start: 2025-03-20

## 2025-03-20 NOTE — TELEPHONE ENCOUNTER
Please Approve or Refuse.  Send to Pharmacy per Pt's Request:      Next Visit Date:  6/19/2025   Last Visit Date: 3/19/2025    Hemoglobin A1C (%)   Date Value   03/19/2025 7.6   12/19/2024 9.1   08/20/2024 8.8             ( goal A1C is < 7)   BP Readings from Last 3 Encounters:   03/19/25 118/70   03/04/25 126/84   01/30/25 110/60          (goal 120/80)  BUN   Date Value Ref Range Status   01/30/2025 9 8 - 23 mg/dL Final     Creatinine   Date Value Ref Range Status   01/30/2025 0.6 (L) 0.7 - 1.2 mg/dL Final     Potassium   Date Value Ref Range Status   01/30/2025 3.9 3.7 - 5.3 mmol/L Final

## 2025-03-21 ENCOUNTER — HOSPITAL ENCOUNTER (OUTPATIENT)
Age: 70
Setting detail: SPECIMEN
Discharge: HOME | End: 2025-03-21

## 2025-03-21 ENCOUNTER — PROCEDURE VISIT (OUTPATIENT)
Dept: OBGYN CLINIC | Age: 70
End: 2025-03-21

## 2025-03-21 VITALS
BODY MASS INDEX: 44.35 KG/M2 | HEIGHT: 62 IN | WEIGHT: 241 LBS | SYSTOLIC BLOOD PRESSURE: 122 MMHG | DIASTOLIC BLOOD PRESSURE: 80 MMHG

## 2025-03-21 DIAGNOSIS — N95.0 PMB (POSTMENOPAUSAL BLEEDING): Primary | ICD-10-CM

## 2025-03-21 LAB
CONTROL: NORMAL
PREGNANCY TEST URINE, POC: NEGATIVE

## 2025-03-21 NOTE — PROGRESS NOTES
[facility]  [place]  Hysteroscopy Procedure Note      Amelia Moseley  3/21/2025  Patient's last menstrual period was 01/12/2011.  Chief Complaint   Patient presents with    Procedure         Blood pressure 122/80, height 1.575 m (5' 2\"), weight 109.3 kg (241 lb), last menstrual period 01/12/2011, not currently breastfeeding.     [facility] Findings:   GYNECOLOGY ULTRASOUND REPORT              [facility]  [facility]; [address]  [place] [address]  [phone] [phone] Fax        1/6/2025     MRN: [id number]  Contact Serial #: [id number]     Amelia Moseley  YOB: 1955  Age: 69 y.o.     PCP: Agnes Acharya         The ultrasound images were reviewed. Please see the attached ultrasound report.  Ultrasonographer: Lisa Chavez RDMS     Assessment:  Amelia Moseley is a 69 y.o. female  1. PMB (postmenopausal bleeding)          Specific Ultrasound Imaging Obtained:  Transabdominal Approach: Yes  Transvaginal Approach: Yes     Limitations of Study Encountered requiring Trans Vaginal imaging:  Overlying bowel & gas limiting the study: Yes  Poor prep for procedure limiting study: No  Elevated BMI limiting study: Yes  Ovaries are NOT seen on Transabdominal imaging, required to better visualize structures, or a retroverted uterus is present. Yes     Impressions:  1. The Uterus is heterogeneous and anteverted (8.79 x 4.78 x 5.75 cm)  2. The Endometrial Stripe measurement is is not measured due to an obscuring central fibroid.  3. The Left Ovary is not seen  4. The Right Ovary is not seen  5. There is not an abnormal amount of cul-de-sac fluid  6. There is a large central fibroid encroaching the endometrium (4.57 x 4.08 x 4.87 cm)           Recommendations:  Gynecologic Physician/Provider follow up appointment   If more specific imaging is required consider MR Imaging of the pelvis with contrast.                    Electronically signed by Toni HERNANDEZ

## 2025-03-25 ENCOUNTER — RESULTS FOLLOW-UP (OUTPATIENT)
Dept: OBGYN CLINIC | Age: 70
End: 2025-03-25

## 2025-03-25 ENCOUNTER — HOSPITAL ENCOUNTER (OUTPATIENT)
Age: 70
Discharge: HOME | End: 2025-03-27
Payer: MEDICARE

## 2025-03-25 DIAGNOSIS — Z12.31 ENCOUNTER FOR SCREENING MAMMOGRAM FOR MALIGNANT NEOPLASM OF BREAST: ICD-10-CM

## 2025-03-25 LAB — SURGICAL PATHOLOGY REPORT: NORMAL

## 2025-03-25 PROCEDURE — 77063 BREAST TOMOSYNTHESIS BI: CPT

## 2025-03-27 NOTE — TELEPHONE ENCOUNTER
LM for pt to contact office regarding results and recommendations.      ----- Message from RASHMI Pond CNP sent at 3/25/2025  5:16 PM EDT -----  Endometrial biopsy with benign results.  Please review with DR Gill and schedule follow up accordingly.

## 2025-03-27 NOTE — TELEPHONE ENCOUNTER
----- Message from RASHMI Pond CNP sent at 3/25/2025  5:16 PM EDT -----  Endometrial biopsy with benign results.  Please review with DR Gill and schedule follow up accordingly.

## 2025-03-27 NOTE — TELEPHONE ENCOUNTER
[facility] Result Note Patient Contact Communication   [address] [place] [address]      03/27/25   11:59 AM     Patients Name: Amelia Moseley   Medical Record Number: [id number]   Contact Serial Number: [id number]  YOB: 1955       Patients Phone Number: [phone]     Description of Recommendations:  The patient was contacted and her identity was confirmed with two of the specific identifiers listed above.  The information regarding her recent testing results and provider recommendations were reviewed with her in detail and all questions were answered.   Recent labs/Cultures/ Imaging Studies/Pathology reports and Urinalysis results are included:      Recommendations given by provider:  ----- Message from RASHMI Pond CNP sent at 3/25/2025  5:16 PM EDT -----  Endometrial biopsy with benign results.  Please review with DR Gill and schedule follow up accordingly.  Pt states she is still having AUB. Pt requesting to proceed with D&C/Hysteroscopy as discuss previously with Dr Gill.        The patient verbalized understanding of the information above and the recommendation by the provider. She will contact her PCP if any other questions arise or contact our office for any other clarifications.        Electronically signed by Johanne Topete on 3/27/2025 at 11:59 AM

## 2025-04-03 ENCOUNTER — PREP FOR PROCEDURE (OUTPATIENT)
Dept: OBGYN CLINIC | Age: 70
End: 2025-04-03

## 2025-04-03 DIAGNOSIS — N95.0 PMB (POSTMENOPAUSAL BLEEDING): ICD-10-CM

## 2025-04-07 DIAGNOSIS — E11.42 TYPE 2 DIABETES MELLITUS WITH DIABETIC POLYNEUROPATHY, WITH LONG-TERM CURRENT USE OF INSULIN (HCC): ICD-10-CM

## 2025-04-07 DIAGNOSIS — Z79.4 TYPE 2 DIABETES MELLITUS WITH DIABETIC POLYNEUROPATHY, WITH LONG-TERM CURRENT USE OF INSULIN (HCC): ICD-10-CM

## 2025-04-08 ENCOUNTER — TELEPHONE (OUTPATIENT)
Dept: GASTROENTEROLOGY | Age: 70
End: 2025-04-08

## 2025-04-08 RX ORDER — INSULIN LISPRO 100 [IU]/ML
INJECTION, SOLUTION INTRAVENOUS; SUBCUTANEOUS
Qty: 27 ML | Refills: 0 | Status: SHIPPED | OUTPATIENT
Start: 2025-04-08

## 2025-04-08 NOTE — TELEPHONE ENCOUNTER
Please Approve or Refuse.  Send to Pharmacy per Pt's Request:      Next Visit Date:  6/19/2025   Last Visit Date: 3/19/2025    Hemoglobin A1C (%)   Date Value   03/19/2025 7.6   12/19/2024 9.1   08/20/2024 8.8             ( goal A1C is < 7)   BP Readings from Last 3 Encounters:   03/21/25 122/80   03/19/25 118/70   03/04/25 126/84          (goal 120/80)  BUN   Date Value Ref Range Status   01/30/2025 9 8 - 23 mg/dL Final     Creatinine   Date Value Ref Range Status   01/30/2025 0.6 (L) 0.7 - 1.2 mg/dL Final     Potassium   Date Value Ref Range Status   01/30/2025 3.9 3.7 - 5.3 mmol/L Final

## 2025-04-08 NOTE — TELEPHONE ENCOUNTER
Pt states she is supposed to have a colonoscopy every 6 months because she had polyps. She does not remember when her last was done, but says that Dr. Meehan performed it. Please call her to advise.

## 2025-04-14 NOTE — TELEPHONE ENCOUNTER
Pt's last colonoscopy was on 08/09/24 with Dr. Meehan at [facility]. Dr. Meehan please advise on if pt needs another colonoscopy or OK to continue with plan of repeat in 5 years.    Op Note from 08/09/24 with Dr. Meehan  Recommendations:  Repeat colonoscopy for screening/surveillance in 5 years.      OV Notes from 01/09/25 with Dr. Meehan  Plan:   -CRC screening: Completed colonoscopy 2024, polyps noted, repeat colonoscopy in 5-year

## 2025-04-23 ENCOUNTER — OFFICE VISIT (OUTPATIENT)
Dept: OBGYN CLINIC | Age: 70
End: 2025-04-23
Payer: MEDICARE

## 2025-04-23 ENCOUNTER — RESULTS FOLLOW-UP (OUTPATIENT)
Dept: OBGYN CLINIC | Age: 70
End: 2025-04-23

## 2025-04-23 ENCOUNTER — HOSPITAL ENCOUNTER (OUTPATIENT)
Dept: PREADMISSION TESTING | Age: 70
Discharge: HOME | End: 2025-04-27
Payer: MEDICARE

## 2025-04-23 VITALS
OXYGEN SATURATION: 96 % | BODY MASS INDEX: 43.79 KG/M2 | RESPIRATION RATE: 20 BRPM | TEMPERATURE: 98.1 F | DIASTOLIC BLOOD PRESSURE: 82 MMHG | HEART RATE: 80 BPM | SYSTOLIC BLOOD PRESSURE: 146 MMHG | WEIGHT: 238 LBS | HEIGHT: 62 IN

## 2025-04-23 VITALS
SYSTOLIC BLOOD PRESSURE: 122 MMHG | HEIGHT: 62 IN | WEIGHT: 238 LBS | DIASTOLIC BLOOD PRESSURE: 84 MMHG | BODY MASS INDEX: 43.79 KG/M2

## 2025-04-23 DIAGNOSIS — N95.0 PMB (POSTMENOPAUSAL BLEEDING): Primary | ICD-10-CM

## 2025-04-23 DIAGNOSIS — N95.0 PMB (POSTMENOPAUSAL BLEEDING): ICD-10-CM

## 2025-04-23 LAB
ABO + RH BLD: NORMAL
ANION GAP SERPL CALCULATED.3IONS-SCNC: 11 MMOL/L (ref 9–16)
ARM BAND NUMBER: NORMAL
B-HCG SERPL EIA 3RD IS-ACNC: 1.1 MIU/ML (ref 0–7)
BACTERIA URNS QL MICRO: ABNORMAL
BASOPHILS # BLD: 0.1 K/UL (ref 0–0.2)
BASOPHILS NFR BLD: 1 % (ref 0–2)
BILIRUB UR QL STRIP: NEGATIVE
BLOOD BANK SAMPLE EXPIRATION: NORMAL
BLOOD GROUP ANTIBODIES SERPL: NEGATIVE
BUN SERPL-MCNC: 10 MG/DL (ref 8–23)
CALCIUM SERPL-MCNC: 9.4 MG/DL (ref 8.6–10.4)
CASTS #/AREA URNS LPF: ABNORMAL /LPF
CHLORIDE SERPL-SCNC: 104 MMOL/L (ref 98–107)
CLARITY UR: ABNORMAL
CO2 SERPL-SCNC: 24 MMOL/L (ref 20–31)
COLOR UR: YELLOW
CREAT SERPL-MCNC: 0.7 MG/DL (ref 0.7–1.2)
EOSINOPHIL # BLD: 0.1 K/UL (ref 0–0.4)
EOSINOPHILS RELATIVE PERCENT: 1 % (ref 0–4)
EPI CELLS #/AREA URNS HPF: ABNORMAL /HPF
ERYTHROCYTE [DISTWIDTH] IN BLOOD BY AUTOMATED COUNT: 14.2 % (ref 11.5–14.9)
GFR, ESTIMATED: >90 ML/MIN/1.73M2
GLUCOSE SERPL-MCNC: 200 MG/DL (ref 74–99)
GLUCOSE UR STRIP-MCNC: NEGATIVE MG/DL
HCT VFR BLD AUTO: 45.4 % (ref 36–46)
HGB BLD-MCNC: 15.1 G/DL (ref 12–16)
HGB UR QL STRIP.AUTO: ABNORMAL
INR PPP: 0.9
KETONES UR STRIP-MCNC: NEGATIVE MG/DL
LEUKOCYTE ESTERASE UR QL STRIP: NEGATIVE
LYMPHOCYTES NFR BLD: 2.5 K/UL (ref 1–4.8)
LYMPHOCYTES RELATIVE PERCENT: 25 % (ref 24–44)
MCH RBC QN AUTO: 29.4 PG (ref 26–34)
MCHC RBC AUTO-ENTMCNC: 33.2 G/DL (ref 31–37)
MCV RBC AUTO: 88.6 FL (ref 80–100)
MONOCYTES NFR BLD: 0.5 K/UL (ref 0.1–1.3)
MONOCYTES NFR BLD: 5 % (ref 1–7)
NEUTROPHILS NFR BLD: 68 % (ref 36–66)
NEUTS SEG NFR BLD: 6.8 K/UL (ref 1.3–9.1)
NITRITE UR QL STRIP: NEGATIVE
PH UR STRIP: 5 [PH] (ref 5–8)
PLATELET # BLD AUTO: 237 K/UL (ref 150–450)
PMV BLD AUTO: 10.2 FL (ref 6–12)
POTASSIUM SERPL-SCNC: 4.2 MMOL/L (ref 3.7–5.3)
PROT UR STRIP-MCNC: ABNORMAL MG/DL
PROTHROMBIN TIME: 13.2 SEC (ref 11.8–14.6)
RBC # BLD AUTO: 5.12 M/UL (ref 4–5.2)
RBC #/AREA URNS HPF: ABNORMAL /HPF
SODIUM SERPL-SCNC: 139 MMOL/L (ref 136–145)
SP GR UR STRIP: 1.03 (ref 1–1.03)
UROBILINOGEN UR STRIP-ACNC: NORMAL EU/DL (ref 0–1)
WBC #/AREA URNS HPF: ABNORMAL /HPF
WBC OTHER # BLD: 10.1 K/UL (ref 3.5–11)

## 2025-04-23 PROCEDURE — 1036F TOBACCO NON-USER: CPT | Performed by: OBSTETRICS & GYNECOLOGY

## 2025-04-23 PROCEDURE — G8427 DOCREV CUR MEDS BY ELIG CLIN: HCPCS | Performed by: OBSTETRICS & GYNECOLOGY

## 2025-04-23 PROCEDURE — G8399 PT W/DXA RESULTS DOCUMENT: HCPCS | Performed by: OBSTETRICS & GYNECOLOGY

## 2025-04-23 PROCEDURE — 1159F MED LIST DOCD IN RCRD: CPT | Performed by: OBSTETRICS & GYNECOLOGY

## 2025-04-23 PROCEDURE — 1123F ACP DISCUSS/DSCN MKR DOCD: CPT | Performed by: OBSTETRICS & GYNECOLOGY

## 2025-04-23 PROCEDURE — 81001 URINALYSIS AUTO W/SCOPE: CPT

## 2025-04-23 PROCEDURE — G8417 CALC BMI ABV UP PARAM F/U: HCPCS | Performed by: OBSTETRICS & GYNECOLOGY

## 2025-04-23 PROCEDURE — 80048 BASIC METABOLIC PNL TOTAL CA: CPT

## 2025-04-23 PROCEDURE — 3074F SYST BP LT 130 MM HG: CPT | Performed by: OBSTETRICS & GYNECOLOGY

## 2025-04-23 PROCEDURE — 99213 OFFICE O/P EST LOW 20 MIN: CPT | Performed by: OBSTETRICS & GYNECOLOGY

## 2025-04-23 PROCEDURE — 84702 CHORIONIC GONADOTROPIN TEST: CPT

## 2025-04-23 PROCEDURE — 86900 BLOOD TYPING SEROLOGIC ABO: CPT

## 2025-04-23 PROCEDURE — 85610 PROTHROMBIN TIME: CPT

## 2025-04-23 PROCEDURE — 3017F COLORECTAL CA SCREEN DOC REV: CPT | Performed by: OBSTETRICS & GYNECOLOGY

## 2025-04-23 PROCEDURE — 86901 BLOOD TYPING SEROLOGIC RH(D): CPT

## 2025-04-23 PROCEDURE — 36415 COLL VENOUS BLD VENIPUNCTURE: CPT

## 2025-04-23 PROCEDURE — 1090F PRES/ABSN URINE INCON ASSESS: CPT | Performed by: OBSTETRICS & GYNECOLOGY

## 2025-04-23 PROCEDURE — 3079F DIAST BP 80-89 MM HG: CPT | Performed by: OBSTETRICS & GYNECOLOGY

## 2025-04-23 PROCEDURE — 86850 RBC ANTIBODY SCREEN: CPT

## 2025-04-23 PROCEDURE — 85025 COMPLETE CBC W/AUTO DIFF WBC: CPT

## 2025-04-23 RX ORDER — SODIUM CHLORIDE 0.9 % (FLUSH) 0.9 %
5-40 SYRINGE (ML) INJECTION PRN
Status: CANCELLED | OUTPATIENT
Start: 2025-04-23

## 2025-04-23 RX ORDER — ACETAMINOPHEN 325 MG/1
1000 TABLET ORAL ONCE
Status: CANCELLED | OUTPATIENT
Start: 2025-04-23 | End: 2025-04-23

## 2025-04-23 RX ORDER — NITROFURANTOIN 25; 75 MG/1; MG/1
100 CAPSULE ORAL 2 TIMES DAILY
Qty: 14 CAPSULE | Refills: 0 | Status: SHIPPED | OUTPATIENT
Start: 2025-04-23 | End: 2025-04-30

## 2025-04-23 RX ORDER — SODIUM CHLORIDE, SODIUM LACTATE, POTASSIUM CHLORIDE, CALCIUM CHLORIDE 600; 310; 30; 20 MG/100ML; MG/100ML; MG/100ML; MG/100ML
INJECTION, SOLUTION INTRAVENOUS CONTINUOUS
Status: CANCELLED | OUTPATIENT
Start: 2025-04-23

## 2025-04-23 RX ORDER — POTASSIUM CHLORIDE 750 MG/1
10 TABLET, EXTENDED RELEASE ORAL DAILY
COMMUNITY
Start: 2025-04-08 | End: 2025-04-23

## 2025-04-23 RX ORDER — SODIUM CHLORIDE 9 MG/ML
INJECTION, SOLUTION INTRAVENOUS PRN
Status: CANCELLED | OUTPATIENT
Start: 2025-04-23

## 2025-04-23 RX ORDER — SODIUM CHLORIDE 0.9 % (FLUSH) 0.9 %
5-40 SYRINGE (ML) INJECTION EVERY 12 HOURS SCHEDULED
Status: CANCELLED | OUTPATIENT
Start: 2025-04-23

## 2025-04-23 NOTE — DISCHARGE INSTRUCTIONS
Pre-op Instructions For Out-Patient Surgery    Medication Instructions:  Please stop herbs and any supplements now (includes vitamins and minerals).    For these medications:  Dulaglutide (Trulicity), Exenatide (Byetta and Bydureon, Liraglutide (Victoza), Lixisenatide (Adlyxin), Semaglutide (Ozempic and Rybelsus), Tirzepatide (Mounjaro, Zepbound)- Stop 1 week prior if taking weekly or 1 day prior if taking every 12 hours or daily.     Please contact your surgeon and prescribing physician for pre-op instructions for any blood thinners. STOP ASPIRIN AS DIRECTED    If you have inhalers/aerosol treatments at home, please use them the morning of your surgery and bring the inhalers with you to the hospital.    Please take the following medications the morning of your surgery with a sip of water:    Metoprolol tartrate, Levothyroxine, and Pantoprazole    Surgery Instructions:  After midnight before surgery:  Do not eat or drink anything, including water, mints, gum, and hard candy.  You may brush your teeth without swallowing.  No smoking, chewing tobacco, or street drugs.    Please shower or bathe before surgery.  If you were given Surgical Scrub Chlorhexidine Gluconate Liquid (CHG), please shower the night before and the morning of your surgery following the detailed instructions you received during your pre-admission visit.     Please do not wear any cologne, lotion, powder, deodorant, jewelry, piercings, perfume, makeup, nail polish, hair accessories, or hair spray on the day of surgery.  Wear loose comfortable clothing.    Leave your valuables at home but bring a payment source for any after-surgery prescriptions you plan to fill at [facility].  Bring a storage case for any glasses/contacts.    An adult who is responsible for you MUST drive you home and should be with you for the first 24 hours after surgery.     If having out-patient knee and foot surgeries, please arrange for

## 2025-04-23 NOTE — TELEPHONE ENCOUNTER
[facility] Result Note Patient Contact Communication   [address] [place] [address]      04/23/25   3:58 PM     Patients Name: Amelia Moseley   Medical Record Number: [id number]   Contact Serial Number: [id number]  YOB: 1955       Patients Phone Number: [phone]     Description of Recommendations:  The patient was contacted and her identity was confirmed with two of the specific identifiers listed above.  The information regarding her recent testing results and provider recommendations were reviewed with her in detail and all questions were answered.   Recent labs/Cultures/ Imaging Studies/Pathology reports and Urinalysis results are included:      Recommendations given by provider:    + hgb in urine  Macrobid 100mg PO BID x7 days  Glucose 200  Please have patient follow up with PCP for diabetes regulation.    The patient verbalized understanding of the information above and the recommendation by the provider. She will contact her PCP if any other questions arise or contact our office for any other clarifications.        Electronically signed by Johanne Topete on 4/23/2025 at 3:58 PM

## 2025-04-23 NOTE — PROGRESS NOTES
Dr. Solares, anesthesia, was contacted and informed of patient's history and planned surgery.  Orders received and no clearance required.

## 2025-04-23 NOTE — H&P (VIEW-ONLY)
[facility] Obstetrics & Gynecology  [address]; [address]  [place] [address]  [phone] mn [phone] Fax        Amelia Moseley  1955  Primary Care Physician: Agnes Acharya MD        HISTORY AND PHYSICAL      Hospital: [facility]      2025  MEDICAID CONSENT COMPLETED: No      HPI: Amelia Moseley is a 70 y.o. female   she is being seen for the problems listed below and is planning surgical intervention. She was counseled on all conservative medical and surgical options as well as definitive procedures as well. Pt with postmenopausal bleeding. Sono c/w uterine fibroid. Endometrial biopsy benign but limited.     1. PMB (postmenopausal bleeding)          Relevant Past History:   Patient Active Problem List    Diagnosis Date Noted    Elevated alkaline phosphatase level 12/15/2022     Priority: Medium    Vitamin D deficiency 12/15/2022     Priority: Medium    PMB (postmenopausal bleeding) 2025    Obesity, class 3 (E66.813) 2025    Chronic obstructive pulmonary disease, unspecified (J44.9) 2025    Restless leg syndrome 2024    Intramural and subserous leiomyoma of uterus 2024    Abnormal EKG 2024    Secondary polycythemia 2024    History of adenomatous polyp of colon 2023    Type 2 diabetes mellitus with microalbuminuria, with long-term current use of insulin (HCC) 2023    Lumbosacral radiculopathy due to degenerative joint disease of spine 2021    Adenomatous polyp of colon 2021    Duodenal diverticulum 2021    Polyp of ascending colon     Family history of uterine cancer 10/07/2021    Major depressive disorder with single episode, in partial remission 2021    Arthritis involving multiple sites 2020    Gastroesophageal reflux disease without esophagitis 2020    Mixed hyperlipidemia 2020    Nephrolithiasis 2019    Coronary artery disease involving native coronary artery of  Smokeless tobacco: Never    Tobacco comments:     pt states she quit smoking cigarettes at the age of 18   Vaping Use    Vaping status: Never Used   Substance and Sexual Activity    Alcohol use: No    Drug use: Yes     Frequency: 7.0 times per week     Types: Marijuana (Weed)    Sexual activity: Yes     Partners: Female   Other Topics Concern    Not on file   Social History Narrative    Not on file     Social Drivers of Health     Financial Resource Strain: Patient Declined (9/5/2024)    Overall Financial Resource Strain (CARDIA)     Difficulty of Paying Living Expenses: Patient declined   Food Insecurity: No Food Insecurity (1/28/2025)    Hunger Vital Sign     Worried About Running Out of Food in the Last Year: Never true     Ran Out of Food in the Last Year: Never true   Transportation Needs: No Transportation Needs (1/28/2025)    PRAPARE - Transportation     Lack of Transportation (Medical): No     Lack of Transportation (Non-Medical): No   Physical Activity: Insufficiently Active (7/16/2024)    Exercise Vital Sign     Days of Exercise per Week: 7 days     Minutes of Exercise per Session: 20 min   Stress: Not on file   Social Connections: Not on file   Intimate Partner Violence: Not on file   Housing Stability: Low Risk  (1/28/2025)    Housing Stability Vital Sign     Unable to Pay for Housing in the Last Year: No     Number of Times Moved in the Last Year: 0     Homeless in the Last Year: No       Psychosocial History: denies    MEDICATIONS:  Current Outpatient Medications   Medication Sig Dispense Refill    insulin lispro, 1 Unit Dial, (HUMALOG/ADMELOG) 100 UNIT/ML SOPN INJECT 18 to 19 u UNDER THE SKIN THREE TIMES A DAY BEFORE MEALS 27 mL 0    lisinopril (PRINIVIL;ZESTRIL) 2.5 MG tablet Take 1 tablet by mouth daily 90 tablet 3    SITagliptin (JANUVIA) 50 MG tablet Take 1 tablet by mouth daily 90 tablet 3    DULoxetine (CYMBALTA) 60 MG extended release capsule Take 1 capsule by mouth daily 90 capsule 3

## 2025-04-23 NOTE — PROGRESS NOTES
component had both face to face and non face to face time spent in determining the total time component.  Counseling and education regarding her diagnosis listed below and her options regarding those diagnoses were also included in determining her time component.      Diagnosis Orders   1. PMB (postmenopausal bleeding)             The patient had her preventative health maintenance recommendations and follow-up reviewed with her at the completion of her visit.

## 2025-04-23 NOTE — H&P
[facility] Obstetrics & Gynecology  [address]; [address]  [place] [address]  [phone] mn [phone] Fax        Amelia Moseley  1955  Primary Care Physician: Agnes Acharya MD        HISTORY AND PHYSICAL      Hospital: [facility]      2025  MEDICAID CONSENT COMPLETED: No      HPI: Amleia Moseley is a 70 y.o. female   she is being seen for the problems listed below and is planning surgical intervention. She was counseled on all conservative medical and surgical options as well as definitive procedures as well. Pt with postmenopausal bleeding. Sono c/w uterine fibroid. Endometrial biopsy benign but limited.     1. PMB (postmenopausal bleeding)          Relevant Past History:   Patient Active Problem List    Diagnosis Date Noted    Elevated alkaline phosphatase level 12/15/2022     Priority: Medium    Vitamin D deficiency 12/15/2022     Priority: Medium    PMB (postmenopausal bleeding) 2025    Obesity, class 3 (E66.813) 2025    Chronic obstructive pulmonary disease, unspecified (J44.9) 2025    Restless leg syndrome 2024    Intramural and subserous leiomyoma of uterus 2024    Abnormal EKG 2024    Secondary polycythemia 2024    History of adenomatous polyp of colon 2023    Type 2 diabetes mellitus with microalbuminuria, with long-term current use of insulin (HCC) 2023    Lumbosacral radiculopathy due to degenerative joint disease of spine 2021    Adenomatous polyp of colon 2021    Duodenal diverticulum 2021    Polyp of ascending colon     Family history of uterine cancer 10/07/2021    Major depressive disorder with single episode, in partial remission 2021    Arthritis involving multiple sites 2020    Gastroesophageal reflux disease without esophagitis 2020    Mixed hyperlipidemia 2020    Nephrolithiasis 2019    Coronary artery disease involving native coronary artery of  Last office visit was on 7/12/2018     No up coming appointments made     Last refill was on 5/7/2018     Last labs was on 5/2/2018

## 2025-04-23 NOTE — TELEPHONE ENCOUNTER
----- Message from RASHMI Pond CNP sent at 4/23/2025  2:04 PM EDT -----  + hgb in urine  Macrobid 100mg PO BID x7 days  Glucose 200  Please have patient follow up with PCP for diabetes regulation.

## 2025-05-06 DIAGNOSIS — Z79.4 TYPE 2 DIABETES MELLITUS WITH DIABETIC POLYNEUROPATHY, WITH LONG-TERM CURRENT USE OF INSULIN (HCC): ICD-10-CM

## 2025-05-06 DIAGNOSIS — E11.42 TYPE 2 DIABETES MELLITUS WITH DIABETIC POLYNEUROPATHY, WITH LONG-TERM CURRENT USE OF INSULIN (HCC): ICD-10-CM

## 2025-05-06 RX ORDER — INSULIN LISPRO 100 [IU]/ML
INJECTION, SOLUTION INTRAVENOUS; SUBCUTANEOUS
Qty: 27 ML | Refills: 0 | Status: SHIPPED | OUTPATIENT
Start: 2025-05-06

## 2025-05-06 NOTE — TELEPHONE ENCOUNTER
Please Approve or Refuse.  Send to Pharmacy per Pt's Request:      Next Visit Date:  6/19/2025   Last Visit Date: 3/19/2025    Hemoglobin A1C (%)   Date Value   03/19/2025 7.6   12/19/2024 9.1   08/20/2024 8.8             ( goal A1C is < 7)   BP Readings from Last 3 Encounters:   04/23/25 (!) 146/82   04/23/25 122/84   03/21/25 122/80          (goal 120/80)  BUN   Date Value Ref Range Status   04/23/2025 10 8 - 23 mg/dL Final     Creatinine   Date Value Ref Range Status   04/23/2025 0.7 0.7 - 1.2 mg/dL Final     Potassium   Date Value Ref Range Status   04/23/2025 4.2 3.7 - 5.3 mmol/L Final

## 2025-05-08 ENCOUNTER — ANESTHESIA EVENT (OUTPATIENT)
Dept: OPERATING ROOM | Age: 70
End: 2025-05-08
Payer: MEDICARE

## 2025-05-08 NOTE — PRE-PROCEDURE INSTRUCTIONS
No answer, left message ?                             Unable to leave message ?    When were you told to arrive at hospital ?  0700    Do you have a  ?yes    Are you on any blood thinners ?      no               If yes when did you stop taking ?    Do you have your prep Rx filled and instruction ?      Nothing to eat the day before , only clear liquids.    Are you experiencing any covid symptoms ? no    Do you have any infections or rash we should be aware of ?no      Do you have the Hibiclens soap to use the night before and the morning of surgery ?    Nothing to eat or drink after midnight, only a sip of water to take any medication instructed to take the night before.yes  Wear comfortable clothing, leave any valuables at home, remove any jewelry and body piercing . yes

## 2025-05-09 ENCOUNTER — HOSPITAL ENCOUNTER (OUTPATIENT)
Age: 70
Setting detail: OUTPATIENT SURGERY
Discharge: HOME | End: 2025-05-09
Attending: OBSTETRICS & GYNECOLOGY | Admitting: OBSTETRICS & GYNECOLOGY
Payer: MEDICARE

## 2025-05-09 ENCOUNTER — ANESTHESIA (OUTPATIENT)
Dept: OPERATING ROOM | Age: 70
End: 2025-05-09
Payer: MEDICARE

## 2025-05-09 VITALS
OXYGEN SATURATION: 96 % | SYSTOLIC BLOOD PRESSURE: 134 MMHG | DIASTOLIC BLOOD PRESSURE: 76 MMHG | RESPIRATION RATE: 16 BRPM | HEART RATE: 69 BPM | TEMPERATURE: 97 F

## 2025-05-09 DIAGNOSIS — N95.0 PMB (POSTMENOPAUSAL BLEEDING): ICD-10-CM

## 2025-05-09 PROBLEM — Z98.890 STATUS POST D&C: Status: ACTIVE | Noted: 2025-05-09

## 2025-05-09 LAB — GLUCOSE BLD-MCNC: 108 MG/DL (ref 65–105)

## 2025-05-09 PROCEDURE — 3600000003 HC SURGERY LEVEL 3 BASE: Performed by: OBSTETRICS & GYNECOLOGY

## 2025-05-09 PROCEDURE — 7100000011 HC PHASE II RECOVERY - ADDTL 15 MIN: Performed by: OBSTETRICS & GYNECOLOGY

## 2025-05-09 PROCEDURE — 2709999900 HC NON-CHARGEABLE SUPPLY: Performed by: OBSTETRICS & GYNECOLOGY

## 2025-05-09 PROCEDURE — 6370000000 HC RX 637 (ALT 250 FOR IP): Performed by: ANESTHESIOLOGY

## 2025-05-09 PROCEDURE — 7100000030 HC ASPR PHASE II RECOVERY - FIRST 15 MIN: Performed by: OBSTETRICS & GYNECOLOGY

## 2025-05-09 PROCEDURE — 7100000001 HC PACU RECOVERY - ADDTL 15 MIN: Performed by: OBSTETRICS & GYNECOLOGY

## 2025-05-09 PROCEDURE — 88305 TISSUE EXAM BY PATHOLOGIST: CPT

## 2025-05-09 PROCEDURE — 6360000002 HC RX W HCPCS: Performed by: NURSE ANESTHETIST, CERTIFIED REGISTERED

## 2025-05-09 PROCEDURE — 7100000031 HC ASPR PHASE II RECOVERY - ADDTL 15 MIN: Performed by: OBSTETRICS & GYNECOLOGY

## 2025-05-09 PROCEDURE — 3600000013 HC SURGERY LEVEL 3 ADDTL 15MIN: Performed by: OBSTETRICS & GYNECOLOGY

## 2025-05-09 PROCEDURE — 82947 ASSAY GLUCOSE BLOOD QUANT: CPT

## 2025-05-09 PROCEDURE — 6370000000 HC RX 637 (ALT 250 FOR IP): Performed by: OBSTETRICS & GYNECOLOGY

## 2025-05-09 PROCEDURE — 7100000000 HC PACU RECOVERY - FIRST 15 MIN: Performed by: OBSTETRICS & GYNECOLOGY

## 2025-05-09 PROCEDURE — 2580000003 HC RX 258: Performed by: OBSTETRICS & GYNECOLOGY

## 2025-05-09 PROCEDURE — 2720000010 HC SURG SUPPLY STERILE: Performed by: OBSTETRICS & GYNECOLOGY

## 2025-05-09 PROCEDURE — 3700000001 HC ADD 15 MINUTES (ANESTHESIA): Performed by: OBSTETRICS & GYNECOLOGY

## 2025-05-09 PROCEDURE — 58558 HYSTEROSCOPY BIOPSY: CPT | Performed by: OBSTETRICS & GYNECOLOGY

## 2025-05-09 PROCEDURE — 7100000010 HC PHASE II RECOVERY - FIRST 15 MIN: Performed by: OBSTETRICS & GYNECOLOGY

## 2025-05-09 PROCEDURE — 3700000000 HC ANESTHESIA ATTENDED CARE: Performed by: OBSTETRICS & GYNECOLOGY

## 2025-05-09 RX ORDER — SODIUM CHLORIDE 9 MG/ML
INJECTION, SOLUTION INTRAVENOUS PRN
Status: DISCONTINUED | OUTPATIENT
Start: 2025-05-09 | End: 2025-05-09 | Stop reason: HOSPADM

## 2025-05-09 RX ORDER — LIDOCAINE HYDROCHLORIDE 20 MG/ML
INJECTION, SOLUTION EPIDURAL; INFILTRATION; INTRACAUDAL; PERINEURAL
Status: DISCONTINUED | OUTPATIENT
Start: 2025-05-09 | End: 2025-05-09 | Stop reason: SDUPTHER

## 2025-05-09 RX ORDER — LIDOCAINE HYDROCHLORIDE 10 MG/ML
1 INJECTION, SOLUTION EPIDURAL; INFILTRATION; INTRACAUDAL; PERINEURAL
Status: DISCONTINUED | OUTPATIENT
Start: 2025-05-09 | End: 2025-05-09 | Stop reason: HOSPADM

## 2025-05-09 RX ORDER — ONDANSETRON 2 MG/ML
4 INJECTION INTRAMUSCULAR; INTRAVENOUS
Status: DISCONTINUED | OUTPATIENT
Start: 2025-05-09 | End: 2025-05-09 | Stop reason: HOSPADM

## 2025-05-09 RX ORDER — SODIUM CHLORIDE 0.9 % (FLUSH) 0.9 %
5-40 SYRINGE (ML) INJECTION PRN
Status: DISCONTINUED | OUTPATIENT
Start: 2025-05-09 | End: 2025-05-09 | Stop reason: HOSPADM

## 2025-05-09 RX ORDER — SODIUM CHLORIDE, SODIUM LACTATE, POTASSIUM CHLORIDE, CALCIUM CHLORIDE 600; 310; 30; 20 MG/100ML; MG/100ML; MG/100ML; MG/100ML
INJECTION, SOLUTION INTRAVENOUS CONTINUOUS
Status: DISCONTINUED | OUTPATIENT
Start: 2025-05-09 | End: 2025-05-09 | Stop reason: HOSPADM

## 2025-05-09 RX ORDER — SODIUM CHLORIDE 9 MG/ML
INJECTION, SOLUTION INTRAVENOUS CONTINUOUS
Status: DISCONTINUED | OUTPATIENT
Start: 2025-05-09 | End: 2025-05-09 | Stop reason: HOSPADM

## 2025-05-09 RX ORDER — SODIUM CHLORIDE 0.9 % (FLUSH) 0.9 %
5-40 SYRINGE (ML) INJECTION EVERY 12 HOURS SCHEDULED
Status: DISCONTINUED | OUTPATIENT
Start: 2025-05-09 | End: 2025-05-09 | Stop reason: HOSPADM

## 2025-05-09 RX ORDER — FENTANYL CITRATE 0.05 MG/ML
50 INJECTION, SOLUTION INTRAMUSCULAR; INTRAVENOUS EVERY 5 MIN PRN
Status: DISCONTINUED | OUTPATIENT
Start: 2025-05-09 | End: 2025-05-09 | Stop reason: HOSPADM

## 2025-05-09 RX ORDER — ACETAMINOPHEN 500 MG
1000 TABLET ORAL ONCE
Status: DISCONTINUED | OUTPATIENT
Start: 2025-05-09 | End: 2025-05-09 | Stop reason: HOSPADM

## 2025-05-09 RX ORDER — ACETAMINOPHEN 500 MG
1000 TABLET ORAL ONCE
Status: COMPLETED | OUTPATIENT
Start: 2025-05-09 | End: 2025-05-09

## 2025-05-09 RX ORDER — ONDANSETRON 2 MG/ML
INJECTION INTRAMUSCULAR; INTRAVENOUS
Status: DISCONTINUED | OUTPATIENT
Start: 2025-05-09 | End: 2025-05-09 | Stop reason: SDUPTHER

## 2025-05-09 RX ORDER — GABAPENTIN 100 MG/1
100 CAPSULE ORAL ONCE
Status: COMPLETED | OUTPATIENT
Start: 2025-05-09 | End: 2025-05-09

## 2025-05-09 RX ORDER — PROPOFOL 10 MG/ML
INJECTION, EMULSION INTRAVENOUS
Status: DISCONTINUED | OUTPATIENT
Start: 2025-05-09 | End: 2025-05-09 | Stop reason: SDUPTHER

## 2025-05-09 RX ORDER — SCOPOLAMINE 1 MG/3D
1 PATCH, EXTENDED RELEASE TRANSDERMAL ONCE
Status: DISCONTINUED | OUTPATIENT
Start: 2025-05-09 | End: 2025-05-09 | Stop reason: HOSPADM

## 2025-05-09 RX ORDER — DIPHENHYDRAMINE HYDROCHLORIDE 50 MG/ML
12.5 INJECTION, SOLUTION INTRAMUSCULAR; INTRAVENOUS
Status: DISCONTINUED | OUTPATIENT
Start: 2025-05-09 | End: 2025-05-09 | Stop reason: HOSPADM

## 2025-05-09 RX ORDER — DEXAMETHASONE SODIUM PHOSPHATE 4 MG/ML
INJECTION, SOLUTION INTRA-ARTICULAR; INTRALESIONAL; INTRAMUSCULAR; INTRAVENOUS; SOFT TISSUE
Status: DISCONTINUED | OUTPATIENT
Start: 2025-05-09 | End: 2025-05-09 | Stop reason: SDUPTHER

## 2025-05-09 RX ORDER — FENTANYL CITRATE 50 UG/ML
INJECTION, SOLUTION INTRAMUSCULAR; INTRAVENOUS
Status: DISCONTINUED | OUTPATIENT
Start: 2025-05-09 | End: 2025-05-09 | Stop reason: SDUPTHER

## 2025-05-09 RX ADMIN — FENTANYL CITRATE 50 MCG: 50 INJECTION INTRAMUSCULAR; INTRAVENOUS at 09:10

## 2025-05-09 RX ADMIN — DEXAMETHASONE SODIUM PHOSPHATE 4 MG: 4 INJECTION INTRA-ARTICULAR; INTRALESIONAL; INTRAMUSCULAR; INTRAVENOUS; SOFT TISSUE at 09:08

## 2025-05-09 RX ADMIN — SODIUM CHLORIDE, POTASSIUM CHLORIDE, SODIUM LACTATE AND CALCIUM CHLORIDE: 600; 310; 30; 20 INJECTION, SOLUTION INTRAVENOUS at 09:00

## 2025-05-09 RX ADMIN — PROPOFOL 200 MG: 10 INJECTION, EMULSION INTRAVENOUS at 09:05

## 2025-05-09 RX ADMIN — ONDANSETRON 4 MG: 2 INJECTION, SOLUTION INTRAMUSCULAR; INTRAVENOUS at 09:10

## 2025-05-09 RX ADMIN — ACETAMINOPHEN 1000 MG: 500 TABLET ORAL at 08:00

## 2025-05-09 RX ADMIN — LIDOCAINE HYDROCHLORIDE 100 MG: 20 INJECTION, SOLUTION EPIDURAL; INFILTRATION; INTRACAUDAL; PERINEURAL at 09:05

## 2025-05-09 RX ADMIN — GABAPENTIN 100 MG: 100 CAPSULE ORAL at 08:00

## 2025-05-09 RX ADMIN — FENTANYL CITRATE 50 MCG: 50 INJECTION INTRAMUSCULAR; INTRAVENOUS at 09:39

## 2025-05-09 RX ADMIN — PROPOFOL 50 MG: 10 INJECTION, EMULSION INTRAVENOUS at 09:12

## 2025-05-09 ASSESSMENT — PAIN SCALES - GENERAL: PAINLEVEL_OUTOF10: 0

## 2025-05-09 ASSESSMENT — PAIN - FUNCTIONAL ASSESSMENT
PAIN_FUNCTIONAL_ASSESSMENT: 0-10
PAIN_FUNCTIONAL_ASSESSMENT: NONE - DENIES PAIN
PAIN_FUNCTIONAL_ASSESSMENT: NONE - DENIES PAIN

## 2025-05-09 ASSESSMENT — ENCOUNTER SYMPTOMS: SHORTNESS OF BREATH: 1

## 2025-05-09 NOTE — ANESTHESIA PRE PROCEDURE
Coccygeal pain 03/07/2016   • Constipation    • COVID-19 04/2020   • Depression    • Diabetic neuropathy (HCC)    • Drug effect    • Heart disease 1977   • Herpes simplex virus (HSV) infection 2004   • History of hepatitis A     possible history of hepatitis A in the past   • History of renal calculi    • Hyperthyroidism    • Hypothyroidism    • Liver disease 1958   • Migraine 1980   • Morbid obesity with BMI of 45.0-49.9, adult (HCC) 03/31/2016   • Nephrolithiasis    • Neuropathy    • Obesity    • Overactive bladder 2022   • Pancreatic cyst    • PONV (postoperative nausea and vomiting)    • Type 1 diabetes mellitus (HCC) 2012   • Type 2 diabetes mellitus without complication (HCC) 2004   • Type II or unspecified type diabetes mellitus without mention of complication, not stated as uncontrolled     non insulin dependent    • Ulcerative colitis (HCC)    • UTI (lower urinary tract infection)        Past Surgical History:        Procedure Laterality Date   • BREAST BIOPSY  2012    negative, clip placed   • CARDIAC CATHETERIZATION  2000    no stents   • CARDIOVASCULAR STRESS TEST      7/17/15 no results   • CHOLECYSTECTOMY     • COLONOSCOPY N/A 10/26/2021    COLONOSCOPY POLYPECTOMY SNARE/COLD BIOPSY performed by Mathew Pandey MD at [facility]   • COLONOSCOPY  06/30/2023    COLORECTAL CANCER SCREENING, NOT HIGH RISK   • COLONOSCOPY N/A 06/30/2023    COLONOSCOPY POLYPECTOMY performed by Sherwin Licona DO at [facility] OR   • COLONOSCOPY  08/25/2023   • COLONOSCOPY N/A 08/25/2023    COLONOSCOPY POLYPECTOMY HOT BIOPSY OF CECAL POLYPS, ASCENDING COLON POLYPS. COLD BIOPSY OF TRANSVERSE COLON POLYPS performed by Sherwin Licona DO at [facility] OR   • COLONOSCOPY N/A 08/09/2024    COLONOSCOPY POLYPECTOMY SNARE/BIOPSY performed by Mark Meehan MD at [facility]   • COLPOSCOPY  2023   • CYST REMOVAL      right hand   • CYSTO/URETERO/PYELOSCOPY, CALCULUS TX Right 08/26/2019    CYSTOSCOPY URETEROSCOPY  STONE BASKET

## 2025-05-09 NOTE — OP NOTE
Gynecologic Operative Note      NAME: Amelia Moseley   MRN: [id number]  CSN: [id number]  : 1955    PROCEDURE DATE: 2025     Pre-op Diagnosis: Pre-Op Diagnosis Codes:      * PMB (postmenopausal bleeding) [N95.0]     Post-op Diagnosis: Same; Endometrial polyps    Procedure: Procedure(s):  DILATATION AND CURETTAGE HYSTEROSCOPY WITH MYOSURE WITH ENDOMETRIAL POLYPS    Surgeon: Alexandria Sandoval DO    Assistant:   Brijesh Rahman D.O. PGY2      Circulator Documentation of Staff:  Surgeons and Role:     * Alexandria Sandoval DO - Primary    OR Staff:  Scrub Person First: Julisa Watson      Anesthesia Type: General  Anesthesia Staff: Anesthesiologist: Michelle Valenzuela MD  CRNA: Woodrow Singleton APRN - CRNA; Cheryl Mayers APRN - CRNA      Complications: None      Estimated Blood Loss: 5 ml  Total IV Fluids:  500 ml  Urine Output: 5 ml clear    Distention Media: NS (Accurate I/O at the completion of the procedure)    Specimens:   ID Type Source Tests Collected by Time Destination   A : endometrial curettings Tissue Endometrium SURGICAL PATHOLOGY Alexandria Sandoval DO 2025 0929    B : myosure curettings Tissue Endometrium SURGICAL PATHOLOGY Alexandria Sandoval DO 2025 0930      Implants: * No implants in log *    Drains: * No LDAs found *    PATOS Levels:  No infection present      Condition: Stable    Findings: small AV uterus sounded to 10 cm. Endometrial polyps x 3 noted. [place]ia visualized.         Description of Procedure: (Understanding of limitations from template op-reports exist)  The patient was seen in the pre-operative area. The procedure risk and complications were reviewed.  The consent , labs , and H&P were reviewed. The patient had all of her questions answered.  The patient was moved to the operative suite.  Time out was preformed. She was placed under general anesthesia by the anesthesiologist.  The patient was placed in the dorsal lithotomy position utilizing  Stirrups.

## 2025-05-09 NOTE — ANESTHESIA POSTPROCEDURE EVALUATION
Department of Anesthesiology  Postprocedure Note    Patient: Amelia Moseley  MRN: [id number]  YOB: 1955  Date of evaluation: 5/9/2025    Procedure Summary       Date: 05/09/25 Room / Location: [address]    Anesthesia Start: 0900 Anesthesia Stop: 0942    Procedure: DILATATION AND CURETTAGE HYSTEROSCOPY WITH MYOSURE WITH ENDOMETRIAL POLYPS (Uterus) Diagnosis:       PMB (postmenopausal bleeding)      (PMB (postmenopausal bleeding) [N95.0])    Surgeons: Alexandria Sandoval DO Responsible Provider: Michelel Valenzuela MD    Anesthesia Type: General ASA Status: 3            Anesthesia Type: General    Gogo Phase I: Gogo Score: 10    Gogo Phase II: Gogo Score: 10    Anesthesia Post Evaluation    Comments: POST- ANESTHESIA EVALUATION       Pt Name: Amelia Moseley  MRN: [id number]  YOB: 1955  Date of evaluation: 5/9/2025  Time:  12:45 PM      /76   Pulse 69   Temp 97 °F (36.1 °C) (Infrared)   Resp 16   LMP 01/12/2011   SpO2 96%      Consciousness Level  Awake  Cardiopulmonary Status  Stable  Pain Adequately Treated YES  Nausea / Vomiting  NO  Adequate Hydration  YES  Anesthesia Related Complications NONE      Electronically signed by Michelle Valenzuela MD on 5/9/2025 at 12:45 PM           No notable events documented.

## 2025-05-09 NOTE — DISCHARGE INSTRUCTIONS
[facility] Obstetrics & Gynecology  [address]  [place] [address]  [phone]          POST-OP INSTRUCTIONS FOR D&C AND/OR HYSTEROSCOPY      AFTER SURGERY:    After surgery you will remain in the recovery room for approximately two hours.  Once you are alert and awake and able to ambulate, you will be discharged home.  If you did not receive a pain medication prescription you may call our office with a pharmacy phone number and we will call in a medication for cramping.  Upon leaving the Out-Patient Department you will need someone to drive you home.  UNDER NO CIRCUMSTANCES SHOULD YOU DRIVE YOURSELF HOME.    AT HOME:    Once arriving at home, plan on resting the entire evening with very limited activity.    If you have small children, you should make arrangements for another adult to be responsible for their care.  It is not uncommon to have nausea after anesthesia.  We recommend a clear liquid diet the evening of surgery, advancing to a regular diet 24 hours after surgery or when the nausea resolves.    You may resume your normal activities the day following surgery, avoiding heavy lifting or straining.  If you are not taking any pain medications, you may be able to drive and leave the house the day following surgery.  If you are uncomfortable enough to require pain medication, we recommend that you continue to limit your activities, increase your rest and post-pone driving and all other activities until you no longer require pain medication.    It is recommended that you do not douche, use tampons or have intercourse for the first two weeks following your surgery.  You may have cramping for a few days and bleeding or spotting for several weeks.      WHEN TO CALL:    For any of the following problems you should call the office during working hours at [phone]  to reach a physician:     temperature greater than 100.6   constant, severe or increasing abdominal or pelvic pain, no relieved

## 2025-05-09 NOTE — INTERVAL H&P NOTE
PRE OP NOTE  Gyn Surgery H&P Update    [facility]  Gynecologic Surgery  PHYSICIAN PRE-OPERATIVE NOTE:      Patient Name: Amelia Moseley  Patient : 1955  Room/Bed: [facility] OR [place]/NONE  Admission Date/Time: 2025  7:07 AM  Primary Care Physician: Agnes Acharya MD  MRN #: [id number]  [facility] #: [id number]        Date: 2025  Time: 8:43 AM    The patient was seen in pre-op holding. The procedure risks and complications were reviewed.  The labs, Consent, and H&P were reviewed and updated.  The patient was counseled on the possibility of  the need of a second surgery.  The patient voiced understanding and had all of her questions answered. The possibility of incomplete removal of abnormal tissue was discussed.        Past Medical History:   Diagnosis Date    Arrhythmia     Breast mass     CAD (coronary artery disease)     with valvular disease    Chronic neck pain     Coccygeal pain 2016    Constipation     COVID-19 2020    Depression     Diabetic neuropathy (HCC)     Drug effect     Heart disease     Herpes simplex virus (HSV) infection     History of hepatitis A     possible history of hepatitis A in the past    History of renal calculi     Hyperthyroidism     Hypothyroidism     Liver disease     Migraine     Morbid obesity with BMI of 45.0-49.9, adult (HCC) 2016    Nephrolithiasis     Neuropathy     Obesity     Overactive bladder     Pancreatic cyst     PONV (postoperative nausea and vomiting)     Type 1 diabetes mellitus (HCC)     Type 2 diabetes mellitus without complication (HCC)     Type II or unspecified type diabetes mellitus without mention of complication, not stated as uncontrolled     non insulin dependent     Ulcerative colitis (HCC)     UTI (lower urinary tract infection)        Patient Active Problem List    Diagnosis Date Noted    Elevated alkaline phosphatase level 12/15/2022     Priority: Medium    Vitamin D deficiency 12/15/2022

## 2025-05-12 ENCOUNTER — RESULTS FOLLOW-UP (OUTPATIENT)
Dept: OBGYN CLINIC | Age: 70
End: 2025-05-12

## 2025-05-12 LAB — SURGICAL PATHOLOGY REPORT: NORMAL

## 2025-05-12 NOTE — TELEPHONE ENCOUNTER
Writer spoke with pt and informed her she is not due for her next screening until 2029, unless symptoms present, then can be an earlier diagnostic colonoscopy.

## 2025-05-17 DIAGNOSIS — I10 ESSENTIAL HYPERTENSION: ICD-10-CM

## 2025-05-17 DIAGNOSIS — E55.9 VITAMIN D DEFICIENCY: ICD-10-CM

## 2025-05-17 DIAGNOSIS — I50.32 CHRONIC DIASTOLIC CONGESTIVE HEART FAILURE (HCC): ICD-10-CM

## 2025-05-19 RX ORDER — LISINOPRIL 2.5 MG/1
2.5 TABLET ORAL DAILY
Qty: 90 TABLET | Refills: 3 | Status: SHIPPED | OUTPATIENT
Start: 2025-05-19

## 2025-05-19 RX ORDER — FUROSEMIDE 20 MG/1
20 TABLET ORAL DAILY
Qty: 270 TABLET | Refills: 1 | Status: SHIPPED | OUTPATIENT
Start: 2025-05-19

## 2025-05-19 RX ORDER — ERGOCALCIFEROL 1.25 MG/1
50000 CAPSULE, LIQUID FILLED ORAL WEEKLY
Qty: 12 CAPSULE | Refills: 1 | Status: SHIPPED | OUTPATIENT
Start: 2025-05-19

## 2025-05-19 NOTE — TELEPHONE ENCOUNTER
Please Approve or Refuse.  Send to Pharmacy per Pt's Request:      Next Visit Date:  6/19/2025   Last Visit Date: 3/19/2025    Hemoglobin A1C (%)   Date Value   03/19/2025 7.6   12/19/2024 9.1   08/20/2024 8.8             ( goal A1C is < 7)   BP Readings from Last 3 Encounters:   05/09/25 134/76   04/23/25 (!) 146/82   04/23/25 122/84          (goal 120/80)  BUN   Date Value Ref Range Status   04/23/2025 10 8 - 23 mg/dL Final     Creatinine   Date Value Ref Range Status   04/23/2025 0.7 0.7 - 1.2 mg/dL Final     Potassium   Date Value Ref Range Status   04/23/2025 4.2 3.7 - 5.3 mmol/L Final

## 2025-05-22 ENCOUNTER — TELEMEDICINE (OUTPATIENT)
Dept: OBGYN CLINIC | Age: 70
End: 2025-05-22

## 2025-05-22 DIAGNOSIS — Z09 POSTOP CHECK: Primary | ICD-10-CM

## 2025-05-22 DIAGNOSIS — Z98.890 STATUS POST D&C: ICD-10-CM

## 2025-05-22 NOTE — PROGRESS NOTES
EKG 06/07/2024    Secondary polycythemia 06/05/2024    History of adenomatous polyp of colon 06/30/2023    Type 2 diabetes mellitus with microalbuminuria, with long-term current use of insulin (HCC) 06/05/2023    Lumbosacral radiculopathy due to degenerative joint disease of spine 12/01/2021    Adenomatous polyp of colon 11/04/2021    Duodenal diverticulum 11/04/2021    Polyp of ascending colon     Family history of uterine cancer 10/07/2021    Major depressive disorder with single episode, in partial remission 03/23/2021    Arthritis involving multiple sites 08/17/2020    Gastroesophageal reflux disease without esophagitis 08/17/2020    Mixed hyperlipidemia 06/16/2020    Nephrolithiasis 08/19/2019    Coronary artery disease involving native coronary artery of native heart without angina pectoris 09/18/2017    Generalized anxiety disorder 09/18/2017    Essential hypertension 02/07/2017    Chronic pain of both knees 02/07/2017    Type 2 diabetes mellitus with diabetic polyneuropathy, with long-term current use of insulin (HCC) 06/08/2016    Increased endometrial stripe thickness 05/04/2016    Constipation 04/08/2015    Hypothyroidism 01/14/2012    Depression 01/14/2012    MVP (mitral valve prolapse) 01/14/2012          POD# 2 weeks   Procedure: D&C/ Hysteroscopy with myosure resection endometrial polyps 5/9/2025    Stable   Pathology reviewed and found to be benign. Yes    Plan:   Return in about 1 year (around 5/22/2026) for Annual Exam.        The patient, Amelia Moseley is a 70 y.o. female, was seen with a total time spent of 20 minutes for the visit on this date of service by the E/M provider. The time component had both face to face and non face to face time spent in determining the total time component.  Counseling and education regarding her diagnosis listed below and her options regarding those diagnoses were also included in determining her time component.     1. Postop check    2. D&C/ Hysteroscopy with

## 2025-05-28 DIAGNOSIS — N32.89 BLADDER IRRITATION: ICD-10-CM

## 2025-05-28 DIAGNOSIS — R39.15 URINARY URGENCY: ICD-10-CM

## 2025-05-28 DIAGNOSIS — Z87.440 HISTORY OF RECURRENT UTIS: Primary | ICD-10-CM

## 2025-05-29 ENCOUNTER — HOSPITAL ENCOUNTER (OUTPATIENT)
Age: 70
Setting detail: SPECIMEN
Discharge: HOME | End: 2025-05-29
Payer: MEDICARE

## 2025-05-29 DIAGNOSIS — Z87.440 HISTORY OF RECURRENT UTIS: ICD-10-CM

## 2025-05-29 DIAGNOSIS — N32.89 BLADDER IRRITATION: ICD-10-CM

## 2025-05-29 DIAGNOSIS — R39.15 URINARY URGENCY: ICD-10-CM

## 2025-05-29 PROCEDURE — 87186 SC STD MICRODIL/AGAR DIL: CPT

## 2025-05-29 PROCEDURE — 87086 URINE CULTURE/COLONY COUNT: CPT

## 2025-05-29 PROCEDURE — 87088 URINE BACTERIA CULTURE: CPT

## 2025-05-31 LAB
MICROORGANISM SPEC CULT: ABNORMAL
SERVICE CMNT-IMP: ABNORMAL
SPECIMEN DESCRIPTION: ABNORMAL

## 2025-06-02 ENCOUNTER — PATIENT MESSAGE (OUTPATIENT)
Dept: GASTROENTEROLOGY | Age: 70
End: 2025-06-02

## 2025-06-02 DIAGNOSIS — R19.4 ALTERED BOWEL HABITS: Primary | ICD-10-CM

## 2025-06-02 DIAGNOSIS — K59.00 CONSTIPATION, UNSPECIFIED CONSTIPATION TYPE: ICD-10-CM

## 2025-06-02 DIAGNOSIS — M47.27 OTHER SPONDYLOSIS WITH RADICULOPATHY, LUMBOSACRAL REGION: ICD-10-CM

## 2025-06-09 ENCOUNTER — PATIENT MESSAGE (OUTPATIENT)
Dept: OBGYN CLINIC | Age: 70
End: 2025-06-09

## 2025-06-09 RX ORDER — FLUCONAZOLE 150 MG/1
150 TABLET ORAL
Qty: 2 TABLET | Refills: 0 | Status: SHIPPED | OUTPATIENT
Start: 2025-06-09

## 2025-06-16 ENCOUNTER — PATIENT MESSAGE (OUTPATIENT)
Dept: OBGYN CLINIC | Age: 70
End: 2025-06-16

## 2025-06-16 RX ORDER — NYSTATIN 100000 [USP'U]/G
POWDER TOPICAL
Qty: 1 EACH | Refills: 1 | Status: SHIPPED | OUTPATIENT
Start: 2025-06-16

## 2025-06-19 ENCOUNTER — PATIENT MESSAGE (OUTPATIENT)
Dept: UROLOGY | Age: 70
End: 2025-06-19

## 2025-06-21 DIAGNOSIS — K29.70 GASTRITIS WITHOUT BLEEDING, UNSPECIFIED CHRONICITY, UNSPECIFIED GASTRITIS TYPE: ICD-10-CM

## 2025-06-22 DIAGNOSIS — I10 ESSENTIAL HYPERTENSION: ICD-10-CM

## 2025-06-22 DIAGNOSIS — I34.1 MVP (MITRAL VALVE PROLAPSE): ICD-10-CM

## 2025-06-23 RX ORDER — METOPROLOL TARTRATE 25 MG/1
25 TABLET, FILM COATED ORAL 2 TIMES DAILY
Qty: 180 TABLET | Refills: 1 | Status: SHIPPED | OUTPATIENT
Start: 2025-06-23

## 2025-06-23 RX ORDER — PANTOPRAZOLE SODIUM 40 MG/1
40 TABLET, DELAYED RELEASE ORAL
Qty: 180 TABLET | Refills: 1 | Status: SHIPPED | OUTPATIENT
Start: 2025-06-23

## 2025-06-23 NOTE — TELEPHONE ENCOUNTER
Please Approve or Refuse.  Send to Pharmacy per Pt's Request:      Next Visit Date:  Visit date not found   Last Visit Date: 3/19/2025    Hemoglobin A1C (%)   Date Value   03/19/2025 7.6   12/19/2024 9.1   08/20/2024 8.8             ( goal A1C is < 7)   BP Readings from Last 3 Encounters:   05/09/25 134/76   04/23/25 (!) 146/82   04/23/25 122/84          (goal 120/80)  BUN   Date Value Ref Range Status   04/23/2025 10 8 - 23 mg/dL Final     Creatinine   Date Value Ref Range Status   04/23/2025 0.7 0.7 - 1.2 mg/dL Final     Potassium   Date Value Ref Range Status   04/23/2025 4.2 3.7 - 5.3 mmol/L Final

## 2025-07-01 ENCOUNTER — TELEMEDICINE (OUTPATIENT)
Dept: FAMILY MEDICINE CLINIC | Age: 70
End: 2025-07-01

## 2025-07-01 DIAGNOSIS — E11.65 UNCONTROLLED TYPE 2 DIABETES MELLITUS WITH HYPERGLYCEMIA (HCC): ICD-10-CM

## 2025-07-01 DIAGNOSIS — I10 ESSENTIAL HYPERTENSION: ICD-10-CM

## 2025-07-01 DIAGNOSIS — E11.42 TYPE 2 DIABETES MELLITUS WITH DIABETIC POLYNEUROPATHY, WITH LONG-TERM CURRENT USE OF INSULIN (HCC): Primary | ICD-10-CM

## 2025-07-01 DIAGNOSIS — E03.9 ACQUIRED HYPOTHYROIDISM: ICD-10-CM

## 2025-07-01 DIAGNOSIS — Z79.4 TYPE 2 DIABETES MELLITUS WITH DIABETIC POLYNEUROPATHY, WITH LONG-TERM CURRENT USE OF INSULIN (HCC): ICD-10-CM

## 2025-07-01 DIAGNOSIS — M47.27 OTHER SPONDYLOSIS WITH RADICULOPATHY, LUMBOSACRAL REGION: ICD-10-CM

## 2025-07-01 DIAGNOSIS — E11.42 TYPE 2 DIABETES MELLITUS WITH DIABETIC POLYNEUROPATHY, WITH LONG-TERM CURRENT USE OF INSULIN (HCC): ICD-10-CM

## 2025-07-01 DIAGNOSIS — Z79.4 TYPE 2 DIABETES MELLITUS WITH DIABETIC POLYNEUROPATHY, WITH LONG-TERM CURRENT USE OF INSULIN (HCC): Primary | ICD-10-CM

## 2025-07-01 PROBLEM — N95.0 PMB (POSTMENOPAUSAL BLEEDING): Status: RESOLVED | Noted: 2025-04-03 | Resolved: 2025-07-01

## 2025-07-01 PROBLEM — M12.9 ARTHRITIS INVOLVING MULTIPLE SITES: Status: RESOLVED | Noted: 2020-08-17 | Resolved: 2025-07-01

## 2025-07-01 PROBLEM — R94.31 ABNORMAL EKG: Status: RESOLVED | Noted: 2024-06-07 | Resolved: 2025-07-01

## 2025-07-01 RX ORDER — INSULIN LISPRO 100 [IU]/ML
INJECTION, SOLUTION INTRAVENOUS; SUBCUTANEOUS
Qty: 27 ML | Refills: 0 | Status: SHIPPED | OUTPATIENT
Start: 2025-07-01

## 2025-07-01 ASSESSMENT — ENCOUNTER SYMPTOMS
CHEST TIGHTNESS: 0
COUGH: 0
DIARRHEA: 0
STRIDOR: 0
COLOR CHANGE: 0
SINUS PRESSURE: 0
BACK PAIN: 1
WHEEZING: 0
TROUBLE SWALLOWING: 0
BLOOD IN STOOL: 0
RECTAL PAIN: 0
RHINORRHEA: 0
SORE THROAT: 0
NAUSEA: 0
ABDOMINAL DISTENTION: 0
SHORTNESS OF BREATH: 0
ABDOMINAL PAIN: 0
CONSTIPATION: 0
EYE REDNESS: 0
VOMITING: 0

## 2025-07-01 NOTE — PROGRESS NOTES
Amelia Moseley, was evaluated through a synchronous (real-time) audio-video encounter. The patient (or guardian if applicable) is aware that this is a billable service, which includes applicable co-pays. This Virtual Visit was conducted with patient's (and/or legal guardian's) consent. Patient identification was verified, and a caregiver was present when appropriate.   The patient was located at Home: [address] [place] [id number]  Provider was located at Facility (Appt Dept): [address] [place] [phone]  Confirm you are appropriately licensed, registered, or certified to deliver care in the state where the patient is located as indicated above. If you are not or unsure, please re-schedule the visit: Yes, I confirm.     Amelia Moseley (:  1955) is a Established patient, presenting virtually for evaluation of the following:      Below is the assessment and plan developed based on review of pertinent history, physical exam, labs, studies, and medications.     Assessment & Plan  Type 2 diabetes mellitus with diabetic polyneuropathy, with long-term current use of insulin (HCC)   Patient is due for A1c previously controlled, recent high blood sugar readings, refill medications check A1c follow-up next week in the office    Orders:    Hemoglobin A1C; Future    insulin lispro, 1 Unit Dial, (HUMALOG/ADMELOG) 100 UNIT/ML SOPN; INJECT 18 TO 19 UNITS UNDER THE SKIN THREE TIMES DAILY BEFORE MEALS    Other spondylosis with radiculopathy, lumbosacral region  Chronic problem not under control, discussed with patient you need to have x-rays done, patient wants to hold on physical therapy, further treatment on the x-ray results currently continue Tylenol as needed and muscle relaxant Cymbalta    Orders:    XR LUMBAR SPINE (2-3 VIEWS); Future    Uncontrolled type 2 diabetes mellitus with hyperglycemia (HCC)  Uncontrolled blood sugar readings, check A1c, continue long and short acting

## 2025-07-01 NOTE — ASSESSMENT & PLAN NOTE
Patient is due for A1c previously controlled, recent high blood sugar readings, refill medications check A1c follow-up next week in the office    Orders:    Hemoglobin A1C; Future    insulin lispro, 1 Unit Dial, (HUMALOG/ADMELOG) 100 UNIT/ML SOPN; INJECT 18 TO 19 UNITS UNDER THE SKIN THREE TIMES DAILY BEFORE MEALS

## 2025-07-01 NOTE — ASSESSMENT & PLAN NOTE
Chronic, at goal (stable), continue current treatment plan          PT TO FLOOR VIA STRETCHER WITH PACU RN'S. FAMILY IN ROOM. PT SLIDE TO BED ON
SLIDE SHEET. VS ASSESSED. PULSE OX AND TELE PLACED. PT HR IRREGULAR. DRESSINGS
INTACT WITH SMALL AMT DRAINAGE. PT GIVEN ICE WATER. TOLERATING WELL.

## 2025-07-01 NOTE — ASSESSMENT & PLAN NOTE
Chronic problem not under control, discussed with patient you need to have x-rays done, patient wants to hold on physical therapy, further treatment on the x-ray results currently continue Tylenol as needed and muscle relaxant Cymbalta    Orders:    XR LUMBAR SPINE (2-3 VIEWS); Future

## 2025-07-01 NOTE — TELEPHONE ENCOUNTER
Please Approve or Refuse.  Send to Pharmacy per Pt's Request:      Next Visit Date:  7/8/2025   Last Visit Date: 3/19/2025    Hemoglobin A1C (%)   Date Value   03/19/2025 7.6   12/19/2024 9.1   08/20/2024 8.8             ( goal A1C is < 7)   BP Readings from Last 3 Encounters:   05/09/25 134/76   04/23/25 (!) 146/82   04/23/25 122/84          (goal 120/80)  BUN   Date Value Ref Range Status   04/23/2025 10 8 - 23 mg/dL Final     Creatinine   Date Value Ref Range Status   04/23/2025 0.7 0.7 - 1.2 mg/dL Final     Potassium   Date Value Ref Range Status   04/23/2025 4.2 3.7 - 5.3 mmol/L Final

## 2025-07-07 ENCOUNTER — PATIENT MESSAGE (OUTPATIENT)
Dept: GASTROENTEROLOGY | Age: 70
End: 2025-07-07

## 2025-07-10 ENCOUNTER — APPOINTMENT (OUTPATIENT)
Dept: GENERAL RADIOLOGY | Age: 70
End: 2025-07-10
Payer: MEDICARE

## 2025-07-10 ENCOUNTER — TELEPHONE (OUTPATIENT)
Dept: FAMILY MEDICINE CLINIC | Age: 70
End: 2025-07-10

## 2025-07-10 ENCOUNTER — APPOINTMENT (OUTPATIENT)
Dept: CT IMAGING | Age: 70
End: 2025-07-10
Payer: MEDICARE

## 2025-07-10 ENCOUNTER — HOSPITAL ENCOUNTER (EMERGENCY)
Age: 70
Discharge: HOME | End: 2025-07-10
Attending: EMERGENCY MEDICINE
Payer: MEDICARE

## 2025-07-10 VITALS
SYSTOLIC BLOOD PRESSURE: 132 MMHG | DIASTOLIC BLOOD PRESSURE: 59 MMHG | BODY MASS INDEX: 45.64 KG/M2 | OXYGEN SATURATION: 92 % | HEIGHT: 62 IN | RESPIRATION RATE: 18 BRPM | HEART RATE: 72 BPM | WEIGHT: 248 LBS | TEMPERATURE: 98.5 F

## 2025-07-10 DIAGNOSIS — R10.84 GENERALIZED ABDOMINAL PAIN: Primary | ICD-10-CM

## 2025-07-10 LAB
ALBUMIN SERPL-MCNC: 3.4 G/DL (ref 3.5–5.2)
ALP SERPL-CCNC: 112 U/L (ref 35–104)
ALT SERPL-CCNC: 17 U/L (ref 10–35)
ANION GAP SERPL CALCULATED.3IONS-SCNC: 10 MMOL/L (ref 9–16)
AST SERPL-CCNC: 16 U/L (ref 10–35)
BASOPHILS # BLD: 0.04 K/UL (ref 0–0.2)
BASOPHILS NFR BLD: 1 % (ref 0–2)
BILIRUB SERPL-MCNC: 0.4 MG/DL (ref 0–1.2)
BILIRUB UR QL STRIP: NEGATIVE
BNP SERPL-MCNC: 57 PG/ML (ref 0–300)
BUN SERPL-MCNC: 13 MG/DL (ref 8–23)
CALCIUM SERPL-MCNC: 9.3 MG/DL (ref 8.6–10.4)
CHLORIDE SERPL-SCNC: 106 MMOL/L (ref 98–107)
CLARITY UR: CLEAR
CO2 SERPL-SCNC: 24 MMOL/L (ref 20–31)
COLOR UR: YELLOW
COMMENT: ABNORMAL
CREAT SERPL-MCNC: 0.6 MG/DL (ref 0.7–1.2)
EOSINOPHIL # BLD: 0.12 K/UL (ref 0–0.44)
EOSINOPHILS RELATIVE PERCENT: 1 % (ref 0–4)
ERYTHROCYTE [DISTWIDTH] IN BLOOD BY AUTOMATED COUNT: 13.5 % (ref 11.5–14.9)
GFR, ESTIMATED: >90 ML/MIN/1.73M2
GLUCOSE SERPL-MCNC: 232 MG/DL (ref 74–99)
GLUCOSE UR STRIP-MCNC: ABNORMAL MG/DL
HCT VFR BLD AUTO: 43.6 % (ref 36–46)
HGB BLD-MCNC: 13.9 G/DL (ref 12–16)
HGB UR QL STRIP.AUTO: NEGATIVE
IMM GRANULOCYTES # BLD AUTO: 0.06 K/UL (ref 0–0.3)
IMM GRANULOCYTES NFR BLD: 1 %
INR PPP: 1
KETONES UR STRIP-MCNC: NEGATIVE MG/DL
LACTATE BLDV-SCNC: 1.6 MMOL/L (ref 0.5–2.2)
LEUKOCYTE ESTERASE UR QL STRIP: NEGATIVE
LIPASE SERPL-CCNC: 24 U/L (ref 13–60)
LYMPHOCYTES NFR BLD: 2.17 K/UL (ref 1.1–3.7)
LYMPHOCYTES RELATIVE PERCENT: 25 % (ref 24–44)
MAGNESIUM SERPL-MCNC: 1.9 MG/DL (ref 1.6–2.4)
MCH RBC QN AUTO: 28.8 PG (ref 26–34)
MCHC RBC AUTO-ENTMCNC: 31.9 G/DL (ref 31–37)
MCV RBC AUTO: 90.3 FL (ref 80–100)
MONOCYTES NFR BLD: 0.7 K/UL (ref 0.1–1.2)
MONOCYTES NFR BLD: 8 % (ref 3–12)
NEUTROPHILS NFR BLD: 64 % (ref 36–66)
NEUTS SEG NFR BLD: 5.67 K/UL (ref 1.5–8.1)
NITRITE UR QL STRIP: NEGATIVE
NRBC BLD-RTO: 0 PER 100 WBC
PH UR STRIP: 6.5 [PH] (ref 5–8)
PLATELET # BLD AUTO: 209 K/UL (ref 150–450)
PMV BLD AUTO: 11.7 FL (ref 8–13.5)
POTASSIUM SERPL-SCNC: 4.3 MMOL/L (ref 3.7–5.3)
PROT SERPL-MCNC: 6.2 G/DL (ref 6.6–8.7)
PROT UR STRIP-MCNC: NEGATIVE MG/DL
PROTHROMBIN TIME: 13.6 SEC (ref 11.8–14.6)
RBC # BLD AUTO: 4.83 M/UL (ref 3.95–5.11)
SODIUM SERPL-SCNC: 140 MMOL/L (ref 136–145)
SP GR UR STRIP: 1.04 (ref 1–1.03)
TROPONIN I SERPL HS-MCNC: 15 NG/L (ref 0–14)
TROPONIN I SERPL HS-MCNC: 16 NG/L (ref 0–14)
UROBILINOGEN UR STRIP-ACNC: NORMAL EU/DL (ref 0–1)
WBC OTHER # BLD: 8.8 K/UL (ref 3.5–11)

## 2025-07-10 PROCEDURE — 84484 ASSAY OF TROPONIN QUANT: CPT

## 2025-07-10 PROCEDURE — 93005 ELECTROCARDIOGRAM TRACING: CPT | Performed by: EMERGENCY MEDICINE

## 2025-07-10 PROCEDURE — 96375 TX/PRO/DX INJ NEW DRUG ADDON: CPT

## 2025-07-10 PROCEDURE — 71045 X-RAY EXAM CHEST 1 VIEW: CPT

## 2025-07-10 PROCEDURE — 36415 COLL VENOUS BLD VENIPUNCTURE: CPT

## 2025-07-10 PROCEDURE — 85610 PROTHROMBIN TIME: CPT

## 2025-07-10 PROCEDURE — 74177 CT ABD & PELVIS W/CONTRAST: CPT

## 2025-07-10 PROCEDURE — 80053 COMPREHEN METABOLIC PANEL: CPT

## 2025-07-10 PROCEDURE — 99285 EMERGENCY DEPT VISIT HI MDM: CPT

## 2025-07-10 PROCEDURE — 2580000003 HC RX 258: Performed by: EMERGENCY MEDICINE

## 2025-07-10 PROCEDURE — 2500000003 HC RX 250 WO HCPCS: Performed by: EMERGENCY MEDICINE

## 2025-07-10 PROCEDURE — 83605 ASSAY OF LACTIC ACID: CPT

## 2025-07-10 PROCEDURE — 85025 COMPLETE CBC W/AUTO DIFF WBC: CPT

## 2025-07-10 PROCEDURE — 96374 THER/PROPH/DIAG INJ IV PUSH: CPT

## 2025-07-10 PROCEDURE — 83735 ASSAY OF MAGNESIUM: CPT

## 2025-07-10 PROCEDURE — 83880 ASSAY OF NATRIURETIC PEPTIDE: CPT

## 2025-07-10 PROCEDURE — 81003 URINALYSIS AUTO W/O SCOPE: CPT

## 2025-07-10 PROCEDURE — 83690 ASSAY OF LIPASE: CPT

## 2025-07-10 PROCEDURE — 6360000004 HC RX CONTRAST MEDICATION: Performed by: EMERGENCY MEDICINE

## 2025-07-10 PROCEDURE — 6360000002 HC RX W HCPCS: Performed by: EMERGENCY MEDICINE

## 2025-07-10 RX ORDER — 0.9 % SODIUM CHLORIDE 0.9 %
100 INTRAVENOUS SOLUTION INTRAVENOUS ONCE
Status: COMPLETED | OUTPATIENT
Start: 2025-07-10 | End: 2025-07-10

## 2025-07-10 RX ORDER — SODIUM CHLORIDE 0.9 % (FLUSH) 0.9 %
10 SYRINGE (ML) INJECTION PRN
Status: DISCONTINUED | OUTPATIENT
Start: 2025-07-10 | End: 2025-07-10 | Stop reason: HOSPADM

## 2025-07-10 RX ORDER — MORPHINE SULFATE 4 MG/ML
4 INJECTION, SOLUTION INTRAMUSCULAR; INTRAVENOUS
Refills: 0 | Status: COMPLETED | OUTPATIENT
Start: 2025-07-10 | End: 2025-07-10

## 2025-07-10 RX ORDER — IOPAMIDOL 755 MG/ML
75 INJECTION, SOLUTION INTRAVASCULAR
Status: COMPLETED | OUTPATIENT
Start: 2025-07-10 | End: 2025-07-10

## 2025-07-10 RX ORDER — ONDANSETRON 2 MG/ML
4 INJECTION INTRAMUSCULAR; INTRAVENOUS ONCE
Status: COMPLETED | OUTPATIENT
Start: 2025-07-10 | End: 2025-07-10

## 2025-07-10 RX ADMIN — IOPAMIDOL 75 ML: 755 INJECTION, SOLUTION INTRAVENOUS at 10:21

## 2025-07-10 RX ADMIN — ONDANSETRON 4 MG: 2 INJECTION, SOLUTION INTRAMUSCULAR; INTRAVENOUS at 09:14

## 2025-07-10 RX ADMIN — SODIUM CHLORIDE, PRESERVATIVE FREE 10 ML: 5 INJECTION INTRAVENOUS at 10:21

## 2025-07-10 RX ADMIN — MORPHINE SULFATE 4 MG: 4 INJECTION, SOLUTION INTRAMUSCULAR; INTRAVENOUS at 09:14

## 2025-07-10 RX ADMIN — SODIUM CHLORIDE 100 ML: 9 INJECTION, SOLUTION INTRAVENOUS at 10:21

## 2025-07-10 ASSESSMENT — PAIN - FUNCTIONAL ASSESSMENT
PAIN_FUNCTIONAL_ASSESSMENT: 0-10
PAIN_FUNCTIONAL_ASSESSMENT: 0-10

## 2025-07-10 ASSESSMENT — ENCOUNTER SYMPTOMS
SHORTNESS OF BREATH: 0
COLOR CHANGE: 0
BACK PAIN: 0
ABDOMINAL PAIN: 1
EYE PAIN: 0

## 2025-07-10 ASSESSMENT — PAIN DESCRIPTION - DESCRIPTORS
DESCRIPTORS: PRESSURE
DESCRIPTORS: PRESSURE

## 2025-07-10 ASSESSMENT — PAIN SCALES - GENERAL
PAINLEVEL_OUTOF10: 6
PAINLEVEL_OUTOF10: 9
PAINLEVEL_OUTOF10: 5
PAINLEVEL_OUTOF10: 0

## 2025-07-10 ASSESSMENT — PAIN DESCRIPTION - LOCATION: LOCATION: ABDOMEN

## 2025-07-10 NOTE — TELEPHONE ENCOUNTER
[facility] ED Follow up Call    Reason for ED visit:   Abdominal Pain   Leg Swelling  Back Pain  Palpitations     7/10/2025           FU appts/Provider:    Future Appointments   Date Time Provider Department Center   7/15/2025 11:00 AM Agnes Acharya MD [facility] DEP         VOICEMAIL DOCUMENTATION - ERASE IF NOT USED  Hi, this message is for Amelia.  This is Viktoriya Perkins MA from [facility] office.  Just calling to see how you are doing after your recent visit to the Emergency Room.  [facility] Under[facility] wants to make sure you were able to fill any prescriptions and that you understand your discharge instructions.  Please return our call if you need to make a follow up appointment with your provider or have any further needs.   Our phone number is [phone].  Have a great day.

## 2025-07-10 NOTE — ED NOTES
Report given to CHRISTOPH Whatley from ED.   Report method in person   The following was reviewed with receiving RN:   Current vital signs:  /87   Pulse 71   Temp 98.5 °F (36.9 °C) (Oral)   Resp 20   Ht 1.575 m (5' 2\")   Wt 112.5 kg (248 lb)   LMP 01/12/2011   SpO2 96%   BMI 45.36 kg/m²                MEWS Score: 1     Any medication or safety alerts were reviewed. Any pending diagnostics and notifications were also reviewed, as well as any safety concerns or issues, abnormal labs, abnormal imaging, and abnormal assessment findings. Questions were answered.

## 2025-07-10 NOTE — ED NOTES
Pt oxygen saturation decreased to 86%. Placed pt on 2LNC. Saturation increased to 97%. Dr. Arzate notified.

## 2025-07-10 NOTE — ED PROVIDER NOTES
morphine injection 4 mg     Refill:  0    ondansetron (ZOFRAN) injection 4 mg    iopamidol (ISOVUE-370) 76 % injection 75 mL    sodium chloride 0.9 % bolus 100 mL    DISCONTD: sodium chloride flush 0.9 % injection 10 mL     DISCHARGE PRESCRIPTIONS:  Discharge Medication List as of 7/10/2025 12:27 PM        PHYSICIAN CONSULTS ORDERED THIS ENCOUNTER:  None  FINAL IMPRESSION      1. Generalized abdominal pain          DISPOSITION/PLAN   DISPOSITION Decision To Discharge 07/10/2025 12:27:06 PM   DISPOSITION CONDITION Stable           OUTPATIENT FOLLOW UP THE PATIENT:  Agnes Acharya MD  [address]  [address]  [place] [id number] [phone]    Schedule an appointment as soon as possible for a visit       [facility] Emergency Department  [address] [id number] [phone]    As needed, If symptoms worsen    Mark Meehan MD  [address]  [id number]  [place] [id number] [phone]    Schedule an appointment as soon as possible for a visit         DO Carito Bolton Nathan R, DO  07/10/25 1742

## 2025-07-12 LAB
EKG ATRIAL RATE: 72 BPM
EKG P AXIS: 66 DEGREES
EKG P-R INTERVAL: 166 MS
EKG Q-T INTERVAL: 414 MS
EKG QRS DURATION: 102 MS
EKG QTC CALCULATION (BAZETT): 453 MS
EKG R AXIS: -40 DEGREES
EKG T AXIS: 41 DEGREES
EKG VENTRICULAR RATE: 72 BPM

## 2025-07-14 ENCOUNTER — TELEPHONE (OUTPATIENT)
Dept: FAMILY MEDICINE CLINIC | Age: 70
End: 2025-07-14

## 2025-07-14 NOTE — TELEPHONE ENCOUNTER
[facility] ED Follow up Call    Reason for ED visit:    7/10/2025 (5 hours)  [facility] Emergency Department     Tito Arzate,   Last attending  Treatment team Generalized abdominal pain  Clinical impression Abdominal Pain   Leg Swelling  Back Pain  Palpitations  Chief complaint       Hi Amelia ,     This is Charlene Carty from [facility]in's office, just calling to see how you are doing after your recent ED visit.    Did you receive discharge instructions?  Yes  Do you understand the discharge instructions? Yes  Did the ED give you any new prescriptions? Yes  Were you able to fill your prescriptions? Yes    Do you have one of our red, yellow and green  Zone sheets that help you to determine when you should go to the ED?Yes    Do you need or want to make a follow up appt with your PCP?Yes    Do you have any further needs in the home i.e. Equipment?  No    FU appts/Provider:      Future Appointments   Date Time Provider Department Center   7/22/2025  1:45 PM Agnes Acharya MD [facility] DEP

## (undated) DEVICE — GLOVE ORANGE PI 7   MSG9070

## (undated) DEVICE — SUBMUCOSAL LIFTING AGENT: Brand: ORISE™ GEL

## (undated) DEVICE — NITINOL STONE RETRIEVAL BASKET: Brand: ZERO TIP

## (undated) DEVICE — 4-PORT MANIFOLD: Brand: NEPTUNE 2

## (undated) DEVICE — GLOVE SURG SZ 75 STD WHT LTX SYN POLYMER BEAD REINF ANTI RL

## (undated) DEVICE — STRAP,POSITIONING,KNEE/BODY,FOAM,4X60": Brand: MEDLINE

## (undated) DEVICE — DEVICE TISS REM L32CM DIA3MM S STL ULT HRD HI WR RESISTANCE

## (undated) DEVICE — ERBE NESSY®PLATE 170 SPLIT; 168CM²; CABLE 3M: Brand: ERBE

## (undated) DEVICE — SET ENDOSCP SEAL HYSTEROSCOPE RIG OUTFLO CHN DISP MYOSURE

## (undated) DEVICE — SINGLE ACTION PUMPING SYSTEM

## (undated) DEVICE — ADAPTER CLEANING PORPOISE CLEANING

## (undated) DEVICE — DEFENDO AIR WATER SUCTION AND BIOPSY VALVE KIT FOR  OLYMPUS: Brand: DEFENDO AIR/WATER/SUCTION AND BIOPSY VALVE

## (undated) DEVICE — JELLY,LUBE,STERILE,FLIP TOP,TUBE,2-OZ: Brand: MEDLINE

## (undated) DEVICE — SOLUTION IV 1000 ML 0.9 NACL INJ USP EXCEL PLAS CONTAINER

## (undated) DEVICE — SNARE ENDOSCP L240CM OD2.4MM LOOP W15MM RND INSUL STIFF

## (undated) DEVICE — GOWN,POLY REINFORCED,LG: Brand: MEDLINE

## (undated) DEVICE — FORCEPS BX L240CM JAW DIA2.4MM ORNG L CAP W/ NDL DISP RAD

## (undated) DEVICE — CATHETER URET 5FR L70CM OPN END SGL LUMN INJ HUB FLEXIMA

## (undated) DEVICE — ENDO KIT W/SYRINGE: Brand: MEDLINE INDUSTRIES, INC.

## (undated) DEVICE — FORCEPS BX L240CM WRK CHN 2.8MM STD CAP W/ NDL MIC MESH

## (undated) DEVICE — ST CHARLES CYSTO PACK: Brand: MEDLINE INDUSTRIES, INC.

## (undated) DEVICE — STRIP,CLOSURE,WOUND,MEDI-STRIP,1/2X4: Brand: MEDLINE

## (undated) DEVICE — SNARE ENDOSCP L240CM LOOP W13MM DIA2.4MM SHT THROW SM OVL

## (undated) DEVICE — 1000ML,PRESSURE INFUSER W/STOPCOCK: Brand: MEDLINE

## (undated) DEVICE — GAUZE,SPONGE,4"X4",16PLY,STRL,LF,10/TRAY: Brand: MEDLINE

## (undated) DEVICE — SET,IRRIGATION,CYSTO/TUR,90": Brand: MEDLINE

## (undated) DEVICE — ST CHARLES PERI-GYN: Brand: MEDLINE INDUSTRIES, INC.

## (undated) DEVICE — SNARE ENDOSCP L240CM LOOP W13MM SHTH DIA2.4MM SM OVL FLX

## (undated) DEVICE — ADAPTER URO SCP UROLOK LL

## (undated) DEVICE — BITEBLOCK 54FR W/ DENT RIM BLOX

## (undated) DEVICE — SNARE ENDOSCP AD L240CM LOOP W10MM SHTH DIA2.4MM RND INSUL

## (undated) DEVICE — NEEDLE SCLERO 25GA L240CM OD0.51MM ID0.24MM EXTN L4MM SHTH

## (undated) DEVICE — POLYP TRAP: Brand: TRAPEASE®

## (undated) DEVICE — SOLUTION IV IRRIG WATER 1000ML POUR BRL 2F7114

## (undated) DEVICE — Z INACTIVE USE 2660664 SOLUTION IRRIG 3000ML 0.9% SOD CHL USP UROMATIC PLAS CONT

## (undated) DEVICE — Device: Brand: DEFENDO VALVE AND CONNECTOR KIT

## (undated) DEVICE — GUIDEWIRE URO L150CM DIA0.035IN STIFF NIT HYDRPHLC STR TIP

## (undated) DEVICE — SOLUTION IRRIG 1000ML H2O PIC PLAS SHATTERPROOF CONTAINER

## (undated) DEVICE — PERRYSBURG ENDO PACK: Brand: MEDLINE INDUSTRIES, INC.

## (undated) DEVICE — DUAL LUMEN URETERAL CATHETER

## (undated) DEVICE — SNARE ENDOSCP L240CM SHTH DIA2.4MM LOOP W20MM MIN WRK CHN

## (undated) DEVICE — Z DUP USE 2522782 SOLUTION IRRIG 1000ML STRL H2O PLAS CONTAINER UROMATIC

## (undated) DEVICE — SINGLE PORT MANIFOLD: Brand: NEPTUNE 2

## (undated) DEVICE — Device